# Patient Record
Sex: FEMALE | Race: BLACK OR AFRICAN AMERICAN | NOT HISPANIC OR LATINO | Employment: OTHER | RURAL
[De-identification: names, ages, dates, MRNs, and addresses within clinical notes are randomized per-mention and may not be internally consistent; named-entity substitution may affect disease eponyms.]

---

## 2021-02-19 ENCOUNTER — HISTORICAL (OUTPATIENT)
Dept: ADMINISTRATIVE | Facility: HOSPITAL | Age: 66
End: 2021-02-19

## 2021-02-20 ENCOUNTER — HISTORICAL (OUTPATIENT)
Dept: ADMINISTRATIVE | Facility: HOSPITAL | Age: 66
End: 2021-02-20

## 2021-02-20 LAB
ALBUMIN SERPL BCP-MCNC: 3.7 G/DL (ref 3.5–5)
ALBUMIN/GLOB SERPL: 0.8 {RATIO}
ALP SERPL-CCNC: 96 U/L (ref 50–130)
ALT SERPL W P-5'-P-CCNC: 24 U/L (ref 13–56)
ANION GAP SERPL CALCULATED.3IONS-SCNC: 14 MMOL/L
AST SERPL W P-5'-P-CCNC: 19 U/L (ref 15–37)
BACTERIA #/AREA URNS HPF: ABNORMAL /HPF
BASOPHILS # BLD AUTO: 0.04 X10E3/UL (ref 0–0.2)
BASOPHILS NFR BLD AUTO: 0.3 % (ref 0–1)
BILIRUB SERPL-MCNC: 0.8 MG/DL (ref 0–1.2)
BILIRUB UR QL STRIP: NEGATIVE MG/DL
BUN SERPL-MCNC: 26 MG/DL (ref 7–18)
BUN/CREAT SERPL: 12.2
CALCIUM SERPL-MCNC: 9.8 MG/DL (ref 8.5–10.1)
CHLORIDE SERPL-SCNC: 104 MMOL/L (ref 98–107)
CLARITY UR: ABNORMAL
CLARITY UR: ABNORMAL
CO2 SERPL-SCNC: 28 MMOL/L (ref 21–32)
COLOR UR: YELLOW
COLOR UR: YELLOW
CREAT SERPL-MCNC: 2.13 MG/DL (ref 0.55–1.02)
EOSINOPHIL # BLD AUTO: 0.03 X10E3/UL (ref 0–0.5)
EOSINOPHIL NFR BLD AUTO: 0.2 % (ref 1–4)
ERYTHROCYTE [DISTWIDTH] IN BLOOD BY AUTOMATED COUNT: 14.4 % (ref 11.5–14.5)
GLOBULIN SER-MCNC: 4.9 G/DL (ref 2–4)
GLUCOSE SERPL-MCNC: 133 MG/DL (ref 74–106)
GLUCOSE UR STRIP-MCNC: NEGATIVE MG/DL
HCT VFR BLD AUTO: 43.6 % (ref 38–47)
HGB BLD-MCNC: 14.1 G/DL (ref 12–16)
IMM GRANULOCYTES # BLD AUTO: 0.08 X10E3/UL (ref 0–0.04)
IMM GRANULOCYTES NFR BLD: 0.5 % (ref 0–0.4)
KETONES UR STRIP-SCNC: NEGATIVE MG/DL
LEUKOCYTE ESTERASE UR QL STRIP: ABNORMAL LEU/UL
LYMPHOCYTES # BLD AUTO: 1.91 X10E3/UL (ref 1–4.8)
LYMPHOCYTES NFR BLD AUTO: 12.1 % (ref 27–41)
MCH RBC QN AUTO: 29.6 PG (ref 27–31)
MCHC RBC AUTO-ENTMCNC: 32.3 G/DL (ref 32–36)
MCV RBC AUTO: 91.6 FL (ref 80–96)
MONOCYTES # BLD AUTO: 1 X10E3/UL (ref 0–0.8)
MONOCYTES NFR BLD AUTO: 6.3 % (ref 2–6)
MPC BLD CALC-MCNC: 11.5 FL (ref 9.4–12.4)
MUCOUS THREADS #/AREA URNS HPF: ABNORMAL /HPF
NEUTROPHILS # BLD AUTO: 12.72 X10E3/UL (ref 1.8–7.7)
NEUTROPHILS NFR BLD AUTO: 80.6 % (ref 53–65)
NITRITE UR QL STRIP: NEGATIVE
NRBC # BLD AUTO: 0 X10E3/UL (ref 0–0)
NRBC, AUTO (.00): 0 /100 (ref 0–0)
PH UR STRIP: 5.5 PH UNITS (ref 5–8)
PLATELET # BLD AUTO: 280 X10E3/UL (ref 150–400)
POTASSIUM SERPL-SCNC: 3.8 MMOL/L (ref 3.5–5.1)
PROT SERPL-MCNC: 8.6 G/DL (ref 6.4–8.2)
PROT UR QL STRIP: NEGATIVE MG/DL
RBC # BLD AUTO: 4.76 X10E6/UL (ref 4.2–5.4)
RBC # UR STRIP: ABNORMAL ERY/UL
RBC #/AREA URNS HPF: ABNORMAL /HPF (ref 0–3)
SODIUM SERPL-SCNC: 142 MMOL/L (ref 136–145)
SP GR UR STRIP: 1.02 (ref 1–1.03)
SQUAMOUS #/AREA URNS LPF: ABNORMAL /LPF
TRICHOMONAS #/AREA URNS HPF: ABNORMAL /HPF
UROBILINOGEN UR STRIP-ACNC: 0.2 EU/DL
WBC # BLD AUTO: 15.78 X10E3/UL (ref 4.5–11)
WBC #/AREA URNS HPF: ABNORMAL /HPF (ref 0–5)
YEAST #/AREA URNS HPF: ABNORMAL /HPF

## 2021-02-22 LAB
REPORT: NO GROWTH
REPORT: NORMAL

## 2021-02-23 ENCOUNTER — HISTORICAL (OUTPATIENT)
Dept: ADMINISTRATIVE | Facility: HOSPITAL | Age: 66
End: 2021-02-23

## 2021-06-29 VITALS
BODY MASS INDEX: 47.09 KG/M2 | OXYGEN SATURATION: 98 % | HEART RATE: 65 BPM | TEMPERATURE: 97 F | SYSTOLIC BLOOD PRESSURE: 130 MMHG | HEIGHT: 66 IN | WEIGHT: 293 LBS | RESPIRATION RATE: 18 BRPM | DIASTOLIC BLOOD PRESSURE: 68 MMHG

## 2021-06-29 RX ORDER — TRAZODONE HYDROCHLORIDE 50 MG/1
50 TABLET ORAL NIGHTLY
COMMUNITY
End: 2021-09-29 | Stop reason: SDUPTHER

## 2021-06-29 RX ORDER — LOSARTAN POTASSIUM AND HYDROCHLOROTHIAZIDE 12.5; 5 MG/1; MG/1
1 TABLET ORAL DAILY
COMMUNITY
End: 2021-06-30 | Stop reason: SDUPTHER

## 2021-06-29 RX ORDER — NAPROXEN SODIUM 220 MG/1
81 TABLET, FILM COATED ORAL DAILY
COMMUNITY
End: 2021-06-30 | Stop reason: SDUPTHER

## 2021-06-29 RX ORDER — ATORVASTATIN CALCIUM 10 MG/1
10 TABLET, FILM COATED ORAL DAILY
COMMUNITY
End: 2021-06-30 | Stop reason: SDUPTHER

## 2021-06-29 RX ORDER — FLUNISOLIDE 0.25 MG/ML
2 SOLUTION NASAL 2 TIMES DAILY
COMMUNITY
End: 2021-06-30

## 2021-06-29 RX ORDER — ALBUTEROL SULFATE 90 UG/1
2 AEROSOL, METERED RESPIRATORY (INHALATION) EVERY 6 HOURS PRN
COMMUNITY
End: 2021-09-29 | Stop reason: SDUPTHER

## 2021-06-29 RX ORDER — ACETAMINOPHEN 500 MG
1 TABLET ORAL DAILY
COMMUNITY
End: 2021-06-30 | Stop reason: SDUPTHER

## 2021-06-29 RX ORDER — KETOROLAC TROMETHAMINE 10 MG/1
10 TABLET, FILM COATED ORAL EVERY 8 HOURS PRN
COMMUNITY
End: 2021-06-30

## 2021-06-30 ENCOUNTER — OFFICE VISIT (OUTPATIENT)
Dept: FAMILY MEDICINE | Facility: CLINIC | Age: 66
End: 2021-06-30
Payer: COMMERCIAL

## 2021-06-30 VITALS
OXYGEN SATURATION: 98 % | SYSTOLIC BLOOD PRESSURE: 167 MMHG | HEART RATE: 73 BPM | RESPIRATION RATE: 18 BRPM | WEIGHT: 293 LBS | DIASTOLIC BLOOD PRESSURE: 85 MMHG | BODY MASS INDEX: 50.16 KG/M2

## 2021-06-30 DIAGNOSIS — I10 ESSENTIAL HYPERTENSION: ICD-10-CM

## 2021-06-30 DIAGNOSIS — F51.01 PRIMARY INSOMNIA: ICD-10-CM

## 2021-06-30 DIAGNOSIS — E55.9 VITAMIN D DEFICIENCY: ICD-10-CM

## 2021-06-30 DIAGNOSIS — E78.2 MIXED HYPERLIPIDEMIA: ICD-10-CM

## 2021-06-30 LAB
ALBUMIN SERPL BCP-MCNC: 3.5 G/DL (ref 3.5–5)
ALBUMIN/GLOB SERPL: 0.8 {RATIO}
ALP SERPL-CCNC: 115 U/L (ref 50–130)
ALT SERPL W P-5'-P-CCNC: 23 U/L (ref 13–56)
ANION GAP SERPL CALCULATED.3IONS-SCNC: 9 MMOL/L (ref 7–16)
AST SERPL W P-5'-P-CCNC: 17 U/L (ref 15–37)
BILIRUB SERPL-MCNC: 0.6 MG/DL (ref 0–1.2)
BUN SERPL-MCNC: 20 MG/DL (ref 7–18)
BUN/CREAT SERPL: 16 (ref 6–20)
CALCIUM SERPL-MCNC: 9.1 MG/DL (ref 8.5–10.1)
CHLORIDE SERPL-SCNC: 106 MMOL/L (ref 98–107)
CHOLEST SERPL-MCNC: 161 MG/DL (ref 0–200)
CHOLEST/HDLC SERPL: 2.1 {RATIO}
CO2 SERPL-SCNC: 30 MMOL/L (ref 21–32)
CREAT SERPL-MCNC: 1.22 MG/DL (ref 0.55–1.02)
GLOBULIN SER-MCNC: 4.6 G/DL (ref 2–4)
GLUCOSE SERPL-MCNC: 116 MG/DL (ref 74–106)
HDLC SERPL-MCNC: 76 MG/DL (ref 40–60)
LDLC SERPL CALC-MCNC: 70 MG/DL
LDLC/HDLC SERPL: 0.9 {RATIO}
NONHDLC SERPL-MCNC: 85 MG/DL
POTASSIUM SERPL-SCNC: 3.7 MMOL/L (ref 3.5–5.1)
PROT SERPL-MCNC: 8.1 G/DL (ref 6.4–8.2)
SODIUM SERPL-SCNC: 141 MMOL/L (ref 136–145)
TRIGL SERPL-MCNC: 76 MG/DL (ref 35–150)
VLDLC SERPL-MCNC: 15 MG/DL

## 2021-06-30 PROCEDURE — 99214 PR OFFICE/OUTPT VISIT, EST, LEVL IV, 30-39 MIN: ICD-10-PCS | Mod: ,,, | Performed by: FAMILY MEDICINE

## 2021-06-30 PROCEDURE — 99214 OFFICE O/P EST MOD 30 MIN: CPT | Mod: ,,, | Performed by: FAMILY MEDICINE

## 2021-06-30 PROCEDURE — 80053 COMPREHENSIVE METABOLIC PANEL: ICD-10-PCS | Mod: ,,, | Performed by: CLINICAL MEDICAL LABORATORY

## 2021-06-30 PROCEDURE — 80061 LIPID PANEL: ICD-10-PCS | Mod: ,,, | Performed by: CLINICAL MEDICAL LABORATORY

## 2021-06-30 PROCEDURE — 80061 LIPID PANEL: CPT | Mod: ,,, | Performed by: CLINICAL MEDICAL LABORATORY

## 2021-06-30 PROCEDURE — 80053 COMPREHEN METABOLIC PANEL: CPT | Mod: ,,, | Performed by: CLINICAL MEDICAL LABORATORY

## 2021-06-30 RX ORDER — POTASSIUM CHLORIDE 750 MG/1
10 TABLET, EXTENDED RELEASE ORAL DAILY
Qty: 90 TABLET | Refills: 1 | Status: SHIPPED | OUTPATIENT
Start: 2021-06-30 | End: 2021-09-29 | Stop reason: SDUPTHER

## 2021-06-30 RX ORDER — LOSARTAN POTASSIUM AND HYDROCHLOROTHIAZIDE 12.5; 5 MG/1; MG/1
1 TABLET ORAL DAILY
Qty: 90 TABLET | Refills: 1 | Status: SHIPPED | OUTPATIENT
Start: 2021-06-30 | End: 2021-09-29 | Stop reason: SDUPTHER

## 2021-06-30 RX ORDER — POTASSIUM CHLORIDE 1500 MG/1
1 TABLET, EXTENDED RELEASE ORAL DAILY
COMMUNITY
Start: 2021-03-03 | End: 2021-06-30

## 2021-06-30 RX ORDER — NAPROXEN SODIUM 220 MG/1
81 TABLET, FILM COATED ORAL DAILY
Qty: 90 TABLET | Refills: 1 | Status: SHIPPED | OUTPATIENT
Start: 2021-06-30 | End: 2021-09-29 | Stop reason: SDUPTHER

## 2021-06-30 RX ORDER — ACETAMINOPHEN 500 MG
1 TABLET ORAL DAILY
Qty: 90 CAPSULE | Refills: 1 | Status: SHIPPED | OUTPATIENT
Start: 2021-06-30 | End: 2021-09-29 | Stop reason: SDUPTHER

## 2021-06-30 RX ORDER — ATORVASTATIN CALCIUM 10 MG/1
10 TABLET, FILM COATED ORAL DAILY
Qty: 90 TABLET | Refills: 1 | Status: SHIPPED | OUTPATIENT
Start: 2021-06-30 | End: 2021-09-29 | Stop reason: SDUPTHER

## 2021-09-29 ENCOUNTER — OFFICE VISIT (OUTPATIENT)
Dept: FAMILY MEDICINE | Facility: CLINIC | Age: 66
End: 2021-09-29
Payer: MEDICARE

## 2021-09-29 VITALS
HEIGHT: 66 IN | BODY MASS INDEX: 47.09 KG/M2 | RESPIRATION RATE: 18 BRPM | DIASTOLIC BLOOD PRESSURE: 80 MMHG | SYSTOLIC BLOOD PRESSURE: 142 MMHG | HEART RATE: 72 BPM | TEMPERATURE: 98 F | WEIGHT: 293 LBS | OXYGEN SATURATION: 98 %

## 2021-09-29 DIAGNOSIS — R09.89 CHEST CONGESTION: ICD-10-CM

## 2021-09-29 DIAGNOSIS — I10 ESSENTIAL HYPERTENSION: Primary | ICD-10-CM

## 2021-09-29 DIAGNOSIS — Z76.0 MEDICATION REFILL: ICD-10-CM

## 2021-09-29 DIAGNOSIS — E55.9 VITAMIN D DEFICIENCY: ICD-10-CM

## 2021-09-29 DIAGNOSIS — J98.01 BRONCHOSPASM: ICD-10-CM

## 2021-09-29 DIAGNOSIS — F51.01 PRIMARY INSOMNIA: ICD-10-CM

## 2021-09-29 DIAGNOSIS — J30.2 SEASONAL ALLERGIES: ICD-10-CM

## 2021-09-29 DIAGNOSIS — F41.1 GENERALIZED ANXIETY DISORDER: ICD-10-CM

## 2021-09-29 DIAGNOSIS — E78.2 MIXED HYPERLIPIDEMIA: ICD-10-CM

## 2021-09-29 DIAGNOSIS — E87.6 HYPOKALEMIA: ICD-10-CM

## 2021-09-29 PROCEDURE — 99213 PR OFFICE/OUTPT VISIT, EST, LEVL III, 20-29 MIN: ICD-10-PCS | Mod: ,,, | Performed by: NURSE PRACTITIONER

## 2021-09-29 PROCEDURE — 99213 OFFICE O/P EST LOW 20 MIN: CPT | Mod: ,,, | Performed by: NURSE PRACTITIONER

## 2021-09-29 RX ORDER — SERTRALINE HYDROCHLORIDE 25 MG/1
25 TABLET, FILM COATED ORAL DAILY
Qty: 90 TABLET | Refills: 1 | Status: SHIPPED | OUTPATIENT
Start: 2021-09-29 | End: 2021-12-23 | Stop reason: SDUPTHER

## 2021-09-29 RX ORDER — POTASSIUM CHLORIDE 750 MG/1
10 TABLET, EXTENDED RELEASE ORAL DAILY
Qty: 90 TABLET | Refills: 1 | Status: SHIPPED | OUTPATIENT
Start: 2021-09-29 | End: 2021-12-23 | Stop reason: SDUPTHER

## 2021-09-29 RX ORDER — LOSARTAN POTASSIUM AND HYDROCHLOROTHIAZIDE 12.5; 5 MG/1; MG/1
1 TABLET ORAL DAILY
Qty: 90 TABLET | Refills: 1 | Status: SHIPPED | OUTPATIENT
Start: 2021-09-29 | End: 2021-12-23 | Stop reason: SDUPTHER

## 2021-09-29 RX ORDER — ALBUTEROL SULFATE 90 UG/1
2 AEROSOL, METERED RESPIRATORY (INHALATION) EVERY 6 HOURS PRN
Qty: 18 G | Refills: 5 | Status: SHIPPED | OUTPATIENT
Start: 2021-09-29 | End: 2021-12-23 | Stop reason: SDUPTHER

## 2021-09-29 RX ORDER — TRAZODONE HYDROCHLORIDE 50 MG/1
50 TABLET ORAL NIGHTLY
Qty: 90 TABLET | Refills: 1 | Status: SHIPPED | OUTPATIENT
Start: 2021-09-29 | End: 2021-12-23 | Stop reason: SDUPTHER

## 2021-09-29 RX ORDER — ACETAMINOPHEN 500 MG
1 TABLET ORAL DAILY
Qty: 90 CAPSULE | Refills: 1 | Status: SHIPPED | OUTPATIENT
Start: 2021-09-29 | End: 2021-12-23 | Stop reason: SDUPTHER

## 2021-09-29 RX ORDER — SERTRALINE HYDROCHLORIDE 25 MG/1
25 TABLET, FILM COATED ORAL DAILY
COMMUNITY
End: 2021-09-29 | Stop reason: SDUPTHER

## 2021-09-29 RX ORDER — AZITHROMYCIN 250 MG/1
TABLET, FILM COATED ORAL
Qty: 6 TABLET | Refills: 0 | Status: SHIPPED | OUTPATIENT
Start: 2021-09-29 | End: 2021-10-04

## 2021-09-29 RX ORDER — NAPROXEN SODIUM 220 MG/1
81 TABLET, FILM COATED ORAL DAILY
Qty: 90 TABLET | Refills: 1 | Status: SHIPPED | OUTPATIENT
Start: 2021-09-29 | End: 2023-05-15

## 2021-09-29 RX ORDER — FEXOFENADINE HCL 60 MG
60 TABLET ORAL DAILY
Qty: 90 TABLET | Refills: 1 | Status: SHIPPED | OUTPATIENT
Start: 2021-09-29 | End: 2021-12-23

## 2021-09-29 RX ORDER — ATORVASTATIN CALCIUM 10 MG/1
10 TABLET, FILM COATED ORAL DAILY
Qty: 90 TABLET | Refills: 1 | Status: SHIPPED | OUTPATIENT
Start: 2021-09-29 | End: 2021-12-23 | Stop reason: SDUPTHER

## 2021-12-23 ENCOUNTER — OFFICE VISIT (OUTPATIENT)
Dept: FAMILY MEDICINE | Facility: CLINIC | Age: 66
End: 2021-12-23
Payer: MEDICARE

## 2021-12-23 ENCOUNTER — APPOINTMENT (OUTPATIENT)
Dept: RADIOLOGY | Facility: CLINIC | Age: 66
End: 2021-12-23
Attending: FAMILY MEDICINE
Payer: MEDICARE

## 2021-12-23 VITALS
TEMPERATURE: 98 F | HEART RATE: 72 BPM | RESPIRATION RATE: 18 BRPM | SYSTOLIC BLOOD PRESSURE: 145 MMHG | WEIGHT: 293 LBS | BODY MASS INDEX: 47.09 KG/M2 | HEIGHT: 66 IN | DIASTOLIC BLOOD PRESSURE: 78 MMHG | OXYGEN SATURATION: 99 %

## 2021-12-23 DIAGNOSIS — E87.6 HYPOKALEMIA: ICD-10-CM

## 2021-12-23 DIAGNOSIS — J45.40 MODERATE PERSISTENT ASTHMA WITHOUT COMPLICATION: Primary | ICD-10-CM

## 2021-12-23 DIAGNOSIS — R05.9 COUGH: ICD-10-CM

## 2021-12-23 DIAGNOSIS — F51.01 PRIMARY INSOMNIA: ICD-10-CM

## 2021-12-23 DIAGNOSIS — E78.2 MIXED HYPERLIPIDEMIA: ICD-10-CM

## 2021-12-23 DIAGNOSIS — F41.1 GENERALIZED ANXIETY DISORDER: ICD-10-CM

## 2021-12-23 DIAGNOSIS — J98.01 BRONCHOSPASM: ICD-10-CM

## 2021-12-23 DIAGNOSIS — I10 ESSENTIAL HYPERTENSION: ICD-10-CM

## 2021-12-23 DIAGNOSIS — E55.9 VITAMIN D DEFICIENCY: ICD-10-CM

## 2021-12-23 LAB
ALBUMIN SERPL BCP-MCNC: 3.3 G/DL (ref 3.5–5)
ALBUMIN/GLOB SERPL: 0.7 {RATIO}
ALP SERPL-CCNC: 105 U/L (ref 55–142)
ALT SERPL W P-5'-P-CCNC: 24 U/L (ref 13–56)
ANION GAP SERPL CALCULATED.3IONS-SCNC: 10 MMOL/L (ref 7–16)
AST SERPL W P-5'-P-CCNC: 17 U/L (ref 15–37)
BILIRUB SERPL-MCNC: 0.7 MG/DL (ref 0–1.2)
BUN SERPL-MCNC: 17 MG/DL (ref 7–18)
BUN/CREAT SERPL: 15 (ref 6–20)
CALCIUM SERPL-MCNC: 9.3 MG/DL (ref 8.5–10.1)
CHLORIDE SERPL-SCNC: 105 MMOL/L (ref 98–107)
CHOLEST SERPL-MCNC: 152 MG/DL (ref 0–200)
CHOLEST/HDLC SERPL: 2.1 {RATIO}
CO2 SERPL-SCNC: 31 MMOL/L (ref 21–32)
CREAT SERPL-MCNC: 1.15 MG/DL (ref 0.55–1.02)
GLOBULIN SER-MCNC: 4.7 G/DL (ref 2–4)
GLUCOSE SERPL-MCNC: 135 MG/DL (ref 74–106)
HDLC SERPL-MCNC: 72 MG/DL (ref 40–60)
LDLC SERPL CALC-MCNC: 68 MG/DL
LDLC/HDLC SERPL: 0.9 {RATIO}
NONHDLC SERPL-MCNC: 80 MG/DL
POTASSIUM SERPL-SCNC: 3.6 MMOL/L (ref 3.5–5.1)
PROT SERPL-MCNC: 8 G/DL (ref 6.4–8.2)
SODIUM SERPL-SCNC: 142 MMOL/L (ref 136–145)
TRIGL SERPL-MCNC: 62 MG/DL (ref 35–150)
VLDLC SERPL-MCNC: 12 MG/DL

## 2021-12-23 PROCEDURE — 80061 LIPID PANEL: CPT | Mod: ,,, | Performed by: CLINICAL MEDICAL LABORATORY

## 2021-12-23 PROCEDURE — 80053 COMPREHEN METABOLIC PANEL: CPT | Mod: ,,, | Performed by: CLINICAL MEDICAL LABORATORY

## 2021-12-23 PROCEDURE — 80053 COMPREHENSIVE METABOLIC PANEL: ICD-10-PCS | Mod: ,,, | Performed by: CLINICAL MEDICAL LABORATORY

## 2021-12-23 PROCEDURE — 71046 X-RAY EXAM CHEST 2 VIEWS: CPT | Mod: TC,RHCUB,FY | Performed by: FAMILY MEDICINE

## 2021-12-23 PROCEDURE — 71046 XR CHEST PA AND LATERAL: ICD-10-PCS | Mod: 26,,, | Performed by: RADIOLOGY

## 2021-12-23 PROCEDURE — 71046 X-RAY EXAM CHEST 2 VIEWS: CPT | Mod: 26,,, | Performed by: RADIOLOGY

## 2021-12-23 PROCEDURE — 99214 PR OFFICE/OUTPT VISIT, EST, LEVL IV, 30-39 MIN: ICD-10-PCS | Mod: ,,, | Performed by: FAMILY MEDICINE

## 2021-12-23 PROCEDURE — 99214 OFFICE O/P EST MOD 30 MIN: CPT | Mod: ,,, | Performed by: FAMILY MEDICINE

## 2021-12-23 PROCEDURE — 80061 LIPID PANEL: ICD-10-PCS | Mod: ,,, | Performed by: CLINICAL MEDICAL LABORATORY

## 2021-12-23 RX ORDER — LOSARTAN POTASSIUM AND HYDROCHLOROTHIAZIDE 12.5; 5 MG/1; MG/1
1 TABLET ORAL DAILY
Qty: 90 TABLET | Refills: 1 | Status: SHIPPED | OUTPATIENT
Start: 2021-12-23 | End: 2021-12-23 | Stop reason: SDUPTHER

## 2021-12-23 RX ORDER — ALBUTEROL SULFATE 90 UG/1
2 AEROSOL, METERED RESPIRATORY (INHALATION) EVERY 6 HOURS PRN
Qty: 18 G | Refills: 5 | Status: SHIPPED | OUTPATIENT
Start: 2021-12-23 | End: 2022-12-15 | Stop reason: SDUPTHER

## 2021-12-23 RX ORDER — MONTELUKAST SODIUM 10 MG/1
10 TABLET ORAL NIGHTLY
Qty: 90 TABLET | Refills: 3 | Status: SHIPPED | OUTPATIENT
Start: 2021-12-23 | End: 2022-08-30

## 2021-12-23 RX ORDER — POTASSIUM CHLORIDE 750 MG/1
10 TABLET, EXTENDED RELEASE ORAL DAILY
Qty: 90 TABLET | Refills: 1 | Status: SHIPPED | OUTPATIENT
Start: 2021-12-23 | End: 2022-06-01

## 2021-12-23 RX ORDER — TRAZODONE HYDROCHLORIDE 100 MG/1
100 TABLET ORAL NIGHTLY
Qty: 90 TABLET | Refills: 1 | Status: SHIPPED | OUTPATIENT
Start: 2021-12-23 | End: 2022-05-31

## 2021-12-23 RX ORDER — ACETAMINOPHEN 500 MG
1 TABLET ORAL DAILY
Qty: 90 CAPSULE | Refills: 1 | Status: SHIPPED | OUTPATIENT
Start: 2021-12-23 | End: 2022-05-31

## 2021-12-23 RX ORDER — SERTRALINE HYDROCHLORIDE 25 MG/1
25 TABLET, FILM COATED ORAL DAILY
Qty: 90 TABLET | Refills: 1 | Status: SHIPPED | OUTPATIENT
Start: 2021-12-23 | End: 2022-08-30

## 2021-12-23 RX ORDER — ATORVASTATIN CALCIUM 10 MG/1
10 TABLET, FILM COATED ORAL DAILY
Qty: 90 TABLET | Refills: 1 | Status: SHIPPED | OUTPATIENT
Start: 2021-12-23 | End: 2022-12-15 | Stop reason: SDUPTHER

## 2021-12-23 RX ORDER — LOSARTAN POTASSIUM AND HYDROCHLOROTHIAZIDE 12.5; 5 MG/1; MG/1
1 TABLET ORAL DAILY
Qty: 90 TABLET | Refills: 1 | Status: SHIPPED | OUTPATIENT
Start: 2021-12-23 | End: 2022-03-17 | Stop reason: DRUGHIGH

## 2021-12-23 RX ORDER — CETIRIZINE HYDROCHLORIDE 10 MG/1
10 TABLET ORAL DAILY
Qty: 90 TABLET | Refills: 3 | Status: SHIPPED | OUTPATIENT
Start: 2021-12-23 | End: 2022-08-30

## 2022-03-11 DIAGNOSIS — Z71.89 COMPLEX CARE COORDINATION: ICD-10-CM

## 2022-03-17 ENCOUNTER — OFFICE VISIT (OUTPATIENT)
Dept: FAMILY MEDICINE | Facility: CLINIC | Age: 67
End: 2022-03-17
Payer: MEDICARE

## 2022-03-17 VITALS
RESPIRATION RATE: 20 BRPM | BODY MASS INDEX: 47.09 KG/M2 | HEART RATE: 72 BPM | OXYGEN SATURATION: 98 % | HEIGHT: 66 IN | TEMPERATURE: 98 F | SYSTOLIC BLOOD PRESSURE: 156 MMHG | DIASTOLIC BLOOD PRESSURE: 74 MMHG | WEIGHT: 293 LBS

## 2022-03-17 DIAGNOSIS — J32.9 SINUSITIS, UNSPECIFIED CHRONICITY, UNSPECIFIED LOCATION: ICD-10-CM

## 2022-03-17 DIAGNOSIS — I10 ESSENTIAL HYPERTENSION: ICD-10-CM

## 2022-03-17 DIAGNOSIS — Z11.59 ENCOUNTER FOR HEPATITIS C SCREENING TEST FOR LOW RISK PATIENT: ICD-10-CM

## 2022-03-17 DIAGNOSIS — Z00.00 ENCOUNTER FOR ANNUAL WELLNESS VISIT (AWV) IN MEDICARE PATIENT: Primary | ICD-10-CM

## 2022-03-17 DIAGNOSIS — Z78.0 ASYMPTOMATIC MENOPAUSAL STATE: ICD-10-CM

## 2022-03-17 DIAGNOSIS — J98.01 BRONCHOSPASM: ICD-10-CM

## 2022-03-17 DIAGNOSIS — Z12.31 ENCOUNTER FOR SCREENING MAMMOGRAM FOR MALIGNANT NEOPLASM OF BREAST: ICD-10-CM

## 2022-03-17 PROCEDURE — G0402 PR WELCOME MEDICARE PREVENTIVE VISIT NEW ENROLLEE: ICD-10-PCS | Mod: ,,, | Performed by: NURSE PRACTITIONER

## 2022-03-17 PROCEDURE — G0402 INITIAL PREVENTIVE EXAM: HCPCS | Mod: ,,, | Performed by: NURSE PRACTITIONER

## 2022-03-17 RX ORDER — LOSARTAN POTASSIUM AND HYDROCHLOROTHIAZIDE 25; 100 MG/1; MG/1
1 TABLET ORAL DAILY
Qty: 90 TABLET | Refills: 3 | Status: SHIPPED | OUTPATIENT
Start: 2022-03-17 | End: 2022-12-15 | Stop reason: SDUPTHER

## 2022-03-17 RX ORDER — BUDESONIDE AND FORMOTEROL FUMARATE DIHYDRATE 160; 4.5 UG/1; UG/1
2 AEROSOL RESPIRATORY (INHALATION) EVERY 12 HOURS
Qty: 10.2 G | Refills: 1 | Status: SHIPPED | OUTPATIENT
Start: 2022-03-17 | End: 2022-05-16

## 2022-03-17 RX ORDER — AZITHROMYCIN 250 MG/1
TABLET, FILM COATED ORAL
Qty: 6 TABLET | Refills: 0 | Status: SHIPPED | OUTPATIENT
Start: 2022-03-17 | End: 2022-03-23

## 2022-03-17 NOTE — PROGRESS NOTES
[facility]     PATIENT NAME: Magan Saleem   : 1955    AGE: 66 y.o. DATE: 2022   MRN: [id number]        Reason for Visit / Chief Complaint: No chief complaint on file.        Magan Saleem presents for a [facility] [employer] AWV today.     The following components were reviewed and updated:    Medical/Social/Family History:  Past Medical History:   Diagnosis Date    Acquired absence of both cervix and uterus 2021    Asthma     History of colonoscopy 2020    REFUSAL    Hyperlipidemia     Hypertension     Melanocytic nevi of lower limb, including hip, left     Vitamin D deficiency         Family History   Problem Relation Age of Onset    Diabetes Mother     Hypertension Mother     Lung cancer Father         Social History     Tobacco Use   Smoking Status Never Smoker   Smokeless Tobacco Never Used      Social History     Substance and Sexual Activity   Alcohol Use Never       Family History   Problem Relation Age of Onset    Diabetes Mother     Hypertension Mother     Lung cancer Father        Past Surgical History:   Procedure Laterality Date    cerebral shunt      CHOLECYSTECTOMY      HYSTERECTOMY      TONSILLECTOMY           · Allergies and Current Medications     Review of patient's allergies indicates:   Allergen Reactions    Pcn [penicillins]     Sulfa (sulfonamide antibiotics)        Current Outpatient Medications:     albuterol (VENTOLIN HFA) 90 mcg/actuation inhaler, Inhale 2 puffs into the lungs every 6 (six) hours as needed for Wheezing. Rescue, Disp: 18 g, Rfl: 5    aspirin 81 MG Chew, Take 1 tablet (81 mg total) by mouth once daily., Disp: 90 tablet, Rfl: 1    atorvastatin (LIPITOR) 10 MG tablet, Take 1 tablet (10 mg total) by mouth once daily., Disp: 90 tablet, Rfl: 1    cetirizine (ZYRTEC) 10 MG tablet, Take 1 tablet (10 mg total) by mouth once daily., Disp: 90 tablet, Rfl: 3    cholecalciferol, vitamin D3, (VITAMIN D3) 50 mcg  (2,000 unit) Cap, Take 1 capsule (2,000 Units total) by mouth once daily., Disp: 90 capsule, Rfl: 1    montelukast (SINGULAIR) 10 mg tablet, Take 1 tablet (10 mg total) by mouth every evening., Disp: 90 tablet, Rfl: 3    potassium chloride SA (KLOR-CON M10) 10 MEQ tablet, Take 1 tablet (10 mEq total) by mouth once daily., Disp: 90 tablet, Rfl: 1    sertraline (ZOLOFT) 25 MG tablet, Take 1 tablet (25 mg total) by mouth once daily., Disp: 90 tablet, Rfl: 1    traZODone (DESYREL) 100 MG tablet, Take 1 tablet (100 mg total) by mouth every evening. 0.5 - 1 tablet by mouth at bedtime for sleep, Disp: 90 tablet, Rfl: 1    azithromycin (Z-AMNA) 250 MG tablet, Take 2 tablets by mouth on day 1; Take 1 tablet by mouth on days 2-5, Disp: 6 tablet, Rfl: 0    budesonide-formoterol 160-4.5 mcg (SYMBICORT) 160-4.5 mcg/actuation HFAA, Inhale 2 puffs into the lungs every 12 (twelve) hours. Controller, Disp: 10.2 g, Rfl: 1    losartan-hydrochlorothiazide 100-25 mg (HYZAAR) 100-25 mg per tablet, Take 1 tablet by mouth once daily., Disp: 90 tablet, Rfl: 3    · Health Risk Assessment   Fall Risk: No   Obesity: BMI 50.69. Education given     Advance Directive:  Does not have an advanced directive. Verbal education and written education included in today's AVS.   Depression: PHQ9 2    HTN:   yes, DASH diet, exercise, weight management, med compliance, home BP monitoring, and follow-up discussed.   Tobacco use: Denies  STI: NA    Alcohol misuse: Denies   Statin Use: Yes      · Health Risk Assessment  What [age] your age?: 65-69  Are you male or female?: Female  During the past four weeks, how much have you been bothered by emotional problems such as feeling anxious, depressed, irritable, sad, or downhearted and blue?: Not at all  During the past five weeks, has your physical and/or emotional health limited your social activities with family, friends, neighbors, or groups?: Not at all  During the past four weeks, how much bodily pain have  you generally had?: Mild pain  During the past four weeks, was someone available to help if you needed and wanted help?: Yes, as much as I wanted  During the past four weeks, what was the hardest physical activity you could do for at least two minutes?: Light  Can you get to places out of walking distance without help?  (For example, can you travel alone on buses or taxis, or drive your own car?): Yes  Can you go shopping for groceries or clothes without someone's help?: Yes  Can you prepare your own meals?: Yes  Can you do your own housework without help?: Yes  Because of any health problems, do you need the help of another person with your personal care needs such as eating, bathing, dressing, or getting around the house?: No  Can you handle your own money without help?: Yes  During the past four weeks, how would you rate your health in general?: Good  How have things been going for you during the past four weeks?: Pretty well  Are you having difficulties driving your car?: No  Do you always fasten your seat belt when you are in a car?: Yes, usually  How often in the past four weeks have you been bothered by falling or dizzy when standing up?: Seldom  How often in the past four weeks have you been bothered by sexual problems?: Never  How often in the past four weeks have you been bothered by trouble eating well?: Never  How often in the past four weeks have you been bothered by teeth or denture problems?: Never  How often in the past four weeks have you been bothered with problems using the telephone?: Never  How often in the past four weeks have you been bothered by tiredness or fatigue?: Seldom  Have you fallen two or more times in the past year?: No  Are you afraid of falling?: Yes  Are you a smoker?: No  During the past four weeks, how many drinks of wine, beer, or other alcoholic beverages did you have?: No alcohol at all  Do you exercise for about 20 minutes three or more days a week?: No, I usually do not  exercise this much  Have you been given any information to help you with hazards in your house that might hurt you?: No  Have you been given any information to help you with keeping track of your medications?: No  How often do you have trouble taking medicines the way you've been told to take them?: I always take them as prescribed  How confident are you that you can control and manage most of your health problems?: Somewhat confident  What is your race? (Check all that apply.):     · Health Maintenance   Last eye exam: 2022   Last CV screen with lipids: 12/23/2021   Diabetes screening with fasting glucose or A1c: 12/23/2021   Colonoscopy: Cologuard 03/12/2021   Flu Vaccine: Declined   Pneumonia vaccines: Declined today   COVID vaccine: Pfizer 08/06/2021 08/27/2021   Hep B vaccine: NA   DEXA: Due   Last pap/pelvic: Pt had total hysterectomy   Last Mammogram: 02/23/2021   Last PSA screen: NA   AAA screening: NA (once in lifetime for males 65-75 who have smoked > 100 cigarettes in lifetime)  HIV Screeing: Declined  Hepatitis C Screen: Eligible  Low Dose CT Scan: NA    Health Maintenance Topics with due status: Not Due       Topic Last Completion Date    Colorectal Cancer Screening 03/12/2021    Lipid Panel 12/23/2021     Health Maintenance Due   Topic Date Due    Hepatitis C Screening  Never done    DEXA Scan  Never done    Pneumococcal Vaccines (Age 65+) (1 of 1 - PPSV23) Never done    COVID-19 Vaccine (3 - Booster for Pfizer series) 01/27/2022    Mammogram  02/23/2022        Incontinence  Bowel: No  Bladder: No    Lab results available in Epic or see dates from [facility] above:   Lab Results   Component Value Date    CHOL 152 12/23/2021    CHOL 161 06/30/2021     Lab Results   Component Value Date    HDL 72 (H) 12/23/2021    HDL 76 (H) 06/30/2021     Lab Results   Component Value Date    LDLCALC 68 12/23/2021    LDLCALC 70 06/30/2021     Lab Results   Component Value Date    TRIG 62 12/23/2021     TRIG 76 06/30/2021     Lab Results   Component Value Date    CHOLHDL 2.1 12/23/2021    CHOLHDL 2.1 06/30/2021       No results found for: LABA1C, HGBA1C    Sodium   Date Value Ref Range Status   12/23/2021 142 136 - 145 mmol/L Final     Potassium   Date Value Ref Range Status   12/23/2021 3.6 3.5 - 5.1 mmol/L Final     Chloride   Date Value Ref Range Status   12/23/2021 105 98 - 107 mmol/L Final     CO2   Date Value Ref Range Status   12/23/2021 31 21 - 32 mmol/L Final     Glucose   Date Value Ref Range Status   12/23/2021 135 (H) 74 - 106 mg/dL Final     BUN   Date Value Ref Range Status   12/23/2021 17 7 - 18 mg/dL Final     Creatinine   Date Value Ref Range Status   12/23/2021 1.15 (H) 0.55 - 1.02 mg/dL Final     Calcium   Date Value Ref Range Status   12/23/2021 9.3 8.5 - 10.1 mg/dL Final     Total Protein   Date Value Ref Range Status   12/23/2021 8.0 6.4 - 8.2 g/dL Final     Albumin   Date Value Ref Range Status   12/23/2021 3.3 (L) 3.5 - 5.0 g/dL Final     Bilirubin, Total   Date Value Ref Range Status   12/23/2021 0.7 0.0 - 1.2 mg/dL Final     Alk Phos   Date Value Ref Range Status   12/23/2021 105 55 - 142 U/L Final     AST   Date Value Ref Range Status   12/23/2021 17 15 - 37 U/L Final     ALT   Date Value Ref Range Status   12/23/2021 24 13 - 56 U/L Final     Anion Gap   Date Value Ref Range Status   12/23/2021 10 7 - 16 mmol/L Final     eGFR    Date Value Ref Range Status   06/30/2021 57 (L) >=60 mL/min/1.73m² Final     eGFR   Date Value Ref Range Status   12/23/2021 50 (L) >=60 mL/min/1.73m² Final                 · Care Team   PCP:  Dr. Sharp    Eye specialist: Dr. Domingo         **See Completed Assessments for Annual Wellness visit within the encounter summary    The following assessments were completed & reviewed:  · Depression Screening  · Cognitive function Screening  · Timed Get Up Test  · Whisper Test  · Vision Screen  · Health Risk Assessment  · Checklist of ADLs and  "IADLs      Objective  Vitals:    03/17/22 0833   BP: (!) 156/74   Pulse: 72   Resp: 20   Temp: 98 °F (36.7 °C)   SpO2: 98%   Weight: (!) 142.5 kg (314 lb 1 oz)   Height: 5' 6" (1.676 m)   PainSc:   5   PainLoc: Back      Body mass index is 50.69 kg/m².  Ideal body weight: 59.3 kg (130 lb 11.7 oz)       Physical Exam  Constitutional:       General: She is not in acute distress.  HENT:      Head: Normocephalic.      Nose: Nose normal. No congestion.      Mouth/Throat:      Mouth: Mucous membranes are moist.   Eyes:      Extraocular Movements: Extraocular movements intact.   Cardiovascular:      Rate and Rhythm: Normal rate.   Pulmonary:      Effort: Pulmonary effort is normal. No respiratory distress.      Breath sounds: Wheezing present.   Abdominal:      General: Bowel sounds are normal.      Palpations: Abdomen is soft.   Musculoskeletal:         General: Normal range of motion.      Cervical back: Neck supple.   Skin:     General: Skin is warm.   Neurological:      Mental Status: She is alert and oriented to person, place, and time.   Psychiatric:         Behavior: Behavior normal.           Assessment:     1. Encounter for annual wellness visit (AWV) in Medicare patient    2. Body mass index 50.0-59.9, adult    3. Asymptomatic menopausal state  - DXA Bone Density Spine And Hip; Future    4. Encounter for screening mammogram for malignant neoplasm of breast  - Mammo Digital Screening Bilat; Future    5. Encounter for hepatitis C screening test for low risk patient  - Hepatitis C Antibody; Future    6. Bronchospasm  - budesonide-formoterol 160-4.5 mcg (SYMBICORT) 160-4.5 mcg/actuation HFAA; Inhale 2 puffs into the lungs every 12 (twelve) hours. Controller  Dispense: 10.2 g; Refill: 1    7. Essential hypertension  - losartan-hydrochlorothiazide 100-25 mg (HYZAAR) 100-25 mg per tablet; Take 1 tablet by mouth once daily.  Dispense: 90 tablet; Refill: 3    8. Sinusitis, unspecified chronicity, unspecified location  - " azithromycin (Z-AMNA) 250 MG tablet; Take 2 tablets by mouth on day 1; Take 1 tablet by mouth on days 2-5  Dispense: 6 tablet; Refill: 0         Plan:    Referrals:   Mammogram and Bone Density Manuel  IBT For Obesity- appt made for 03/23/2021 at 10:00    Advised to call office if does not hear from anyone with referral appt within 2-3 weeks to check on status of referral. Voiced understanding.      Discussed and provided with a screening schedule and personal prevention plan in accordance with USPSTF age appropriate recommendations and Medicare screening guidelines.   Education, counseling, and referrals were provided as needed.  After Visit Summary printed and given to patient which includes written education and a list of any referrals if indicated.     F/u plan for yearly AWV.    Treated for sinusitis and bronchospasm with exam. Follow up on this about 6 months  Blood pressure is elevated; will increase current medication for hypertension    Signature:

## 2022-03-23 ENCOUNTER — OFFICE VISIT (OUTPATIENT)
Dept: FAMILY MEDICINE | Facility: CLINIC | Age: 67
End: 2022-03-23
Payer: MEDICARE

## 2022-03-23 VITALS
RESPIRATION RATE: 20 BRPM | WEIGHT: 293 LBS | HEART RATE: 77 BPM | DIASTOLIC BLOOD PRESSURE: 67 MMHG | BODY MASS INDEX: 47.09 KG/M2 | SYSTOLIC BLOOD PRESSURE: 159 MMHG | OXYGEN SATURATION: 97 % | HEIGHT: 66 IN | TEMPERATURE: 98 F

## 2022-03-23 DIAGNOSIS — J45.40 MODERATE PERSISTENT ASTHMA WITHOUT COMPLICATION: ICD-10-CM

## 2022-03-23 DIAGNOSIS — E63.9 INADEQUATE NUTRITION: Primary | ICD-10-CM

## 2022-03-23 DIAGNOSIS — E66.9 OBESITY, UNSPECIFIED CLASSIFICATION, UNSPECIFIED OBESITY TYPE, UNSPECIFIED WHETHER SERIOUS COMORBIDITY PRESENT: ICD-10-CM

## 2022-03-23 PROCEDURE — 99213 PR OFFICE/OUTPT VISIT, EST, LEVL III, 20-29 MIN: ICD-10-PCS | Mod: ,,, | Performed by: FAMILY MEDICINE

## 2022-03-23 PROCEDURE — 99213 OFFICE O/P EST LOW 20 MIN: CPT | Mod: ,,, | Performed by: FAMILY MEDICINE

## 2022-03-23 RX ORDER — IPRATROPIUM BROMIDE AND ALBUTEROL SULFATE 2.5; .5 MG/3ML; MG/3ML
3 SOLUTION RESPIRATORY (INHALATION) EVERY 6 HOURS PRN
Qty: 75 ML | Refills: 0 | Status: SHIPPED | OUTPATIENT
Start: 2022-03-23 | End: 2022-12-15

## 2022-03-23 NOTE — PROGRESS NOTES
Anabel Sharp MD        PATIENT NAME: Magan Saleem  : 1955  DATE: 3/23/22  MRN: [id number]      Billing Provider: Anabel Sharp MD  Level of Service:   Patient PCP Information     Provider PCP Type    Anabel Sharp MD General          Reason for Visit / Chief Complaint: Nutrition Counseling (Diet and weight loss)       History of Present Illness      Magan Saleem presents to the clinic with Nutrition Counseling (Diet and weight loss)     Goal weight 250    Currently 309 lbs    She is ready to loose weight    She is not able to exercise, due to lower back pain, she is willing and able to change diet    She also comes in for asthma, she continues to have some coughing although she is doing better that she was last week, she is taking singular, pulmicort, cetirizine       Review of Systems     Review of Systems   Constitutional: Negative for activity change and unexpected weight change.   HENT: Negative for hearing loss, rhinorrhea and trouble swallowing.    Eyes: Negative for discharge and visual disturbance.   Respiratory: Negative for chest tightness and wheezing.    Cardiovascular: Negative for chest pain and palpitations.   Gastrointestinal: Negative for blood in stool, constipation, diarrhea and vomiting.   Endocrine: Negative for polydipsia and polyuria.   Genitourinary: Negative for difficulty urinating, dysuria, hematuria and menstrual problem.   Musculoskeletal: Negative for arthralgias, joint swelling and neck pain.   Neurological: Negative for weakness and headaches.   Psychiatric/Behavioral: Negative for confusion and dysphoric mood.       Medical / Social / Family History     Past Medical History:   Diagnosis Date    Acquired absence of both cervix and uterus 2021    Asthma     History of colonoscopy 2020    REFUSAL    Hyperlipidemia     Hypertension     Melanocytic nevi of lower limb, including hip, left     Vitamin D deficiency        Past Surgical History:    Procedure Laterality Date    cerebral shunt      CHOLECYSTECTOMY      HYSTERECTOMY      TONSILLECTOMY         Social History  Ms.  reports that she has never smoked. She has never used smokeless tobacco. She reports that she does not drink alcohol and does not use drugs.    Family History  Ms.'s family history includes Diabetes in her mother; Hypertension in her mother; Lung cancer in her father.    Medications and Allergies     Medications  Outpatient Medications Marked as Taking for the 3/23/22 encounter (Office Visit) with Anabel Sharp MD   Medication Sig Dispense Refill    albuterol (VENTOLIN HFA) 90 mcg/actuation inhaler Inhale 2 puffs into the lungs every 6 (six) hours as needed for Wheezing. Rescue 18 g 5    aspirin 81 MG Chew Take 1 tablet (81 mg total) by mouth once daily. 90 tablet 1    atorvastatin (LIPITOR) 10 MG tablet Take 1 tablet (10 mg total) by mouth once daily. 90 tablet 1    budesonide-formoterol 160-4.5 mcg (SYMBICORT) 160-4.5 mcg/actuation HFAA Inhale 2 puffs into the lungs every 12 (twelve) hours. Controller 10.2 g 1    cetirizine (ZYRTEC) 10 MG tablet Take 1 tablet (10 mg total) by mouth once daily. 90 tablet 3    cholecalciferol, vitamin D3, (VITAMIN D3) 50 mcg (2,000 unit) Cap Take 1 capsule (2,000 Units total) by mouth once daily. 90 capsule 1    losartan-hydrochlorothiazide 100-25 mg (HYZAAR) 100-25 mg per tablet Take 1 tablet by mouth once daily. 90 tablet 3    montelukast (SINGULAIR) 10 mg tablet Take 1 tablet (10 mg total) by mouth every evening. 90 tablet 3    potassium chloride SA (KLOR-CON M10) 10 MEQ tablet Take 1 tablet (10 mEq total) by mouth once daily. 90 tablet 1    sertraline (ZOLOFT) 25 MG tablet Take 1 tablet (25 mg total) by mouth once daily. 90 tablet 1    traZODone (DESYREL) 100 MG tablet Take 1 tablet (100 mg total) by mouth every evening. 0.5 - 1 tablet by mouth at bedtime for sleep 90 tablet 1       Allergies  Review of patient's allergies  "indicates:   Allergen Reactions    Pcn [penicillins]     Sulfa (sulfonamide antibiotics)        Physical Examination   BP (!) 159/67 (BP Location: Left arm, Patient Position: Sitting, BP Method: Medium (Automatic))   Pulse 77   Temp 97.8 °F (36.6 °C) (Temporal)   Resp 20   Ht 5' 6" (1.676 m)   Wt (!) 140.5 kg (309 lb 12.8 oz)   SpO2 97%   BMI 50.00 kg/m²     Physical Exam  Constitutional:       Appearance: Normal appearance. She is obese.   Cardiovascular:      Rate and Rhythm: Normal rate and regular rhythm.      Pulses: Normal pulses.      Heart sounds: Normal heart sounds.   Musculoskeletal:         General: Normal range of motion.   Skin:     General: Skin is warm.   Neurological:      General: No focal deficit present.      Mental Status: She is alert.   Psychiatric:         Mood and Affect: Mood normal.         Behavior: Behavior normal.         Judgment: Judgment normal.           Assessment and Plan (including Health Maintenance)     Plan:         Problem List Items Addressed This Visit    None     Visit Diagnoses     Inadequate nutrition    -  Primary    Obesity, unspecified classification, unspecified obesity type, unspecified whether serious comorbidity present        Moderate persistent asthma without complication        Relevant Medications    albuterol-ipratropium (DUO-NEB) 2.5 mg-0.5 mg/3 mL nebulizer solution        I ordered a nebulizer though the medical store, and blayne, continue other regiment, discussed chest PT    The pt will bring in logs in 2 weeks with foods that have been eaten so we can review them together and make changes as tolerated, today we discussed lower carbohydrate intake and more water intake    Follow up in about 2 weeks (around 4/6/2022).        Signature:  Anabel Sharp MD      Date of encounter: 3/23/22    "

## 2022-10-09 DIAGNOSIS — Z71.89 COMPLEX CARE COORDINATION: ICD-10-CM

## 2022-12-15 ENCOUNTER — OFFICE VISIT (OUTPATIENT)
Dept: FAMILY MEDICINE | Facility: CLINIC | Age: 67
End: 2022-12-15
Payer: MEDICARE

## 2022-12-15 ENCOUNTER — APPOINTMENT (OUTPATIENT)
Dept: RADIOLOGY | Facility: CLINIC | Age: 67
End: 2022-12-15
Attending: NURSE PRACTITIONER
Payer: MEDICARE

## 2022-12-15 VITALS
HEART RATE: 78 BPM | RESPIRATION RATE: 20 BRPM | OXYGEN SATURATION: 96 % | WEIGHT: 293 LBS | HEIGHT: 66 IN | SYSTOLIC BLOOD PRESSURE: 126 MMHG | DIASTOLIC BLOOD PRESSURE: 78 MMHG | TEMPERATURE: 98 F | BODY MASS INDEX: 47.09 KG/M2

## 2022-12-15 DIAGNOSIS — R93.89 CHEST X-RAY ABNORMALITY: ICD-10-CM

## 2022-12-15 DIAGNOSIS — E87.6 HYPOKALEMIA: ICD-10-CM

## 2022-12-15 DIAGNOSIS — J98.4 LUNG DENSITY ON X-RAY: ICD-10-CM

## 2022-12-15 DIAGNOSIS — F41.1 GENERALIZED ANXIETY DISORDER: ICD-10-CM

## 2022-12-15 DIAGNOSIS — E78.2 MIXED HYPERLIPIDEMIA: ICD-10-CM

## 2022-12-15 DIAGNOSIS — E55.9 VITAMIN D DEFICIENCY: ICD-10-CM

## 2022-12-15 DIAGNOSIS — J40 BRONCHITIS: Primary | ICD-10-CM

## 2022-12-15 DIAGNOSIS — I10 ESSENTIAL HYPERTENSION: ICD-10-CM

## 2022-12-15 DIAGNOSIS — F51.01 PRIMARY INSOMNIA: ICD-10-CM

## 2022-12-15 DIAGNOSIS — R05.9 COUGH, UNSPECIFIED TYPE: ICD-10-CM

## 2022-12-15 DIAGNOSIS — J45.40 MODERATE PERSISTENT ASTHMA WITHOUT COMPLICATION: ICD-10-CM

## 2022-12-15 DIAGNOSIS — I73.9 PVD (PERIPHERAL VASCULAR DISEASE): ICD-10-CM

## 2022-12-15 DIAGNOSIS — J98.01 BRONCHOSPASM: ICD-10-CM

## 2022-12-15 DIAGNOSIS — R59.0 MEDIASTINAL ADENOPATHY: ICD-10-CM

## 2022-12-15 LAB
ALBUMIN SERPL BCP-MCNC: 3.4 G/DL (ref 3.5–5)
ALBUMIN/GLOB SERPL: 0.8 {RATIO}
ALP SERPL-CCNC: 113 U/L (ref 55–142)
ALT SERPL W P-5'-P-CCNC: 23 U/L (ref 13–56)
ANION GAP SERPL CALCULATED.3IONS-SCNC: 8 MMOL/L (ref 7–16)
AST SERPL W P-5'-P-CCNC: 20 U/L (ref 15–37)
BASOPHILS # BLD AUTO: 0.06 K/UL (ref 0–0.2)
BASOPHILS NFR BLD AUTO: 0.6 % (ref 0–1)
BILIRUB SERPL-MCNC: 0.6 MG/DL (ref ?–1.2)
BUN SERPL-MCNC: 14 MG/DL (ref 7–18)
BUN/CREAT SERPL: 12 (ref 6–20)
CALCIUM SERPL-MCNC: 9.7 MG/DL (ref 8.5–10.1)
CHLORIDE SERPL-SCNC: 106 MMOL/L (ref 98–107)
CHOLEST SERPL-MCNC: 165 MG/DL (ref 0–200)
CHOLEST/HDLC SERPL: 2.4 {RATIO}
CO2 SERPL-SCNC: 30 MMOL/L (ref 21–32)
CREAT SERPL-MCNC: 1.16 MG/DL (ref 0.55–1.02)
DIFFERENTIAL METHOD BLD: ABNORMAL
EGFR (NO RACE VARIABLE) (RUSH/TITUS): 52 ML/MIN/1.73M²
EOSINOPHIL # BLD AUTO: 0.59 K/UL (ref 0–0.5)
EOSINOPHIL NFR BLD AUTO: 5.9 % (ref 1–4)
ERYTHROCYTE [DISTWIDTH] IN BLOOD BY AUTOMATED COUNT: 15.2 % (ref 11.5–14.5)
GLOBULIN SER-MCNC: 4.3 G/DL (ref 2–4)
GLUCOSE SERPL-MCNC: 159 MG/DL (ref 74–106)
HCT VFR BLD AUTO: 44.2 % (ref 38–47)
HDLC SERPL-MCNC: 70 MG/DL (ref 40–60)
HGB BLD-MCNC: 13.7 G/DL (ref 12–16)
IMM GRANULOCYTES # BLD AUTO: 0.05 K/UL (ref 0–0.04)
IMM GRANULOCYTES NFR BLD: 0.5 % (ref 0–0.4)
LDLC SERPL CALC-MCNC: 80 MG/DL
LDLC/HDLC SERPL: 1.1 {RATIO}
LYMPHOCYTES # BLD AUTO: 1.98 K/UL (ref 1–4.8)
LYMPHOCYTES NFR BLD AUTO: 19.8 % (ref 27–41)
MCH RBC QN AUTO: 28.5 PG (ref 27–31)
MCHC RBC AUTO-ENTMCNC: 31 G/DL (ref 32–36)
MCV RBC AUTO: 92.1 FL (ref 80–96)
MONOCYTES # BLD AUTO: 0.91 K/UL (ref 0–0.8)
MONOCYTES NFR BLD AUTO: 9.1 % (ref 2–6)
MPC BLD CALC-MCNC: 10.9 FL (ref 9.4–12.4)
NEUTROPHILS # BLD AUTO: 6.43 K/UL (ref 1.8–7.7)
NEUTROPHILS NFR BLD AUTO: 64.1 % (ref 53–65)
NONHDLC SERPL-MCNC: 95 MG/DL
NRBC # BLD AUTO: 0 X10E3/UL
NRBC, AUTO (.00): 0 %
PLATELET # BLD AUTO: 322 K/UL (ref 150–400)
POTASSIUM SERPL-SCNC: 4.2 MMOL/L (ref 3.5–5.1)
PROT SERPL-MCNC: 7.7 G/DL (ref 6.4–8.2)
RBC # BLD AUTO: 4.8 M/UL (ref 4.2–5.4)
SODIUM SERPL-SCNC: 140 MMOL/L (ref 136–145)
TRIGL SERPL-MCNC: 73 MG/DL (ref 35–150)
VLDLC SERPL-MCNC: 15 MG/DL
WBC # BLD AUTO: 10.02 K/UL (ref 4.5–11)

## 2022-12-15 PROCEDURE — 96372 PR INJECTION,THERAP/PROPH/DIAG2ST, IM OR SUBCUT: ICD-10-PCS | Mod: ,,, | Performed by: NURSE PRACTITIONER

## 2022-12-15 PROCEDURE — 96372 THER/PROPH/DIAG INJ SC/IM: CPT | Mod: ,,, | Performed by: NURSE PRACTITIONER

## 2022-12-15 PROCEDURE — 99213 OFFICE O/P EST LOW 20 MIN: CPT | Mod: ,,, | Performed by: NURSE PRACTITIONER

## 2022-12-15 PROCEDURE — 80061 LIPID PANEL: ICD-10-PCS | Mod: ,,, | Performed by: CLINICAL MEDICAL LABORATORY

## 2022-12-15 PROCEDURE — 80053 COMPREHEN METABOLIC PANEL: CPT | Mod: ,,, | Performed by: CLINICAL MEDICAL LABORATORY

## 2022-12-15 PROCEDURE — 85025 CBC WITH DIFFERENTIAL: ICD-10-PCS | Mod: ,,, | Performed by: CLINICAL MEDICAL LABORATORY

## 2022-12-15 PROCEDURE — 99213 PR OFFICE/OUTPT VISIT, EST, LEVL III, 20-29 MIN: ICD-10-PCS | Mod: ,,, | Performed by: NURSE PRACTITIONER

## 2022-12-15 PROCEDURE — 71046 X-RAY EXAM CHEST 2 VIEWS: CPT | Mod: TC,RHCUB,FY | Performed by: NURSE PRACTITIONER

## 2022-12-15 PROCEDURE — 85025 COMPLETE CBC W/AUTO DIFF WBC: CPT | Mod: ,,, | Performed by: CLINICAL MEDICAL LABORATORY

## 2022-12-15 PROCEDURE — 71046 XR CHEST PA AND LATERAL: ICD-10-PCS | Mod: 26,,, | Performed by: RADIOLOGY

## 2022-12-15 PROCEDURE — 80061 LIPID PANEL: CPT | Mod: ,,, | Performed by: CLINICAL MEDICAL LABORATORY

## 2022-12-15 PROCEDURE — 80053 COMPREHENSIVE METABOLIC PANEL: ICD-10-PCS | Mod: ,,, | Performed by: CLINICAL MEDICAL LABORATORY

## 2022-12-15 PROCEDURE — 71046 X-RAY EXAM CHEST 2 VIEWS: CPT | Mod: 26,,, | Performed by: RADIOLOGY

## 2022-12-15 RX ORDER — SERTRALINE HYDROCHLORIDE 25 MG/1
25 TABLET, FILM COATED ORAL DAILY
Qty: 90 TABLET | Refills: 1 | Status: SHIPPED | OUTPATIENT
Start: 2022-12-15 | End: 2023-05-15

## 2022-12-15 RX ORDER — ALBUTEROL SULFATE 90 UG/1
2 AEROSOL, METERED RESPIRATORY (INHALATION) EVERY 6 HOURS PRN
Qty: 18 G | Refills: 5 | Status: SHIPPED | OUTPATIENT
Start: 2022-12-15

## 2022-12-15 RX ORDER — MONTELUKAST SODIUM 10 MG/1
10 TABLET ORAL NIGHTLY
Qty: 90 TABLET | Refills: 3 | Status: SHIPPED | OUTPATIENT
Start: 2022-12-15 | End: 2023-12-19

## 2022-12-15 RX ORDER — IPRATROPIUM BROMIDE AND ALBUTEROL SULFATE 2.5; .5 MG/3ML; MG/3ML
3 SOLUTION RESPIRATORY (INHALATION) EVERY 4 HOURS PRN
Qty: 75 ML | Refills: 2 | Status: SHIPPED | OUTPATIENT
Start: 2022-12-15 | End: 2023-12-15

## 2022-12-15 RX ORDER — LOSARTAN POTASSIUM AND HYDROCHLOROTHIAZIDE 25; 100 MG/1; MG/1
1 TABLET ORAL DAILY
Qty: 90 TABLET | Refills: 3 | Status: SHIPPED | OUTPATIENT
Start: 2022-12-15 | End: 2023-12-15

## 2022-12-15 RX ORDER — POTASSIUM CHLORIDE 750 MG/1
10 TABLET, EXTENDED RELEASE ORAL DAILY
Qty: 90 TABLET | Refills: 1 | Status: SHIPPED | OUTPATIENT
Start: 2022-12-15 | End: 2023-08-07

## 2022-12-15 RX ORDER — CETIRIZINE HYDROCHLORIDE 10 MG/1
10 TABLET ORAL DAILY
Qty: 90 TABLET | Refills: 3 | Status: SHIPPED | OUTPATIENT
Start: 2022-12-15 | End: 2023-10-23

## 2022-12-15 RX ORDER — DEXAMETHASONE SODIUM PHOSPHATE 4 MG/ML
4 INJECTION, SOLUTION INTRA-ARTICULAR; INTRALESIONAL; INTRAMUSCULAR; INTRAVENOUS; SOFT TISSUE
Status: COMPLETED | OUTPATIENT
Start: 2022-12-15 | End: 2022-12-15

## 2022-12-15 RX ORDER — LINCOMYCIN HYDROCHLORIDE 300 MG/ML
600 INJECTION, SOLUTION INTRAMUSCULAR; INTRAVENOUS; SUBCONJUNCTIVAL
Status: COMPLETED | OUTPATIENT
Start: 2022-12-15 | End: 2022-12-15

## 2022-12-15 RX ORDER — LEVOFLOXACIN 750 MG/1
750 TABLET ORAL DAILY
Qty: 10 TABLET | Refills: 0 | Status: SHIPPED | OUTPATIENT
Start: 2022-12-15 | End: 2023-02-06

## 2022-12-15 RX ORDER — CHOLECALCIFEROL (VITAMIN D3) 50 MCG
1 TABLET ORAL DAILY
Qty: 90 TABLET | Refills: 1 | Status: SHIPPED | OUTPATIENT
Start: 2022-12-15

## 2022-12-15 RX ORDER — ATORVASTATIN CALCIUM 10 MG/1
10 TABLET, FILM COATED ORAL DAILY
Qty: 90 TABLET | Refills: 1 | Status: SHIPPED | OUTPATIENT
Start: 2022-12-15 | End: 2023-03-14

## 2022-12-15 RX ADMIN — DEXAMETHASONE SODIUM PHOSPHATE 4 MG: 4 INJECTION, SOLUTION INTRA-ARTICULAR; INTRALESIONAL; INTRAMUSCULAR; INTRAVENOUS; SOFT TISSUE at 09:12

## 2022-12-15 RX ADMIN — LINCOMYCIN HYDROCHLORIDE 600 MG: 300 INJECTION, SOLUTION INTRAMUSCULAR; INTRAVENOUS; SUBCONJUNCTIVAL at 09:12

## 2022-12-16 ENCOUNTER — HOSPITAL ENCOUNTER (OUTPATIENT)
Dept: RADIOLOGY | Facility: HOSPITAL | Age: 67
Discharge: HOME | End: 2022-12-16
Attending: NURSE PRACTITIONER
Payer: MEDICARE

## 2022-12-16 DIAGNOSIS — R93.89 CHEST X-RAY ABNORMALITY: ICD-10-CM

## 2022-12-16 PROCEDURE — 71260 CT THORAX DX C+: CPT | Mod: TC

## 2022-12-16 PROCEDURE — 25500020 PHARM REV CODE 255: Performed by: NURSE PRACTITIONER

## 2022-12-16 RX ADMIN — IOPAMIDOL 100 ML: 755 INJECTION, SOLUTION INTRAVENOUS at 08:12

## 2022-12-19 DIAGNOSIS — R73.9 ELEVATED SERUM GLUCOSE: Primary | ICD-10-CM

## 2022-12-19 RX ORDER — METFORMIN HYDROCHLORIDE 500 MG/1
500 TABLET ORAL 2 TIMES DAILY WITH MEALS
Qty: 180 TABLET | Refills: 1 | Status: SHIPPED | OUTPATIENT
Start: 2022-12-19 | End: 2023-06-04

## 2022-12-19 NOTE — PROGRESS NOTES
"   GISELLA Haskins   [facility] [id number] [place] [address]  [phone]      PATIENT NAME: Magan Saleem  : 1955  DATE: 12/15/22  MRN: [id number]      Billing Provider: GISELLA Haskins  Level of Service:   Patient PCP Information       Provider PCP Type    GISELLA Haskins General            Reason for Visit / Chief Complaint: Medication Refill, Leg Pain (LLE tenderness./States she does have poor circulation but now has tenderness to the inside of LLE.), Cough (X2 wks), and Wheezing (Audible wheezing./X2 wks)       Update PCP  Update Chief Complaint         History of Present Illness / Problem Focused Workflow     67 year old female presents with complaints of cough, chest congestion, wheezing x 2 weeks  Needing medication refills today  Also complaints of left lower ext tenderness and swelling that "comes and goes"  Denies any known fever, n/v    Hx of hypertension, hyperlipidemia, asthma, vit D def  Blood pressure is normal today    Review of Systems     Review of Systems   Constitutional:  Positive for fatigue. Negative for fever.   HENT:  Positive for congestion. Negative for ear pain and sore throat.    Respiratory:  Positive for cough, shortness of breath and wheezing.    Cardiovascular:  Positive for leg swelling. Negative for chest pain.   Gastrointestinal:  Negative for abdominal pain, diarrhea and nausea.   Endocrine: Negative for cold intolerance and heat intolerance.   Musculoskeletal:  Negative for gait problem.   Allergic/Immunologic: Negative for environmental allergies.   Neurological:  Negative for dizziness, weakness and headaches.   Psychiatric/Behavioral:  Negative for agitation and dysphoric mood.      Medical / Social / Family History     Past Medical History:   Diagnosis Date    Acquired absence of both cervix and uterus 2021    Asthma     History of colonoscopy 2020    REFUSAL    Hyperlipidemia     " Hypertension     Melanocytic nevi of lower limb, including hip, left     Vitamin D deficiency        Past Surgical History:   Procedure Laterality Date    cerebral shunt      CHOLECYSTECTOMY      HYSTERECTOMY      TONSILLECTOMY         Social History  Ms.  reports that she has never smoked. She has never used smokeless tobacco. She reports that she does not drink alcohol and does not use drugs.    Family History  Ms.'s family history includes Diabetes in her mother; Hypertension in her mother; Lung cancer in her father.    Medications and Allergies     Medications  Outpatient Medications Marked as Taking for the 12/15/22 encounter (Office Visit) with GISELLA Haskins   Medication Sig Dispense Refill    SYMBICORT 160-4.5 mcg/actuation HFAA INHALE TWO PUFFS BY MOUTH into lungs EVERY TWELVE HOURS 10.2 g 1    [DISCONTINUED] albuterol (VENTOLIN HFA) 90 mcg/actuation inhaler Inhale 2 puffs into the lungs every 6 (six) hours as needed for Wheezing. Rescue 18 g 5    [DISCONTINUED] albuterol-ipratropium (DUO-NEB) 2.5 mg-0.5 mg/3 mL nebulizer solution Take 3 mLs by nebulization every 6 (six) hours as needed for Wheezing. Rescue 75 mL 0    [DISCONTINUED] cetirizine (ZYRTEC) 10 MG tablet TAKE ONE TABLET BY MOUTH EVERY DAY 90 tablet 3    [DISCONTINUED] losartan-hydrochlorothiazide 100-25 mg (HYZAAR) 100-25 mg per tablet Take 1 tablet by mouth once daily. 90 tablet 3    [DISCONTINUED] montelukast (SINGULAIR) 10 mg tablet TAKE ONE TABLET BY MOUTH EVERY EVENING 90 tablet 3    [DISCONTINUED] potassium chloride (KLOR-CON) 10 MEQ TbSR TAKE ONE TABLET BY MOUTH EVERY DAY 90 tablet 1    [DISCONTINUED] sertraline (ZOLOFT) 25 MG tablet TAKE ONE TABLET BY MOUTH EVERY DAY 90 tablet 1    [DISCONTINUED] VITAMIN D3 50 mcg (2,000 unit) tablet TAKE ONE TABLET BY MOUTH EVERY DAY 90 tablet 1       Allergies  Review of patient's allergies indicates:   Allergen Reactions    Pcn [penicillins]     Sulfa (sulfonamide antibiotics)        Physical  Examination     Vitals:    12/15/22 0832   BP: 126/78   Pulse: 78   Resp: 20   Temp: 97.6 °F (36.4 °C)     Physical Exam  Constitutional:       General: She is not in acute distress.  HENT:      Head: Normocephalic.      Nose: Congestion present.      Mouth/Throat:      Mouth: Mucous membranes are moist.      Pharynx: Posterior oropharyngeal erythema present.   Eyes:      Extraocular Movements: Extraocular movements intact.   Cardiovascular:      Rate and Rhythm: Normal rate.   Pulmonary:      Effort: Pulmonary effort is normal. No respiratory distress.      Breath sounds: Wheezing and rhonchi present.   Abdominal:      General: Bowel sounds are normal.      Palpations: Abdomen is soft.      Tenderness: There is no abdominal tenderness.   Musculoskeletal:         General: Normal range of motion.      Cervical back: Neck supple.   Skin:     General: Skin is warm.   Neurological:      Mental Status: She is alert and oriented to person, place, and time.   Psychiatric:         Behavior: Behavior normal.         Imaging / Labs     Office Visit on 12/15/2022   Component Date Value Ref Range Status    Triglycerides 12/15/2022 73  35 - 150 mg/dL Final    Cholesterol 12/15/2022 165  0 - 200 mg/dL Final    HDL Cholesterol 12/15/2022 70 (H)  40 - 60 mg/dL Final    Cholesterol/HDL Ratio (Risk Factor) 12/15/2022 2.4   Final    Non-HDL 12/15/2022 95  mg/dL Final    LDL Calculated 12/15/2022 80  mg/dL Final    LDL/HDL 12/15/2022 1.1   Final    VLDL 12/15/2022 15  mg/dL Final    Sodium 12/15/2022 140  136 - 145 mmol/L Final    Potassium 12/15/2022 4.2  3.5 - 5.1 mmol/L Final    Chloride 12/15/2022 106  98 - 107 mmol/L Final    CO2 12/15/2022 30  21 - 32 mmol/L Final    Anion Gap 12/15/2022 8  7 - 16 mmol/L Final    Glucose 12/15/2022 159 (H)  74 - 106 mg/dL Final    BUN 12/15/2022 14  7 - 18 mg/dL Final    Creatinine 12/15/2022 1.16 (H)  0.55 - 1.02 mg/dL Final    BUN/Creatinine Ratio 12/15/2022 12  6 - 20 Final    Calcium  12/15/2022 9.7  8.5 - 10.1 mg/dL Final    Total Protein 12/15/2022 7.7  6.4 - 8.2 g/dL Final    Albumin 12/15/2022 3.4 (L)  3.5 - 5.0 g/dL Final    Globulin 12/15/2022 4.3 (H)  2.0 - 4.0 g/dL Final    A/G Ratio 12/15/2022 0.8   Final    Bilirubin, Total 12/15/2022 0.6  >0.0 - 1.2 mg/dL Final    Alk Phos 12/15/2022 113  55 - 142 U/L Final    ALT 12/15/2022 23  13 - 56 U/L Final    AST 12/15/2022 20  15 - 37 U/L Final    eGFR 12/15/2022 52 (L)  >=60 mL/min/1.73m² Final    WBC 12/15/2022 10.02  4.50 - 11.00 K/uL Final    RBC 12/15/2022 4.80  4.20 - 5.40 M/uL Final    Hemoglobin 12/15/2022 13.7  12.0 - 16.0 g/dL Final    Hematocrit 12/15/2022 44.2  38.0 - 47.0 % Final    MCV 12/15/2022 92.1  80.0 - 96.0 fL Final    MCH 12/15/2022 28.5  27.0 - 31.0 pg Final    MCHC 12/15/2022 31.0 (L)  32.0 - 36.0 g/dL Final    RDW 12/15/2022 15.2 (H)  11.5 - 14.5 % Final    Platelet Count 12/15/2022 322  150 - 400 K/uL Final    MPV 12/15/2022 10.9  9.4 - 12.4 fL Final    Neutrophils % 12/15/2022 64.1  53.0 - 65.0 % Final    Lymphocytes % 12/15/2022 19.8 (L)  27.0 - 41.0 % Final    Monocytes % 12/15/2022 9.1 (H)  2.0 - 6.0 % Final    Eosinophils % 12/15/2022 5.9 (H)  1.0 - 4.0 % Final    Basophils % 12/15/2022 0.6  0.0 - 1.0 % Final    Immature Granulocytes % 12/15/2022 0.5 (H)  0.0 - 0.4 % Final    nRBC, Auto 12/15/2022 0.0  <=0.0 % Final    Neutrophils, Abs 12/15/2022 6.43  1.80 - 7.70 K/uL Final    Lymphocytes, Absolute 12/15/2022 1.98  1.00 - 4.80 K/uL Final    Monocytes, Absolute 12/15/2022 0.91 (H)  0.00 - 0.80 K/uL Final    Eosinophils, Absolute 12/15/2022 0.59 (H)  0.00 - 0.50 K/uL Final    Basophils, Absolute 12/15/2022 0.06  0.00 - 0.20 K/uL Final    Immature Granulocytes, Absolute 12/15/2022 0.05 (H)  0.00 - 0.04 K/uL Final    nRBC, Absolute 12/15/2022 0.00  <=0.00 x10e3/uL Final    Diff Type 12/15/2022 Auto   Final     CT Chest With Contrast  Narrative: EXAMINATION:  CT CHEST WITH CONTRAST    CLINICAL HISTORY:  Abnormal xray  - interstitial infiltrates;chest x-ray abnormality; right hilum prominence; Abnormal findings on diagnostic imaging of other specified body structures    TECHNIQUE:  Axial CT imaging of the chest was done at 3 mm intervals with intravenous contrast. Contrast dose was 100 cc Isovue 370.    CT dose reduction technique used - Dose Rite and tube current modulation.    COMPARISON:  None available    FINDINGS:  There is airspace density right lower lobe with more confluent area posterior to the right hilum.  Multiple enlarged lymph nodes present in the right hilum and mediastinum, the largest pretracheal lymph node present measures 1.9 cm.    Remaining lungs show no evidence of infiltrates or airspace disease. No nodule or mass is identified. No effusion or pneumothorax is seen. The heart, mediastinum and great vessels appear within normal limits. No other abnormality is identified.  Impression: Areas of right lung airspace density most can fluent posterior to the right hilum.  Distinct mass not present in this area although is suspected.  Multiple enlarged lymph nodes in the mediastinum could indicate reactive adenopathy or malignant process.  Sarcoid could have similar appearance.    Electronically signed by: Juice Montalvo  Date:    12/16/2022  Time:    08:43      Assessment and Plan (including Health Maintenance)      Problem List  Smart Sets  Document Outside HM   :    Health Maintenance Due   Topic Date Due    Hepatitis C Screening  Never done    Pneumococcal Vaccines (Age 65+) (1 - PCV) Never done    Hemoglobin A1c (Diabetic Prevention Screening)  Never done    DEXA Scan  Never done    COVID-19 Vaccine (3 - Booster for Pfizer series) 10/22/2021    Mammogram  02/23/2022    Influenza Vaccine (1) Never done       Problem List Items Addressed This Visit          Psychiatric    Generalized anxiety disorder    Relevant Medications    sertraline (ZOLOFT) 25 MG tablet       Pulmonary    Bronchospasm    Relevant  Medications    albuterol (VENTOLIN HFA) 90 mcg/actuation inhaler    albuterol-ipratropium (DUO-NEB) 2.5 mg-0.5 mg/3 mL nebulizer solution       Cardiac/Vascular    Essential hypertension    Relevant Medications    losartan-hydrochlorothiazide 100-25 mg (HYZAAR) 100-25 mg per tablet    Other Relevant Orders    Lipid Panel (Completed)    CBC Auto Differential (Completed)    Comprehensive Metabolic Panel (Completed)    Mixed hyperlipidemia    Relevant Medications    atorvastatin (LIPITOR) 10 MG tablet    Other Relevant Orders    Lipid Panel (Completed)    CBC Auto Differential (Completed)    Comprehensive Metabolic Panel (Completed)       Renal/    Hypokalemia    Relevant Medications    potassium chloride (KLOR-CON) 10 MEQ TbSR       Endocrine    Vitamin D deficiency    Relevant Medications    cholecalciferol, vitamin D3, (VITAMIN D3) 50 mcg (2,000 unit) Tab       Other    Primary insomnia     Other Visit Diagnoses       Bronchitis    -  Primary    Relevant Medications    lincomycin injection 600 mg (Completed)    dexAMETHasone injection 4 mg (Completed)    levoFLOXacin (LEVAQUIN) 750 MG tablet    albuterol-ipratropium (DUO-NEB) 2.5 mg-0.5 mg/3 mL nebulizer solution    Moderate persistent asthma without complication        Relevant Medications    cetirizine (ZYRTEC) 10 MG tablet    montelukast (SINGULAIR) 10 mg tablet    Cough, unspecified type        Relevant Orders    X-Ray Chest PA And Lateral (Completed)    PVD (peripheral vascular disease)        Relevant Orders    Ambulatory referral/consult to Vascular Surgery    Chest x-ray abnormality        Relevant Orders    CT Chest With Contrast (Completed)    Ambulatory referral/consult to Pulmonology    Lung density on x-ray        Relevant Orders    Ambulatory referral/consult to Pulmonology    Mediastinal adenopathy        Relevant Orders    Ambulatory referral/consult to Pulmonology        Treat for bronchitis today  Chest x-ray  Rest. Increase fluids as  tolearted  Vascular referral for PVD  Medication refills  Labs today; will treat as indicated  Follow up about 6 mo    Health Maintenance Topics with due status: Not Due       Topic Last Completion Date    Colorectal Cancer Screening 03/12/2021    Lipid Panel 12/15/2022       Future Appointments   Date Time Provider Department Center   3/27/2023  8:00 AM AW[facility] NURSE, [facility]          Signature:  GISELLA Haskins  [facility]  [address] [place] [id number]  [phone]    Date of encounter: 12/15/22

## 2022-12-29 DIAGNOSIS — I73.9 PVD (PERIPHERAL VASCULAR DISEASE): Primary | ICD-10-CM

## 2022-12-29 DIAGNOSIS — I83.893 VARICOSE VEINS OF BOTH LEGS WITH EDEMA: ICD-10-CM

## 2023-01-11 ENCOUNTER — HOSPITAL ENCOUNTER (OUTPATIENT)
Dept: RADIOLOGY | Facility: HOSPITAL | Age: 68
Discharge: HOME | End: 2023-01-11
Attending: FAMILY MEDICINE
Payer: MEDICARE

## 2023-01-11 ENCOUNTER — OFFICE VISIT (OUTPATIENT)
Dept: VASCULAR SURGERY | Facility: CLINIC | Age: 68
End: 2023-01-11
Payer: MEDICARE

## 2023-01-11 VITALS
DIASTOLIC BLOOD PRESSURE: 60 MMHG | HEIGHT: 65 IN | SYSTOLIC BLOOD PRESSURE: 112 MMHG | WEIGHT: 289 LBS | RESPIRATION RATE: 18 BRPM | HEART RATE: 68 BPM | BODY MASS INDEX: 48.15 KG/M2

## 2023-01-11 DIAGNOSIS — M79.605 BILATERAL LEG PAIN: Primary | ICD-10-CM

## 2023-01-11 DIAGNOSIS — M79.605 BILATERAL LEG PAIN: ICD-10-CM

## 2023-01-11 DIAGNOSIS — R60.0 EDEMA, LOWER EXTREMITY: ICD-10-CM

## 2023-01-11 DIAGNOSIS — L81.9 HYPERPIGMENTATION OF SKIN: ICD-10-CM

## 2023-01-11 DIAGNOSIS — I87.2 VENOUS INSUFFICIENCY: ICD-10-CM

## 2023-01-11 DIAGNOSIS — M79.604 BILATERAL LEG PAIN: ICD-10-CM

## 2023-01-11 DIAGNOSIS — M79.604 BILATERAL LEG PAIN: Primary | ICD-10-CM

## 2023-01-11 PROCEDURE — 93970 EXTREMITY STUDY: CPT | Mod: 26,,, | Performed by: FAMILY MEDICINE

## 2023-01-11 PROCEDURE — 93970 EXTREMITY STUDY: CPT | Mod: TC

## 2023-01-11 PROCEDURE — 99204 PR OFFICE/OUTPT VISIT, NEW, LEVL IV, 45-59 MIN: ICD-10-PCS | Mod: S$PBB,,, | Performed by: FAMILY MEDICINE

## 2023-01-11 PROCEDURE — 99204 OFFICE O/P NEW MOD 45 MIN: CPT | Mod: S$PBB,,, | Performed by: FAMILY MEDICINE

## 2023-01-11 PROCEDURE — 93970 US VENOUS REFLUX STUDY BILATERAL: ICD-10-PCS | Mod: 26,,, | Performed by: FAMILY MEDICINE

## 2023-01-11 PROCEDURE — 99215 OFFICE O/P EST HI 40 MIN: CPT | Mod: PBBFAC,25 | Performed by: FAMILY MEDICINE

## 2023-01-11 NOTE — PROGRESS NOTES
VEIN CENTER CLINIC NOTE    Patient ID: Magan Saleem is a 67 y.o. female.    I. HISTORY     Chief Complaint:   Chief Complaint   Patient presents with    Leg Swelling     Room 4. Varicose veins of both legs with edema        HPI: Magan Saleem is a 67 y.o. female who is referred here today by Maribell OBANDO for evaluation of bilateral lower extremity swelling, discomfort and hyperpigmentation.  Symptoms are progressive/worsening and began greater than 10 years ago.  Location is bilateral lower extremities below the knees. Symptoms are worse at the end of the day.  History of venous interventions includes none.  Positive, mother, family history of venous disease.      The patient underwent a bilateral complete venous reflux study today, 01/11/2023, and the results were discussed with the patient.  This study shows no evidence of DVT bilaterally.  The study also shows dilation and axial reflux of the left great and bilateral small saphenous veins.      Venous Disease Medical Necessity Documentation Initial Visit Date:1/11/2023 Return Check Date:    Have you ever had a rupture or bleed from a varicose vein in your leg(s)?              [] Yes  [x] No   [] Yes   [] No   Have you ever been diagnosed with phlebitis, cellulitis, or inflammation in the area of the varicose veins of  your leg(s)?  [] Yes  [x] No    [] Yes   [] No   Do you have darkened or inflamed skin on your legs?   [x] Yes   [] No   [] Yes   [] No   Do you have leg swelling?     [x] Yes   [] No   [] Yes   [] No   Do you have leg pain?   [x] Yes   [] No   [] Yes   [] No   If yes, describe the type of pain?    [x]   Stabbing []  Radiating [x]  Aching   [x]  Tightness []  Throbbing               []  Burning [x]  Cramping              Do you have leg discomfort?   [x] Yes   [] No   [] Yes   [] No   If yes, describe the type of discomfort?    [x]  Heaviness [x]  Fullness   []  Restlessness [] Tired/Fatigued [x] Itching              Have you ever worn  compression hose?   [x] Yes   [] No   [] Yes   [] No   If yes, how long? 2YR OFF/ON          Do you elevate your legs while sitting?   [x] Yes   [] No   [] Yes   [] No   Does venous disease (varicose veins, ulcers, skin changes, leg pain/swelling) interfere with your daily life?  If yes, check activities you are limited or unable to do.    []  Shower  [x]   Walk  []  Exercise  [] Play with children/grandchildren  []  Shop [] Work [x] Stand for any period of time [x] Sleep                               [x] Sitting for an extended period of time.           [x] Yes   [] No   [] Yes   [] No   Do you exercise/have you tried to exercise (i.e.  Walk our participate in a regular exercise routine)?  [] Yes  [x] No   [] Yes   [] No   BMI 48.2             Past Medical History:   Diagnosis Date    Acquired absence of both cervix and uterus 01/27/2021    Asthma     History of colonoscopy 09/29/2020    REFUSAL    Hyperlipidemia     Hypertension     Melanocytic nevi of lower limb, including hip, left     Vitamin D deficiency         Past Surgical History:   Procedure Laterality Date    cerebral shunt      CHOLECYSTECTOMY      HYSTERECTOMY      TONSILLECTOMY         Social History     Tobacco Use   Smoking Status Never   Smokeless Tobacco Never         Current Outpatient Medications:     albuterol (VENTOLIN HFA) 90 mcg/actuation inhaler, Inhale 2 puffs into the lungs every 6 (six) hours as needed for Wheezing. Rescue, Disp: 18 g, Rfl: 5    albuterol-ipratropium (DUO-NEB) 2.5 mg-0.5 mg/3 mL nebulizer solution, Take 3 mLs by nebulization every 4 (four) hours as needed for Wheezing or Shortness of Breath. Rescue, Disp: 75 mL, Rfl: 2    atorvastatin (LIPITOR) 10 MG tablet, Take 1 tablet (10 mg total) by mouth once daily., Disp: 90 tablet, Rfl: 1    cetirizine (ZYRTEC) 10 MG tablet, Take 1 tablet (10 mg total) by mouth once daily., Disp: 90 tablet, Rfl: 3    cholecalciferol, vitamin D3, (VITAMIN D3) 50 mcg (2,000 unit) Tab, Take 1  tablet (2,000 Units total) by mouth once daily., Disp: 90 tablet, Rfl: 1    losartan-hydrochlorothiazide 100-25 mg (HYZAAR) 100-25 mg per tablet, Take 1 tablet by mouth once daily., Disp: 90 tablet, Rfl: 3    metFORMIN (GLUCOPHAGE) 500 MG tablet, Take 1 tablet (500 mg total) by mouth 2 (two) times daily with meals., Disp: 180 tablet, Rfl: 1    montelukast (SINGULAIR) 10 mg tablet, Take 1 tablet (10 mg total) by mouth every evening., Disp: 90 tablet, Rfl: 3    potassium chloride (KLOR-CON) 10 MEQ TbSR, Take 1 tablet (10 mEq total) by mouth once daily., Disp: 90 tablet, Rfl: 1    sertraline (ZOLOFT) 25 MG tablet, Take 1 tablet (25 mg total) by mouth once daily., Disp: 90 tablet, Rfl: 1    SYMBICORT 160-4.5 mcg/actuation HFAA, INHALE TWO PUFFS BY MOUTH into lungs EVERY TWELVE HOURS, Disp: 10.2 g, Rfl: 1    aspirin 81 MG Chew, Take 1 tablet (81 mg total) by mouth once daily., Disp: 90 tablet, Rfl: 1    levoFLOXacin (LEVAQUIN) 750 MG tablet, Take 1 tablet (750 mg total) by mouth once daily., Disp: 10 tablet, Rfl: 0    Review of Systems   Constitutional:  Negative for activity change, chills, diaphoresis, fatigue and fever.   Respiratory:  Negative for cough and shortness of breath.    Cardiovascular:  Positive for leg swelling. Negative for chest pain and claudication.        Hyperpigmentation LE   Gastrointestinal:  Negative for nausea and vomiting.   Musculoskeletal:  Positive for leg pain. Negative for joint swelling.   Integumentary:  Negative for rash and wound.   Neurological:  Negative for weakness and numbness.        II. PHYSICAL EXAM     Physical Exam  Constitutional:       General: She is awake. She is not in acute distress.     Appearance: Normal appearance. She is obese. She is not ill-appearing or toxic-appearing.   HENT:      Head: Normocephalic and atraumatic.   Eyes:      Extraocular Movements: Extraocular movements intact.      Conjunctiva/sclera: Conjunctivae normal.      Pupils: Pupils are equal,  round, and reactive to light.   Neck:      Vascular: No carotid bruit or JVD.   Cardiovascular:      Rate and Rhythm: Normal rate and regular rhythm.      Pulses:           Dorsalis pedis pulses are detected w/ Doppler on the right side and detected w/ Doppler on the left side.        Posterior tibial pulses are detected w/ Doppler on the right side and detected w/ Doppler on the left side.      Heart sounds: No murmur heard.  Pulmonary:      Effort: Pulmonary effort is normal. No respiratory distress.      Breath sounds: No stridor. No wheezing, rhonchi or rales.   Musculoskeletal:         General: No swelling, tenderness or deformity.      Right lower le+ Edema present.      Left lower le+ Edema present.      Comments: Hyperpigmentation of the bilateral Gator regions.  No signs of cellulitis, weeping or open ulceration.   Feet:      Comments: Triphasic hand-held dopplerable pulses of the bilateral dorsalis pedis and posterior tibial arteries.  Skin:     General: Skin is warm.      Capillary Refill: Capillary refill takes less than 2 seconds.      Coloration: Skin is not ashen.      Findings: No bruising, erythema, lesion, rash or wound.   Neurological:      Mental Status: She is alert and oriented to person, place, and time.      Motor: No weakness.   Psychiatric:         Speech: Speech normal.         Behavior: Behavior normal. Behavior is cooperative.       Reticular/Spider veins noted:  RLE: none  LLE: none    Varicose veins noted:  RLE: none  LLE:  none    CEAP Classification  Clinical Signs: Class 4 - Skin changes ascribed to venous disease  Etiologic Classification: Primary  Anatomic distribution: Superficial  Pathophysiologic dysfunction: Reflux                         Venous Clinical Severity Score  Pain:2=Daily, moderate activity limitation, occasional analgesics  Varicose Veins: 0=None  Venous Edema: 2=Afternoon edema, above ankle  Pigmentation: 1=Diffuse, but limited in area and old  (brown)  Inflammation: 0=None  Induration: 0=None  Number of Active Ulcers: 0=0  Active Ulceration, Duration: 0=None  Active Ulcer Size: 0=None  Compressive Therapy: 0=Not used or not compliant  Total Score: 5     III. ASSESSMENT & PLAN (MEDICAL DECISION MAKING)     1. Bilateral leg pain    2. Hyperpigmentation of skin    3. Edema, lower extremity    4. Venous insufficiency        Assessment/Diagnosis and Plan:  Ultrasound of lower extremities reveals positive evidence of venous insufficiency in the bilateral small saphenous veins and left great saphenous vein.  Plan for conservative medical treatment at this time. The patient may benefit from endovenous ablation in the future.     - Start compression with 20-30mmHg Rx stockings  - Therapeutic leg elevation  - Calf pumping exercises  - RTC 3 months for further evaluation     Orders Placed This Encounter    US Venous Reflux Study Bilateral          Noel Edwards DO

## 2023-02-06 ENCOUNTER — OFFICE VISIT (OUTPATIENT)
Dept: PULMONOLOGY | Facility: CLINIC | Age: 68
End: 2023-02-06
Payer: MEDICARE

## 2023-02-06 VITALS
HEIGHT: 65 IN | WEIGHT: 280 LBS | DIASTOLIC BLOOD PRESSURE: 90 MMHG | HEART RATE: 80 BPM | RESPIRATION RATE: 16 BRPM | OXYGEN SATURATION: 95 % | BODY MASS INDEX: 46.65 KG/M2 | TEMPERATURE: 98 F | SYSTOLIC BLOOD PRESSURE: 156 MMHG

## 2023-02-06 DIAGNOSIS — E66.01 MORBID OBESITY: Primary | ICD-10-CM

## 2023-02-06 DIAGNOSIS — R91.8 RIGHT LOWER LOBE LUNG MASS: ICD-10-CM

## 2023-02-06 DIAGNOSIS — R59.0 MEDIASTINAL ADENOPATHY: ICD-10-CM

## 2023-02-06 DIAGNOSIS — J98.4 LUNG DENSITY ON X-RAY: ICD-10-CM

## 2023-02-06 DIAGNOSIS — R93.89 CHEST X-RAY ABNORMALITY: ICD-10-CM

## 2023-02-06 PROCEDURE — 99204 OFFICE O/P NEW MOD 45 MIN: CPT | Mod: S$PBB,,, | Performed by: INTERNAL MEDICINE

## 2023-02-06 PROCEDURE — 99204 PR OFFICE/OUTPT VISIT, NEW, LEVL IV, 45-59 MIN: ICD-10-PCS | Mod: S$PBB,,, | Performed by: INTERNAL MEDICINE

## 2023-02-06 PROCEDURE — 99215 OFFICE O/P EST HI 40 MIN: CPT | Mod: PBBFAC | Performed by: INTERNAL MEDICINE

## 2023-02-07 PROBLEM — E66.01 MORBID OBESITY: Status: ACTIVE | Noted: 2023-02-07

## 2023-02-07 PROBLEM — R91.8 RIGHT LOWER LOBE LUNG MASS: Status: ACTIVE | Noted: 2023-02-07

## 2023-02-07 NOTE — H&P (VIEW-ONLY)
Subjective:       Patient ID: Magan Saleem is a 67 y.o. female.    Chief Complaint: Abnormal Ct Scan (Consult from [facility]: Abnormal CT Chest.  Pt reports residual cough, fermin pm, clear secretions: AM wheeze. SOB with activity.)    Abnormal Ct Scan  This is a chronic problem. The current episode started more than 1 month ago. The problem has been unchanged. Pertinent negatives include no abdominal pain, arthralgias, chest pain, chills, congestion, headaches or rash.   Past Medical History:   Diagnosis Date    Acquired absence of both cervix and uterus 01/27/2021    Asthma     History of colonoscopy 09/29/2020    REFUSAL    Hyperlipidemia     Hypertension     Melanocytic nevi of lower limb, including hip, left     Vitamin D deficiency      Past Surgical History:   Procedure Laterality Date    cerebral shunt      CHOLECYSTECTOMY      HYSTERECTOMY      TONSILLECTOMY       Family History   Problem Relation Age of Onset    Diabetes Mother     Hypertension Mother     Lung cancer Father      Review of patient's allergies indicates:   Allergen Reactions    Pcn [penicillins]     Sulfa (sulfonamide antibiotics)       Social History     Tobacco Use    Smoking status: Never    Smokeless tobacco: Never   Substance Use Topics    Alcohol use: Never    Drug use: Never      Review of Systems   Constitutional:  Negative for chills, activity change and night sweats.   HENT:  Negative for congestion and ear pain.    Eyes:  Negative for redness and itching.   Cardiovascular:  Negative for chest pain and palpitations.   Musculoskeletal:  Negative for arthralgias and back pain.   Skin:  Negative for rash.   Gastrointestinal:  Negative for abdominal pain and abdominal distention.   Neurological:  Negative for dizziness and headaches.   Hematological:  Negative for adenopathy. Does not bruise/bleed easily.   Psychiatric/Behavioral:  Negative for confusion. The patient is not nervous/anxious.      Objective:      Physical Exam    Constitutional: She is oriented to person, place, and time. She appears well-developed and well-nourished.   HENT:   Head: Normocephalic.   Nose: Nose normal.   Mouth/Throat: Oropharynx is clear and moist.   Neck: No JVD present. No thyromegaly present.   Cardiovascular: Normal rate, regular rhythm, normal heart sounds and intact distal pulses.   Pulmonary/Chest: Normal expansion, hyperinflation, symmetric chest wall expansion, effort normal and breath sounds normal.   Abdominal: Soft. Bowel sounds are normal.   Musculoskeletal:         General: Normal range of motion.      Cervical back: Normal range of motion and neck supple.   Lymphadenopathy: No supraclavicular adenopathy is present.     She has no cervical adenopathy.   Neurological: She is alert and oriented to person, place, and time. She has normal reflexes.   Skin: Skin is warm and dry.   Psychiatric: She has a normal mood and affect. Her behavior is normal.   Personal Diagnostic Review  Abnormal CT evaluated    No flowsheet data found.      Assessment:       1. Morbid obesity    2. Chest x-ray abnormality    3. Lung density on x-ray    4. Mediastinal adenopathy    5. Right lower lobe lung mass          Outpatient Encounter Medications as of 2/6/2023   Medication Sig Dispense Refill    albuterol (VENTOLIN HFA) 90 mcg/actuation inhaler Inhale 2 puffs into the lungs every 6 (six) hours as needed for Wheezing. Rescue (Patient taking differently: Inhale 2 puffs into the lungs every 6 (six) hours as needed for Wheezing or Shortness of Breath. Occasional use :Rescue) 18 g 5    albuterol-ipratropium (DUO-NEB) 2.5 mg-0.5 mg/3 mL nebulizer solution Take 3 mLs by nebulization every 4 (four) hours as needed for Wheezing or Shortness of Breath. Rescue 75 mL 2    atorvastatin (LIPITOR) 10 MG tablet Take 1 tablet (10 mg total) by mouth once daily. 90 tablet 1    cetirizine (ZYRTEC) 10 MG tablet Take 1 tablet (10 mg total) by mouth once daily. 90 tablet 3     cholecalciferol, vitamin D3, (VITAMIN D3) 50 mcg (2,000 unit) Tab Take 1 tablet (2,000 Units total) by mouth once daily. 90 tablet 1    losartan-hydrochlorothiazide 100-25 mg (HYZAAR) 100-25 mg per tablet Take 1 tablet by mouth once daily. 90 tablet 3    montelukast (SINGULAIR) 10 mg tablet Take 1 tablet (10 mg total) by mouth every evening. 90 tablet 3    potassium chloride (KLOR-CON) 10 MEQ TbSR Take 1 tablet (10 mEq total) by mouth once daily. 90 tablet 1    sertraline (ZOLOFT) 25 MG tablet Take 1 tablet (25 mg total) by mouth once daily. 90 tablet 1    SYMBICORT 160-4.5 mcg/actuation HFAA INHALE TWO PUFFS BY MOUTH into lungs EVERY TWELVE HOURS 10.2 g 1    aspirin 81 MG Chew Take 1 tablet (81 mg total) by mouth once daily. 90 tablet 1    metFORMIN (GLUCOPHAGE) 500 MG tablet Take 1 tablet (500 mg total) by mouth 2 (two) times daily with meals. (Patient not taking: Reported on 2/6/2023) 180 tablet 1    [DISCONTINUED] levoFLOXacin (LEVAQUIN) 750 MG tablet Take 1 tablet (750 mg total) by mouth once daily. 10 tablet 0     No facility-administered encounter medications on file as of 2/6/2023.     No orders of the defined types were placed in this encounter.      Plan:       Problem List Items Addressed This Visit          Pulmonary    Right lower lobe lung mass     Patient has a right lower lung mass versus infiltrate having more cough and will proceed with bronchoscopy differential diagnosis and informed consent were obtained for patient            Endocrine    Morbid obesity - Primary     Other Visit Diagnoses       Chest x-ray abnormality        Lung density on x-ray        Relevant Orders    BRONCHOSCOPY    Mediastinal adenopathy

## 2023-02-07 NOTE — ASSESSMENT & PLAN NOTE
Patient has a right lower lung mass versus infiltrate having more cough and will proceed with bronchoscopy differential diagnosis and informed consent were obtained for patient

## 2023-02-09 ENCOUNTER — HOSPITAL ENCOUNTER (OUTPATIENT)
Dept: GASTROENTEROLOGY | Facility: HOSPITAL | Age: 68
Discharge: HOME | End: 2023-02-09
Attending: INTERNAL MEDICINE
Payer: MEDICARE

## 2023-02-09 VITALS
RESPIRATION RATE: 24 BRPM | HEART RATE: 108 BPM | OXYGEN SATURATION: 96 % | SYSTOLIC BLOOD PRESSURE: 111 MMHG | DIASTOLIC BLOOD PRESSURE: 57 MMHG

## 2023-02-09 DIAGNOSIS — J98.4 LUNG DENSITY ON X-RAY: ICD-10-CM

## 2023-02-09 LAB — DIRECT PREP: NORMAL

## 2023-02-09 PROCEDURE — 88108 CYTOPATH CONCENTRATE TECH: CPT | Mod: 26,,, | Performed by: PATHOLOGY

## 2023-02-09 PROCEDURE — G0500 MOD SEDAT ENDO SERVICE >5YRS: HCPCS

## 2023-02-09 PROCEDURE — 31623 DX BRONCHOSCOPE/BRUSH: CPT | Mod: ,,, | Performed by: INTERNAL MEDICINE

## 2023-02-09 PROCEDURE — 88305 TISSUE EXAM BY PATHOLOGIST: CPT | Mod: TC,MCY | Performed by: INTERNAL MEDICINE

## 2023-02-09 PROCEDURE — 88342 IMHCHEM/IMCYTCHM 1ST ANTB: CPT | Mod: 26,,, | Performed by: PATHOLOGY

## 2023-02-09 PROCEDURE — 88108 NON-GYN CYTOLOGY: ICD-10-PCS | Mod: 26,,, | Performed by: PATHOLOGY

## 2023-02-09 PROCEDURE — 31625 BRONCHOSCOPY W/BIOPSY(S): CPT | Mod: ,,, | Performed by: INTERNAL MEDICINE

## 2023-02-09 PROCEDURE — 88363 SURGICAL PATHOLOGY: ICD-10-PCS | Mod: ,,, | Performed by: PATHOLOGY

## 2023-02-09 PROCEDURE — 88305 NON-GYN CYTOLOGY: ICD-10-PCS | Mod: 26,XU,, | Performed by: PATHOLOGY

## 2023-02-09 PROCEDURE — 31625 PR BRONCHOSCOPY,BIOPSY: ICD-10-PCS | Mod: ,,, | Performed by: INTERNAL MEDICINE

## 2023-02-09 PROCEDURE — 87116 MYCOBACTERIA CULTURE: CPT | Performed by: INTERNAL MEDICINE

## 2023-02-09 PROCEDURE — 87070 CULTURE OTHR SPECIMN AEROBIC: CPT | Mod: 91 | Performed by: INTERNAL MEDICINE

## 2023-02-09 PROCEDURE — 88342 SURGICAL PATHOLOGY: ICD-10-PCS | Mod: 26,,, | Performed by: PATHOLOGY

## 2023-02-09 PROCEDURE — 87210 SMEAR WET MOUNT SALINE/INK: CPT | Performed by: INTERNAL MEDICINE

## 2023-02-09 PROCEDURE — 31625 BRONCHOSCOPY W/BIOPSY(S): CPT

## 2023-02-09 PROCEDURE — 87206 SMEAR FLUORESCENT/ACID STAI: CPT | Performed by: INTERNAL MEDICINE

## 2023-02-09 PROCEDURE — 31623 DX BRONCHOSCOPE/BRUSH: CPT

## 2023-02-09 PROCEDURE — 88341 SURGICAL PATHOLOGY: ICD-10-PCS | Mod: 26,,, | Performed by: PATHOLOGY

## 2023-02-09 PROCEDURE — 31623 PR BRONCHOSCOPY,DIAGNOSTIC W BRUSH: ICD-10-PCS | Mod: ,,, | Performed by: INTERNAL MEDICINE

## 2023-02-09 PROCEDURE — 88305 TISSUE EXAM BY PATHOLOGIST: CPT | Mod: 26,,, | Performed by: PATHOLOGY

## 2023-02-09 PROCEDURE — 88305 TISSUE EXAM BY PATHOLOGIST: CPT | Mod: 26,XU,, | Performed by: PATHOLOGY

## 2023-02-09 PROCEDURE — 87205 SMEAR GRAM STAIN: CPT | Performed by: INTERNAL MEDICINE

## 2023-02-09 PROCEDURE — 88305 TISSUE EXAM BY PATHOLOGIST: CPT | Mod: TC,SUR | Performed by: INTERNAL MEDICINE

## 2023-02-09 PROCEDURE — 88160 CYTOPATH SMEAR OTHER SOURCE: CPT | Mod: 26,XU,, | Performed by: PATHOLOGY

## 2023-02-09 PROCEDURE — 63600175 PHARM REV CODE 636 W HCPCS: Performed by: INTERNAL MEDICINE

## 2023-02-09 PROCEDURE — 88363 XM ARCHIVE TISSUE MOLEC ANAL: CPT | Mod: ,,, | Performed by: PATHOLOGY

## 2023-02-09 PROCEDURE — 88160 NON-GYN CYTOLOGY: ICD-10-PCS | Mod: 26,XU,, | Performed by: PATHOLOGY

## 2023-02-09 PROCEDURE — 87102 FUNGUS ISOLATION CULTURE: CPT | Performed by: INTERNAL MEDICINE

## 2023-02-09 PROCEDURE — 88341 IMHCHEM/IMCYTCHM EA ADD ANTB: CPT | Mod: 26,,, | Performed by: PATHOLOGY

## 2023-02-09 PROCEDURE — 31622 DX BRONCHOSCOPE/WASH: CPT | Mod: XB

## 2023-02-09 PROCEDURE — 88305 SURGICAL PATHOLOGY: ICD-10-PCS | Mod: 26,,, | Performed by: PATHOLOGY

## 2023-02-09 PROCEDURE — 27201423 OPTIME MED/SURG SUP & DEVICES STERILE SUPPLY

## 2023-02-09 PROCEDURE — 87070 CULTURE OTHR SPECIMN AEROBIC: CPT | Performed by: INTERNAL MEDICINE

## 2023-02-09 RX ORDER — MEPERIDINE HYDROCHLORIDE 100 MG/ML
INJECTION INTRAMUSCULAR; INTRAVENOUS; SUBCUTANEOUS
Status: COMPLETED | OUTPATIENT
Start: 2023-02-09 | End: 2023-02-09

## 2023-02-09 RX ORDER — MIDAZOLAM HYDROCHLORIDE 1 MG/ML
INJECTION INTRAMUSCULAR; INTRAVENOUS
Status: COMPLETED | OUTPATIENT
Start: 2023-02-09 | End: 2023-02-09

## 2023-02-09 RX ADMIN — MIDAZOLAM HYDROCHLORIDE 2 MG: 1 INJECTION, SOLUTION INTRAMUSCULAR; INTRAVENOUS at 07:02

## 2023-02-09 RX ADMIN — Medication 25 MG: at 07:02

## 2023-02-09 NOTE — DISCHARGE SUMMARY
[facility] - Endoscopy  Discharge Note  Short Stay    BRONCHOSCOPY      OUTCOME: Patient tolerated treatment/procedure well without complication and is now ready for discharge.    DISPOSITION: Home or Self Care    FINAL DIAGNOSIS:  <principal problem not specified>    FOLLOWUP: In clinic    DISCHARGE INSTRUCTIONS:  No discharge procedures on file.     TIME SPENT ON DISCHARGE: 5 minutes  Dx rll mass

## 2023-02-09 NOTE — DISCHARGE INSTRUCTIONS
Procedure Date  2/9/23     Impression  Overall Impression: right lower lobe lesion     Recommendation    Follow up bronchoscopy            Indication  Lung density on x-ray

## 2023-02-10 LAB
AFB SMEAR (FA): NORMAL
DHEA SERPL-MCNC: NORMAL
ESTROGEN SERPL-MCNC: NORMAL PG/ML
INSULIN SERPL-ACNC: NORMAL U[IU]/ML
INSULIN SERPL-ACNC: NORMAL U[IU]/ML
LAB AP GROSS DESCRIPTION: NORMAL
LAB AP GROSS DESCRIPTION: NORMAL
LAB AP LABORATORY NOTES: NORMAL
LAB AP LABORATORY NOTES: NORMAL
LAB AP SPECIMEN A NON-GYN GENERAL CATEGORIZATION: NORMAL
LAB AP SPECIMEN A NON-GYN INTERPETATION: NORMAL
LAB AP SPECIMEN B NON-GYN GENERAL CATEGORIZATION: NORMAL
LAB AP SPECIMEN B NON-GYN INTERPRETATION: NORMAL
T3RU NFR SERPL: NORMAL %

## 2023-02-11 LAB
CULTURE, LOWER RESPIRATORY: NORMAL
GRAM STN SPEC: NORMAL
GRAM STN SPEC: NORMAL

## 2023-02-13 ENCOUNTER — PATIENT MESSAGE (OUTPATIENT)
Dept: PULMONOLOGY | Facility: CLINIC | Age: 68
End: 2023-02-13
Payer: MEDICARE

## 2023-02-15 ENCOUNTER — OFFICE VISIT (OUTPATIENT)
Dept: PULMONOLOGY | Facility: CLINIC | Age: 68
End: 2023-02-15
Payer: MEDICARE

## 2023-02-15 VITALS
HEIGHT: 65 IN | WEIGHT: 280 LBS | RESPIRATION RATE: 18 BRPM | HEART RATE: 109 BPM | OXYGEN SATURATION: 96 % | BODY MASS INDEX: 46.65 KG/M2 | DIASTOLIC BLOOD PRESSURE: 75 MMHG | SYSTOLIC BLOOD PRESSURE: 150 MMHG

## 2023-02-15 DIAGNOSIS — C34.31 MALIGNANT NEOPLASM OF LOWER LOBE OF RIGHT LUNG: ICD-10-CM

## 2023-02-15 PROBLEM — C34.91 MALIGNANT NEOPLASM OF RIGHT LUNG: Status: ACTIVE | Noted: 2023-02-15

## 2023-02-15 PROCEDURE — 99214 PR OFFICE/OUTPT VISIT, EST, LEVL IV, 30-39 MIN: ICD-10-PCS | Mod: S$PBB,,, | Performed by: INTERNAL MEDICINE

## 2023-02-15 PROCEDURE — 99215 OFFICE O/P EST HI 40 MIN: CPT | Mod: PBBFAC | Performed by: INTERNAL MEDICINE

## 2023-02-15 PROCEDURE — 99214 OFFICE O/P EST MOD 30 MIN: CPT | Mod: S$PBB,,, | Performed by: INTERNAL MEDICINE

## 2023-02-15 NOTE — PROGRESS NOTES
Subjective:       Patient ID: Magan Saleem is a 67 y.o. female.    Chief Complaint: Abnormal Chest X-ray and Follow-up (Broncho results )    Abnormal Chest X-ray  This is a chronic problem. The current episode started more than 1 month ago. The problem has been unchanged. Pertinent negatives include no abdominal pain, arthralgias, chest pain, chills, congestion, headaches or rash.   Follow-up  Pertinent negatives include no abdominal pain, arthralgias, chest pain, chills, congestion, headaches or rash.   Past Medical History:   Diagnosis Date    Acquired absence of both cervix and uterus 01/27/2021    Asthma     History of colonoscopy 09/29/2020    REFUSAL    Hyperlipidemia     Hypertension     Melanocytic nevi of lower limb, including hip, left     Vitamin D deficiency      Past Surgical History:   Procedure Laterality Date    cerebral shunt      CHOLECYSTECTOMY      HYSTERECTOMY      TONSILLECTOMY       Family History   Problem Relation Age of Onset    Diabetes Mother     Hypertension Mother     Lung cancer Father      Review of patient's allergies indicates:   Allergen Reactions    Pcn [penicillins]     Sulfa (sulfonamide antibiotics)       Social History     Tobacco Use    Smoking status: Never    Smokeless tobacco: Never   Substance Use Topics    Alcohol use: Never    Drug use: Never      Review of Systems   Constitutional:  Negative for chills, activity change and night sweats.   HENT:  Negative for congestion and ear pain.    Eyes:  Negative for redness and itching.   Cardiovascular:  Negative for chest pain and palpitations.   Musculoskeletal:  Negative for arthralgias and back pain.   Skin:  Negative for rash.   Gastrointestinal:  Negative for abdominal pain and abdominal distention.   Neurological:  Negative for dizziness and headaches.   Hematological:  Negative for adenopathy. Does not bruise/bleed easily.   Psychiatric/Behavioral:  Negative for confusion. The patient is not nervous/anxious.       Objective:      Physical Exam   Constitutional: She is oriented to person, place, and time. She appears well-developed and well-nourished.   HENT:   Head: Normocephalic.   Nose: Nose normal.   Mouth/Throat: Oropharynx is clear and moist.   Neck: No JVD present. No thyromegaly present.   Cardiovascular: Normal rate, regular rhythm, normal heart sounds and intact distal pulses.   Pulmonary/Chest: Normal expansion, hyperinflation, symmetric chest wall expansion, effort normal and breath sounds normal.   Abdominal: Soft. Bowel sounds are normal.   Musculoskeletal:         General: Normal range of motion.      Cervical back: Normal range of motion and neck supple.   Lymphadenopathy: No supraclavicular adenopathy is present.     She has no cervical adenopathy.   Neurological: She is alert and oriented to person, place, and time. She has normal reflexes.   Skin: Skin is warm and dry.   Psychiatric: She has a normal mood and affect. Her behavior is normal.   Personal Diagnostic Review  none pertinent    No flowsheet data found.      Assessment:       1. Malignant neoplasm of lower lobe of right lung          Outpatient Encounter Medications as of 2/15/2023   Medication Sig Dispense Refill    albuterol (VENTOLIN HFA) 90 mcg/actuation inhaler Inhale 2 puffs into the lungs every 6 (six) hours as needed for Wheezing. Rescue (Patient taking differently: Inhale 2 puffs into the lungs every 6 (six) hours as needed for Wheezing or Shortness of Breath. Occasional use :Rescue) 18 g 5    albuterol-ipratropium (DUO-NEB) 2.5 mg-0.5 mg/3 mL nebulizer solution Take 3 mLs by nebulization every 4 (four) hours as needed for Wheezing or Shortness of Breath. Rescue 75 mL 2    atorvastatin (LIPITOR) 10 MG tablet Take 1 tablet (10 mg total) by mouth once daily. 90 tablet 1    cetirizine (ZYRTEC) 10 MG tablet Take 1 tablet (10 mg total) by mouth once daily. 90 tablet 3    cholecalciferol, vitamin D3, (VITAMIN D3) 50 mcg (2,000 unit) Tab Take 1  tablet (2,000 Units total) by mouth once daily. 90 tablet 1    losartan-hydrochlorothiazide 100-25 mg (HYZAAR) 100-25 mg per tablet Take 1 tablet by mouth once daily. 90 tablet 3    montelukast (SINGULAIR) 10 mg tablet Take 1 tablet (10 mg total) by mouth every evening. 90 tablet 3    potassium chloride (KLOR-CON) 10 MEQ TbSR Take 1 tablet (10 mEq total) by mouth once daily. 90 tablet 1    sertraline (ZOLOFT) 25 MG tablet Take 1 tablet (25 mg total) by mouth once daily. 90 tablet 1    SYMBICORT 160-4.5 mcg/actuation HFAA INHALE TWO PUFFS BY MOUTH into lungs EVERY TWELVE HOURS 10.2 g 1    aspirin 81 MG Chew Take 1 tablet (81 mg total) by mouth once daily. 90 tablet 1    metFORMIN (GLUCOPHAGE) 500 MG tablet Take 1 tablet (500 mg total) by mouth 2 (two) times daily with meals. (Patient not taking: Reported on 2/6/2023) 180 tablet 1     No facility-administered encounter medications on file as of 2/15/2023.     Orders Placed This Encounter   Procedures    Ambulatory referral/consult to Oncology     Standing Status:   Future     Standing Expiration Date:   3/15/2024     Referral Priority:   Routine     Referral Type:   Consultation     Referral Reason:   Specialty Services Required     Requested Specialty:   Oncology     Number of Visits Requested:   1       Plan:       Problem List Items Addressed This Visit          Oncology    Malignant neoplasm of right lung     Patient has a right lower lobe lung mass on bronchoscopy it was endobronchial has precarinal and mediastinal and hilar adenopathy noted she is a poorly differentiated adenocarcinoma of the lung does not look like a surgical candidate will send the doctor Randal for onc eval         Relevant Orders    Ambulatory referral/consult to Oncology

## 2023-02-15 NOTE — ASSESSMENT & PLAN NOTE
Patient has a right lower lobe lung mass on bronchoscopy it was endobronchial has precarinal and mediastinal and hilar adenopathy noted she is a poorly differentiated adenocarcinoma of the lung does not look like a surgical candidate will send the doctor Randal for onc frankieal

## 2023-02-17 ENCOUNTER — PATIENT MESSAGE (OUTPATIENT)
Dept: PULMONOLOGY | Facility: CLINIC | Age: 68
End: 2023-02-17
Payer: MEDICARE

## 2023-02-21 ENCOUNTER — TELEPHONE (OUTPATIENT)
Dept: PULMONOLOGY | Facility: CLINIC | Age: 68
End: 2023-02-21
Payer: MEDICARE

## 2023-02-21 NOTE — TELEPHONE ENCOUNTER
", [facility]   Phone [phone]   fax [phone]    Packet with referrral form and appointmwent request   for  with  fax'd to [facility].  Our fax confirmed deliveryBindu Jarrell, New Pt coordinator was called and was encouraged to phone if needing additional information.//gp    Also:  "power share" request forwarded to David with [facility]  for CXR and CT 12/15 and 12/16/22    Addendum 2/23/23  Confirmed with MsStiven that she has received   Appointment with  for 3/2/23.  She was encouraged to call here, prn.//gp  "

## 2023-03-23 LAB — CULTURE, FUNGUS (OTHER): NORMAL

## 2023-04-10 LAB — MYCOBACTERIUM SPEC CULT: NORMAL

## 2023-05-02 ENCOUNTER — PATIENT OUTREACH (OUTPATIENT)
Dept: ADMINISTRATIVE | Facility: CLINIC | Age: 68
End: 2023-05-02

## 2023-05-02 ENCOUNTER — EXTERNAL HOSPITAL ADMISSION (OUTPATIENT)
Dept: ADMINISTRATIVE | Facility: CLINIC | Age: 68
End: 2023-05-02

## 2023-05-02 NOTE — PROGRESS NOTES
C3 nurse attempted to contact Magan Stiven  for a [facility] post hospital discharge follow up call. No answer. Left voicemail with callback information. The patient has a scheduled HOSFU appointment with GISELLA Haskins  on 5/8/23 @ 300.

## 2023-05-05 ENCOUNTER — PATIENT OUTREACH (OUTPATIENT)
Dept: ADMINISTRATIVE | Facility: HOSPITAL | Age: 68
End: 2023-05-05

## 2023-05-05 NOTE — PROGRESS NOTES
Health Maintenance Due   Topic Date Due    Hepatitis C Screening  Never done    Pneumococcal Vaccines (Age 65+) (1 - PCV) Never done    TETANUS VACCINE  Never done    Shingles Vaccine (1 of 2) Never done    Hemoglobin A1c (Diabetic Prevention Screening)  Never done    DEXA Scan  Never done    COVID-19 Vaccine (3 - Booster for Pfizer series) 10/22/2021    Mammogram  02/23/2022     Reviewed measures for population health   HM TOPICS DUE, discuss at upcoming appt. If done, where?    Left message for patient to call back. Please message available RN if call back received.

## 2023-05-09 DIAGNOSIS — Z71.89 COMPLEX CARE COORDINATION: ICD-10-CM

## 2023-05-15 ENCOUNTER — OFFICE VISIT (OUTPATIENT)
Dept: FAMILY MEDICINE | Facility: CLINIC | Age: 68
End: 2023-05-15
Payer: MEDICARE

## 2023-05-15 VITALS
OXYGEN SATURATION: 98 % | DIASTOLIC BLOOD PRESSURE: 80 MMHG | TEMPERATURE: 97 F | HEART RATE: 88 BPM | WEIGHT: 266 LBS | BODY MASS INDEX: 44.32 KG/M2 | RESPIRATION RATE: 18 BRPM | HEIGHT: 65 IN | SYSTOLIC BLOOD PRESSURE: 128 MMHG

## 2023-05-15 DIAGNOSIS — Z09 HOSPITAL DISCHARGE FOLLOW-UP: Primary | ICD-10-CM

## 2023-05-15 DIAGNOSIS — F41.1 GENERALIZED ANXIETY DISORDER: ICD-10-CM

## 2023-05-15 PROCEDURE — 99496 TRANSITIONAL CARE MANAGE SERVICE 7 DAY DISCHARGE: ICD-10-PCS | Mod: ,,, | Performed by: NURSE PRACTITIONER

## 2023-05-15 PROCEDURE — 99496 TRANSJ CARE MGMT HIGH F2F 7D: CPT | Mod: ,,, | Performed by: NURSE PRACTITIONER

## 2023-05-15 RX ORDER — BLOOD-GLUCOSE METER
EACH MISCELLANEOUS
COMMUNITY
Start: 2023-05-01

## 2023-05-15 RX ORDER — SERTRALINE HYDROCHLORIDE 25 MG/1
25 TABLET, FILM COATED ORAL DAILY
Qty: 90 TABLET | Refills: 1 | Status: SHIPPED | OUTPATIENT
Start: 2023-05-15 | End: 2024-05-14

## 2023-05-15 RX ORDER — CALCIUM CITRATE/VITAMIN D3 200MG-6.25
TABLET ORAL
COMMUNITY
Start: 2023-05-01

## 2023-05-15 RX ORDER — ONDANSETRON 4 MG/1
4 TABLET, FILM COATED ORAL EVERY 8 HOURS PRN
COMMUNITY
Start: 2023-03-29

## 2023-05-15 RX ORDER — FUROSEMIDE 40 MG/1
TABLET ORAL
COMMUNITY
Start: 2023-05-10

## 2023-05-15 RX ORDER — SERTRALINE HYDROCHLORIDE 25 MG/1
25 TABLET, FILM COATED ORAL DAILY
Qty: 90 TABLET | Refills: 1 | Status: CANCELLED | OUTPATIENT
Start: 2023-05-15

## 2023-05-15 RX ORDER — ASCORBIC ACID 500 MG
TABLET ORAL
COMMUNITY
Start: 2023-05-01 | End: 2023-05-18 | Stop reason: SDUPTHER

## 2023-05-15 RX ORDER — METOPROLOL TARTRATE 25 MG/1
25 TABLET, FILM COATED ORAL 2 TIMES DAILY
COMMUNITY
Start: 2023-05-01 | End: 2023-05-18 | Stop reason: SDUPTHER

## 2023-05-15 RX ORDER — APIXABAN 5 MG/1
5 TABLET, FILM COATED ORAL 2 TIMES DAILY
COMMUNITY
Start: 2023-05-01 | End: 2023-05-18 | Stop reason: SDUPTHER

## 2023-05-22 RX ORDER — ASCORBIC ACID 500 MG
TABLET ORAL
Qty: 180 TABLET | Refills: 1 | Status: SHIPPED | OUTPATIENT
Start: 2023-05-22

## 2023-05-22 RX ORDER — APIXABAN 5 MG/1
5 TABLET, FILM COATED ORAL 2 TIMES DAILY
Qty: 180 TABLET | Refills: 1 | Status: SHIPPED | OUTPATIENT
Start: 2023-05-22 | End: 2023-08-07

## 2023-05-22 RX ORDER — METOPROLOL TARTRATE 25 MG/1
25 TABLET, FILM COATED ORAL 2 TIMES DAILY
Qty: 180 TABLET | Refills: 1 | Status: SHIPPED | OUTPATIENT
Start: 2023-05-22 | End: 2023-08-07

## 2023-06-04 PROBLEM — Z09 HOSPITAL DISCHARGE FOLLOW-UP: Status: ACTIVE | Noted: 2023-06-04

## 2023-06-04 NOTE — ASSESSMENT & PLAN NOTE
Recently discharged from [facility]  Was admitted for chest pain and syncopal episodes  States she is feeling better than prior to admission  Reviewed hospital stay and meds today  Refuses to take metformin   She is monitoring glucose at home  Will get A1C in 3 mo

## 2023-06-04 NOTE — PROGRESS NOTES
GISELLA Haskins   [facility]  [address] [address] [address]  [phone]      PATIENT NAME: Magan Saleem  : 1955  DATE: 5/15/23  MRN: [id number]      Billing Provider: GISELLA Haskins  Level of Service:   Patient PCP Information       Provider PCP Type    GISELLA Haskins General            Reason for Visit / Chief Complaint: Transitional Care (Pt was discharged from [facility] on 23. /Pt was admitted for syncope and chest tightness/Pt was started on Eliquis 5mg bid /Pt was diagnosed with new onset diabetes and started on metformin. Pt states she refuses to take the metformin due to previous experience with it. /Pt states since discharge she has had some anxiety that causes her heart to race. ) and Medication Refill       Update PCP  Update Chief Complaint         History of Present Illness / Problem Focused Workflow     67 year old female presents for hospital follow up   Recently discharged from [facility] after chest pain and syncopal episode  She has been diagnosed with lung cancer since last visit  Started on eliquis during hospital stay  Needing medication refills today  Refuses to take metformin     Hx of hypertension, hyperlipidemia, asthma, vit D def      Review of Systems     Review of Systems   Constitutional:  Positive for fatigue. Negative for fever.   HENT:  Positive for congestion. Negative for ear pain and sore throat.    Respiratory:  Positive for cough, shortness of breath and wheezing.    Cardiovascular:  Positive for leg swelling. Negative for chest pain.   Gastrointestinal:  Negative for abdominal pain, diarrhea and nausea.   Endocrine: Negative for cold intolerance and heat intolerance.   Musculoskeletal:  Negative for gait problem.   Allergic/Immunologic: Negative for environmental allergies.   Neurological:  Negative for dizziness, weakness and headaches.   Psychiatric/Behavioral:  Negative for agitation and  dysphoric mood.      Medical / Social / Family History     Past Medical History:   Diagnosis Date    Acquired absence of both cervix and uterus 01/27/2021    Asthma     History of colonoscopy 09/29/2020    REFUSAL    Hyperlipidemia     Hypertension     Melanocytic nevi of lower limb, including hip, left     Vitamin D deficiency        Past Surgical History:   Procedure Laterality Date    cerebral shunt      CHOLECYSTECTOMY      HYSTERECTOMY      TONSILLECTOMY         Social History  Ms.  reports that she has never smoked. She has never used smokeless tobacco. She reports that she does not drink alcohol and does not use drugs.    Family History  Ms.'s family history includes Diabetes in her mother; Hypertension in her mother; Lung cancer in her father.    Medications and Allergies     Medications  Outpatient Medications Marked as Taking for the 5/15/23 encounter (Office Visit) with GISELLA Haskins   Medication Sig Dispense Refill    albuterol (VENTOLIN HFA) 90 mcg/actuation inhaler Inhale 2 puffs into the lungs every 6 (six) hours as needed for Wheezing. Rescue (Patient taking differently: Inhale 2 puffs into the lungs every 6 (six) hours as needed for Wheezing or Shortness of Breath. Occasional use :Rescue) 18 g 5    albuterol-ipratropium (DUO-NEB) 2.5 mg-0.5 mg/3 mL nebulizer solution Take 3 mLs by nebulization every 4 (four) hours as needed for Wheezing or Shortness of Breath. Rescue 75 mL 2    aspirin 81 MG Chew Take 1 tablet (81 mg total) by mouth once daily. 90 tablet 1    atorvastatin (LIPITOR) 10 MG tablet TAKE ONE TABLET BY MOUTH ONCE DAILY 90 tablet 1    cetirizine (ZYRTEC) 10 MG tablet Take 1 tablet (10 mg total) by mouth once daily. 90 tablet 3    cholecalciferol, vitamin D3, (VITAMIN D3) 50 mcg (2,000 unit) Tab Take 1 tablet (2,000 Units total) by mouth once daily. 90 tablet 1    furosemide (LASIX) 40 MG tablet Take by mouth.      losartan-hydrochlorothiazide 100-25 mg (HYZAAR) 100-25 mg per tablet  Take 1 tablet by mouth once daily. 90 tablet 3    montelukast (SINGULAIR) 10 mg tablet Take 1 tablet (10 mg total) by mouth every evening. 90 tablet 3    ondansetron (ZOFRAN) 4 MG tablet Take 4 mg by mouth every 8 (eight) hours as needed. AS NEEDED FOR NAUSEA      osimertinib (TAGRISSO) 80 mg Tab Daily      potassium chloride (KLOR-CON) 10 MEQ TbSR Take 1 tablet (10 mEq total) by mouth once daily. 90 tablet 1    SYMBICORT 160-4.5 mcg/actuation HFAA INHALE TWO PUFFS BY MOUTH into lungs EVERY TWELVE HOURS 10.2 g 1    TRUE METRIX GLUCOSE METER Misc check blood sugar TWICE DAILY AND record      TRUE METRIX GLUCOSE TEST STRIP Strp check blood sugar TWICE DAILY AND record      [DISCONTINUED] ascorbic acid, vitamin C, (VITAMIN C) 500 MG tablet Twice Daily      [DISCONTINUED] ELIQUIS 5 mg Tab Take 5 mg by mouth 2 (two) times daily.      [DISCONTINUED] metoprolol tartrate (LOPRESSOR) 25 MG tablet Take 25 mg by mouth 2 (two) times daily.      [DISCONTINUED] sertraline (ZOLOFT) 25 MG tablet Take 1 tablet (25 mg total) by mouth once daily. (Patient taking differently: Take 25 mg by mouth once daily. Takes as needed) 90 tablet 1       Allergies  Review of patient's allergies indicates:   Allergen Reactions    Penicillins Hives    Sulfa (sulfonamide antibiotics) Hives       Physical Examination     Vitals:    05/15/23 1411   BP: 128/80   Pulse: 88   Resp: 18   Temp: 97.3 °F (36.3 °C)     Physical Exam  Constitutional:       General: She is not in acute distress.  HENT:      Head: Normocephalic.      Nose: Congestion present.      Mouth/Throat:      Mouth: Mucous membranes are moist.      Pharynx: Posterior oropharyngeal erythema present.   Eyes:      Extraocular Movements: Extraocular movements intact.   Cardiovascular:      Rate and Rhythm: Normal rate.   Pulmonary:      Effort: Pulmonary effort is normal. No respiratory distress.      Breath sounds: Wheezing and rhonchi present.   Abdominal:      General: Bowel sounds are  normal.      Palpations: Abdomen is soft.      Tenderness: There is no abdominal tenderness.   Musculoskeletal:         General: Normal range of motion.      Cervical back: Neck supple.   Skin:     General: Skin is warm.   Neurological:      Mental Status: She is alert and oriented to person, place, and time.   Psychiatric:         Behavior: Behavior normal.         Imaging / Labs     No visits with results within 1 Day(s) from this visit.   Latest known visit with results is:   Hospital Outpatient Visit on 02/09/2023   Component Date Value Ref Range Status    Culture, Fungus (Other) 02/09/2023 No Fungus Isolated at 6 weeks   Final    Direct Prep 02/09/2023 No fungal elements seen   Final    Culture, Lower Respiratory 02/09/2023 Typical respiratory safia   Final    Gram Stain Result 02/09/2023 No organisms seen   Final    Gram Stain Result 02/09/2023 No WBC seen   Final    Culture, AFB 02/09/2023 No AFB Isolated at 8 weeks   Final    AFB Smear 02/09/2023 No acid fast bacilli seen   Final    Case Report 02/09/2023    Final                    Value:Non-Gyn Cytology                                  Case: [id number]                                   Authorizing Provider:  oTdd Villasenor MD    Collected:           02/09/2023 07:50 AM          Ordering Location:     [facility]       Received:            02/09/2023 10:01 AM          Pathologist:           Danial Justice MD                                                       Specimens:   A) - Lung, RLL, a.  right lower lobe washing                                                        B) - Lung, RLL, Brushing                                                                   Specimen A General Categorization 02/09/2023 SUSPICIOUS   Final    Specimen A Intepretation 02/09/2023 Suspicious for malignant cells   Final    Specimen B General Categorization 02/09/2023 SUSPICIOUS   Final    Specimen B Interpretation 02/09/2023 Suspicious for malignant cells    Final    Gross Description 02/09/2023    Final                    Value:This result contains rich text formatting which cannot be displayed here.    Laboratory Notes 02/09/2023    Final                    Value:This result contains rich text formatting which cannot be displayed here.    Case Report 02/09/2023    Final                    Value:Surgical Pathology                                Case: [id number]                                   Authorizing Provider:  Todd Villsaenor MD    Collected:           02/09/2023 07:50 AM          Ordering Location:     [facility]       Received:            02/09/2023 11:15 AM          Pathologist:           Shahbaz Sol MD                                                            Specimen:    Lung, RLL, Right lower lung bx                                                             Final Diagnosis 02/09/2023    Final                    Value:This result contains rich text formatting which cannot be displayed here.    Comments 02/09/2023    Final                    Value:This result contains rich text formatting which cannot be displayed here.    Gross Description 02/09/2023    Final                    Value:This result contains rich text formatting which cannot be displayed here.    Microscopic Description 02/09/2023    Final                    Value:This result contains rich text formatting which cannot be displayed here.    Laboratory Notes 02/09/2023    Final                    Value:This result contains rich text formatting which cannot be displayed here.     BRONCHOSCOPY  Narrative: Table formatting from the original result was not included.  Procedure Date  2/9/23    Impression  Overall   Impression:  right lower lobe lesion    Recommendation   Follow up bronchoscopy     Indication  Lung density on x-ray    Providers  Carina Raza, RN Registered Nurse   Todd Villasenor MD Proceduralist   Richie Matias, [place] Sedation Nurse      Medications  Sedation   Medications (Filter: Administrations occurring from 0730 to 0753 on   02/09/23)    None        Preprocedure  A history and physical has been performed, and patient medication   allergies have been reviewed. The patient's tolerance of previous   anesthesia has been reviewed. The risks and benefits of the procedure and   the sedation options and risks were discussed with the patient. All   questions were answered and informed consent obtained.    ASA Score: ASA 2 - Patient with mild systemic disease with no functional   limitations  Mallampati Airway Score: I (soft palate, uvula, fauces, and tonsillar   pillars visible)    Details of the Procedure  The patient underwent moderate sedation, which was administered by a   sedation nurse. The patient's blood pressure, ECG, ETCO2, heart rate,   level of consciousness, oxygen and respirations were monitored throughout   the procedure. The patient experienced no blood loss. The scope was   introduced through the left naris. The procedure was not difficult. The   patient did not tolerate the procedure well due to gagging. There were   complications.     Scope: Bronchoscope  Scope Serial: [id number]    Events  Procedure Events   Event Event Time     Procedure Events   Event Event Time   SCOPE IN 2/9/2023  7:42 AM   SCOPE OUT 2/9/2023  7:49 AM     Findings  All observed locations appeared normal, including the pharynx, larynx,   left vocal cord, right vocal cord, upper trachea, middle trachea, lower   trachea, main juan, left main stem, FRANCIS, lingula, LLL, right main stem,   RUL, bronchus intermedius and RML. Erythematous, friable and nodular   mucosa in the RLL; performed 1 brushing with cytology ; performed 5   endobronchial biopsies with forceps; performed washing using 20 mL of   saline with a total return of 10 mL.       Assessment and Plan (including Health Maintenance)      Problem List  Smart Sets  Document Outside HM   :    Health Maintenance  Due   Topic Date Due    Hepatitis C Screening  Never done    Pneumococcal Vaccines (Age 65+) (1 - PCV) Never done    TETANUS VACCINE  Never done    Shingles Vaccine (1 of 2) Never done    Hemoglobin A1c (Diabetic Prevention Screening)  Never done    DEXA Scan  Never done    COVID-19 Vaccine (3 - Pfizer series) 10/22/2021    Mammogram  02/23/2022       Problem List Items Addressed This Visit          Psychiatric    Generalized anxiety disorder    Current Assessment & Plan     She has been dx with lung cancer since last visit  currently taking chemo treatments in [place]  States she is doing well but is struggling with her anxiety  Refill zoloft today         Relevant Medications    sertraline (ZOLOFT) 25 MG tablet       Other    Hospital discharge follow-up - Primary    Current Assessment & Plan     Recently discharged from [facility]  Was admitted for chest pain and syncopal episodes  States she is feeling better than prior to admission  Reviewed hospital stay and meds today  Refuses to take metformin   She is monitoring glucose at home  Will get A1C in 3 mo          Health Maintenance Topics with due status: Not Due       Topic Last Completion Date    Colorectal Cancer Screening 03/12/2021    Lipid Panel 12/15/2022    Influenza Vaccine Not Due       Future Appointments   Date Time Provider Department Center   11/15/2023  9:00 AM GISELLA Haskins [facility]          Signature:  G[facility]LLA Haskins [facility] [address] [place] [address]  [phone]    Date of encounter: 5/15/23

## 2023-06-04 NOTE — ASSESSMENT & PLAN NOTE
She has been dx with lung cancer since last visit  currently taking chemo treatments in [place]  States she is doing well but is struggling with her anxiety  Refill zoloft today

## 2023-08-07 DIAGNOSIS — E78.2 MIXED HYPERLIPIDEMIA: ICD-10-CM

## 2023-08-07 DIAGNOSIS — E87.6 HYPOKALEMIA: ICD-10-CM

## 2023-08-07 RX ORDER — POTASSIUM CHLORIDE 750 MG/1
10 TABLET, EXTENDED RELEASE ORAL
Qty: 90 TABLET | Refills: 1 | Status: SHIPPED | OUTPATIENT
Start: 2023-08-07

## 2023-08-07 RX ORDER — METOPROLOL TARTRATE 25 MG/1
25 TABLET, FILM COATED ORAL 2 TIMES DAILY
Qty: 180 TABLET | Refills: 1 | Status: SHIPPED | OUTPATIENT
Start: 2023-08-07

## 2023-08-07 RX ORDER — ATORVASTATIN CALCIUM 10 MG/1
TABLET, FILM COATED ORAL
Qty: 90 TABLET | Refills: 1 | Status: SHIPPED | OUTPATIENT
Start: 2023-08-07

## 2023-08-07 RX ORDER — APIXABAN 5 MG/1
5 TABLET, FILM COATED ORAL 2 TIMES DAILY
Qty: 180 TABLET | Refills: 1 | Status: SHIPPED | OUTPATIENT
Start: 2023-08-07

## 2023-09-04 PROBLEM — Z09 HOSPITAL DISCHARGE FOLLOW-UP: Status: RESOLVED | Noted: 2023-06-04 | Resolved: 2023-09-04

## 2023-10-23 DIAGNOSIS — J45.40 MODERATE PERSISTENT ASTHMA WITHOUT COMPLICATION: ICD-10-CM

## 2023-10-23 RX ORDER — CETIRIZINE HYDROCHLORIDE 10 MG/1
10 TABLET, FILM COATED ORAL
Qty: 90 TABLET | Refills: 3 | Status: SHIPPED | OUTPATIENT
Start: 2023-10-23

## 2023-12-09 DIAGNOSIS — Z71.89 COMPLEX CARE COORDINATION: ICD-10-CM

## 2023-12-19 DIAGNOSIS — J45.40 MODERATE PERSISTENT ASTHMA WITHOUT COMPLICATION: ICD-10-CM

## 2023-12-19 RX ORDER — MONTELUKAST SODIUM 10 MG/1
10 TABLET ORAL NIGHTLY
Qty: 90 TABLET | Refills: 3 | Status: SHIPPED | OUTPATIENT
Start: 2023-12-19

## 2024-08-14 ENCOUNTER — TELEPHONE (OUTPATIENT)
Dept: FAMILY MEDICINE | Facility: CLINIC | Age: 69
End: 2024-08-14
Payer: MEDICARE

## 2024-10-07 RX ORDER — GLIPIZIDE 5 MG/1
5 TABLET, FILM COATED, EXTENDED RELEASE ORAL EVERY MORNING
COMMUNITY
Start: 2024-06-04

## 2024-10-07 RX ORDER — LOSARTAN POTASSIUM AND HYDROCHLOROTHIAZIDE 12.5; 5 MG/1; MG/1
1 TABLET ORAL
COMMUNITY
Start: 2024-08-20 | End: 2024-11-18

## 2024-10-07 RX ORDER — DEXAMETHASONE 4 MG/1
4 TABLET ORAL
COMMUNITY
Start: 2024-05-08

## 2024-10-08 ENCOUNTER — HOSPITAL ENCOUNTER (OUTPATIENT)
Dept: RADIOLOGY | Facility: HOSPITAL | Age: 69
Discharge: HOME | End: 2024-10-08
Attending: STUDENT IN AN ORGANIZED HEALTH CARE EDUCATION/TRAINING PROGRAM
Payer: MEDICARE

## 2024-10-08 ENCOUNTER — OFFICE VISIT (OUTPATIENT)
Dept: PULMONOLOGY | Facility: CLINIC | Age: 69
End: 2024-10-08
Payer: MEDICARE

## 2024-10-08 VITALS
SYSTOLIC BLOOD PRESSURE: 134 MMHG | HEIGHT: 65 IN | WEIGHT: 293 LBS | OXYGEN SATURATION: 99 % | BODY MASS INDEX: 48.82 KG/M2 | DIASTOLIC BLOOD PRESSURE: 72 MMHG | RESPIRATION RATE: 18 BRPM | HEART RATE: 68 BPM

## 2024-10-08 DIAGNOSIS — C34.31 MALIGNANT NEOPLASM OF LOWER LOBE OF RIGHT LUNG: Primary | ICD-10-CM

## 2024-10-08 DIAGNOSIS — C34.31 MALIGNANT NEOPLASM OF LOWER LOBE OF RIGHT LUNG: ICD-10-CM

## 2024-10-08 PROCEDURE — 71250 CT THORAX DX C-: CPT | Mod: 26,,, | Performed by: RADIOLOGY

## 2024-10-08 PROCEDURE — 99214 OFFICE O/P EST MOD 30 MIN: CPT | Mod: PBBFAC,25 | Performed by: STUDENT IN AN ORGANIZED HEALTH CARE EDUCATION/TRAINING PROGRAM

## 2024-10-08 PROCEDURE — 99214 OFFICE O/P EST MOD 30 MIN: CPT | Mod: S$PBB,,, | Performed by: STUDENT IN AN ORGANIZED HEALTH CARE EDUCATION/TRAINING PROGRAM

## 2024-10-08 PROCEDURE — 71250 CT THORAX DX C-: CPT | Mod: TC

## 2024-10-08 PROCEDURE — 99999 PR PBB SHADOW E&M-EST. PATIENT-LVL IV: CPT | Mod: PBBFAC,,, | Performed by: STUDENT IN AN ORGANIZED HEALTH CARE EDUCATION/TRAINING PROGRAM

## 2024-10-08 RX ORDER — BLOOD-GLUCOSE SENSOR
EACH MISCELLANEOUS
COMMUNITY
Start: 2024-09-23

## 2024-10-08 RX ORDER — PREDNISONE 20 MG/1
20 TABLET ORAL
COMMUNITY
Start: 2024-09-23

## 2024-10-08 RX ORDER — BLOOD-GLUCOSE,RECEIVER,CONT
EACH MISCELLANEOUS
COMMUNITY
Start: 2024-09-23

## 2024-10-08 RX ORDER — BENZONATATE 100 MG/1
100 CAPSULE ORAL 3 TIMES DAILY PRN
COMMUNITY

## 2024-10-08 RX ORDER — SEMAGLUTIDE 0.68 MG/ML
0.25 INJECTION, SOLUTION SUBCUTANEOUS
COMMUNITY
Start: 2024-09-23

## 2024-10-08 RX ORDER — INSULIN ASPART 100 [IU]/ML
INJECTION, SOLUTION INTRAVENOUS; SUBCUTANEOUS
COMMUNITY
Start: 2024-09-23

## 2024-10-08 NOTE — PROGRESS NOTES
[facility]  Pulmonology  NEW VISIT     Patient Name:  Magan aSleem  Primary Care Provider: Sil Brown DO  Date of Service: 10/8/2024   Reason for Referral:  EBUS bronchoscopy/navigation bronchoscopy      Chief Complaint:  I need a bronchoscopy    SUBJECTIVE   HPI:  Magan Saleem is a 69 y.o. 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     Stiven reports feeling well at this time of assessment.  She details the course of her lung cancer therapies in the findings of new right lung lesion and lymph node enlargement.  She expresses concerns with bronchoscopy given previous experience with the procedure.  We discussed bronchoscopic evaluation at length and questions answered.  She has no shortness of breath at rest and has not required oxygen in the past year.  She denies any hospitalizations for any treatment of pneumonia.    Oncology records reviewed at length.  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  Her workup was notable for brain metastasis during staging.  She received SP RT for her brain metastasis and initiated on osimertinib systemic therapy.  Serial imaging most recently has demonstrated progressive growth of pulmonary nodules associated with mediastinal lymphadenopathy.  For evaluation, she underwent peripheral blood biopsy with carries that did not identify any new targets for therapy per.  To this end, she has been referred by her oncology team for goal biopsy for next generation sequencing to help tailor her chemotherapy regimen.      Past Medical History:   Diagnosis Date    Acquired absence of both cervix and uterus 01/27/2021    Asthma     History of  "colonoscopy 09/29/2020    REFUSAL    Hyperlipidemia     Hypertension     Melanocytic nevi of lower limb, including hip, left     Vitamin D deficiency        Past Surgical History:   Procedure Laterality Date    cerebral shunt      CHOLECYSTECTOMY      HYSTERECTOMY      TONSILLECTOMY         Family History   Problem Relation Name Age of Onset    Diabetes Mother      Hypertension Mother      Lung cancer Father          Social History     Socioeconomic History    Marital status:    Occupational History    Occupation: Retired   Tobacco Use    Smoking status: Never    Smokeless tobacco: Never   Substance and Sexual Activity    Alcohol use: Never    Drug use: Never    Sexual activity: Not Currently       Social History     Social History Narrative    Not on file       Review of patient's allergies indicates:   Allergen Reactions    Penicillins Hives    Sulfa (sulfonamide antibiotics) Hives        Medications: Medications reviewed to include over the counter medications.    Review of Systems: A focused ROS was completed and found to be negative except for that mentioned above.      OBJECTIVE   PHYSICAL EXAM:  Vitals:    10/08/24 0932   BP: 134/72   BP Location: Left forearm   Patient Position: Sitting   Pulse: 68   Resp: 18   SpO2: 99%   Weight: 134.4 kg (296 lb 3.2 oz)   Height: 5' 5" (1.651 m)        GENERAL: NAD  HEENT: normocephalic, non-icteric conjunctivae, moist oral mucosa  RESPIRATORY: clear to auscultation, no wheezing, rales or rhonchi  CARDIOVASCULAR: Regular rate and rhythm, no murmurs rubs or gallops  SKIN: no rash, jaundice, ecchymosis or ulcers  MUSCULOSKELETAL: No clubbing or cyanosis; no pedal edema  NEUROLOGIC: AO ×3, no gross deficits  PSYCH: Normal mood and affect    LABS:  Lab studies reviewed and notable for CO2 31, SCr 2.2, H/H 12.3/38.4 ([facility] labs 2024)    IMAGING:  Care Everywhere image reports reviewed and notable for :    PET-CT 10/04/2024:  Findings:   Head and neck: Interval improved " right-sided FDG avid cervical   lymphadenopathy including right level 3 and supraclavicular locations.   Subcentimeter focus of uptake remains within right level 3 with SUV   measuring up to 4.98.   Chest/Mediastinum: Significantly improved right hilar masslike uptake   in mediastinal multifocal uptake consistent with an element of   treatment response. Residual subcentimeter focus of uptake within the   left paratracheal region demonstrates SUV measuring up to 5.75.   Minimal uptake within previous location of right perihilar mass   demonstrates SUV measuring up to 4.18. There has been interval   progression of multiple left axillary lymph nodes. Representative   uptake measures up to 1.8 cm with SUV measuring up to 11.5. There are   a few nodular densities within the medial basal right lung involving   the right middle lobe and right lower lobe. Representative along the   medial basal right middle lobe measures approximately 1 cm with SUV   measuring up to 1.76.   Abdomen and pelvis:  Normal physiologic activity is noted in the   liver, spleen, stomach and bowel. No adrenal hypermetabolism is   appreciated. Urinary activity is noted in the kidneys bilaterally and   urinary bladder.     LUNG FUNCTION TESTING: None available to review or report      ASSESSMENT & PLAN     1. 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  We discussed that we would like to proceed with bronchoscopy. The bronchoscopic procedure was discussed in detail with the patient. Risks of the procedure were reviewed, including bleeding, infection, pneumothorax, damage to surrounding structures discussed.   - no reports to  date for Osimertinib causing granulomatous inflammation and ongoing concern for recurrence on therapy warrants bx for NGS  - PET-CT will be requested for personal review and is pending power share to tailor targets for Bx   -- PET CT report denotes L axillary LAD which could be an additional target for bx  - obtain CT Chest CHELSI bronch protocol  - renal function noted from OSH labs, will plan for Eliquis hold starting 10/09 for EBUS tentative date 10/16  - case to be discussed with Dr. Guerra, discussed with patient tentative plans for biopsy and they are amenable to meeting with Dr. Guerra following case discussion possibly on procedure day      Orders:  -     CT Chest Without Contrast; Future; Expected date: 10/08/2024  -     EBUS w 3 or more samples; Future; Expected date: 10/08/2024           Follow up in about 4 weeks (around 11/5/2024).      Case was discussed with patient; all questions were answered to patient's satisfaction and patient verbalized understanding.       Bea York MD  Pulmonary Medicine  [facility]  Phone: [phone]

## 2024-10-08 NOTE — H&P (VIEW-ONLY)
[facility]  Pulmonology  NEW VISIT     Patient Name:  Magan Saleem  Primary Care Provider: Sil Brown DO  Date of Service: 10/8/2024   Reason for Referral:  EBUS bronchoscopy/navigation bronchoscopy      Chief Complaint:  I need a bronchoscopy    SUBJECTIVE   HPI:  Magan Saleem is a 69 y.o. 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     Stiven reports feeling well at this time of assessment.  She details the course of her lung cancer therapies in the findings of new right lung lesion and lymph node enlargement.  She expresses concerns with bronchoscopy given previous experience with the procedure.  We discussed bronchoscopic evaluation at length and questions answered.  She has no shortness of breath at rest and has not required oxygen in the past year.  She denies any hospitalizations for any treatment of pneumonia.    Oncology records reviewed at length.  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  Her workup was notable for brain metastasis during staging.  She received SP RT for her brain metastasis and initiated on osimertinib systemic therapy.  Serial imaging most recently has demonstrated progressive growth of pulmonary nodules associated with mediastinal lymphadenopathy.  For evaluation, she underwent peripheral blood biopsy with carries that did not identify any new targets for therapy per.  To this end, she has been referred by her oncology team for goal biopsy for next generation sequencing to help tailor her chemotherapy regimen.      Past Medical History:   Diagnosis Date    Acquired absence of both cervix and uterus 01/27/2021    Asthma     History of  "colonoscopy 09/29/2020    REFUSAL    Hyperlipidemia     Hypertension     Melanocytic nevi of lower limb, including hip, left     Vitamin D deficiency        Past Surgical History:   Procedure Laterality Date    cerebral shunt      CHOLECYSTECTOMY      HYSTERECTOMY      TONSILLECTOMY         Family History   Problem Relation Name Age of Onset    Diabetes Mother      Hypertension Mother      Lung cancer Father          Social History     Socioeconomic History    Marital status:    Occupational History    Occupation: Retired   Tobacco Use    Smoking status: Never    Smokeless tobacco: Never   Substance and Sexual Activity    Alcohol use: Never    Drug use: Never    Sexual activity: Not Currently       Social History     Social History Narrative    Not on file       Review of patient's allergies indicates:   Allergen Reactions    Penicillins Hives    Sulfa (sulfonamide antibiotics) Hives        Medications: Medications reviewed to include over the counter medications.    Review of Systems: A focused ROS was completed and found to be negative except for that mentioned above.      OBJECTIVE   PHYSICAL EXAM:  Vitals:    10/08/24 0932   BP: 134/72   BP Location: Left forearm   Patient Position: Sitting   Pulse: 68   Resp: 18   SpO2: 99%   Weight: 134.4 kg (296 lb 3.2 oz)   Height: 5' 5" (1.651 m)        GENERAL: NAD  HEENT: normocephalic, non-icteric conjunctivae, moist oral mucosa  RESPIRATORY: clear to auscultation, no wheezing, rales or rhonchi  CARDIOVASCULAR: Regular rate and rhythm, no murmurs rubs or gallops  SKIN: no rash, jaundice, ecchymosis or ulcers  MUSCULOSKELETAL: No clubbing or cyanosis; no pedal edema  NEUROLOGIC: AO ×3, no gross deficits  PSYCH: Normal mood and affect    LABS:  Lab studies reviewed and notable for CO2 31, SCr 2.2, H/H 12.3/38.4 ([facility] labs 2024)    IMAGING:  Care Everywhere image reports reviewed and notable for :    PET-CT 10/04/2024:  Findings:   Head and neck: Interval improved " right-sided FDG avid cervical   lymphadenopathy including right level 3 and supraclavicular locations.   Subcentimeter focus of uptake remains within right level 3 with SUV   measuring up to 4.98.   Chest/Mediastinum: Significantly improved right hilar masslike uptake   in mediastinal multifocal uptake consistent with an element of   treatment response. Residual subcentimeter focus of uptake within the   left paratracheal region demonstrates SUV measuring up to 5.75.   Minimal uptake within previous location of right perihilar mass   demonstrates SUV measuring up to 4.18. There has been interval   progression of multiple left axillary lymph nodes. Representative   uptake measures up to 1.8 cm with SUV measuring up to 11.5. There are   a few nodular densities within the medial basal right lung involving   the right middle lobe and right lower lobe. Representative along the   medial basal right middle lobe measures approximately 1 cm with SUV   measuring up to 1.76.   Abdomen and pelvis:  Normal physiologic activity is noted in the   liver, spleen, stomach and bowel. No adrenal hypermetabolism is   appreciated. Urinary activity is noted in the kidneys bilaterally and   urinary bladder.     LUNG FUNCTION TESTING: None available to review or report      ASSESSMENT & PLAN     1. 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  We discussed that we would like to proceed with bronchoscopy. The bronchoscopic procedure was discussed in detail with the patient. Risks of the procedure were reviewed, including bleeding, infection, pneumothorax, damage to surrounding structures discussed.   - no reports to  date for Osimertinib causing granulomatous inflammation and ongoing concern for recurrence on therapy warrants bx for NGS  - PET-CT will be requested for personal review and is pending power share to tailor targets for Bx   -- PET CT report denotes L axillary LAD which could be an additional target for bx  - obtain CT Chest CHELSI bronch protocol  - renal function noted from OSH labs, will plan for Eliquis hold starting 10/09 for EBUS tentative date 10/16  - case to be discussed with Dr. Guerra, discussed with patient tentative plans for biopsy and they are amenable to meeting with Dr. Guerra following case discussion possibly on procedure day      Orders:  -     CT Chest Without Contrast; Future; Expected date: 10/08/2024  -     EBUS w 3 or more samples; Future; Expected date: 10/08/2024           Follow up in about 4 weeks (around 11/5/2024).      Case was discussed with patient; all questions were answered to patient's satisfaction and patient verbalized understanding.       Bea York MD  Pulmonary Medicine  [facility]  Phone: [phone]

## 2024-10-08 NOTE — PATIENT INSTRUCTIONS
STOP TAKING APIXABAN AFTER YOUR PM DOSE TODAY 10/08/2024    TENTATIVE EBUS SCHEDULED FOR 10/16/2024      Check in on GROUND FLOOR of Ambulatory building.      Dont eat or drink  Follow any directions you are given for not eating or drinking before surgery. If you don't follow instructions about when to stop eating and drinking, your procedure may be postponed or rescheduled for another day. This is a safety issue.  You can brush your teeth and rinse your mouth, but dont swallow any water.    Day of surgery  Leave jewelry (including rings), watches, and other valuables at home.  Be sure to bring health insurance cards or forms, and a photo ID.  Bring a list of your medicines (include the name, dose, how often you take them, and the time last dose was taken).  You will be sedated for the procedure so you must have a  present with you.  Arrive on time at the hospital or surgery facility.        Flexible Bronchoscopy  A flexible bronchoscopy is an exam of the airways of your lungs. A thin, flexible tube called a bronchoscope is used. It has a light and small camera that allow the healthcare provider to view your airways.    Before your test  Follow your healthcare provider's instructions carefully. If you dont, the exam may be canceled. Or you may need to take it again.  If you are taking blood-thinning medicine, ask your healthcare provider if you should stop taking the medicine before this test.  Have no food or drink starting at midnight on day of the test. Also, avoid smoking for 24 hours before the test.  You will need to remove any dentures or removable devices from your mouth.  Right before the test, you will be given sedating medicines to help you relax. The medicine may be given by an IV (intravenously) into one of your veins. In addition, your nose and throat may be numbed with a special spray to help prevent gagging and coughing.  If you are having this test as an outpatient, make sure you have an  adult friend or family member to drive you home.    During your test  Bronchoscopy takes 45 to 60 minutes (**EBUS takes longer**) and includes the following steps:  You may be given medicine (anesthesia) so that you are unconscious or asleep during the procedure.  The healthcare provider inserts the tube into your nose or mouth.  If you have not been given anesthesia, you might feel a gagging sensation. To help ease this feeling, you will be told to swallow or take deep breaths. Your airway will remain open even with the tube in place. But you wont be able to talk.  The provider checks your breathing passage. He or she may also remove tiny tissue samples for biopsy.  After your test  You may have a mild sore throat or cough. Your voice may also be hoarse.  Don't eat or drink until the anesthesia wears off.  If you had a biopsy, you might see traces of blood being coughed up.  When to call your healthcare provider  Call your healthcare provider right away if you have any of the following:  Shortness of breath  Chest pain  Bleeding from your nose or throat  Coughing up a large amount of blood  A fever above 100.4°F (38°C) for more than 24 hours  Call 911  Call 911 if you have:  Chest pain  Severe shortness of breath

## 2024-10-08 NOTE — H&P (VIEW-ONLY)
[facility]  Pulmonology  NEW VISIT     Patient Name:  Magan Saleem  Primary Care Provider: Sil Brown DO  Date of Service: 10/8/2024   Reason for Referral:  EBUS bronchoscopy/navigation bronchoscopy      Chief Complaint:  I need a bronchoscopy    SUBJECTIVE   HPI:  Magan Saleem is a 69 y.o. 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     Stiven reports feeling well at this time of assessment.  She details the course of her lung cancer therapies in the findings of new right lung lesion and lymph node enlargement.  She expresses concerns with bronchoscopy given previous experience with the procedure.  We discussed bronchoscopic evaluation at length and questions answered.  She has no shortness of breath at rest and has not required oxygen in the past year.  She denies any hospitalizations for any treatment of pneumonia.    Oncology records reviewed at length.  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  Her workup was notable for brain metastasis during staging.  She received SP RT for her brain metastasis and initiated on osimertinib systemic therapy.  Serial imaging most recently has demonstrated progressive growth of pulmonary nodules associated with mediastinal lymphadenopathy.  For evaluation, she underwent peripheral blood biopsy with carries that did not identify any new targets for therapy per.  To this end, she has been referred by her oncology team for goal biopsy for next generation sequencing to help tailor her chemotherapy regimen.      Past Medical History:   Diagnosis Date    Acquired absence of both cervix and uterus 01/27/2021    Asthma     History of  "colonoscopy 09/29/2020    REFUSAL    Hyperlipidemia     Hypertension     Melanocytic nevi of lower limb, including hip, left     Vitamin D deficiency        Past Surgical History:   Procedure Laterality Date    cerebral shunt      CHOLECYSTECTOMY      HYSTERECTOMY      TONSILLECTOMY         Family History   Problem Relation Name Age of Onset    Diabetes Mother      Hypertension Mother      Lung cancer Father          Social History     Socioeconomic History    Marital status:    Occupational History    Occupation: Retired   Tobacco Use    Smoking status: Never    Smokeless tobacco: Never   Substance and Sexual Activity    Alcohol use: Never    Drug use: Never    Sexual activity: Not Currently       Social History     Social History Narrative    Not on file       Review of patient's allergies indicates:   Allergen Reactions    Penicillins Hives    Sulfa (sulfonamide antibiotics) Hives        Medications: Medications reviewed to include over the counter medications.    Review of Systems: A focused ROS was completed and found to be negative except for that mentioned above.      OBJECTIVE   PHYSICAL EXAM:  Vitals:    10/08/24 0932   BP: 134/72   BP Location: Left forearm   Patient Position: Sitting   Pulse: 68   Resp: 18   SpO2: 99%   Weight: 134.4 kg (296 lb 3.2 oz)   Height: 5' 5" (1.651 m)        GENERAL: NAD  HEENT: normocephalic, non-icteric conjunctivae, moist oral mucosa  RESPIRATORY: clear to auscultation, no wheezing, rales or rhonchi  CARDIOVASCULAR: Regular rate and rhythm, no murmurs rubs or gallops  SKIN: no rash, jaundice, ecchymosis or ulcers  MUSCULOSKELETAL: No clubbing or cyanosis; no pedal edema  NEUROLOGIC: AO ×3, no gross deficits  PSYCH: Normal mood and affect    LABS:  Lab studies reviewed and notable for CO2 31, SCr 2.2, H/H 12.3/38.4 ([facility] labs 2024)    IMAGING:  Care Everywhere image reports reviewed and notable for :    PET-CT 10/04/2024:  Findings:   Head and neck: Interval improved " right-sided FDG avid cervical   lymphadenopathy including right level 3 and supraclavicular locations.   Subcentimeter focus of uptake remains within right level 3 with SUV   measuring up to 4.98.   Chest/Mediastinum: Significantly improved right hilar masslike uptake   in mediastinal multifocal uptake consistent with an element of   treatment response. Residual subcentimeter focus of uptake within the   left paratracheal region demonstrates SUV measuring up to 5.75.   Minimal uptake within previous location of right perihilar mass   demonstrates SUV measuring up to 4.18. There has been interval   progression of multiple left axillary lymph nodes. Representative   uptake measures up to 1.8 cm with SUV measuring up to 11.5. There are   a few nodular densities within the medial basal right lung involving   the right middle lobe and right lower lobe. Representative along the   medial basal right middle lobe measures approximately 1 cm with SUV   measuring up to 1.76.   Abdomen and pelvis:  Normal physiologic activity is noted in the   liver, spleen, stomach and bowel. No adrenal hypermetabolism is   appreciated. Urinary activity is noted in the kidneys bilaterally and   urinary bladder.     LUNG FUNCTION TESTING: None available to review or report      ASSESSMENT & PLAN     1. 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  We discussed that we would like to proceed with bronchoscopy. The bronchoscopic procedure was discussed in detail with the patient. Risks of the procedure were reviewed, including bleeding, infection, pneumothorax, damage to surrounding structures discussed.   - no reports to  date for Osimertinib causing granulomatous inflammation and ongoing concern for recurrence on therapy warrants bx for NGS  - PET-CT will be requested for personal review and is pending power share to tailor targets for Bx   -- PET CT report denotes L axillary LAD which could be an additional target for bx  - obtain CT Chest CHELSI bronch protocol  - renal function noted from OSH labs, will plan for Eliquis hold starting 10/09 for EBUS tentative date 10/16  - case to be discussed with Dr. Guerra, discussed with patient tentative plans for biopsy and they are amenable to meeting with Dr. Guerra following case discussion possibly on procedure day      Orders:  -     CT Chest Without Contrast; Future; Expected date: 10/08/2024  -     EBUS w 3 or more samples; Future; Expected date: 10/08/2024           Follow up in about 4 weeks (around 11/5/2024).      Case was discussed with patient; all questions were answered to patient's satisfaction and patient verbalized understanding.       Bea York MD  Pulmonary Medicine  [facility]  Phone: [phone]

## 2024-10-10 NOTE — ASSESSMENT & PLAN NOTE
70 yo F with stage IV adenocarcinoma of the lung with brain metastasis s/p radiation and currently on Osimertinib (Dx 2022) presents upon referral for consideration of EBUS bronchoscopy given PET-CT with evidence of evolution of pulmonary nodules and LAD with ongoing concern for recurrence while on therapy.  We discussed that we would like to proceed with bronchoscopy. The bronchoscopic procedure was discussed in detail with the patient. Risks of the procedure were reviewed, including bleeding, infection, pneumothorax, damage to surrounding structures discussed.   - no reports to date for Osimertinib causing granulomatous inflammation and ongoing concern for recurrence on therapy warrants bx for NGS  - PET-CT will be requested for personal review and is pending power share to tailor targets for Bx   -- PET CT report denotes L axillary LAD which could be an additional target for bx  - obtain CT Chest CHELSI bronch protocol  - renal function noted from OSH labs, will plan for Eliquis hold starting 10/09 for EBUS tentative date 10/16  - case to be discussed with Dr. Guerra, discussed with patient tentative plans for biopsy and they are amenable to meeting with Dr. Guerra following case discussion possibly on procedure day

## 2024-10-14 ENCOUNTER — TELEPHONE (OUTPATIENT)
Dept: GASTROENTEROLOGY | Facility: HOSPITAL | Age: 69
End: 2024-10-14
Payer: MEDICARE

## 2024-10-14 NOTE — TELEPHONE ENCOUNTER
Spoke with patient concerning upcoming Bronchoscopy procedure scheduled for 10.16.24. Patient confirmed appt time and arrival time. Patient confirmed address of facility. Patient expressed understanding of all pre-procedure instructions. Patient confirmed NPO status after midnight on 10.15.24. Pt denied having any cardiac implanted devices, pacemakers, or neuro-stimulators. Pt stated last dose of Eliquis was 10.10.24. Patient confirmed having a  present for procedure. Discussed a tentative schedule of times for the day of the procedure. Answered all questions and concerns at this time.

## 2024-10-16 ENCOUNTER — HOSPITAL ENCOUNTER (OUTPATIENT)
Dept: GASTROENTEROLOGY | Facility: HOSPITAL | Age: 69
Discharge: HOME | End: 2024-10-16
Attending: STUDENT IN AN ORGANIZED HEALTH CARE EDUCATION/TRAINING PROGRAM
Payer: MEDICARE

## 2024-10-16 ENCOUNTER — ANESTHESIA EVENT (OUTPATIENT)
Dept: GASTROENTEROLOGY | Facility: HOSPITAL | Age: 69
End: 2024-10-16
Payer: MEDICARE

## 2024-10-16 ENCOUNTER — ANESTHESIA (OUTPATIENT)
Dept: GASTROENTEROLOGY | Facility: HOSPITAL | Age: 69
End: 2024-10-16
Payer: MEDICARE

## 2024-10-16 VITALS
HEART RATE: 72 BPM | BODY MASS INDEX: 48.82 KG/M2 | OXYGEN SATURATION: 100 % | RESPIRATION RATE: 16 BRPM | HEIGHT: 65 IN | DIASTOLIC BLOOD PRESSURE: 54 MMHG | WEIGHT: 293 LBS | SYSTOLIC BLOOD PRESSURE: 141 MMHG | TEMPERATURE: 98 F

## 2024-10-16 DIAGNOSIS — C34.31 MALIGNANT NEOPLASM OF LOWER LOBE OF RIGHT LUNG: Primary | ICD-10-CM

## 2024-10-16 DIAGNOSIS — C34.31 MALIGNANT NEOPLASM OF LOWER LOBE OF RIGHT LUNG: ICD-10-CM

## 2024-10-16 PROCEDURE — 27000177 HC AIRWAY, LARYNGEAL MASK: Performed by: NURSE ANESTHETIST, CERTIFIED REGISTERED

## 2024-10-16 PROCEDURE — 27000510 HC BLANKET BAIR HUGGER ANY SIZE: Performed by: NURSE ANESTHETIST, CERTIFIED REGISTERED

## 2024-10-16 PROCEDURE — 27000655: Performed by: NURSE ANESTHETIST, CERTIFIED REGISTERED

## 2024-10-16 PROCEDURE — 27000716 HC OXISENSOR PROBE, ANY SIZE: Performed by: NURSE ANESTHETIST, CERTIFIED REGISTERED

## 2024-10-16 RX ORDER — MORPHINE SULFATE 10 MG/ML
4 INJECTION INTRAMUSCULAR; INTRAVENOUS; SUBCUTANEOUS EVERY 5 MIN PRN
OUTPATIENT
Start: 2024-10-16

## 2024-10-16 RX ORDER — IPRATROPIUM BROMIDE AND ALBUTEROL SULFATE 2.5; .5 MG/3ML; MG/3ML
3 SOLUTION RESPIRATORY (INHALATION) ONCE
OUTPATIENT
Start: 2024-10-16 | End: 2024-10-16

## 2024-10-16 RX ORDER — HYDROMORPHONE HYDROCHLORIDE 2 MG/ML
0.5 INJECTION, SOLUTION INTRAMUSCULAR; INTRAVENOUS; SUBCUTANEOUS EVERY 5 MIN PRN
OUTPATIENT
Start: 2024-10-16

## 2024-10-16 RX ORDER — ONDANSETRON HYDROCHLORIDE 2 MG/ML
4 INJECTION, SOLUTION INTRAVENOUS DAILY PRN
OUTPATIENT
Start: 2024-10-16

## 2024-10-16 RX ORDER — DIPHENHYDRAMINE HYDROCHLORIDE 50 MG/ML
25 INJECTION INTRAMUSCULAR; INTRAVENOUS EVERY 6 HOURS PRN
OUTPATIENT
Start: 2024-10-16

## 2024-10-16 RX ORDER — MEPERIDINE HYDROCHLORIDE 25 MG/ML
25 INJECTION INTRAMUSCULAR; INTRAVENOUS; SUBCUTANEOUS EVERY 10 MIN PRN
OUTPATIENT
Start: 2024-10-16 | End: 2024-10-16

## 2024-10-16 NOTE — PROGRESS NOTES
After history and physical update was performed, patient informed the healthcare team that she had taken her Ozempic a few days prior.  Therefore, due to increased risk of aspiration due to delayed gastric emptying, the patient was not cleared for the procedure under general anesthesia by our anesthesia care team.  This was discussed in detail with the patient and she is agreeable to having a procedure done in a few days.      She will resume her Eliquis and stopped taking it so that she will hold it on 10/19/24 and 10/20/24 for her scheduled EBUS bronchoscopy on 10/21/24.  She will continue to hold Ozempic.  All questions were answered and patient was agreeable with plan as above.      Colten Guerra MD  Interventional Pulmonary and Critical Care  [facility]

## 2024-10-16 NOTE — ANESTHESIA PREPROCEDURE EVALUATION
10/16/2024  Magan Saleem is a 69 y.o., female.      Pre-op Assessment    I have reviewed the Patient Summary Reports.     I have reviewed the Nursing Notes. I have reviewed the NPO Status.   I have reviewed the Medications.     Review of Systems  Anesthesia Hx:             Denies Family Hx of Anesthesia complications.    Denies Personal Hx of Anesthesia complications.                    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               Musculoskeletal:  Musculoskeletal Normal                Endocrine:        Morbid Obesity / BMI > 40  Psych:   anxiety                 Physical Exam  General: Well nourished, Cooperative, Alert and Oriented    Airway:  Mallampati: III / III  Mouth Opening: Normal  TM Distance: Normal  Neck ROM: Normal ROM    Dental:  Intact    Chest/Lungs:  Clear to auscultation    Heart:  Rate: Normal  Rhythm: Regular Rhythm  Sounds: Normal        Chemistry        Component Value Date/Time     12/15/2022 0937    K 4.2 12/15/2022 0937     12/15/2022 0937    CO2 30 12/15/2022 0937    BUN 14 12/15/2022 0937    CREATININE 1.16 (H) 12/15/2022 0937     (H) 12/15/2022 0937        Component Value Date/Time    CALCIUM 9.7 12/15/2022 0937    ALKPHOS 113 12/15/2022 0937    AST 20 12/15/2022 0937    ALT 23 12/15/2022 0937    BILITOT 0.6 12/15/2022 0937    ESTGFRAFRICA 57 (L) 06/30/2021 0955    EGFRNONAA 50 (L) 12/23/2021 0902        Lab Results   Component Value Date    WBC 10.02 12/15/2022    HGB 13.7 12/15/2022    HCT 44.2 12/15/2022     12/15/2022     No results found for this or any previous visit.      Anesthesia Plan  Type of  Anesthesia, risks & benefits discussed:    Anesthesia Type: Gen ETT  Intra-op Monitoring Plan: Standard ASA Monitors  Post Op Pain Control Plan: multimodal analgesia  Induction:  IV  Airway Plan: Direct  Informed Consent: Informed consent signed with the Patient and all parties understand the risks and agree with anesthesia plan.  All questions answered.   ASA Score: 3  Day of Surgery Review of History & Physical: H&P Update referred to the surgeon/provider.I have interviewed and examined the patient. I have reviewed the patient's H&P dated: There are no significant changes.     Ready For Surgery From Anesthesia Perspective.     .

## 2024-10-16 NOTE — INTERVAL H&P NOTE
I have reviewed the History and Physical on file for Magan Saleem and there is no significant change noted.  Procedure, including risks, discussed with the patient in detail and all questions were answered when I met the patient in the preprocedure area.  Consent was signed as per institutional protocol.  At this time, patient okay to proceed with planned bronchoscopy procedure.    The risks of bronchoscopy, and endobronchial ultrasound-guided biopsies +/- endobronchial and transbronchial biopsies were discussed with the patient in detail, including the alternatives to EBUS bronchoscopy.  Risks discussed included, but not limited to, bleeding, infection, pneumothorax, worsening shortness of breath and respiratory failure.  The patient/ their family verbalized understanding of this risk and agreed to proceed with the procedure as above.      Rapid on-site evaluation (SHANNA) will be used during the case above, which we will provide a preliminary diagnosis.  I have offered to share this preliminary diagnosis with the patient/their family with their understanding that these results are preliminary and the final results me change.  The patient's/their family have opted to be made aware of these preliminary results.       Final pathology results will be subjected to a delayed release in the electronic medical record to the patient as per their request, so that the results can be interpreted by their physicians and explained to them in a satisfactory manner.  The patient is aware and has agreed to this.        Colten Guerra MD  Interventional Pulmonary and Critical Care  [facility]

## 2024-10-17 ENCOUNTER — TELEPHONE (OUTPATIENT)
Dept: GASTROENTEROLOGY | Facility: HOSPITAL | Age: 69
End: 2024-10-17
Payer: MEDICARE

## 2024-10-17 NOTE — TELEPHONE ENCOUNTER
Spoke with patient concerning upcoming EBUS procedure scheduled for 10.21.24. Patient confirmed appt time and arrival time. Patient confirmed address of facility. Patient expressed understanding of all pre-procedure instructions. Patient confirmed NPO status after midnight on 10.20.24. Pt denied having any cardiac implanted devices, pacemakers, or neuro-stimulators. Pt confirmed last dose of Ozempic was 10.12.24 and last dose of Eliquis was 10.10.24 per Dr. Guerra verbal instructions. Patient confirmed having a  present for procedure. Discussed a tentative schedule of times for the day of the procedure. Answered all questions and concerns at this time.

## 2024-10-21 ENCOUNTER — ANESTHESIA (OUTPATIENT)
Dept: GASTROENTEROLOGY | Facility: HOSPITAL | Age: 69
End: 2024-10-21
Payer: MEDICARE

## 2024-10-21 ENCOUNTER — HOSPITAL ENCOUNTER (OUTPATIENT)
Dept: GASTROENTEROLOGY | Facility: HOSPITAL | Age: 69
Discharge: HOME | End: 2024-10-21
Attending: STUDENT IN AN ORGANIZED HEALTH CARE EDUCATION/TRAINING PROGRAM
Payer: MEDICARE

## 2024-10-21 ENCOUNTER — ANESTHESIA EVENT (OUTPATIENT)
Dept: GASTROENTEROLOGY | Facility: HOSPITAL | Age: 69
End: 2024-10-21
Payer: MEDICARE

## 2024-10-21 VITALS
TEMPERATURE: 98 F | SYSTOLIC BLOOD PRESSURE: 127 MMHG | HEIGHT: 65 IN | WEIGHT: 293 LBS | DIASTOLIC BLOOD PRESSURE: 61 MMHG | BODY MASS INDEX: 48.82 KG/M2 | RESPIRATION RATE: 16 BRPM | HEART RATE: 77 BPM | OXYGEN SATURATION: 99 %

## 2024-10-21 DIAGNOSIS — C34.31 MALIGNANT NEOPLASM OF LOWER LOBE OF RIGHT LUNG: ICD-10-CM

## 2024-10-21 LAB
CLARITY FLD: ABNORMAL
COLOR FLD: ABNORMAL
EOSINOPHIL NFR FLD MANUAL: 1 %
GLUCOSE SERPL-MCNC: 104 MG/DL (ref 70–105)
GLUCOSE SERPL-MCNC: 114 MG/DL (ref 70–105)
GRANULOCYTES NFR FLD MANUAL: 55 % (ref 0–25)
LYMPHOCYTES NFR FLD MANUAL: 3 %
MACROPHAGES NFR FLD MANUAL: 41 %
RBC # FLD MANUAL: 4875 /CUMM
WBC # FLD MANUAL: 305 /CUMM

## 2024-10-21 PROCEDURE — 87070 CULTURE OTHR SPECIMN AEROBIC: CPT | Performed by: STUDENT IN AN ORGANIZED HEALTH CARE EDUCATION/TRAINING PROGRAM

## 2024-10-21 PROCEDURE — 88112 CYTOPATH CELL ENHANCE TECH: CPT | Mod: 26,59,, | Performed by: PATHOLOGY

## 2024-10-21 PROCEDURE — 88342 IMHCHEM/IMCYTCHM 1ST ANTB: CPT | Mod: 26,,, | Performed by: PATHOLOGY

## 2024-10-21 PROCEDURE — 89050 BODY FLUID CELL COUNT: CPT | Performed by: STUDENT IN AN ORGANIZED HEALTH CARE EDUCATION/TRAINING PROGRAM

## 2024-10-21 PROCEDURE — 25000003 PHARM REV CODE 250

## 2024-10-21 PROCEDURE — 31653 BRONCH EBUS SAMPLNG 3/> NODE: CPT | Mod: ,,, | Performed by: STUDENT IN AN ORGANIZED HEALTH CARE EDUCATION/TRAINING PROGRAM

## 2024-10-21 PROCEDURE — 27000689 HC BLADE LARYNGOSCOPE ANY SIZE: Performed by: ANESTHESIOLOGY

## 2024-10-21 PROCEDURE — 82962 GLUCOSE BLOOD TEST: CPT

## 2024-10-21 PROCEDURE — 88112 CYTOPATH CELL ENHANCE TECH: CPT | Mod: TC,MCY | Performed by: STUDENT IN AN ORGANIZED HEALTH CARE EDUCATION/TRAINING PROGRAM

## 2024-10-21 PROCEDURE — 31653 BRONCH EBUS SAMPLNG 3/> NODE: CPT | Performed by: STUDENT IN AN ORGANIZED HEALTH CARE EDUCATION/TRAINING PROGRAM

## 2024-10-21 PROCEDURE — 88173 CYTOPATH EVAL FNA REPORT: CPT | Mod: 26,,, | Performed by: PATHOLOGY

## 2024-10-21 PROCEDURE — D9220A PRA ANESTHESIA: Mod: ANES,,, | Performed by: ANESTHESIOLOGY

## 2024-10-21 PROCEDURE — 27000177 HC AIRWAY, LARYNGEAL MASK: Performed by: ANESTHESIOLOGY

## 2024-10-21 PROCEDURE — 88305 TISSUE EXAM BY PATHOLOGIST: CPT | Mod: TC,91,MCY | Performed by: STUDENT IN AN ORGANIZED HEALTH CARE EDUCATION/TRAINING PROGRAM

## 2024-10-21 PROCEDURE — 31624 DX BRONCHOSCOPE/LAVAGE: CPT | Performed by: STUDENT IN AN ORGANIZED HEALTH CARE EDUCATION/TRAINING PROGRAM

## 2024-10-21 PROCEDURE — 27000655: Performed by: ANESTHESIOLOGY

## 2024-10-21 PROCEDURE — 89051 BODY FLUID CELL COUNT: CPT | Performed by: STUDENT IN AN ORGANIZED HEALTH CARE EDUCATION/TRAINING PROGRAM

## 2024-10-21 PROCEDURE — 27000716 HC OXISENSOR PROBE, ANY SIZE: Performed by: ANESTHESIOLOGY

## 2024-10-21 PROCEDURE — 87205 SMEAR GRAM STAIN: CPT | Performed by: STUDENT IN AN ORGANIZED HEALTH CARE EDUCATION/TRAINING PROGRAM

## 2024-10-21 PROCEDURE — 27000165 HC TUBE, ETT CUFFED: Performed by: ANESTHESIOLOGY

## 2024-10-21 PROCEDURE — 63600175 PHARM REV CODE 636 W HCPCS

## 2024-10-21 PROCEDURE — 37000009 HC ANESTHESIA EA ADD 15 MINS

## 2024-10-21 PROCEDURE — 88172 CYTP DX EVAL FNA 1ST EA SITE: CPT | Mod: 26,,, | Performed by: PATHOLOGY

## 2024-10-21 PROCEDURE — 63600175 PHARM REV CODE 636 W HCPCS: Performed by: STUDENT IN AN ORGANIZED HEALTH CARE EDUCATION/TRAINING PROGRAM

## 2024-10-21 PROCEDURE — 31624 DX BRONCHOSCOPE/LAVAGE: CPT | Mod: 51,,, | Performed by: STUDENT IN AN ORGANIZED HEALTH CARE EDUCATION/TRAINING PROGRAM

## 2024-10-21 PROCEDURE — 37000008 HC ANESTHESIA 1ST 15 MINUTES

## 2024-10-21 PROCEDURE — 88172 CYTP DX EVAL FNA 1ST EA SITE: CPT | Mod: TC,91,MCY | Performed by: STUDENT IN AN ORGANIZED HEALTH CARE EDUCATION/TRAINING PROGRAM

## 2024-10-21 PROCEDURE — 27202344 HC EYESHIELD: Performed by: ANESTHESIOLOGY

## 2024-10-21 PROCEDURE — D9220A PRA ANESTHESIA: Mod: CRNA,,,

## 2024-10-21 PROCEDURE — 88341 IMHCHEM/IMCYTCHM EA ADD ANTB: CPT | Mod: 26,,, | Performed by: PATHOLOGY

## 2024-10-21 PROCEDURE — 88305 TISSUE EXAM BY PATHOLOGIST: CPT | Mod: 26,,, | Performed by: PATHOLOGY

## 2024-10-21 RX ORDER — MEPERIDINE HYDROCHLORIDE 25 MG/ML
25 INJECTION INTRAMUSCULAR; INTRAVENOUS; SUBCUTANEOUS EVERY 10 MIN PRN
OUTPATIENT
Start: 2024-10-21 | End: 2024-10-21

## 2024-10-21 RX ORDER — DEXAMETHASONE SODIUM PHOSPHATE 4 MG/ML
INJECTION, SOLUTION INTRA-ARTICULAR; INTRALESIONAL; INTRAMUSCULAR; INTRAVENOUS; SOFT TISSUE
Status: DISCONTINUED | OUTPATIENT
Start: 2024-10-21 | End: 2024-10-21

## 2024-10-21 RX ORDER — ONDANSETRON HYDROCHLORIDE 2 MG/ML
4 INJECTION, SOLUTION INTRAVENOUS DAILY PRN
OUTPATIENT
Start: 2024-10-21

## 2024-10-21 RX ORDER — LIDOCAINE HYDROCHLORIDE 20 MG/ML
INJECTION INTRAVENOUS
Status: DISCONTINUED | OUTPATIENT
Start: 2024-10-21 | End: 2024-10-21

## 2024-10-21 RX ORDER — FENTANYL CITRATE 50 UG/ML
INJECTION, SOLUTION INTRAMUSCULAR; INTRAVENOUS
Status: DISCONTINUED | OUTPATIENT
Start: 2024-10-21 | End: 2024-10-21

## 2024-10-21 RX ORDER — MIDAZOLAM HYDROCHLORIDE 1 MG/ML
INJECTION INTRAMUSCULAR; INTRAVENOUS
Status: DISCONTINUED | OUTPATIENT
Start: 2024-10-21 | End: 2024-10-21

## 2024-10-21 RX ORDER — PHENYLEPHRINE HYDROCHLORIDE 10 MG/ML
INJECTION INTRAVENOUS
Status: DISCONTINUED | OUTPATIENT
Start: 2024-10-21 | End: 2024-10-21

## 2024-10-21 RX ORDER — DIPHENHYDRAMINE HYDROCHLORIDE 50 MG/ML
25 INJECTION INTRAMUSCULAR; INTRAVENOUS EVERY 6 HOURS PRN
OUTPATIENT
Start: 2024-10-21

## 2024-10-21 RX ORDER — IPRATROPIUM BROMIDE AND ALBUTEROL SULFATE 2.5; .5 MG/3ML; MG/3ML
3 SOLUTION RESPIRATORY (INHALATION) ONCE
OUTPATIENT
Start: 2024-10-21 | End: 2024-10-21

## 2024-10-21 RX ORDER — LIDOCAINE HYDROCHLORIDE 10 MG/ML
INJECTION, SOLUTION INFILTRATION; PERINEURAL
Status: COMPLETED | OUTPATIENT
Start: 2024-10-21 | End: 2024-10-21

## 2024-10-21 RX ORDER — ESMOLOL HYDROCHLORIDE 10 MG/ML
INJECTION INTRAVENOUS
Status: DISCONTINUED | OUTPATIENT
Start: 2024-10-21 | End: 2024-10-21

## 2024-10-21 RX ORDER — ONDANSETRON HYDROCHLORIDE 2 MG/ML
INJECTION, SOLUTION INTRAVENOUS
Status: DISCONTINUED | OUTPATIENT
Start: 2024-10-21 | End: 2024-10-21

## 2024-10-21 RX ORDER — ROCURONIUM BROMIDE 10 MG/ML
INJECTION, SOLUTION INTRAVENOUS
Status: DISCONTINUED | OUTPATIENT
Start: 2024-10-21 | End: 2024-10-21

## 2024-10-21 RX ORDER — MORPHINE SULFATE 10 MG/ML
4 INJECTION INTRAMUSCULAR; INTRAVENOUS; SUBCUTANEOUS EVERY 5 MIN PRN
OUTPATIENT
Start: 2024-10-21

## 2024-10-21 RX ORDER — HYDROMORPHONE HYDROCHLORIDE 2 MG/ML
0.5 INJECTION, SOLUTION INTRAMUSCULAR; INTRAVENOUS; SUBCUTANEOUS EVERY 5 MIN PRN
OUTPATIENT
Start: 2024-10-21

## 2024-10-21 RX ORDER — PROPOFOL 10 MG/ML
VIAL (ML) INTRAVENOUS
Status: DISCONTINUED | OUTPATIENT
Start: 2024-10-21 | End: 2024-10-21

## 2024-10-21 RX ADMIN — FENTANYL CITRATE 50 MCG: 50 INJECTION, SOLUTION INTRAMUSCULAR; INTRAVENOUS at 02:10

## 2024-10-21 RX ADMIN — SUGAMMADEX 200 MG: 100 INJECTION, SOLUTION INTRAVENOUS at 02:10

## 2024-10-21 RX ADMIN — MIDAZOLAM HYDROCHLORIDE 2 MG: 1 INJECTION, SOLUTION INTRAMUSCULAR; INTRAVENOUS at 01:10

## 2024-10-21 RX ADMIN — LIDOCAINE HYDROCHLORIDE 6 ML: 10 INJECTION, SOLUTION INFILTRATION; PERINEURAL at 02:10

## 2024-10-21 RX ADMIN — PROPOFOL 150 MG: 10 INJECTION, EMULSION INTRAVENOUS at 01:10

## 2024-10-21 RX ADMIN — FENTANYL CITRATE 50 MCG: 50 INJECTION, SOLUTION INTRAMUSCULAR; INTRAVENOUS at 01:10

## 2024-10-21 RX ADMIN — DEXAMETHASONE SODIUM PHOSPHATE 6 MG: 4 INJECTION, SOLUTION INTRA-ARTICULAR; INTRALESIONAL; INTRAMUSCULAR; INTRAVENOUS; SOFT TISSUE at 01:10

## 2024-10-21 RX ADMIN — LIDOCAINE HYDROCHLORIDE 100 MG: 20 INJECTION, SOLUTION INTRAVENOUS at 01:10

## 2024-10-21 RX ADMIN — PHENYLEPHRINE HYDROCHLORIDE 100 MCG: 10 INJECTION INTRAVENOUS at 01:10

## 2024-10-21 RX ADMIN — ESMOLOL HYDROCHLORIDE 10 MG: 100 INJECTION, SOLUTION INTRAVENOUS at 01:10

## 2024-10-21 RX ADMIN — ROCURONIUM BROMIDE 50 MG: 10 INJECTION, SOLUTION INTRAVENOUS at 01:10

## 2024-10-21 RX ADMIN — PROPOFOL 150 MCG/KG/MIN: 10 INJECTION, EMULSION INTRAVENOUS at 01:10

## 2024-10-21 RX ADMIN — ONDANSETRON 4 MG: 2 INJECTION INTRAMUSCULAR; INTRAVENOUS at 01:10

## 2024-10-21 NOTE — DISCHARGE INSTRUCTIONS
POST BRONCHOSCOPY DISCHARGE INSTRUCTIONS:  Today you have undergone a procedure called a bronchoscopy with biopsy (also referred to as Bronchoscopy with EBUS and biopsy). You underwent general anesthesia and the medication will be in your body for the following hours up to 24 hours. It is essential that you are accompanied home by a responsible adult and I recommend that they stay with you during this period. You should NOT drive a vehicle, operate any form of machinery, consume alcohol or sign legal documentation during this time. After 24 hours, the effect of sedation should have worn off and you will be able to start normal activities.      DIET: You may begin a normal diet and fluids 2 HOURS following leaving the hospital. This will make sure your swallowing muscles have recovered properly.      MEDICATIONS: You may resume your usual prescriptions.     BIOPSIES AND SPECIMENS: The biopsies that were taken following your procedure have been taken for processing and testing. It may take up to 1 week, and sometimes longer, to get the final result.  You have a follow up appointment with Dr. York who will go over the details of your results.  Your oncologist has also been updated regarding your preliminary diagnosis.        SYMPTOMS:  You may have a sore throat after the procedure; this typically resolves in a day.  Because of the biopsies taken, you may experience a low grade fever for <24 hrs and coughing with some blood coming up.  You may cough after surgery. This is normal to clear secretions in your lung that collected during the time  you were asleep in surgery and could not cough on your own. You may also see some blood in your sputum.  This is normal and as long as it is only a small amount there is no need for alarm.      Awareness of the following unusual symptoms should prompt you to call our office at [phone] to discuss a plan for management. If the symptoms come on suddenly go to the Emergency  Department for evaluation.  These unusual symptoms include:  Sudden breathing distress, such as being more breathless than you normally are  Chest pain not responding to medication  Persistently high temperature or fever of 100.4°F or higher  Coughing up large amount of blood or blood clots (more than a teaspoon)   Sudden swelling of your previous IV sites           Colten Guerra MD  Interventional Pulmonary and Critical Care  [facility]

## 2024-10-21 NOTE — BRIEF OP NOTE
Magan Saleem  presents 10/21/2024 in outpatient setting for planned EBUS TBNA.    PROCEDURES: Bronchoscopy, Bronchoscopy with Endobronchial ultrasound (EBUS), Transbronchial needle aspiration of 3 or more lymph nodes or structures, and Bronchoalveolar lavage  See procedure note for further details.    OUTCOME: Patient tolerated the procedure well without complication and is now ready for discharge    DISPOSITION: Home or self care    FINAL DIAGNOSIS:   Successful EBUS TBNA with preliminary analysis suggestive of non-small-cell cancer in station 4 L and station 4R with final pathology analysis pending.  Patient tolerated procedure well.      FOLLOW UP: Will coordinate follow up vs referral thereafter. Follow up in clinic to discuss results with Dr. York, will call to schedule f/u visit.    DISCHARGE INSTRUCTIONS: Post-bronchoscopy instructions discussed with patient. Entered into AVS.     TIME SPENT ON DISCHARGE: <30 minutes      Discussed with patient and accompanying family/friend management plans, all questions were answered and they verbalized understanding.       Colten Guerra MD  Interventional Pulmonary and Critical Care  [facility]

## 2024-10-21 NOTE — ANESTHESIA PROCEDURE NOTES
LMA    Date/Time: 10/21/2024 1:18 PM    Performed by: Miguel Olea II, CRNA  Authorized by: Ant Osuna MD    Intubation:     Induction:  Intravenous    Intubated:  Postinduction    Mask Ventilation:  Easy with oral airway    Attempts:  1    Attempted By:  CRNA    Difficult Airway Encountered?: No      Complications:  None    Airway Device:  Supraglottic airway/LMA    Airway Device Size:  4.0    Style/Cuff Inflation:  Uncuffed (I-GEL)    Secured at:  The lips    Placement Verified By:  Capnometry    Complicating Factors:  None    Findings Post-Intubation:  BS equal bilateral and atraumatic/condition of teeth unchanged

## 2024-10-21 NOTE — ANESTHESIA PROCEDURE NOTES
Intubation    Date/Time: 10/21/2024 1:22 PM    Performed by: Miguel Olea II, CRNA  Authorized by: Ant Osuna MD    Intubation:     Induction:  Intravenous    Intubated:  Postinduction    Mask Ventilation:  Easy with oral airway    Attempts:  1    Attempted By:  CRNA    Method of Intubation:  Direct    Blade:  Thai 4    Laryngeal View Grade: Grade I - full view of cords      Difficult Airway Encountered?: No      Complications:  None    Airway Device:  Oral endotracheal tube    Airway Device Size:  8.5    Style/Cuff Inflation:  Cuffed    Inflation Amount (mL):  7    Tube secured:  20    Secured at:  The lips    Placement Verified By:  Capnometry    Complicating Factors:  None    Findings Post-Intubation:  BS equal bilateral and atraumatic/condition of teeth unchanged

## 2024-10-21 NOTE — PLAN OF CARE
1425 RECEIVED FROM OR RESTING QUIETLY NO DISTRESS NOTED, AWAKENS EASILY TO NAME CALLED.WILL CONTINUE TO MONITOR.    1450 FRIEND CALLED AND UPDATED ON PATIENT STATUS    1510 TRANSFERRED TO [facility] NO DISTRESS NOTED. REPORT GIVEN TO LONDON MELARA 100% 83 131/65

## 2024-10-21 NOTE — ANESTHESIA PREPROCEDURE EVALUATION
10/21/2024  Magan Saleem is a 69 y.o., female.      Pre-op Assessment    I have reviewed the Patient Summary Reports.     I have reviewed the Nursing Notes. I have reviewed the NPO Status.   I have reviewed the Medications.     Review of Systems  Anesthesia Hx:             Denies Family Hx of Anesthesia complications.    Denies Personal Hx of Anesthesia complications.                    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               Musculoskeletal:  Musculoskeletal Normal                Endocrine:        Morbid Obesity / BMI > 40  Psych:   anxiety                 Physical Exam  General: Well nourished, Cooperative, Alert and Oriented    Airway:  Mallampati: III / III  Mouth Opening: Normal  TM Distance: Normal  Neck ROM: Normal ROM    Dental:  Intact    Chest/Lungs:  Clear to auscultation    Heart:  Rate: Normal  Rhythm: Regular Rhythm  Sounds: Normal        Chemistry        Component Value Date/Time     12/15/2022 0937    K 4.2 12/15/2022 0937     12/15/2022 0937    CO2 30 12/15/2022 0937    BUN 14 12/15/2022 0937    CREATININE 1.16 (H) 12/15/2022 0937     (H) 12/15/2022 0937        Component Value Date/Time    CALCIUM 9.7 12/15/2022 0937    ALKPHOS 113 12/15/2022 0937    AST 20 12/15/2022 0937    ALT 23 12/15/2022 0937    BILITOT 0.6 12/15/2022 0937    ESTGFRAFRICA 57 (L) 06/30/2021 0955    EGFRNONAA 50 (L) 12/23/2021 0902        Lab Results   Component Value Date    WBC 10.02 12/15/2022    HGB 13.7 12/15/2022    HCT 44.2 12/15/2022     12/15/2022     No results found for this or any previous visit.      Anesthesia Plan  Type of  Anesthesia, risks & benefits discussed:    Anesthesia Type: Gen ETT, Gen Supraglottic Airway  Intra-op Monitoring Plan: Standard ASA Monitors  Post Op Pain Control Plan: multimodal analgesia  Induction:  IV  Airway Plan: Direct  Informed Consent: Informed consent signed with the Patient and all parties understand the risks and agree with anesthesia plan.  All questions answered.   ASA Score: 3  Day of Surgery Review of History & Physical: H&P Update referred to the surgeon/provider.I have interviewed and examined the patient. I have reviewed the patient's H&P dated: There are no significant changes.     Ready For Surgery From Anesthesia Perspective.     .

## 2024-10-21 NOTE — TRANSFER OF CARE
"Anesthesia Transfer of Care Note    Patient: Magan Saleem    Procedure(s) Performed: * No procedures listed *    Patient location: PACU    Anesthesia Type: general    Transport from OR: Transported from OR on 6-10 L/min O2 by face mask with adequate spontaneous ventilation    Post pain: adequate analgesia    Post assessment: no apparent anesthetic complications    Post vital signs: stable    Level of consciousness: responds to stimulation    Nausea/Vomiting: no nausea/vomiting    Complications: none    Transfer of care protocol was followed      Last vitals: Visit Vitals  BP (!) 141/50 (BP Location: Left arm, Patient Position: Lying)   Pulse 96   Temp 36.4 °C (97.5 °F) (Oral)   Resp 17   Ht 5' 5" (1.651 m)   Wt 133.8 kg (295 lb)   SpO2 97%   Breastfeeding No   BMI 49.09 kg/m²     "

## 2024-10-22 LAB
ESTROGEN SERPL-MCNC: NORMAL PG/ML
INSULIN SERPL-ACNC: NORMAL U[IU]/ML
LAB AP CLINICAL INFORMATION: NORMAL
LAB AP COMMENTS: NORMAL
LAB AP GROSS DESCRIPTION: NORMAL
RUSH AP QUICK STAIN DIAGNOSIS: NORMAL
T3RU NFR SERPL: NORMAL %

## 2024-10-23 LAB
CULTURE, LOWER RESPIRATORY: NORMAL
GRAM STN SPEC: NORMAL

## 2024-10-23 NOTE — ANESTHESIA POSTPROCEDURE EVALUATION
Anesthesia Post Evaluation    Patient: Magan Saleem    Procedure(s) Performed: * No procedures listed *    Final Anesthesia Type: general      Patient location during evaluation: PACU  Patient participation: Yes- Able to Participate  Level of consciousness: awake and alert  Post-procedure vital signs: reviewed and stable  Pain management: adequate  Airway patency: patent  AMRITA mitigation strategies: Multimodal analgesia  PONV status at discharge: No PONV  Anesthetic complications: no      Cardiovascular status: blood pressure returned to baseline  Respiratory status: unassisted  Hydration status: euvolemic  Follow-up not needed.              Vitals Value Taken Time   /61 10/21/24 1545   Temp 36.4 °C (97.5 °F) 10/21/24 1430   Pulse 76 10/21/24 1541   Resp 16 10/21/24 1510   SpO2 100 % 10/21/24 1541   Vitals shown include unfiled device data.      Event Time   Out of Recovery 15:05:00         Pain/Lilli Score: No data recorded

## 2024-10-30 ENCOUNTER — OFFICE VISIT (OUTPATIENT)
Dept: PULMONOLOGY | Facility: CLINIC | Age: 69
End: 2024-10-30
Payer: MEDICARE

## 2024-10-30 ENCOUNTER — TELEPHONE (OUTPATIENT)
Dept: PHARMACY | Facility: CLINIC | Age: 69
End: 2024-10-30

## 2024-10-30 VITALS
RESPIRATION RATE: 18 BRPM | HEIGHT: 65 IN | BODY MASS INDEX: 48.82 KG/M2 | SYSTOLIC BLOOD PRESSURE: 134 MMHG | WEIGHT: 293 LBS | OXYGEN SATURATION: 98 % | DIASTOLIC BLOOD PRESSURE: 80 MMHG | HEART RATE: 60 BPM

## 2024-10-30 DIAGNOSIS — J45.40 MODERATE PERSISTENT ASTHMA WITHOUT COMPLICATION: Primary | ICD-10-CM

## 2024-10-30 PROCEDURE — 99215 OFFICE O/P EST HI 40 MIN: CPT | Mod: PBBFAC | Performed by: STUDENT IN AN ORGANIZED HEALTH CARE EDUCATION/TRAINING PROGRAM

## 2024-10-30 PROCEDURE — 99214 OFFICE O/P EST MOD 30 MIN: CPT | Mod: S$PBB,,, | Performed by: STUDENT IN AN ORGANIZED HEALTH CARE EDUCATION/TRAINING PROGRAM

## 2024-10-30 PROCEDURE — 99999 PR PBB SHADOW E&M-EST. PATIENT-LVL V: CPT | Mod: PBBFAC,,, | Performed by: STUDENT IN AN ORGANIZED HEALTH CARE EDUCATION/TRAINING PROGRAM

## 2024-10-30 RX ORDER — IPRATROPIUM BROMIDE AND ALBUTEROL SULFATE 2.5; .5 MG/3ML; MG/3ML
3 SOLUTION RESPIRATORY (INHALATION) EVERY 4 HOURS PRN
Qty: 75 ML | Refills: 6 | Status: SHIPPED | OUTPATIENT
Start: 2024-10-30 | End: 2025-10-30

## 2024-10-30 RX ORDER — ALBUTEROL SULFATE 90 UG/1
2 INHALANT RESPIRATORY (INHALATION) EVERY 6 HOURS PRN
Qty: 18 G | Refills: 5 | Status: SHIPPED | OUTPATIENT
Start: 2024-10-30

## 2024-10-30 RX ORDER — SEMAGLUTIDE 0.68 MG/ML
0.5 INJECTION, SOLUTION SUBCUTANEOUS
COMMUNITY
Start: 2024-10-28 | End: 2025-01-26

## 2024-10-30 RX ORDER — FLUTICASONE FUROATE, UMECLIDINIUM BROMIDE AND VILANTEROL TRIFENATATE 100; 62.5; 25 UG/1; UG/1; UG/1
1 POWDER RESPIRATORY (INHALATION) DAILY
Qty: 60 EACH | Refills: 11 | Status: SHIPPED | OUTPATIENT
Start: 2024-10-30

## 2024-11-15 ENCOUNTER — TELEPHONE (OUTPATIENT)
Dept: PULMONOLOGY | Facility: CLINIC | Age: 69
End: 2024-11-15
Payer: MEDICARE

## 2024-11-15 NOTE — TELEPHONE ENCOUNTER
Called [facility] spoke to representative [facility] # [phone]. Answered 2 questions and she informed me she would send it back over to appeal department and there was 76 hours left to complete the appeal.   ----- Message from Ada sent at 11/14/2024 12:15 PM CST -----  Who Called: Kat[employer]     Caller is requesting assistance/information from provider's office.    Katarzyna states she needing to speak w someone to answer clinical questions in regards to prescription albuterol (VENTOLIN HFA) 90 mcg/actuation inhaler    Preferred Method of Contact: Phone Call  Patient's Preferred Phone Number on File: [phone]  Additional Information:

## 2024-11-22 ENCOUNTER — PATIENT MESSAGE (OUTPATIENT)
Dept: PHARMACY | Facility: CLINIC | Age: 69
End: 2024-11-22
Payer: MEDICARE

## 2024-11-25 ENCOUNTER — PATIENT MESSAGE (OUTPATIENT)
Dept: PHARMACY | Facility: CLINIC | Age: 69
End: 2024-11-25
Payer: MEDICARE

## 2024-11-25 ENCOUNTER — CLINICAL SUPPORT (OUTPATIENT)
Dept: PULMONOLOGY | Facility: HOSPITAL | Age: 69
End: 2024-11-25
Attending: STUDENT IN AN ORGANIZED HEALTH CARE EDUCATION/TRAINING PROGRAM
Payer: MEDICARE

## 2024-11-25 DIAGNOSIS — J45.40 MODERATE PERSISTENT ASTHMA WITHOUT COMPLICATION: ICD-10-CM

## 2024-11-25 PROCEDURE — 94726 PLETHYSMOGRAPHY LUNG VOLUMES: CPT

## 2024-11-25 PROCEDURE — 94060 EVALUATION OF WHEEZING: CPT

## 2024-11-25 PROCEDURE — 27100098 HC SPACER

## 2024-11-25 PROCEDURE — 94729 DIFFUSING CAPACITY: CPT

## 2024-11-25 PROCEDURE — 94727 GAS DIL/WSHOT DETER LNG VOL: CPT

## 2025-01-01 ENCOUNTER — HOSPITAL ENCOUNTER (INPATIENT)
Facility: HOSPITAL | Age: 70
LOS: 20 days | DRG: 205 | End: 2025-05-16
Attending: HOSPITALIST | Admitting: HOSPITALIST
Payer: MEDICARE

## 2025-01-01 VITALS
BODY MASS INDEX: 46.28 KG/M2 | OXYGEN SATURATION: 84 % | TEMPERATURE: 98 F | HEIGHT: 65 IN | WEIGHT: 277.75 LBS | SYSTOLIC BLOOD PRESSURE: 77 MMHG | HEART RATE: 42 BPM | DIASTOLIC BLOOD PRESSURE: 51 MMHG

## 2025-01-01 DIAGNOSIS — A49.8 INFECTION DUE TO STENOTROPHOMONAS MALTOPHILIA: ICD-10-CM

## 2025-01-01 DIAGNOSIS — J18.9 SEPSIS DUE TO PNEUMONIA: ICD-10-CM

## 2025-01-01 DIAGNOSIS — Z87.19 HISTORY OF PANCREATITIS: ICD-10-CM

## 2025-01-01 DIAGNOSIS — K86.89 ACUTE PANCREATIC FLUID COLLECTION: ICD-10-CM

## 2025-01-01 DIAGNOSIS — R79.89 ELEVATED TROPONIN I LEVEL: ICD-10-CM

## 2025-01-01 DIAGNOSIS — K86.3 PANCREATIC PSEUDOCYST: ICD-10-CM

## 2025-01-01 DIAGNOSIS — R62.7 FAILURE TO THRIVE IN ADULT: ICD-10-CM

## 2025-01-01 DIAGNOSIS — R00.0 TACHYCARDIA: ICD-10-CM

## 2025-01-01 DIAGNOSIS — N18.4 ACUTE KIDNEY INJURY SUPERIMPOSED ON STAGE 4 CHRONIC KIDNEY DISEASE: ICD-10-CM

## 2025-01-01 DIAGNOSIS — A41.9 SEPSIS DUE TO PNEUMONIA: ICD-10-CM

## 2025-01-01 DIAGNOSIS — J98.4 PNEUMONITIS: Primary | ICD-10-CM

## 2025-01-01 DIAGNOSIS — N17.9 ACUTE KIDNEY INJURY SUPERIMPOSED ON STAGE 4 CHRONIC KIDNEY DISEASE: ICD-10-CM

## 2025-01-01 DIAGNOSIS — I47.10 SVT (SUPRAVENTRICULAR TACHYCARDIA): ICD-10-CM

## 2025-01-01 DIAGNOSIS — E79.0 HYPERURICEMIA: ICD-10-CM

## 2025-01-01 LAB
ABO + RH BLD: NORMAL
ALBUMIN SERPL BCP-MCNC: 1 G/DL (ref 3.4–4.8)
ALBUMIN SERPL BCP-MCNC: 1 G/DL (ref 3.4–4.8)
ALBUMIN SERPL BCP-MCNC: 1.1 G/DL (ref 3.4–4.8)
ALBUMIN SERPL BCP-MCNC: 1.2 G/DL (ref 3.4–4.8)
ALBUMIN SERPL BCP-MCNC: 1.2 G/DL (ref 3.4–4.8)
ALBUMIN SERPL BCP-MCNC: 1.3 G/DL (ref 3.4–4.8)
ALBUMIN SERPL BCP-MCNC: 1.4 G/DL (ref 3.4–4.8)
ALBUMIN SERPL BCP-MCNC: 1.5 G/DL (ref 3.4–4.8)
ALBUMIN SERPL BCP-MCNC: 1.6 G/DL (ref 3.4–4.8)
ALBUMIN/GLOB SERPL: 0.3 {RATIO}
ALBUMIN/GLOB SERPL: 0.4 {RATIO}
ALBUMIN/GLOB SERPL: 0.5 {RATIO}
ALBUMIN/GLOB SERPL: 0.6 {RATIO}
ALP SERPL-CCNC: 129 U/L (ref 40–150)
ALP SERPL-CCNC: 131 U/L (ref 40–150)
ALP SERPL-CCNC: 133 U/L (ref 40–150)
ALP SERPL-CCNC: 135 U/L (ref 40–150)
ALP SERPL-CCNC: 142 U/L (ref 40–150)
ALP SERPL-CCNC: 143 U/L (ref 40–150)
ALP SERPL-CCNC: 148 U/L (ref 40–150)
ALP SERPL-CCNC: 162 U/L (ref 40–150)
ALP SERPL-CCNC: 174 U/L (ref 40–150)
ALP SERPL-CCNC: 212 U/L (ref 40–150)
ALP SERPL-CCNC: 237 U/L (ref 40–150)
ALT SERPL W P-5'-P-CCNC: 26 U/L
ALT SERPL W P-5'-P-CCNC: 28 U/L
ALT SERPL W P-5'-P-CCNC: 29 U/L
ALT SERPL W P-5'-P-CCNC: 30 U/L
ALT SERPL W P-5'-P-CCNC: 31 U/L
ALT SERPL W P-5'-P-CCNC: 32 U/L
ALT SERPL W P-5'-P-CCNC: 34 U/L
ANION GAP SERPL CALCULATED.3IONS-SCNC: 18 MMOL/L (ref 7–16)
ANION GAP SERPL CALCULATED.3IONS-SCNC: 19 MMOL/L (ref 7–16)
ANION GAP SERPL CALCULATED.3IONS-SCNC: 20 MMOL/L (ref 7–16)
ANION GAP SERPL CALCULATED.3IONS-SCNC: 20 MMOL/L (ref 7–16)
ANION GAP SERPL CALCULATED.3IONS-SCNC: 21 MMOL/L (ref 7–16)
ANION GAP SERPL CALCULATED.3IONS-SCNC: 21 MMOL/L (ref 7–16)
ANION GAP SERPL CALCULATED.3IONS-SCNC: 22 MMOL/L (ref 7–16)
ANION GAP SERPL CALCULATED.3IONS-SCNC: 23 MMOL/L (ref 7–16)
ANION GAP SERPL CALCULATED.3IONS-SCNC: 24 MMOL/L (ref 7–16)
ANION GAP SERPL CALCULATED.3IONS-SCNC: 25 MMOL/L (ref 7–16)
ANISOCYTOSIS BLD QL SMEAR: ABNORMAL
AORTIC ROOT ANNULUS: 2.5 CM
AORTIC VALVE CUSP SEPERATION: 2.08 CM
APICAL FOUR CHAMBER EJECTION FRACTION: 59 %
AST SERPL W P-5'-P-CCNC: 25 U/L (ref 11–45)
AST SERPL W P-5'-P-CCNC: 34 U/L (ref 11–45)
AST SERPL W P-5'-P-CCNC: 34 U/L (ref 11–45)
AST SERPL W P-5'-P-CCNC: 35 U/L (ref 11–45)
AST SERPL W P-5'-P-CCNC: 35 U/L (ref 11–45)
AST SERPL W P-5'-P-CCNC: 36 U/L (ref 11–45)
AST SERPL W P-5'-P-CCNC: 38 U/L (ref 11–45)
AST SERPL W P-5'-P-CCNC: 38 U/L (ref 11–45)
AST SERPL W P-5'-P-CCNC: 39 U/L (ref 11–45)
AST SERPL W P-5'-P-CCNC: 40 U/L (ref 11–45)
AST SERPL W P-5'-P-CCNC: 53 U/L (ref 11–45)
AV INDEX (PROSTH): 0.72
AV MEAN GRADIENT: 4 MMHG
AV PEAK GRADIENT: 9 MMHG
AV VALVE AREA BY VELOCITY RATIO: 2.3 CM²
AV VALVE AREA: 2.3 CM²
AV VELOCITY RATIO: 0.73
BACTERIA BLD CULT: NORMAL
BACTERIA BLD CULT: NORMAL
BASOPHILS # BLD AUTO: 0.08 K/UL (ref 0–0.2)
BASOPHILS # BLD AUTO: 0.09 K/UL (ref 0–0.2)
BASOPHILS # BLD AUTO: 0.1 K/UL (ref 0–0.2)
BASOPHILS # BLD AUTO: 0.11 K/UL (ref 0–0.2)
BASOPHILS # BLD AUTO: 0.11 K/UL (ref 0–0.2)
BASOPHILS # BLD AUTO: 0.12 K/UL (ref 0–0.2)
BASOPHILS # BLD AUTO: 0.14 K/UL (ref 0–0.2)
BASOPHILS # BLD AUTO: 0.15 K/UL (ref 0–0.2)
BASOPHILS NFR BLD AUTO: 0.3 % (ref 0–1)
BASOPHILS NFR BLD AUTO: 0.4 % (ref 0–1)
BASOPHILS NFR BLD AUTO: 0.5 % (ref 0–1)
BASOPHILS NFR BLD AUTO: 0.5 % (ref 0–1)
BILIRUB SERPL-MCNC: 0.3 MG/DL
BILIRUB SERPL-MCNC: 0.3 MG/DL
BILIRUB SERPL-MCNC: 0.4 MG/DL
BILIRUB SERPL-MCNC: 0.5 MG/DL
BLD PROD TYP BPU: NORMAL
BLOOD UNIT EXPIRATION DATE: NORMAL
BLOOD UNIT TYPE CODE: 5100
BUN SERPL-MCNC: 118 MG/DL (ref 10–20)
BUN SERPL-MCNC: 123 MG/DL (ref 10–20)
BUN SERPL-MCNC: 123 MG/DL (ref 10–20)
BUN SERPL-MCNC: 124 MG/DL (ref 10–20)
BUN SERPL-MCNC: 127 MG/DL (ref 10–20)
BUN SERPL-MCNC: 129 MG/DL (ref 10–20)
BUN SERPL-MCNC: 132 MG/DL (ref 10–20)
BUN SERPL-MCNC: 86 MG/DL (ref 10–20)
BUN SERPL-MCNC: >125 MG/DL (ref 10–20)
BUN/CREAT SERPL: 20 (ref 6–20)
BUN/CREAT SERPL: 28 (ref 6–20)
BUN/CREAT SERPL: 29 (ref 6–20)
BUN/CREAT SERPL: 29 (ref 6–20)
BUN/CREAT SERPL: 30 (ref 6–20)
BUN/CREAT SERPL: 30 (ref 6–20)
BUN/CREAT SERPL: 31 (ref 6–20)
BUN/CREAT SERPL: 32 (ref 6–20)
CALCIUM SERPL-MCNC: 6.2 MG/DL (ref 8.4–10.2)
CALCIUM SERPL-MCNC: 6.4 MG/DL (ref 8.4–10.2)
CALCIUM SERPL-MCNC: 6.8 MG/DL (ref 8.4–10.2)
CALCIUM SERPL-MCNC: 6.9 MG/DL (ref 8.4–10.2)
CALCIUM SERPL-MCNC: 7.1 MG/DL (ref 8.4–10.2)
CALCIUM SERPL-MCNC: 7.2 MG/DL (ref 8.4–10.2)
CALCIUM SERPL-MCNC: 7.6 MG/DL (ref 8.4–10.2)
CALCIUM SERPL-MCNC: 7.7 MG/DL (ref 8.4–10.2)
CALCIUM SERPL-MCNC: 7.7 MG/DL (ref 8.4–10.2)
CALCIUM SERPL-MCNC: 7.8 MG/DL (ref 8.4–10.2)
CALCIUM SERPL-MCNC: 7.8 MG/DL (ref 8.4–10.2)
CALCIUM SERPL-MCNC: 7.9 MG/DL (ref 8.4–10.2)
CALCIUM SERPL-MCNC: 8 MG/DL (ref 8.4–10.2)
CALCIUM SERPL-MCNC: 8.1 MG/DL (ref 8.4–10.2)
CALCIUM SERPL-MCNC: 8.3 MG/DL (ref 8.4–10.2)
CALCIUM SERPL-MCNC: 8.3 MG/DL (ref 8.4–10.2)
CALCIUM SERPL-MCNC: 8.5 MG/DL (ref 8.4–10.2)
CHLORIDE SERPL-SCNC: 101 MMOL/L (ref 98–107)
CHLORIDE SERPL-SCNC: 105 MMOL/L (ref 98–107)
CHLORIDE SERPL-SCNC: 109 MMOL/L (ref 98–107)
CHLORIDE SERPL-SCNC: 111 MMOL/L (ref 98–107)
CHLORIDE SERPL-SCNC: 112 MMOL/L (ref 98–107)
CHLORIDE SERPL-SCNC: 113 MMOL/L (ref 98–107)
CHLORIDE SERPL-SCNC: 82 MMOL/L (ref 98–107)
CHLORIDE SERPL-SCNC: 83 MMOL/L (ref 98–107)
CHLORIDE SERPL-SCNC: 83 MMOL/L (ref 98–107)
CHLORIDE SERPL-SCNC: 84 MMOL/L (ref 98–107)
CHLORIDE SERPL-SCNC: 86 MMOL/L (ref 98–107)
CHLORIDE SERPL-SCNC: 86 MMOL/L (ref 98–107)
CHLORIDE SERPL-SCNC: 90 MMOL/L (ref 98–107)
CHLORIDE SERPL-SCNC: 92 MMOL/L (ref 98–107)
CHLORIDE SERPL-SCNC: 96 MMOL/L (ref 98–107)
CO2 SERPL-SCNC: 15 MMOL/L (ref 23–31)
CO2 SERPL-SCNC: 15 MMOL/L (ref 23–31)
CO2 SERPL-SCNC: 16 MMOL/L (ref 23–31)
CO2 SERPL-SCNC: 17 MMOL/L (ref 23–31)
CO2 SERPL-SCNC: 19 MMOL/L (ref 23–31)
CO2 SERPL-SCNC: 22 MMOL/L (ref 23–31)
CO2 SERPL-SCNC: 24 MMOL/L (ref 23–31)
CO2 SERPL-SCNC: 27 MMOL/L (ref 23–31)
CO2 SERPL-SCNC: 29 MMOL/L (ref 23–31)
CO2 SERPL-SCNC: 30 MMOL/L (ref 23–31)
CO2 SERPL-SCNC: 31 MMOL/L (ref 23–31)
CO2 SERPL-SCNC: 31 MMOL/L (ref 23–31)
CREAT SERPL-MCNC: 3.89 MG/DL (ref 0.55–1.02)
CREAT SERPL-MCNC: 3.95 MG/DL (ref 0.55–1.02)
CREAT SERPL-MCNC: 4 MG/DL (ref 0.55–1.02)
CREAT SERPL-MCNC: 4.03 MG/DL (ref 0.55–1.02)
CREAT SERPL-MCNC: 4.07 MG/DL (ref 0.55–1.02)
CREAT SERPL-MCNC: 4.12 MG/DL (ref 0.55–1.02)
CREAT SERPL-MCNC: 4.16 MG/DL (ref 0.55–1.02)
CREAT SERPL-MCNC: 4.28 MG/DL (ref 0.55–1.02)
CREAT SERPL-MCNC: 4.37 MG/DL (ref 0.55–1.02)
CREAT SERPL-MCNC: 4.42 MG/DL (ref 0.55–1.02)
CREAT SERPL-MCNC: 4.44 MG/DL (ref 0.55–1.02)
CREAT SERPL-MCNC: 4.46 MG/DL (ref 0.55–1.02)
CREAT SERPL-MCNC: 4.46 MG/DL (ref 0.55–1.02)
CREAT SERPL-MCNC: 4.48 MG/DL (ref 0.55–1.02)
CREAT SERPL-MCNC: 4.51 MG/DL (ref 0.55–1.02)
CREAT SERPL-MCNC: 4.53 MG/DL (ref 0.55–1.02)
CREAT SERPL-MCNC: 4.57 MG/DL (ref 0.55–1.02)
CRENATED CELLS: ABNORMAL
CRENATED CELLS: ABNORMAL
CROSSMATCH INTERPRETATION: NORMAL
CV ECHO LV RWT: 0.4 CM
DACRYOCYTES BLD QL SMEAR: ABNORMAL
DIFFERENTIAL METHOD BLD: ABNORMAL
DISPENSE STATUS: NORMAL
DOP CALC AO PEAK VEL: 1.5 M/S
DOP CALC AO VTI: 26.9 CM
DOP CALC LVOT AREA: 3.1 CM2
DOP CALC LVOT DIAMETER: 2 CM
DOP CALC LVOT PEAK VEL: 1.1 M/S
DOP CALC LVOT STROKE VOLUME: 60.6 CM3
DOP CALCLVOT PEAK VEL VTI: 19.3 CM
E WAVE DECELERATION TIME: 203 MSEC
E/A RATIO: 1.21
E/E' RATIO: 9 M/S
ECHO LV POSTERIOR WALL: 0.8 CM (ref 0.6–1.1)
EGFR (NO RACE VARIABLE) (RUSH/TITUS): 10 ML/MIN/1.73M2
EGFR (NO RACE VARIABLE) (RUSH/TITUS): 11 ML/MIN/1.73M2
EGFR (NO RACE VARIABLE) (RUSH/TITUS): 12 ML/MIN/1.73M2
EJECTION FRACTION: 60 %
EOSINOPHIL # BLD AUTO: 0 K/UL (ref 0–0.5)
EOSINOPHIL # BLD AUTO: 0.01 K/UL (ref 0–0.5)
EOSINOPHIL # BLD AUTO: 0.02 K/UL (ref 0–0.5)
EOSINOPHIL # BLD AUTO: 0.02 K/UL (ref 0–0.5)
EOSINOPHIL # BLD AUTO: 0.03 K/UL (ref 0–0.5)
EOSINOPHIL # BLD AUTO: 0.03 K/UL (ref 0–0.5)
EOSINOPHIL # BLD AUTO: 0.04 K/UL (ref 0–0.5)
EOSINOPHIL # BLD AUTO: 0.07 K/UL (ref 0–0.5)
EOSINOPHIL # BLD AUTO: 0.07 K/UL (ref 0–0.5)
EOSINOPHIL NFR BLD AUTO: 0 % (ref 1–4)
EOSINOPHIL NFR BLD AUTO: 0.1 % (ref 1–4)
EOSINOPHIL NFR BLD AUTO: 0.3 % (ref 1–4)
EOSINOPHIL NFR BLD AUTO: 0.3 % (ref 1–4)
EOSINOPHIL NFR BLD MANUAL: 1 % (ref 1–4)
EOSINOPHIL NFR BLD MANUAL: 1 % (ref 1–4)
ERYTHROCYTE [DISTWIDTH] IN BLOOD BY AUTOMATED COUNT: 22.2 % (ref 11.5–14.5)
ERYTHROCYTE [DISTWIDTH] IN BLOOD BY AUTOMATED COUNT: 22.2 % (ref 11.5–14.5)
ERYTHROCYTE [DISTWIDTH] IN BLOOD BY AUTOMATED COUNT: 22.3 % (ref 11.5–14.5)
ERYTHROCYTE [DISTWIDTH] IN BLOOD BY AUTOMATED COUNT: 22.3 % (ref 11.5–14.5)
ERYTHROCYTE [DISTWIDTH] IN BLOOD BY AUTOMATED COUNT: 22.5 % (ref 11.5–14.5)
ERYTHROCYTE [DISTWIDTH] IN BLOOD BY AUTOMATED COUNT: 22.9 % (ref 11.5–14.5)
ERYTHROCYTE [DISTWIDTH] IN BLOOD BY AUTOMATED COUNT: 23.2 % (ref 11.5–14.5)
ERYTHROCYTE [DISTWIDTH] IN BLOOD BY AUTOMATED COUNT: 23.7 % (ref 11.5–14.5)
ERYTHROCYTE [DISTWIDTH] IN BLOOD BY AUTOMATED COUNT: 23.8 % (ref 11.5–14.5)
ERYTHROCYTE [DISTWIDTH] IN BLOOD BY AUTOMATED COUNT: 23.9 % (ref 11.5–14.5)
FRACTIONAL SHORTENING: 42.5 % (ref 28–44)
GLOBULIN SER-MCNC: 2.4 G/DL (ref 2–4)
GLOBULIN SER-MCNC: 2.7 G/DL (ref 2–4)
GLOBULIN SER-MCNC: 2.8 G/DL (ref 2–4)
GLOBULIN SER-MCNC: 2.9 G/DL (ref 2–4)
GLOBULIN SER-MCNC: 3 G/DL (ref 2–4)
GLOBULIN SER-MCNC: 3.3 G/DL (ref 2–4)
GLUCOSE SERPL-MCNC: 101 MG/DL (ref 70–105)
GLUCOSE SERPL-MCNC: 108 MG/DL (ref 70–105)
GLUCOSE SERPL-MCNC: 109 MG/DL (ref 70–105)
GLUCOSE SERPL-MCNC: 110 MG/DL (ref 70–105)
GLUCOSE SERPL-MCNC: 112 MG/DL (ref 82–115)
GLUCOSE SERPL-MCNC: 113 MG/DL (ref 82–115)
GLUCOSE SERPL-MCNC: 114 MG/DL (ref 70–105)
GLUCOSE SERPL-MCNC: 115 MG/DL (ref 70–105)
GLUCOSE SERPL-MCNC: 115 MG/DL (ref 82–115)
GLUCOSE SERPL-MCNC: 121 MG/DL (ref 70–105)
GLUCOSE SERPL-MCNC: 124 MG/DL (ref 70–105)
GLUCOSE SERPL-MCNC: 125 MG/DL (ref 70–105)
GLUCOSE SERPL-MCNC: 129 MG/DL (ref 70–105)
GLUCOSE SERPL-MCNC: 130 MG/DL (ref 70–105)
GLUCOSE SERPL-MCNC: 133 MG/DL (ref 70–105)
GLUCOSE SERPL-MCNC: 133 MG/DL (ref 82–115)
GLUCOSE SERPL-MCNC: 137 MG/DL (ref 70–105)
GLUCOSE SERPL-MCNC: 137 MG/DL (ref 70–105)
GLUCOSE SERPL-MCNC: 138 MG/DL (ref 70–105)
GLUCOSE SERPL-MCNC: 140 MG/DL (ref 70–105)
GLUCOSE SERPL-MCNC: 148 MG/DL (ref 70–105)
GLUCOSE SERPL-MCNC: 157 MG/DL (ref 70–105)
GLUCOSE SERPL-MCNC: 159 MG/DL (ref 70–105)
GLUCOSE SERPL-MCNC: 159 MG/DL (ref 70–105)
GLUCOSE SERPL-MCNC: 161 MG/DL (ref 70–105)
GLUCOSE SERPL-MCNC: 170 MG/DL (ref 82–115)
GLUCOSE SERPL-MCNC: 179 MG/DL (ref 70–105)
GLUCOSE SERPL-MCNC: 182 MG/DL (ref 70–105)
GLUCOSE SERPL-MCNC: 182 MG/DL (ref 70–105)
GLUCOSE SERPL-MCNC: 183 MG/DL (ref 70–105)
GLUCOSE SERPL-MCNC: 184 MG/DL (ref 82–115)
GLUCOSE SERPL-MCNC: 194 MG/DL (ref 70–105)
GLUCOSE SERPL-MCNC: 196 MG/DL (ref 70–105)
GLUCOSE SERPL-MCNC: 197 MG/DL (ref 70–105)
GLUCOSE SERPL-MCNC: 208 MG/DL (ref 70–105)
GLUCOSE SERPL-MCNC: 210 MG/DL (ref 82–115)
GLUCOSE SERPL-MCNC: 212 MG/DL (ref 70–105)
GLUCOSE SERPL-MCNC: 215 MG/DL (ref 70–105)
GLUCOSE SERPL-MCNC: 216 MG/DL (ref 70–105)
GLUCOSE SERPL-MCNC: 218 MG/DL (ref 70–105)
GLUCOSE SERPL-MCNC: 220 MG/DL (ref 82–115)
GLUCOSE SERPL-MCNC: 225 MG/DL (ref 70–105)
GLUCOSE SERPL-MCNC: 228 MG/DL (ref 70–105)
GLUCOSE SERPL-MCNC: 240 MG/DL (ref 70–105)
GLUCOSE SERPL-MCNC: 240 MG/DL (ref 70–105)
GLUCOSE SERPL-MCNC: 241 MG/DL (ref 82–115)
GLUCOSE SERPL-MCNC: 245 MG/DL (ref 70–105)
GLUCOSE SERPL-MCNC: 263 MG/DL (ref 70–105)
GLUCOSE SERPL-MCNC: 275 MG/DL (ref 70–105)
GLUCOSE SERPL-MCNC: 288 MG/DL (ref 70–105)
GLUCOSE SERPL-MCNC: 295 MG/DL (ref 70–105)
GLUCOSE SERPL-MCNC: 314 MG/DL (ref 70–105)
GLUCOSE SERPL-MCNC: 314 MG/DL (ref 70–105)
GLUCOSE SERPL-MCNC: 317 MG/DL (ref 70–105)
GLUCOSE SERPL-MCNC: 320 MG/DL (ref 70–105)
GLUCOSE SERPL-MCNC: 321 MG/DL (ref 82–115)
GLUCOSE SERPL-MCNC: 327 MG/DL (ref 70–105)
GLUCOSE SERPL-MCNC: 327 MG/DL (ref 82–115)
GLUCOSE SERPL-MCNC: 333 MG/DL (ref 70–105)
GLUCOSE SERPL-MCNC: 337 MG/DL (ref 82–115)
GLUCOSE SERPL-MCNC: 342 MG/DL (ref 82–115)
GLUCOSE SERPL-MCNC: 351 MG/DL (ref 70–105)
GLUCOSE SERPL-MCNC: 355 MG/DL (ref 70–105)
GLUCOSE SERPL-MCNC: 356 MG/DL (ref 70–105)
GLUCOSE SERPL-MCNC: 356 MG/DL (ref 70–105)
GLUCOSE SERPL-MCNC: 359 MG/DL (ref 70–105)
GLUCOSE SERPL-MCNC: 367 MG/DL (ref 70–105)
GLUCOSE SERPL-MCNC: 368 MG/DL (ref 82–115)
GLUCOSE SERPL-MCNC: 370 MG/DL (ref 70–105)
GLUCOSE SERPL-MCNC: 377 MG/DL (ref 70–105)
GLUCOSE SERPL-MCNC: 377 MG/DL (ref 70–105)
GLUCOSE SERPL-MCNC: 379 MG/DL (ref 70–105)
GLUCOSE SERPL-MCNC: 397 MG/DL (ref 70–105)
GLUCOSE SERPL-MCNC: 411 MG/DL (ref 70–105)
GLUCOSE SERPL-MCNC: 415 MG/DL (ref 82–115)
GLUCOSE SERPL-MCNC: 427 MG/DL (ref 70–105)
GLUCOSE SERPL-MCNC: 48 MG/DL (ref 82–115)
GLUCOSE SERPL-MCNC: 52 MG/DL (ref 70–105)
GLUCOSE SERPL-MCNC: 55 MG/DL (ref 70–105)
GLUCOSE SERPL-MCNC: 57 MG/DL (ref 70–105)
GLUCOSE SERPL-MCNC: 62 MG/DL (ref 70–105)
GLUCOSE SERPL-MCNC: 67 MG/DL (ref 70–105)
GLUCOSE SERPL-MCNC: 68 MG/DL (ref 70–105)
GLUCOSE SERPL-MCNC: 76 MG/DL (ref 70–105)
GLUCOSE SERPL-MCNC: 79 MG/DL (ref 70–105)
GLUCOSE SERPL-MCNC: 79 MG/DL (ref 70–105)
GLUCOSE SERPL-MCNC: 89 MG/DL (ref 82–115)
GLUCOSE SERPL-MCNC: 91 MG/DL (ref 70–105)
GLUCOSE SERPL-MCNC: 92 MG/DL (ref 70–105)
GLUCOSE SERPL-MCNC: 93 MG/DL (ref 70–105)
HCT VFR BLD AUTO: 20.6 % (ref 38–47)
HCT VFR BLD AUTO: 21.9 % (ref 38–47)
HCT VFR BLD AUTO: 22.4 % (ref 38–47)
HCT VFR BLD AUTO: 23.3 % (ref 38–47)
HCT VFR BLD AUTO: 24.8 % (ref 38–47)
HCT VFR BLD AUTO: 25.2 % (ref 38–47)
HCT VFR BLD AUTO: 25.5 % (ref 38–47)
HCT VFR BLD AUTO: 25.8 % (ref 38–47)
HCT VFR BLD AUTO: 25.9 % (ref 38–47)
HCT VFR BLD AUTO: 26.3 % (ref 38–47)
HCT VFR BLD AUTO: 26.6 % (ref 38–47)
HCT VFR BLD AUTO: 27.2 % (ref 38–47)
HGB BLD-MCNC: 6.6 G/DL (ref 12–16)
HGB BLD-MCNC: 7.2 G/DL (ref 12–16)
HGB BLD-MCNC: 7.4 G/DL (ref 12–16)
HGB BLD-MCNC: 7.5 G/DL (ref 12–16)
HGB BLD-MCNC: 7.9 G/DL (ref 12–16)
HGB BLD-MCNC: 8.1 G/DL (ref 12–16)
HGB BLD-MCNC: 8.2 G/DL (ref 12–16)
HGB BLD-MCNC: 8.3 G/DL (ref 12–16)
HGB BLD-MCNC: 8.3 G/DL (ref 12–16)
HGB BLD-MCNC: 8.5 G/DL (ref 12–16)
HGB BLD-MCNC: 8.6 G/DL (ref 12–16)
HGB BLD-MCNC: 9.1 G/DL (ref 12–16)
HYPOCHROMIA BLD QL SMEAR: ABNORMAL
IMM GRANULOCYTES # BLD AUTO: 0.77 K/UL (ref 0–0.04)
IMM GRANULOCYTES # BLD AUTO: 0.88 K/UL (ref 0–0.04)
IMM GRANULOCYTES # BLD AUTO: 0.91 K/UL (ref 0–0.04)
IMM GRANULOCYTES # BLD AUTO: 1.05 K/UL (ref 0–0.04)
IMM GRANULOCYTES # BLD AUTO: 1.07 K/UL (ref 0–0.04)
IMM GRANULOCYTES # BLD AUTO: 1.08 K/UL (ref 0–0.04)
IMM GRANULOCYTES # BLD AUTO: 1.19 K/UL (ref 0–0.04)
IMM GRANULOCYTES # BLD AUTO: 1.21 K/UL (ref 0–0.04)
IMM GRANULOCYTES # BLD AUTO: 1.27 K/UL (ref 0–0.04)
IMM GRANULOCYTES # BLD AUTO: 1.49 K/UL (ref 0–0.04)
IMM GRANULOCYTES # BLD AUTO: 1.69 K/UL (ref 0–0.04)
IMM GRANULOCYTES # BLD AUTO: 1.88 K/UL (ref 0–0.04)
IMM GRANULOCYTES NFR BLD: 2.1 % (ref 0–0.4)
IMM GRANULOCYTES NFR BLD: 2.6 % (ref 0–0.4)
IMM GRANULOCYTES NFR BLD: 2.8 % (ref 0–0.4)
IMM GRANULOCYTES NFR BLD: 2.8 % (ref 0–0.4)
IMM GRANULOCYTES NFR BLD: 3.2 % (ref 0–0.4)
IMM GRANULOCYTES NFR BLD: 3.4 % (ref 0–0.4)
IMM GRANULOCYTES NFR BLD: 3.5 % (ref 0–0.4)
IMM GRANULOCYTES NFR BLD: 4.1 % (ref 0–0.4)
IMM GRANULOCYTES NFR BLD: 4.8 % (ref 0–0.4)
IMM GRANULOCYTES NFR BLD: 5.6 % (ref 0–0.4)
IMM GRANULOCYTES NFR BLD: 6 % (ref 0–0.4)
IMM GRANULOCYTES NFR BLD: 6.1 % (ref 0–0.4)
INDIRECT COOMBS: NORMAL
INTERVENTRICULAR SEPTUM: 0.7 CM (ref 0.6–1.1)
IVC DIAMETER: 1.45 CM
LACTATE SERPL-SCNC: 2 MMOL/L (ref 0.5–2.2)
LACTATE SERPL-SCNC: 2 MMOL/L (ref 0.5–2.2)
LACTATE SERPL-SCNC: 2.1 MMOL/L (ref 0.5–2.2)
LACTATE SERPL-SCNC: 2.2 MMOL/L (ref 0.5–2.2)
LACTATE SERPL-SCNC: 2.2 MMOL/L (ref 0.5–2.2)
LACTATE SERPL-SCNC: 2.3 MMOL/L (ref 0.5–2.2)
LACTATE SERPL-SCNC: 2.3 MMOL/L (ref 0.5–2.2)
LACTATE SERPL-SCNC: 2.4 MMOL/L (ref 0.5–2.2)
LACTATE SERPL-SCNC: 2.4 MMOL/L (ref 0.5–2.2)
LACTATE SERPL-SCNC: 2.5 MMOL/L (ref 0.5–2.2)
LACTATE SERPL-SCNC: 2.6 MMOL/L (ref 0.5–2.2)
LACTATE SERPL-SCNC: 2.7 MMOL/L (ref 0.5–2.2)
LEFT ATRIUM AREA SYSTOLIC (APICAL 4 CHAMBER): 14.75 CM2
LEFT ATRIUM SIZE: 3.1 CM
LEFT INTERNAL DIMENSION IN SYSTOLE: 2.3 CM (ref 2.1–4)
LEFT VENTRICLE DIASTOLIC VOLUME INDEX: 31.82 ML/M2
LEFT VENTRICLE DIASTOLIC VOLUME: 70 ML
LEFT VENTRICLE END DIASTOLIC VOLUME APICAL 4 CHAMBER: 37.67 ML
LEFT VENTRICLE END SYSTOLIC VOLUME APICAL 4 CHAMBER: 35.18 ML
LEFT VENTRICLE MASS INDEX: 39 G/M2
LEFT VENTRICLE SYSTOLIC VOLUME INDEX: 8.6 ML/M2
LEFT VENTRICLE SYSTOLIC VOLUME: 19 ML
LEFT VENTRICULAR INTERNAL DIMENSION IN DIASTOLE: 4 CM (ref 3.5–6)
LEFT VENTRICULAR MASS: 85.8 G
LIPASE SERPL-CCNC: 75 U/L
LV LATERAL E/E' RATIO: 8.5 M/S
LV SEPTAL E/E' RATIO: 8.5 M/S
LVED V (TEICH): 70.33 ML
LVES V (TEICH): 18.75 ML
LVOT MG: 2.32 MMHG
LVOT MV: 0.7 CM/S
LYMPHOCYTES # BLD AUTO: 0.27 K/UL (ref 1–4.8)
LYMPHOCYTES # BLD AUTO: 0.33 K/UL (ref 1–4.8)
LYMPHOCYTES # BLD AUTO: 0.35 K/UL (ref 1–4.8)
LYMPHOCYTES # BLD AUTO: 0.38 K/UL (ref 1–4.8)
LYMPHOCYTES # BLD AUTO: 0.39 K/UL (ref 1–4.8)
LYMPHOCYTES # BLD AUTO: 0.41 K/UL (ref 1–4.8)
LYMPHOCYTES # BLD AUTO: 0.42 K/UL (ref 1–4.8)
LYMPHOCYTES # BLD AUTO: 0.5 K/UL (ref 1–4.8)
LYMPHOCYTES # BLD AUTO: 0.54 K/UL (ref 1–4.8)
LYMPHOCYTES # BLD AUTO: 0.81 K/UL (ref 1–4.8)
LYMPHOCYTES # BLD AUTO: 0.9 K/UL (ref 1–4.8)
LYMPHOCYTES # BLD AUTO: 0.96 K/UL (ref 1–4.8)
LYMPHOCYTES NFR BLD AUTO: 0.7 % (ref 27–41)
LYMPHOCYTES NFR BLD AUTO: 0.9 % (ref 27–41)
LYMPHOCYTES NFR BLD AUTO: 1 % (ref 27–41)
LYMPHOCYTES NFR BLD AUTO: 1 % (ref 27–41)
LYMPHOCYTES NFR BLD AUTO: 1.1 % (ref 27–41)
LYMPHOCYTES NFR BLD AUTO: 1.2 % (ref 27–41)
LYMPHOCYTES NFR BLD AUTO: 1.3 % (ref 27–41)
LYMPHOCYTES NFR BLD AUTO: 1.9 % (ref 27–41)
LYMPHOCYTES NFR BLD AUTO: 2.4 % (ref 27–41)
LYMPHOCYTES NFR BLD AUTO: 2.7 % (ref 27–41)
LYMPHOCYTES NFR BLD AUTO: 2.9 % (ref 27–41)
LYMPHOCYTES NFR BLD AUTO: 3.8 % (ref 27–41)
LYMPHOCYTES NFR BLD MANUAL: 1 % (ref 27–41)
LYMPHOCYTES NFR BLD MANUAL: 2 % (ref 27–41)
LYMPHOCYTES NFR BLD MANUAL: 3 % (ref 27–41)
LYMPHOCYTES NFR BLD MANUAL: 5 % (ref 27–41)
MACROCYTES BLD QL SMEAR: ABNORMAL
MAGNESIUM SERPL-MCNC: 1.9 MG/DL (ref 1.6–2.6)
MAGNESIUM SERPL-MCNC: 2.3 MG/DL (ref 1.6–2.6)
MAGNESIUM SERPL-MCNC: 2.3 MG/DL (ref 1.6–2.6)
MAGNESIUM SERPL-MCNC: 2.4 MG/DL (ref 1.6–2.6)
MAGNESIUM SERPL-MCNC: 2.5 MG/DL (ref 1.6–2.6)
MCH RBC QN AUTO: 28.9 PG (ref 27–31)
MCH RBC QN AUTO: 29 PG (ref 27–31)
MCH RBC QN AUTO: 29 PG (ref 27–31)
MCH RBC QN AUTO: 29.3 PG (ref 27–31)
MCH RBC QN AUTO: 29.4 PG (ref 27–31)
MCH RBC QN AUTO: 29.5 PG (ref 27–31)
MCH RBC QN AUTO: 29.6 PG (ref 27–31)
MCH RBC QN AUTO: 29.6 PG (ref 27–31)
MCH RBC QN AUTO: 29.8 PG (ref 27–31)
MCH RBC QN AUTO: 29.9 PG (ref 27–31)
MCHC RBC AUTO-ENTMCNC: 31.4 G/DL (ref 32–36)
MCHC RBC AUTO-ENTMCNC: 31.6 G/DL (ref 32–36)
MCHC RBC AUTO-ENTMCNC: 31.9 G/DL (ref 32–36)
MCHC RBC AUTO-ENTMCNC: 32 G/DL (ref 32–36)
MCHC RBC AUTO-ENTMCNC: 32 G/DL (ref 32–36)
MCHC RBC AUTO-ENTMCNC: 32.2 G/DL (ref 32–36)
MCHC RBC AUTO-ENTMCNC: 32.3 G/DL (ref 32–36)
MCHC RBC AUTO-ENTMCNC: 32.5 G/DL (ref 32–36)
MCHC RBC AUTO-ENTMCNC: 32.9 G/DL (ref 32–36)
MCHC RBC AUTO-ENTMCNC: 33 G/DL (ref 32–36)
MCHC RBC AUTO-ENTMCNC: 33.3 G/DL (ref 32–36)
MCHC RBC AUTO-ENTMCNC: 33.5 G/DL (ref 32–36)
MCV RBC AUTO: 88.3 FL (ref 80–96)
MCV RBC AUTO: 89.8 FL (ref 80–96)
MCV RBC AUTO: 90.1 FL (ref 80–96)
MCV RBC AUTO: 90.3 FL (ref 80–96)
MCV RBC AUTO: 90.6 FL (ref 80–96)
MCV RBC AUTO: 90.6 FL (ref 80–96)
MCV RBC AUTO: 90.8 FL (ref 80–96)
MCV RBC AUTO: 91.1 FL (ref 80–96)
MCV RBC AUTO: 91.4 FL (ref 80–96)
MCV RBC AUTO: 92 FL (ref 80–96)
MCV RBC AUTO: 92.4 FL (ref 80–96)
MCV RBC AUTO: 93.5 FL (ref 80–96)
METAMYELOCYTES NFR BLD MANUAL: 1 %
MICROCYTES BLD QL SMEAR: ABNORMAL
MONOCYTES # BLD AUTO: 0.7 K/UL (ref 0–0.8)
MONOCYTES # BLD AUTO: 0.73 K/UL (ref 0–0.8)
MONOCYTES # BLD AUTO: 0.87 K/UL (ref 0–0.8)
MONOCYTES # BLD AUTO: 0.89 K/UL (ref 0–0.8)
MONOCYTES # BLD AUTO: 0.91 K/UL (ref 0–0.8)
MONOCYTES # BLD AUTO: 0.92 K/UL (ref 0–0.8)
MONOCYTES # BLD AUTO: 1.04 K/UL (ref 0–0.8)
MONOCYTES # BLD AUTO: 1.1 K/UL (ref 0–0.8)
MONOCYTES # BLD AUTO: 1.21 K/UL (ref 0–0.8)
MONOCYTES # BLD AUTO: 1.56 K/UL (ref 0–0.8)
MONOCYTES # BLD AUTO: 1.96 K/UL (ref 0–0.8)
MONOCYTES # BLD AUTO: 2.18 K/UL (ref 0–0.8)
MONOCYTES NFR BLD AUTO: 2.4 % (ref 2–6)
MONOCYTES NFR BLD AUTO: 2.5 % (ref 2–6)
MONOCYTES NFR BLD AUTO: 2.8 % (ref 2–6)
MONOCYTES NFR BLD AUTO: 2.9 % (ref 2–6)
MONOCYTES NFR BLD AUTO: 2.9 % (ref 2–6)
MONOCYTES NFR BLD AUTO: 3 % (ref 2–6)
MONOCYTES NFR BLD AUTO: 3.3 % (ref 2–6)
MONOCYTES NFR BLD AUTO: 3.4 % (ref 2–6)
MONOCYTES NFR BLD AUTO: 4.5 % (ref 2–6)
MONOCYTES NFR BLD AUTO: 4.6 % (ref 2–6)
MONOCYTES NFR BLD AUTO: 5.3 % (ref 2–6)
MONOCYTES NFR BLD AUTO: 5.6 % (ref 2–6)
MONOCYTES NFR BLD MANUAL: 2 % (ref 2–6)
MONOCYTES NFR BLD MANUAL: 3 % (ref 2–6)
MONOCYTES NFR BLD MANUAL: 4 % (ref 2–6)
MPC BLD CALC-MCNC: 11.6 FL (ref 9.4–12.4)
MPC BLD CALC-MCNC: 11.8 FL (ref 9.4–12.4)
MPC BLD CALC-MCNC: 12 FL (ref 9.4–12.4)
MPC BLD CALC-MCNC: 12 FL (ref 9.4–12.4)
MPC BLD CALC-MCNC: 12.2 FL (ref 9.4–12.4)
MPC BLD CALC-MCNC: 12.5 FL (ref 9.4–12.4)
MPC BLD CALC-MCNC: 12.7 FL (ref 9.4–12.4)
MPC BLD CALC-MCNC: 12.8 FL (ref 9.4–12.4)
MPC BLD CALC-MCNC: 12.9 FL (ref 9.4–12.4)
MPC BLD CALC-MCNC: 12.9 FL (ref 9.4–12.4)
MPC BLD CALC-MCNC: 13.1 FL (ref 9.4–12.4)
MPC BLD CALC-MCNC: 13.7 FL (ref 9.4–12.4)
MV PEAK A VEL: 0.7 M/S
MV PEAK E VEL: 0.85 M/S
NEUTROPHILS # BLD AUTO: 19.47 K/UL (ref 1.8–7.7)
NEUTROPHILS # BLD AUTO: 19.68 K/UL (ref 1.8–7.7)
NEUTROPHILS # BLD AUTO: 21.7 K/UL (ref 1.8–7.7)
NEUTROPHILS # BLD AUTO: 26.56 K/UL (ref 1.8–7.7)
NEUTROPHILS # BLD AUTO: 27.07 K/UL (ref 1.8–7.7)
NEUTROPHILS # BLD AUTO: 31.22 K/UL (ref 1.8–7.7)
NEUTROPHILS # BLD AUTO: 33.25 K/UL (ref 1.8–7.7)
NEUTROPHILS # BLD AUTO: 33.54 K/UL (ref 1.8–7.7)
NEUTROPHILS # BLD AUTO: 34.01 K/UL (ref 1.8–7.7)
NEUTROPHILS # BLD AUTO: 34.4 K/UL (ref 1.8–7.7)
NEUTROPHILS # BLD AUTO: 35.02 K/UL (ref 1.8–7.7)
NEUTROPHILS # BLD AUTO: 35.58 K/UL (ref 1.8–7.7)
NEUTROPHILS NFR BLD AUTO: 86.9 % (ref 53–65)
NEUTROPHILS NFR BLD AUTO: 87.5 % (ref 53–65)
NEUTROPHILS NFR BLD AUTO: 87.5 % (ref 53–65)
NEUTROPHILS NFR BLD AUTO: 88.2 % (ref 53–65)
NEUTROPHILS NFR BLD AUTO: 89.9 % (ref 53–65)
NEUTROPHILS NFR BLD AUTO: 90.1 % (ref 53–65)
NEUTROPHILS NFR BLD AUTO: 90.4 % (ref 53–65)
NEUTROPHILS NFR BLD AUTO: 90.6 % (ref 53–65)
NEUTROPHILS NFR BLD AUTO: 92.6 % (ref 53–65)
NEUTROPHILS NFR BLD AUTO: 92.9 % (ref 53–65)
NEUTROPHILS NFR BLD AUTO: 93 % (ref 53–65)
NEUTROPHILS NFR BLD AUTO: 93.5 % (ref 53–65)
NEUTS BAND NFR BLD MANUAL: 1 % (ref 1–5)
NEUTS BAND NFR BLD MANUAL: 13 % (ref 1–5)
NEUTS BAND NFR BLD MANUAL: 16 % (ref 1–5)
NEUTS BAND NFR BLD MANUAL: 27 % (ref 1–5)
NEUTS BAND NFR BLD MANUAL: 29 % (ref 1–5)
NEUTS BAND NFR BLD MANUAL: 3 % (ref 1–5)
NEUTS BAND NFR BLD MANUAL: 3 % (ref 1–5)
NEUTS BAND NFR BLD MANUAL: 4 % (ref 1–5)
NEUTS BAND NFR BLD MANUAL: 5 % (ref 1–5)
NEUTS SEG NFR BLD MANUAL: 64 % (ref 50–62)
NEUTS SEG NFR BLD MANUAL: 66 % (ref 50–62)
NEUTS SEG NFR BLD MANUAL: 80 % (ref 50–62)
NEUTS SEG NFR BLD MANUAL: 83 % (ref 50–62)
NEUTS SEG NFR BLD MANUAL: 89 % (ref 50–62)
NEUTS SEG NFR BLD MANUAL: 91 % (ref 50–62)
NEUTS SEG NFR BLD MANUAL: 93 % (ref 50–62)
NEUTS SEG NFR BLD MANUAL: 94 % (ref 50–62)
NEUTS SEG NFR BLD MANUAL: 94 % (ref 50–62)
NEUTS SEG NFR BLD MANUAL: 98 % (ref 50–62)
NRBC # BLD AUTO: 0 X10E3/UL
NRBC # BLD AUTO: 0.02 X10E3/UL
NRBC # BLD AUTO: 0.03 X10E3/UL
NRBC # BLD AUTO: 0.04 X10E3/UL
NRBC # BLD AUTO: 0.05 X10E3/UL
NRBC # BLD AUTO: 0.06 X10E3/UL
NRBC # BLD AUTO: 0.07 X10E3/UL
NRBC # BLD AUTO: 0.14 X10E3/UL
NRBC # BLD AUTO: 0.14 X10E3/UL
NRBC # BLD AUTO: 0.17 X10E3/UL
NRBC # BLD AUTO: 0.18 X10E3/UL
NRBC # BLD AUTO: 0.19 X10E3/UL
NRBC BLD MANUAL-RTO: 1 /100 WBC
NRBC BLD MANUAL-RTO: 2 /100 WBC
NRBC BLD MANUAL-RTO: 4 /100 WBC
NRBC, AUTO (.00): 0 %
NRBC, AUTO (.00): 0.1 %
NRBC, AUTO (.00): 0.2 %
NRBC, AUTO (.00): 0.2 %
NRBC, AUTO (.00): 0.5 %
NRBC, AUTO (.00): 0.6 %
NRBC, AUTO (.00): 0.6 %
NRBC, AUTO (.00): 0.8 %
NRBC, AUTO (.00): 0.8 %
OHS CV RV/LV RATIO: 0.93 CM
OHS QRS DURATION: 68 MS
OHS QRS DURATION: 72 MS
OHS QTC CALCULATION: 435 MS
OHS QTC CALCULATION: 501 MS
OVALOCYTES BLD QL SMEAR: ABNORMAL
PHOSPHATE SERPL-MCNC: 5.2 MG/DL (ref 2.3–4.7)
PHOSPHATE SERPL-MCNC: 5.3 MG/DL (ref 2.3–4.7)
PHOSPHATE SERPL-MCNC: 5.3 MG/DL (ref 2.3–4.7)
PHOSPHATE SERPL-MCNC: 5.4 MG/DL (ref 2.3–4.7)
PHOSPHATE SERPL-MCNC: 5.5 MG/DL (ref 2.3–4.7)
PHOSPHATE SERPL-MCNC: 5.9 MG/DL (ref 2.3–4.7)
PHOSPHATE SERPL-MCNC: 7.5 MG/DL (ref 2.3–4.7)
PISA TR MAX VEL: 3.5 M/S
PLATELET # BLD AUTO: 118 K/UL (ref 150–400)
PLATELET # BLD AUTO: 118 K/UL (ref 150–400)
PLATELET # BLD AUTO: 125 K/UL (ref 150–400)
PLATELET # BLD AUTO: 131 K/UL (ref 150–400)
PLATELET # BLD AUTO: 138 K/UL (ref 150–400)
PLATELET # BLD AUTO: 140 K/UL (ref 150–400)
PLATELET # BLD AUTO: 155 K/UL (ref 150–400)
PLATELET # BLD AUTO: 172 K/UL (ref 150–400)
PLATELET # BLD AUTO: 184 K/UL (ref 150–400)
PLATELET # BLD AUTO: 188 K/UL (ref 150–400)
PLATELET # BLD AUTO: 194 K/UL (ref 150–400)
PLATELET # BLD AUTO: 219 K/UL (ref 150–400)
PLATELET MORPHOLOGY: ABNORMAL
PLATELET MORPHOLOGY: NORMAL
PLATELET MORPHOLOGY: NORMAL
POLYCHROMASIA BLD QL SMEAR: ABNORMAL
POTASSIUM SERPL-SCNC: 3.2 MMOL/L (ref 3.5–5.1)
POTASSIUM SERPL-SCNC: 3.2 MMOL/L (ref 3.5–5.1)
POTASSIUM SERPL-SCNC: 3.3 MMOL/L (ref 3.5–5.1)
POTASSIUM SERPL-SCNC: 3.5 MMOL/L (ref 3.5–5.1)
POTASSIUM SERPL-SCNC: 3.9 MMOL/L (ref 3.5–5.1)
POTASSIUM SERPL-SCNC: 4.1 MMOL/L (ref 3.5–5.1)
POTASSIUM SERPL-SCNC: 4.2 MMOL/L (ref 3.5–5.1)
POTASSIUM SERPL-SCNC: 4.3 MMOL/L (ref 3.5–5.1)
POTASSIUM SERPL-SCNC: 4.4 MMOL/L (ref 3.5–5.1)
POTASSIUM SERPL-SCNC: 4.4 MMOL/L (ref 3.5–5.1)
POTASSIUM SERPL-SCNC: 4.5 MMOL/L (ref 3.5–5.1)
POTASSIUM SERPL-SCNC: 4.5 MMOL/L (ref 3.5–5.1)
POTASSIUM SERPL-SCNC: 4.6 MMOL/L (ref 3.5–5.1)
POTASSIUM SERPL-SCNC: 4.6 MMOL/L (ref 3.5–5.1)
POTASSIUM SERPL-SCNC: 4.7 MMOL/L (ref 3.5–5.1)
PROCALCITONIN SERPL-MCNC: 0.58 NG/ML
PROCALCITONIN SERPL-MCNC: 0.6 NG/ML
PROCALCITONIN SERPL-MCNC: 0.75 NG/ML
PROT SERPL-MCNC: 3.6 G/DL (ref 5.8–7.6)
PROT SERPL-MCNC: 3.7 G/DL (ref 5.8–7.6)
PROT SERPL-MCNC: 3.9 G/DL (ref 5.8–7.6)
PROT SERPL-MCNC: 4 G/DL (ref 5.8–7.6)
PROT SERPL-MCNC: 4 G/DL (ref 5.8–7.6)
PROT SERPL-MCNC: 4.1 G/DL (ref 5.8–7.6)
PROT SERPL-MCNC: 4.1 G/DL (ref 5.8–7.6)
PROT SERPL-MCNC: 4.2 G/DL (ref 5.8–7.6)
PROT SERPL-MCNC: 4.3 G/DL (ref 5.8–7.6)
PROT SERPL-MCNC: 4.4 G/DL (ref 5.8–7.6)
PROT SERPL-MCNC: 4.6 G/DL (ref 5.8–7.6)
PV PEAK GRADIENT: 4 MMHG
PV PEAK VELOCITY: 0.94 M/S
RA MAJOR: 4.22 CM
RA PRESSURE ESTIMATED: 3 MMHG
RBC # BLD AUTO: 2.23 M/UL (ref 4.2–5.4)
RBC # BLD AUTO: 2.43 M/UL (ref 4.2–5.4)
RBC # BLD AUTO: 2.48 M/UL (ref 4.2–5.4)
RBC # BLD AUTO: 2.55 M/UL (ref 4.2–5.4)
RBC # BLD AUTO: 2.73 M/UL (ref 4.2–5.4)
RBC # BLD AUTO: 2.76 M/UL (ref 4.2–5.4)
RBC # BLD AUTO: 2.78 M/UL (ref 4.2–5.4)
RBC # BLD AUTO: 2.84 M/UL (ref 4.2–5.4)
RBC # BLD AUTO: 2.86 M/UL (ref 4.2–5.4)
RBC # BLD AUTO: 2.86 M/UL (ref 4.2–5.4)
RBC # BLD AUTO: 2.92 M/UL (ref 4.2–5.4)
RBC # BLD AUTO: 3.08 M/UL (ref 4.2–5.4)
RH BLD: NORMAL
RIGHT VENTRICLE DIASTOLIC BASEL DIMENSION: 3.7 CM
RIGHT VENTRICLE DIASTOLIC LENGTH: 5.5 CM
RIGHT VENTRICLE DIASTOLIC MID DIMENSION: 2.8 CM
RIGHT VENTRICULAR LENGTH IN DIASTOLE (APICAL 4-CHAMBER VIEW): 5.48 CM
RV MID DIAMA: 2.84 CM
RV TB RVSP: 7 MMHG
SCHISTOCYTES BLD QL AUTO: ABNORMAL
SODIUM SERPL-SCNC: 126 MMOL/L (ref 136–145)
SODIUM SERPL-SCNC: 128 MMOL/L (ref 136–145)
SODIUM SERPL-SCNC: 130 MMOL/L (ref 136–145)
SODIUM SERPL-SCNC: 130 MMOL/L (ref 136–145)
SODIUM SERPL-SCNC: 131 MMOL/L (ref 136–145)
SODIUM SERPL-SCNC: 131 MMOL/L (ref 136–145)
SODIUM SERPL-SCNC: 132 MMOL/L (ref 136–145)
SODIUM SERPL-SCNC: 132 MMOL/L (ref 136–145)
SODIUM SERPL-SCNC: 135 MMOL/L (ref 136–145)
SODIUM SERPL-SCNC: 137 MMOL/L (ref 136–145)
SODIUM SERPL-SCNC: 137 MMOL/L (ref 136–145)
SODIUM SERPL-SCNC: 139 MMOL/L (ref 136–145)
SODIUM SERPL-SCNC: 139 MMOL/L (ref 136–145)
SODIUM SERPL-SCNC: 140 MMOL/L (ref 136–145)
SODIUM SERPL-SCNC: 142 MMOL/L (ref 136–145)
SODIUM SERPL-SCNC: 142 MMOL/L (ref 136–145)
SODIUM SERPL-SCNC: 143 MMOL/L (ref 136–145)
SPECIMEN OUTDATE: NORMAL
TARGETS BLD QL SMEAR: ABNORMAL
TDI LATERAL: 0.1 M/S
TDI SEPTAL: 0.1 M/S
TDI: 0.1 M/S
TR MAX PG: 49 MMHG
TRICUSPID ANNULAR PLANE SYSTOLIC EXCURSION: 1.9 CM
TROPONIN I SERPL HS-MCNC: 106.4 NG/L
TROPONIN I SERPL HS-MCNC: 85.8 NG/L
TROPONIN I SERPL HS-MCNC: 93.6 NG/L
TSH SERPL DL<=0.005 MIU/L-ACNC: 1.53 UIU/ML (ref 0.35–4.94)
TV REST PULMONARY ARTERY PRESSURE: 52 MMHG
UNIT NUMBER: NORMAL
URATE SERPL-MCNC: 11.9 MG/DL (ref 2.6–6)
URATE SERPL-MCNC: 12.2 MG/DL (ref 2.6–6)
URATE SERPL-MCNC: 12.5 MG/DL (ref 2.6–6)
URATE SERPL-MCNC: 12.6 MG/DL (ref 2.6–6)
URATE SERPL-MCNC: 13.6 MG/DL (ref 2.6–6)
URATE SERPL-MCNC: 14.2 MG/DL (ref 2.6–6)
URATE SERPL-MCNC: 15.7 MG/DL (ref 2.6–6)
URATE SERPL-MCNC: 16.8 MG/DL (ref 2.6–6)
URATE SERPL-MCNC: 17.5 MG/DL (ref 2.6–6)
URATE SERPL-MCNC: 17.7 MG/DL (ref 2.6–6)
URATE SERPL-MCNC: 17.9 MG/DL (ref 2.6–6)
WBC # BLD AUTO: 21.66 K/UL (ref 4.5–11)
WBC # BLD AUTO: 22.48 K/UL (ref 4.5–11)
WBC # BLD AUTO: 24.97 K/UL (ref 4.5–11)
WBC # BLD AUTO: 30.1 K/UL (ref 4.5–11)
WBC # BLD AUTO: 30.95 K/UL (ref 4.5–11)
WBC # BLD AUTO: 34.48 K/UL (ref 4.5–11)
WBC # BLD AUTO: 35.56 K/UL (ref 4.5–11)
WBC # BLD AUTO: 36.55 K/UL (ref 4.5–11)
WBC # BLD AUTO: 37.16 K/UL (ref 4.5–11)
WBC # BLD AUTO: 37.22 K/UL (ref 4.5–11)
WBC # BLD AUTO: 37.7 K/UL (ref 4.5–11)
WBC # BLD AUTO: 39.27 K/UL (ref 4.5–11)
Z-SCORE OF LEFT VENTRICULAR DIMENSION IN END DIASTOLE: -6.3
Z-SCORE OF LEFT VENTRICULAR DIMENSION IN END SYSTOLE: -5.42

## 2025-01-01 PROCEDURE — 97530 THERAPEUTIC ACTIVITIES: CPT | Mod: CO

## 2025-01-01 PROCEDURE — 63600175 PHARM REV CODE 636 W HCPCS: Performed by: INTERNAL MEDICINE

## 2025-01-01 PROCEDURE — 82962 GLUCOSE BLOOD TEST: CPT

## 2025-01-01 PROCEDURE — 80053 COMPREHEN METABOLIC PANEL: CPT | Performed by: INTERNAL MEDICINE

## 2025-01-01 PROCEDURE — 99232 SBSQ HOSP IP/OBS MODERATE 35: CPT | Mod: ,,,

## 2025-01-01 PROCEDURE — 25000003 PHARM REV CODE 250: Performed by: INTERNAL MEDICINE

## 2025-01-01 PROCEDURE — 94761 N-INVAS EAR/PLS OXIMETRY MLT: CPT

## 2025-01-01 PROCEDURE — 80048 BASIC METABOLIC PNL TOTAL CA: CPT | Performed by: INTERNAL MEDICINE

## 2025-01-01 PROCEDURE — 97110 THERAPEUTIC EXERCISES: CPT

## 2025-01-01 PROCEDURE — 97110 THERAPEUTIC EXERCISES: CPT | Mod: CQ

## 2025-01-01 PROCEDURE — 36415 COLL VENOUS BLD VENIPUNCTURE: CPT | Performed by: INTERNAL MEDICINE

## 2025-01-01 PROCEDURE — 27000221 HC OXYGEN, UP TO 24 HOURS

## 2025-01-01 PROCEDURE — 11000008

## 2025-01-01 PROCEDURE — 97162 PT EVAL MOD COMPLEX 30 MIN: CPT

## 2025-01-01 PROCEDURE — 84100 ASSAY OF PHOSPHORUS: CPT | Performed by: INTERNAL MEDICINE

## 2025-01-01 PROCEDURE — 99232 SBSQ HOSP IP/OBS MODERATE 35: CPT | Mod: ,,, | Performed by: STUDENT IN AN ORGANIZED HEALTH CARE EDUCATION/TRAINING PROGRAM

## 2025-01-01 PROCEDURE — 97530 THERAPEUTIC ACTIVITIES: CPT | Mod: CQ

## 2025-01-01 PROCEDURE — 84145 PROCALCITONIN (PCT): CPT | Performed by: INTERNAL MEDICINE

## 2025-01-01 PROCEDURE — 85025 COMPLETE CBC W/AUTO DIFF WBC: CPT | Performed by: INTERNAL MEDICINE

## 2025-01-01 PROCEDURE — 99223 1ST HOSP IP/OBS HIGH 75: CPT | Mod: ,,, | Performed by: INTERNAL MEDICINE

## 2025-01-01 PROCEDURE — 63600175 PHARM REV CODE 636 W HCPCS: Performed by: HOSPITALIST

## 2025-01-01 PROCEDURE — 25000003 PHARM REV CODE 250: Performed by: HOSPITALIST

## 2025-01-01 PROCEDURE — 84550 ASSAY OF BLOOD/URIC ACID: CPT | Performed by: INTERNAL MEDICINE

## 2025-01-01 PROCEDURE — 25000003 PHARM REV CODE 250: Performed by: NURSE PRACTITIONER

## 2025-01-01 PROCEDURE — 99900035 HC TECH TIME PER 15 MIN (STAT)

## 2025-01-01 PROCEDURE — 83605 ASSAY OF LACTIC ACID: CPT | Performed by: INTERNAL MEDICINE

## 2025-01-01 PROCEDURE — P9016 RBC LEUKOCYTES REDUCED: HCPCS

## 2025-01-01 PROCEDURE — 96372 THER/PROPH/DIAG INJ SC/IM: CPT

## 2025-01-01 PROCEDURE — 99238 HOSP IP/OBS DSCHRG MGMT 30/<: CPT | Mod: ,,, | Performed by: STUDENT IN AN ORGANIZED HEALTH CARE EDUCATION/TRAINING PROGRAM

## 2025-01-01 PROCEDURE — 30233N1 TRANSFUSION OF NONAUTOLOGOUS RED BLOOD CELLS INTO PERIPHERAL VEIN, PERCUTANEOUS APPROACH: ICD-10-PCS

## 2025-01-01 PROCEDURE — 94799 UNLISTED PULMONARY SVC/PX: CPT

## 2025-01-01 PROCEDURE — 93010 ELECTROCARDIOGRAM REPORT: CPT | Mod: ,,, | Performed by: INTERNAL MEDICINE

## 2025-01-01 PROCEDURE — 36415 COLL VENOUS BLD VENIPUNCTURE: CPT

## 2025-01-01 PROCEDURE — 93005 ELECTROCARDIOGRAM TRACING: CPT

## 2025-01-01 PROCEDURE — 92610 EVALUATE SWALLOWING FUNCTION: CPT

## 2025-01-01 PROCEDURE — 97530 THERAPEUTIC ACTIVITIES: CPT

## 2025-01-01 PROCEDURE — 97110 THERAPEUTIC EXERCISES: CPT | Mod: CO

## 2025-01-01 PROCEDURE — 99233 SBSQ HOSP IP/OBS HIGH 50: CPT | Mod: ,,, | Performed by: INTERNAL MEDICINE

## 2025-01-01 PROCEDURE — 63600175 PHARM REV CODE 636 W HCPCS

## 2025-01-01 PROCEDURE — 86901 BLOOD TYPING SEROLOGIC RH(D): CPT

## 2025-01-01 PROCEDURE — 97166 OT EVAL MOD COMPLEX 45 MIN: CPT

## 2025-01-01 PROCEDURE — 94640 AIRWAY INHALATION TREATMENT: CPT

## 2025-01-01 PROCEDURE — 36430 TRANSFUSION BLD/BLD COMPNT: CPT

## 2025-01-01 PROCEDURE — 99233 SBSQ HOSP IP/OBS HIGH 50: CPT | Mod: ,,,

## 2025-01-01 PROCEDURE — 25000003 PHARM REV CODE 250

## 2025-01-01 PROCEDURE — 83605 ASSAY OF LACTIC ACID: CPT

## 2025-01-01 PROCEDURE — 99900031 HC PATIENT EDUCATION (STAT)

## 2025-01-01 PROCEDURE — 87040 BLOOD CULTURE FOR BACTERIA: CPT | Performed by: INTERNAL MEDICINE

## 2025-01-01 PROCEDURE — 84484 ASSAY OF TROPONIN QUANT: CPT | Performed by: INTERNAL MEDICINE

## 2025-01-01 PROCEDURE — 83690 ASSAY OF LIPASE: CPT | Performed by: INTERNAL MEDICINE

## 2025-01-01 PROCEDURE — 83735 ASSAY OF MAGNESIUM: CPT | Performed by: INTERNAL MEDICINE

## 2025-01-01 PROCEDURE — 99223 1ST HOSP IP/OBS HIGH 75: CPT | Mod: 95,,, | Performed by: HOSPITALIST

## 2025-01-01 PROCEDURE — 86923 COMPATIBILITY TEST ELECTRIC: CPT

## 2025-01-01 PROCEDURE — P9047 ALBUMIN (HUMAN), 25%, 50ML: HCPCS

## 2025-01-01 PROCEDURE — 36600 WITHDRAWAL OF ARTERIAL BLOOD: CPT

## 2025-01-01 PROCEDURE — 25000242 PHARM REV CODE 250 ALT 637 W/ HCPCS: Performed by: INTERNAL MEDICINE

## 2025-01-01 PROCEDURE — 84443 ASSAY THYROID STIM HORMONE: CPT | Performed by: INTERNAL MEDICINE

## 2025-01-01 RX ORDER — PREDNISONE 5 MG/1
10 TABLET ORAL DAILY
Status: DISCONTINUED | OUTPATIENT
Start: 2025-05-18 | End: 2025-01-01

## 2025-01-01 RX ORDER — ROPINIROLE 0.25 MG/1
0.25 TABLET, FILM COATED ORAL 3 TIMES DAILY
Status: DISCONTINUED | OUTPATIENT
Start: 2025-01-01 | End: 2025-01-01

## 2025-01-01 RX ORDER — ALLOPURINOL 100 MG/1
300 TABLET ORAL DAILY
Status: DISCONTINUED | OUTPATIENT
Start: 2025-01-01 | End: 2025-01-01

## 2025-01-01 RX ORDER — INSULIN GLARGINE 100 [IU]/ML
50 INJECTION, SOLUTION SUBCUTANEOUS NIGHTLY
Status: DISCONTINUED | OUTPATIENT
Start: 2025-01-01 | End: 2025-01-01

## 2025-01-01 RX ORDER — LEVOFLOXACIN 250 MG/1
500 TABLET, FILM COATED ORAL
Status: COMPLETED | OUTPATIENT
Start: 2025-01-01 | End: 2025-01-01

## 2025-01-01 RX ORDER — IPRATROPIUM BROMIDE AND ALBUTEROL SULFATE 2.5; .5 MG/3ML; MG/3ML
3 SOLUTION RESPIRATORY (INHALATION) EVERY 4 HOURS PRN
Status: DISCONTINUED | OUTPATIENT
Start: 2025-01-01 | End: 2025-01-01

## 2025-01-01 RX ORDER — GUAIFENESIN AND DEXTROMETHORPHAN HYDROBROMIDE 10; 100 MG/5ML; MG/5ML
10 SYRUP ORAL EVERY 6 HOURS PRN
Status: DISCONTINUED | OUTPATIENT
Start: 2025-01-01 | End: 2025-01-01

## 2025-01-01 RX ORDER — INSULIN ASPART 100 [IU]/ML
0-5 INJECTION, SOLUTION INTRAVENOUS; SUBCUTANEOUS
Status: DISCONTINUED | OUTPATIENT
Start: 2025-01-01 | End: 2025-01-01

## 2025-01-01 RX ORDER — INSULIN ASPART 100 [IU]/ML
0-5 INJECTION, SOLUTION INTRAVENOUS; SUBCUTANEOUS EVERY 6 HOURS SCHEDULED
Status: DISCONTINUED | OUTPATIENT
Start: 2025-01-01 | End: 2025-01-01

## 2025-01-01 RX ORDER — FUROSEMIDE 20 MG/1
80 TABLET ORAL DAILY
Status: DISCONTINUED | OUTPATIENT
Start: 2025-01-01 | End: 2025-01-01

## 2025-01-01 RX ORDER — ACETAMINOPHEN 325 MG/1
650 TABLET ORAL EVERY 4 HOURS PRN
Status: DISCONTINUED | OUTPATIENT
Start: 2025-01-01 | End: 2025-01-01

## 2025-01-01 RX ORDER — INSULIN GLARGINE 100 [IU]/ML
25 INJECTION, SOLUTION SUBCUTANEOUS NIGHTLY
Status: DISPENSED | OUTPATIENT
Start: 2025-01-01 | End: 2025-01-01

## 2025-01-01 RX ORDER — SODIUM BICARBONATE 650 MG/1
1300 TABLET ORAL 3 TIMES DAILY
Status: DISCONTINUED | OUTPATIENT
Start: 2025-01-01 | End: 2025-01-01

## 2025-01-01 RX ORDER — GLUCAGON 1 MG
1 KIT INJECTION
Status: DISCONTINUED | OUTPATIENT
Start: 2025-01-01 | End: 2025-01-01

## 2025-01-01 RX ORDER — DRONABINOL 2.5 MG/1
2.5 CAPSULE ORAL 2 TIMES DAILY
Status: CANCELLED | OUTPATIENT
Start: 2025-01-01

## 2025-01-01 RX ORDER — PREDNISONE 20 MG/1
20 TABLET ORAL DAILY
Status: DISCONTINUED | OUTPATIENT
Start: 2025-01-01 | End: 2025-01-01

## 2025-01-01 RX ORDER — SERTRALINE HYDROCHLORIDE 25 MG/1
25 TABLET, FILM COATED ORAL DAILY
Status: DISCONTINUED | OUTPATIENT
Start: 2025-01-01 | End: 2025-01-01

## 2025-01-01 RX ORDER — INSULIN GLARGINE 100 [IU]/ML
40 INJECTION, SOLUTION SUBCUTANEOUS 2 TIMES DAILY
Status: DISCONTINUED | OUTPATIENT
Start: 2025-01-01 | End: 2025-01-01

## 2025-01-01 RX ORDER — ADENOSINE 3 MG/ML
INJECTION, SOLUTION INTRAVENOUS
Status: COMPLETED
Start: 2025-01-01 | End: 2025-01-01

## 2025-01-01 RX ORDER — ACETAMINOPHEN 500 MG
1000 TABLET ORAL EVERY 6 HOURS PRN
Status: DISCONTINUED | OUTPATIENT
Start: 2025-01-01 | End: 2025-01-01 | Stop reason: HOSPADM

## 2025-01-01 RX ORDER — HYDROCODONE BITARTRATE AND ACETAMINOPHEN 500; 5 MG/1; MG/1
TABLET ORAL
Status: DISCONTINUED | OUTPATIENT
Start: 2025-01-01 | End: 2025-01-01 | Stop reason: HOSPADM

## 2025-01-01 RX ORDER — BISACODYL 5 MG
10 TABLET, DELAYED RELEASE (ENTERIC COATED) ORAL DAILY PRN
Status: DISCONTINUED | OUTPATIENT
Start: 2025-01-01 | End: 2025-01-01 | Stop reason: HOSPADM

## 2025-01-01 RX ORDER — PREDNISONE 20 MG/1
40 TABLET ORAL DAILY
Status: COMPLETED | OUTPATIENT
Start: 2025-01-01 | End: 2025-01-01

## 2025-01-01 RX ORDER — SELENIUM 50 MCG
2 TABLET ORAL
Status: DISCONTINUED | OUTPATIENT
Start: 2025-01-01 | End: 2025-01-01

## 2025-01-01 RX ORDER — INSULIN GLARGINE 100 [IU]/ML
20 INJECTION, SOLUTION SUBCUTANEOUS 2 TIMES DAILY
Status: DISCONTINUED | OUTPATIENT
Start: 2025-01-01 | End: 2025-01-01

## 2025-01-01 RX ORDER — METOCLOPRAMIDE 5 MG/1
5 TABLET ORAL 4 TIMES DAILY
Status: DISCONTINUED | OUTPATIENT
Start: 2025-01-01 | End: 2025-01-01

## 2025-01-01 RX ORDER — MICONAZOLE NITRATE 2 G/100G
POWDER TOPICAL 2 TIMES DAILY
Status: DISCONTINUED | OUTPATIENT
Start: 2025-01-01 | End: 2025-01-01

## 2025-01-01 RX ORDER — ALBUTEROL SULFATE 0.83 MG/ML
2.5 SOLUTION RESPIRATORY (INHALATION)
Status: DISCONTINUED | OUTPATIENT
Start: 2025-01-01 | End: 2025-01-01

## 2025-01-01 RX ORDER — OXYCODONE HYDROCHLORIDE 5 MG/1
5 TABLET ORAL EVERY 6 HOURS PRN
Refills: 0 | Status: DISCONTINUED | OUTPATIENT
Start: 2025-01-01 | End: 2025-01-01 | Stop reason: HOSPADM

## 2025-01-01 RX ORDER — IBUPROFEN 200 MG
24 TABLET ORAL
Status: DISCONTINUED | OUTPATIENT
Start: 2025-01-01 | End: 2025-01-01

## 2025-01-01 RX ORDER — LIDOCAINE HYDROCHLORIDE 20 MG/ML
SOLUTION OROPHARYNGEAL EVERY 6 HOURS
Status: CANCELLED | OUTPATIENT
Start: 2025-01-01

## 2025-01-01 RX ORDER — DOCUSATE SODIUM 100 MG/1
100 CAPSULE, LIQUID FILLED ORAL 2 TIMES DAILY PRN
Status: DISCONTINUED | OUTPATIENT
Start: 2025-01-01 | End: 2025-01-01

## 2025-01-01 RX ORDER — IBUPROFEN 200 MG
16 TABLET ORAL
Status: DISCONTINUED | OUTPATIENT
Start: 2025-01-01 | End: 2025-01-01

## 2025-01-01 RX ORDER — MORPHINE SULFATE 4 MG/ML
4 INJECTION, SOLUTION INTRAMUSCULAR; INTRAVENOUS
Status: DISCONTINUED | OUTPATIENT
Start: 2025-01-01 | End: 2025-01-01 | Stop reason: HOSPADM

## 2025-01-01 RX ORDER — ALBUMIN HUMAN 250 G/1000ML
25 SOLUTION INTRAVENOUS ONCE
Status: COMPLETED | OUTPATIENT
Start: 2025-01-01 | End: 2025-01-01

## 2025-01-01 RX ORDER — TALC
6 POWDER (GRAM) TOPICAL NIGHTLY PRN
Status: DISCONTINUED | OUTPATIENT
Start: 2025-01-01 | End: 2025-01-01

## 2025-01-01 RX ORDER — ATORVASTATIN CALCIUM 10 MG/1
10 TABLET, FILM COATED ORAL DAILY
Status: DISCONTINUED | OUTPATIENT
Start: 2025-01-01 | End: 2025-01-01

## 2025-01-01 RX ORDER — INSULIN GLARGINE 100 [IU]/ML
25 INJECTION, SOLUTION SUBCUTANEOUS NIGHTLY
Status: DISCONTINUED | OUTPATIENT
Start: 2025-01-01 | End: 2025-01-01

## 2025-01-01 RX ORDER — INSULIN GLARGINE 100 [IU]/ML
50 INJECTION, SOLUTION SUBCUTANEOUS 2 TIMES DAILY
Status: DISCONTINUED | OUTPATIENT
Start: 2025-01-01 | End: 2025-01-01

## 2025-01-01 RX ORDER — INSULIN ASPART 100 [IU]/ML
0-10 INJECTION, SOLUTION INTRAVENOUS; SUBCUTANEOUS EVERY 6 HOURS PRN
Status: DISCONTINUED | OUTPATIENT
Start: 2025-01-01 | End: 2025-01-01

## 2025-01-01 RX ORDER — ACETAMINOPHEN 650 MG/1
650 SUPPOSITORY RECTAL EVERY 6 HOURS PRN
Status: DISCONTINUED | OUTPATIENT
Start: 2025-01-01 | End: 2025-01-01 | Stop reason: HOSPADM

## 2025-01-01 RX ORDER — FUROSEMIDE 20 MG/1
40 TABLET ORAL DAILY
Status: DISCONTINUED | OUTPATIENT
Start: 2025-01-01 | End: 2025-01-01

## 2025-01-01 RX ORDER — TRAZODONE HYDROCHLORIDE 50 MG/1
50 TABLET ORAL NIGHTLY PRN
Status: DISCONTINUED | OUTPATIENT
Start: 2025-01-01 | End: 2025-01-01 | Stop reason: HOSPADM

## 2025-01-01 RX ORDER — DIPHENHYDRAMINE HCL 25 MG
50 CAPSULE ORAL EVERY 6 HOURS PRN
Status: DISCONTINUED | OUTPATIENT
Start: 2025-01-01 | End: 2025-01-01

## 2025-01-01 RX ORDER — INSULIN GLARGINE 100 [IU]/ML
25 INJECTION, SOLUTION SUBCUTANEOUS 2 TIMES DAILY
Status: DISCONTINUED | OUTPATIENT
Start: 2025-01-01 | End: 2025-01-01

## 2025-01-01 RX ORDER — MORPHINE SULFATE 2 MG/ML
2 INJECTION, SOLUTION INTRAMUSCULAR; INTRAVENOUS ONCE
Status: COMPLETED | OUTPATIENT
Start: 2025-01-01 | End: 2025-01-01

## 2025-01-01 RX ORDER — ADENOSINE 3 MG/ML
INJECTION, SOLUTION INTRAVENOUS
Status: DISCONTINUED
Start: 2025-01-01 | End: 2025-01-01 | Stop reason: WASHOUT

## 2025-01-01 RX ORDER — MUPIROCIN 20 MG/G
OINTMENT TOPICAL 2 TIMES DAILY
Status: DISPENSED | OUTPATIENT
Start: 2025-01-01 | End: 2025-01-01

## 2025-01-01 RX ORDER — INSULIN GLARGINE 100 [IU]/ML
10 INJECTION, SOLUTION SUBCUTANEOUS DAILY
Status: DISCONTINUED | OUTPATIENT
Start: 2025-01-01 | End: 2025-01-01

## 2025-01-01 RX ORDER — ONDANSETRON HYDROCHLORIDE 2 MG/ML
8 INJECTION, SOLUTION INTRAVENOUS EVERY 6 HOURS PRN
Status: DISCONTINUED | OUTPATIENT
Start: 2025-01-01 | End: 2025-01-01

## 2025-01-01 RX ORDER — SODIUM BICARBONATE 650 MG/1
1300 TABLET ORAL 2 TIMES DAILY
Status: DISCONTINUED | OUTPATIENT
Start: 2025-01-01 | End: 2025-01-01

## 2025-01-01 RX ORDER — ALUMINUM HYDROXIDE, MAGNESIUM HYDROXIDE, AND SIMETHICONE 2400; 240; 2400 MG/30ML; MG/30ML; MG/30ML
30 SUSPENSION ORAL EVERY 6 HOURS PRN
Status: DISCONTINUED | OUTPATIENT
Start: 2025-01-01 | End: 2025-01-01

## 2025-01-01 RX ORDER — ALBUTEROL SULFATE 0.83 MG/ML
2.5 SOLUTION RESPIRATORY (INHALATION)
Status: CANCELLED | OUTPATIENT
Start: 2025-01-01

## 2025-01-01 RX ORDER — MONTELUKAST SODIUM 10 MG/1
10 TABLET ORAL NIGHTLY
Status: DISCONTINUED | OUTPATIENT
Start: 2025-01-01 | End: 2025-01-01

## 2025-01-01 RX ORDER — MIDODRINE HYDROCHLORIDE 2.5 MG/1
10 TABLET ORAL
Status: DISCONTINUED | OUTPATIENT
Start: 2025-01-01 | End: 2025-01-01

## 2025-01-01 RX ORDER — INSULIN GLARGINE 100 [IU]/ML
30 INJECTION, SOLUTION SUBCUTANEOUS 2 TIMES DAILY
Status: DISCONTINUED | OUTPATIENT
Start: 2025-01-01 | End: 2025-01-01

## 2025-01-01 RX ORDER — INSULIN GLARGINE 100 [IU]/ML
10 INJECTION, SOLUTION SUBCUTANEOUS 2 TIMES DAILY
Status: DISCONTINUED | OUTPATIENT
Start: 2025-01-01 | End: 2025-01-01

## 2025-01-01 RX ORDER — HEPARIN SODIUM 5000 [USP'U]/ML
5000 INJECTION, SOLUTION INTRAVENOUS; SUBCUTANEOUS EVERY 8 HOURS
Status: DISCONTINUED | OUTPATIENT
Start: 2025-01-01 | End: 2025-01-01

## 2025-01-01 RX ORDER — LORAZEPAM 2 MG/ML
2 INJECTION INTRAMUSCULAR
Status: DISCONTINUED | OUTPATIENT
Start: 2025-01-01 | End: 2025-01-01 | Stop reason: HOSPADM

## 2025-01-01 RX ORDER — PANTOPRAZOLE SODIUM 40 MG/1
40 TABLET, DELAYED RELEASE ORAL DAILY
Status: DISCONTINUED | OUTPATIENT
Start: 2025-01-01 | End: 2025-01-01

## 2025-01-01 RX ORDER — METOCLOPRAMIDE 5 MG/1
5 TABLET ORAL 4 TIMES DAILY
Status: DISCONTINUED | OUTPATIENT
Start: 2025-01-01 | End: 2025-01-01 | Stop reason: SDUPTHER

## 2025-01-01 RX ADMIN — HEPARIN SODIUM 5000 UNITS: 5000 INJECTION, SOLUTION INTRAVENOUS; SUBCUTANEOUS at 01:05

## 2025-01-01 RX ADMIN — Medication 2 CAPSULE: at 12:05

## 2025-01-01 RX ADMIN — METOCLOPRAMIDE 5 MG: 5 TABLET ORAL at 05:05

## 2025-01-01 RX ADMIN — METOCLOPRAMIDE 5 MG: 5 TABLET ORAL at 09:04

## 2025-01-01 RX ADMIN — METOCLOPRAMIDE 5 MG: 5 TABLET ORAL at 09:05

## 2025-01-01 RX ADMIN — ROPINIROLE HYDROCHLORIDE 0.25 MG: 0.25 TABLET, FILM COATED ORAL at 09:04

## 2025-01-01 RX ADMIN — Medication 2 CAPSULE: at 08:05

## 2025-01-01 RX ADMIN — HEPARIN SODIUM 5000 UNITS: 5000 INJECTION, SOLUTION INTRAVENOUS; SUBCUTANEOUS at 02:05

## 2025-01-01 RX ADMIN — INSULIN GLARGINE 25 UNITS: 100 INJECTION, SOLUTION SUBCUTANEOUS at 10:04

## 2025-01-01 RX ADMIN — SODIUM BICARBONATE 1300 MG: 650 TABLET ORAL at 09:04

## 2025-01-01 RX ADMIN — SODIUM BICARBONATE: 84 INJECTION INTRAVENOUS at 05:05

## 2025-01-01 RX ADMIN — METOCLOPRAMIDE 5 MG: 5 TABLET ORAL at 12:05

## 2025-01-01 RX ADMIN — ROPINIROLE HYDROCHLORIDE 0.25 MG: 0.25 TABLET, FILM COATED ORAL at 03:04

## 2025-01-01 RX ADMIN — HEPARIN SODIUM 5000 UNITS: 5000 INJECTION, SOLUTION INTRAVENOUS; SUBCUTANEOUS at 09:04

## 2025-01-01 RX ADMIN — Medication 2 CAPSULE: at 05:05

## 2025-01-01 RX ADMIN — HEPARIN SODIUM 5000 UNITS: 5000 INJECTION, SOLUTION INTRAVENOUS; SUBCUTANEOUS at 09:05

## 2025-01-01 RX ADMIN — MONTELUKAST 10 MG: 10 TABLET, FILM COATED ORAL at 09:05

## 2025-01-01 RX ADMIN — SODIUM BICARBONATE: 84 INJECTION INTRAVENOUS at 03:05

## 2025-01-01 RX ADMIN — ALLOPURINOL 300 MG: 100 TABLET ORAL at 08:05

## 2025-01-01 RX ADMIN — INSULIN ASPART 5 UNITS: 100 INJECTION, SOLUTION INTRAVENOUS; SUBCUTANEOUS at 06:04

## 2025-01-01 RX ADMIN — LEVOFLOXACIN 500 MG: 250 TABLET, FILM COATED ORAL at 08:05

## 2025-01-01 RX ADMIN — INSULIN ASPART 1 UNITS: 100 INJECTION, SOLUTION INTRAVENOUS; SUBCUTANEOUS at 11:04

## 2025-01-01 RX ADMIN — SERTRALINE HYDROCHLORIDE 25 MG: 25 TABLET ORAL at 08:05

## 2025-01-01 RX ADMIN — ALLOPURINOL 300 MG: 100 TABLET ORAL at 09:05

## 2025-01-01 RX ADMIN — SODIUM BICARBONATE 1300 MG: 650 TABLET ORAL at 09:05

## 2025-01-01 RX ADMIN — MICONAZOLE NITRATE ANTIFUNGAL POWDER: 2 POWDER TOPICAL at 08:05

## 2025-01-01 RX ADMIN — ATORVASTATIN CALCIUM 10 MG: 10 TABLET, FILM COATED ORAL at 08:04

## 2025-01-01 RX ADMIN — SODIUM BICARBONATE 1300 MG: 650 TABLET ORAL at 02:04

## 2025-01-01 RX ADMIN — INSULIN GLARGINE 50 UNITS: 100 INJECTION, SOLUTION SUBCUTANEOUS at 09:05

## 2025-01-01 RX ADMIN — PREDNISONE 20 MG: 20 TABLET ORAL at 08:05

## 2025-01-01 RX ADMIN — Medication 2 CAPSULE: at 01:05

## 2025-01-01 RX ADMIN — SODIUM BICARBONATE: 84 INJECTION INTRAVENOUS at 11:05

## 2025-01-01 RX ADMIN — ROPINIROLE HYDROCHLORIDE 0.25 MG: 0.25 TABLET, FILM COATED ORAL at 09:05

## 2025-01-01 RX ADMIN — MIDODRINE HYDROCHLORIDE 10 MG: 2.5 TABLET ORAL at 05:05

## 2025-01-01 RX ADMIN — Medication 2 CAPSULE: at 05:04

## 2025-01-01 RX ADMIN — INSULIN GLARGINE 20 UNITS: 100 INJECTION, SOLUTION SUBCUTANEOUS at 08:04

## 2025-01-01 RX ADMIN — HEPARIN SODIUM 5000 UNITS: 5000 INJECTION, SOLUTION INTRAVENOUS; SUBCUTANEOUS at 01:04

## 2025-01-01 RX ADMIN — DEXTROSE MONOHYDRATE 12.5 G: 25 INJECTION, SOLUTION INTRAVENOUS at 05:05

## 2025-01-01 RX ADMIN — MONTELUKAST 10 MG: 10 TABLET, FILM COATED ORAL at 09:04

## 2025-01-01 RX ADMIN — ATORVASTATIN CALCIUM 10 MG: 10 TABLET, FILM COATED ORAL at 08:05

## 2025-01-01 RX ADMIN — ROPINIROLE HYDROCHLORIDE 0.25 MG: 0.25 TABLET, FILM COATED ORAL at 02:05

## 2025-01-01 RX ADMIN — MIDODRINE HYDROCHLORIDE 10 MG: 2.5 TABLET ORAL at 04:05

## 2025-01-01 RX ADMIN — Medication 2 CAPSULE: at 09:05

## 2025-01-01 RX ADMIN — DEXTROSE MONOHYDRATE 12.5 G: 25 INJECTION, SOLUTION INTRAVENOUS at 11:05

## 2025-01-01 RX ADMIN — MUPIROCIN: 20 OINTMENT TOPICAL at 08:04

## 2025-01-01 RX ADMIN — Medication 2 CAPSULE: at 11:04

## 2025-01-01 RX ADMIN — INSULIN GLARGINE 40 UNITS: 100 INJECTION, SOLUTION SUBCUTANEOUS at 09:05

## 2025-01-01 RX ADMIN — Medication 2 CAPSULE: at 12:04

## 2025-01-01 RX ADMIN — SERTRALINE HYDROCHLORIDE 25 MG: 25 TABLET ORAL at 09:05

## 2025-01-01 RX ADMIN — METOCLOPRAMIDE 5 MG: 5 TABLET ORAL at 05:04

## 2025-01-01 RX ADMIN — PREDNISONE 40 MG: 20 TABLET ORAL at 08:05

## 2025-01-01 RX ADMIN — SODIUM CHLORIDE 500 ML: 900 INJECTION, SOLUTION INTRAVENOUS at 10:05

## 2025-01-01 RX ADMIN — LEVOFLOXACIN 500 MG: 250 TABLET, FILM COATED ORAL at 09:04

## 2025-01-01 RX ADMIN — METOCLOPRAMIDE 5 MG: 5 TABLET ORAL at 04:05

## 2025-01-01 RX ADMIN — AMIODARONE HYDROCHLORIDE 0.5 MG/MIN: 1.8 INJECTION, SOLUTION INTRAVENOUS at 08:04

## 2025-01-01 RX ADMIN — METOCLOPRAMIDE 5 MG: 5 TABLET ORAL at 12:04

## 2025-01-01 RX ADMIN — MICONAZOLE NITRATE ANTIFUNGAL POWDER: 2 POWDER TOPICAL at 09:05

## 2025-01-01 RX ADMIN — METOPROLOL SUCCINATE 25 MG: 25 TABLET, EXTENDED RELEASE ORAL at 08:04

## 2025-01-01 RX ADMIN — SERTRALINE HYDROCHLORIDE 25 MG: 25 TABLET ORAL at 08:04

## 2025-01-01 RX ADMIN — INSULIN ASPART 1 UNITS: 100 INJECTION, SOLUTION INTRAVENOUS; SUBCUTANEOUS at 01:05

## 2025-01-01 RX ADMIN — AMIODARONE HYDROCHLORIDE 0.5 MG/MIN: 1.8 INJECTION, SOLUTION INTRAVENOUS at 09:04

## 2025-01-01 RX ADMIN — Medication 2 CAPSULE: at 08:04

## 2025-01-01 RX ADMIN — METOCLOPRAMIDE 5 MG: 5 TABLET ORAL at 01:05

## 2025-01-01 RX ADMIN — SODIUM BICARBONATE: 84 INJECTION INTRAVENOUS at 10:05

## 2025-01-01 RX ADMIN — METOCLOPRAMIDE 5 MG: 5 TABLET ORAL at 08:05

## 2025-01-01 RX ADMIN — IPRATROPIUM BROMIDE AND ALBUTEROL SULFATE 3 ML: 2.5; .5 SOLUTION RESPIRATORY (INHALATION) at 08:05

## 2025-01-01 RX ADMIN — PANTOPRAZOLE SODIUM 40 MG: 40 TABLET, DELAYED RELEASE ORAL at 08:04

## 2025-01-01 RX ADMIN — MIDODRINE HYDROCHLORIDE 10 MG: 2.5 TABLET ORAL at 08:05

## 2025-01-01 RX ADMIN — SODIUM BICARBONATE 1300 MG: 650 TABLET ORAL at 10:04

## 2025-01-01 RX ADMIN — INSULIN ASPART 2 UNITS: 100 INJECTION, SOLUTION INTRAVENOUS; SUBCUTANEOUS at 12:05

## 2025-01-01 RX ADMIN — PANTOPRAZOLE SODIUM 40 MG: 40 TABLET, DELAYED RELEASE ORAL at 09:05

## 2025-01-01 RX ADMIN — MUPIROCIN: 20 OINTMENT TOPICAL at 08:05

## 2025-01-01 RX ADMIN — INSULIN ASPART 3 UNITS: 100 INJECTION, SOLUTION INTRAVENOUS; SUBCUTANEOUS at 12:04

## 2025-01-01 RX ADMIN — INSULIN ASPART 5 UNITS: 100 INJECTION, SOLUTION INTRAVENOUS; SUBCUTANEOUS at 06:05

## 2025-01-01 RX ADMIN — HEPARIN SODIUM 5000 UNITS: 5000 INJECTION, SOLUTION INTRAVENOUS; SUBCUTANEOUS at 03:05

## 2025-01-01 RX ADMIN — APIXABAN 5 MG: 5 TABLET, FILM COATED ORAL at 08:04

## 2025-01-01 RX ADMIN — INSULIN ASPART 3 UNITS: 100 INJECTION, SOLUTION INTRAVENOUS; SUBCUTANEOUS at 05:04

## 2025-01-01 RX ADMIN — ROPINIROLE HYDROCHLORIDE 0.25 MG: 0.25 TABLET, FILM COATED ORAL at 02:04

## 2025-01-01 RX ADMIN — MONTELUKAST 10 MG: 10 TABLET, FILM COATED ORAL at 11:04

## 2025-01-01 RX ADMIN — PREDNISONE 20 MG: 20 TABLET ORAL at 09:05

## 2025-01-01 RX ADMIN — HEPARIN SODIUM 5000 UNITS: 5000 INJECTION, SOLUTION INTRAVENOUS; SUBCUTANEOUS at 05:05

## 2025-01-01 RX ADMIN — SODIUM BICARBONATE: 84 INJECTION INTRAVENOUS at 02:05

## 2025-01-01 RX ADMIN — Medication 6 MG: at 01:05

## 2025-01-01 RX ADMIN — HEPARIN SODIUM 5000 UNITS: 5000 INJECTION, SOLUTION INTRAVENOUS; SUBCUTANEOUS at 02:04

## 2025-01-01 RX ADMIN — ROPINIROLE HYDROCHLORIDE 0.25 MG: 0.25 TABLET, FILM COATED ORAL at 08:05

## 2025-01-01 RX ADMIN — SODIUM CHLORIDE 1000 ML: 900 INJECTION, SOLUTION INTRAVENOUS at 01:05

## 2025-01-01 RX ADMIN — INSULIN ASPART 2 UNITS: 100 INJECTION, SOLUTION INTRAVENOUS; SUBCUTANEOUS at 05:05

## 2025-01-01 RX ADMIN — INSULIN ASPART 5 UNITS: 100 INJECTION, SOLUTION INTRAVENOUS; SUBCUTANEOUS at 11:04

## 2025-01-01 RX ADMIN — SODIUM BICARBONATE 1300 MG: 650 TABLET ORAL at 08:04

## 2025-01-01 RX ADMIN — ROPINIROLE HYDROCHLORIDE 0.25 MG: 0.25 TABLET, FILM COATED ORAL at 03:05

## 2025-01-01 RX ADMIN — PANTOPRAZOLE SODIUM 40 MG: 40 TABLET, DELAYED RELEASE ORAL at 08:05

## 2025-01-01 RX ADMIN — APIXABAN 5 MG: 5 TABLET, FILM COATED ORAL at 11:04

## 2025-01-01 RX ADMIN — SODIUM BICARBONATE 1300 MG: 650 TABLET ORAL at 02:05

## 2025-01-01 RX ADMIN — METOPROLOL SUCCINATE 25 MG: 25 TABLET, EXTENDED RELEASE ORAL at 10:04

## 2025-01-01 RX ADMIN — INSULIN ASPART 2 UNITS: 100 INJECTION, SOLUTION INTRAVENOUS; SUBCUTANEOUS at 06:05

## 2025-01-01 RX ADMIN — MUPIROCIN: 20 OINTMENT TOPICAL at 09:04

## 2025-01-01 RX ADMIN — OXYCODONE HYDROCHLORIDE 5 MG: 5 TABLET ORAL at 01:05

## 2025-01-01 RX ADMIN — INSULIN ASPART 1 UNITS: 100 INJECTION, SOLUTION INTRAVENOUS; SUBCUTANEOUS at 02:04

## 2025-01-01 RX ADMIN — INSULIN ASPART 4 UNITS: 100 INJECTION, SOLUTION INTRAVENOUS; SUBCUTANEOUS at 05:04

## 2025-01-01 RX ADMIN — INSULIN ASPART 8 UNITS: 100 INJECTION, SOLUTION INTRAVENOUS; SUBCUTANEOUS at 06:05

## 2025-01-01 RX ADMIN — ATORVASTATIN CALCIUM 10 MG: 10 TABLET, FILM COATED ORAL at 10:04

## 2025-01-01 RX ADMIN — HEPARIN SODIUM 5000 UNITS: 5000 INJECTION, SOLUTION INTRAVENOUS; SUBCUTANEOUS at 06:05

## 2025-01-01 RX ADMIN — METOPROLOL SUCCINATE 12.5 MG: 25 TABLET, EXTENDED RELEASE ORAL at 09:05

## 2025-01-01 RX ADMIN — PREDNISONE 40 MG: 20 TABLET ORAL at 09:05

## 2025-01-01 RX ADMIN — INSULIN ASPART 10 UNITS: 100 INJECTION, SOLUTION INTRAVENOUS; SUBCUTANEOUS at 05:05

## 2025-01-01 RX ADMIN — INSULIN ASPART 4 UNITS: 100 INJECTION, SOLUTION INTRAVENOUS; SUBCUTANEOUS at 06:05

## 2025-01-01 RX ADMIN — SERTRALINE HYDROCHLORIDE 25 MG: 25 TABLET ORAL at 10:04

## 2025-01-01 RX ADMIN — INSULIN GLARGINE 50 UNITS: 100 INJECTION, SOLUTION SUBCUTANEOUS at 08:05

## 2025-01-01 RX ADMIN — INSULIN ASPART 8 UNITS: 100 INJECTION, SOLUTION INTRAVENOUS; SUBCUTANEOUS at 11:05

## 2025-01-01 RX ADMIN — PANTOPRAZOLE SODIUM 40 MG: 40 TABLET, DELAYED RELEASE ORAL at 10:04

## 2025-01-01 RX ADMIN — Medication 2 CAPSULE: at 04:04

## 2025-01-01 RX ADMIN — HEPARIN SODIUM 5000 UNITS: 5000 INJECTION, SOLUTION INTRAVENOUS; SUBCUTANEOUS at 10:04

## 2025-01-01 RX ADMIN — PREDNISONE 40 MG: 20 TABLET ORAL at 08:04

## 2025-01-01 RX ADMIN — ADENOSINE: 3 INJECTION, SOLUTION INTRAVENOUS at 01:04

## 2025-01-01 RX ADMIN — FUROSEMIDE 40 MG: 20 TABLET ORAL at 08:05

## 2025-01-01 RX ADMIN — INSULIN ASPART 5 UNITS: 100 INJECTION, SOLUTION INTRAVENOUS; SUBCUTANEOUS at 05:04

## 2025-01-01 RX ADMIN — INSULIN GLARGINE 25 UNITS: 100 INJECTION, SOLUTION SUBCUTANEOUS at 09:04

## 2025-01-01 RX ADMIN — SODIUM BICARBONATE: 84 INJECTION INTRAVENOUS at 06:04

## 2025-01-01 RX ADMIN — METOPROLOL SUCCINATE 25 MG: 25 TABLET, EXTENDED RELEASE ORAL at 09:05

## 2025-01-01 RX ADMIN — HEPARIN SODIUM 5000 UNITS: 5000 INJECTION, SOLUTION INTRAVENOUS; SUBCUTANEOUS at 06:04

## 2025-01-01 RX ADMIN — POTASSIUM BICARBONATE 20 MEQ: 782 TABLET, EFFERVESCENT ORAL at 05:05

## 2025-01-01 RX ADMIN — MIDODRINE HYDROCHLORIDE 10 MG: 2.5 TABLET ORAL at 12:05

## 2025-01-01 RX ADMIN — ROPINIROLE HYDROCHLORIDE 0.25 MG: 0.25 TABLET, FILM COATED ORAL at 08:04

## 2025-01-01 RX ADMIN — INSULIN ASPART 1 UNITS: 100 INJECTION, SOLUTION INTRAVENOUS; SUBCUTANEOUS at 12:05

## 2025-01-01 RX ADMIN — INSULIN ASPART 2 UNITS: 100 INJECTION, SOLUTION INTRAVENOUS; SUBCUTANEOUS at 06:04

## 2025-01-01 RX ADMIN — INSULIN ASPART 2 UNITS: 100 INJECTION, SOLUTION INTRAVENOUS; SUBCUTANEOUS at 05:04

## 2025-01-01 RX ADMIN — Medication 2 CAPSULE: at 04:05

## 2025-01-01 RX ADMIN — POTASSIUM BICARBONATE 20 MEQ: 782 TABLET, EFFERVESCENT ORAL at 09:05

## 2025-01-01 RX ADMIN — HEPARIN SODIUM 5000 UNITS: 5000 INJECTION, SOLUTION INTRAVENOUS; SUBCUTANEOUS at 10:05

## 2025-01-01 RX ADMIN — HEPARIN SODIUM 5000 UNITS: 5000 INJECTION, SOLUTION INTRAVENOUS; SUBCUTANEOUS at 05:04

## 2025-01-01 RX ADMIN — INSULIN ASPART 2 UNITS: 100 INJECTION, SOLUTION INTRAVENOUS; SUBCUTANEOUS at 01:04

## 2025-01-01 RX ADMIN — METOCLOPRAMIDE 5 MG: 5 TABLET ORAL at 08:04

## 2025-01-01 RX ADMIN — ROPINIROLE HYDROCHLORIDE 0.25 MG: 0.25 TABLET, FILM COATED ORAL at 04:05

## 2025-01-01 RX ADMIN — ONDANSETRON 8 MG: 2 INJECTION INTRAMUSCULAR; INTRAVENOUS at 09:05

## 2025-01-01 RX ADMIN — SERTRALINE HYDROCHLORIDE 25 MG: 25 TABLET ORAL at 10:05

## 2025-01-01 RX ADMIN — LEVOFLOXACIN 500 MG: 250 TABLET, FILM COATED ORAL at 09:05

## 2025-01-01 RX ADMIN — Medication 2 CAPSULE: at 11:05

## 2025-01-01 RX ADMIN — ROPINIROLE HYDROCHLORIDE 0.25 MG: 0.25 TABLET, FILM COATED ORAL at 11:05

## 2025-01-01 RX ADMIN — FUROSEMIDE 40 MG: 20 TABLET ORAL at 03:05

## 2025-01-01 RX ADMIN — INSULIN ASPART 10 UNITS: 100 INJECTION, SOLUTION INTRAVENOUS; SUBCUTANEOUS at 12:05

## 2025-01-01 RX ADMIN — ALBUMIN (HUMAN) 25 G: 12.5 SOLUTION INTRAVENOUS at 02:05

## 2025-01-01 RX ADMIN — METOCLOPRAMIDE 5 MG: 5 TABLET ORAL at 04:04

## 2025-01-01 RX ADMIN — DEXTROSE MONOHYDRATE 12.5 G: 25 INJECTION, SOLUTION INTRAVENOUS at 06:05

## 2025-01-01 RX ADMIN — ATORVASTATIN CALCIUM 10 MG: 10 TABLET, FILM COATED ORAL at 09:05

## 2025-01-01 RX ADMIN — DEXTROSE MONOHYDRATE 25 G: 25 INJECTION, SOLUTION INTRAVENOUS at 11:05

## 2025-01-01 RX ADMIN — ADENOSINE 12 MG: 3 INJECTION INTRAVENOUS at 11:04

## 2025-01-01 RX ADMIN — INSULIN ASPART 4 UNITS: 100 INJECTION, SOLUTION INTRAVENOUS; SUBCUTANEOUS at 05:05

## 2025-01-01 RX ADMIN — INSULIN GLARGINE 30 UNITS: 100 INJECTION, SOLUTION SUBCUTANEOUS at 09:05

## 2025-01-01 RX ADMIN — SODIUM BICARBONATE 1300 MG: 650 TABLET ORAL at 04:05

## 2025-01-01 RX ADMIN — INSULIN ASPART 3 UNITS: 100 INJECTION, SOLUTION INTRAVENOUS; SUBCUTANEOUS at 12:05

## 2025-01-01 RX ADMIN — MONTELUKAST 10 MG: 10 TABLET, FILM COATED ORAL at 11:05

## 2025-01-01 RX ADMIN — ALLOPURINOL 300 MG: 100 TABLET ORAL at 05:04

## 2025-01-01 RX ADMIN — OXYCODONE HYDROCHLORIDE 5 MG: 5 TABLET ORAL at 12:05

## 2025-01-01 RX ADMIN — SODIUM BICARBONATE 1300 MG: 650 TABLET ORAL at 08:05

## 2025-01-01 RX ADMIN — INSULIN GLARGINE 20 UNITS: 100 INJECTION, SOLUTION SUBCUTANEOUS at 05:04

## 2025-01-01 RX ADMIN — SODIUM BICARBONATE 1300 MG: 650 TABLET ORAL at 03:04

## 2025-01-01 RX ADMIN — INSULIN GLARGINE 30 UNITS: 100 INJECTION, SOLUTION SUBCUTANEOUS at 08:05

## 2025-01-01 RX ADMIN — PREDNISONE 40 MG: 20 TABLET ORAL at 10:04

## 2025-01-01 RX ADMIN — ROPINIROLE HYDROCHLORIDE 0.25 MG: 0.25 TABLET, FILM COATED ORAL at 10:04

## 2025-01-01 RX ADMIN — MUPIROCIN: 20 OINTMENT TOPICAL at 09:05

## 2025-01-01 RX ADMIN — INSULIN ASPART 2 UNITS: 100 INJECTION, SOLUTION INTRAVENOUS; SUBCUTANEOUS at 11:04

## 2025-01-01 RX ADMIN — INSULIN ASPART 6 UNITS: 100 INJECTION, SOLUTION INTRAVENOUS; SUBCUTANEOUS at 12:05

## 2025-01-01 RX ADMIN — MORPHINE SULFATE 2 MG: 2 INJECTION, SOLUTION INTRAMUSCULAR; INTRAVENOUS at 11:04

## 2025-01-01 RX ADMIN — SODIUM BICARBONATE: 84 INJECTION INTRAVENOUS at 01:05

## 2025-01-01 RX ADMIN — SODIUM CHLORIDE: 900 INJECTION, SOLUTION INTRAVENOUS at 12:05

## 2025-01-01 RX ADMIN — INSULIN GLARGINE 20 UNITS: 100 INJECTION, SOLUTION SUBCUTANEOUS at 09:04

## 2025-01-01 RX ADMIN — METOCLOPRAMIDE 5 MG: 5 TABLET ORAL at 02:05

## 2025-01-01 RX ADMIN — INSULIN ASPART 5 UNITS: 100 INJECTION, SOLUTION INTRAVENOUS; SUBCUTANEOUS at 12:05

## 2025-01-01 RX ADMIN — INSULIN GLARGINE 10 UNITS: 100 INJECTION, SOLUTION SUBCUTANEOUS at 08:04

## 2025-01-01 RX ADMIN — METOCLOPRAMIDE 5 MG: 5 TABLET ORAL at 10:04

## 2025-01-01 RX ADMIN — AMIODARONE HYDROCHLORIDE 1 MG/MIN: 1.8 INJECTION, SOLUTION INTRAVENOUS at 03:04

## 2025-01-01 RX ADMIN — METOCLOPRAMIDE 5 MG: 5 TABLET ORAL at 11:05

## 2025-01-01 RX ADMIN — AMIODARONE HYDROCHLORIDE 150 MG: 1.5 INJECTION, SOLUTION INTRAVENOUS at 02:04

## 2025-01-01 RX ADMIN — SODIUM BICARBONATE: 84 INJECTION INTRAVENOUS at 06:05

## 2025-01-01 RX ADMIN — SODIUM BICARBONATE 1300 MG: 650 TABLET ORAL at 11:04

## 2025-04-02 ENCOUNTER — LAB REQUISITION (OUTPATIENT)
Dept: LAB | Facility: HOSPITAL | Age: 70
End: 2025-04-02
Attending: FAMILY MEDICINE
Payer: MEDICARE

## 2025-04-02 DIAGNOSIS — N18.4 CHRONIC KIDNEY DISEASE, STAGE 4 (SEVERE): ICD-10-CM

## 2025-04-02 DIAGNOSIS — J96.01 ACUTE RESPIRATORY FAILURE WITH HYPOXIA: ICD-10-CM

## 2025-04-02 DIAGNOSIS — E11.8 TYPE 2 DIABETES MELLITUS WITH UNSPECIFIED COMPLICATIONS: ICD-10-CM

## 2025-04-02 DIAGNOSIS — R78.5 FINDING OF OTHER PSYCHOTROPIC DRUG IN BLOOD: ICD-10-CM

## 2025-04-02 LAB
ALBUMIN SERPL BCP-MCNC: 2.3 G/DL (ref 3.4–4.8)
ALBUMIN/GLOB SERPL: 1.2 {RATIO}
ALP SERPL-CCNC: 107 U/L (ref 40–150)
ALT SERPL W P-5'-P-CCNC: 75 U/L
ANION GAP SERPL CALCULATED.3IONS-SCNC: 17 MMOL/L (ref 7–16)
AST SERPL W P-5'-P-CCNC: 38 U/L (ref 11–45)
BASOPHILS # BLD AUTO: 0.01 K/UL (ref 0–0.2)
BASOPHILS NFR BLD AUTO: 0 % (ref 0–1)
BILIRUB SERPL-MCNC: 0.7 MG/DL
BUN SERPL-MCNC: 60 MG/DL (ref 10–20)
BUN/CREAT SERPL: 30 (ref 6–20)
CALCIUM SERPL-MCNC: 8.7 MG/DL (ref 8.4–10.2)
CHLORIDE SERPL-SCNC: 118 MMOL/L (ref 98–107)
CHOLEST SERPL-MCNC: 123 MG/DL
CHOLEST/HDLC SERPL: 4.6 {RATIO}
CK SERPL-CCNC: 60 U/L (ref 29–168)
CO2 SERPL-SCNC: 24 MMOL/L (ref 23–31)
CREAT SERPL-MCNC: 2.02 MG/DL (ref 0.55–1.02)
DIFFERENTIAL METHOD BLD: ABNORMAL
EGFR (NO RACE VARIABLE) (RUSH/TITUS): 26 ML/MIN/1.73M2
EOSINOPHIL # BLD AUTO: 0.03 K/UL (ref 0–0.5)
EOSINOPHIL NFR BLD AUTO: 0.1 % (ref 1–4)
ERYTHROCYTE [DISTWIDTH] IN BLOOD BY AUTOMATED COUNT: 17.7 % (ref 11.5–14.5)
EST. AVERAGE GLUCOSE BLD GHB EST-MCNC: 146 MG/DL
GLOBULIN SER-MCNC: 2 G/DL (ref 2–4)
GLUCOSE SERPL-MCNC: 194 MG/DL (ref 82–115)
HBA1C MFR BLD HPLC: 6.7 %
HCT VFR BLD AUTO: 34 % (ref 38–47)
HDLC SERPL-MCNC: 27 MG/DL (ref 35–60)
HGB BLD-MCNC: 11.4 G/DL (ref 12–16)
HYPOCHROMIA BLD QL SMEAR: ABNORMAL
LDLC SERPL CALC-MCNC: 63 MG/DL
LDLC/HDLC SERPL: 2.3 {RATIO}
LYMPHOCYTES # BLD AUTO: 0.75 K/UL (ref 1–4.8)
LYMPHOCYTES NFR BLD AUTO: 2.8 % (ref 27–41)
LYMPHOCYTES NFR BLD MANUAL: 3 % (ref 27–41)
MCH RBC QN AUTO: 29.2 PG (ref 27–31)
MCHC RBC AUTO-ENTMCNC: 33.5 G/DL (ref 32–36)
MCV RBC AUTO: 87.2 FL (ref 80–96)
MONOCYTES # BLD AUTO: 0.97 K/UL (ref 0–0.8)
MONOCYTES NFR BLD AUTO: 3.6 % (ref 2–6)
MONOCYTES NFR BLD MANUAL: 2 % (ref 2–6)
MPC BLD CALC-MCNC: ABNORMAL G/DL
NEUTROPHILS # BLD AUTO: 25.32 K/UL (ref 1.8–7.7)
NEUTROPHILS NFR BLD AUTO: 93.5 % (ref 53–65)
NEUTS SEG NFR BLD MANUAL: 95 % (ref 50–62)
NONHDLC SERPL-MCNC: 96 MG/DL
NRBC BLD MANUAL-RTO: ABNORMAL %
PLATELET # BLD AUTO: 65 K/UL (ref 150–400)
PLATELET MORPHOLOGY: ABNORMAL
POTASSIUM SERPL-SCNC: 3.9 MMOL/L (ref 3.5–5.1)
PROT SERPL-MCNC: 4.3 G/DL (ref 5.8–7.6)
RBC # BLD AUTO: 3.9 M/UL (ref 4.2–5.4)
SODIUM SERPL-SCNC: 155 MMOL/L (ref 136–145)
TARGETS BLD QL SMEAR: ABNORMAL
TRIGL SERPL-MCNC: 166 MG/DL (ref 37–140)
VLDLC SERPL-MCNC: 33 MG/DL
WBC # BLD AUTO: 27.08 K/UL (ref 4.5–11)

## 2025-04-02 PROCEDURE — 85025 COMPLETE CBC W/AUTO DIFF WBC: CPT | Performed by: FAMILY MEDICINE

## 2025-04-02 PROCEDURE — 80061 LIPID PANEL: CPT | Performed by: FAMILY MEDICINE

## 2025-04-02 PROCEDURE — 82550 ASSAY OF CK (CPK): CPT | Performed by: FAMILY MEDICINE

## 2025-04-02 PROCEDURE — 83036 HEMOGLOBIN GLYCOSYLATED A1C: CPT | Performed by: FAMILY MEDICINE

## 2025-04-02 PROCEDURE — 80053 COMPREHEN METABOLIC PANEL: CPT | Performed by: FAMILY MEDICINE

## 2025-04-03 ENCOUNTER — HOSPITAL ENCOUNTER (EMERGENCY)
Facility: HOSPITAL | Age: 70
Discharge: ANOTHER HOSPITAL | End: 2025-04-03
Payer: MEDICARE

## 2025-04-03 ENCOUNTER — HOSPITAL ENCOUNTER (INPATIENT)
Facility: HOSPITAL | Age: 70
LOS: 23 days | Discharge: NURSING, REHAB OR LONG-TERM CARE | DRG: 871 | End: 2025-04-26
Attending: HOSPITALIST | Admitting: HOSPITALIST
Payer: MEDICARE

## 2025-04-03 VITALS
DIASTOLIC BLOOD PRESSURE: 83 MMHG | HEART RATE: 108 BPM | BODY MASS INDEX: 48.82 KG/M2 | RESPIRATION RATE: 28 BRPM | SYSTOLIC BLOOD PRESSURE: 116 MMHG | OXYGEN SATURATION: 95 % | WEIGHT: 293 LBS | HEIGHT: 65 IN | TEMPERATURE: 98 F

## 2025-04-03 DIAGNOSIS — K31.84 GASTROPARESIS: ICD-10-CM

## 2025-04-03 DIAGNOSIS — N18.32 CHRONIC KIDNEY DISEASE, STAGE 3B: ICD-10-CM

## 2025-04-03 DIAGNOSIS — I27.82 CHRONIC PULMONARY EMBOLISM WITHOUT ACUTE COR PULMONALE, UNSPECIFIED PULMONARY EMBOLISM TYPE: ICD-10-CM

## 2025-04-03 DIAGNOSIS — I10 ESSENTIAL HYPERTENSION: ICD-10-CM

## 2025-04-03 DIAGNOSIS — F41.1 GENERALIZED ANXIETY DISORDER: ICD-10-CM

## 2025-04-03 DIAGNOSIS — E66.01 MORBID OBESITY WITH BMI OF 45.0-49.9, ADULT: ICD-10-CM

## 2025-04-03 DIAGNOSIS — C79.31 LUNG CANCER METASTATIC TO BRAIN: Primary | ICD-10-CM

## 2025-04-03 DIAGNOSIS — J18.9 SEPSIS DUE TO PNEUMONIA: ICD-10-CM

## 2025-04-03 DIAGNOSIS — E87.70 HYPERVOLEMIA, UNSPECIFIED HYPERVOLEMIA TYPE: ICD-10-CM

## 2025-04-03 DIAGNOSIS — J45.40 MODERATE PERSISTENT ASTHMA WITHOUT COMPLICATION: ICD-10-CM

## 2025-04-03 DIAGNOSIS — A49.8 INFECTION DUE TO STENOTROPHOMONAS MALTOPHILIA: ICD-10-CM

## 2025-04-03 DIAGNOSIS — M79.605 LEFT LEG PAIN: ICD-10-CM

## 2025-04-03 DIAGNOSIS — E87.0 DEHYDRATION WITH HYPERNATREMIA: ICD-10-CM

## 2025-04-03 DIAGNOSIS — A41.9 SEPSIS: ICD-10-CM

## 2025-04-03 DIAGNOSIS — R00.0 TACHYCARDIA: ICD-10-CM

## 2025-04-03 DIAGNOSIS — N39.0 URINARY TRACT INFECTION WITHOUT HEMATURIA, SITE UNSPECIFIED: ICD-10-CM

## 2025-04-03 DIAGNOSIS — E66.01 MORBID OBESITY: ICD-10-CM

## 2025-04-03 DIAGNOSIS — C34.90 LUNG CANCER METASTATIC TO BRAIN: ICD-10-CM

## 2025-04-03 DIAGNOSIS — C34.90 LUNG CANCER METASTATIC TO BRAIN: Primary | ICD-10-CM

## 2025-04-03 DIAGNOSIS — E11.65 TYPE 2 DIABETES MELLITUS WITH HYPERGLYCEMIA, UNSPECIFIED WHETHER LONG TERM INSULIN USE: ICD-10-CM

## 2025-04-03 DIAGNOSIS — A41.9 SEPSIS, DUE TO UNSPECIFIED ORGANISM, UNSPECIFIED WHETHER ACUTE ORGAN DYSFUNCTION PRESENT: Primary | ICD-10-CM

## 2025-04-03 DIAGNOSIS — N17.0 ACUTE RENAL FAILURE WITH ACUTE TUBULAR NECROSIS SUPERIMPOSED ON STAGE 2 CHRONIC KIDNEY DISEASE: ICD-10-CM

## 2025-04-03 DIAGNOSIS — E87.0 HYPERNATREMIA: ICD-10-CM

## 2025-04-03 DIAGNOSIS — N18.2 ACUTE RENAL FAILURE WITH ACUTE TUBULAR NECROSIS SUPERIMPOSED ON STAGE 2 CHRONIC KIDNEY DISEASE: ICD-10-CM

## 2025-04-03 DIAGNOSIS — N17.9 ACUTE KIDNEY INJURY SUPERIMPOSED ON STAGE 4 CHRONIC KIDNEY DISEASE: ICD-10-CM

## 2025-04-03 DIAGNOSIS — A41.9 SEPSIS DUE TO PNEUMONIA: ICD-10-CM

## 2025-04-03 DIAGNOSIS — J98.4 PNEUMONITIS: ICD-10-CM

## 2025-04-03 DIAGNOSIS — R07.9 CHEST PAIN: ICD-10-CM

## 2025-04-03 DIAGNOSIS — N18.4 ACUTE KIDNEY INJURY SUPERIMPOSED ON STAGE 4 CHRONIC KIDNEY DISEASE: ICD-10-CM

## 2025-04-03 DIAGNOSIS — E83.39 HYPERPHOSPHATEMIA: ICD-10-CM

## 2025-04-03 DIAGNOSIS — C79.31 LUNG CANCER METASTATIC TO BRAIN: ICD-10-CM

## 2025-04-03 DIAGNOSIS — E79.0 HYPERURICEMIA: ICD-10-CM

## 2025-04-03 DIAGNOSIS — R79.89 ELEVATED BRAIN NATRIURETIC PEPTIDE (BNP) LEVEL: ICD-10-CM

## 2025-04-03 DIAGNOSIS — R06.02 SHORTNESS OF BREATH: ICD-10-CM

## 2025-04-03 DIAGNOSIS — E86.0 DEHYDRATION WITH HYPERNATREMIA: ICD-10-CM

## 2025-04-03 LAB
ALBUMIN SERPL BCP-MCNC: 2.2 G/DL (ref 3.4–4.8)
ALBUMIN/GLOB SERPL: 0.8 {RATIO}
ALP SERPL-CCNC: 123 U/L (ref 40–150)
ALT SERPL W P-5'-P-CCNC: 65 U/L
ANION GAP SERPL CALCULATED.3IONS-SCNC: 22 MMOL/L (ref 7–16)
APTT PPP: 32.5 SECONDS (ref 25.2–37.3)
AST SERPL W P-5'-P-CCNC: 34 U/L (ref 11–45)
BACTERIA #/AREA URNS HPF: ABNORMAL /HPF
BASOPHILS # BLD AUTO: 0.03 K/UL (ref 0–0.2)
BASOPHILS NFR BLD AUTO: 0.1 % (ref 0–1)
BILIRUB SERPL-MCNC: 0.5 MG/DL
BILIRUB UR QL STRIP: NEGATIVE
BUN SERPL-MCNC: 63 MG/DL (ref 10–20)
BUN/CREAT SERPL: 34 (ref 6–20)
CALCIUM SERPL-MCNC: 8.9 MG/DL (ref 8.4–10.2)
CHLORIDE SERPL-SCNC: 121 MMOL/L (ref 98–107)
CLARITY UR: ABNORMAL
CO2 SERPL-SCNC: 18 MMOL/L (ref 23–31)
COLOR UR: YELLOW
CREAT SERPL-MCNC: 1.83 MG/DL (ref 0.55–1.02)
DIFFERENTIAL METHOD BLD: ABNORMAL
EGFR (NO RACE VARIABLE) (RUSH/TITUS): 30 ML/MIN/1.73M2
EOSINOPHIL # BLD AUTO: 0 K/UL (ref 0–0.5)
EOSINOPHIL NFR BLD AUTO: 0 % (ref 1–4)
ERYTHROCYTE [DISTWIDTH] IN BLOOD BY AUTOMATED COUNT: 17.5 % (ref 11.5–14.5)
GLOBULIN SER-MCNC: 2.6 G/DL (ref 2–4)
GLUCOSE SERPL-MCNC: 230 MG/DL (ref 70–105)
GLUCOSE SERPL-MCNC: 245 MG/DL (ref 82–115)
GLUCOSE UR STRIP-MCNC: >=1000 MG/DL
HCO3 UR-SCNC: 22.4 MMOL/L (ref 21–28)
HCT VFR BLD AUTO: 34.6 % (ref 38–47)
HGB BLD-MCNC: 11.8 G/DL (ref 12–16)
INFLUENZA A MOLECULAR (OHS): NEGATIVE
INFLUENZA B MOLECULAR (OHS): NEGATIVE
INR BLD: 1.25
KETONES UR STRIP-SCNC: 15 MG/DL
LACTATE SERPL-SCNC: 2.5 MMOL/L (ref 0.5–2.2)
LACTATE SERPL-SCNC: 3.2 MMOL/L (ref 0.5–2.2)
LACTATE SERPL-SCNC: 3.3 MMOL/L (ref 0.5–2.2)
LEUKOCYTE ESTERASE UR QL STRIP: ABNORMAL
LYMPHOCYTES # BLD AUTO: 0.74 K/UL (ref 1–4.8)
LYMPHOCYTES NFR BLD AUTO: 2.5 % (ref 27–41)
LYMPHOCYTES NFR BLD MANUAL: 6 % (ref 27–41)
MAGNESIUM SERPL-MCNC: 2.3 MG/DL (ref 1.6–2.6)
MCH RBC QN AUTO: 29.6 PG (ref 27–31)
MCHC RBC AUTO-ENTMCNC: 34.1 G/DL (ref 32–36)
MCV RBC AUTO: 86.7 FL (ref 80–96)
MONOCYTES # BLD AUTO: 1.04 K/UL (ref 0–0.8)
MONOCYTES NFR BLD AUTO: 3.5 % (ref 2–6)
MONOCYTES NFR BLD MANUAL: 3 % (ref 2–6)
MPC BLD CALC-MCNC: ABNORMAL G/DL
NEUTROPHILS # BLD AUTO: 27.62 K/UL (ref 1.8–7.7)
NEUTROPHILS NFR BLD AUTO: 93.9 % (ref 53–65)
NEUTS SEG NFR BLD MANUAL: 91 % (ref 50–62)
NITRITE UR QL STRIP: NEGATIVE
NRBC BLD MANUAL-RTO: ABNORMAL %
NT-PROBNP SERPL-MCNC: 3227 PG/ML (ref 1–125)
OHS QRS DURATION: 70 MS
OHS QTC CALCULATION: 487 MS
PCO2 BLDA: 25 MMHG (ref 35–48)
PH SMN: 7.56 [PH] (ref 7.35–7.45)
PH UR STRIP: 6 PH UNITS
PLATELET # BLD AUTO: 88 K/UL (ref 150–400)
PLATELET MORPHOLOGY: ABNORMAL
PO2 BLDA: 56 MMHG (ref 83–108)
POC BASE EXCESS: 1.4 MMOL/L (ref -2–3)
POC SATURATED O2: 93 %
POTASSIUM SERPL-SCNC: 3.8 MMOL/L (ref 3.5–5.1)
PROT SERPL-MCNC: 4.8 G/DL (ref 5.8–7.6)
PROT UR QL STRIP: ABNORMAL
PROTHROMBIN TIME: 16.4 SECONDS (ref 11.7–14.7)
RBC # BLD AUTO: 3.99 M/UL (ref 4.2–5.4)
RBC # UR STRIP: ABNORMAL /UL
RBC #/AREA URNS HPF: ABNORMAL /HPF
RBC MORPH BLD: NORMAL
SARS-COV-2 RDRP RESP QL NAA+PROBE: NEGATIVE
SODIUM SERPL-SCNC: 157 MMOL/L (ref 136–145)
SP GR UR STRIP: 1.01
SQUAMOUS #/AREA URNS LPF: ABNORMAL /LPF
UROBILINOGEN UR STRIP-ACNC: 0.2 MG/DL
WBC # BLD AUTO: 29.43 K/UL (ref 4.5–11)
WBC #/AREA URNS HPF: ABNORMAL /HPF
YEAST #/AREA URNS HPF: ABNORMAL /HPF

## 2025-04-03 PROCEDURE — 87040 BLOOD CULTURE FOR BACTERIA: CPT | Performed by: NURSE PRACTITIONER

## 2025-04-03 PROCEDURE — 83735 ASSAY OF MAGNESIUM: CPT | Performed by: NURSE PRACTITIONER

## 2025-04-03 PROCEDURE — 99900035 HC TECH TIME PER 15 MIN (STAT)

## 2025-04-03 PROCEDURE — 82803 BLOOD GASES ANY COMBINATION: CPT

## 2025-04-03 PROCEDURE — 93005 ELECTROCARDIOGRAM TRACING: CPT

## 2025-04-03 PROCEDURE — 11000001 HC ACUTE MED/SURG PRIVATE ROOM

## 2025-04-03 PROCEDURE — 63600175 PHARM REV CODE 636 W HCPCS: Performed by: NURSE PRACTITIONER

## 2025-04-03 PROCEDURE — 83880 ASSAY OF NATRIURETIC PEPTIDE: CPT | Performed by: NURSE PRACTITIONER

## 2025-04-03 PROCEDURE — 36415 COLL VENOUS BLD VENIPUNCTURE: CPT | Performed by: NURSE PRACTITIONER

## 2025-04-03 PROCEDURE — 87186 SC STD MICRODIL/AGAR DIL: CPT | Performed by: NURSE PRACTITIONER

## 2025-04-03 PROCEDURE — 96365 THER/PROPH/DIAG IV INF INIT: CPT

## 2025-04-03 PROCEDURE — 81003 URINALYSIS AUTO W/O SCOPE: CPT | Performed by: NURSE PRACTITIONER

## 2025-04-03 PROCEDURE — 80053 COMPREHEN METABOLIC PANEL: CPT | Performed by: NURSE PRACTITIONER

## 2025-04-03 PROCEDURE — 99285 EMERGENCY DEPT VISIT HI MDM: CPT | Mod: GF,EDII,, | Performed by: NURSE PRACTITIONER

## 2025-04-03 PROCEDURE — 85025 COMPLETE CBC W/AUTO DIFF WBC: CPT | Performed by: NURSE PRACTITIONER

## 2025-04-03 PROCEDURE — 99223 1ST HOSP IP/OBS HIGH 75: CPT | Mod: ,,, | Performed by: HOSPITALIST

## 2025-04-03 PROCEDURE — 85730 THROMBOPLASTIN TIME PARTIAL: CPT | Performed by: NURSE PRACTITIONER

## 2025-04-03 PROCEDURE — 87502 INFLUENZA DNA AMP PROBE: CPT | Performed by: NURSE PRACTITIONER

## 2025-04-03 PROCEDURE — 87635 SARS-COV-2 COVID-19 AMP PRB: CPT | Performed by: NURSE PRACTITIONER

## 2025-04-03 PROCEDURE — 63600175 PHARM REV CODE 636 W HCPCS: Performed by: HOSPITALIST

## 2025-04-03 PROCEDURE — 99285 EMERGENCY DEPT VISIT HI MDM: CPT | Mod: 25

## 2025-04-03 PROCEDURE — 82962 GLUCOSE BLOOD TEST: CPT

## 2025-04-03 PROCEDURE — 83605 ASSAY OF LACTIC ACID: CPT | Performed by: NURSE PRACTITIONER

## 2025-04-03 PROCEDURE — 51702 INSERT TEMP BLADDER CATH: CPT

## 2025-04-03 PROCEDURE — 25000003 PHARM REV CODE 250: Performed by: NURSE PRACTITIONER

## 2025-04-03 PROCEDURE — 27000221 HC OXYGEN, UP TO 24 HOURS

## 2025-04-03 PROCEDURE — 85610 PROTHROMBIN TIME: CPT | Performed by: NURSE PRACTITIONER

## 2025-04-03 PROCEDURE — 96375 TX/PRO/DX INJ NEW DRUG ADDON: CPT | Mod: 59

## 2025-04-03 RX ORDER — MEROPENEM 500 MG/1
1 INJECTION, POWDER, FOR SOLUTION INTRAVENOUS
Status: COMPLETED | OUTPATIENT
Start: 2025-04-03 | End: 2025-04-03

## 2025-04-03 RX ORDER — FUROSEMIDE 10 MG/ML
80 INJECTION INTRAMUSCULAR; INTRAVENOUS
Status: COMPLETED | OUTPATIENT
Start: 2025-04-03 | End: 2025-04-03

## 2025-04-03 RX ORDER — GUAIFENESIN AND DEXTROMETHORPHAN HYDROBROMIDE 10; 100 MG/5ML; MG/5ML
10 SYRUP ORAL EVERY 6 HOURS PRN
Status: DISCONTINUED | OUTPATIENT
Start: 2025-04-03 | End: 2025-04-26 | Stop reason: HOSPADM

## 2025-04-03 RX ORDER — ACETAMINOPHEN 500 MG
1000 TABLET ORAL EVERY 6 HOURS PRN
Status: DISCONTINUED | OUTPATIENT
Start: 2025-04-03 | End: 2025-04-26 | Stop reason: HOSPADM

## 2025-04-03 RX ORDER — SODIUM CHLORIDE 450 MG/100ML
INJECTION, SOLUTION INTRAVENOUS CONTINUOUS
Status: DISCONTINUED | OUTPATIENT
Start: 2025-04-04 | End: 2025-04-05

## 2025-04-03 RX ORDER — DIPHENHYDRAMINE HCL 25 MG
50 CAPSULE ORAL EVERY 6 HOURS PRN
Status: DISCONTINUED | OUTPATIENT
Start: 2025-04-03 | End: 2025-04-26 | Stop reason: HOSPADM

## 2025-04-03 RX ORDER — BUDESONIDE 0.5 MG/2ML
0.5 INHALANT ORAL EVERY 12 HOURS
Status: DISCONTINUED | OUTPATIENT
Start: 2025-04-04 | End: 2025-04-26 | Stop reason: HOSPADM

## 2025-04-03 RX ORDER — DOCUSATE SODIUM 100 MG/1
100 CAPSULE, LIQUID FILLED ORAL 2 TIMES DAILY PRN
Status: DISCONTINUED | OUTPATIENT
Start: 2025-04-03 | End: 2025-04-26 | Stop reason: HOSPADM

## 2025-04-03 RX ORDER — TRAZODONE HYDROCHLORIDE 50 MG/1
50 TABLET ORAL NIGHTLY PRN
Status: DISCONTINUED | OUTPATIENT
Start: 2025-04-03 | End: 2025-04-26 | Stop reason: HOSPADM

## 2025-04-03 RX ORDER — ALUMINUM HYDROXIDE, MAGNESIUM HYDROXIDE, AND SIMETHICONE 2400; 240; 2400 MG/30ML; MG/30ML; MG/30ML
30 SUSPENSION ORAL EVERY 6 HOURS PRN
Status: DISCONTINUED | OUTPATIENT
Start: 2025-04-03 | End: 2025-04-26 | Stop reason: HOSPADM

## 2025-04-03 RX ORDER — PANTOPRAZOLE SODIUM 40 MG/1
40 TABLET, DELAYED RELEASE ORAL DAILY
Status: ON HOLD | COMMUNITY

## 2025-04-03 RX ORDER — IPRATROPIUM BROMIDE AND ALBUTEROL SULFATE 2.5; .5 MG/3ML; MG/3ML
3 SOLUTION RESPIRATORY (INHALATION) EVERY 6 HOURS
Status: DISCONTINUED | OUTPATIENT
Start: 2025-04-04 | End: 2025-04-08

## 2025-04-03 RX ORDER — ACETAMINOPHEN 650 MG/1
650 SUPPOSITORY RECTAL EVERY 6 HOURS PRN
Status: DISCONTINUED | OUTPATIENT
Start: 2025-04-03 | End: 2025-04-26 | Stop reason: HOSPADM

## 2025-04-03 RX ORDER — ONDANSETRON HYDROCHLORIDE 2 MG/ML
8 INJECTION, SOLUTION INTRAVENOUS EVERY 6 HOURS PRN
Status: DISCONTINUED | OUTPATIENT
Start: 2025-04-03 | End: 2025-04-03

## 2025-04-03 RX ORDER — BISACODYL 5 MG
10 TABLET, DELAYED RELEASE (ENTERIC COATED) ORAL DAILY PRN
Status: DISCONTINUED | OUTPATIENT
Start: 2025-04-03 | End: 2025-04-26 | Stop reason: HOSPADM

## 2025-04-03 RX ORDER — TALC
6 POWDER (GRAM) TOPICAL NIGHTLY PRN
Status: DISCONTINUED | OUTPATIENT
Start: 2025-04-03 | End: 2025-04-26 | Stop reason: HOSPADM

## 2025-04-03 RX ORDER — ROPINIROLE 0.25 MG/1
0.25 TABLET, FILM COATED ORAL 3 TIMES DAILY
Status: ON HOLD | COMMUNITY

## 2025-04-03 RX ORDER — METOPROLOL SUCCINATE 50 MG/1
50 TABLET, EXTENDED RELEASE ORAL DAILY
Status: ON HOLD | COMMUNITY

## 2025-04-03 RX ORDER — DOXYCYCLINE HYCLATE 100 MG/1
100 TABLET, DELAYED RELEASE ORAL 2 TIMES DAILY
Status: ON HOLD | COMMUNITY

## 2025-04-03 RX ORDER — METOCLOPRAMIDE HYDROCHLORIDE 5 MG/ML
10 INJECTION INTRAMUSCULAR; INTRAVENOUS EVERY 6 HOURS PRN
Status: DISCONTINUED | OUTPATIENT
Start: 2025-04-03 | End: 2025-04-04

## 2025-04-03 RX ORDER — METOCLOPRAMIDE 5 MG/1
5 TABLET ORAL 4 TIMES DAILY
Status: ON HOLD | COMMUNITY

## 2025-04-03 RX ADMIN — SODIUM CHLORIDE, POTASSIUM CHLORIDE, SODIUM LACTATE AND CALCIUM CHLORIDE 1000 ML: 600; 310; 30; 20 INJECTION, SOLUTION INTRAVENOUS at 11:04

## 2025-04-03 RX ADMIN — VANCOMYCIN HYDROCHLORIDE 1250 MG: 1 INJECTION, POWDER, LYOPHILIZED, FOR SOLUTION INTRAVENOUS at 02:04

## 2025-04-03 RX ADMIN — FUROSEMIDE 80 MG: 10 INJECTION, SOLUTION INTRAMUSCULAR; INTRAVENOUS at 01:04

## 2025-04-03 RX ADMIN — MEROPENEM 1 G: 500 INJECTION, POWDER, FOR SOLUTION INTRAVENOUS at 02:04

## 2025-04-03 NOTE — PROGRESS NOTES
Pharmacy Vancomycin consult.    Patient is 68y/o in the ED with possible sepsis. Pharmacy to dose Vancomycin.    Patient weighs: 57kg  CrCl=41.2  Scr=1.83 and BUN=63.    Will dose Vanc at 20mg/kg. Start Vancomycin 1250mg ivpb q24h.    Thanks,   Elsie Vaughn [facility]  Ext:[phone]

## 2025-04-03 NOTE — ED PROVIDER NOTES
Encounter Date: 4/3/2025       History     Chief Complaint   Patient presents with    elevated wbc     70 y/o female with PMHx of HTN, Asthma, HLD, DM, CKD, Lung Ca with brain mets (Dr. Swanson) and PE  who is a resident at [facility] arrived to the ED via [facility] for evaluation of leukocytosis. Patient reports increase in swelling, SOB and productive cough with thick brown sputum over the past few days. Denies fever. Patient was found to have elevated WBC 27.8 at nursing home this morning and sent to the ED for further evaluation.   Patient was admitted to [facility], [place], operated by [facility] on 03/19 for possible sepsis and respiratory failure and discharged on 004/01/2025. Currently on Doxycycline.    The history is provided by the patient, the nursing home and the EMS personnel.     Review of patient's allergies indicates:   Allergen Reactions    Penicillins Hives    Sulfa (sulfonamide antibiotics) Hives     Past Medical History:   Diagnosis Date    Acquired absence of both cervix and uterus 01/27/2021    Acute respiratory failure     Asthma     Chronic kidney disease, unspecified     Diabetes mellitus     Dysphagia     History of colonoscopy 09/29/2020    REFUSAL    Hyperlipidemia     Hypertension     Hypotension     Malignant neoplasm of brain, unspecified     Malignant neoplasm of unspecified part of unspecified bronchus or lung     Melanocytic nevi of lower limb, including hip, left     Mixed hyperlipidemia     Other pulmonary embolism without acute cor pulmonale     Vitamin D deficiency      Past Surgical History:   Procedure Laterality Date    cerebral shunt      CHOLECYSTECTOMY      HYSTERECTOMY      TONSILLECTOMY       Family History   Problem Relation Name Age of Onset    Diabetes Mother      Hypertension Mother      Lung cancer Father       Social History[1]  Review of Systems   Constitutional:  Positive for activity change and fatigue. Negative for appetite change and fever.   HENT:  Positive for  congestion, sore throat and trouble swallowing. Negative for ear pain, rhinorrhea, sinus pressure and sinus pain.    Eyes:  Negative for pain, redness and visual disturbance.   Respiratory:  Positive for cough and shortness of breath. Negative for wheezing.    Cardiovascular:  Positive for leg swelling. Negative for chest pain and palpitations.   Gastrointestinal:  Negative for abdominal pain, diarrhea, nausea and vomiting.   Genitourinary:  Negative for dysuria, flank pain and frequency.   Musculoskeletal:  Positive for myalgias (generalized body pain).   Skin:  Positive for color change (bruising to right lower extremity).   Allergic/Immunologic: Positive for immunocompromised state.   Neurological:  Positive for weakness (generalized). Negative for dizziness, syncope, light-headedness and headaches.   Psychiatric/Behavioral:  The patient is nervous/anxious.        Physical Exam     Initial Vitals   BP Pulse Resp Temp SpO2   04/03/25 1200 04/03/25 1200 04/03/25 1200 04/03/25 1216 04/03/25 1200   (!) 146/82 (!) 112 (!) 27 97.6 °F (36.4 °C) 95 %      MAP       --                Physical Exam    Nursing note and vitals reviewed.  Constitutional: She appears well-developed and well-nourished. She is not diaphoretic. She is Obese . She is cooperative. She appears toxic. She appears ill. No distress. Nasal cannula in place.   HENT:   Head: Normocephalic and atraumatic.   Right Ear: External ear normal.   Left Ear: External ear normal.   Nose: Nose normal. Mouth/Throat: Mucous membranes are dry.   Eyes: Conjunctivae and EOM are normal. Pupils are equal, round, and reactive to light.   Neck: Neck supple. No JVD present.   Normal range of motion.  Cardiovascular:  Regular rhythm, normal heart sounds and intact distal pulses.   Tachycardia present.         2+-3+  edema BLE and BUE   Pulmonary/Chest: No accessory muscle usage. Tachypnea noted. No respiratory distress. She has decreased breath sounds. She has no wheezes. She  has rhonchi. She has rales.   Abdominal: Abdomen is soft. Bowel sounds are normal. There is no abdominal tenderness.   Musculoskeletal:         General: Normal range of motion.      Cervical back: Normal range of motion and neck supple.     Neurological: She is alert and oriented to person, place, and time. She exhibits abnormal muscle tone (generalized weakness).   Skin: Skin is warm, dry and intact. Capillary refill takes less than 2 seconds. Bruising (to RLE) noted.   Psychiatric: She has a normal mood and affect. Her speech is normal and behavior is normal. Judgment and thought content normal.         Medical Screening Exam   See Full Note    ED Course   Procedures  Labs Reviewed   COMPREHENSIVE METABOLIC PANEL - Abnormal       Result Value    Sodium 157 (*)     Potassium 3.8      Chloride 121 (*)     CO2 18 (*)     Anion Gap 22 (*)     Glucose 245 (*)     BUN 63 (*)     Creatinine 1.83 (*)     BUN/Creatinine Ratio 34 (*)     Calcium 8.9      Total Protein 4.8 (*)     Albumin 2.2 (*)     Globulin 2.6      A/G Ratio 0.8      Bilirubin, Total 0.5      Alk Phos 123      ALT 65 (*)     AST 34      eGFR 30 (*)    LACTIC ACID, PLASMA - Abnormal    Lactic Acid 2.5 (*)    URINALYSIS, REFLEX TO URINE CULTURE - Abnormal    Color, UA Yellow      Clarity, UA Slightly Cloudy      pH, UA 6.0      Leukocytes, UA Moderate (*)     Nitrites, UA Negative      Protein, UA Trace (*)     Glucose, UA >=1000 (*)     Ketones, UA 15 (*)     Urobilinogen, UA 0.2      Bilirubin, UA Negative      Blood, UA Moderate (*)     Specific [place], UA 1.015     NT-PRO NATRIURETIC PEPTIDE - Abnormal    ProBNP 3,227 (*)    PROTIME-INR - Abnormal    PT 16.4 (*)     INR 1.25     CBC WITH DIFFERENTIAL - Abnormal    WBC 29.43 (*)     RBC 3.99 (*)     Hemoglobin 11.8 (*)     Hematocrit 34.6 (*)     MCV 86.7      MCH 29.6      MCHC 34.1      RDW 17.5 (*)     Platelet Count 88 (*)     MPV        Neutrophils % 93.9 (*)     Lymphocytes % 2.5 (*)      Neutrophils, Abs 27.62 (*)     Lymphocytes, Absolute 0.74 (*)     Diff Type Manual      Monocytes % 3.5      Eosinophils % 0.0 (*)     Basophils % 0.1      Monocytes, Absolute 1.04 (*)     Eosinophils, Absolute 0.00      Basophils, Absolute 0.03     MANUAL DIFFERENTIAL - Abnormal    Segmented Neutrophils, Man % 91 (*)     Lymphocytes, Man % 6 (*)     Monocytes, Man % 3      nRBC, Manual        Platelet Morphology Decreased (*)     RBC Morphology Normal     LACTIC ACID, PLASMA - Abnormal    Lactic Acid 3.2 (*)    URINALYSIS, MICROSCOPIC - Abnormal    WBC, UA 15-25 (*)     RBC, UA 0-3      Bacteria, UA Moderate (*)     Yeast, UA Moderate (*)     Squamous Epithelial Cells, UA Few (*)    LACTIC ACID, PLASMA - Abnormal    Lactic Acid 3.3 (*)    POCT GLUCOSE MONITORING CONTINUOUS - Abnormal    POC Glucose 230 (*)    INFLUENZA A & B BY MOLECULAR - Normal    INFLUENZA A MOLECULAR Negative      INFLUENZA B MOLECULAR  Negative     MAGNESIUM - Normal    Magnesium 2.3     APTT - Normal    PTT 32.5     SARS-COV-2 RNA AMPLIFICATION, QUAL - Normal    SARS COV-2 Molecular Negative      Narrative:     Negative SARS-CoV results should not be used as the sole basis for treatment or patient management decisions; negative results should be considered in the context of a patient's recent exposures, history and the presene of clinical signs and symptoms consistent with COVID-19.  Negative results should be treated as presumptive and confirmed by molecular assay, if necessary for patient management.   CULTURE, BLOOD   CULTURE, BLOOD   CULTURE, URINE   CBC W/ AUTO DIFFERENTIAL    Narrative:     The following orders were created for panel order CBC auto differential.  Procedure                               Abnormality         Status                     ---------                               -----------         ------                     CBC with Differential[[id number]]       Abnormal            Final result               Manual  Differential[[id number]]         Abnormal            Final result                 Please view results for these tests on the individual orders.        ECG Results              EKG 12-lead (In process)        Collection Time Result Time QRS Duration OHS QTC Calculation    04/03/25 12:13:08 04/03/25 12:24:15 70 487                     In process by Interface, Lab In [facility] (04/03/25 12:24:21)                   Narrative:    Test Reason : R00.0,    Vent. Rate : 114 BPM     Atrial Rate : 114 BPM     P-R Int : 130 ms          QRS Dur :  70 ms      QT Int : 354 ms       P-R-T Axes :  27 -28 122 degrees    QTcB Int : 487 ms    Sinus tachycardia  Inferior infarct ,age undetermined  Anterolateral infarct ,age undetermined  QTcB >= 480 msec  Abnormal ECG  No previous ECGs available    Referred By: AAAREFERRAL SELF           Confirmed By:                                   Imaging Results              X-Ray Chest AP Portable (Final result)  Result time 04/03/25 14:18:02      Final result by Kyle Charles MD (04/03/25 14:18:02)                   Impression:      Nonspecific patchy opacities both lungs may relate to atelectasis, edema, infection, and/or noninfectious inflammatory process.      Electronically signed by: Kyle Charles  Date:    04/03/2025  Time:    14:18               Narrative:    EXAMINATION:  XR CHEST AP PORTABLE    CLINICAL HISTORY:  Sepsis;    TECHNIQUE:  Single frontal view of the chest was performed.    COMPARISON:  Chest radiograph performed 12/15/2022.    FINDINGS:  Monitoring leads over the chest.    Grossly unchanged cardiac contours, again noting enlargement the cardiac silhouette, prominence of central pulmonary vasculature, and atherosclerosis of the aorta.    Patchy opacities are noted in both lungs.    No definite pneumothorax.  Trace pleural effusions are not excluded.    No acute findings in the visualized abdomen.    Osseous and soft tissue structures appear without definite acute  change.                                       X-Ray Tibia Fibula 2 View Left (Final result)  Result time 04/03/25 13:10:00      Final result by Galen Solis MD (04/03/25 13:10:00)                   Impression:      Intraosseous needle is in place within the tibia.      Electronically signed by: Galen Solis  Date:    04/03/2025  Time:    13:10               Narrative:    EXAMINATION:  XR TIBIA FIBULA 2 VIEW LEFT    CLINICAL HISTORY:  Pain in left leg    COMPARISON:  None available    FINDINGS:  Intra osseous needle is in place within the cortex of the anterior tibial.  It penetrates the depth of about 3 mm.  No acute fracture or focal lesion of the tibia or fibula is evident.  Left lower extremity subcutaneous edema.                                       Medications   vancomycin (VANCOCIN) 1,250 mg in 0.9% NaCl 250 mL IVPB (0 mg Intravenous Stopped 4/3/25 1625)   furosemide injection 80 mg (80 mg Intravenous Given 4/3/25 1303)   meropenem injection 1 g (1 g Intravenous Given 4/3/25 1401)     Medical Decision Making  68 y/o female who is a resident at [facility] arrived to the ED via [facility] for evaluation of leukocytosis. Patient reports increase in swelling, SOB and productive cough with thick brown over the past few days. Denies fever. Patient was found to have elevated WBC 27.8 at nursing home this morning and sent to the ED for further evaluation.   Patient was admitted to [facility] on 03/19 for possible sepsis and respiratory failure and discharged on 004/01/2025.      Unable to get IV after multiple attempts. Attempted IO in left leg, but was unsuccessful. Needle was too short due to swelling of patient's leg and caused too much pain.   Nurse was able to finally obtain IV on patient for medications.    Amount and/or Complexity of Data Reviewed  Labs: ordered. Decision-making details documented in ED Course.     Details: WBC 29.93, Lactic 2.9, Na 157, K+ 3.8, Creatinine 1.83  (baseline 1.4), glucose 245,  AST 65, BNP 3,227, UA: WBC 15-25, RBC 0-3, Nitrite negative. Bacteria moderate.   Radiology: ordered. Decision-making details documented in ED Course.     Details: CXR: Nonspecific patchy opacities both lungs may relate to atelectasis, edema, infection, and/or noninfectious inflammatory process. Trace bilateral pleural effusions.  Discussion of management or test interpretation with external provider(s): Admission MDM  I discussed the patient presentation labs, ekg, X-rays, CT findings with patient and her family for the need for admission.   Patient was managed in the ED with:  -Lasix 80 mg IV  -Merrem 1 gm IV  -Vancomycin pharmacy to dose IV  -Fluids not given due to volume overload.   The response to treatment was urine output 600 ml  Patient required consultation to Dimitris for admission who accepted patient for admission to [facility].       Risk  Prescription drug management.      Additional MDM:   Sepsis:   This patient does have evidence of infective focus  My overall impression is sepsis.  Source: Respiratory and Urinary Tract  Antibiotics given- Antibiotics     Patient Encounter Information Not Found      Latest lactate reviewed- 2.5  Organ dysfunction indicated by Acute kidney injury    Fluid challenge Fluid Not Needed - Patient is not hypotensive and/or lactate is less than 4.0.     Post- resuscitation assessment Yes - I attest a sepsis perfusion exam was performed within 6 hours of sepsis, severe sepsis, or septic shock presentation, following fluid resuscitation. Also contraindication due to volume overload.      Will Not start Pressors- Levophed for MAP of 65  Source control achieved by: Merrem and Vancomycin                           Medical Decision Making:   Clinical Tests:   Sepsis Perfusion Assessment: "I attest a sepsis perfusion exam was performed within 6 hours of sepsis, severe sepsis, or septic shock presentation, following fluid resuscitation."              Clinical Impression:   Final diagnoses:  [R00.0] Tachycardia  [M79.605] Left leg pain  [A41.9] Sepsis, due to unspecified organism, unspecified whether acute organ dysfunction present (Primary)  [N39.0] Urinary tract infection without hematuria, site unspecified  [R79.89] Elevated brain natriuretic peptide (BNP) level  [E87.70] Hypervolemia, unspecified hypervolemia type - BNP 3,227        ED Disposition Condition    Transfer to Another Facility Stable                       Sofia Patel FNP  04/03/25 1847         [1]   Social History  Tobacco Use    Smoking status: Never    Smokeless tobacco: Never   Substance Use Topics    Alcohol use: Never    Drug use: Never        Sofia Patel FNP  04/03/25 2997

## 2025-04-04 PROBLEM — E86.0 DEHYDRATION WITH HYPERNATREMIA: Status: ACTIVE | Noted: 2025-04-04

## 2025-04-04 PROBLEM — E55.9 VITAMIN D DEFICIENCY: Status: RESOLVED | Noted: 2021-06-30 | Resolved: 2025-04-04

## 2025-04-04 PROBLEM — D72.829 LEUKOCYTOSIS: Status: RESOLVED | Noted: 2025-04-04 | Resolved: 2025-04-04

## 2025-04-04 PROBLEM — E87.0 DEHYDRATION WITH HYPERNATREMIA: Status: RESOLVED | Noted: 2025-04-04 | Resolved: 2025-04-04

## 2025-04-04 PROBLEM — E11.65 TYPE 2 DIABETES MELLITUS WITH HYPERGLYCEMIA: Status: ACTIVE | Noted: 2025-04-04

## 2025-04-04 PROBLEM — E87.0 DEHYDRATION WITH HYPERNATREMIA: Status: ACTIVE | Noted: 2025-04-04

## 2025-04-04 PROBLEM — E87.0 HYPERNATREMIA: Status: ACTIVE | Noted: 2025-04-04

## 2025-04-04 PROBLEM — J18.9 SEPSIS DUE TO PNEUMONIA: Status: ACTIVE | Noted: 2025-04-04

## 2025-04-04 PROBLEM — D72.829 LEUKOCYTOSIS: Status: ACTIVE | Noted: 2025-04-04

## 2025-04-04 PROBLEM — E78.2 MIXED HYPERLIPIDEMIA: Status: RESOLVED | Noted: 2021-06-30 | Resolved: 2025-04-04

## 2025-04-04 PROBLEM — E86.0 DEHYDRATION WITH HYPERNATREMIA: Status: RESOLVED | Noted: 2025-04-04 | Resolved: 2025-04-04

## 2025-04-04 PROBLEM — A41.9 SEPSIS: Status: ACTIVE | Noted: 2025-04-04

## 2025-04-04 PROBLEM — D69.6 THROMBOCYTOPENIA: Status: ACTIVE | Noted: 2025-04-04

## 2025-04-04 PROBLEM — F41.1 GENERALIZED ANXIETY DISORDER: Status: RESOLVED | Noted: 2021-09-29 | Resolved: 2025-04-04

## 2025-04-04 LAB
25(OH)D3 SERPL-MCNC: 39.6 NG/ML (ref 30–80)
ACETONE SERPL QL SCN: NORMAL
ALBUMIN SERPL BCP-MCNC: 2 G/DL (ref 3.4–4.8)
ALBUMIN/GLOB SERPL: 0.8 {RATIO}
ALP SERPL-CCNC: 126 U/L (ref 40–150)
ALT SERPL W P-5'-P-CCNC: 53 U/L
ANION GAP SERPL CALCULATED.3IONS-SCNC: 19 MMOL/L (ref 7–16)
ANISOCYTOSIS BLD QL SMEAR: ABNORMAL
AST SERPL W P-5'-P-CCNC: 34 U/L (ref 11–45)
BASOPHILS # BLD AUTO: 0.09 K/UL (ref 0–0.2)
BASOPHILS NFR BLD AUTO: 0.3 % (ref 0–1)
BILIRUB SERPL-MCNC: 0.5 MG/DL
BUN SERPL-MCNC: 64 MG/DL (ref 10–20)
BUN/CREAT SERPL: 37 (ref 6–20)
CALCIUM SERPL-MCNC: 8.5 MG/DL (ref 8.4–10.2)
CHLORIDE SERPL-SCNC: 126 MMOL/L (ref 98–107)
CO2 SERPL-SCNC: 16 MMOL/L (ref 23–31)
CREAT SERPL-MCNC: 1.72 MG/DL (ref 0.55–1.02)
DIFFERENTIAL METHOD BLD: ABNORMAL
EGFR (NO RACE VARIABLE) (RUSH/TITUS): 32 ML/MIN/1.73M2
EOSINOPHIL # BLD AUTO: 0.03 K/UL (ref 0–0.5)
EOSINOPHIL NFR BLD AUTO: 0.1 % (ref 1–4)
ERYTHROCYTE [DISTWIDTH] IN BLOOD BY AUTOMATED COUNT: 18.3 % (ref 11.5–14.5)
GLOBULIN SER-MCNC: 2.5 G/DL (ref 2–4)
GLUCOSE SERPL-MCNC: 180 MG/DL (ref 70–105)
GLUCOSE SERPL-MCNC: 209 MG/DL (ref 70–105)
GLUCOSE SERPL-MCNC: 216 MG/DL (ref 70–105)
GLUCOSE SERPL-MCNC: 218 MG/DL (ref 82–115)
GLUCOSE SERPL-MCNC: 244 MG/DL (ref 70–105)
GLUCOSE SERPL-MCNC: 245 MG/DL (ref 70–105)
HCT VFR BLD AUTO: 33.9 % (ref 38–47)
HGB BLD-MCNC: 10.5 G/DL (ref 12–16)
IMM GRANULOCYTES # BLD AUTO: 0.99 K/UL (ref 0–0.04)
IMM GRANULOCYTES NFR BLD: 3.6 % (ref 0–0.4)
LACTATE SERPL-SCNC: 2 MMOL/L (ref 0.5–2.2)
LACTATE SERPL-SCNC: 2.1 MMOL/L (ref 0.5–2.2)
LACTATE SERPL-SCNC: 2.3 MMOL/L (ref 0.5–2.2)
LACTATE SERPL-SCNC: 2.3 MMOL/L (ref 0.5–2.2)
LACTATE SERPL-SCNC: 2.5 MMOL/L (ref 0.5–2.2)
LACTATE SERPL-SCNC: 3.2 MMOL/L (ref 0.5–2.2)
LYMPHOCYTES # BLD AUTO: 0.73 K/UL (ref 1–4.8)
LYMPHOCYTES NFR BLD AUTO: 2.7 % (ref 27–41)
LYMPHOCYTES NFR BLD MANUAL: 3 % (ref 27–41)
MAGNESIUM SERPL-MCNC: 2 MG/DL (ref 1.6–2.6)
MCH RBC QN AUTO: 29.4 PG (ref 27–31)
MCHC RBC AUTO-ENTMCNC: 31 G/DL (ref 32–36)
MCV RBC AUTO: 95 FL (ref 80–96)
MONOCYTES # BLD AUTO: 0.67 K/UL (ref 0–0.8)
MONOCYTES NFR BLD AUTO: 2.5 % (ref 2–6)
MONOCYTES NFR BLD MANUAL: 4 % (ref 2–6)
MPC BLD CALC-MCNC: ABNORMAL G/DL
NEUTROPHILS # BLD AUTO: 24.69 K/UL (ref 1.8–7.7)
NEUTROPHILS NFR BLD AUTO: 90.8 % (ref 53–65)
NEUTS BAND NFR BLD MANUAL: 12 % (ref 1–5)
NEUTS SEG NFR BLD MANUAL: 81 % (ref 50–62)
NRBC # BLD AUTO: 0.07 X10E3/UL
NRBC, AUTO (.00): 0.3 %
PLATELET # BLD AUTO: 45 K/UL (ref 150–400)
PLATELET MORPHOLOGY: ABNORMAL
POLYCHROMASIA BLD QL SMEAR: ABNORMAL
POTASSIUM SERPL-SCNC: 3.8 MMOL/L (ref 3.5–5.1)
PREALB SERPL NEPH-MCNC: <3 MG/DL (ref 14–37)
PROT SERPL-MCNC: 4.5 G/DL (ref 5.8–7.6)
RBC # BLD AUTO: 3.57 M/UL (ref 4.2–5.4)
SODIUM SERPL-SCNC: 157 MMOL/L (ref 136–145)
WBC # BLD AUTO: 27.2 K/UL (ref 4.5–11)

## 2025-04-04 PROCEDURE — 94761 N-INVAS EAR/PLS OXIMETRY MLT: CPT

## 2025-04-04 PROCEDURE — 80053 COMPREHEN METABOLIC PANEL: CPT | Performed by: HOSPITALIST

## 2025-04-04 PROCEDURE — 36415 COLL VENOUS BLD VENIPUNCTURE: CPT | Performed by: HOSPITALIST

## 2025-04-04 PROCEDURE — 63600175 PHARM REV CODE 636 W HCPCS: Performed by: HOSPITALIST

## 2025-04-04 PROCEDURE — 94640 AIRWAY INHALATION TREATMENT: CPT

## 2025-04-04 PROCEDURE — 97166 OT EVAL MOD COMPLEX 45 MIN: CPT

## 2025-04-04 PROCEDURE — 84134 ASSAY OF PREALBUMIN: CPT | Performed by: HOSPITALIST

## 2025-04-04 PROCEDURE — 85025 COMPLETE CBC W/AUTO DIFF WBC: CPT | Performed by: HOSPITALIST

## 2025-04-04 PROCEDURE — 99233 SBSQ HOSP IP/OBS HIGH 50: CPT | Mod: ,,, | Performed by: HOSPITALIST

## 2025-04-04 PROCEDURE — 11000001 HC ACUTE MED/SURG PRIVATE ROOM

## 2025-04-04 PROCEDURE — 82009 KETONE BODYS QUAL: CPT | Performed by: HOSPITALIST

## 2025-04-04 PROCEDURE — 82306 VITAMIN D 25 HYDROXY: CPT | Performed by: HOSPITALIST

## 2025-04-04 PROCEDURE — 25000242 PHARM REV CODE 250 ALT 637 W/ HCPCS: Performed by: HOSPITALIST

## 2025-04-04 PROCEDURE — 92610 EVALUATE SWALLOWING FUNCTION: CPT

## 2025-04-04 PROCEDURE — 82962 GLUCOSE BLOOD TEST: CPT

## 2025-04-04 PROCEDURE — 83735 ASSAY OF MAGNESIUM: CPT | Performed by: HOSPITALIST

## 2025-04-04 PROCEDURE — 97163 PT EVAL HIGH COMPLEX 45 MIN: CPT

## 2025-04-04 PROCEDURE — 99900035 HC TECH TIME PER 15 MIN (STAT)

## 2025-04-04 PROCEDURE — 27000221 HC OXYGEN, UP TO 24 HOURS

## 2025-04-04 PROCEDURE — 83605 ASSAY OF LACTIC ACID: CPT | Performed by: HOSPITALIST

## 2025-04-04 PROCEDURE — 25000003 PHARM REV CODE 250: Performed by: HOSPITALIST

## 2025-04-04 PROCEDURE — 94799 UNLISTED PULMONARY SVC/PX: CPT

## 2025-04-04 RX ORDER — INSULIN GLARGINE 100 [IU]/ML
20 INJECTION, SOLUTION SUBCUTANEOUS NIGHTLY
Status: DISCONTINUED | OUTPATIENT
Start: 2025-04-04 | End: 2025-04-04

## 2025-04-04 RX ORDER — METOCLOPRAMIDE 5 MG/1
5 TABLET ORAL
Status: DISCONTINUED | OUTPATIENT
Start: 2025-04-04 | End: 2025-04-08

## 2025-04-04 RX ORDER — IBUPROFEN 200 MG
24 TABLET ORAL
Status: DISCONTINUED | OUTPATIENT
Start: 2025-04-04 | End: 2025-04-26 | Stop reason: HOSPADM

## 2025-04-04 RX ORDER — SERTRALINE HYDROCHLORIDE 50 MG/1
100 TABLET, FILM COATED ORAL DAILY
Status: DISCONTINUED | OUTPATIENT
Start: 2025-04-04 | End: 2025-04-26 | Stop reason: HOSPADM

## 2025-04-04 RX ORDER — MONTELUKAST SODIUM 10 MG/1
10 TABLET ORAL NIGHTLY
Status: DISCONTINUED | OUTPATIENT
Start: 2025-04-04 | End: 2025-04-26 | Stop reason: HOSPADM

## 2025-04-04 RX ORDER — INSULIN GLARGINE 100 [IU]/ML
25 INJECTION, SOLUTION SUBCUTANEOUS NIGHTLY
Status: DISCONTINUED | OUTPATIENT
Start: 2025-04-04 | End: 2025-04-21

## 2025-04-04 RX ORDER — MUPIROCIN 20 MG/G
OINTMENT TOPICAL 2 TIMES DAILY
Status: COMPLETED | OUTPATIENT
Start: 2025-04-04 | End: 2025-04-08

## 2025-04-04 RX ORDER — INSULIN ASPART 100 [IU]/ML
0-10 INJECTION, SOLUTION INTRAVENOUS; SUBCUTANEOUS
Status: DISCONTINUED | OUTPATIENT
Start: 2025-04-04 | End: 2025-04-26 | Stop reason: HOSPADM

## 2025-04-04 RX ORDER — LOSARTAN POTASSIUM 25 MG/1
25 TABLET ORAL DAILY
Status: DISCONTINUED | OUTPATIENT
Start: 2025-04-04 | End: 2025-04-04

## 2025-04-04 RX ORDER — IBUPROFEN 200 MG
16 TABLET ORAL
Status: DISCONTINUED | OUTPATIENT
Start: 2025-04-04 | End: 2025-04-26 | Stop reason: HOSPADM

## 2025-04-04 RX ORDER — HYDROCODONE BITARTRATE AND ACETAMINOPHEN 7.5; 325 MG/1; MG/1
1 TABLET ORAL EVERY 6 HOURS PRN
Refills: 0 | Status: DISCONTINUED | OUTPATIENT
Start: 2025-04-04 | End: 2025-04-26 | Stop reason: HOSPADM

## 2025-04-04 RX ORDER — METOPROLOL SUCCINATE 25 MG/1
25 TABLET, EXTENDED RELEASE ORAL DAILY
Status: DISCONTINUED | OUTPATIENT
Start: 2025-04-04 | End: 2025-04-26 | Stop reason: HOSPADM

## 2025-04-04 RX ORDER — CEFTRIAXONE 1 G/1
1 INJECTION, POWDER, FOR SOLUTION INTRAMUSCULAR; INTRAVENOUS
Status: DISCONTINUED | OUTPATIENT
Start: 2025-04-04 | End: 2025-04-05

## 2025-04-04 RX ORDER — ATORVASTATIN CALCIUM 20 MG/1
20 TABLET, FILM COATED ORAL DAILY
Status: DISCONTINUED | OUTPATIENT
Start: 2025-04-04 | End: 2025-04-14

## 2025-04-04 RX ORDER — PANTOPRAZOLE SODIUM 40 MG/1
40 TABLET, DELAYED RELEASE ORAL DAILY
Status: DISCONTINUED | OUTPATIENT
Start: 2025-04-04 | End: 2025-04-26 | Stop reason: HOSPADM

## 2025-04-04 RX ORDER — GLUCAGON 1 MG
1 KIT INJECTION
Status: DISCONTINUED | OUTPATIENT
Start: 2025-04-04 | End: 2025-04-26 | Stop reason: HOSPADM

## 2025-04-04 RX ADMIN — INSULIN GLARGINE 25 UNITS: 100 INJECTION, SOLUTION SUBCUTANEOUS at 08:04

## 2025-04-04 RX ADMIN — BUDESONIDE 0.5 MG: 0.5 SUSPENSION RESPIRATORY (INHALATION) at 07:04

## 2025-04-04 RX ADMIN — PANTOPRAZOLE SODIUM 40 MG: 40 TABLET, DELAYED RELEASE ORAL at 08:04

## 2025-04-04 RX ADMIN — SERTRALINE HYDROCHLORIDE 100 MG: 50 TABLET ORAL at 08:04

## 2025-04-04 RX ADMIN — METOCLOPRAMIDE 5 MG: 5 TABLET ORAL at 05:04

## 2025-04-04 RX ADMIN — METOCLOPRAMIDE 5 MG: 5 TABLET ORAL at 11:04

## 2025-04-04 RX ADMIN — INSULIN ASPART 1 UNITS: 100 INJECTION, SOLUTION INTRAVENOUS; SUBCUTANEOUS at 08:04

## 2025-04-04 RX ADMIN — INSULIN ASPART 4 UNITS: 100 INJECTION, SOLUTION INTRAVENOUS; SUBCUTANEOUS at 05:04

## 2025-04-04 RX ADMIN — INSULIN ASPART 4 UNITS: 100 INJECTION, SOLUTION INTRAVENOUS; SUBCUTANEOUS at 08:04

## 2025-04-04 RX ADMIN — MUPIROCIN: 20 OINTMENT TOPICAL at 08:04

## 2025-04-04 RX ADMIN — IPRATROPIUM BROMIDE AND ALBUTEROL SULFATE 3 ML: .5; 3 SOLUTION RESPIRATORY (INHALATION) at 12:04

## 2025-04-04 RX ADMIN — SODIUM CHLORIDE: 4.5 INJECTION, SOLUTION INTRAVENOUS at 01:04

## 2025-04-04 RX ADMIN — METOCLOPRAMIDE 5 MG: 5 TABLET ORAL at 08:04

## 2025-04-04 RX ADMIN — MONTELUKAST 10 MG: 10 TABLET, FILM COATED ORAL at 08:04

## 2025-04-04 RX ADMIN — METOPROLOL SUCCINATE 25 MG: 25 TABLET, EXTENDED RELEASE ORAL at 08:04

## 2025-04-04 RX ADMIN — MUPIROCIN: 20 OINTMENT TOPICAL at 09:04

## 2025-04-04 RX ADMIN — CEFTRIAXONE SODIUM 1 G: 1 INJECTION, POWDER, FOR SOLUTION INTRAMUSCULAR; INTRAVENOUS at 05:04

## 2025-04-04 RX ADMIN — ACETAMINOPHEN 1000 MG: 500 TABLET ORAL at 05:04

## 2025-04-04 RX ADMIN — ATORVASTATIN CALCIUM 20 MG: 20 TABLET, FILM COATED ORAL at 08:04

## 2025-04-04 RX ADMIN — APIXABAN 5 MG: 5 TABLET, FILM COATED ORAL at 08:04

## 2025-04-04 RX ADMIN — SODIUM CHLORIDE: 4.5 INJECTION, SOLUTION INTRAVENOUS at 11:04

## 2025-04-04 RX ADMIN — IPRATROPIUM BROMIDE AND ALBUTEROL SULFATE 3 ML: .5; 3 SOLUTION RESPIRATORY (INHALATION) at 07:04

## 2025-04-04 RX ADMIN — HYDROCODONE BITARTRATE AND ACETAMINOPHEN 1 TABLET: 7.5; 325 TABLET ORAL at 08:04

## 2025-04-04 RX ADMIN — LOSARTAN POTASSIUM 25 MG: 25 TABLET, FILM COATED ORAL at 08:04

## 2025-04-04 RX ADMIN — INSULIN ASPART 4 UNITS: 100 INJECTION, SOLUTION INTRAVENOUS; SUBCUTANEOUS at 11:04

## 2025-04-04 NOTE — PT/OT/SLP EVAL
Physical Therapy Evaluation     Patient Name: Magan Saleem   MRN: [id number]  Recent Surgery: * No surgery found *      Recommendations:     Discharge Recommendations: Moderate Intensity Therapy   Discharge Equipment Recommendations: to be determined by next level of care   Barriers to discharge: Increased level of assist, Inaccessible home, and Ongoing medical treatment    Assessment:     Magan Saleem is a 69 y.o. female admitted with a medical diagnosis of Dehydration with hypernatremia. She presents with the following impairments/functional limitations: weakness, impaired endurance, impaired self care skills, impaired functional mobility, decreased lower extremity function, impaired cardiopulmonary response to activity. Pt was recently at [facility] due to weakness and falls. Per pt, she was ambulatory 3 weeks ago but has seen significant decline in function. She has history of metastatic lung cancer and is very short of breath with minimal exertion. She needed maximum assistance for all mobility and was unable to attempt standing. PT to follow to address mobility as able[facility]    Rehab Prognosis: Fair; patient would benefit from acute PT services to address these deficits and reach maximum level of function.    Plan:     During this hospitalization, patient to be seen 5 x/week to address the above listed problems via gait training, therapeutic activities, therapeutic exercises    Plan of Care Expires: 05/04/25    Subjective     Chief Complaint: shortness of breath   Patient Comments/Goals: Pt is agreeable to PT   Pain/Comfort:  Pain Rating 1: 0/10  Pain Rating Post-Intervention 1: 0/10    Social History:  Living Environment: Patient lives with their daughter in a single story home with number of outside stair(s): 0, but was at swing[facility] prior to this admission  Prior Level of Function: Prior to admission, patient was modified independent with ADLs using rollator for mobility  Equipment Used  at Home: rollator, shower chair  DME owned (not currently used): none  Assistance Upon Discharge: family    Objective:     Communicated with KEYONA Redmond RN prior to session. Patient found HOB elevated with peripheral IV, oxygen, SCD upon PT entry to room.    General Precautions: Standard, fall   Orthopedic Precautions: N/A   Braces: N/A    Respiratory Status: Nasal cannula, flow 3 L/min    Exams:  Cognition: Patient is oriented to Person, Place, Time, Situation  RLE ROM: WFL  RLE Strength:  2/5  LLE ROM: WFL  LLE Strength:  2/5  Gross Motor Coordination: WFL  Skin Integrity/Edema:     -       weeping from right side of belly  -       Skin integrity: Bruising of RLE    Functional Mobility:  Gait belt applied - N/A  Bed Mobility  Rolling Left: maximal assistance and of 2 persons  Rolling Right: maximal assistance and of 2 persons  Scooting: maximal assistance and of 2 persons  Supine to Sit: maximal assistance and of 2 persons for LE management and trunk management  Sit to Supine: maximal assistance and of 2 persons for LE management and trunk management  Transfers  unable  Gait  Unable  Balance  Sitting: moderate assistance  Standing:  n/a      Therapeutic Activities and Exercises:   Patient educated on role of acute care PT and PT POC, safety while in hospital including calling nurse for mobility, and call light usage      AM-PAC 6 CLICK MOBILITY  Total Score:8    Patient left HOB elevated with all lines intact and call button in reach.    GOALS:   Multidisciplinary Problems       Physical Therapy Goals          Problem: Physical Therapy    Goal Priority Disciplines Outcome Interventions   Physical Therapy Goal     PT, PT/OT Progressing    Description: Short term goals:  1. Supine to sit with MInimal Assistance  2. Sit to stand transfer with Moderate Assistance  3. Bed to chair transfer with Moderate Assistance using Rolling Walker  4. Sitting at edge of bed x15 minutes with Contact Guard Assistance    Long term  goals:  1. Supine to sit with Contact Guard Assistance  2. Sit to stand transfer with Contact Guard Assistance  3. Bed to chair transfer with Contact Guard Assistance using Rolling Walker  4. Gait  x 100 feet with Contact Guard Assistance using Rolling Walker.                          DME Justifications:  No DME recommended requiring DME justifications    History:     Past Medical History:   Diagnosis Date    Chronic kidney disease, stage 3b     Chronic pulmonary embolism without acute cor pulmonale     Diabetes mellitus type 2 in obese     DNR (do not resuscitate)     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 Swanson    Melanocytic nevi of lower limb, including hip, left     Mixed hyperlipidemia     Moderate persistent asthma without complication 10/30/2024    Other pulmonary embolism without acute cor pulmonale     Vitamin D deficiency        Past Surgical History:   Procedure Laterality Date    CHOLECYSTECTOMY      CSF SHUNT      HYSTERECTOMY      TONSILLECTOMY         Time Tracking:     PT Received On: 04/04/25  PT Start Time: 0930  PT Stop Time: 0956  PT Total Time (min): 26 min     Billable Minutes: Evaluation high complexity    4/4/2025

## 2025-04-04 NOTE — ASSESSMENT & PLAN NOTE
Last A1c reviewed-   Lab Results   Component Value Date    HGBA1C 6.7 04/02/2025     Basal/bolus insulin regimen while in hospital.  Will need prandial insulin added to correctional insulin once no longer NPO or tolerating diet for tight glycemic control while in hospital.    Hold Oral hypoglycemics while patient is in the hospital.    Should improve with volume resuscitation and off decadron.

## 2025-04-04 NOTE — HOSPITAL COURSE
69 year old female presents with hypernatremia; she is not too talkative during rounds    4/5- patient seen examined today resting comfortably in bed and in no acute distress, with no acute events overnight.  Patient has a multitude of issues complicating her medical picture.  White blood cell count 51287 today, sodium 159, potassium 3.2 and BUN creatinine 59 and 1.8.  The patient is still not eating even when assistance is provided.  We have already changed her fluids to half-normal saline with 20 of KCl at 1:25 a.m..  Have also put in a GI consult for possible feeding tube.  E coli in the urine has turned out to be ESBL and Rocephin has been changed Merrem.    4/6- the patient seen examined today resting comfortably in bed, in no acute distress, in no acute events overnight.  The patient is not very talkative today, but states she is doing okay.  She has no acute complaints.  Blood cultures remain negative.  The patient has not eaten since yesterday and is on light IV fluids.  GI has been consulted for feeding tube.  Continues on Merrem for positive urine culture.    04/07 Records reviewed. Not eating which not new, Similar during recent admit at [facility]. Dr Swanson there had question pancreatitis. No abd pain or tenderness reported now. Question of dysphagia to solids. Chart hx gastroparesis. Will discuss with GI. Ronnie says has responded so far well to treatment for lung CA.   04/08 had EGD at [facility] 03/21/25 with no obstruction and evidence gastroparesis. Still not ending. Try additional meds. Talked with GI. Increase activity  04/09 Some better with med  changes  04/10 Continues to do better. Ate 1/2 fish sandwich for lunch. Called by Dr Swanson and she wanting to keep feeding tube in consideration. Talked with Dr Reich and Dr Lemus. If something needed with gastroparesis would have to have post gastric position of tube.   04/11 eating some. Later staff requested some nystatin for sore mouth.   04/12 still  not eating well.  Increased peripheral edema.  Albumin low at 1.5.  Will give some albumin and follow with some Lasix.  Placed Dobbhoff tube and start enteral feedings.  This will help with nutrition and if issue of placing surgical tube later make sure will tolerate enteral feedings.  Chest x-ray continues worse on left side.  Discuss with Pulmonary and ask Dr. Villasenor to see.   04/13 no new issues.  Enteral feedings to be started.  Pulmonary evaluation appreciated and plans noted.  04/14 Tolerating feedings Therapy working with her. Bronchoscopy planned.   4/15 BAL today  4/16 bronch yesterday looks like pneumonitis  4/17 not candidate for PEG    04/18 Eating some now. Pt says feels some better than last seen. Look at placement. High dose steroids by pulmonary. BS not as bad as would expect.  04/19 states continued eat some.  Less nausea.  Blood sugar not sure bad considering the steroids.  Pulmonary adjusted antibiotics based on cultures.  Look at it placement next week.   04/20 Looks the small. C/O SOB to nursing staff earlier but ABG OK. Poor oral intake ; encourage for pt to do more. Looking into placement.  04/21 Firm tender area in abd wall above umbilicus; ?hernia. Abnormal CT de santiago noted and will discuss with surgery.   04/22 CT shows evolving pancreatitis which pt treated for acouple weeks earlier at [facility]. Reviewed with Dr Tapia. No plan to operate on ventral hernia. Discussed later with Dr York. See how does off IVF and encourage oral intake. WBC and Cr both slight better.   04/23 Looks this same. Pt eating some. Pt getting discouraged and asking about home hospice. Talked by phone with her friend Shauna. In conversion doesn't sound like family would be able to care for pt at home. Encouraged pt to give some time and attempt SWB. She says she with consider tonight.   04/24 Lab worse but pt looks some better. Still not taking in well. Maybe eat better as pancreatitis further resolves. Considering replacing  dobbhoff feeding tube and look into move to [facility]. Ask Dr Frias to review renal situation. Maybe pancreatitis,pneumonia,renal failure, all these might make a big difference if resolved. Talked with pt and she was OK with NGT  04/25 patient's spirits seems to be doing some better.  Dobbhoff tube placed in feedings to be started.  To be moved to LTAC.  No other new issue  04/26 Awaiting bed assignment at [facility]. No new complains.

## 2025-04-04 NOTE — PLAN OF CARE
Problem: Occupational Therapy  Goal: Occupational Therapy Goal  Description: STG:  Pt will perform grooming with setup  Pt will bathe with Min A  Pt will perform UE dressing with Min A  Pt will perform LE dressing with Mod A  Pt will sit EOB x 10 min with CG assistance  Pt will transfer bed/chair/bsc with Mod A  Pt will perform standing task x 3 min with CG assistance  Pt will tolerate 30 minutes of tx without fatigue      LT.Restore to max I with self care and mobility.    Outcome: Progressing

## 2025-04-04 NOTE — ASSESSMENT & PLAN NOTE
Volume resuscitate with LR and then with 125 cc hr 1/2 ns  Should improve off HCT and lasix.    Follow BMP and renal function daily.  Prerenal from dehydration  Will have to figure out a long term solution if patient unable to eat and drink due to gastroparesis but stopping the ozempic will help.    Recently treated for UTI.  Will stop the jardiance as well and this should help with polyuria as well.    Patient came from [place] with dominguez and will remove and monitor for any urinary retention.    Avoid adult diapers as much as possible, using pink pads at night.      Patient with condition that if not treated could result in loss of life or bodily function.  Due to co morbidities this patient has complex medical management.  Spent over 45 minutes with patient and caregiver and in forming plan of care.  DNR discussion noted above.

## 2025-04-04 NOTE — ASSESSMENT & PLAN NOTE
Appears to have UTI and pneumonia; has leukocytosis; has opacities on CXR; has G- bacilli on urine culture; blood cultures pending; will place on Vancomycin and Rocephin; BP on the low side; on IVF

## 2025-04-04 NOTE — ASSESSMENT & PLAN NOTE
Patient well controlled.  No S/H plans or ideation.  No hallucinations at present.    Will not consult psychiatry.  Will continue his OP psychotropic medication regimen.  She is on zoloft and will continue with slightly increased dose.

## 2025-04-04 NOTE — SUBJECTIVE & OBJECTIVE
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 Swanson    Melanocytic nevi of lower limb, including hip, left     Mixed hyperlipidemia     Moderate persistent asthma without complication 10/30/2024    Other pulmonary embolism without acute cor pulmonale     Vitamin D deficiency        Past Surgical History:   Procedure Laterality Date    CHOLECYSTECTOMY      CSF SHUNT      HYSTERECTOMY      TONSILLECTOMY         Review of patient's allergies indicates:   Allergen Reactions    Penicillins Hives    Sulfa (sulfonamide antibiotics) Hives       Current Facility-Administered Medications on File Prior to Encounter   Medication    [COMPLETED] furosemide injection 80 mg    [COMPLETED] meropenem injection 1 g    [DISCONTINUED] acetaminophen tablet    [DISCONTINUED] albuterol-ipratropium 2.5 mg-0.5 mg/3 mL nebulizer solution    [DISCONTINUED] ascorbic acid (vitamin C) tablet    [DISCONTINUED] busPIRone tablet    [DISCONTINUED] D5W infusion    [DISCONTINUED] dextrose 50% (D50W) solution    [DISCONTINUED] furosemide injection    [DISCONTINUED] GENERIC EXTERNAL MEDICATION    [DISCONTINUED] GENERIC EXTERNAL MEDICATION    [DISCONTINUED] GENERIC EXTERNAL MEDICATION    [DISCONTINUED] HYDROcodone-acetaminophen 5-325 mg per tablet    [DISCONTINUED] hydrocortisone sod succ (PF) injection    [DISCONTINUED] HYDROmorphone (PF) injection Syrg    [DISCONTINUED] lactated ringers bolus 30 mL/kg    [DISCONTINUED] metoclopramide HCl tablet     [DISCONTINUED] metoprolol succinate (TOPROL-XL) 24 hr tablet    [DISCONTINUED] montelukast tablet    [DISCONTINUED] ondansetron injection    [DISCONTINUED] pantoprazole EC tablet    [DISCONTINUED] polyethylene glycol packet    [DISCONTINUED] potassium chloride SA CR tablet    [DISCONTINUED] vancomycin (VANCOCIN) 1,250 mg in 0.9% NaCl 250 mL IVPB    [DISCONTINUED] vitamin D 1000 units tablet     Current Outpatient Medications on File Prior to Encounter   Medication Sig    albuterol (VENTOLIN HFA) 90 mcg/actuation inhaler Inhale 2 puffs into the lungs every 6 (six) hours as needed for Wheezing. Rescue    albuterol-ipratropium (DUO-NEB) 2.5 mg-0.5 mg/3 mL nebulizer solution Take 3 mLs by nebulization every 4 (four) hours as needed for Wheezing or Shortness of Breath. Rescue    ALLERGY RELIEF, CETIRIZINE, 10 mg tablet TAKE ONE TABLET BY MOUTH ONCE DAILY    ascorbic acid, vitamin C, (VITAMIN C) 500 MG tablet Twice Daily    aspirin 81 MG Chew Take 1 tablet (81 mg total) by mouth once daily. (Patient not taking: Reported on 10/30/2024)    atorvastatin (LIPITOR) 10 MG tablet TAKE ONE TABLET BY MOUTH ONCE DAILY    benzonatate (TESSALON) 100 MG capsule Take 100 mg by mouth 3 (three) times daily as needed for Cough. (Patient not taking: Reported on 10/30/2024)    blood-glucose meter,continuous (FREESTYLE OLIVA 3 READER) [facility] [place] as directed    blood-glucose sensor (FREESTYLE OLIVA 3 SENSOR) [place] use as directed    cholecalciferol, vitamin D3, (VITAMIN D3) 50 mcg (2,000 unit) Tab Take 1 tablet (2,000 Units total) by mouth once daily.    dexAMETHasone (DECADRON) 4 MG Tab Take 4 mg by mouth.    doxycycline (DORYX) 100 MG EC tablet Take 100 mg by mouth 2 (two) times daily.    ELIQUIS 5 mg Tab TAKE ONE TABLET BY MOUTH TWICE DAILY    empagliflozin (JARDIANCE) 10 mg tablet Take 10 mg by mouth.    fluticasone-umeclidin-vilanter (TRELEGY ELLIPTA) 100-62.5-25 mcg DsDv Inhale 1 puff into the lungs once daily.    furosemide (LASIX) 40 MG  tablet Take by mouth.    glipiZIDE 5 MG TR24 Take 5 mg by mouth every morning. (Patient not taking: Reported on 10/30/2024)    insulin aspart U-100 (NOVOLOG FLEXPEN U-100 INSULIN) 100 unit/mL (3 mL) InPn pen Per sliding scale max daily dose 30 units; subcutaneously; three times a day with meals    losartan-hydrochlorothiazide 50-12.5 mg (HYZAAR) 50-12.5 mg per tablet Take 1 tablet by mouth.    metoclopramide HCl (REGLAN) 5 MG tablet Take 5 mg by mouth 4 (four) times daily.    metoprolol succinate (TOPROL-XL) 50 MG 24 hr tablet Take 50 mg by mouth once daily.    metoprolol tartrate (LOPRESSOR) 25 MG tablet TAKE ONE TABLET BY MOUTH TWICE DAILY    montelukast (SINGULAIR) 10 mg tablet TAKE ONE TABLET BY MOUTH EVERY EVENING    ondansetron (ZOFRAN) 4 MG tablet Take 4 mg by mouth every 8 (eight) hours as needed. AS NEEDED FOR NAUSEA    osimertinib (TAGRISSO) 80 mg Tab Take 40 mg by mouth once daily.    OZEMPIC 0.25 mg or 0.5 mg (2 mg/3 mL) pen injector Inject 0.25 mg into the skin.    pantoprazole (PROTONIX) 40 MG tablet Take 40 mg by mouth once daily.    potassium chloride (KLOR-CON) 10 MEQ TbSR TAKE ONE TABLET BY MOUTH ONCE DAILY    predniSONE (DELTASONE) 20 MG tablet Take 20 mg by mouth. for five days    rOPINIRole (REQUIP) 0.25 MG tablet Take 0.25 mg by mouth 3 (three) times daily.    sertraline (ZOLOFT) 25 MG tablet Take 1 tablet (25 mg total) by mouth once daily.    TRUE METRIX GLUCOSE METER Misc check blood sugar TWICE DAILY AND record    TRUE METRIX GLUCOSE TEST STRIP Strp check blood sugar TWICE DAILY AND record     Family History       Problem Relation (Age of Onset)    Diabetes Mother    Hypertension Mother    Lung cancer Father          Tobacco Use    Smoking status: Never    Smokeless tobacco: Never   Substance and Sexual Activity    Alcohol use: Never    Drug use: Never    Sexual activity: Not Currently     Review of Systems   Constitutional:  Positive for appetite change and fatigue. Negative for chills,  fever and unexpected weight change.   HENT:  Negative for congestion, mouth sores, nosebleeds, sinus pain, sore throat and trouble swallowing.    Eyes:  Negative for visual disturbance.   Respiratory:  Negative for apnea, cough, chest tightness and shortness of breath.    Cardiovascular:  Negative for chest pain, palpitations and leg swelling.   Gastrointestinal:  Positive for constipation. Negative for abdominal pain, blood in stool, diarrhea, nausea and vomiting.   Endocrine: Positive for polyuria. Negative for polydipsia.   Genitourinary:  Negative for decreased urine volume, difficulty urinating, dysuria and frequency.   Musculoskeletal:  Negative for arthralgias, back pain and neck pain.   Skin:  Negative for rash.   Neurological:  Negative for syncope, light-headedness and headaches.   Hematological:  Does not bruise/bleed easily.   Psychiatric/Behavioral:  Negative for confusion and suicidal ideas.      Objective:     Vital Signs (Most Recent):  Temp: 97.4 °F (36.3 °C) (04/04/25 0020)  Pulse: 105 (04/04/25 0029)  Resp: 19 (04/04/25 0029)  BP: (!) 104/53 (04/04/25 0022)  SpO2: (!) 94 % (04/04/25 0029) Vital Signs (24h Range):  Temp:  [97.4 °F (36.3 °C)-98.1 °F (36.7 °C)] 97.4 °F (36.3 °C)  Pulse:  [] 105  Resp:  [14-31] 19  SpO2:  [89 %-98 %] 94 %  BP: ()/() 104/53     Weight: (!) 136.1 kg (300 lb)  Body mass index is 49.92 kg/m².     Physical Exam  Vitals and nursing note reviewed. Exam conducted with a chaperone present.   Constitutional:       Appearance: She is obese. She is ill-appearing.   HENT:      Head: Atraumatic.      Mouth/Throat:      Mouth: Mucous membranes are dry.      Pharynx: Oropharynx is clear.   Eyes:      Conjunctiva/sclera: Conjunctivae normal.      Pupils: Pupils are equal, round, and reactive to light.   Neck:      Vascular: No carotid bruit.   Cardiovascular:      Rate and Rhythm: Regular rhythm. Tachycardia present.      Pulses: Normal pulses.      Heart sounds:  Normal heart sounds.   Pulmonary:      Effort: Pulmonary effort is normal.      Breath sounds: Normal breath sounds.   Abdominal:      General: Bowel sounds are normal. There is no distension.      Palpations: Abdomen is soft.      Tenderness: There is no abdominal tenderness. There is no guarding.   Musculoskeletal:         General: No deformity or signs of injury. Normal range of motion.      Cervical back: Neck supple.      Right lower leg: No edema.      Left lower leg: No edema.   Skin:     General: Skin is warm and dry.      Capillary Refill: Capillary refill takes less than 2 seconds.      Coloration: Skin is not jaundiced or pale.      Findings: No bruising, lesion or rash.   Neurological:      General: No focal deficit present.      Mental Status: She is alert and oriented to person, place, and time.   Psychiatric:         Mood and Affect: Mood is depressed.         Behavior: Behavior is withdrawn.              CRANIAL NERVES     CN III, IV, VI   Pupils are equal, round, and reactive to light.       Significant Labs: All pertinent labs within the past 24 hours have been reviewed.    Significant Imaging: I have reviewed all pertinent imaging results/findings within the past 24 hours.

## 2025-04-04 NOTE — PROGRESS NOTES
[facility] - Orthopedic  Wound [facility]    Patient Name:  Magan Saleem   MRN:  [id number]  Date: 4/4/2025  Diagnosis: Sepsis due to pneumonia    History:     Past Medical History:   Diagnosis Date    Chronic kidney disease, stage 3b     Chronic pulmonary embolism without acute cor pulmonale     Diabetes mellitus type 2 in obese     DNR (do not resuscitate)     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 Swanson    Melanocytic nevi of lower limb, including hip, left     Mixed hyperlipidemia     Moderate persistent asthma without complication 10/30/2024    Other pulmonary embolism without acute cor pulmonale     Vitamin D deficiency        Social History[1]    Precautions:     Allergies as of 04/03/2025 - Reviewed 04/03/2025   Allergen Reaction Noted    Penicillins Hives 06/29/2021    Sulfa (sulfonamide antibiotics) Hives 06/29/2021       WOC Assessment Details/Treatment     Narrative: Seen patient for initial preventative skin care measures.  Patient ambulates with cane.  Foam borders noted to bilateral heels and sacral.  No redness or open wounds noted.  Consult wounds care of any findings.    04/04/2025        [1]   Social History  Socioeconomic History    Marital status:    Occupational History    Occupation: Retired   Tobacco Use    Smoking status: Never    Smokeless tobacco: Never   Substance and Sexual Activity    Alcohol use: Never    Drug use: Never    Sexual activity: Not Currently     Social Drivers of Health     Financial Resource Strain: Low Risk  (4/4/2025)    Overall Financial Resource Strain (CARDIA)     Difficulty of Paying Living Expenses: Not very hard   Food Insecurity: No Food  Insecurity (4/4/2025)    Hunger Vital Sign     Worried About Running Out of Food in the Last Year: Never true     Ran Out of Food in the Last Year: Never true   Transportation Needs: No Transportation Needs (4/4/2025)    PRAPARE - Transportation     Lack of Transportation (Medical): No     Lack of Transportation (Non-Medical): No   Physical Activity: Inactive (4/4/2025)    Exercise Vital Sign     Days of Exercise per Week: 0 days     Minutes of Exercise per Session: 0 min   Stress: No Stress Concern Present (4/4/2025)    [employer] - Occupational Stress Questionnaire     Feeling of Stress : Not at all   Housing Stability: Low Risk  (4/4/2025)    Housing Stability Vital Sign     Unable to Pay for Housing in the Last Year: No     Homeless in the Last Year: No

## 2025-04-04 NOTE — ASSESSMENT & PLAN NOTE
Body mass index is 49.92 kg/m². Morbid obesity complicates all aspects of disease management from diagnostic modalities to treatment. Weight loss encouraged and health benefits explained to patient.     Would benefit from sleep study and possible CPAP.  Does not wear oxygen at home.

## 2025-04-04 NOTE — PROGRESS NOTES
Pharmacokinetic Initial Assessment: IV Vancomycin    Assessment/Plan:    Initiate intravenous vancomycin 2500 mg every 24 hours   Desired empiric serum trough concentration is 15 to 20 mcg/mL  Draw vancomycin trough level 30 min prior to fourth dose on 4/7 at approximately 1430  Pharmacy will continue to follow and monitor vancomycin.      Please contact pharmacy at extension [phone] with any questions regarding this assessment.     Thank you for the consult,   Vicky Benoit       Patient brief summary:  Magan Saleem is a 69 y.o. female initiated on antimicrobial therapy with IV Vancomycin for treatment of suspected sepsis    Drug Allergies:   Review of patient's allergies indicates:   Allergen Reactions    Penicillins Hives    Sulfa (sulfonamide antibiotics) Hives       Actual Body Weight:   136 kg     Renal Function:   Estimated Creatinine Clearance: 43.8 mL/min (A) (based on SCr of 1.72 mg/dL (H)).,     Dialysis Method (if applicable):  N/A    CBC (last 72 hours):  Recent Labs   Lab Result Units 04/02/25  0200 04/03/25  1236 04/04/25  0426   WBC K/uL 27.08* 29.43* 27.20*   Hemoglobin g/dL 11.4* 11.8* 10.5*   Hemoglobin A1C % 6.7  --   --    Hematocrit % 34.0* 34.6* 33.9*   Platelet Count K/uL 65* 88* 45*   Lymphocytes % % 2.8* 2.5* 2.7*   Lymphocytes, Man % % 3* 6* 3*   Monocytes % % 3.6 3.5 2.5   Monocytes, Man % % 2 3 4   Eosinophils % % 0.1* 0.0* 0.1*   Basophils % % 0.0 0.1 0.3   Diff Type  Manual Manual Scan Smear       Metabolic Panel (last 72 hours):  Recent Labs   Lab Result Units 04/02/25  0200 04/03/25  1236 04/03/25  1317 04/04/25  0426   Sodium mmol/L 155* 157*  --  157*   Potassium mmol/L 3.9 3.8  --  3.8   Chloride mmol/L 118* 121*  --  126*   CO2 mmol/L 24 18*  --  16*   Glucose mg/dL 194* 245*  --  218*   Glucose, UA mg/dL  --   --  >=1000*  --    BUN mg/dL 60* 63*  --  64*   Creatinine mg/dL 2.02* 1.83*  --  1.72*   Albumin g/dL 2.3* 2.2*  --  2.0*   Bilirubin, Total mg/dL 0.7 0.5  --  0.5   Alk  "Phos U/L 107 123  --  126   AST U/L 38 34  --  34   ALT U/L 75* 65*  --  53   Magnesium mg/dL  --  2.3  --  2.0       Drug levels (last 3 results):  No results for input(s): "VANCOMYCINRA", "VANCORANDOM", "VANCOMYCINPE", "VANCOPEAK", "VANCOMYCINTR", "VANCOTROUGH" in the last 72 hours.    Microbiologic Results:  Microbiology Results (last 7 days)       ** No results found for the last 168 hours. **            "

## 2025-04-04 NOTE — H&P
[facility]  [facility] Medicine  History & Physical    Patient Name: Magan Saleem  MRN: [id number]  Patient Class: IP- Inpatient  Admission Date: 4/3/2025  Attending Physician: Lesley Shanks DO   Primary Care Provider: Sil Brown DO         Patient information was obtained from patient, caregiver / friend, past medical records, and ER records.     Subjective:     Principal Problem:Dehydration with hypernatremia    Chief Complaint: No chief complaint on file.       HPI: 70 yo F presents to [facility] ED from [facility] for abnormal labs.  Patient was discharged from [facility] two days ago to skilled nursing facility for rehab.  She was diagnosed with dehydration due to gastroparesis with UTI.  Patient has metastatic lung cancer (to the brain) and follows with Dr. Swanson for IV chemotherapy every other week and takes lazertinib daily.  She has completed her course of radiation for the brain mets and follow had showed some improvement.  I thought she had either some decreased LOC due to encephalopathy or some aphasia from the brain mets but after a great effort to get her to talk, I realized she was just mad.  She wanted to know why she had been discharged two days ago when she could not eat.  She has no appetite and early satiety.  She is not nauseated and no vomiting.  She has decided on a DNR status previously (her caretaker and friend of 20 years) states that she had always said she did not want resuscitation if she experienced cardiopulmonary arrest and had told her children her wishes but she does want treatment and is not opposed to J tube if needed.  She has not had anything to eat or drink in two days and is dehydrated again.      Patient was transferred because of concern of sepsis.  She did have enterococcus faecalis UTI at [facility] and was treated.  I do not have the culture results but cultures were sent and patient does have some yeast noted.  (Will not treat a  yeast colonization).  She is afebrile and hemodynamically stable and does not look septic clinically.  Her Lactic acid is stable at 2.5 dara 3.2.  Will check another in am after volume resuscitation.  Her creat is at baseline but she has prerenal azotemia further confirming the dehydration.  Patient has an IO in place from [facility] due to dehydration and difficult stick but with some volume resuscitation, we have gotten an IV.  She is covid and flu negative.      Her WBC is 29 and I am not sure if she has received neupogen but could be a stress reaction.  Her BS is 245 and she does have some urine ketones but most likely due to starvation ketosis.  Will check a serum ketone.  Her platelets are 88K but this is most likely from her chemo.  She is not anemic.  CXR shows some patchy infiltrate but no obvious obstructive pneumonia from her lung cancer.  Her BNP about 3K but no clinical signs of CHF.  No recent echo but due to her BMI 50 most likely has some PHTN.  EKG had no ischemic changes but tachy at 114 which could also be from dehydration.  She was on ozempic for her DM and this could be the cause of her decreased appetite.  She is also on decadron for prevention of cerebral edema.      Remainder of ROS as below.  She mostly just nods and shakes her head and rolls her eyes.  You can get her to laugh if you try but she has been depressed since she has not walked since her hospitalization at [facility] on 3/19/25 and was discharged two days ago.  She says that at her last chemo she noticed she was getting weaker and her daughter had to help her in and out of the car and that was new for her.      See assessment and plan below for problem based evaluation       Past Medical History:   Diagnosis Date    Chronic kidney disease, stage 3b     Chronic pulmonary embolism without acute cor pulmonale     Diabetes mellitus type 2 in obese     DNR (do not resuscitate)     caretaker of 20 years present and says this was always her wish and  had stated she did not wish to be resuscitated if she had cardiac arrest    Erosive gastritis     Essential hypertension 06/30/2021    Gastroparesis     Generalized anxiety disorder 09/29/2021    Lung cancer metastatic to brain     Malignant neoplasm of right lung 02/15/2023    Dr. Swanson    Melanocytic nevi of lower limb, including hip, left     Mixed hyperlipidemia     Moderate persistent asthma without complication 10/30/2024    Other pulmonary embolism without acute cor pulmonale     Vitamin D deficiency        Past Surgical History:   Procedure Laterality Date    CHOLECYSTECTOMY      CSF SHUNT      HYSTERECTOMY      TONSILLECTOMY         Review of patient's allergies indicates:   Allergen Reactions    Penicillins Hives    Sulfa (sulfonamide antibiotics) Hives       Current Facility-Administered Medications on File Prior to Encounter   Medication    [COMPLETED] furosemide injection 80 mg    [COMPLETED] meropenem injection 1 g    [DISCONTINUED] acetaminophen tablet    [DISCONTINUED] albuterol-ipratropium 2.5 mg-0.5 mg/3 mL nebulizer solution    [DISCONTINUED] ascorbic acid (vitamin C) tablet    [DISCONTINUED] busPIRone tablet    [DISCONTINUED] D5W infusion    [DISCONTINUED] dextrose 50% (D50W) solution    [DISCONTINUED] furosemide injection    [DISCONTINUED] GENERIC EXTERNAL MEDICATION    [DISCONTINUED] GENERIC EXTERNAL MEDICATION    [DISCONTINUED] GENERIC EXTERNAL MEDICATION    [DISCONTINUED] HYDROcodone-acetaminophen 5-325 mg per tablet    [DISCONTINUED] hydrocortisone sod succ (PF) injection    [DISCONTINUED] HYDROmorphone (PF) injection Syrg    [DISCONTINUED] lactated ringers bolus 30 mL/kg    [DISCONTINUED] metoclopramide HCl tablet    [DISCONTINUED] metoprolol succinate (TOPROL-XL) 24 hr tablet    [DISCONTINUED] montelukast tablet    [DISCONTINUED] ondansetron injection    [DISCONTINUED] pantoprazole EC tablet    [DISCONTINUED] polyethylene glycol packet    [DISCONTINUED] potassium chloride SA CR tablet     [DISCONTINUED] vancomycin (VANCOCIN) 1,250 mg in 0.9% NaCl 250 mL IVPB    [DISCONTINUED] vitamin D 1000 units tablet     Current Outpatient Medications on File Prior to Encounter   Medication Sig    albuterol (VENTOLIN HFA) 90 mcg/actuation inhaler Inhale 2 puffs into the lungs every 6 (six) hours as needed for Wheezing. Rescue    albuterol-ipratropium (DUO-NEB) 2.5 mg-0.5 mg/3 mL nebulizer solution Take 3 mLs by nebulization every 4 (four) hours as needed for Wheezing or Shortness of Breath. Rescue    ALLERGY RELIEF, CETIRIZINE, 10 mg tablet TAKE ONE TABLET BY MOUTH ONCE DAILY    ascorbic acid, vitamin C, (VITAMIN C) 500 MG tablet Twice Daily    aspirin 81 MG Chew Take 1 tablet (81 mg total) by mouth once daily. (Patient not taking: Reported on 10/30/2024)    atorvastatin (LIPITOR) 10 MG tablet TAKE ONE TABLET BY MOUTH ONCE DAILY    benzonatate (TESSALON) 100 MG capsule Take 100 mg by mouth 3 (three) times daily as needed for Cough. (Patient not taking: Reported on 10/30/2024)    blood-glucose meter,continuous (FREESTYLE OLIVA 3 READER) [facility] use as directed    blood-glucose sensor (FREESTYLE OLIVA 3 SENSOR) [place] use as directed    cholecalciferol, vitamin D3, (VITAMIN D3) 50 mcg (2,000 unit) Tab Take 1 tablet (2,000 Units total) by mouth once daily.    dexAMETHasone (DECADRON) 4 MG Tab Take 4 mg by mouth.    doxycycline (DORYX) 100 MG EC tablet Take 100 mg by mouth 2 (two) times daily.    ELIQUIS 5 mg Tab TAKE ONE TABLET BY MOUTH TWICE DAILY    empagliflozin (JARDIANCE) 10 mg tablet Take 10 mg by mouth.    fluticasone-umeclidin-vilanter (TRELEGY ELLIPTA) 100-62.5-25 mcg DsDv Inhale 1 puff into the lungs once daily.    furosemide (LASIX) 40 MG tablet Take by mouth.    glipiZIDE 5 MG TR24 Take 5 mg by mouth every morning. (Patient not taking: Reported on 10/30/2024)    insulin aspart U-100 (NOVOLOG FLEXPEN U-100 INSULIN) 100 unit/mL (3 mL) InPn pen Per sliding scale max daily dose 30 units; subcutaneously; three  times a day with meals    losartan-hydrochlorothiazide 50-12.5 mg (HYZAAR) 50-12.5 mg per tablet Take 1 tablet by mouth.    metoclopramide HCl (REGLAN) 5 MG tablet Take 5 mg by mouth 4 (four) times daily.    metoprolol succinate (TOPROL-XL) 50 MG 24 hr tablet Take 50 mg by mouth once daily.    metoprolol tartrate (LOPRESSOR) 25 MG tablet TAKE ONE TABLET BY MOUTH TWICE DAILY    montelukast (SINGULAIR) 10 mg tablet TAKE ONE TABLET BY MOUTH EVERY EVENING    ondansetron (ZOFRAN) 4 MG tablet Take 4 mg by mouth every 8 (eight) hours as needed. AS NEEDED FOR NAUSEA    osimertinib (TAGRISSO) 80 mg Tab Take 40 mg by mouth once daily.    OZEMPIC 0.25 mg or 0.5 mg (2 mg/3 mL) pen injector Inject 0.25 mg into the skin.    pantoprazole (PROTONIX) 40 MG tablet Take 40 mg by mouth once daily.    potassium chloride (KLOR-CON) 10 MEQ TbSR TAKE ONE TABLET BY MOUTH ONCE DAILY    predniSONE (DELTASONE) 20 MG tablet Take 20 mg by mouth. for five days    rOPINIRole (REQUIP) 0.25 MG tablet Take 0.25 mg by mouth 3 (three) times daily.    sertraline (ZOLOFT) 25 MG tablet Take 1 tablet (25 mg total) by mouth once daily.    TRUE METRIX GLUCOSE METER Misc check blood sugar TWICE DAILY AND record    TRUE METRIX GLUCOSE TEST STRIP Strp check blood sugar TWICE DAILY AND record     Family History       Problem Relation (Age of Onset)    Diabetes Mother    Hypertension Mother    Lung cancer Father          Tobacco Use    Smoking status: Never    Smokeless tobacco: Never   Substance and Sexual Activity    Alcohol use: Never    Drug use: Never    Sexual activity: Not Currently     Review of Systems   Constitutional:  Positive for appetite change and fatigue. Negative for chills, fever and unexpected weight change.   HENT:  Negative for congestion, mouth sores, nosebleeds, sinus pain, sore throat and trouble swallowing.    Eyes:  Negative for visual disturbance.   Respiratory:  Negative for apnea, cough, chest tightness and shortness of breath.     Cardiovascular:  Negative for chest pain, palpitations and leg swelling.   Gastrointestinal:  Positive for constipation. Negative for abdominal pain, blood in stool, diarrhea, nausea and vomiting.   Endocrine: Positive for polyuria. Negative for polydipsia.   Genitourinary:  Negative for decreased urine volume, difficulty urinating, dysuria and frequency.   Musculoskeletal:  Negative for arthralgias, back pain and neck pain.   Skin:  Negative for rash.   Neurological:  Negative for syncope, light-headedness and headaches.   Hematological:  Does not bruise/bleed easily.   Psychiatric/Behavioral:  Negative for confusion and suicidal ideas.      Objective:     Vital Signs (Most Recent):  Temp: 97.4 °F (36.3 °C) (04/04/25 0020)  Pulse: 105 (04/04/25 0029)  Resp: 19 (04/04/25 0029)  BP: (!) 104/53 (04/04/25 0022)  SpO2: (!) 94 % (04/04/25 0029) Vital Signs (24h Range):  Temp:  [97.4 °F (36.3 °C)-98.1 °F (36.7 °C)] 97.4 °F (36.3 °C)  Pulse:  [] 105  Resp:  [14-31] 19  SpO2:  [89 %-98 %] 94 %  BP: ()/() 104/53     Weight: (!) 136.1 kg (300 lb)  Body mass index is 49.92 kg/m².     Physical Exam  Vitals and nursing note reviewed. Exam conducted with a chaperone present.   Constitutional:       Appearance: She is obese. She is ill-appearing.   HENT:      Head: Atraumatic.      Mouth/Throat:      Mouth: Mucous membranes are dry.      Pharynx: Oropharynx is clear.   Eyes:      Conjunctiva/sclera: Conjunctivae normal.      Pupils: Pupils are equal, round, and reactive to light.   Neck:      Vascular: No carotid bruit.   Cardiovascular:      Rate and Rhythm: Regular rhythm. Tachycardia present.      Pulses: Normal pulses.      Heart sounds: Normal heart sounds.   Pulmonary:      Effort: Pulmonary effort is normal.      Breath sounds: Normal breath sounds.   Abdominal:      General: Bowel sounds are normal. There is no distension.      Palpations: Abdomen is soft.      Tenderness: There is no abdominal  tenderness. There is no guarding.   Musculoskeletal:         General: No deformity or signs of injury. Normal range of motion.      Cervical back: Neck supple.      Right lower leg: No edema.      Left lower leg: No edema.   Skin:     General: Skin is warm and dry.      Capillary Refill: Capillary refill takes less than 2 seconds.      Coloration: Skin is not jaundiced or pale.      Findings: No bruising, lesion or rash.   Neurological:      General: No focal deficit present.      Mental Status: She is alert and oriented to person, place, and time.   Psychiatric:         Mood and Affect: Mood is depressed.         Behavior: Behavior is withdrawn.              CRANIAL NERVES     CN III, IV, VI   Pupils are equal, round, and reactive to light.       Significant Labs: All pertinent labs within the past 24 hours have been reviewed.    Significant Imaging: I have reviewed all pertinent imaging results/findings within the past 24 hours.  Assessment/Plan:     Assessment & Plan  Dehydration with hypernatremia  Volume resuscitate with LR and then with 125 cc hr 1/2 ns  Should improve off HCT and lasix.    Follow BMP and renal function daily.  Prerenal from dehydration  Will have to figure out a long term solution if patient unable to eat and drink due to gastroparesis but stopping the ozempic will help.    Recently treated for UTI.  Will stop the jardiance as well and this should help with polyuria as well.    Patient came from [place] with dominguez and will remove and monitor for any urinary retention.    Avoid adult diapers as much as possible, using pink pads at night.      Patient with condition that if not treated could result in loss of life or bodily function.  Due to co morbidities this patient has complex medical management.  Spent over 45 minutes with patient and caregiver and in forming plan of care.  DNR discussion noted above.         Gastroparesis  Should improve off ozempic.  Will give a trial of low dose reglan ac  and hs  Patient with erosive gastritis per discharge summary from two days ago.  Will start PPI.    Will check PAB and get nutrition consult.    Will consult ST for swallow eval for possible dysphagia to solids.      Morbid obesity with BMI of 45.0-49.9, adult  Body mass index is 49.92 kg/m². Morbid obesity complicates all aspects of disease management from diagnostic modalities to treatment. Weight loss encouraged and health benefits explained to patient.     Would benefit from sleep study and possible CPAP.  Does not wear oxygen at home.      Chronic pulmonary embolism without acute cor pulmonale  Eliquis was discontinued due to risk of ICH with brain mets but they have improved so will restart and weigh the risk vs the benefit.    She has had PTE previously and is now nonambulatory.  Will get PT/OT on board to help.  Type 2 diabetes mellitus with hyperglycemia    Last A1c reviewed-   Lab Results   Component Value Date    HGBA1C 6.7 04/02/2025     Basal/bolus insulin regimen while in hospital.  Will need prandial insulin added to correctional insulin once no longer NPO or tolerating diet for tight glycemic control while in hospital.    Hold Oral hypoglycemics while patient is in the hospital.    Should improve with volume resuscitation and off decadron.   Essential hypertension  Patient's blood pressure range in the last 24 hours was: BP  Min: 91/52  Max: 158/80.The patient's inpatient anti-hypertensive regimen is listed below:  Current Antihypertensives  losartan tablet 25 mg, Daily, Oral  metoprolol succinate (TOPROL-XL) 24 hr tablet 25 mg, Daily, Oral    Plan  - BP is controlled, no changes needed to their regimen  - stop HCT but continue ARB and BB.  If PHTN consider CCB  Vitamin D deficiency  Will check and supplement if needed.      Mixed hyperlipidemia  Continue statin    Generalized anxiety disorder  Patient well controlled.  No S/H plans or ideation.  No hallucinations at present.    Will not consult  psychiatry.  Will continue his OP psychotropic medication regimen.  She is on zoloft and will continue with slightly increased dose.    Moderate persistent asthma without complication  Continue home inhaled steroid/bronchodilator and singulair    Chronic kidney disease, stage 3b  Creatine stable for now. BMP reviewed- noted Estimated Creatinine Clearance: 41.2 mL/min (A) (based on SCr of 1.83 mg/dL (H)). according to latest data. Based on current GFR, CKD stage is stage 3 - GFR 30-59.  Monitor UOP and serial BMP and adjust therapy as needed. Renally dose meds. Avoid nephrotoxic medications and procedures.    Baseline creat 2 and is stable but prerenal azotemia should improve with volume resuscitation.    Lung cancer metastatic to brain  DNR but not hospice.  Receives chemo and has finished radiation.    Wants rehab and to go home and be independent.    She is not objective to TPN or J tube if needed.    Will need PT/OT and possible swing bed.  Lives close to [facility].    She has been on decadron previously and will need to verify if she needs to continue now that radiation complete.    She has T2DM and BS is over 250 at present.        VTE Risk Mitigation (From admission, onward)           Ordered     apixaban tablet 5 mg  2 times daily         04/04/25 5956                                    Maria Elena Colorado MD  Department of Hospital Medicine  [facility]

## 2025-04-04 NOTE — CONSULTS
[facility]  Adult Nutrition  Consult Note         Reason for Assessment  Reason For Assessment: consult        Assessment and Plan  Consult received and appreciated. Consult for gastroparesis with risk of malnutrition. Patient is a 70 yo F who presented to [facility] ED from [facility] for abnormal labs. Patient was discharged from [facility] two days ago to SNF for rehab. She was diagnosed with dehydration due to gastroparesis with UTI. Patient has complex medical history including anxiety, CKD stage 3b, chronic PE without acute cor pulmonale, T2DM, gastroparesis. Pt has metastatic lung cancer (to the brain) and receives IV chemotherapy every other week. She has completed her course of radiation for brain mets and has showed some improvement.    Patient is 136.1 kg (300 lb) with a BMI of 49.92 and is morbidly obese. Chart review reveals that patient has not lost a significant amount of weight as of recent. Rather, in October 2024 patient documented to weigh 133.8 kg (295 lb) (+15 lbs x 6 mo). Patient has no evident fat or muscle depletion. However, she has no appetite, early satiety, and is depressed. She isn't nauseated nor vomiting. PTA, she had not had anything to eat or drink in two days. Despite poor intake, pt does not meet criteria for malnutrition at this time per ASPEN guidelines, though she is at high risk.     Nutrition Recommendations  Patient is currently on a Regular Diet. Recommend to change diet to Low Residue 2/2 gastroparesis (low fat, low fiber) with addition of Boost Glucose Control TID -- though solid foods high in fat are not recommended, liquids that contain fat may be tolerated and can provide needed calories. If difficulty tolerating solid foods, ground foods may be better as solid foods in stomach don't empty well. ST consulted for swallow eval for possible dysphagia to solid foods. Recommend to consider appetite stimulant or alternate route of nutrition if intake  does not improve. Per MD, she is not objective to TPN or J tube if needed.     Note: Patient was on ozempic -- gastroparesis should improve off ozempic, plan to trial low dose reglan per MD    Last Bowel Movement: 04/04/25    Medications/labs reviewed. RD following.    Learning Needs/Social Determinants of Health    Learning Assessment       04/04/2025 0021 [facility] (4/3/2025 - Present)   Created by Deanna Rivas, RN - RN (Nurse) Status: Complete                 PRIMARY LEARNER     Primary Learner Name:  Magan Saleem  - 04/04/2025 0021    Relationship:  Patient SG - 04/04/2025 0021    Does the primary learner have any barriers to learning?:  No Barriers  - 04/04/2025 0021    What is the preferred language of the primary learner?:  English SG - 04/04/2025 0021    Is an  required?:  No  - 04/04/2025 0021    How does the primary learner prefer to learn new concepts?:  Listening SG - 04/04/2025 0021    How often do you need to have someone help you read instructions, pamphlets, or written material from your doctor or pharmacy?:  Never SG - 04/04/2025 0021        CO-LEARNER #1     No question answered        CO-LEARNER #2     No question answered        SPECIAL TOPICS     No question answered        ANSWERED BY:     -:  Family SG - 04/04/2025 0021        Comments         Edit History       Deanna Rivas, RN - RN (Nurse)   04/04/2025 0021                          Social Drivers of Health     Tobacco Use: Low Risk  (4/4/2025)    Patient History     Smoking Tobacco Use: Never     Smokeless Tobacco Use: Never     Passive Exposure: Not on file   Alcohol Use: Not At Risk (4/4/2025)    AUDIT-C     Frequency of Alcohol Consumption: Never     Average Number of Drinks: Patient does not drink     Frequency of Binge Drinking: Never   Financial Resource Strain: Low Risk  (4/4/2025)    Overall Financial Resource Strain (CARDIA)     Difficulty of Paying Living Expenses: Not very  hard   Food Insecurity: No Food Insecurity (4/4/2025)    Hunger Vital Sign     Worried About Running Out of Food in the Last Year: Never true     Ran Out of Food in the Last Year: Never true   Transportation Needs: No Transportation Needs (4/4/2025)    PRAPARE - Transportation     Lack of Transportation (Medical): No     Lack of Transportation (Non-Medical): No   Physical Activity: Inactive (4/4/2025)    Exercise Vital Sign     Days of Exercise per Week: 0 days     Minutes of Exercise per Session: 0 min   Stress: No Stress Concern Present (4/4/2025)    [employer] [place] [employer] - Occupational Stress Questionnaire     Feeling of Stress : Not at all   Housing Stability: Low Risk  (4/4/2025)    Housing Stability Vital Sign     Unable to Pay for Housing in the Last Year: No     Number of Times Moved in the Last Year: Not on file     Homeless in the Last Year: No   Depression: Low Risk  (10/30/2024)    Depression     Last PHQ-4: Flowsheet Data: 0   Utilities: Not At Risk (4/4/2025)    [facility] Utilities     Threatened with loss of utilities: No   Health Literacy: Inadequate Health Literacy (4/4/2025)     Health Literacy     Frequency of need for help with medical instructions: Sometimes   Social Isolation: Not on file     Malnutrition  Is Patient Malnourished: No    Nutrition Diagnosis  Decreased nutrient needs of fat, fiber  related to Compromised organ/organs related to G.I. function as evidenced by gastroparesis    Recent Labs   Lab 04/04/25  0426 04/04/25  0756 04/04/25  1120   *  --   --    POCGLU  --    < > 245*    < > = values in this interval not displayed.     Comments on Glucose: Glucose elevated (T2DM, metabolic stress)    Nutrition Prescription / Recommendations  Recommendation/Intervention: Recommend to change diet order to low residue 2/2 gastroparesis with addition of Boost Glucose Control TID to better meet nutritional needs. If intake does not improve, may recommend to consider  alternate route of nutrition.  Goals: encourage good PO intakes, weight maintenance during admission  Nutrition Goal Status: new  Current Diet Order: Regular Diet  Chewing or Swallowing Difficulty: ST consulted for swallow eval for possible dysphagia to solid foods  Recommended Diet: Fiber/Fat Restricted  Recommended Oral Supplement: Boost Glucose Control [190kcals, 16g protein, 16g Carbs] with meals  Is Nutrition Support Recommended: Not at the moment  Is Nutrition Education Recommended: No-- will likely warrant education pending clinical status upon follow up with caretaker    Monitor and Evaluation  % current Intake: Unknown/ No P.O. intake documented - likely poor  % intake to meet estimated needs: 50 - 75 %  Monitor and Evaluation: Food and beverage intake, Protein intake, Carbohydrate intake, Diet order, Weight, Beliefs and attitudes, Electrolyte and renal panel, Gastrointestinal profile, Glucose/endocrine profile, Inflammatory profile, Lipid profile, Nutrition focused physical findings    Current Medical Diagnosis and Past Medical History     Past Medical History:   Diagnosis Date    Chronic kidney disease, stage 3b     Chronic pulmonary embolism without acute cor pulmonale     Diabetes mellitus type 2 in obese     DNR (do not resuscitate)     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 Swanson    Melanocytic nevi of lower limb, including hip, left     Mixed hyperlipidemia     Moderate persistent asthma without complication 10/30/2024    Other pulmonary embolism without acute cor pulmonale     Vitamin D deficiency      Nutrition/Diet History  Spiritual, Cultural Beliefs, Restoration Practices, Values that  Affect Care: no  Factors Affecting Nutritional Intake: depression, altered gastrointestinal function, decreased appetite, early satiety    Lab/Procedures/Meds  Recent Labs   Lab 04/04/25  0426   *   K 3.8   BUN 64*   CREATININE 1.72*   CALCIUM 8.5   ALBUMIN 2.0*   *   ALT 53   AST 34   Note: Na+ high (dehydration). BUN, Cr high (dehydration, CKD stage 3). Alb low (inflammation). Cl- high (dehydration, IV fluids).     Last A1c:   Lab Results   Component Value Date    HGBA1C 6.7 04/02/2025    HGBA1C 6.4 (H) 02/05/2025   Note: < 7 % goal for individuals with diabetes    Lab Results   Component Value Date    RBC 3.57 (L) 04/04/2025    HGB 10.5 (L) 04/04/2025    HCT 33.9 (L) 04/04/2025    MCV 95.0 04/04/2025    MCH 29.4 04/04/2025    MCHC 31.0 (L) 04/04/2025   Note: H/H low    Pertinent Labs Reviewed: reviewed  Pertinent Medications Reviewed: reviewed    Scheduled Meds:   albuterol-ipratropium  3 mL Nebulization Q6H    apixaban  5 mg Oral BID    atorvastatin  20 mg Oral Daily    budesonide  0.5 mg Nebulization Q12H    cefTRIAXone (Rocephin) IV (PEDS and ADULTS)  1 g Intravenous Q24H    insulin glargine U-100  25 Units Subcutaneous QHS    metoclopramide HCl  5 mg Oral QID (AC & HS)    metoprolol succinate  25 mg Oral Daily    montelukast  10 mg Oral QHS    mupirocin   Nasal BID    pantoprazole  40 mg Oral Daily    sertraline  100 mg Oral Daily   Note: atorvastatin, insulin glargine, metoclopramide, pantoprazole, sertraline    Continuous Infusions:   0.45% NaCl   Intravenous Continuous 125 mL/hr at 04/04/25 1159 New Bag at 04/04/25 1159     PRN Meds:.  Current Facility-Administered Medications:     acetaminophen, 650 mg, Rectal, Q6H PRN    acetaminophen, 1,000 mg, Oral, Q6H PRN    aluminum & magnesium hydroxide-simethicone, 30 mL, Oral, Q6H PRN    bisacodyL, 10 mg, Oral, Daily PRN    dextromethorphan-guaiFENesin  mg/5 ml, 10 mL, Oral, Q6H PRN    dextrose 50%, 12.5 g, Intravenous, PRN    dextrose 50%, 25  "g, Intravenous, PRN    diphenhydrAMINE, 50 mg, Oral, Q6H PRN    docusate sodium, 100 mg, Oral, BID PRN    glucagon (human recombinant), 1 mg, Intramuscular, PRN    glucose, 16 g, Oral, PRN    glucose, 24 g, Oral, PRN    insulin aspart U-100, 0-10 Units, Subcutaneous, QID (AC + HS) PRN    melatonin, 6 mg, Oral, Nightly PRN    traZODone, 50 mg, Oral, Nightly PRN    Anthropometrics  Height: 5' 5" (165.1 cm)  Height (inches): 65 in  Height Method: Stated  Weight: (!) 136.1 kg (300 lb)  Weight (lb): 300 lb  Weight Method: Bed Scale  Ideal Body Weight (IBW), Female: 125 lb  % Ideal Body Weight, Female (lb): 240 %  BMI (Calculated): 49.9       Estimated/Assessed Needs  RMR ([place]-St. Jeor Equation): 1891.68     Temp: 97.5 °F (36.4 °C)Oral  Weight Used For Calorie Calculations: (!) 136.1 kg (300 lb 0.7 oz)     Energy Calorie Requirements (kcal): 1,905 - 2,722 kcal (14 - 20 kcal/kg ABW) (morbidly obese-- though hypermetabolic secondary to cancer, this should be adequate given BMI)  Weight Used For Protein Calculations: 56.7 kg (125 lb)  Protein Requirements: 60 - 85 g (1 - 1.5 g/kg IBW) (increased protein needs secondary to lung cancer with brain mets; undergoing chemo-- increased protein to heal tissues, fight infection; older age)    Fluid Requirements (mL): 1 mL/kcal  CHO Requirement: 45-55% (T2DM)    Nutrition by Nursing  Diet/Nutrition Received: regular                         Nutrition Follow-Up  RD Follow-up?: Yes    Nutrition Discharge Planning: Too early to determine, pending clinical course       Maria C Lorenzo, MS, RD, LD  Available via Secure Chat  "

## 2025-04-04 NOTE — PLAN OF CARE
Problem: Adult Inpatient Plan of Care  Goal: Plan of Care Review  Outcome: Not Progressing  Goal: Patient-Specific Goal (Individualized)  Outcome: Not Progressing  Goal: Absence of Hospital-Acquired Illness or Injury  Outcome: Not Progressing  Goal: Optimal Comfort and Wellbeing  Outcome: Not Progressing  Goal: Readiness for Transition of Care  Outcome: Not Progressing     Problem: Bariatric Environmental Safety  Goal: Safety Maintained with Care  Outcome: Not Progressing     Problem: Skin Injury Risk Increased  Goal: Skin Health and Integrity  Outcome: Not Progressing     Problem: Infection  Goal: Absence of Infection Signs and Symptoms  Outcome: Not Progressing     Problem: Fall Injury Risk  Goal: Absence of Fall and Fall-Related Injury  Outcome: Not Progressing     Problem: UTI (Urinary Tract Infection)  Goal: Improved Infection Symptoms  Outcome: Not Progressing     Problem: Anxiety  Goal: Anxiety Reduction or Resolution  Outcome: Not Progressing     Problem: Chronic Kidney Disease  Goal: Optimal Coping with Chronic Illness  Outcome: Not Progressing  Goal: Electrolyte Balance  Outcome: Not Progressing  Goal: Fluid Balance  Outcome: Not Progressing  Goal: Optimal Functional Ability  Outcome: Not Progressing  Goal: Absence of Anemia Signs and Symptoms  Outcome: Not Progressing  Goal: Optimal Oral Intake  Outcome: Not Progressing  Goal: Acceptable Pain Control  Outcome: Not Progressing  Goal: Minimize Renal Failure Effects  Outcome: Not Progressing     Problem: Diabetes Comorbidity  Goal: Blood Glucose Level Within Targeted Range  Outcome: Not Progressing

## 2025-04-04 NOTE — HPI
68 yo F presents to [place] ED from [facility] for abnormal labs.  Patient was discharged from [facility] two days ago to skilled nursing facility for rehab.  She was diagnosed with dehydration due to gastroparesis with UTI.  Patient has metastatic lung cancer (to the brain) and follows with Dr. Swanson for IV chemotherapy every other week and takes lazertinib daily.  She has completed her course of radiation for the brain mets and follow had showed some improvement.  I thought she had either some decreased LOC due to encephalopathy or some aphasia from the brain mets but after a great effort to get her to talk, I realized she was just mad.  She wanted to know why she had been discharged two days ago when she could not eat.  She has no appetite and early satiety.  She is not nauseated and no vomiting.  She has decided on a DNR status previously (her caretaker and friend of 20 years) states that she had always said she did not want resuscitation if she experienced cardiopulmonary arrest and had told her children her wishes but she does want treatment and is not opposed to J tube if needed.  She has not had anything to eat or drink in two days and is dehydrated again.      Patient was transferred because of concern of sepsis.  She did have enterococcus faecalis UTI at [facility] and was treated.  I do not have the culture results but cultures were sent and patient does have some yeast noted.  (Will not treat a yeast colonization).  She is afebrile and hemodynamically stable and does not look septic clinically.  Her Lactic acid is stable at 2.5 adra 3.2.  Will check another in am after volume resuscitation.  Her creat is at baseline but she has prerenal azotemia further confirming the dehydration.  Patient has an IO in place from [facility] due to dehydration and difficult stick but with some volume resuscitation, we have gotten an IV.  She is covid and flu negative.      Her WBC is 29 and I am not sure if she has received  neupogen but could be a stress reaction.  Her BS is 245 and she does have some urine ketones but most likely due to starvation ketosis.  Will check a serum ketone.  Her platelets are 88K but this is most likely from her chemo.  She is not anemic.  CXR shows some patchy infiltrate but no obvious obstructive pneumonia from her lung cancer.  Her BNP about 3K but no clinical signs of CHF.  No recent echo but due to her BMI 50 most likely has some PHTN.  EKG had no ischemic changes but tachy at 114 which could also be from dehydration.  She was on ozempic for her DM and this could be the cause of her decreased appetite.  She is also on decadron for prevention of cerebral edema.      Remainder of ROS as below.  She mostly just nods and shakes her head and rolls her eyes.  You can get her to laugh if you try but she has been depressed since she has not walked since her hospitalization at [facility] on 3/19/25 and was discharged two days ago.  She says that at her last chemo she noticed she was getting weaker and her daughter had to help her in and out of the car and that was new for her.      See assessment and plan below for problem based evaluation

## 2025-04-04 NOTE — ASSESSMENT & PLAN NOTE
Creatine stable for now. BMP reviewed- noted Estimated Creatinine Clearance: 41.2 mL/min (A) (based on SCr of 1.83 mg/dL (H)). according to latest data. Based on current GFR, CKD stage is stage 3 - GFR 30-59.  Monitor UOP and serial BMP and adjust therapy as needed. Renally dose meds. Avoid nephrotoxic medications and procedures.    Baseline creat 2 and is stable but prerenal azotemia should improve with volume resuscitation.

## 2025-04-04 NOTE — ASSESSMENT & PLAN NOTE
Should improve off ozempic.  Will give a trial of low dose reglan ac and hs  Patient with erosive gastritis per discharge summary from two days ago.  Will start PPI.    Will check PAB and get nutrition consult.    Will consult ST for swallow eval for possible dysphagia to solids.

## 2025-04-04 NOTE — PLAN OF CARE
Problem: Physical Therapy  Goal: Physical Therapy Goal  Description: Short term goals:  1. Supine to sit with MInimal Assistance  2. Sit to stand transfer with Moderate Assistance  3. Bed to chair transfer with Moderate Assistance using Rolling Walker  4. Sitting at edge of bed x15 minutes with Contact Guard Assistance    Long term goals:  1. Supine to sit with Contact Guard Assistance  2. Sit to stand transfer with Contact Guard Assistance  3. Bed to chair transfer with Contact Guard Assistance using Rolling Walker  4. Gait  x 100 feet with Contact Guard Assistance using Rolling Walker.     Outcome: Progressing

## 2025-04-04 NOTE — PLAN OF CARE
[facility]  Initial Discharge Assessment       Primary Care Provider: Sil Brown DO    Admission Diagnosis: Sepsis [A41.9]    Admission Date: 4/3/2025  Expected Discharge Date:     Transition of Care Barriers: None    Payor: MEDICARE / Plan: MEDICARE PART A & B / Product Type: Government /     Extended Emergency Contact Information  Primary Emergency Contact: Shauna Yeung  Address: [address] [place] [address], [place] [address] [place]  Work Phone: [phone]  Mobile Phone: [phone]  Relation: Friend  Secondary Emergency Contact: ZHANE FRAZIER  Address: [address]           [place] [address] [place]  Mobile Phone: [phone]  Relation: Daughter  Preferred language: English   needed? No    Discharge Plan A: Home with family  Discharge Plan B: Long-term acute care facility (LTAC), Rehab, Skilled Nursing Facility      [facility] - [place], [place] - [address] [place] [address]  Phone: [phone] Fax: [phone]    [facility]  [address] [place] [address]  Phone: [phone] Fax: [phone]    [facility] - [place], [place] - [address]  [address] [address]  Phone: [phone] Fax: [phone]      Initial Assessment (most recent)       Adult Discharge Assessment - 04/04/25 1043          Discharge Assessment    Assessment Type Discharge Planning Assessment     Source of Information patient     Communicated MITUL with patient/caregiver Date not available/Unable to determine     People in Home child(jeana), adult     Facility Arrived From: home     Do you expect to return to your current living situation? Yes     Do you have help at home or someone to help you manage your care at home? Yes     Who are your caregiver(s) and their phone number(s)? Zhane Frazier, daughter, [phone]     Prior to hospitilization cognitive status:  Unable to Assess     Current cognitive status: Alert/Oriented     Walking or Climbing Stairs Difficulty yes     Walking or Climbing Stairs ambulation difficulty, requires equipment;stair climbing difficulty, requires equipment     Mobility Management uses a can and rollator     Dressing/Bathing Difficulty no     Home Layout Able to live on 1st floor     Equipment Currently Used at Home cane, straight;walker, rolling;oxygen;CPAP     Patient currently being followed by outpatient case management? No     Do you currently have service(s) that help you manage your care at home? No     Do you take prescription medications? Yes     Do you have prescription coverage? Yes     Coverage Medicare     Do you have any problems affording any of your prescribed medications? No     Is the patient taking medications as prescribed? yes     Who is going to help you get home at discharge? family     How do you get to doctors appointments? family or friend will provide     Are you on dialysis? No     Discharge Plan A Home with family     Discharge Plan B Long-term acute care facility (LTAC);Rehab;Skilled Nursing Facility     DME Needed Upon Discharge  none     Discharge Plan discussed with: Patient     Transition of Care Barriers None        Physical Activity    On average, how many days per week do you engage in moderate to strenuous exercise (like a brisk walk)? 0 days     On average, how many minutes do you engage in exercise at this level? 0 min        Financial Resource Strain    How hard is it for you to pay for the very basics like food, housing, medical care, and heating? Not very hard        Housing Stability    In the last 12 months, was there a time when you were not able to pay the mortgage or rent on time? No     At any time in the past 12 months, were you homeless or living in a shelter (including now)? No        Transportation Needs    In the past 12 months, has lack of transportation kept you from medical appointments or  from getting medications? No     In the past 12 months, has lack of transportation kept you from meetings, work, or from getting things needed for daily living? No        Food Insecurity    Within the past 12 months, you worried that your food would run out before you got the money to buy more. Never true     Within the past 12 months, the food you bought just didn't last and you didn't have money to get more. Never true        Alcohol Use    Q1: How often do you have a drink containing alcohol? Never     Q2: How many drinks containing alcohol do you have on a typical day when you are drinking? Patient does not drink     Q3: How often do you have six or more drinks on one occasion? Never        Utilities    In the past 12 months has the electric, gas, oil, or water company threatened to shut off services in your home? No        Health Literacy    How often do you need to have someone help you when you read instructions, pamphlets, or other written material from your doctor or pharmacy? Sometimes        OTHER    Name(s) of People in Home Zhane Saleem, daughter, [phone]                 Met with patient in her room this am.  She stated that she lives at home with her daughter.  She does not have hh.  DME at home include a cane, rollator walker, O2, and a cpap machine.  Plans are to return home upon discharge.  Will continue to follow for anticipated discharge needs.

## 2025-04-04 NOTE — PT/OT/SLP EVAL
Occupational Therapy   Evaluation    Name: Magan Saleem  MRN: [id number]  Admitting Diagnosis: Dehydration with hypernatremia  Recent Surgery: * No surgery found *      Recommendations:     Discharge Recommendations: Low Intensity Therapy at SWB  Discharge Equipment Recommendations:  to be determined by next level of care  Barriers to discharge:  None    Assessment:     Magan Saleem is a 69 y.o. female with a medical diagnosis of Dehydration with hypernatremia. Ms. Saleem was discharged form [facility] 2 days ago to [facility] for [facility]. Pt was sent to [facility] for dehydration and not eating or drinking then transferred to [facility]. Pt has a past medical history of lung cancer that metastasized  to her brain. She recently finished radiation treatments.  She presents with weakness. Performance deficits affecting function: weakness, impaired endurance, impaired self care skills, impaired functional mobility, gait instability, impaired balance.      Rehab Prognosis: Good; patient would benefit from acute skilled OT services to address these deficits and reach maximum level of function.       Plan:     Patient to be seen 5 x/week to address the above listed problems via self-care/home management, therapeutic activities, therapeutic exercises  Plan of Care Expires:    Plan of Care Reviewed with: patient    Subjective     Chief Complaint: Pt had no complaints  Patient/Family Comments/goals: return to [facility]    Occupational Profile:  Living Environment: Pt lives with her daughter in single story home  Previous level of function: Pt stated that prior to recent hospitalization, she was I with ADLs  Roles and Routines: self care  Equipment Used at Home: walker, rolling  Assistance upon Discharge: Pt will need SWB placement    Pain/Comfort:  Pain Rating 1: 0/10    Patients cultural, spiritual, Anabaptism conflicts given the current situation:      Objective:     Communicated with: LONDON Jarrell prior to session.  Patient found HOB  elevated with peripheral IV, oxygen upon OT entry to room.    General Precautions: Standard, fall  Orthopedic Precautions:    Braces: N/A  Respiratory Status: Nasal cannula, flow 3 L/min    Occupational Performance:    Bed Mobility:    Patient completed Rolling/Turning to Left with  maximal assistance and 3 persons  Patient completed Rolling/Turning to Right with maximal assistance and 3 persons  Patient completed Supine to Sit with maximal assistance and 2 persons  Patient completed Sit to Supine with maximal assistance and 2 persons    Functional Mobility/Transfers:  Pt was unable to complete sit to stand  Functional Mobility: unable to take steps    Activities of Daily Living:  Upper Body Dressing: moderate assistance Pt required Mod A to [facility]    Cognitive/Visual Perceptual:  Cognitive/Psychosocial Skills:     -       Follows Commands/attention:Follows multistep  commands    Physical Exam:  Upper Extremity Range of Motion:     -       Right Upper Extremity: WFL  -       Left Upper Extremity: WFL  Upper Extremity Strength:    -       Right Upper Extremity: 2/5  -       Left Upper Extremity: 2/5    AMPAC 6 Click ADL:  AMPAC Total Score: 15    Treatment & Education:  Pt completed bed mobs to assist with cleaning and arranging pads under her.  Pt educated on role and scope of OT practice  Importance of OOB activities with staff assist    Patient left HOB elevated with all lines intact and call button in reach    GOALS:   Multidisciplinary Problems       Occupational Therapy Goals          Problem: Occupational Therapy    Goal Priority Disciplines Outcome Interventions   Occupational Therapy Goal     OT, PT/OT Progressing    Description: STG:  Pt will perform grooming with setup  Pt will bathe with Min A  Pt will perform UE dressing with Min A  Pt will perform LE dressing with Mod A  Pt will sit EOB x 10 min with CG assistance  Pt will transfer bed/chair/bsc with Mod A  Pt will perform standing  task x 3 min with CG assistance  Pt will tolerate 30 minutes of tx without fatigue      LT.Restore to max I with self care and mobility.                         DME Justifications:  No DME recommended requiring DME justifications    History:     Past Medical History:   Diagnosis Date    Chronic kidney disease, stage 3b     Chronic pulmonary embolism without acute cor pulmonale     Diabetes mellitus type 2 in obese     DNR (do not resuscitate)     caretaker of 20 years present and says this was always her wish and had stated she did not wish to be resuscitated if she had cardiac arrest    Erosive gastritis     Essential hypertension 2021    Gastroparesis     Generalized anxiety disorder 2021    Lung cancer metastatic to brain     Malignant neoplasm of right lung 02/15/2023    Dr. Swanson    Melanocytic nevi of lower limb, including hip, left     Mixed hyperlipidemia     Moderate persistent asthma without complication 10/30/2024    Other pulmonary embolism without acute cor pulmonale     Vitamin D deficiency          Past Surgical History:   Procedure Laterality Date    CHOLECYSTECTOMY      CSF SHUNT      HYSTERECTOMY      TONSILLECTOMY         Time Tracking:     OT Date of Treatment:    OT Start Time: 930  OT Stop Time: 1000  OT Total Time (min): 30 min    Billable Minutes:Evaluation 2025

## 2025-04-04 NOTE — ASSESSMENT & PLAN NOTE
Eliquis was discontinued due to risk of ICH with brain mets but they have improved so will restart and weigh the risk vs the benefit.    She has had PTE previously and is now nonambulatory.  Will get PT/OT on board to help.

## 2025-04-04 NOTE — ASSESSMENT & PLAN NOTE
Patient's blood pressure range in the last 24 hours was: BP  Min: 91/52  Max: 158/80.The patient's inpatient anti-hypertensive regimen is listed below:  Current Antihypertensives  losartan tablet 25 mg, Daily, Oral  metoprolol succinate (TOPROL-XL) 24 hr tablet 25 mg, Daily, Oral    Plan  - BP is controlled, no changes needed to their regimen  - stop HCT but continue ARB and BB.  If PHTN consider CCB

## 2025-04-04 NOTE — PROGRESS NOTES
[facility]  [facility] Medicine  Progress Note    Patient Name: Magan Saleem  MRN: [id number]  Patient Class: IP- Inpatient   Admission Date: 4/3/2025  Length of Stay: 1 days  Attending Physician: Lesley Shanks DO  Primary Care Provider: Sil Brown DO        Subjective     Principal Problem:Sepsis      HPI:  70 yo F presents to [facility] from [facility] for abnormal labs.  Patient was discharged from [facility] two days ago to skilled nursing facility for rehab.  She was diagnosed with dehydration due to gastroparesis with UTI.  Patient has metastatic lung cancer (to the brain) and follows with Dr. Swanson for IV chemotherapy every other week and takes lazertinib daily.  She has completed her course of radiation for the brain mets and follow had showed some improvement.  I thought she had either some decreased LOC due to encephalopathy or some aphasia from the brain mets but after a great effort to get her to talk, I realized she was just mad.  She wanted to know why she had been discharged two days ago when she could not eat.  She has no appetite and early satiety.  She is not nauseated and no vomiting.  She has decided on a DNR status previously (her caretaker and friend of 20 years) states that she had always said she did not want resuscitation if she experienced cardiopulmonary arrest and had told her children her wishes but she does want treatment and is not opposed to J tube if needed.  She has not had anything to eat or drink in two days and is dehydrated again.      Patient was transferred because of concern of sepsis.  She did have enterococcus faecalis UTI at [facility] and was treated.  I do not have the culture results but cultures were sent and patient does have some yeast noted.  (Will not treat a yeast colonization).  She is afebrile and hemodynamically stable and does not look septic clinically.  Her Lactic acid is stable at 2.5 dara 3.2.  Will check another in am  after volume resuscitation.  Her creat is at baseline but she has prerenal azotemia further confirming the dehydration.  Patient has an IO in place from [facility] due to dehydration and difficult stick but with some volume resuscitation, we have gotten an IV.  She is covid and flu negative.      Her WBC is 29 and I am not sure if she has received neupogen but could be a stress reaction.  Her BS is 245 and she does have some urine ketones but most likely due to starvation ketosis.  Will check a serum ketone.  Her platelets are 88K but this is most likely from her chemo.  She is not anemic.  CXR shows some patchy infiltrate but no obvious obstructive pneumonia from her lung cancer.  Her BNP about 3K but no clinical signs of CHF.  No recent echo but due to her BMI 50 most likely has some PHTN.  EKG had no ischemic changes but tachy at 114 which could also be from dehydration.  She was on ozempic for her DM and this could be the cause of her decreased appetite.  She is also on decadron for prevention of cerebral edema.      Remainder of ROS as below.  She mostly just nods and shakes her head and rolls her eyes.  You can get her to laugh if you try but she has been depressed since she has not walked since her hospitalization at [facility] on 3/19/25 and was discharged two days ago.  She says that at her last chemo she noticed she was getting weaker and her daughter had to help her in and out of the car and that was new for her.      See assessment and plan below for problem based evaluation       Overview/Hospital Course:  69 year old female presents with hypernatremia; she is not too talkative during rounds    Vitals:    04/04/25 0710 04/04/25 0758 04/04/25 1122 04/04/25 1200   BP:  (!) 105/56 109/71    Pulse: 74 (!) 121 94 93   Resp: 15  18 17   Temp:  97.6 °F (36.4 °C) 97.5 °F (36.4 °C)    TempSrc:  Oral Oral    SpO2: 95% 98% 97% 95%   Weight:       Height:             PHYSICAL EXAM:    GEN: NAD; alert   CVS: regular rate and  rhythm; no murmurs  RESP: clear to auscultation bilaterally; no rhonchi, rales, or wheezes noted  GI: soft, non-tender, non-distended; + bowel sounds  EXTR: no clubbing, cyanosis, or edema        Assessment & Plan  Sepsis  Appears to have UTI and pneumonia; has leukocytosis; has opacities on CXR; has G- bacilli on urine culture; blood cultures pending; will place on Vancomycin and Rocephin; BP on the low side; on IVF  Hypernatremia  Due to dehydration; off diuretics; on IVF; will monitor Na levels  Lung cancer metastatic to brain  DNR but not hospice.  Receives chemo and has finished radiation.      Thrombocytopenia  On chemo; will monitor    Essential hypertension  Monitor BP while on Metoprolol; on IVF; off Losartan    Moderate persistent asthma without complication  Continue home inhaled steroid/bronchodilator and singulair    Chronic kidney disease, stage 3b  Creatine stable   Chronic pulmonary embolism without acute cor pulmonale  Eliquis was discontinued due to risk of ICH with brain mets but they have improved so the anti-coagulation has been restarted; will monitor    Type 2 diabetes mellitus with hyperglycemia  BS elevated; will increase Lantus to 25 units; continue SSI;monitor BS     Gastroparesis  On Reglan and PPI for erosive gastritis; will monitor  Morbid obesity with BMI of 45.0-49.9, adult  Body mass index is 49.92 kg/m². Morbid obesity complicates all aspects of disease management from diagnostic modalities to treatment. Weight loss encouraged and health benefits explained to patient.     Would benefit from sleep study and possible CPAP.  Does not wear oxygen at home.            Lesley Shanks DO  Department of Hospital Medicine   [facility]

## 2025-04-04 NOTE — PT/OT/SLP EVAL
Speech Language Pathology Evaluation  Bedside Swallow    Patient Name:  aMgan Saleem   MRN:  [id number]  Admitting Diagnosis: Sepsis due to pneumonia    Recommendations:                 General Recommendations:  Follow-up not indicated  Diet recommendations:  Other (Comment) (Unable to recommend a solid diet level secondary to pt did not participate.), Thin   Aspiration Precautions: 1 bite/sip at a time, Small bites/sips, and Standard aspiration precautions   General Precautions: Standard    Communication strategies:  none    Assessment:     Magan Saleem is a 69 y.o. female with an SLP diagnosis of  possible dysphagia .  She presents with refusing with all solids during eval. Nurse reported patient refused breakfast and lunch meal as well.    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 Swanson    Melanocytic nevi of lower limb, including hip, left     Mixed hyperlipidemia     Moderate persistent asthma without complication 10/30/2024    Other pulmonary embolism without acute cor pulmonale     Vitamin D deficiency        Past Surgical History:   Procedure Laterality Date    CHOLECYSTECTOMY      CSF SHUNT      HYSTERECTOMY      TONSILLECTOMY         Social History: unknown living arrangements. Patient has a history of lung cancer which has spread to her brain.    Prior Intubation HX:  see chart    Modified Barium Swallow: n/a    Chest X-Rays: see chart    Prior diet: regular  consistency.    Occupation/hobbies/homemaking: not stated.    Subjective     Patient lying in bed awake and alert. Patient very reluctant to participate in BEDSIDE SWALLOW EVALUATION.   Patient goals: not stated     Pain/Comfort:  Pain Rating 1: 0/10 (No pain reported by pt during BSE.)    Respiratory Status:  see chart    Objective:     Oral Musculature Evaluation  Oral Musculature: WFL  Dentition: other (see comments) (unable to assess.)  Secretion Management: adequate  Mucosal Quality: adequate  Oral Labial Strength and Mobility: WFL  Lingual Strength and Mobility: WF    Bedside Swallow Eval:   Consistencies Assessed:  Thin liquids No difficulties noted with sips of water via cup edge.    Patient refused a;; other consistencies claiming she has no appetite.   Oral Phase:   WFL    Pharyngeal Phase:   no overt clinical signs/symptoms of aspiration  no overt clinical signs/symptoms of pharyngeal dysphagia    Compensatory Strategies  None    Treatment: No difficulties with thin liquids noted. SLP unable to make a determination regarding solids secondary to patient refused.    Goals:   Multidisciplinary Problems       SLP Goals       Not on file                    Plan:     Patient to be seen:      Plan of Care expires:     Plan of Care reviewed with:      SLP Follow-Up:  No       Discharge recommendations:      Barriers to Discharge:  Level of Skilled Assistance Needed patient may need nursing home care    Time Tracking:     SLP Treatment Date:      Speech Start Time:  1304  Speech Stop Time:  1316     Speech Total Time (min):  12 min    Billable Minutes: Eval Swallow and Oral Function 12 04/04/2025

## 2025-04-04 NOTE — PLAN OF CARE
Received call from Billie at [facility].  She stated that patient had been at their facility but she would have to re-apply to be re-admitted and noted she's unsure if they would be able to meet patient needs currently.  Will continue to follow.    [id number]  Attempted to reach patient's contacts:  Daughter, Zhane Saleem, and friend, Shauna Yeung, to obtain additional hx and discuss dc planning.  No answer.  Will continue to try.

## 2025-04-04 NOTE — PLAN OF CARE
Problem: Adult Inpatient Plan of Care  Goal: Plan of Care Review  Outcome: Progressing  Goal: Patient-Specific Goal (Individualized)  Outcome: Progressing  Goal: Absence of Hospital-Acquired Illness or Injury  Outcome: Progressing  Goal: Optimal Comfort and Wellbeing  Outcome: Progressing  Goal: Readiness for Transition of Care  Outcome: Progressing     Problem: Bariatric Environmental Safety  Goal: Safety Maintained with Care  Outcome: Progressing     Problem: Skin Injury Risk Increased  Goal: Skin Health and Integrity  Outcome: Progressing     Problem: Infection  Goal: Absence of Infection Signs and Symptoms  Outcome: Progressing     Problem: Fall Injury Risk  Goal: Absence of Fall and Fall-Related Injury  Outcome: Progressing     Problem: UTI (Urinary Tract Infection)  Goal: Improved Infection Symptoms  Outcome: Progressing     Problem: Anxiety  Goal: Anxiety Reduction or Resolution  Outcome: Progressing     Problem: Chronic Kidney Disease  Goal: Optimal Coping with Chronic Illness  Outcome: Progressing  Goal: Electrolyte Balance  Outcome: Progressing  Goal: Fluid Balance  Outcome: Progressing  Goal: Optimal Functional Ability  Outcome: Progressing  Goal: Absence of Anemia Signs and Symptoms  Outcome: Progressing  Goal: Optimal Oral Intake  Outcome: Progressing  Goal: Acceptable Pain Control  Outcome: Progressing  Goal: Minimize Renal Failure Effects  Outcome: Progressing

## 2025-04-04 NOTE — ASSESSMENT & PLAN NOTE
Eliquis was discontinued due to risk of ICH with brain mets but they have improved so the anti-coagulation has been restarted; will monitor

## 2025-04-04 NOTE — ASSESSMENT & PLAN NOTE
DNR but not hospice.  Receives chemo and has finished radiation.    Wants rehab and to go home and be independent.    She is not objective to TPN or J tube if needed.    Will need PT/OT and possible swing bed.  Lives close to [place].    She has been on decadron previously and will need to verify if she needs to continue now that radiation complete.    She has T2DM and BS is over 250 at present.

## 2025-04-05 LAB
ANION GAP SERPL CALCULATED.3IONS-SCNC: 14 MMOL/L (ref 7–16)
BASOPHILS # BLD AUTO: 0.09 K/UL (ref 0–0.2)
BASOPHILS NFR BLD AUTO: 0.3 % (ref 0–1)
BUN SERPL-MCNC: 59 MG/DL (ref 10–20)
BUN/CREAT SERPL: 34 (ref 6–20)
CALCIUM SERPL-MCNC: 8.6 MG/DL (ref 8.4–10.2)
CHLORIDE SERPL-SCNC: 124 MMOL/L (ref 98–107)
CO2 SERPL-SCNC: 24 MMOL/L (ref 23–31)
CREAT SERPL-MCNC: 1.75 MG/DL (ref 0.55–1.02)
DIFFERENTIAL METHOD BLD: ABNORMAL
EGFR (NO RACE VARIABLE) (RUSH/TITUS): 31 ML/MIN/1.73M2
EOSINOPHIL # BLD AUTO: 0.12 K/UL (ref 0–0.5)
EOSINOPHIL NFR BLD AUTO: 0.4 % (ref 1–4)
ERYTHROCYTE [DISTWIDTH] IN BLOOD BY AUTOMATED COUNT: 17.3 % (ref 11.5–14.5)
GLUCOSE SERPL-MCNC: 164 MG/DL (ref 82–115)
GLUCOSE SERPL-MCNC: 179 MG/DL (ref 70–105)
GLUCOSE SERPL-MCNC: 181 MG/DL (ref 70–105)
GLUCOSE SERPL-MCNC: 183 MG/DL (ref 70–105)
GLUCOSE SERPL-MCNC: 188 MG/DL (ref 70–105)
GLUCOSE SERPL-MCNC: 203 MG/DL (ref 70–105)
HCT VFR BLD AUTO: 32.1 % (ref 38–47)
HGB BLD-MCNC: 10.5 G/DL (ref 12–16)
IMM GRANULOCYTES # BLD AUTO: 1.26 K/UL (ref 0–0.04)
IMM GRANULOCYTES NFR BLD: 4.2 % (ref 0–0.4)
LYMPHOCYTES # BLD AUTO: 0.93 K/UL (ref 1–4.8)
LYMPHOCYTES NFR BLD AUTO: 3.1 % (ref 27–41)
LYMPHOCYTES NFR BLD MANUAL: 3 % (ref 27–41)
MCH RBC QN AUTO: 29 PG (ref 27–31)
MCHC RBC AUTO-ENTMCNC: 32.7 G/DL (ref 32–36)
MCV RBC AUTO: 88.7 FL (ref 80–96)
MONOCYTES # BLD AUTO: 0.83 K/UL (ref 0–0.8)
MONOCYTES NFR BLD AUTO: 2.8 % (ref 2–6)
MONOCYTES NFR BLD MANUAL: 2 % (ref 2–6)
MPC BLD CALC-MCNC: ABNORMAL G/DL
NEUTROPHILS # BLD AUTO: 26.84 K/UL (ref 1.8–7.7)
NEUTROPHILS NFR BLD AUTO: 89.2 % (ref 53–65)
NEUTS BAND NFR BLD MANUAL: 9 % (ref 1–5)
NEUTS SEG NFR BLD MANUAL: 86 % (ref 50–62)
NRBC # BLD AUTO: 0.09 X10E3/UL
NRBC, AUTO (.00): 0.3 %
PLATELET # BLD AUTO: 106 K/UL (ref 150–400)
PLATELET MORPHOLOGY: ABNORMAL
POTASSIUM SERPL-SCNC: 3.2 MMOL/L (ref 3.5–5.1)
RBC # BLD AUTO: 3.62 M/UL (ref 4.2–5.4)
RBC MORPH BLD: NORMAL
SODIUM SERPL-SCNC: 159 MMOL/L (ref 136–145)
UA COMPLETE W REFLEX CULTURE PNL UR: ABNORMAL
WBC # BLD AUTO: 30.07 K/UL (ref 4.5–11)

## 2025-04-05 PROCEDURE — 82962 GLUCOSE BLOOD TEST: CPT

## 2025-04-05 PROCEDURE — 63600175 PHARM REV CODE 636 W HCPCS: Performed by: HOSPITALIST

## 2025-04-05 PROCEDURE — 63600175 PHARM REV CODE 636 W HCPCS: Performed by: FAMILY MEDICINE

## 2025-04-05 PROCEDURE — 27000221 HC OXYGEN, UP TO 24 HOURS

## 2025-04-05 PROCEDURE — 99900035 HC TECH TIME PER 15 MIN (STAT)

## 2025-04-05 PROCEDURE — 27000207 HC ISOLATION

## 2025-04-05 PROCEDURE — 99232 SBSQ HOSP IP/OBS MODERATE 35: CPT | Mod: ,,, | Performed by: FAMILY MEDICINE

## 2025-04-05 PROCEDURE — 94640 AIRWAY INHALATION TREATMENT: CPT

## 2025-04-05 PROCEDURE — 94761 N-INVAS EAR/PLS OXIMETRY MLT: CPT

## 2025-04-05 PROCEDURE — 11000001 HC ACUTE MED/SURG PRIVATE ROOM

## 2025-04-05 PROCEDURE — 80048 BASIC METABOLIC PNL TOTAL CA: CPT | Performed by: HOSPITALIST

## 2025-04-05 PROCEDURE — 25000003 PHARM REV CODE 250: Performed by: HOSPITALIST

## 2025-04-05 PROCEDURE — 25000242 PHARM REV CODE 250 ALT 637 W/ HCPCS: Performed by: HOSPITALIST

## 2025-04-05 PROCEDURE — 85025 COMPLETE CBC W/AUTO DIFF WBC: CPT | Performed by: HOSPITALIST

## 2025-04-05 PROCEDURE — 36415 COLL VENOUS BLD VENIPUNCTURE: CPT | Performed by: HOSPITALIST

## 2025-04-05 RX ORDER — MEROPENEM 500 MG/1
500 INJECTION, POWDER, FOR SOLUTION INTRAVENOUS
Status: DISCONTINUED | OUTPATIENT
Start: 2025-04-05 | End: 2025-04-05

## 2025-04-05 RX ORDER — SODIUM CHLORIDE AND POTASSIUM CHLORIDE 150; 450 MG/100ML; MG/100ML
INJECTION, SOLUTION INTRAVENOUS CONTINUOUS
Status: DISCONTINUED | OUTPATIENT
Start: 2025-04-05 | End: 2025-04-07

## 2025-04-05 RX ORDER — MEROPENEM 1 G/1
1 INJECTION, POWDER, FOR SOLUTION INTRAVENOUS
Status: DISCONTINUED | OUTPATIENT
Start: 2025-04-05 | End: 2025-04-16

## 2025-04-05 RX ADMIN — IPRATROPIUM BROMIDE AND ALBUTEROL SULFATE 3 ML: .5; 3 SOLUTION RESPIRATORY (INHALATION) at 01:04

## 2025-04-05 RX ADMIN — BUDESONIDE 0.5 MG: 0.5 SUSPENSION RESPIRATORY (INHALATION) at 08:04

## 2025-04-05 RX ADMIN — MONTELUKAST 10 MG: 10 TABLET, FILM COATED ORAL at 09:04

## 2025-04-05 RX ADMIN — METOCLOPRAMIDE 5 MG: 5 TABLET ORAL at 09:04

## 2025-04-05 RX ADMIN — APIXABAN 5 MG: 5 TABLET, FILM COATED ORAL at 09:04

## 2025-04-05 RX ADMIN — MEROPENEM 1 G: 1 INJECTION, POWDER, FOR SOLUTION INTRAVENOUS at 05:04

## 2025-04-05 RX ADMIN — MUPIROCIN: 20 OINTMENT TOPICAL at 08:04

## 2025-04-05 RX ADMIN — HYDROCODONE BITARTRATE AND ACETAMINOPHEN 1 TABLET: 7.5; 325 TABLET ORAL at 12:04

## 2025-04-05 RX ADMIN — SODIUM CHLORIDE AND POTASSIUM CHLORIDE: 150; 450 INJECTION, SOLUTION INTRAVENOUS at 09:04

## 2025-04-05 RX ADMIN — METOCLOPRAMIDE 5 MG: 5 TABLET ORAL at 12:04

## 2025-04-05 RX ADMIN — MUPIROCIN: 20 OINTMENT TOPICAL at 09:04

## 2025-04-05 RX ADMIN — SODIUM CHLORIDE AND POTASSIUM CHLORIDE: 150; 450 INJECTION, SOLUTION INTRAVENOUS at 01:04

## 2025-04-05 RX ADMIN — METOCLOPRAMIDE 5 MG: 5 TABLET ORAL at 06:04

## 2025-04-05 RX ADMIN — IPRATROPIUM BROMIDE AND ALBUTEROL SULFATE 3 ML: .5; 3 SOLUTION RESPIRATORY (INHALATION) at 06:04

## 2025-04-05 RX ADMIN — ATORVASTATIN CALCIUM 20 MG: 20 TABLET, FILM COATED ORAL at 08:04

## 2025-04-05 RX ADMIN — INSULIN GLARGINE 25 UNITS: 100 INJECTION, SOLUTION SUBCUTANEOUS at 09:04

## 2025-04-05 RX ADMIN — INSULIN ASPART 1 UNITS: 100 INJECTION, SOLUTION INTRAVENOUS; SUBCUTANEOUS at 09:04

## 2025-04-05 RX ADMIN — SERTRALINE HYDROCHLORIDE 100 MG: 50 TABLET ORAL at 08:04

## 2025-04-05 RX ADMIN — VANCOMYCIN HYDROCHLORIDE 2500 MG: 500 INJECTION, POWDER, LYOPHILIZED, FOR SOLUTION INTRAVENOUS at 02:04

## 2025-04-05 RX ADMIN — PANTOPRAZOLE SODIUM 40 MG: 40 TABLET, DELAYED RELEASE ORAL at 08:04

## 2025-04-05 RX ADMIN — METOCLOPRAMIDE 5 MG: 5 TABLET ORAL at 05:04

## 2025-04-05 RX ADMIN — APIXABAN 5 MG: 5 TABLET, FILM COATED ORAL at 08:04

## 2025-04-05 RX ADMIN — IPRATROPIUM BROMIDE AND ALBUTEROL SULFATE 3 ML: .5; 3 SOLUTION RESPIRATORY (INHALATION) at 07:04

## 2025-04-05 RX ADMIN — IPRATROPIUM BROMIDE AND ALBUTEROL SULFATE 3 ML: .5; 3 SOLUTION RESPIRATORY (INHALATION) at 12:04

## 2025-04-05 NOTE — PLAN OF CARE
Problem: Adult Inpatient Plan of Care  Goal: Plan of Care Review  Outcome: Progressing  Goal: Patient-Specific Goal (Individualized)  Outcome: Progressing  Goal: Absence of Hospital-Acquired Illness or Injury  Outcome: Progressing  Goal: Optimal Comfort and Wellbeing  Outcome: Progressing     Problem: Bariatric Environmental Safety  Goal: Safety Maintained with Care  Outcome: Progressing     Problem: Skin Injury Risk Increased  Goal: Skin Health and Integrity  Outcome: Progressing     Problem: Infection  Goal: Absence of Infection Signs and Symptoms  Outcome: Progressing

## 2025-04-05 NOTE — PROGRESS NOTES
[facility]  [facility] Medicine  Progress Note    Patient Name: Magan Saleme  MRN: [id number]  Patient Class: IP- Inpatient   Admission Date: 4/3/2025  Length of Stay: 2 days  Attending Physician: Noel Edwards DO  Primary Care Provider: Sil Brown DO        Subjective     Principal Problem:Sepsis due to pneumonia        HPI:  70 yo F presents to [place] ED from [facility] for abnormal labs.  Patient was discharged from [facility] two days ago to skilled nursing facility for rehab.  She was diagnosed with dehydration due to gastroparesis with UTI.  Patient has metastatic lung cancer (to the brain) and follows with Dr. Swanson for IV chemotherapy every other week and takes lazertinib daily.  She has completed her course of radiation for the brain mets and follow had showed some improvement.  I thought she had either some decreased LOC due to encephalopathy or some aphasia from the brain mets but after a great effort to get her to talk, I realized she was just mad.  She wanted to know why she had been discharged two days ago when she could not eat.  She has no appetite and early satiety.  She is not nauseated and no vomiting.  She has decided on a DNR status previously (her caretaker and friend of 20 years) states that she had always said she did not want resuscitation if she experienced cardiopulmonary arrest and had told her children her wishes but she does want treatment and is not opposed to J tube if needed.  She has not had anything to eat or drink in two days and is dehydrated again.      Patient was transferred because of concern of sepsis.  She did have enterococcus faecalis UTI at [facility] and was treated.  I do not have the culture results but cultures were sent and patient does have some yeast noted.  (Will not treat a yeast colonization).  She is afebrile and hemodynamically stable and does not look septic clinically.  Her Lactic acid is stable at 2.5 dara 3.2.  Will check  another in am after volume resuscitation.  Her creat is at baseline but she has prerenal azotemia further confirming the dehydration.  Patient has an IO in place from [facility] due to dehydration and difficult stick but with some volume resuscitation, we have gotten an IV.  She is covid and flu negative.      Her WBC is 29 and I am not sure if she has received neupogen but could be a stress reaction.  Her BS is 245 and she does have some urine ketones but most likely due to starvation ketosis.  Will check a serum ketone.  Her platelets are 88K but this is most likely from her chemo.  She is not anemic.  CXR shows some patchy infiltrate but no obvious obstructive pneumonia from her lung cancer.  Her BNP about 3K but no clinical signs of CHF.  No recent echo but due to her BMI 50 most likely has some PHTN.  EKG had no ischemic changes but tachy at 114 which could also be from dehydration.  She was on ozempic for her DM and this could be the cause of her decreased appetite.  She is also on decadron for prevention of cerebral edema.      Remainder of ROS as below.  She mostly just nods and shakes her head and rolls her eyes.  You can get her to laugh if you try but she has been depressed since she has not walked since her hospitalization at [facility] on 3/19/25 and was discharged two days ago.  She says that at her last chemo she noticed she was getting weaker and her daughter had to help her in and out of the car and that was new for her.      See assessment and plan below for problem based evaluation       Overview/Hospital Course:  69 year old female presents with hypernatremia; she is not too talkative during rounds    4/5- patient seen examined today resting comfortably in bed and in no acute distress, with no acute events overnight.  Patient has a multitude of issues complicating her medical picture.  White blood cell count 51972 today, sodium 159, potassium 3.2 and BUN creatinine 59 and 1.8.  The patient is still not eating  even when assistance is provided.  We have already changed her fluids to half-normal saline with 20 of KCl at 1:25 a.m..  Have also put in a GI consult for possible feeding tube.  E coli in the urine has turned out to be ESBL and Rocephin has been changed Merrem.    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 Swanson    Melanocytic nevi of lower limb, including hip, left     Mixed hyperlipidemia     Moderate persistent asthma without complication 10/30/2024    Other pulmonary embolism without acute cor pulmonale     Vitamin D deficiency        Past Surgical History:   Procedure Laterality Date    CHOLECYSTECTOMY      CSF SHUNT      HYSTERECTOMY      TONSILLECTOMY         Review of patient's allergies indicates:   Allergen Reactions    Penicillins Hives    Sulfa (sulfonamide antibiotics) Hives       No current facility-administered medications on file prior to encounter.     Current Outpatient Medications on File Prior to Encounter   Medication Sig    albuterol (VENTOLIN HFA) 90 mcg/actuation inhaler Inhale 2 puffs into the lungs every 6 (six) hours as needed for Wheezing. Rescue    albuterol-ipratropium (DUO-NEB) 2.5 mg-0.5 mg/3 mL nebulizer solution Take 3 mLs by nebulization every 4 (four) hours as needed for Wheezing or Shortness of Breath. Rescue    ALLERGY RELIEF, CETIRIZINE, 10 mg tablet TAKE ONE TABLET BY MOUTH ONCE DAILY    ascorbic acid, vitamin C, (VITAMIN C) 500 MG tablet Twice Daily    aspirin 81 MG  Chew Take 1 tablet (81 mg total) by mouth once daily. (Patient not taking: Reported on 10/30/2024)    atorvastatin (LIPITOR) 10 MG tablet TAKE ONE TABLET BY MOUTH ONCE DAILY    benzonatate (TESSALON) 100 MG capsule Take 100 mg by mouth 3 (three) times daily as needed for Cough. (Patient not taking: Reported on 10/30/2024)    blood-glucose meter,continuous (FREESTYLE OLIVA 3 READER) [facility]    blood-glucose sensor (FREESTYLE OLIVA 3 SENSOR) [facility] use as directed    cholecalciferol, vitamin D3, (VITAMIN D3) 50 mcg (2,000 unit) Tab Take 1 tablet (2,000 Units total) by mouth once daily.    dexAMETHasone (DECADRON) 4 MG Tab Take 4 mg by mouth.    doxycycline (DORYX) 100 MG EC tablet Take 100 mg by mouth 2 (two) times daily.    ELIQUIS 5 mg Tab TAKE ONE TABLET BY MOUTH TWICE DAILY    empagliflozin (JARDIANCE) 10 mg tablet Take 10 mg by mouth.    fluticasone-umeclidin-vilanter (TRELEGY ELLIPTA) 100-62.5-25 mcg DsDv Inhale 1 puff into the lungs once daily.    furosemide (LASIX) 40 MG tablet Take by mouth.    glipiZIDE 5 MG TR24 Take 5 mg by mouth every morning. (Patient not taking: Reported on 10/30/2024)    insulin aspart U-100 (NOVOLOG FLEXPEN U-100 INSULIN) 100 unit/mL (3 mL) InPn pen Per sliding scale max daily dose 30 units; subcutaneously; three times a day with meals    losartan-hydrochlorothiazide 50-12.5 mg (HYZAAR) 50-12.5 mg per tablet Take 1 tablet by mouth.    metoclopramide HCl (REGLAN) 5 MG tablet Take 5 mg by mouth 4 (four) times daily.    metoprolol succinate (TOPROL-XL) 50 MG 24 hr tablet Take 50 mg by mouth once daily.    metoprolol tartrate (LOPRESSOR) 25 MG tablet TAKE ONE TABLET BY MOUTH TWICE DAILY    montelukast (SINGULAIR) 10 mg tablet TAKE ONE TABLET BY MOUTH EVERY EVENING    ondansetron (ZOFRAN) 4 MG tablet Take 4 mg by mouth every 8 (eight) hours as needed. AS NEEDED FOR NAUSEA    osimertinib (TAGRISSO) 80 mg Tab Take 40 mg by mouth once daily.    OZEMPIC 0.25 mg or 0.5 mg (2 mg/3 mL)  pen injector Inject 0.25 mg into the skin.    pantoprazole (PROTONIX) 40 MG tablet Take 40 mg by mouth once daily.    potassium chloride (KLOR-CON) 10 MEQ TbSR TAKE ONE TABLET BY MOUTH ONCE DAILY    predniSONE (DELTASONE) 20 MG tablet Take 20 mg by mouth. for five days    rOPINIRole (REQUIP) 0.25 MG tablet Take 0.25 mg by mouth 3 (three) times daily.    sertraline (ZOLOFT) 25 MG tablet Take 1 tablet (25 mg total) by mouth once daily.    TRUE METRIX GLUCOSE METER Misc check blood sugar TWICE DAILY AND record    TRUE METRIX GLUCOSE TEST STRIP Strp check blood sugar TWICE DAILY AND record     Family History       Problem Relation (Age of Onset)    Diabetes Mother    Hypertension Mother    Lung cancer Father          Tobacco Use    Smoking status: Never    Smokeless tobacco: Never   Substance and Sexual Activity    Alcohol use: Never    Drug use: Never    Sexual activity: Not Currently     Review of Systems   Constitutional:  Positive for appetite change and fatigue. Negative for chills, fever and unexpected weight change.   HENT:  Negative for congestion, mouth sores, nosebleeds, sinus pain, sore throat and trouble swallowing.    Eyes:  Negative for visual disturbance.   Respiratory:  Negative for apnea, cough, chest tightness and shortness of breath.    Cardiovascular:  Negative for chest pain, palpitations and leg swelling.   Gastrointestinal:  Positive for constipation. Negative for abdominal pain, blood in stool, diarrhea, nausea and vomiting.   Endocrine: Positive for polyuria. Negative for polydipsia.   Genitourinary:  Negative for decreased urine volume, difficulty urinating, dysuria and frequency.   Musculoskeletal:  Negative for arthralgias, back pain and neck pain.   Skin:  Negative for rash.   Neurological:  Negative for syncope, light-headedness and headaches.   Hematological:  Does not bruise/bleed easily.   Psychiatric/Behavioral:  Negative for confusion and suicidal ideas.      Objective:     Vital Signs  (Most Recent):  Temp: 98.3 °F (36.8 °C) (04/05/25 1146)  Pulse: 101 (04/05/25 1330)  Resp: 19 (04/05/25 1330)  BP: (!) 96/56 (04/05/25 1146)  SpO2: 99 % (04/05/25 1330) Vital Signs (24h Range):  Temp:  [97.5 °F (36.4 °C)-98.3 °F (36.8 °C)] 98.3 °F (36.8 °C)  Pulse:  [] 101  Resp:  [16-20] 19  SpO2:  [94 %-99 %] 99 %  BP: ()/(53-89) 96/56     Weight: (!) 136.1 kg (300 lb)  Body mass index is 49.92 kg/m².     Physical Exam  Vitals and nursing note reviewed. Exam conducted with a chaperone present.   Constitutional:       Appearance: She is obese. She is ill-appearing.   HENT:      Head: Atraumatic.      Mouth/Throat:      Mouth: Mucous membranes are dry.      Pharynx: Oropharynx is clear.   Eyes:      Conjunctiva/sclera: Conjunctivae normal.      Pupils: Pupils are equal, round, and reactive to light.   Neck:      Vascular: No carotid bruit.   Cardiovascular:      Rate and Rhythm: Regular rhythm. Tachycardia present.      Pulses: Normal pulses.      Heart sounds: Normal heart sounds.   Pulmonary:      Effort: Pulmonary effort is normal.      Breath sounds: Normal breath sounds.   Abdominal:      General: Bowel sounds are normal. There is no distension.      Palpations: Abdomen is soft.      Tenderness: There is no abdominal tenderness. There is no guarding.   Musculoskeletal:         General: No deformity or signs of injury. Normal range of motion.      Cervical back: Neck supple.      Right lower leg: No edema.      Left lower leg: No edema.   Skin:     General: Skin is warm and dry.      Capillary Refill: Capillary refill takes less than 2 seconds.      Coloration: Skin is not jaundiced or pale.      Findings: No bruising, lesion or rash.   Neurological:      General: No focal deficit present.      Mental Status: She is alert and oriented to person, place, and time.   Psychiatric:         Mood and Affect: Mood is depressed.         Behavior: Behavior is withdrawn.              CRANIAL NERVES     CN III,  IV, VI   Pupils are equal, round, and reactive to light.       Significant Labs: All pertinent labs within the past 24 hours have been reviewed.    Significant Imaging: I have reviewed all pertinent imaging results/findings within the past 24 hours.      Assessment & Plan  Sepsis due to pneumonia  Appears to have UTI and pneumonia; has leukocytosis; has opacities on CXR; has G- bacilli on urine culture; blood cultures pending; will place on Vancomycin and Rocephin; BP on the low side; on IVF  Hypernatremia  Due to dehydration; off diuretics; on IVF; will monitor Na levels  Lung cancer metastatic to brain  DNR but not hospice.  Receives chemo and has finished radiation.      Thrombocytopenia  On chemo; will monitor    Essential hypertension  Monitor BP while on Metoprolol; on IVF; off Losartan    Moderate persistent asthma without complication  Continue home inhaled steroid/bronchodilator and singulair    Chronic kidney disease, stage 3b  Creatine stable   Chronic pulmonary embolism without acute cor pulmonale  Eliquis was discontinued due to risk of ICH with brain mets but they have improved so the anti-coagulation has been restarted; will monitor    Type 2 diabetes mellitus with hyperglycemia  BS elevated; will increase Lantus to 25 units; continue SSI;monitor BS     Gastroparesis  On Reglan and PPI for erosive gastritis; will monitor  Morbid obesity with BMI of 45.0-49.9, adult  Body mass index is 49.92 kg/m². Morbid obesity complicates all aspects of disease management from diagnostic modalities to treatment. Weight loss encouraged and health benefits explained to patient.     Would benefit from sleep study and possible CPAP.  Does not wear oxygen at home.      Leukocytosis (Resolved: 4/4/2025)        VTE Risk Mitigation (From admission, onward)           Ordered     apixaban tablet 5 mg  2 times daily         04/04/25 0322                    Discharge Planning   MITUL:      Code Status: DNR   Medical Readiness for  Discharge Date:   Discharge Plan A: Home with family                Please place Justification for DME        Noel Edwards DO  Department of Hospital Medicine   [facility]

## 2025-04-05 NOTE — SUBJECTIVE & OBJECTIVE
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 Swanson    Melanocytic nevi of lower limb, including hip, left     Mixed hyperlipidemia     Moderate persistent asthma without complication 10/30/2024    Other pulmonary embolism without acute cor pulmonale     Vitamin D deficiency        Past Surgical History:   Procedure Laterality Date    CHOLECYSTECTOMY      CSF SHUNT      HYSTERECTOMY      TONSILLECTOMY         Review of patient's allergies indicates:   Allergen Reactions    Penicillins Hives    Sulfa (sulfonamide antibiotics) Hives       No current facility-administered medications on file prior to encounter.     Current Outpatient Medications on File Prior to Encounter   Medication Sig    albuterol (VENTOLIN HFA) 90 mcg/actuation inhaler Inhale 2 puffs into the lungs every 6 (six) hours as needed for Wheezing. Rescue    albuterol-ipratropium (DUO-NEB) 2.5 mg-0.5 mg/3 mL nebulizer solution Take 3 mLs by nebulization every 4 (four) hours as needed for Wheezing or Shortness of Breath. Rescue    ALLERGY RELIEF, CETIRIZINE, 10 mg tablet TAKE ONE TABLET BY MOUTH ONCE DAILY    ascorbic acid, vitamin C, (VITAMIN C) 500 MG tablet Twice Daily    aspirin 81 MG Chew Take 1 tablet (81 mg total) by mouth once daily. (Patient not taking: Reported on 10/30/2024)    atorvastatin (LIPITOR) 10 MG tablet TAKE ONE TABLET BY MOUTH ONCE DAILY    benzonatate (TESSALON) 100 MG capsule Take 100 mg by mouth 3 (three) times daily as needed  for Cough. (Patient not taking: Reported on 10/30/2024)    blood-glucose meter,continuous (FREESTYLE OLIVA 3 READER) [facility] use as directed    blood-glucose sensor (FREESTYLE OLIVA 3 SENSOR) [facility] use as directed    cholecalciferol, vitamin D3, (VITAMIN D3) 50 mcg (2,000 unit) Tab Take 1 tablet (2,000 Units total) by mouth once daily.    dexAMETHasone (DECADRON) 4 MG Tab Take 4 mg by mouth.    doxycycline (DORYX) 100 MG EC tablet Take 100 mg by mouth 2 (two) times daily.    ELIQUIS 5 mg Tab TAKE ONE TABLET BY MOUTH TWICE DAILY    empagliflozin (JARDIANCE) 10 mg tablet Take 10 mg by mouth.    fluticasone-umeclidin-vilanter (TRELEGY ELLIPTA) 100-62.5-25 mcg DsDv Inhale 1 puff into the lungs once daily.    furosemide (LASIX) 40 MG tablet Take by mouth.    glipiZIDE 5 MG TR24 Take 5 mg by mouth every morning. (Patient not taking: Reported on 10/30/2024)    insulin aspart U-100 (NOVOLOG FLEXPEN U-100 INSULIN) 100 unit/mL (3 mL) InPn pen Per sliding scale max daily dose 30 units; subcutaneously; three times a day with meals    losartan-hydrochlorothiazide 50-12.5 mg (HYZAAR) 50-12.5 mg per tablet Take 1 tablet by mouth.    metoclopramide HCl (REGLAN) 5 MG tablet Take 5 mg by mouth 4 (four) times daily.    metoprolol succinate (TOPROL-XL) 50 MG 24 hr tablet Take 50 mg by mouth once daily.    metoprolol tartrate (LOPRESSOR) 25 MG tablet TAKE ONE TABLET BY MOUTH TWICE DAILY    montelukast (SINGULAIR) 10 mg tablet TAKE ONE TABLET BY MOUTH EVERY EVENING    ondansetron (ZOFRAN) 4 MG tablet Take 4 mg by mouth every 8 (eight) hours as needed. AS NEEDED FOR NAUSEA    osimertinib (TAGRISSO) 80 mg Tab Take 40 mg by mouth once daily.    OZEMPIC 0.25 mg or 0.5 mg (2 mg/3 mL) pen injector Inject 0.25 mg into the skin.    pantoprazole (PROTONIX) 40 MG tablet Take 40 mg by mouth once daily.    potassium chloride (KLOR-CON) 10 MEQ TbSR TAKE ONE TABLET BY MOUTH ONCE DAILY    predniSONE (DELTASONE) 20 MG tablet Take 20 mg by mouth. for five  days    rOPINIRole (REQUIP) 0.25 MG tablet Take 0.25 mg by mouth 3 (three) times daily.    sertraline (ZOLOFT) 25 MG tablet Take 1 tablet (25 mg total) by mouth once daily.    TRUE METRIX GLUCOSE METER Misc check blood sugar TWICE DAILY AND record    TRUE METRIX GLUCOSE TEST STRIP Strp check blood sugar TWICE DAILY AND record     Family History       Problem Relation (Age of Onset)    Diabetes Mother    Hypertension Mother    Lung cancer Father          Tobacco Use    Smoking status: Never    Smokeless tobacco: Never   Substance and Sexual Activity    Alcohol use: Never    Drug use: Never    Sexual activity: Not Currently     Review of Systems   Constitutional:  Positive for appetite change and fatigue. Negative for chills, fever and unexpected weight change.   HENT:  Negative for congestion, mouth sores, nosebleeds, sinus pain, sore throat and trouble swallowing.    Eyes:  Negative for visual disturbance.   Respiratory:  Negative for apnea, cough, chest tightness and shortness of breath.    Cardiovascular:  Negative for chest pain, palpitations and leg swelling.   Gastrointestinal:  Positive for constipation. Negative for abdominal pain, blood in stool, diarrhea, nausea and vomiting.   Endocrine: Positive for polyuria. Negative for polydipsia.   Genitourinary:  Negative for decreased urine volume, difficulty urinating, dysuria and frequency.   Musculoskeletal:  Negative for arthralgias, back pain and neck pain.   Skin:  Negative for rash.   Neurological:  Negative for syncope, light-headedness and headaches.   Hematological:  Does not bruise/bleed easily.   Psychiatric/Behavioral:  Negative for confusion and suicidal ideas.      Objective:     Vital Signs (Most Recent):  Temp: 98.3 °F (36.8 °C) (04/05/25 1146)  Pulse: 101 (04/05/25 1330)  Resp: 19 (04/05/25 1330)  BP: (!) 96/56 (04/05/25 1146)  SpO2: 99 % (04/05/25 1330) Vital Signs (24h Range):  Temp:  [97.5 °F (36.4 °C)-98.3 °F (36.8 °C)] 98.3 °F (36.8  °C)  Pulse:  [] 101  Resp:  [16-20] 19  SpO2:  [94 %-99 %] 99 %  BP: ()/(53-89) 96/56     Weight: (!) 136.1 kg (300 lb)  Body mass index is 49.92 kg/m².     Physical Exam  Vitals and nursing note reviewed. Exam conducted with a chaperone present.   Constitutional:       Appearance: She is obese. She is ill-appearing.   HENT:      Head: Atraumatic.      Mouth/Throat:      Mouth: Mucous membranes are dry.      Pharynx: Oropharynx is clear.   Eyes:      Conjunctiva/sclera: Conjunctivae normal.      Pupils: Pupils are equal, round, and reactive to light.   Neck:      Vascular: No carotid bruit.   Cardiovascular:      Rate and Rhythm: Regular rhythm. Tachycardia present.      Pulses: Normal pulses.      Heart sounds: Normal heart sounds.   Pulmonary:      Effort: Pulmonary effort is normal.      Breath sounds: Normal breath sounds.   Abdominal:      General: Bowel sounds are normal. There is no distension.      Palpations: Abdomen is soft.      Tenderness: There is no abdominal tenderness. There is no guarding.   Musculoskeletal:         General: No deformity or signs of injury. Normal range of motion.      Cervical back: Neck supple.      Right lower leg: No edema.      Left lower leg: No edema.   Skin:     General: Skin is warm and dry.      Capillary Refill: Capillary refill takes less than 2 seconds.      Coloration: Skin is not jaundiced or pale.      Findings: No bruising, lesion or rash.   Neurological:      General: No focal deficit present.      Mental Status: She is alert and oriented to person, place, and time.   Psychiatric:         Mood and Affect: Mood is depressed.         Behavior: Behavior is withdrawn.              CRANIAL NERVES     CN III, IV, VI   Pupils are equal, round, and reactive to light.       Significant Labs: All pertinent labs within the past 24 hours have been reviewed.    Significant Imaging: I have reviewed all pertinent imaging results/findings within the past 24 hours.

## 2025-04-06 LAB
ALBUMIN SERPL BCP-MCNC: 1.7 G/DL (ref 3.4–4.8)
ALBUMIN/GLOB SERPL: 0.6 {RATIO}
ALP SERPL-CCNC: 157 U/L (ref 40–150)
ALT SERPL W P-5'-P-CCNC: 53 U/L
ANION GAP SERPL CALCULATED.3IONS-SCNC: 15 MMOL/L (ref 7–16)
AST SERPL W P-5'-P-CCNC: 49 U/L (ref 11–45)
BILIRUB SERPL-MCNC: 0.6 MG/DL
BUN SERPL-MCNC: 47 MG/DL (ref 10–20)
BUN/CREAT SERPL: 32 (ref 6–20)
CALCIUM SERPL-MCNC: 8.3 MG/DL (ref 8.4–10.2)
CHLORIDE SERPL-SCNC: 125 MMOL/L (ref 98–107)
CO2 SERPL-SCNC: 20 MMOL/L (ref 23–31)
CREAT SERPL-MCNC: 1.46 MG/DL (ref 0.55–1.02)
EGFR (NO RACE VARIABLE) (RUSH/TITUS): 39 ML/MIN/1.73M2
GLOBULIN SER-MCNC: 2.8 G/DL (ref 2–4)
GLUCOSE SERPL-MCNC: 110 MG/DL (ref 82–115)
GLUCOSE SERPL-MCNC: 121 MG/DL (ref 70–105)
GLUCOSE SERPL-MCNC: 123 MG/DL (ref 70–105)
GLUCOSE SERPL-MCNC: 126 MG/DL (ref 70–105)
GLUCOSE SERPL-MCNC: 131 MG/DL (ref 70–105)
POTASSIUM SERPL-SCNC: 3.9 MMOL/L (ref 3.5–5.1)
PROT SERPL-MCNC: 4.5 G/DL (ref 5.8–7.6)
SODIUM SERPL-SCNC: 156 MMOL/L (ref 136–145)

## 2025-04-06 PROCEDURE — 36415 COLL VENOUS BLD VENIPUNCTURE: CPT | Performed by: INTERNAL MEDICINE

## 2025-04-06 PROCEDURE — 11000001 HC ACUTE MED/SURG PRIVATE ROOM

## 2025-04-06 PROCEDURE — 80053 COMPREHEN METABOLIC PANEL: CPT | Performed by: INTERNAL MEDICINE

## 2025-04-06 PROCEDURE — 27000207 HC ISOLATION

## 2025-04-06 PROCEDURE — 82962 GLUCOSE BLOOD TEST: CPT

## 2025-04-06 PROCEDURE — 25000003 PHARM REV CODE 250: Performed by: HOSPITALIST

## 2025-04-06 PROCEDURE — 94761 N-INVAS EAR/PLS OXIMETRY MLT: CPT

## 2025-04-06 PROCEDURE — 99232 SBSQ HOSP IP/OBS MODERATE 35: CPT | Mod: ,,, | Performed by: FAMILY MEDICINE

## 2025-04-06 PROCEDURE — 94640 AIRWAY INHALATION TREATMENT: CPT

## 2025-04-06 PROCEDURE — 63600175 PHARM REV CODE 636 W HCPCS: Performed by: HOSPITALIST

## 2025-04-06 PROCEDURE — 99223 1ST HOSP IP/OBS HIGH 75: CPT | Mod: ,,, | Performed by: INTERNAL MEDICINE

## 2025-04-06 PROCEDURE — 63600175 PHARM REV CODE 636 W HCPCS: Performed by: FAMILY MEDICINE

## 2025-04-06 PROCEDURE — 99900035 HC TECH TIME PER 15 MIN (STAT)

## 2025-04-06 PROCEDURE — 25000003 PHARM REV CODE 250: Performed by: INTERNAL MEDICINE

## 2025-04-06 PROCEDURE — 27000221 HC OXYGEN, UP TO 24 HOURS

## 2025-04-06 PROCEDURE — 25000242 PHARM REV CODE 250 ALT 637 W/ HCPCS: Performed by: HOSPITALIST

## 2025-04-06 RX ORDER — DEXTROSE MONOHYDRATE 50 MG/ML
INJECTION, SOLUTION INTRAVENOUS CONTINUOUS
Status: DISCONTINUED | OUTPATIENT
Start: 2025-04-06 | End: 2025-04-14

## 2025-04-06 RX ADMIN — INSULIN GLARGINE 25 UNITS: 100 INJECTION, SOLUTION SUBCUTANEOUS at 08:04

## 2025-04-06 RX ADMIN — IPRATROPIUM BROMIDE AND ALBUTEROL SULFATE 3 ML: .5; 3 SOLUTION RESPIRATORY (INHALATION) at 07:04

## 2025-04-06 RX ADMIN — METOCLOPRAMIDE 5 MG: 5 TABLET ORAL at 04:04

## 2025-04-06 RX ADMIN — DEXTROSE MONOHYDRATE: 50 INJECTION, SOLUTION INTRAVENOUS at 09:04

## 2025-04-06 RX ADMIN — MEROPENEM 1 G: 1 INJECTION, POWDER, FOR SOLUTION INTRAVENOUS at 04:04

## 2025-04-06 RX ADMIN — APIXABAN 5 MG: 5 TABLET, FILM COATED ORAL at 08:04

## 2025-04-06 RX ADMIN — BUDESONIDE 0.5 MG: 0.5 SUSPENSION RESPIRATORY (INHALATION) at 07:04

## 2025-04-06 RX ADMIN — METOPROLOL SUCCINATE 25 MG: 25 TABLET, EXTENDED RELEASE ORAL at 08:04

## 2025-04-06 RX ADMIN — PANTOPRAZOLE SODIUM 40 MG: 40 TABLET, DELAYED RELEASE ORAL at 08:04

## 2025-04-06 RX ADMIN — IPRATROPIUM BROMIDE AND ALBUTEROL SULFATE 3 ML: .5; 3 SOLUTION RESPIRATORY (INHALATION) at 12:04

## 2025-04-06 RX ADMIN — VANCOMYCIN HYDROCHLORIDE 2500 MG: 500 INJECTION, POWDER, LYOPHILIZED, FOR SOLUTION INTRAVENOUS at 01:04

## 2025-04-06 RX ADMIN — MONTELUKAST 10 MG: 10 TABLET, FILM COATED ORAL at 08:04

## 2025-04-06 RX ADMIN — ATORVASTATIN CALCIUM 20 MG: 20 TABLET, FILM COATED ORAL at 08:04

## 2025-04-06 RX ADMIN — SODIUM CHLORIDE AND POTASSIUM CHLORIDE: 150; 450 INJECTION, SOLUTION INTRAVENOUS at 06:04

## 2025-04-06 RX ADMIN — MUPIROCIN: 20 OINTMENT TOPICAL at 08:04

## 2025-04-06 RX ADMIN — METOCLOPRAMIDE 5 MG: 5 TABLET ORAL at 08:04

## 2025-04-06 RX ADMIN — SODIUM CHLORIDE AND POTASSIUM CHLORIDE: 150; 450 INJECTION, SOLUTION INTRAVENOUS at 04:04

## 2025-04-06 RX ADMIN — IPRATROPIUM BROMIDE AND ALBUTEROL SULFATE 3 ML: .5; 3 SOLUTION RESPIRATORY (INHALATION) at 01:04

## 2025-04-06 RX ADMIN — SERTRALINE HYDROCHLORIDE 100 MG: 50 TABLET ORAL at 08:04

## 2025-04-06 RX ADMIN — HYDROCODONE BITARTRATE AND ACETAMINOPHEN 1 TABLET: 7.5; 325 TABLET ORAL at 08:04

## 2025-04-06 RX ADMIN — HYDROCODONE BITARTRATE AND ACETAMINOPHEN 1 TABLET: 7.5; 325 TABLET ORAL at 06:04

## 2025-04-06 RX ADMIN — METOCLOPRAMIDE 5 MG: 5 TABLET ORAL at 10:04

## 2025-04-06 RX ADMIN — METOCLOPRAMIDE 5 MG: 5 TABLET ORAL at 06:04

## 2025-04-06 NOTE — PROGRESS NOTES
[facility]  [facility] Medicine  Progress Note    Patient Name: Magan Saleem  MRN: [id number]  Patient Class: IP- Inpatient   Admission Date: 4/3/2025  Length of Stay: 3 days  Attending Physician: Noel Edwards DO  Primary Care Provider: Sil Brown DO        Subjective     Principal Problem:Sepsis due to pneumonia        HPI:  68 yo F presents to [place] ED from [facility] for abnormal labs.  Patient was discharged from [facility] two days ago to skilled nursing facility for rehab.  She was diagnosed with dehydration due to gastroparesis with UTI.  Patient has metastatic lung cancer (to the brain) and follows with Dr. Swanson for IV chemotherapy every other week and takes lazertinib daily.  She has completed her course of radiation for the brain mets and follow had showed some improvement.  I thought she had either some decreased LOC due to encephalopathy or some aphasia from the brain mets but after a great effort to get her to talk, I realized she was just mad.  She wanted to know why she had been discharged two days ago when she could not eat.  She has no appetite and early satiety.  She is not nauseated and no vomiting.  She has decided on a DNR status previously (her caretaker and friend of 20 years) states that she had always said she did not want resuscitation if she experienced cardiopulmonary arrest and had told her children her wishes but she does want treatment and is not opposed to J tube if needed.  She has not had anything to eat or drink in two days and is dehydrated again.      Patient was transferred because of concern of sepsis.  She did have enterococcus faecalis UTI at [facility] and was treated.  I do not have the culture results but cultures were sent and patient does have some yeast noted.  (Will not treat a yeast colonization).  She is afebrile and hemodynamically stable and does not look septic clinically.  Her Lactic acid is stable at 2.5 dara 3.2.  Will check  another in am after volume resuscitation.  Her creat is at baseline but she has prerenal azotemia further confirming the dehydration.  Patient has an IO in place from [facility] due to dehydration and difficult stick but with some volume resuscitation, we have gotten an IV.  She is covid and flu negative.      Her WBC is 29 and I am not sure if she has received neupogen but could be a stress reaction.  Her BS is 245 and she does have some urine ketones but most likely due to starvation ketosis.  Will check a serum ketone.  Her platelets are 88K but this is most likely from her chemo.  She is not anemic.  CXR shows some patchy infiltrate but no obvious obstructive pneumonia from her lung cancer.  Her BNP about 3K but no clinical signs of CHF.  No recent echo but due to her BMI 50 most likely has some PHTN.  EKG had no ischemic changes but tachy at 114 which could also be from dehydration.  She was on ozempic for her DM and this could be the cause of her decreased appetite.  She is also on decadron for prevention of cerebral edema.      Remainder of ROS as below.  She mostly just nods and shakes her head and rolls her eyes.  You can get her to laugh if you try but she has been depressed since she has not walked since her hospitalization at [facility] on 3/19/25 and was discharged two days ago.  She says that at her last chemo she noticed she was getting weaker and her daughter had to help her in and out of the car and that was new for her.      See assessment and plan below for problem based evaluation       Overview/Hospital Course:  69 year old female presents with hypernatremia; she is not too talkative during rounds    4/5- patient seen examined today resting comfortably in bed and in no acute distress, with no acute events overnight.  Patient has a multitude of issues complicating her medical picture.  White blood cell count 92104 today, sodium 159, potassium 3.2 and BUN creatinine 59 and 1.8.  The patient is still not eating  even when assistance is provided.  We have already changed her fluids to half-normal saline with 20 of KCl at 1:25 a.m..  Have also put in a GI consult for possible feeding tube.  E coli in the urine has turned out to be ESBL and Rocephin has been changed Merrem.    4/6- the patient seen examined today resting comfortably in bed, in no acute distress, in no acute events overnight.  The patient is not very talkative today, but states she is doing okay.  She has no acute complaints.  Blood cultures remain negative.  The patient has not eaten since yesterday and is on light IV fluids.  GI has been consulted for feeding tube.  Continues on Merrem for positive urine culture.    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 Swanson    Melanocytic nevi of lower limb, including hip, left     Mixed hyperlipidemia     Moderate persistent asthma without complication 10/30/2024    Other pulmonary embolism without acute cor pulmonale     Vitamin D deficiency        Past Surgical History:   Procedure Laterality Date    CHOLECYSTECTOMY      CSF SHUNT      HYSTERECTOMY      TONSILLECTOMY         Review of patient's allergies indicates:   Allergen Reactions    Penicillins Hives    Sulfa (sulfonamide antibiotics) Hives       No current facility-administered medications on file prior to encounter.     Current Outpatient Medications on File Prior to Encounter   Medication Sig    albuterol (VENTOLIN HFA) 90 mcg/actuation inhaler Inhale  2 puffs into the lungs every 6 (six) hours as needed for Wheezing. Rescue    albuterol-ipratropium (DUO-NEB) 2.5 mg-0.5 mg/3 mL nebulizer solution Take 3 mLs by nebulization every 4 (four) hours as needed for Wheezing or Shortness of Breath. Rescue    ALLERGY RELIEF, CETIRIZINE, 10 mg tablet TAKE ONE TABLET BY MOUTH ONCE DAILY    ascorbic acid, vitamin C, (VITAMIN C) 500 MG tablet Twice Daily    aspirin 81 MG Chew Take 1 tablet (81 mg total) by mouth once daily. (Patient not taking: Reported on 10/30/2024)    atorvastatin (LIPITOR) 10 MG tablet TAKE ONE TABLET BY MOUTH ONCE DAILY    benzonatate (TESSALON) 100 MG capsule Take 100 mg by mouth 3 (three) times daily as needed for Cough. (Patient not taking: Reported on 10/30/2024)    blood-glucose meter,continuous (FREESTYLE OLIVA 3 READER) Misc use as directed    blood-glucose sensor (FREESTYLE OLIVA 3 SENSOR) [place] use as directed    cholecalciferol, vitamin D3, (VITAMIN D3) 50 mcg (2,000 unit) Tab Take 1 tablet (2,000 Units total) by mouth once daily.    dexAMETHasone (DECADRON) 4 MG Tab Take 4 mg by mouth.    doxycycline (DORYX) 100 MG EC tablet Take 100 mg by mouth 2 (two) times daily.    ELIQUIS 5 mg Tab TAKE ONE TABLET BY MOUTH TWICE DAILY    empagliflozin (JARDIANCE) 10 mg tablet Take 10 mg by mouth.    fluticasone-umeclidin-vilanter (TRELEGY ELLIPTA) 100-62.5-25 mcg DsDv Inhale 1 puff into the lungs once daily.    furosemide (LASIX) 40 MG tablet Take by mouth.    glipiZIDE 5 MG TR24 Take 5 mg by mouth every morning. (Patient not taking: Reported on 10/30/2024)    insulin aspart U-100 (NOVOLOG FLEXPEN U-100 INSULIN) 100 unit/mL (3 mL) InPn pen Per sliding scale max daily dose 30 units; subcutaneously; three times a day with meals    losartan-hydrochlorothiazide 50-12.5 mg (HYZAAR) 50-12.5 mg per tablet Take 1 tablet by mouth.    metoclopramide HCl (REGLAN) 5 MG tablet Take 5 mg by mouth 4 (four) times daily.    metoprolol succinate (TOPROL-XL) 50 MG 24 hr tablet  Take 50 mg by mouth once daily.    metoprolol tartrate (LOPRESSOR) 25 MG tablet TAKE ONE TABLET BY MOUTH TWICE DAILY    montelukast (SINGULAIR) 10 mg tablet TAKE ONE TABLET BY MOUTH EVERY EVENING    ondansetron (ZOFRAN) 4 MG tablet Take 4 mg by mouth every 8 (eight) hours as needed. AS NEEDED FOR NAUSEA    osimertinib (TAGRISSO) 80 mg Tab Take 40 mg by mouth once daily.    OZEMPIC 0.25 mg or 0.5 mg (2 mg/3 mL) pen injector Inject 0.25 mg into the skin.    pantoprazole (PROTONIX) 40 MG tablet Take 40 mg by mouth once daily.    potassium chloride (KLOR-CON) 10 MEQ TbSR TAKE ONE TABLET BY MOUTH ONCE DAILY    predniSONE (DELTASONE) 20 MG tablet Take 20 mg by mouth. for five days    rOPINIRole (REQUIP) 0.25 MG tablet Take 0.25 mg by mouth 3 (three) times daily.    sertraline (ZOLOFT) 25 MG tablet Take 1 tablet (25 mg total) by mouth once daily.    TRUE METRIX GLUCOSE METER Misc check blood sugar TWICE DAILY AND record    TRUE METRIX GLUCOSE TEST STRIP Strp check blood sugar TWICE DAILY AND record     Family History       Problem Relation (Age of Onset)    Diabetes Mother    Hypertension Mother    Lung cancer Father          Tobacco Use    Smoking status: Never    Smokeless tobacco: Never   Substance and Sexual Activity    Alcohol use: Never    Drug use: Never    Sexual activity: Not Currently     Review of Systems   Constitutional:  Positive for appetite change and fatigue. Negative for chills, fever and unexpected weight change.   HENT:  Negative for congestion, mouth sores, nosebleeds, sinus pain, sore throat and trouble swallowing.    Eyes:  Negative for visual disturbance.   Respiratory:  Negative for apnea, cough, chest tightness and shortness of breath.    Cardiovascular:  Negative for chest pain, palpitations and leg swelling.   Gastrointestinal:  Positive for constipation. Negative for abdominal pain, blood in stool, diarrhea, nausea and vomiting.   Endocrine: Positive for polyuria. Negative for polydipsia.    Genitourinary:  Negative for decreased urine volume, difficulty urinating, dysuria and frequency.   Musculoskeletal:  Negative for arthralgias, back pain and neck pain.   Skin:  Negative for rash.   Neurological:  Negative for syncope, light-headedness and headaches.   Hematological:  Does not bruise/bleed easily.   Psychiatric/Behavioral:  Negative for confusion and suicidal ideas.      Objective:     Vital Signs (Most Recent):  Temp: 98.3 °F (36.8 °C) (04/06/25 1158)  Pulse: 94 (04/06/25 1312)  Resp: 20 (04/06/25 1312)  BP: 113/62 (04/06/25 1158)  SpO2: 95 % (04/06/25 1312) Vital Signs (24h Range):  Temp:  [96.9 °F (36.1 °C)-98.3 °F (36.8 °C)] 98.3 °F (36.8 °C)  Pulse:  [] 94  Resp:  [18-20] 20  SpO2:  [92 %-97 %] 95 %  BP: ()/(59-71) 113/62     Weight: (!) 136.1 kg (300 lb)  Body mass index is 49.92 kg/m².     Physical Exam  Vitals and nursing note reviewed. Exam conducted with a chaperone present.   Constitutional:       Appearance: She is obese. She is ill-appearing.   HENT:      Head: Atraumatic.      Mouth/Throat:      Mouth: Mucous membranes are dry.      Pharynx: Oropharynx is clear.   Eyes:      Conjunctiva/sclera: Conjunctivae normal.      Pupils: Pupils are equal, round, and reactive to light.   Neck:      Vascular: No carotid bruit.   Cardiovascular:      Rate and Rhythm: Regular rhythm. Tachycardia present.      Pulses: Normal pulses.      Heart sounds: Normal heart sounds.   Pulmonary:      Effort: Pulmonary effort is normal.      Breath sounds: Normal breath sounds.   Abdominal:      General: Bowel sounds are normal. There is no distension.      Palpations: Abdomen is soft.      Tenderness: There is no abdominal tenderness. There is no guarding.   Musculoskeletal:         General: No deformity or signs of injury. Normal range of motion.      Cervical back: Neck supple.      Right lower leg: No edema.      Left lower leg: No edema.   Skin:     General: Skin is warm and dry.       Capillary Refill: Capillary refill takes less than 2 seconds.      Coloration: Skin is not jaundiced or pale.      Findings: No bruising, lesion or rash.   Neurological:      General: No focal deficit present.      Mental Status: She is alert and oriented to person, place, and time.   Psychiatric:         Mood and Affect: Mood is depressed.         Behavior: Behavior is withdrawn.              CRANIAL NERVES     CN III, IV, VI   Pupils are equal, round, and reactive to light.       Significant Labs: All pertinent labs within the past 24 hours have been reviewed.    Significant Imaging: I have reviewed all pertinent imaging results/findings within the past 24 hours.      Assessment & Plan  Sepsis due to pneumonia  Appears to have UTI and pneumonia; has leukocytosis; has opacities on CXR; has G- bacilli on urine culture; blood cultures pending; will place on Vancomycin and Rocephin; BP on the low side; on IVF  Hypernatremia  Due to dehydration; off diuretics; on IVF; will monitor Na levels  Lung cancer metastatic to brain  DNR but not hospice.  Receives chemo and has finished radiation.      Thrombocytopenia  On chemo; will monitor    Essential hypertension  Monitor BP while on Metoprolol; on IVF; off Losartan    Moderate persistent asthma without complication  Continue home inhaled steroid/bronchodilator and singulair    Chronic kidney disease, stage 3b  Creatine stable   Chronic pulmonary embolism without acute cor pulmonale  Eliquis was discontinued due to risk of ICH with brain mets but they have improved so the anti-coagulation has been restarted; will monitor    Type 2 diabetes mellitus with hyperglycemia  BS elevated; will increase Lantus to 25 units; continue SSI;monitor BS     Gastroparesis  On Reglan and PPI for erosive gastritis; will monitor  Morbid obesity with BMI of 45.0-49.9, adult  Body mass index is 49.92 kg/m². Morbid obesity complicates all aspects of disease management from diagnostic modalities  to treatment. Weight loss encouraged and health benefits explained to patient.     Would benefit from sleep study and possible CPAP.  Does not wear oxygen at home.        VTE Risk Mitigation (From admission, onward)           Ordered     apixaban tablet 5 mg  2 times daily         04/04/25 0322                    Discharge Planning   MITUL:      Code Status: DNR   Medical Readiness for Discharge Date:   Discharge Plan A: Home with family                Please place Justification for DME        Noel Edwards DO  Department of Hospital Medicine   [facility]

## 2025-04-06 NOTE — CONSULTS
"  CC: PEG tube placement      HPI 69 y.o. female with history of morbid obesity (BMI 49), metastatic lung cancer (to the brain) who follows with Dr. Swanson for IV chemotherapy every other week and takes lazertinib daily. She has completed her course of radiation for the brain mets. Reports decreased appetite but tolerated some diet. GI has been consulted due to poor PO intake and request for PEG placement. She is minimally conversive with me. When asked about PEG she states she is unsure whether she would want one.     Medical records reviewed. Additional history supplemented by nursing.     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 Swanson    Melanocytic nevi of lower limb, including hip, left     Mixed hyperlipidemia     Moderate persistent asthma without complication 10/30/2024    Other pulmonary embolism without acute cor pulmonale     Vitamin D deficiency      Past Surgical History:   Procedure Laterality Date    CHOLECYSTECTOMY      CSF SHUNT      HYSTERECTOMY      TONSILLECTOMY       Social History  Social History     Tobacco Use    Smoking status: Never    Smokeless tobacco: Never   Substance Use Topics    Alcohol use: Never    Drug use: Never     Family History   Problem Relation Name Age of Onset    Diabetes Mother      Hypertension Mother      Lung cancer Father       Physical Examination  BP (!) 151/96   Pulse 88   Temp 98.3 °F (36.8 °C) (Oral)   Resp 18   Ht 5' 5" (1.651 m)   Wt (!) 136.1 kg " (300 lb)   SpO2 95%   BMI 49.92 kg/m²   General appearance: morbid obesity, alert, cooperative, no distress  HENT: Normocephalic, atraumatic, neck symmetrical, no nasal discharge   Eyes: conjunctivae/corneas clear, PERRL, EOM's intact  Lungs: +labored breathing, symmetric chest wall expansion bilaterally  Heart: regular rate and rhythm without rub; no displacement of the PMI   Abdomen: soft, protuberant, obese, non-tender; bowel sounds normoactive; no organomegaly  Extremities: extremities symmetric; no clubbing, cyanosis, or edema  Integument: Skin color, texture, turgor normal; no rashes; hair distrubution normal  Neurologic: Alert, did not test strength  Psychiatric: no pressured speech; flat affect; + evidence of impaired cognition     Labs:  Lab Results   Component Value Date    WBC 30.07 (HH) 04/05/2025    HGB 10.5 (L) 04/05/2025    HCT 32.1 (L) 04/05/2025    MCV 88.7 04/05/2025     (L) 04/05/2025     CMP  Sodium   Date Value Ref Range Status   04/05/2025 159 (H) 136 - 145 mmol/L Final     Potassium   Date Value Ref Range Status   04/05/2025 3.2 (L) 3.5 - 5.1 mmol/L Final     Chloride   Date Value Ref Range Status   04/05/2025 124 (H) 98 - 107 mmol/L Final     CO2   Date Value Ref Range Status   04/05/2025 24 23 - 31 mmol/L Final     Glucose   Date Value Ref Range Status   04/05/2025 164 (H) 82 - 115 mg/dL Final     BUN   Date Value Ref Range Status   04/05/2025 59 (H) 10 - 20 mg/dL Final     Creatinine   Date Value Ref Range Status   04/05/2025 1.75 (H) 0.55 - 1.02 mg/dL Final     Calcium   Date Value Ref Range Status   04/05/2025 8.6 8.4 - 10.2 mg/dL Final     Total Protein   Date Value Ref Range Status   04/04/2025 4.5 (L) 5.8 - 7.6 g/dL Final     Albumin   Date Value Ref Range Status   04/04/2025 2.0 (L) 3.4 - 4.8 g/dL Final     Bilirubin, Total   Date Value Ref Range Status   04/04/2025 0.5 <=1.5 mg/dL Final     Alk Phos   Date Value Ref Range Status   04/04/2025 126 40 - 150 U/L Final     AST    Date Value Ref Range Status   04/04/2025 34 11 - 45 U/L Final     ALT   Date Value Ref Range Status   04/04/2025 53 <=55 U/L Final     Anion Gap   Date Value Ref Range Status   04/05/2025 14 7 - 16 mmol/L Final     eGFR    Date Value Ref Range Status   06/30/2021 57 (L) >=60 mL/min/1.73m² Final     eGFR   Date Value Ref Range Status   12/23/2021 50 (L) >=60 mL/min/1.73m² Final       Imaging:  CXR:  Nonspecific patchy opacities both lungs may relate to atelectasis, edema, infection, and/or noninfectious inflammatory process.    Assessment:   Poor PO intake  Metastatic lung cancer to brain  Hypernatremia    Plan:   -Endoscopic PEG placement would likely be difficult due to morbid obesity and body habitus  -Would likely need surgical PEG or J tube per surgery due to history of gastroparesis  -For hypernatremia, will switch to D5W 125 cc/hr as free water deficit was calculated at around 9L  -Repeat CMP    Aurelio Reich MD  [facility]

## 2025-04-06 NOTE — SUBJECTIVE & OBJECTIVE
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 Swanson    Melanocytic nevi of lower limb, including hip, left     Mixed hyperlipidemia     Moderate persistent asthma without complication 10/30/2024    Other pulmonary embolism without acute cor pulmonale     Vitamin D deficiency        Past Surgical History:   Procedure Laterality Date    CHOLECYSTECTOMY      CSF SHUNT      HYSTERECTOMY      TONSILLECTOMY         Review of patient's allergies indicates:   Allergen Reactions    Penicillins Hives    Sulfa (sulfonamide antibiotics) Hives       No current facility-administered medications on file prior to encounter.     Current Outpatient Medications on File Prior to Encounter   Medication Sig    albuterol (VENTOLIN HFA) 90 mcg/actuation inhaler Inhale 2 puffs into the lungs every 6 (six) hours as needed for Wheezing. Rescue    albuterol-ipratropium (DUO-NEB) 2.5 mg-0.5 mg/3 mL nebulizer solution Take 3 mLs by nebulization every 4 (four) hours as needed for Wheezing or Shortness of Breath. Rescue    ALLERGY RELIEF, CETIRIZINE, 10 mg tablet TAKE ONE TABLET BY MOUTH ONCE DAILY    ascorbic acid, vitamin C, (VITAMIN C) 500 MG tablet Twice Daily    aspirin 81 MG Chew Take 1 tablet (81 mg total) by mouth once daily. (Patient not taking: Reported on 10/30/2024)    atorvastatin (LIPITOR) 10 MG tablet TAKE ONE TABLET BY MOUTH ONCE DAILY    benzonatate (TESSALON) 100 MG capsule Take 100 mg by mouth 3 (three) times daily as needed  for Cough. (Patient not taking: Reported on 10/30/2024)    blood-glucose meter,continuous (FREESTYLE OLIVA 3 READER) [facility] use as directed    blood-glucose sensor (FREESTYLE OLIVA 3 SENSOR) [facility] use as directed    cholecalciferol, vitamin D3, (VITAMIN D3) 50 mcg (2,000 unit) Tab Take 1 tablet (2,000 Units total) by mouth once daily.    dexAMETHasone (DECADRON) 4 MG Tab Take 4 mg by mouth.    doxycycline (DORYX) 100 MG EC tablet Take 100 mg by mouth 2 (two) times daily.    ELIQUIS 5 mg Tab TAKE ONE TABLET BY MOUTH TWICE DAILY    empagliflozin (JARDIANCE) 10 mg tablet Take 10 mg by mouth.    fluticasone-umeclidin-vilanter (TRELEGY ELLIPTA) 100-62.5-25 mcg DsDv Inhale 1 puff into the lungs once daily.    furosemide (LASIX) 40 MG tablet Take by mouth.    glipiZIDE 5 MG TR24 Take 5 mg by mouth every morning. (Patient not taking: Reported on 10/30/2024)    insulin aspart U-100 (NOVOLOG FLEXPEN U-100 INSULIN) 100 unit/mL (3 mL) InPn pen Per sliding scale max daily dose 30 units; subcutaneously; three times a day with meals    losartan-hydrochlorothiazide 50-12.5 mg (HYZAAR) 50-12.5 mg per tablet Take 1 tablet by mouth.    metoclopramide HCl (REGLAN) 5 MG tablet Take 5 mg by mouth 4 (four) times daily.    metoprolol succinate (TOPROL-XL) 50 MG 24 hr tablet Take 50 mg by mouth once daily.    metoprolol tartrate (LOPRESSOR) 25 MG tablet TAKE ONE TABLET BY MOUTH TWICE DAILY    montelukast (SINGULAIR) 10 mg tablet TAKE ONE TABLET BY MOUTH EVERY EVENING    ondansetron (ZOFRAN) 4 MG tablet Take 4 mg by mouth every 8 (eight) hours as needed. AS NEEDED FOR NAUSEA    osimertinib (TAGRISSO) 80 mg Tab Take 40 mg by mouth once daily.    OZEMPIC 0.25 mg or 0.5 mg (2 mg/3 mL) pen injector Inject 0.25 mg into the skin.    pantoprazole (PROTONIX) 40 MG tablet Take 40 mg by mouth once daily.    potassium chloride (KLOR-CON) 10 MEQ TbSR TAKE ONE TABLET BY MOUTH ONCE DAILY    predniSONE (DELTASONE) 20 MG tablet Take 20 mg by mouth. for five  days    rOPINIRole (REQUIP) 0.25 MG tablet Take 0.25 mg by mouth 3 (three) times daily.    sertraline (ZOLOFT) 25 MG tablet Take 1 tablet (25 mg total) by mouth once daily.    TRUE METRIX GLUCOSE METER Misc check blood sugar TWICE DAILY AND record    TRUE METRIX GLUCOSE TEST STRIP Strp check blood sugar TWICE DAILY AND record     Family History       Problem Relation (Age of Onset)    Diabetes Mother    Hypertension Mother    Lung cancer Father          Tobacco Use    Smoking status: Never    Smokeless tobacco: Never   Substance and Sexual Activity    Alcohol use: Never    Drug use: Never    Sexual activity: Not Currently     Review of Systems   Constitutional:  Positive for appetite change and fatigue. Negative for chills, fever and unexpected weight change.   HENT:  Negative for congestion, mouth sores, nosebleeds, sinus pain, sore throat and trouble swallowing.    Eyes:  Negative for visual disturbance.   Respiratory:  Negative for apnea, cough, chest tightness and shortness of breath.    Cardiovascular:  Negative for chest pain, palpitations and leg swelling.   Gastrointestinal:  Positive for constipation. Negative for abdominal pain, blood in stool, diarrhea, nausea and vomiting.   Endocrine: Positive for polyuria. Negative for polydipsia.   Genitourinary:  Negative for decreased urine volume, difficulty urinating, dysuria and frequency.   Musculoskeletal:  Negative for arthralgias, back pain and neck pain.   Skin:  Negative for rash.   Neurological:  Negative for syncope, light-headedness and headaches.   Hematological:  Does not bruise/bleed easily.   Psychiatric/Behavioral:  Negative for confusion and suicidal ideas.      Objective:     Vital Signs (Most Recent):  Temp: 98.3 °F (36.8 °C) (04/06/25 1158)  Pulse: 94 (04/06/25 1312)  Resp: 20 (04/06/25 1312)  BP: 113/62 (04/06/25 1158)  SpO2: 95 % (04/06/25 1312) Vital Signs (24h Range):  Temp:  [96.9 °F (36.1 °C)-98.3 °F (36.8 °C)] 98.3 °F (36.8 °C)  Pulse:   [] 94  Resp:  [18-20] 20  SpO2:  [92 %-97 %] 95 %  BP: ()/(59-71) 113/62     Weight: (!) 136.1 kg (300 lb)  Body mass index is 49.92 kg/m².     Physical Exam  Vitals and nursing note reviewed. Exam conducted with a chaperone present.   Constitutional:       Appearance: She is obese. She is ill-appearing.   HENT:      Head: Atraumatic.      Mouth/Throat:      Mouth: Mucous membranes are dry.      Pharynx: Oropharynx is clear.   Eyes:      Conjunctiva/sclera: Conjunctivae normal.      Pupils: Pupils are equal, round, and reactive to light.   Neck:      Vascular: No carotid bruit.   Cardiovascular:      Rate and Rhythm: Regular rhythm. Tachycardia present.      Pulses: Normal pulses.      Heart sounds: Normal heart sounds.   Pulmonary:      Effort: Pulmonary effort is normal.      Breath sounds: Normal breath sounds.   Abdominal:      General: Bowel sounds are normal. There is no distension.      Palpations: Abdomen is soft.      Tenderness: There is no abdominal tenderness. There is no guarding.   Musculoskeletal:         General: No deformity or signs of injury. Normal range of motion.      Cervical back: Neck supple.      Right lower leg: No edema.      Left lower leg: No edema.   Skin:     General: Skin is warm and dry.      Capillary Refill: Capillary refill takes less than 2 seconds.      Coloration: Skin is not jaundiced or pale.      Findings: No bruising, lesion or rash.   Neurological:      General: No focal deficit present.      Mental Status: She is alert and oriented to person, place, and time.   Psychiatric:         Mood and Affect: Mood is depressed.         Behavior: Behavior is withdrawn.              CRANIAL NERVES     CN III, IV, VI   Pupils are equal, round, and reactive to light.       Significant Labs: All pertinent labs within the past 24 hours have been reviewed.    Significant Imaging: I have reviewed all pertinent imaging results/findings within the past 24 hours.

## 2025-04-06 NOTE — PLAN OF CARE
Problem: Adult Inpatient Plan of Care  Goal: Plan of Care Review  Outcome: Progressing  Goal: Patient-Specific Goal (Individualized)  Outcome: Progressing  Goal: Absence of Hospital-Acquired Illness or Injury  Outcome: Progressing  Goal: Optimal Comfort and Wellbeing  Outcome: Progressing  Goal: Readiness for Transition of Care  Outcome: Progressing     Problem: Bariatric Environmental Safety  Goal: Safety Maintained with Care  Outcome: Progressing     Problem: Skin Injury Risk Increased  Goal: Skin Health and Integrity  Outcome: Progressing     Problem: Infection  Goal: Absence of Infection Signs and Symptoms  Outcome: Progressing     Problem: Fall Injury Risk  Goal: Absence of Fall and Fall-Related Injury  Outcome: Progressing     Problem: UTI (Urinary Tract Infection)  Goal: Improved Infection Symptoms  Outcome: Progressing     Problem: Anxiety  Goal: Anxiety Reduction or Resolution  Outcome: Progressing     Problem: Chronic Kidney Disease  Goal: Optimal Coping with Chronic Illness  Outcome: Progressing  Goal: Electrolyte Balance  Outcome: Progressing  Goal: Fluid Balance  Outcome: Progressing  Goal: Optimal Functional Ability  Outcome: Progressing  Goal: Absence of Anemia Signs and Symptoms  Outcome: Progressing  Goal: Optimal Oral Intake  Outcome: Progressing  Goal: Acceptable Pain Control  Outcome: Progressing  Goal: Minimize Renal Failure Effects  Outcome: Progressing     Problem: Diabetes Comorbidity  Goal: Blood Glucose Level Within Targeted Range  Outcome: Progressing     Problem: Sepsis/Septic Shock  Goal: Optimal Coping  Outcome: Progressing  Goal: Absence of Bleeding  Outcome: Progressing  Goal: Blood Glucose Level Within Targeted Range  Outcome: Progressing  Goal: Absence of Infection Signs and Symptoms  Outcome: Progressing  Goal: Optimal Nutrition Intake  Outcome: Progressing     Problem: Pneumonia  Goal: Fluid Balance  Outcome: Progressing  Goal: Resolution of Infection Signs and  Symptoms  Outcome: Progressing  Goal: Effective Oxygenation and Ventilation  Outcome: Progressing     Problem: Gas Exchange Impaired  Goal: Optimal Gas Exchange  Outcome: Progressing     Problem: Wound  Goal: Optimal Coping  Outcome: Progressing  Goal: Optimal Functional Ability  Outcome: Progressing  Goal: Absence of Infection Signs and Symptoms  Outcome: Progressing  Goal: Improved Oral Intake  Outcome: Progressing  Goal: Optimal Pain Control and Function  Outcome: Progressing  Goal: Skin Health and Integrity  Outcome: Progressing  Goal: Optimal Wound Healing  Outcome: Progressing

## 2025-04-07 LAB
ANION GAP SERPL CALCULATED.3IONS-SCNC: 14 MMOL/L (ref 7–16)
ANISOCYTOSIS BLD QL SMEAR: ABNORMAL
BASOPHILS # BLD AUTO: 0.1 K/UL (ref 0–0.2)
BASOPHILS NFR BLD AUTO: 0.3 % (ref 0–1)
BUN SERPL-MCNC: 44 MG/DL (ref 10–20)
BUN/CREAT SERPL: 30 (ref 6–20)
CALCIUM SERPL-MCNC: 8.3 MG/DL (ref 8.4–10.2)
CHLORIDE SERPL-SCNC: 125 MMOL/L (ref 98–107)
CO2 SERPL-SCNC: 20 MMOL/L (ref 23–31)
CREAT SERPL-MCNC: 1.45 MG/DL (ref 0.55–1.02)
DIFFERENTIAL METHOD BLD: ABNORMAL
EGFR (NO RACE VARIABLE) (RUSH/TITUS): 39 ML/MIN/1.73M2
EOSINOPHIL # BLD AUTO: 0.16 K/UL (ref 0–0.5)
EOSINOPHIL NFR BLD AUTO: 0.5 % (ref 1–4)
EOSINOPHIL NFR BLD MANUAL: 1 % (ref 1–4)
ERYTHROCYTE [DISTWIDTH] IN BLOOD BY AUTOMATED COUNT: 19.1 % (ref 11.5–14.5)
GLUCOSE SERPL-MCNC: 161 MG/DL (ref 70–105)
GLUCOSE SERPL-MCNC: 162 MG/DL (ref 82–115)
GLUCOSE SERPL-MCNC: 173 MG/DL (ref 70–105)
GLUCOSE SERPL-MCNC: 207 MG/DL (ref 70–105)
GLUCOSE SERPL-MCNC: 229 MG/DL (ref 70–105)
HCT VFR BLD AUTO: 32.2 % (ref 38–47)
HGB BLD-MCNC: 10.2 G/DL (ref 12–16)
IMM GRANULOCYTES # BLD AUTO: 1.31 K/UL (ref 0–0.04)
IMM GRANULOCYTES NFR BLD: 4.2 % (ref 0–0.4)
LYMPHOCYTES # BLD AUTO: 0.8 K/UL (ref 1–4.8)
LYMPHOCYTES NFR BLD AUTO: 2.6 % (ref 27–41)
LYMPHOCYTES NFR BLD MANUAL: 2 % (ref 27–41)
MCH RBC QN AUTO: 29.6 PG (ref 27–31)
MCHC RBC AUTO-ENTMCNC: 31.7 G/DL (ref 32–36)
MCV RBC AUTO: 93.3 FL (ref 80–96)
MONOCYTES # BLD AUTO: 0.82 K/UL (ref 0–0.8)
MONOCYTES NFR BLD AUTO: 2.6 % (ref 2–6)
MONOCYTES NFR BLD MANUAL: 2 % (ref 2–6)
MPC BLD CALC-MCNC: ABNORMAL G/DL
NEUTROPHILS # BLD AUTO: 27.78 K/UL (ref 1.8–7.7)
NEUTROPHILS NFR BLD AUTO: 89.8 % (ref 53–65)
NEUTS BAND NFR BLD MANUAL: 15 % (ref 1–5)
NEUTS SEG NFR BLD MANUAL: 80 % (ref 50–62)
NRBC # BLD AUTO: 0.24 X10E3/UL
NRBC BLD MANUAL-RTO: 1 /100 WBC
NRBC, AUTO (.00): 0.8 %
PLATELET # BLD AUTO: 124 K/UL (ref 150–400)
PLATELET MORPHOLOGY: ABNORMAL
POLYCHROMASIA BLD QL SMEAR: ABNORMAL
POTASSIUM SERPL-SCNC: 4.3 MMOL/L (ref 3.5–5.1)
RBC # BLD AUTO: 3.45 M/UL (ref 4.2–5.4)
SODIUM SERPL-SCNC: 155 MMOL/L (ref 136–145)
VANCOMYCIN TROUGH SERPL-MCNC: 36.5 ΜG/ML (ref 15–20)
WBC # BLD AUTO: 30.97 K/UL (ref 4.5–11)

## 2025-04-07 PROCEDURE — 99900035 HC TECH TIME PER 15 MIN (STAT)

## 2025-04-07 PROCEDURE — 97535 SELF CARE MNGMENT TRAINING: CPT

## 2025-04-07 PROCEDURE — 94640 AIRWAY INHALATION TREATMENT: CPT

## 2025-04-07 PROCEDURE — 25000003 PHARM REV CODE 250: Performed by: HOSPITALIST

## 2025-04-07 PROCEDURE — 63600175 PHARM REV CODE 636 W HCPCS: Performed by: HOSPITALIST

## 2025-04-07 PROCEDURE — 63600175 PHARM REV CODE 636 W HCPCS: Performed by: FAMILY MEDICINE

## 2025-04-07 PROCEDURE — 25000242 PHARM REV CODE 250 ALT 637 W/ HCPCS: Performed by: HOSPITALIST

## 2025-04-07 PROCEDURE — 94761 N-INVAS EAR/PLS OXIMETRY MLT: CPT

## 2025-04-07 PROCEDURE — 97110 THERAPEUTIC EXERCISES: CPT

## 2025-04-07 PROCEDURE — 36415 COLL VENOUS BLD VENIPUNCTURE: CPT | Performed by: FAMILY MEDICINE

## 2025-04-07 PROCEDURE — 87641 MR-STAPH DNA AMP PROBE: CPT | Performed by: HOSPITALIST

## 2025-04-07 PROCEDURE — 80048 BASIC METABOLIC PNL TOTAL CA: CPT | Performed by: FAMILY MEDICINE

## 2025-04-07 PROCEDURE — 99233 SBSQ HOSP IP/OBS HIGH 50: CPT | Mod: ,,, | Performed by: HOSPITALIST

## 2025-04-07 PROCEDURE — 80202 ASSAY OF VANCOMYCIN: CPT | Performed by: HOSPITALIST

## 2025-04-07 PROCEDURE — 97530 THERAPEUTIC ACTIVITIES: CPT

## 2025-04-07 PROCEDURE — 27000207 HC ISOLATION

## 2025-04-07 PROCEDURE — 25000003 PHARM REV CODE 250: Performed by: INTERNAL MEDICINE

## 2025-04-07 PROCEDURE — 27000221 HC OXYGEN, UP TO 24 HOURS

## 2025-04-07 PROCEDURE — 11000001 HC ACUTE MED/SURG PRIVATE ROOM

## 2025-04-07 PROCEDURE — 82962 GLUCOSE BLOOD TEST: CPT

## 2025-04-07 PROCEDURE — 85025 COMPLETE CBC W/AUTO DIFF WBC: CPT | Performed by: FAMILY MEDICINE

## 2025-04-07 RX ADMIN — APIXABAN 5 MG: 5 TABLET, FILM COATED ORAL at 08:04

## 2025-04-07 RX ADMIN — ATORVASTATIN CALCIUM 20 MG: 20 TABLET, FILM COATED ORAL at 08:04

## 2025-04-07 RX ADMIN — PANTOPRAZOLE SODIUM 40 MG: 40 TABLET, DELAYED RELEASE ORAL at 08:04

## 2025-04-07 RX ADMIN — MUPIROCIN: 20 OINTMENT TOPICAL at 09:04

## 2025-04-07 RX ADMIN — IPRATROPIUM BROMIDE AND ALBUTEROL SULFATE 3 ML: .5; 3 SOLUTION RESPIRATORY (INHALATION) at 12:04

## 2025-04-07 RX ADMIN — METOCLOPRAMIDE 5 MG: 5 TABLET ORAL at 11:04

## 2025-04-07 RX ADMIN — MONTELUKAST 10 MG: 10 TABLET, FILM COATED ORAL at 09:04

## 2025-04-07 RX ADMIN — IPRATROPIUM BROMIDE AND ALBUTEROL SULFATE 3 ML: .5; 3 SOLUTION RESPIRATORY (INHALATION) at 07:04

## 2025-04-07 RX ADMIN — MUPIROCIN: 20 OINTMENT TOPICAL at 08:04

## 2025-04-07 RX ADMIN — METOCLOPRAMIDE 5 MG: 5 TABLET ORAL at 09:04

## 2025-04-07 RX ADMIN — BUDESONIDE 0.5 MG: 0.5 SUSPENSION RESPIRATORY (INHALATION) at 07:04

## 2025-04-07 RX ADMIN — INSULIN GLARGINE 25 UNITS: 100 INJECTION, SOLUTION SUBCUTANEOUS at 09:04

## 2025-04-07 RX ADMIN — MEROPENEM 1 G: 1 INJECTION, POWDER, FOR SOLUTION INTRAVENOUS at 04:04

## 2025-04-07 RX ADMIN — DEXTROSE MONOHYDRATE: 50 INJECTION, SOLUTION INTRAVENOUS at 04:04

## 2025-04-07 RX ADMIN — INSULIN ASPART 2 UNITS: 100 INJECTION, SOLUTION INTRAVENOUS; SUBCUTANEOUS at 09:04

## 2025-04-07 RX ADMIN — MEROPENEM 1 G: 1 INJECTION, POWDER, FOR SOLUTION INTRAVENOUS at 03:04

## 2025-04-07 RX ADMIN — DEXTROSE MONOHYDRATE: 50 INJECTION, SOLUTION INTRAVENOUS at 01:04

## 2025-04-07 RX ADMIN — METOCLOPRAMIDE 5 MG: 5 TABLET ORAL at 06:04

## 2025-04-07 RX ADMIN — DEXTROSE MONOHYDRATE: 50 INJECTION, SOLUTION INTRAVENOUS at 10:04

## 2025-04-07 RX ADMIN — VANCOMYCIN HYDROCHLORIDE 2500 MG: 500 INJECTION, POWDER, LYOPHILIZED, FOR SOLUTION INTRAVENOUS at 03:04

## 2025-04-07 RX ADMIN — SERTRALINE HYDROCHLORIDE 100 MG: 50 TABLET ORAL at 08:04

## 2025-04-07 NOTE — PROGRESS NOTES
"[facility]  Adult Nutrition  Follow-Up Note         Reason for Assessment  Reason For Assessment: RD follow-up        Assessment and Plan    4/7/2025:    RD follow up. Patient is currently on a Consistent Carbohydrate Diet (2000 Calories). Per flowsheet, patient does not have an appetite and is only taking a few bites of her food and/or refusing meals altogether. SLP evaluated patient last week, and pt refused all solids during evaluation.     GI was consulted for feeding tube. Per GI yesterday: "Endoscopic PEG placement would likely be difficult due to morbid obesity and body habits. Would likely need surgical PEG or J tube per surgery due to history of gastroparesis." Per GI, when asked about PEG, she states she is unsure whether she would want one.     Recommend to consider changing diet to Low Residue 2/2 gastroparesis with addition of Boost Glucose Control (all meals) to better meet nutritional needs with daily multivitamin/mineral pending decision regarding nutrition support. If decision made to initiate nutrition support, given gastroparesis, J-tube warranted. RD following.    4/4/2025:    Consult received and appreciated. Consult for gastroparesis with risk of malnutrition. Patient is a 70 yo F who presented to [place] ED from [facility] for abnormal labs. Patient was discharged from [facility] two days ago to SNF for rehab. She was diagnosed with dehydration due to gastroparesis with UTI. Patient has complex medical history including anxiety, CKD stage 3b, chronic PE without acute cor pulmonale, T2DM, gastroparesis. Pt has metastatic lung cancer (to the brain) and receives IV chemotherapy every other week. She has completed her course of radiation for brain mets and has showed some improvement.    Patient is 136.1 kg (300 lb) with a BMI of 49.92 and is morbidly obese. Chart review reveals that patient has not lost a significant amount of weight as of recent. Rather, in October " 2024 patient documented to weigh 133.8 kg (295 lb) (+15 lbs x 6 mo). Patient has no evident fat or muscle depletion. However, she has no appetite, early satiety, and is depressed. She isn't nauseated nor vomiting. PTA, she had not had anything to eat or drink in two days. Despite poor intake, pt does not meet criteria for malnutrition at this time per ASPEN guidelines, though she is at high risk.     Patient is currently on a Regular Diet. Recommend to change diet to Low Residue 2/2 gastroparesis (low fat, low fiber) with addition of Boost Glucose Control TID -- though solid foods high in fat are not recommended, liquids that contain fat may be tolerated and can provide needed calories. If difficulty tolerating solid foods, ground foods may be better as solid foods in stomach don't empty well. ST consulted for swallow eval for possible dysphagia to solid foods. Recommend to consider appetite stimulant or alternate route of nutrition if intake does not improve. Per MD, she is not objective to TPN or J tube if needed.     Note: Patient was on ozempic -- gastroparesis should improve off ozempic, plan to trial low dose reglan per MD    Last Bowel Movement: 04/04/25    Medications/labs reviewed. RD following.    Learning Needs/Social Determinants of Health    Learning Assessment       04/04/2025 0021 [facility] (4/3/2025 - Present)   Created by Deanna Rivas, RN - RN (Nurse) Status: Complete                 PRIMARY LEARNER     Primary Learner Name:  Magan Saleem SG - 04/04/2025 0021    Relationship:  Patient SG - 04/04/2025 0021    Does the primary learner have any barriers to learning?:  No Barriers SG - 04/04/2025 0021    What is the preferred language of the primary learner?:  English SG - 04/04/2025 0021    Is an  required?:  No SG - 04/04/2025 0021    How does the primary learner prefer to learn new concepts?:  Listening SG - 04/04/2025 0021    How often do you need to have  someone help you read instructions, pamphlets, or written material from your doctor or pharmacy?:  Never SG - 04/04/2025 0021        CO-LEARNER #1     No question answered        CO-LEARNER #2     No question answered        SPECIAL TOPICS     No question answered        ANSWERED BY:     -:  Family SG - 04/04/2025 0021        Comments         Edit History       Deanna Rivas RN - RN (Nurse)   04/04/2025 0021                          Social Drivers of Health     Tobacco Use: Low Risk  (4/4/2025)    Patient History     Smoking Tobacco Use: Never     Smokeless Tobacco Use: Never     Passive Exposure: Not on file   Alcohol Use: Not At Risk (4/4/2025)    AUDIT-C     Frequency of Alcohol Consumption: Never     Average Number of Drinks: Patient does not drink     Frequency of Binge Drinking: Never   Financial Resource Strain: Patient Declined (4/5/2025)    Overall Financial Resource Strain (CARDIA)     Difficulty of Paying Living Expenses: Patient declined   Food Insecurity: Patient Declined (4/5/2025)    Hunger Vital Sign     Worried About Running Out of Food in the Last Year: Patient declined     Ran Out of Food in the Last Year: Patient declined   Transportation Needs: No Transportation Needs (4/4/2025)    PRAPARE - Transportation     Lack of Transportation (Medical): No     Lack of Transportation (Non-Medical): No   Physical Activity: Inactive (4/4/2025)    Exercise Vital Sign     Days of Exercise per Week: 0 days     Minutes of Exercise per Session: 0 min   Stress: Patient Declined (4/5/2025)    [employer] - Occupational Stress Questionnaire     Feeling of Stress : Patient declined   Housing Stability: Patient Declined (4/5/2025)    Housing Stability Vital Sign     Unable to Pay for Housing in the Last Year: Patient declined     Number of Times Moved in the Last Year: Not on file     Homeless in the Last Year: Patient declined   Depression: Low Risk  (10/30/2024)    Depression      Last PHQ-4: Flowsheet Data: 0   Utilities: Patient Declined (4/5/2025)    [facility] Utilities     Threatened with loss of utilities: Patient declined   Health Literacy: Patient Declined (4/5/2025)     Health Literacy     Frequency of need for help with medical instructions: Patient declines to respond   Recent Concern: Health Literacy - Inadequate Health Literacy (4/4/2025)     Health Literacy     Frequency of need for help with medical instructions: Sometimes   Social Isolation: Not on file     Malnutrition  Is Patient Malnourished: No    Nutrition Diagnosis  Decreased nutrient needs of fat, fiber  related to Compromised organ/organs related to G.I. function as evidenced by gastroparesis    Recent Labs   Lab 04/07/25  0503 04/07/25  0759   *  --    POCGLU  --  161*     Comments on Glucose: Glucose elevated (T2DM, metabolic stress, D5W)    Nutrition Prescription / Recommendations  Recommendation/Intervention: Recommend to change diet order to low residue 2/2 gastroparesis with addition of Boost Glucose Control TID to better meet nutritional needs. If intake does not improve, may recommend to consider alternate route of nutrition.  Goals: encourage good PO intakes, weight maintenance during admission  Nutrition Goal Status: new  Current Diet Order: Consistent Carbohydrate Diet  Chewing or Swallowing Difficulty: refused solids during speech eval  Recommended Diet: Fiber/Fat Restricted  Recommended Oral Supplement: Boost Glucose Control [190kcals, 16g protein, 16g Carbs] with meals  Is Nutrition Education Recommended: No-- will likely warrant education pending clinical status upon follow up with caretaker    Monitor and Evaluation  % current intake: 0 - 10 %  % intake to meet estimated needs: 50 - 75 %  Monitor and Evaluation: Food and beverage intake, Protein intake, Carbohydrate intake, Diet order, Weight, Beliefs and attitudes, Electrolyte and renal panel, Gastrointestinal profile, Glucose/endocrine  profile, Inflammatory profile, Lipid profile, Nutrition focused physical findings    Current Medical Diagnosis and Past Medical History     Past Medical History:   Diagnosis Date    Chronic kidney disease, stage 3b     Chronic pulmonary embolism without acute cor pulmonale     Diabetes mellitus type 2 in obese     DNR (do not resuscitate)     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 Swanson    Melanocytic nevi of lower limb, including hip, left     Mixed hyperlipidemia     Moderate persistent asthma without complication 10/30/2024    Other pulmonary embolism without acute cor pulmonale     Vitamin D deficiency      Nutrition/Diet History  Spiritual, Cultural Beliefs, Oriental orthodox Practices, Values that Affect Care: no  Factors Affecting Nutritional Intake: depression, altered gastrointestinal function, decreased appetite, early satiety    Lab/Procedures/Meds  Recent Labs   Lab 04/06/25 2122 04/07/25  0503   * 155*   K 3.9 4.3   BUN 47* 44*   CREATININE 1.46* 1.45*   CALCIUM 8.3* 8.3*   ALBUMIN 1.7*  --    * 125*   ALT 53  --    AST 49*  --    Note: Na+ high (dehydration). BUN, Cr high (dehydration, CKD stage 3). Ca++ low (likely secondary to low alb). Alb low (inflammation, liver dysfunction). Cl- high (dehydration, IV fluids). AST high (liver dysfunction)    Last A1c:   Lab Results   Component Value Date    HGBA1C 6.7 04/02/2025    HGBA1C 6.4 (H) 02/05/2025   Note: < 7 % goal for individuals with diabetes    Lab Results   Component Value Date    RBC 3.45 (L) 04/07/2025    HGB 10.2 (L) 04/07/2025    HCT 32.2 (L) 04/07/2025    MCV 93.3 04/07/2025    MCH 29.6 04/07/2025    MCHC 31.7 (L) 04/07/2025  "  Note: H/H low    Pertinent Labs Reviewed: reviewed  Pertinent Medications Reviewed: reviewed    Scheduled Meds:   albuterol-ipratropium  3 mL Nebulization Q6H    apixaban  5 mg Oral BID    atorvastatin  20 mg Oral Daily    budesonide  0.5 mg Nebulization Q12H    insulin glargine U-100  25 Units Subcutaneous QHS    meropenem IV (PEDS and ADULTS)  1 g Intravenous Q12H    metoclopramide HCl  5 mg Oral QID (AC & HS)    metoprolol succinate  25 mg Oral Daily    montelukast  10 mg Oral QHS    mupirocin   Nasal BID    pantoprazole  40 mg Oral Daily    sertraline  100 mg Oral Daily    vancomycin (VANCOCIN) IV (PEDS and ADULTS)  2,500 mg Intravenous Q24H   Note: atorvastatin, insulin glargine, metoclopramide, metoprolol, pantoprazole, sertraline    Continuous Infusions:   D5W   Intravenous Continuous 125 mL/hr at 04/07/25 0447 New Bag at 04/07/25 0447     PRN Meds:.  Current Facility-Administered Medications:     acetaminophen, 650 mg, Rectal, Q6H PRN    acetaminophen, 1,000 mg, Oral, Q6H PRN    aluminum & magnesium hydroxide-simethicone, 30 mL, Oral, Q6H PRN    bisacodyL, 10 mg, Oral, Daily PRN    dextromethorphan-guaiFENesin  mg/5 ml, 10 mL, Oral, Q6H PRN    dextrose 50%, 12.5 g, Intravenous, PRN    dextrose 50%, 25 g, Intravenous, PRN    diphenhydrAMINE, 50 mg, Oral, Q6H PRN    docusate sodium, 100 mg, Oral, BID PRN    glucagon (human recombinant), 1 mg, Intramuscular, PRN    glucose, 16 g, Oral, PRN    glucose, 24 g, Oral, PRN    HYDROcodone-acetaminophen, 1 tablet, Oral, Q6H PRN    insulin aspart U-100, 0-10 Units, Subcutaneous, QID (AC + HS) PRN    melatonin, 6 mg, Oral, Nightly PRN    traZODone, 50 mg, Oral, Nightly PRN    vancomycin - pharmacy to dose, , Intravenous, pharmacy to manage frequency    Anthropometrics  Height: 5' 5" (165.1 cm)  Height (inches): 65 in  Height Method: Stated  Weight: (!) 136.1 kg (300 lb)  Weight (lb): 300 lb  Weight Method: Bed Scale  Ideal Body Weight (IBW), Female: 125 lb  % " Ideal Body Weight, Female (lb): 240 %  BMI (Calculated): 49.9       Estimated/Assessed Needs  RMR (Faulk-St. Jeor Equation): 1891.68     Temp: 98.3 °F (36.8 °C)Axillary  Weight Used For Calorie Calculations: (!) 136.1 kg (300 lb 0.7 oz)     Energy Calorie Requirements (kcal): 1,905 - 2,722 kcal (14 - 20 kcal/kg ABW) (morbidly obese-- though hypermetabolic secondary to cancer, this should be adequate given BMI)  Weight Used For Protein Calculations: 56.7 kg (125 lb)  Protein Requirements: 60 - 85 g (1 - 1.5 g/kg IBW) (increased protein needs secondary to lung cancer with brain mets; undergoing chemo-- increased protein to heal tissues, fight infection; older age)    Fluid Requirements (mL): 1 mL/kcal  CHO Requirement: 45-55% (T2DM)    Nutrition by Nursing  Diet/Nutrition Received: regular  Intake (%): other (see comments) (a few bites)  Diet/Feeding Assistance: total feed  Diet/Feeding Tolerance: no appetite                Nutrition Follow-Up  RD Follow-up?: Yes    Nutrition Discharge Planning: Too early to determine, pending clinical course       Maria C Lorenzo, MS, RD, LD  Available via Secure Chat

## 2025-04-07 NOTE — PT/OT/SLP PROGRESS
Physical Therapy Treatment    Patient Name:  Magan Saleem   MRN:  [id number]    Recommendations:     Discharge Recommendations: Moderate Intensity Therapy  Discharge Equipment Recommendations: to be determined by next level of care  Barriers to discharge: Inaccessible home and Decreased caregiver support    Assessment:     Magan Saleem is a 69 y.o. female admitted with a medical diagnosis of Sepsis due to pneumonia.  She presents with the following impairments/functional limitations: weakness, impaired endurance, impaired self care skills, impaired functional mobility, decreased lower extremity function, impaired cardiopulmonary response to activity Pt is very weak and required maximum assistance with all mobility. Unable to tolerate sitting at edge of bed due to shortness of breath.    Rehab Prognosis: Poor; patient would benefit from acute skilled PT services to address these deficits and reach maximum level of function.    Recent Surgery: * No surgery found *      Plan:     During this hospitalization, patient to be seen 5 x/week to address the identified rehab impairments via gait training, therapeutic activities, therapeutic exercises and progress toward the following goals:    Plan of Care Expires:  05/04/25    Subjective     Chief Complaint: weakness  Patient/Family Comments/goals: Pt is agreeable to PT   Pain/Comfort:  Pain Rating 1: 3/10  Location - Orientation 1: generalized  Pain Addressed 1: Reposition  Pain Rating Post-Intervention 1: 3/10      Objective:     Communicated with JOHANNA Blake RN prior to session.  Patient found HOB elevated with peripheral IV, oxygen, PureWick upon PT entry to room.     General Precautions: Standard, fall  Orthopedic Precautions: N/A  Braces: N/A  Respiratory Status: Nasal cannula, flow 2 L/min     Functional Mobility:  Bed Mobility:     Scooting: maximal assistance and of 2 persons  Supine to Sit: maximal assistance and of 2 persons  Sit to Supine: maximal assistance and  of 2 persons  Balance: fair      AM-PAC 6 CLICK MOBILITY  Turning over in bed (including adjusting bedclothes, sheets and blankets)?: 2  Sitting down on and standing up from a chair with arms (e.g., wheelchair, bedside commode, etc.): 1  Moving from lying on back to sitting on the side of the bed?: 2  Moving to and from a bed to a chair (including a wheelchair)?: 1  Need to walk in hospital room?: 1  Climbing 3-5 steps with a railing?: 1  Basic Mobility Total Score: 8       Treatment & Education:  Assisted to edge of bed with maximum assistance, unable to tolerate sitting, assisted back into bed  Pt performed bilateral LE: bed level exercises: ankle pumps, quad sets, heel slides, and hip abduction x 30 each      Patient left HOB elevated with all lines intact and call button in reach..    GOALS:   Multidisciplinary Problems       Physical Therapy Goals          Problem: Physical Therapy    Goal Priority Disciplines Outcome Interventions   Physical Therapy Goal     PT, PT/OT Progressing    Description: Short term goals:  1. Supine to sit with MInimal Assistance  2. Sit to stand transfer with Moderate Assistance  3. Bed to chair transfer with Moderate Assistance using Rolling Walker  4. Sitting at edge of bed x15 minutes with Contact Guard Assistance    Long term goals:  1. Supine to sit with Contact Guard Assistance  2. Sit to stand transfer with Contact Guard Assistance  3. Bed to chair transfer with Contact Guard Assistance using Rolling Walker  4. Gait  x 100 feet with Contact Guard Assistance using Rolling Walker.                          DME Justifications:  No DME recommended requiring DME justifications    Time Tracking:     PT Received On: 04/07/25  PT Start Time: 0923     PT Stop Time: 0952  PT Total Time (min): 29 min     Billable Minutes: Therapeutic Activity 12 and Therapeutic Exercise 17    Treatment Type: Treatment  PT/PTA: PT     Number of PTA visits since last PT visit: 0     04/07/2025

## 2025-04-07 NOTE — PROGRESS NOTES
Pharmacokinetic Assessment Follow Up: IV Vancomycin    Vancomycin serum concentration assessment(s):    The trough level was drawn correctly and can be used to guide therapy at this time. The measurement is above the desired definitive target range of 15 to 20 mcg/mL.    Vancomycin Regimen Plan:    Discontinue the scheduled vancomycin regimen and re-dose when the random level is less than 20 mcg/mL, next level to be drawn at 4/8 on 0430.    Drug levels (last 3 results):  Recent Labs   Lab Result Units 04/07/25  1436   Vancomycin, Trough µg/mL 36.5*       Pharmacy will continue to follow and monitor vancomycin.    Please contact pharmacy at extension [phone] for questions regarding this assessment.    Thank you for the consult,   Solis Hawthorne, PharmD       Patient brief summary:  Magan Saleem is a 69 y.o. female initiated on antimicrobial therapy with IV Vancomycin for treatment of sepsis    The patient's current regimen is currently on hold.    Drug Allergies:   Review of patient's allergies indicates:   Allergen Reactions    Penicillins Hives    Sulfa (sulfonamide antibiotics) Hives       Actual Body Weight:   136.1 kg    Renal Function:   Estimated Creatinine Clearance: 51.9 mL/min (A) (based on SCr of 1.45 mg/dL (H)).,     Dialysis Method (if applicable):  N/A    CBC (last 72 hours):  Recent Labs   Lab Result Units 04/05/25  0542 04/07/25  0503   WBC K/uL 30.07* 30.97*   Hemoglobin g/dL 10.5* 10.2*   Hematocrit % 32.1* 32.2*   Platelet Count K/uL 106* 124*   Lymphocytes % % 3.1* 2.6*   Lymphocytes, Man % % 3* 2*   Monocytes % % 2.8 2.6   Monocytes, Man % % 2 2   Eosinophils % % 0.4* 0.5*   Eosinophils, Man % %  --  1   Basophils % % 0.3 0.3   Diff Type  Scan Smear Scan Smear       Metabolic Panel (last 72 hours):  Recent Labs   Lab Result Units 04/05/25  0542 04/06/25 2122 04/07/25  0503   Sodium mmol/L 159* 156* 155*   Potassium mmol/L 3.2* 3.9 4.3   Chloride mmol/L 124* 125* 125*   CO2 mmol/L 24 20* 20*    Glucose mg/dL 164* 110 162*   BUN mg/dL 59* 47* 44*   Creatinine mg/dL 1.75* 1.46* 1.45*   Albumin g/dL  --  1.7*  --    Bilirubin, Total mg/dL  --  0.6  --    Alk Phos U/L  --  157*  --    AST U/L  --  49*  --    ALT U/L  --  53  --        Vancomycin Administrations:  vancomycin given in the last 96 hours                     vancomycin (VANCOCIN) 2,500 mg in 0.9% NaCl 500 mL IVPB (mg) 2,500 mg New Bag 04/07/25 1529     2,500 mg New Bag 04/06/25 1352     2,500 mg New Bag 04/05/25 1459                    Microbiologic Results:  Microbiology Results (last 7 days)       Procedure Component Value Units Date/Time    Clostridioides difficile toxin/antigen with reflex to PCR [[id number]]     Order Status: Canceled Specimen: Stool

## 2025-04-07 NOTE — PLAN OF CARE
Talked to patient's daughter, Zhane Saleem at [phone] regarding plans for discharge.  She stated that she is agreeable for Rehab at  for patient if her mother is willing to go.  Otherwise, she stated she may discharge to live with her.  Patient was at  at [facility] in [place] prior to admission but was not doing well.  Will continue to follow for anticipated discharge needs.

## 2025-04-07 NOTE — PLAN OF CARE
Problem: Adult Inpatient Plan of Care  Goal: Plan of Care Review  Outcome: Progressing  Goal: Patient-Specific Goal (Individualized)  Outcome: Progressing  Goal: Absence of Hospital-Acquired Illness or Injury  Outcome: Progressing  Goal: Optimal Comfort and Wellbeing  Outcome: Progressing  Goal: Readiness for Transition of Care  Outcome: Progressing     Problem: Bariatric Environmental Safety  Goal: Safety Maintained with Care  Outcome: Progressing     Problem: Skin Injury Risk Increased  Goal: Skin Health and Integrity  Outcome: Progressing     Problem: Infection  Goal: Absence of Infection Signs and Symptoms  Outcome: Progressing     Problem: Fall Injury Risk  Goal: Absence of Fall and Fall-Related Injury  Outcome: Progressing     Problem: UTI (Urinary Tract Infection)  Goal: Improved Infection Symptoms  Outcome: Progressing     Problem: Anxiety  Goal: Anxiety Reduction or Resolution  Outcome: Progressing     Problem: Chronic Kidney Disease  Goal: Optimal Coping with Chronic Illness  Outcome: Progressing  Goal: Electrolyte Balance  Outcome: Progressing  Goal: Fluid Balance  Outcome: Progressing  Goal: Optimal Functional Ability  Outcome: Progressing  Goal: Absence of Anemia Signs and Symptoms  Outcome: Progressing  Goal: Optimal Oral Intake  Outcome: Progressing  Goal: Acceptable Pain Control  Outcome: Progressing  Goal: Minimize Renal Failure Effects  Outcome: Progressing     Problem: Diabetes Comorbidity  Goal: Blood Glucose Level Within Targeted Range  Outcome: Progressing     Problem: Sepsis/Septic Shock  Goal: Optimal Coping  Outcome: Progressing  Goal: Absence of Bleeding  Outcome: Progressing  Goal: Blood Glucose Level Within Targeted Range  Outcome: Progressing  Goal: Absence of Infection Signs and Symptoms  Outcome: Progressing  Goal: Optimal Nutrition Intake  Outcome: Progressing     Problem: Pneumonia  Goal: Fluid Balance  Outcome: Progressing  Goal: Resolution of Infection Signs and  What Type Of Note Output Would You Prefer (Optional)?: Standard Output Hpi Title: Evaluation of Skin Lesions Symptoms  Outcome: Progressing  Goal: Effective Oxygenation and Ventilation  Outcome: Progressing     Problem: Gas Exchange Impaired  Goal: Optimal Gas Exchange  Outcome: Progressing     Problem: Wound  Goal: Optimal Coping  Outcome: Progressing  Goal: Optimal Functional Ability  Outcome: Progressing  Goal: Absence of Infection Signs and Symptoms  Outcome: Progressing  Goal: Improved Oral Intake  Outcome: Progressing  Goal: Optimal Pain Control and Function  Outcome: Progressing  Goal: Skin Health and Integrity  Outcome: Progressing  Goal: Optimal Wound Healing  Outcome: Progressing     Problem: Airway Clearance Ineffective  Goal: Effective Airway Clearance  Outcome: Progressing      How Severe Are Your Spot(S)?: moderate Have Your Spot(S) Been Treated In The Past?: has not been treated

## 2025-04-07 NOTE — PT/OT/SLP PROGRESS
Occupational Therapy   Treatment    Name: Magan Saleem  MRN: [id number]  Admitting Diagnosis:  Sepsis due to pneumonia       Recommendations:     Discharge Recommendations: Low Intensity Therapy  Discharge Equipment Recommendations:  to be determined by next level of care  Barriers to discharge:  None    Assessment:     Magan Saleem is a 69 y.o. female with a medical diagnosis of Sepsis due to pneumonia.  She presents with weakness. Performance deficits affecting function are weakness, impaired endurance, impaired self care skills, impaired functional mobility, gait instability, impaired balance.     Rehab Prognosis:  Good; patient would benefit from acute skilled OT services to address these deficits and reach maximum level of function.       Plan:     Patient to be seen 5 x/week to address the above listed problems via self-care/home management, therapeutic activities, therapeutic exercises  Plan of Care Expires:    Plan of Care Reviewed with: patient    Subjective     Chief Complaint: Pt c/o difficulty breathing when sitting up and her throat burning  Patient/Family Comments/goals: return to PLOF  Pain/Comfort:       Objective:     Communicated with: LONDON Cisneros prior to session.  Patient found HOB elevated with peripheral IV, oxygen upon OT entry to room.    General Precautions: Standard, fall    Orthopedic Precautions:   Braces: N/A  Respiratory Status: Nasal cannula, flow 2 L/min     Occupational Performance:     Bed Mobility:    Patient completed Supine to Sit with maximal assistance and 2 persons  Patient completed Sit to Supine with maximal assistance and 2 persons     Functional Mobility/Transfers:  Pt was unable to complete, Pt's sitting balance was fair. She lay down across the bed twice stating she couldn't breathe  Functional Mobility:     Activities of Daily Living:        [facility] 6 Click ADL:      Treatment & Education:  Pt completed BUE AROM in elbow flex finger flex/ext, and sh flex 30 x ea ex to  increase ROM and strength for improved performanc ein self care skills    Patient left HOB elevated with all lines intact and call button in reach    GOALS:   Multidisciplinary Problems       Occupational Therapy Goals          Problem: Occupational Therapy    Goal Priority Disciplines Outcome Interventions   Occupational Therapy Goal     OT, PT/OT Progressing    Description: STG:  Pt will perform grooming with setup  Pt will bathe with Min A  Pt will perform UE dressing with Min A  Pt will perform LE dressing with Mod A  Pt will sit EOB x 10 min with CG assistance  Pt will transfer bed/chair/bsc with Mod A  Pt will perform standing task x 3 min with CG assistance  Pt will tolerate 30 minutes of tx without fatigue      LT.Restore to max I with self care and mobility.                         DME Justifications:  No DME recommended requiring DME justifications    Time Tracking:     OT Date of Treatment: 25  OT Start Time: 900  OT Stop Time: 930  OT Total Time (min): 30 min    Billable Minutes:Self Care/Home Management 15  Therapeutic Exercise 15               2025

## 2025-04-08 LAB
AMYLASE SERPL-CCNC: 92 U/L (ref 25–125)
ANION GAP SERPL CALCULATED.3IONS-SCNC: 13 MMOL/L (ref 7–16)
ANISOCYTOSIS BLD QL SMEAR: ABNORMAL
BASOPHILS # BLD AUTO: 0.08 K/UL (ref 0–0.2)
BASOPHILS NFR BLD AUTO: 0.3 % (ref 0–1)
BUN SERPL-MCNC: 39 MG/DL (ref 10–20)
BUN/CREAT SERPL: 28 (ref 6–20)
CALCIUM SERPL-MCNC: 8.2 MG/DL (ref 8.4–10.2)
CHLORIDE SERPL-SCNC: 118 MMOL/L (ref 98–107)
CHOLEST SERPL-MCNC: 88 MG/DL
CHOLEST/HDLC SERPL: 3.5 {RATIO}
CO2 SERPL-SCNC: 22 MMOL/L (ref 23–31)
CREAT SERPL-MCNC: 1.39 MG/DL (ref 0.55–1.02)
DIFFERENTIAL METHOD BLD: ABNORMAL
EGFR (NO RACE VARIABLE) (RUSH/TITUS): 41 ML/MIN/1.73M2
EOSINOPHIL # BLD AUTO: 0.13 K/UL (ref 0–0.5)
EOSINOPHIL NFR BLD AUTO: 0.4 % (ref 1–4)
ERYTHROCYTE [DISTWIDTH] IN BLOOD BY AUTOMATED COUNT: 18.5 % (ref 11.5–14.5)
FOLATE SERPL-MCNC: 3.5 NG/ML (ref 7–31.4)
GLUCOSE SERPL-MCNC: 216 MG/DL (ref 70–105)
GLUCOSE SERPL-MCNC: 218 MG/DL (ref 82–115)
GLUCOSE SERPL-MCNC: 237 MG/DL (ref 70–105)
GLUCOSE SERPL-MCNC: 245 MG/DL (ref 70–105)
GLUCOSE SERPL-MCNC: 247 MG/DL (ref 70–105)
HCO3 UR-SCNC: 23.1 MMOL/L (ref 21–28)
HCT VFR BLD AUTO: 31.5 % (ref 38–47)
HDLC SERPL-MCNC: 25 MG/DL (ref 35–60)
HGB BLD-MCNC: 10.2 G/DL (ref 12–16)
IMM GRANULOCYTES # BLD AUTO: 0.85 K/UL (ref 0–0.04)
IMM GRANULOCYTES NFR BLD: 2.8 % (ref 0–0.4)
LDLC SERPL CALC-MCNC: 42 MG/DL
LDLC/HDLC SERPL: 1.7 {RATIO}
LYMPHOCYTES # BLD AUTO: 0.78 K/UL (ref 1–4.8)
LYMPHOCYTES NFR BLD AUTO: 2.6 % (ref 27–41)
LYMPHOCYTES NFR BLD MANUAL: 2 % (ref 27–41)
MCH RBC QN AUTO: 29.1 PG (ref 27–31)
MCHC RBC AUTO-ENTMCNC: 32.4 G/DL (ref 32–36)
MCV RBC AUTO: 90 FL (ref 80–96)
METHICILLIN RESISTANT STAPHYLOCOCCUS AUREUS: NEGATIVE
MONOCYTES # BLD AUTO: 0.8 K/UL (ref 0–0.8)
MONOCYTES NFR BLD AUTO: 2.7 % (ref 2–6)
MONOCYTES NFR BLD MANUAL: 3 % (ref 2–6)
MPC BLD CALC-MCNC: ABNORMAL G/DL
NEUTROPHILS # BLD AUTO: 27.28 K/UL (ref 1.8–7.7)
NEUTROPHILS NFR BLD AUTO: 91.2 % (ref 53–65)
NEUTS BAND NFR BLD MANUAL: 10 % (ref 1–5)
NEUTS SEG NFR BLD MANUAL: 85 % (ref 50–62)
NONHDLC SERPL-MCNC: 63 MG/DL
NRBC # BLD AUTO: 0.28 X10E3/UL
NRBC BLD MANUAL-RTO: 3 /100 WBC
NRBC, AUTO (.00): 0.9 %
PCO2 BLDA: 29 MMHG (ref 35–48)
PH SMN: 7.51 [PH] (ref 7.35–7.45)
PLATELET # BLD AUTO: 128 K/UL (ref 150–400)
PLATELET MORPHOLOGY: ABNORMAL
PO2 BLDA: 66 MMHG (ref 83–108)
POC BASE EXCESS: 0.9 MMOL/L (ref -2–3)
POC SATURATED O2: 95 % (ref 95–98)
POLYCHROMASIA BLD QL SMEAR: ABNORMAL
POTASSIUM SERPL-SCNC: 3.6 MMOL/L (ref 3.5–5.1)
RBC # BLD AUTO: 3.5 M/UL (ref 4.2–5.4)
SODIUM SERPL-SCNC: 149 MMOL/L (ref 136–145)
T4 SERPL-MCNC: 3.7 ΜG/DL (ref 4.9–11.7)
TRIGL SERPL-MCNC: 107 MG/DL (ref 37–140)
TSH SERPL DL<=0.005 MIU/L-ACNC: 2.18 UIU/ML (ref 0.35–4.94)
URATE SERPL-MCNC: 10 MG/DL (ref 2.6–6)
VIT B12 SERPL-MCNC: >2000 PG/ML (ref 213–816)
VLDLC SERPL-MCNC: 21 MG/DL
WBC # BLD AUTO: 29.92 K/UL (ref 4.5–11)

## 2025-04-08 PROCEDURE — 25000003 PHARM REV CODE 250: Performed by: HOSPITALIST

## 2025-04-08 PROCEDURE — 82607 VITAMIN B-12: CPT | Performed by: HOSPITALIST

## 2025-04-08 PROCEDURE — 84550 ASSAY OF BLOOD/URIC ACID: CPT | Performed by: HOSPITALIST

## 2025-04-08 PROCEDURE — 11000001 HC ACUTE MED/SURG PRIVATE ROOM

## 2025-04-08 PROCEDURE — 99233 SBSQ HOSP IP/OBS HIGH 50: CPT | Mod: ,,, | Performed by: HOSPITALIST

## 2025-04-08 PROCEDURE — 25000242 PHARM REV CODE 250 ALT 637 W/ HCPCS: Performed by: HOSPITALIST

## 2025-04-08 PROCEDURE — 63600175 PHARM REV CODE 636 W HCPCS: Performed by: FAMILY MEDICINE

## 2025-04-08 PROCEDURE — 80061 LIPID PANEL: CPT | Performed by: HOSPITALIST

## 2025-04-08 PROCEDURE — 97110 THERAPEUTIC EXERCISES: CPT | Mod: CO

## 2025-04-08 PROCEDURE — 85025 COMPLETE CBC W/AUTO DIFF WBC: CPT | Performed by: FAMILY MEDICINE

## 2025-04-08 PROCEDURE — 84436 ASSAY OF TOTAL THYROXINE: CPT | Performed by: HOSPITALIST

## 2025-04-08 PROCEDURE — 63600175 PHARM REV CODE 636 W HCPCS: Performed by: HOSPITALIST

## 2025-04-08 PROCEDURE — 94761 N-INVAS EAR/PLS OXIMETRY MLT: CPT

## 2025-04-08 PROCEDURE — 99232 SBSQ HOSP IP/OBS MODERATE 35: CPT | Mod: ,,, | Performed by: INTERNAL MEDICINE

## 2025-04-08 PROCEDURE — 97530 THERAPEUTIC ACTIVITIES: CPT

## 2025-04-08 PROCEDURE — 36600 WITHDRAWAL OF ARTERIAL BLOOD: CPT

## 2025-04-08 PROCEDURE — 82803 BLOOD GASES ANY COMBINATION: CPT

## 2025-04-08 PROCEDURE — 80048 BASIC METABOLIC PNL TOTAL CA: CPT | Performed by: FAMILY MEDICINE

## 2025-04-08 PROCEDURE — 97110 THERAPEUTIC EXERCISES: CPT

## 2025-04-08 PROCEDURE — 27000221 HC OXYGEN, UP TO 24 HOURS

## 2025-04-08 PROCEDURE — 84443 ASSAY THYROID STIM HORMONE: CPT | Performed by: HOSPITALIST

## 2025-04-08 PROCEDURE — 99900035 HC TECH TIME PER 15 MIN (STAT)

## 2025-04-08 PROCEDURE — 25000003 PHARM REV CODE 250: Performed by: INTERNAL MEDICINE

## 2025-04-08 PROCEDURE — 94640 AIRWAY INHALATION TREATMENT: CPT

## 2025-04-08 PROCEDURE — 36415 COLL VENOUS BLD VENIPUNCTURE: CPT | Performed by: FAMILY MEDICINE

## 2025-04-08 PROCEDURE — 82962 GLUCOSE BLOOD TEST: CPT

## 2025-04-08 PROCEDURE — 27000207 HC ISOLATION

## 2025-04-08 PROCEDURE — 82150 ASSAY OF AMYLASE: CPT | Performed by: HOSPITALIST

## 2025-04-08 RX ORDER — ERYTHROMYCIN 250 MG/1
500 TABLET, COATED ORAL
Status: COMPLETED | OUTPATIENT
Start: 2025-04-08 | End: 2025-04-13

## 2025-04-08 RX ORDER — METOCLOPRAMIDE 5 MG/1
10 TABLET ORAL
Status: DISCONTINUED | OUTPATIENT
Start: 2025-04-09 | End: 2025-04-09

## 2025-04-08 RX ORDER — IPRATROPIUM BROMIDE AND ALBUTEROL SULFATE 2.5; .5 MG/3ML; MG/3ML
3 SOLUTION RESPIRATORY (INHALATION) 4 TIMES DAILY
Status: DISCONTINUED | OUTPATIENT
Start: 2025-04-09 | End: 2025-04-26 | Stop reason: HOSPADM

## 2025-04-08 RX ADMIN — APIXABAN 5 MG: 5 TABLET, FILM COATED ORAL at 09:04

## 2025-04-08 RX ADMIN — DEXTROSE MONOHYDRATE: 50 INJECTION, SOLUTION INTRAVENOUS at 02:04

## 2025-04-08 RX ADMIN — METOCLOPRAMIDE 5 MG: 5 TABLET ORAL at 09:04

## 2025-04-08 RX ADMIN — MONTELUKAST 10 MG: 10 TABLET, FILM COATED ORAL at 09:04

## 2025-04-08 RX ADMIN — INSULIN ASPART 4 UNITS: 100 INJECTION, SOLUTION INTRAVENOUS; SUBCUTANEOUS at 04:04

## 2025-04-08 RX ADMIN — BUDESONIDE 0.5 MG: 0.5 SUSPENSION RESPIRATORY (INHALATION) at 07:04

## 2025-04-08 RX ADMIN — PANTOPRAZOLE SODIUM 40 MG: 40 TABLET, DELAYED RELEASE ORAL at 09:04

## 2025-04-08 RX ADMIN — DEXTROSE MONOHYDRATE: 50 INJECTION, SOLUTION INTRAVENOUS at 05:04

## 2025-04-08 RX ADMIN — DEXTROSE MONOHYDRATE: 50 INJECTION, SOLUTION INTRAVENOUS at 10:04

## 2025-04-08 RX ADMIN — MUPIROCIN: 20 OINTMENT TOPICAL at 09:04

## 2025-04-08 RX ADMIN — MEROPENEM 1 G: 1 INJECTION, POWDER, FOR SOLUTION INTRAVENOUS at 03:04

## 2025-04-08 RX ADMIN — METOCLOPRAMIDE 5 MG: 5 TABLET ORAL at 04:04

## 2025-04-08 RX ADMIN — INSULIN GLARGINE 25 UNITS: 100 INJECTION, SOLUTION SUBCUTANEOUS at 09:04

## 2025-04-08 RX ADMIN — MEROPENEM 1 G: 1 INJECTION, POWDER, FOR SOLUTION INTRAVENOUS at 04:04

## 2025-04-08 RX ADMIN — METOCLOPRAMIDE 5 MG: 5 TABLET ORAL at 10:04

## 2025-04-08 RX ADMIN — IPRATROPIUM BROMIDE AND ALBUTEROL SULFATE 3 ML: .5; 3 SOLUTION RESPIRATORY (INHALATION) at 01:04

## 2025-04-08 RX ADMIN — METOCLOPRAMIDE 5 MG: 5 TABLET ORAL at 05:04

## 2025-04-08 RX ADMIN — IPRATROPIUM BROMIDE AND ALBUTEROL SULFATE 3 ML: .5; 3 SOLUTION RESPIRATORY (INHALATION) at 07:04

## 2025-04-08 RX ADMIN — IPRATROPIUM BROMIDE AND ALBUTEROL SULFATE 3 ML: .5; 3 SOLUTION RESPIRATORY (INHALATION) at 12:04

## 2025-04-08 RX ADMIN — INSULIN ASPART 2 UNITS: 100 INJECTION, SOLUTION INTRAVENOUS; SUBCUTANEOUS at 09:04

## 2025-04-08 RX ADMIN — SERTRALINE HYDROCHLORIDE 100 MG: 50 TABLET ORAL at 09:04

## 2025-04-08 RX ADMIN — ATORVASTATIN CALCIUM 20 MG: 20 TABLET, FILM COATED ORAL at 09:04

## 2025-04-08 NOTE — PROGRESS NOTES
[facility]  [facility] Medicine  Progress Note    Patient Name: Magan Saleem  MRN: [id number]  Patient Class: IP- Inpatient   Admission Date: 4/3/2025  Length of Stay: 5 days  Attending Physician: Andriy Rizvi MD  Primary Care Provider: Sil Brown DO        Subjective     Principal Problem:Sepsis due to pneumonia        HPI:  68 yo F presents to [place] ED from [facility] for abnormal labs.  Patient was discharged from [facility] two days ago to skilled nursing facility for rehab.  She was diagnosed with dehydration due to gastroparesis with UTI.  Patient has metastatic lung cancer (to the brain) and follows with Dr. Swanson for IV chemotherapy every other week and takes lazertinib daily.  She has completed her course of radiation for the brain mets and follow had showed some improvement.  I thought she had either some decreased LOC due to encephalopathy or some aphasia from the brain mets but after a great effort to get her to talk, I realized she was just mad.  She wanted to know why she had been discharged two days ago when she could not eat.  She has no appetite and early satiety.  She is not nauseated and no vomiting.  She has decided on a DNR status previously (her caretaker and friend of 20 years) states that she had always said she did not want resuscitation if she experienced cardiopulmonary arrest and had told her children her wishes but she does want treatment and is not opposed to J tube if needed.  She has not had anything to eat or drink in two days and is dehydrated again.      Patient was transferred because of concern of sepsis.  She did have enterococcus faecalis UTI at [facility] and was treated.  I do not have the culture results but cultures were sent and patient does have some yeast noted.  (Will not treat a yeast colonization).  She is afebrile and hemodynamically stable and does not look septic clinically.  Her Lactic acid is stable at 2.5 dara 3.2.  Will check  another in am after volume resuscitation.  Her creat is at baseline but she has prerenal azotemia further confirming the dehydration.  Patient has an IO in place from [facility] due to dehydration and difficult stick but with some volume resuscitation, we have gotten an IV.  She is covid and flu negative.      Her WBC is 29 and I am not sure if she has received neupogen but could be a stress reaction.  Her BS is 245 and she does have some urine ketones but most likely due to starvation ketosis.  Will check a serum ketone.  Her platelets are 88K but this is most likely from her chemo.  She is not anemic.  CXR shows some patchy infiltrate but no obvious obstructive pneumonia from her lung cancer.  Her BNP about 3K but no clinical signs of CHF.  No recent echo but due to her BMI 50 most likely has some PHTN.  EKG had no ischemic changes but tachy at 114 which could also be from dehydration.  She was on ozempic for her DM and this could be the cause of her decreased appetite.  She is also on decadron for prevention of cerebral edema.      Remainder of ROS as below.  She mostly just nods and shakes her head and rolls her eyes.  You can get her to laugh if you try but she has been depressed since she has not walked since her hospitalization at [facility] on 3/19/25 and was discharged two days ago.  She says that at her last chemo she noticed she was getting weaker and her daughter had to help her in and out of the car and that was new for her.      See assessment and plan below for problem based evaluation       Overview/Hospital Course:  69 year old female presents with hypernatremia; she is not too talkative during rounds    4/5- patient seen examined today resting comfortably in bed and in no acute distress, with no acute events overnight.  Patient has a multitude of issues complicating her medical picture.  White blood cell count 25668 today, sodium 159, potassium 3.2 and BUN creatinine 59 and 1.8.  The patient is still not eating  even when assistance is provided.  We have already changed her fluids to half-normal saline with 20 of KCl at 1:25 a.m..  Have also put in a GI consult for possible feeding tube.  E coli in the urine has turned out to be ESBL and Rocephin has been changed Merrem.    4/6- the patient seen examined today resting comfortably in bed, in no acute distress, in no acute events overnight.  The patient is not very talkative today, but states she is doing okay.  She has no acute complaints.  Blood cultures remain negative.  The patient has not eaten since yesterday and is on light IV fluids.  GI has been consulted for feeding tube.  Continues on Merrem for positive urine culture.    04/07 Records reviewed. Not eating which not new, Similar during recent admit at [facility]. Dr Swanson there had question pancreatitis. No abd pain or tenderness reported now. Question of dysphagia to solids. Chart hx gastroparesis. Will discuss with GI. Ronnie says has responded so far well to treatment for lung CA.     Interval History:     Review of Systems   Constitutional:  Negative for appetite change, fatigue and fever.   HENT:  Positive for trouble swallowing. Negative for congestion and hearing loss.    Respiratory:  Positive for shortness of breath. Negative for chest tightness and wheezing.    Cardiovascular:  Negative for chest pain and palpitations.   Gastrointestinal:  Negative for abdominal pain, constipation and nausea.   Genitourinary:  Negative for difficulty urinating and dysuria.   Musculoskeletal:  Positive for gait problem. Negative for back pain and neck stiffness.   Skin:  Negative for pallor and rash.   Neurological:  Negative for dizziness, speech difficulty and headaches.   Psychiatric/Behavioral:  Positive for sleep disturbance. Negative for confusion and suicidal ideas.      Objective:     Vital Signs (Most Recent):  Temp: 97.2 °F (36.2 °C) (04/07/25 2003)  Pulse: 96 (04/08/25 0011)  Resp: (!) 23 (04/08/25 0011)  BP:  128/81 (04/07/25 2003)  SpO2: (!) 94 % (04/08/25 0011) Vital Signs (24h Range):  Temp:  [96.1 °F (35.6 °C)-98.3 °F (36.8 °C)] 97.2 °F (36.2 °C)  Pulse:  [] 96  Resp:  [18-23] 23  SpO2:  [92 %-96 %] 94 %  BP: ()/(42-81) 128/81     Weight: (!) 136.1 kg (300 lb)  Body mass index is 49.92 kg/m².    Intake/Output Summary (Last 24 hours) at 4/8/2025 0049  Last data filed at 4/7/2025 2201  Gross per 24 hour   Intake 0 ml   Output 500 ml   Net -500 ml         Physical Exam  Vitals reviewed.   Constitutional:       General: She is awake. She is not in acute distress.     Appearance: She is well-developed. She is morbidly obese. She is not toxic-appearing.   HENT:      Head: Normocephalic.      Nose: Nose normal.      Mouth/Throat:      Pharynx: Oropharynx is clear.   Eyes:      Extraocular Movements: Extraocular movements intact.      Pupils: Pupils are equal, round, and reactive to light.   Neck:      Thyroid: No thyroid mass.      Vascular: No carotid bruit.   Cardiovascular:      Rate and Rhythm: Normal rate and regular rhythm.      Pulses: Normal pulses.      Heart sounds: Normal heart sounds. No murmur heard.  Pulmonary:      Effort: Pulmonary effort is normal.      Breath sounds: Normal breath sounds and air entry. No wheezing.   Abdominal:      General: Bowel sounds are normal. There is no distension.      Palpations: Abdomen is soft.      Tenderness: There is no abdominal tenderness.   Musculoskeletal:         General: Normal range of motion.      Cervical back: Neck supple. No rigidity.   Skin:     General: Skin is warm.      Coloration: Skin is not jaundiced.      Findings: No lesion.   Neurological:      General: No focal deficit present.      Mental Status: She is alert and oriented to person, place, and time.      Cranial Nerves: No cranial nerve deficit.   Psychiatric:         Attention and Perception: Attention normal.         Mood and Affect: Mood normal.         Behavior: Behavior normal.  Behavior is cooperative.         Thought Content: Thought content normal.         Cognition and Memory: Cognition normal.               Significant Labs: All pertinent labs within the past 24 hours have been reviewed.  BMP:   Recent Labs   Lab 04/07/25  0503   *   *   K 4.3   *   CO2 20*   BUN 44*   CREATININE 1.45*   CALCIUM 8.3*     CBC:   Recent Labs   Lab 04/07/25  0503   WBC 30.97*   HGB 10.2*   HCT 32.2*   *     CMP:   Recent Labs   Lab 04/06/25 2122 04/07/25  0503   * 155*   K 3.9 4.3   * 125*   CO2 20* 20*    162*   BUN 47* 44*   CREATININE 1.46* 1.45*   CALCIUM 8.3* 8.3*   PROT 4.5*  --    ALBUMIN 1.7*  --    BILITOT 0.6  --    ALKPHOS 157*  --    AST 49*  --    ALT 53  --    ANIONGAP 15 14       Significant Imaging: I have reviewed all pertinent imaging results/findings within the past 24 hours.      Intake/Output - Last 3 Shifts         04/06 0700 04/07 0659 04/07 0700 04/08 0659    P.O.  0    Total Intake(mL/kg)  0 (0)    Urine (mL/kg/hr)  500 (0.2)    Stool  0    Total Output  500    Net  -500          Urine Occurrence  1 x    Stool Occurrence  2 x          Microbiology Results (last 7 days)       Procedure Component Value Units Date/Time    Clostridioides difficile toxin/antigen with reflex to PCR [[id number]]     Order Status: Canceled Specimen: Stool                 Assessment & Plan  Sepsis due to pneumonia  Appears to have UTI and pneumonia; has leukocytosis; has opacities on CXR; has G- bacilli on urine culture; blood cultures pending; will place on Vancomycin and Rocephin; BP on the low side; on IVF  Hypernatremia  Due to dehydration; off diuretics; on IVF; will monitor Na levels  Lung cancer metastatic to brain  DNR but not hospice.  Receives chemo and has finished radiation.    04/07 reported stable / improved after treatment    Thrombocytopenia  On chemo; will monitor    Essential hypertension  Vitals:    04/06/25 1609 04/06/25 2043 04/07/25 0035  04/07/25 0040   BP: (!) 151/96 102/66 (!) 81/48 107/70    04/07/25 0506 04/07/25 0508 04/07/25 0803 04/07/25 1116   BP: (!) 78/46 114/71 (!) 92/42 106/60    04/07/25 1655 04/07/25 2003   BP: 102/62 128/81         Monitor BP while on Metoprolol; on IVF; off Losartan    Moderate persistent asthma without complication  Continue home inhaled steroid/bronchodilator and singulair    Chronic kidney disease, stage 3b  Creatine stable   Chronic pulmonary embolism without acute cor pulmonale  Eliquis was discontinued due to risk of ICH with brain mets but they have improved so the anti-coagulation has been restarted; will monitor  04/07 apparently this of several years ago    Type 2 diabetes mellitus with hyperglycemia  BS elevated; will increase Lantus to 25 units; continue SSI;monitor BS     Gastroparesis  On Reglan and PPI for erosive gastritis; will monitor  Morbid obesity  Body mass index is 49.92 kg/m². Morbid obesity complicates all aspects of disease management from diagnostic modalities to treatment. Weight loss encouraged and health benefits explained to patient.     Would benefit from sleep study and possible CPAP.  Does not wear oxygen at home.      VTE Risk Mitigation (From admission, onward)      None            Discharge Planning   MITUL:      Code Status: DNR   Medical Readiness for Discharge Date:   Discharge Plan A: Home with family                Please place Justification for DME        Andriy Rizvi MD  Department of Hospital Medicine   [facility]

## 2025-04-08 NOTE — PROGRESS NOTES
"  CC: PEG tube placement      HPI 69 y.o. female with history of morbid obesity (BMI 49), metastatic lung cancer (to the brain) who follows with Dr. Swanson for IV chemotherapy every other week and takes lazertinib daily. She has completed her course of radiation for the brain mets. Reports decreased appetite but tolerated some diet. GI has been consulted due to poor PO intake and request for PEG placement. She is minimally conversive with me. When asked about PEG she states she is unsure whether she would want one.     Interval hx:  Tolerating some diet  No nausea or vomiting  Occasional dysphagia to solids  Recent EGD 03/21/2025 shows LA class B erosive esophagitis, nonerosive gastritis and retention of food and fluid suspicious for gastroparesis     Past Medical History:   Diagnosis Date    Chronic kidney disease, stage 3b     Chronic pulmonary embolism without acute cor pulmonale     Diabetes mellitus type 2 in obese     DNR (do not resuscitate)     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 Swanson    Melanocytic nevi of lower limb, including hip, left     Mixed hyperlipidemia     Moderate persistent asthma without complication 10/30/2024    Other pulmonary embolism without acute cor pulmonale     Vitamin D deficiency      Physical Examination  /62 (BP Location: Left arm)   Pulse 81   Temp 97.3 °F (36.3 °C) (Oral)   Resp 20   Ht 5' 5" (1.651 m)   Wt (!) 136.1 kg (300 lb)   SpO2 (!) 94%   BMI 49.92 kg/m²   General appearance: morbid obesity, alert, cooperative, no distress  HENT: Normocephalic, atraumatic, neck symmetrical, no nasal discharge   Eyes: conjunctivae/corneas clear, PERRL, EOM's " intact  Lungs: symmetric chest wall expansion bilaterally  Heart: regular rate and rhythm without rub; no displacement of the PMI   Abdomen: soft, protuberant, obese, non-tender; bowel sounds normoactive; no organomegaly  Extremities: extremities symmetric; no clubbing, cyanosis, or edema  Integument: Skin color, texture, turgor normal; no rashes; hair distrubution normal  Neurologic: Alert, did not test strength  Psychiatric: no pressured speech; flat affect; no evidence of impaired cognition     Labs:  Lab Results   Component Value Date    WBC 29.92 (HH) 04/08/2025    HGB 10.2 (L) 04/08/2025    HCT 31.5 (L) 04/08/2025    MCV 90.0 04/08/2025     (L) 04/08/2025     CMP  Sodium   Date Value Ref Range Status   04/08/2025 149 (H) 136 - 145 mmol/L Final     Potassium   Date Value Ref Range Status   04/08/2025 3.6 3.5 - 5.1 mmol/L Final     Chloride   Date Value Ref Range Status   04/08/2025 118 (H) 98 - 107 mmol/L Final     CO2   Date Value Ref Range Status   04/08/2025 22 (L) 23 - 31 mmol/L Final     Glucose   Date Value Ref Range Status   04/08/2025 218 (H) 82 - 115 mg/dL Final     BUN   Date Value Ref Range Status   04/08/2025 39 (H) 10 - 20 mg/dL Final     Creatinine   Date Value Ref Range Status   04/08/2025 1.39 (H) 0.55 - 1.02 mg/dL Final     Calcium   Date Value Ref Range Status   04/08/2025 8.2 (L) 8.4 - 10.2 mg/dL Final     Total Protein   Date Value Ref Range Status   04/06/2025 4.5 (L) 5.8 - 7.6 g/dL Final     Albumin   Date Value Ref Range Status   04/06/2025 1.7 (L) 3.4 - 4.8 g/dL Final     Bilirubin, Total   Date Value Ref Range Status   04/06/2025 0.6 <=1.5 mg/dL Final     Alk Phos   Date Value Ref Range Status   04/06/2025 157 (H) 40 - 150 U/L Final     AST   Date Value Ref Range Status   04/06/2025 49 (H) 11 - 45 U/L Final     ALT   Date Value Ref Range Status   04/06/2025 53 <=55 U/L Final     Anion Gap   Date Value Ref Range Status   04/08/2025 13 7 - 16 mmol/L Final     eGFR African American    Date Value Ref Range Status   06/30/2021 57 (L) >=60 mL/min/1.73m² Final     eGFR   Date Value Ref Range Status   12/23/2021 50 (L) >=60 mL/min/1.73m² Final     Imaging:  CXR:  Nonspecific patchy opacities both lungs may relate to atelectasis, edema, infection, and/or noninfectious inflammatory process.    Assessment:   Poor PO intake  Metastatic lung cancer to brain  Hypernatremia-resolved    Plan:   -Endoscopic PEG placement would likely be difficult due to morbid obesity and body habitus  -Would likely need surgical PEG or J tube per surgery due to history of gastroparesis  -Hypernatremia improved on D5W  -Continue PPI and anti-emetics as needed    Aurelio Reich MD  [facility]

## 2025-04-08 NOTE — ASSESSMENT & PLAN NOTE
DNR but not hospice.  Receives chemo and has finished radiation.    04/07 reported stable / improved after treatment

## 2025-04-08 NOTE — ASSESSMENT & PLAN NOTE
Vitals:    04/06/25 1609 04/06/25 2043 04/07/25 0035 04/07/25 0040   BP: (!) 151/96 102/66 (!) 81/48 107/70    04/07/25 0506 04/07/25 0508 04/07/25 0803 04/07/25 1116   BP: (!) 78/46 114/71 (!) 92/42 106/60    04/07/25 1655 04/07/25 2003   BP: 102/62 128/81         Monitor BP while on Metoprolol; on IVF; off Losartan

## 2025-04-08 NOTE — ASSESSMENT & PLAN NOTE
Eliquis was discontinued due to risk of ICH with brain mets but they have improved so the anti-coagulation has been restarted; will monitor  04/07 apparently this of several years ago

## 2025-04-08 NOTE — ASSESSMENT & PLAN NOTE
"On Reglan and PPI for erosive gastritis; will monitor    EGD by Dr. Lo during recent past admission at [facility]:  "EGD on 03/21/2025 shows LA class B erosive esophagitis but no pill esophagitis, nonerosive gastritis and retention of food and fluid suspicious for gastroparesis, due to narcotics and diabetes. "    04/ 08 try additional meds to help gastric emptying.  "

## 2025-04-08 NOTE — PT/OT/SLP PROGRESS
Occupational Therapy   Treatment    Name: Magan Saleem  MRN: [id number]  Admitting Diagnosis:  Sepsis due to pneumonia       Recommendations:     Discharge Recommendations: Low Intensity Therapy  Discharge Equipment Recommendations:  to be determined by next level of care  Barriers to discharge:       Assessment:     Magan Saleem is a 69 y.o. female with a medical diagnosis of Sepsis due to pneumonia.  She presents with the following Performance deficits affecting function are weakness, decreased upper extremity function, impaired endurance, impaired balance, decreased safety awareness, impaired self care skills, impaired functional mobility, decreased coordination, edema.     Rehab Prognosis:  Fair and Poor; patient would benefit from acute skilled OT services to address these deficits and reach maximum level of function.       Plan:     Patient to be seen 5 x/week to address the above listed problems via self-care/home management, therapeutic activities, therapeutic exercises  Plan of Care Expires:    Plan of Care Reviewed with: patient    Subjective   Patient was laying supine in bed upon ROSA arrival. Patient was easily arousable but did not communicate just moaned entire session. Patient arms were weeping due to increased swelling in both extremities   Chief Complaint: no complaints voiced just moaning like she was in pain  Patient/Family Comments/goals:   Pain/Comfort:  Pain Rating 1: 3/10  Location - Orientation 1: generalized    Objective:     Communicated with: RN prior to session.  Patient found HOB elevated with PureWick, oxygen, peripheral IV upon OT entry to room.    General Precautions: Standard, fall    Orthopedic Precautions:N/A  Braces: N/A  Respiratory Status: Nasal cannula, flow 2 L/min    Therapeutic exercises:  Patient completed the following AAROM exercises on right and left upper extremities  to work on ROM/strengthening in preparation to complete of bed mobility, ADLs and  transfers:     -Elbow flexion/extension: 2 sets of 10   -Shoulder flexion: 2 sets of 10   -Chest press: 2 sets of 10   -Internal Rotation/External Rotation: 2 sets of 10   -Hand squeezes: 1 sets of 10   -Pronation/Supination: 2 sets of 10    Increased time/effort needed to complete each exercise.       Patient left HOB elevated with call button in reach and RN notified    GOALS:   Multidisciplinary Problems       Occupational Therapy Goals          Problem: Occupational Therapy    Goal Priority Disciplines Outcome Interventions   Occupational Therapy Goal     OT, PT/OT Progressing    Description: STG:  Pt will perform grooming with setup  Pt will bathe with Min A  Pt will perform UE dressing with Min A  Pt will perform LE dressing with Mod A  Pt will sit EOB x 10 min with CG assistance  Pt will transfer bed/chair/bsc with Mod A  Pt will perform standing task x 3 min with CG assistance  Pt will tolerate 30 minutes of tx without fatigue      LT.Restore to max I with self care and mobility.                         DME Justifications:    Time Tracking:     OT Date of Treatment: 25  OT Start Time: 1023  OT Stop Time: 1036  OT Total Time (min): 13 min    Billable Minutes:Therapeutic Exercise 13 minutes    OT/SAROJ: SAROJ     Number of SAROJ visits since last OT visit: 1    SAROJ Rodriguez  2025  1:12 PM

## 2025-04-08 NOTE — PLAN OF CARE
Problem: Adult Inpatient Plan of Care  Goal: Plan of Care Review  Outcome: Progressing  Goal: Patient-Specific Goal (Individualized)  Outcome: Progressing  Goal: Absence of Hospital-Acquired Illness or Injury  Outcome: Progressing  Goal: Optimal Comfort and Wellbeing  Outcome: Progressing  Goal: Readiness for Transition of Care  Outcome: Progressing     Problem: Bariatric Environmental Safety  Goal: Safety Maintained with Care  Outcome: Progressing     Problem: Skin Injury Risk Increased  Goal: Skin Health and Integrity  Outcome: Progressing     Problem: Infection  Goal: Absence of Infection Signs and Symptoms  Outcome: Progressing     Problem: Fall Injury Risk  Goal: Absence of Fall and Fall-Related Injury  Outcome: Progressing

## 2025-04-08 NOTE — PLAN OF CARE
Problem: Adult Inpatient Plan of Care  Goal: Plan of Care Review  Outcome: Progressing  Goal: Patient-Specific Goal (Individualized)  Outcome: Progressing  Goal: Absence of Hospital-Acquired Illness or Injury  Outcome: Progressing  Goal: Optimal Comfort and Wellbeing  Outcome: Progressing  Goal: Readiness for Transition of Care  Outcome: Progressing     Problem: Bariatric Environmental Safety  Goal: Safety Maintained with Care  Outcome: Progressing     Problem: Skin Injury Risk Increased  Goal: Skin Health and Integrity  Outcome: Progressing     Problem: Infection  Goal: Absence of Infection Signs and Symptoms  Outcome: Progressing     Problem: Fall Injury Risk  Goal: Absence of Fall and Fall-Related Injury  Outcome: Progressing     Problem: UTI (Urinary Tract Infection)  Goal: Improved Infection Symptoms  Outcome: Progressing     Problem: Anxiety  Goal: Anxiety Reduction or Resolution  Outcome: Progressing     Problem: Chronic Kidney Disease  Goal: Optimal Coping with Chronic Illness  Outcome: Progressing  Goal: Electrolyte Balance  Outcome: Progressing  Goal: Fluid Balance  Outcome: Progressing  Goal: Optimal Functional Ability  Outcome: Progressing  Goal: Absence of Anemia Signs and Symptoms  Outcome: Progressing  Goal: Optimal Oral Intake  Outcome: Progressing  Goal: Acceptable Pain Control  Outcome: Progressing  Goal: Minimize Renal Failure Effects  Outcome: Progressing     Problem: Diabetes Comorbidity  Goal: Blood Glucose Level Within Targeted Range  Outcome: Progressing     Problem: Sepsis/Septic Shock  Goal: Optimal Coping  Outcome: Progressing  Goal: Absence of Bleeding  Outcome: Progressing  Goal: Blood Glucose Level Within Targeted Range  Outcome: Progressing  Goal: Absence of Infection Signs and Symptoms  Outcome: Progressing  Goal: Optimal Nutrition Intake  Outcome: Progressing     Problem: Pneumonia  Goal: Fluid Balance  Outcome: Progressing  Goal: Resolution of Infection Signs and  Symptoms  Outcome: Progressing  Goal: Effective Oxygenation and Ventilation  Outcome: Progressing     Problem: Gas Exchange Impaired  Goal: Optimal Gas Exchange  Outcome: Progressing     Problem: Wound  Goal: Optimal Coping  Outcome: Progressing  Goal: Optimal Functional Ability  Outcome: Progressing  Goal: Absence of Infection Signs and Symptoms  Outcome: Progressing  Goal: Improved Oral Intake  Outcome: Progressing  Goal: Optimal Pain Control and Function  Outcome: Progressing  Goal: Skin Health and Integrity  Outcome: Progressing  Goal: Optimal Wound Healing  Outcome: Progressing     Problem: Airway Clearance Ineffective  Goal: Effective Airway Clearance  Outcome: Progressing

## 2025-04-08 NOTE — SUBJECTIVE & OBJECTIVE
Interval History:     Review of Systems   Constitutional:  Negative for appetite change, fatigue and fever.   HENT:  Positive for trouble swallowing. Negative for congestion and hearing loss.    Respiratory:  Positive for shortness of breath. Negative for chest tightness and wheezing.    Cardiovascular:  Negative for chest pain and palpitations.   Gastrointestinal:  Negative for abdominal pain, constipation and nausea.   Genitourinary:  Negative for difficulty urinating and dysuria.   Musculoskeletal:  Positive for gait problem. Negative for back pain and neck stiffness.   Skin:  Negative for pallor and rash.   Neurological:  Negative for dizziness, speech difficulty and headaches.   Psychiatric/Behavioral:  Positive for sleep disturbance. Negative for confusion and suicidal ideas.      Objective:     Vital Signs (Most Recent):  Temp: 97.2 °F (36.2 °C) (04/07/25 2003)  Pulse: 96 (04/08/25 0011)  Resp: (!) 23 (04/08/25 0011)  BP: 128/81 (04/07/25 2003)  SpO2: (!) 94 % (04/08/25 0011) Vital Signs (24h Range):  Temp:  [96.1 °F (35.6 °C)-98.3 °F (36.8 °C)] 97.2 °F (36.2 °C)  Pulse:  [] 96  Resp:  [18-23] 23  SpO2:  [92 %-96 %] 94 %  BP: ()/(42-81) 128/81     Weight: (!) 136.1 kg (300 lb)  Body mass index is 49.92 kg/m².    Intake/Output Summary (Last 24 hours) at 4/8/2025 0049  Last data filed at 4/7/2025 2201  Gross per 24 hour   Intake 0 ml   Output 500 ml   Net -500 ml         Physical Exam  Vitals reviewed.   Constitutional:       General: She is awake. She is not in acute distress.     Appearance: She is well-developed. She is morbidly obese. She is not toxic-appearing.   HENT:      Head: Normocephalic.      Nose: Nose normal.      Mouth/Throat:      Pharynx: Oropharynx is clear.   Eyes:      Extraocular Movements: Extraocular movements intact.      Pupils: Pupils are equal, round, and reactive to light.   Neck:      Thyroid: No thyroid mass.      Vascular: No carotid bruit.   Cardiovascular:      Rate  and Rhythm: Normal rate and regular rhythm.      Pulses: Normal pulses.      Heart sounds: Normal heart sounds. No murmur heard.  Pulmonary:      Effort: Pulmonary effort is normal.      Breath sounds: Normal breath sounds and air entry. No wheezing.   Abdominal:      General: Bowel sounds are normal. There is no distension.      Palpations: Abdomen is soft.      Tenderness: There is no abdominal tenderness.   Musculoskeletal:         General: Normal range of motion.      Cervical back: Neck supple. No rigidity.   Skin:     General: Skin is warm.      Coloration: Skin is not jaundiced.      Findings: No lesion.   Neurological:      General: No focal deficit present.      Mental Status: She is alert and oriented to person, place, and time.      Cranial Nerves: No cranial nerve deficit.   Psychiatric:         Attention and Perception: Attention normal.         Mood and Affect: Mood normal.         Behavior: Behavior normal. Behavior is cooperative.         Thought Content: Thought content normal.         Cognition and Memory: Cognition normal.               Significant Labs: All pertinent labs within the past 24 hours have been reviewed.  BMP:   Recent Labs   Lab 04/07/25  0503   *   *   K 4.3   *   CO2 20*   BUN 44*   CREATININE 1.45*   CALCIUM 8.3*     CBC:   Recent Labs   Lab 04/07/25  0503   WBC 30.97*   HGB 10.2*   HCT 32.2*   *     CMP:   Recent Labs   Lab 04/06/25  2122 04/07/25  0503   * 155*   K 3.9 4.3   * 125*   CO2 20* 20*    162*   BUN 47* 44*   CREATININE 1.46* 1.45*   CALCIUM 8.3* 8.3*   PROT 4.5*  --    ALBUMIN 1.7*  --    BILITOT 0.6  --    ALKPHOS 157*  --    AST 49*  --    ALT 53  --    ANIONGAP 15 14       Significant Imaging: I have reviewed all pertinent imaging results/findings within the past 24 hours.      Intake/Output - Last 3 Shifts         04/06 0700 04/07 0659 04/07 0700 04/08 0659    P.O.  0    Total Intake(mL/kg)  0 (0)    Urine (mL/kg/hr)   500 (0.2)    Stool  0    Total Output  500    Net  -500          Urine Occurrence  1 x    Stool Occurrence  2 x          Microbiology Results (last 7 days)       Procedure Component Value Units Date/Time    Clostridioides difficile toxin/antigen with reflex to PCR [[id number]]     Order Status: Canceled Specimen: Stool

## 2025-04-08 NOTE — PT/OT/SLP PROGRESS
Physical Therapy Treatment    Patient Name:  Magan Saleem   MRN:  [id number]    Recommendations:     Discharge Recommendations: Moderate Intensity Therapy  Discharge Equipment Recommendations: to be determined by next level of care  Barriers to discharge: Inaccessible home and Decreased caregiver support    Assessment:     Magan Saleem is a 69 y.o. female admitted with a medical diagnosis of Sepsis due to pneumonia.  She presents with the following impairments/functional limitations: weakness, impaired endurance, impaired functional mobility, gait instability, impaired balance, decreased lower extremity function, decreased safety awareness, pain, decreased ROM, impaired cardiopulmonary response to activity Pt conts to require maximum assistance with all bed mobility. Pt  tolerated sitting at edge of bed for approx 30 sec with increased shortness of breath and anxiety noted.    Rehab Prognosis: Poor; patient would benefit from acute skilled PT services to address these deficits and reach maximum level of function.    Recent Surgery: * No surgery found *      Plan:     During this hospitalization, patient to be seen 5 x/week to address the identified rehab impairments via gait training, therapeutic activities, therapeutic exercises, neuromuscular re-education and progress toward the following goals:    Plan of Care Expires:  05/04/25    Subjective     Chief Complaint: weakness  Patient/Family Comments/goals: Pt is agreeable to PT, however apprehensive about attempting sitting EOB  Pain/Comfort:  Pain Rating 1: 4/10  Location - Orientation 1: generalized  Pain Addressed 1: Reposition, Distraction  Pain Rating Post-Intervention 1: 4/10      Objective:     Communicated with JOHANNA Blake RN prior to session.  Patient found HOB elevated with peripheral IV, oxygen, PureWick upon PT entry to room.     General Precautions: Standard, fall, contact  Orthopedic Precautions: N/A  Braces: N/A  Respiratory Status: Nasal cannula,  "flow 4 L/min     Functional Mobility:  Bed Mobility:     Scooting: maximal assistance and of 2 persons  Supine to Sit: maximal assistance and of 2 persons  Sit to Supine: maximal assistance and of 2 persons  Balance: static sitting balance fair      AM-PAC 6 CLICK MOBILITY  Turning over in bed (including adjusting bedclothes, sheets and blankets)?: 2  Sitting down on and standing up from a chair with arms (e.g., wheelchair, bedside commode, etc.): 1  Moving from lying on back to sitting on the side of the bed?: 2  Moving to and from a bed to a chair (including a wheelchair)?: 1  Need to walk in hospital room?: 1  Climbing 3-5 steps with a railing?: 1  Basic Mobility Total Score: 8       Treatment & Education:  Assisted to edge of bed with maximum assistance x 2 using positioning sling, pt achieved upright sitting and maintained this position for approx 30 secs with mod to max A with cueing provided to avoid posterior trunk lean.  Increased SOB and anxiety noted and pt reported "I'm about to pass out."  Pt assisted back to supine positon with Max A x 2 and scooted up in bed with Max A x 2.    Pt performed bilateral LE AAROM ex bed level exercises: ankle pumps, quad sets, heel slides, and hip abduction x 2 x 20 each.      Patient left HOB elevated with all lines intact and call button in reach..    GOALS:   Multidisciplinary Problems       Physical Therapy Goals          Problem: Physical Therapy    Goal Priority Disciplines Outcome Interventions   Physical Therapy Goal     PT, PT/OT Progressing    Description: Short term goals:  1. Supine to sit with MInimal Assistance  2. Sit to stand transfer with Moderate Assistance  3. Bed to chair transfer with Moderate Assistance using Rolling Walker  4. Sitting at edge of bed x15 minutes with Contact Guard Assistance    Long term goals:  1. Supine to sit with Contact Guard Assistance  2. Sit to stand transfer with Contact Guard Assistance  3. Bed to chair transfer with Contact " Guard Assistance using Rolling Walker  4. Gait  x 100 feet with Contact Guard Assistance using Rolling Walker.                          DME Justifications:  No DME recommended requiring DME justifications    Time Tracking:     PT Received On: 04/08/25  PT Start Time: 1440     PT Stop Time: 1509  PT Total Time (min): 29 min     Billable Minutes: Therapeutic Activity 18 and Therapeutic Exercise 11    Treatment Type: Treatment  PT/PTA: PT     Number of PTA visits since last PT visit: 0     04/08/2025

## 2025-04-09 LAB
ALBUMIN SERPL BCP-MCNC: 1.5 G/DL (ref 3.4–4.8)
ALP SERPL-CCNC: 179 U/L (ref 40–150)
ALT SERPL W P-5'-P-CCNC: 55 U/L
ANION GAP SERPL CALCULATED.3IONS-SCNC: 13 MMOL/L (ref 7–16)
ANISOCYTOSIS BLD QL SMEAR: ABNORMAL
AST SERPL W P-5'-P-CCNC: 39 U/L (ref 11–45)
BACTERIA BLD CULT: NORMAL
BACTERIA BLD CULT: NORMAL
BASOPHILS # BLD AUTO: 0.06 K/UL (ref 0–0.2)
BASOPHILS NFR BLD AUTO: 0.2 % (ref 0–1)
BILIRUB DIRECT SERPL-MCNC: 0.3 MG/DL
BILIRUB SERPL-MCNC: 0.7 MG/DL
BUN SERPL-MCNC: 36 MG/DL (ref 10–20)
BUN/CREAT SERPL: 25 (ref 6–20)
CALCIUM SERPL-MCNC: 8.1 MG/DL (ref 8.4–10.2)
CHLORIDE SERPL-SCNC: 112 MMOL/L (ref 98–107)
CO2 SERPL-SCNC: 21 MMOL/L (ref 23–31)
CREAT SERPL-MCNC: 1.42 MG/DL (ref 0.55–1.02)
DIFFERENTIAL METHOD BLD: ABNORMAL
EGFR (NO RACE VARIABLE) (RUSH/TITUS): 40 ML/MIN/1.73M2
EOSINOPHIL # BLD AUTO: 0.16 K/UL (ref 0–0.5)
EOSINOPHIL NFR BLD AUTO: 0.6 % (ref 1–4)
ERYTHROCYTE [DISTWIDTH] IN BLOOD BY AUTOMATED COUNT: 18.7 % (ref 11.5–14.5)
GLUCOSE SERPL-MCNC: 177 MG/DL (ref 70–105)
GLUCOSE SERPL-MCNC: 206 MG/DL (ref 70–105)
GLUCOSE SERPL-MCNC: 227 MG/DL (ref 82–115)
GLUCOSE SERPL-MCNC: 249 MG/DL (ref 70–105)
GLUCOSE SERPL-MCNC: 253 MG/DL (ref 70–105)
HCT VFR BLD AUTO: 31.5 % (ref 38–47)
HGB BLD-MCNC: 10.1 G/DL (ref 12–16)
IMM GRANULOCYTES # BLD AUTO: 0.73 K/UL (ref 0–0.04)
IMM GRANULOCYTES NFR BLD: 2.7 % (ref 0–0.4)
LYMPHOCYTES # BLD AUTO: 0.79 K/UL (ref 1–4.8)
LYMPHOCYTES NFR BLD AUTO: 3 % (ref 27–41)
MCH RBC QN AUTO: 28.9 PG (ref 27–31)
MCHC RBC AUTO-ENTMCNC: 32.1 G/DL (ref 32–36)
MCV RBC AUTO: 90.3 FL (ref 80–96)
METAMYELOCYTES NFR BLD MANUAL: 2 %
MONOCYTES # BLD AUTO: 0.69 K/UL (ref 0–0.8)
MONOCYTES NFR BLD AUTO: 2.6 % (ref 2–6)
MONOCYTES NFR BLD MANUAL: 1 % (ref 2–6)
MPC BLD CALC-MCNC: ABNORMAL G/DL
NEUTROPHILS # BLD AUTO: 24.15 K/UL (ref 1.8–7.7)
NEUTROPHILS NFR BLD AUTO: 90.9 % (ref 53–65)
NEUTS BAND NFR BLD MANUAL: 30 % (ref 1–5)
NEUTS SEG NFR BLD MANUAL: 67 % (ref 50–62)
NRBC # BLD AUTO: 0.18 X10E3/UL
NRBC BLD MANUAL-RTO: 1 /100 WBC
NRBC, AUTO (.00): 0.7 %
PLATELET # BLD AUTO: 130 K/UL (ref 150–400)
PLATELET MORPHOLOGY: ABNORMAL
POTASSIUM SERPL-SCNC: 3.6 MMOL/L (ref 3.5–5.1)
PROT SERPL-MCNC: 4.5 G/DL (ref 5.8–7.6)
RBC # BLD AUTO: 3.49 M/UL (ref 4.2–5.4)
SODIUM SERPL-SCNC: 142 MMOL/L (ref 136–145)
WBC # BLD AUTO: 26.58 K/UL (ref 4.5–11)

## 2025-04-09 PROCEDURE — 25000003 PHARM REV CODE 250: Performed by: HOSPITALIST

## 2025-04-09 PROCEDURE — 25000242 PHARM REV CODE 250 ALT 637 W/ HCPCS: Performed by: HOSPITALIST

## 2025-04-09 PROCEDURE — 11000001 HC ACUTE MED/SURG PRIVATE ROOM

## 2025-04-09 PROCEDURE — 27000221 HC OXYGEN, UP TO 24 HOURS

## 2025-04-09 PROCEDURE — 82962 GLUCOSE BLOOD TEST: CPT

## 2025-04-09 PROCEDURE — 99900035 HC TECH TIME PER 15 MIN (STAT)

## 2025-04-09 PROCEDURE — 63600175 PHARM REV CODE 636 W HCPCS: Performed by: FAMILY MEDICINE

## 2025-04-09 PROCEDURE — 94640 AIRWAY INHALATION TREATMENT: CPT

## 2025-04-09 PROCEDURE — 63600175 PHARM REV CODE 636 W HCPCS: Performed by: HOSPITALIST

## 2025-04-09 PROCEDURE — 94761 N-INVAS EAR/PLS OXIMETRY MLT: CPT

## 2025-04-09 PROCEDURE — 80048 BASIC METABOLIC PNL TOTAL CA: CPT | Performed by: FAMILY MEDICINE

## 2025-04-09 PROCEDURE — 97110 THERAPEUTIC EXERCISES: CPT

## 2025-04-09 PROCEDURE — 25000003 PHARM REV CODE 250: Performed by: INTERNAL MEDICINE

## 2025-04-09 PROCEDURE — 80076 HEPATIC FUNCTION PANEL: CPT | Performed by: HOSPITALIST

## 2025-04-09 PROCEDURE — 85025 COMPLETE CBC W/AUTO DIFF WBC: CPT | Performed by: FAMILY MEDICINE

## 2025-04-09 PROCEDURE — 99233 SBSQ HOSP IP/OBS HIGH 50: CPT | Mod: ,,, | Performed by: HOSPITALIST

## 2025-04-09 PROCEDURE — 27000207 HC ISOLATION

## 2025-04-09 PROCEDURE — 36415 COLL VENOUS BLD VENIPUNCTURE: CPT | Performed by: FAMILY MEDICINE

## 2025-04-09 RX ORDER — METOCLOPRAMIDE 5 MG/1
5 TABLET ORAL
Status: DISCONTINUED | OUTPATIENT
Start: 2025-04-09 | End: 2025-04-17

## 2025-04-09 RX ADMIN — APIXABAN 5 MG: 5 TABLET, FILM COATED ORAL at 09:04

## 2025-04-09 RX ADMIN — IPRATROPIUM BROMIDE AND ALBUTEROL SULFATE 3 ML: 2.5; .5 SOLUTION RESPIRATORY (INHALATION) at 08:04

## 2025-04-09 RX ADMIN — INSULIN ASPART 1 UNITS: 100 INJECTION, SOLUTION INTRAVENOUS; SUBCUTANEOUS at 09:04

## 2025-04-09 RX ADMIN — ERYTHROMYCIN 500 MG: 250 TABLET, FILM COATED ORAL at 05:04

## 2025-04-09 RX ADMIN — IPRATROPIUM BROMIDE AND ALBUTEROL SULFATE 3 ML: 2.5; .5 SOLUTION RESPIRATORY (INHALATION) at 01:04

## 2025-04-09 RX ADMIN — MONTELUKAST 10 MG: 10 TABLET, FILM COATED ORAL at 09:04

## 2025-04-09 RX ADMIN — MEROPENEM 1 G: 1 INJECTION, POWDER, FOR SOLUTION INTRAVENOUS at 03:04

## 2025-04-09 RX ADMIN — DEXTROSE MONOHYDRATE: 50 INJECTION, SOLUTION INTRAVENOUS at 07:04

## 2025-04-09 RX ADMIN — INSULIN GLARGINE 25 UNITS: 100 INJECTION, SOLUTION SUBCUTANEOUS at 09:04

## 2025-04-09 RX ADMIN — BUDESONIDE 0.5 MG: 0.5 SUSPENSION RESPIRATORY (INHALATION) at 07:04

## 2025-04-09 RX ADMIN — DEXTROSE MONOHYDRATE: 50 INJECTION, SOLUTION INTRAVENOUS at 09:04

## 2025-04-09 RX ADMIN — INSULIN ASPART 4 UNITS: 100 INJECTION, SOLUTION INTRAVENOUS; SUBCUTANEOUS at 11:04

## 2025-04-09 RX ADMIN — ATORVASTATIN CALCIUM 20 MG: 20 TABLET, FILM COATED ORAL at 09:04

## 2025-04-09 RX ADMIN — ERYTHROMYCIN 500 MG: 250 TABLET, FILM COATED ORAL at 09:04

## 2025-04-09 RX ADMIN — METOCLOPRAMIDE 10 MG: 5 TABLET ORAL at 06:04

## 2025-04-09 RX ADMIN — METOCLOPRAMIDE 10 MG: 5 TABLET ORAL at 11:04

## 2025-04-09 RX ADMIN — MEROPENEM 1 G: 1 INJECTION, POWDER, FOR SOLUTION INTRAVENOUS at 05:04

## 2025-04-09 RX ADMIN — BUDESONIDE 0.5 MG: 0.5 SUSPENSION RESPIRATORY (INHALATION) at 08:04

## 2025-04-09 RX ADMIN — PANTOPRAZOLE SODIUM 40 MG: 40 TABLET, DELAYED RELEASE ORAL at 09:04

## 2025-04-09 RX ADMIN — METOCLOPRAMIDE HYDROCHLORIDE 5 MG: 5 TABLET ORAL at 09:04

## 2025-04-09 RX ADMIN — ERYTHROMYCIN 500 MG: 250 TABLET, FILM COATED ORAL at 12:04

## 2025-04-09 RX ADMIN — INSULIN ASPART 6 UNITS: 100 INJECTION, SOLUTION INTRAVENOUS; SUBCUTANEOUS at 05:04

## 2025-04-09 RX ADMIN — IPRATROPIUM BROMIDE AND ALBUTEROL SULFATE 3 ML: 2.5; .5 SOLUTION RESPIRATORY (INHALATION) at 07:04

## 2025-04-09 RX ADMIN — ERYTHROMYCIN 500 MG: 250 TABLET, FILM COATED ORAL at 11:04

## 2025-04-09 RX ADMIN — IPRATROPIUM BROMIDE AND ALBUTEROL SULFATE 3 ML: 2.5; .5 SOLUTION RESPIRATORY (INHALATION) at 04:04

## 2025-04-09 RX ADMIN — SERTRALINE HYDROCHLORIDE 100 MG: 50 TABLET ORAL at 09:04

## 2025-04-09 RX ADMIN — METOCLOPRAMIDE HYDROCHLORIDE 5 MG: 5 TABLET ORAL at 05:04

## 2025-04-09 NOTE — ASSESSMENT & PLAN NOTE
Vitals:    04/07/25 1116 04/07/25 1655 04/07/25 2003 04/08/25 0100   BP: 106/60 102/62 128/81 116/66    04/08/25 0359 04/08/25 0738 04/08/25 0910 04/08/25 1130   BP: (!) 101/59 99/64 91/65 107/62    04/08/25 1608 04/08/25 2022   BP: (!) 105/58 110/75         Monitor BP while on Metoprolol; on IVF; off Losartan

## 2025-04-09 NOTE — RESPIRATORY THERAPY
Treatment given by RT student, Dana Mojica.       04/09/25 1330   Patient Assessment/Suction   Level of Consciousness (AVPU) alert   Respiratory Effort Unlabored   Expansion/Accessory Muscles/Retractions no use of accessory muscles;no retractions   All Lung Fields Breath Sounds Anterior:;Lateral:;equal bilaterally;wheezes, expiratory   Rhythm/Pattern, Respiratory unlabored;depth regular;no shortness of breath reported;tachypneic   PRE-TX-O2   Device (Oxygen Therapy) nasal cannula   Flow (L/min) (Oxygen Therapy) 2   Oxygen Concentration (%) 28   SpO2 (!) 92 %   Pulse Oximetry Type Intermittent   Pulse 95   Resp (!) 23   Positioning   Body Position position maintained   Head of Bed (HOB) Positioning HOB elevated   Aerosol Therapy   $ Aerosol Therapy Charges Aerosol Treatment   Daily Review of Necessity (SVN) completed   Respiratory Treatment Status (SVN) given   Treatment Route (SVN) oxygen;mask   Patient Position HOB elevated   Post Treatment Assessment (SVN) breath sounds unchanged   Signs of Intolerance (SVN) none   Breath Sounds Post-Respiratory Treatment   Throughout All Fields Post-Treatment All Fields   Throughout All Fields Post-Treatment Anterior:;Lateral:;no change   Post-treatment Heart Rate (beats/min) 82   Post-treatment Resp Rate (breaths/min) 23

## 2025-04-09 NOTE — PT/OT/SLP PROGRESS
Occupational Therapy   Treatment    Name: Magan Saleem  MRN: [id number]  Admitting Diagnosis:  Sepsis due to pneumonia       Recommendations:     Discharge Recommendations: Low Intensity Therapy  Discharge Equipment Recommendations:  to be determined by next level of care  Barriers to discharge:       Assessment:     Magan Saleem is a 69 y.o. female with a medical diagnosis of Sepsis due to pneumonia.  She presents with weakness and decline in ADLs. Performance deficits affecting function are weakness, decreased upper extremity function, impaired endurance, impaired balance, decreased safety awareness, impaired self care skills, impaired functional mobility, decreased coordination, edema.     Rehab Prognosis:  Good; patient would benefit from acute skilled OT services to address these deficits and reach maximum level of function.       Plan:     Patient to be seen 5 x/week to address the above listed problems via self-care/home management, therapeutic activities, therapeutic exercises  Plan of Care Expires:    Plan of Care Reviewed with: patient    Subjective     Chief Complaint: sepsis d/t pneumonia  Patient/Family Comments/goals: pt agreeable to OT tx  Pain/Comfort:  Pain Rating 1: 4/10  Location - Side 1: Bilateral  Location 1: arm    Objective:     Communicated with: LONDON Cisneros prior to session.  Patient found HOB elevated with peripheral IV, oxygen, telemetry upon OT entry to room.    General Precautions: Standard, fall    Orthopedic Precautions:N/A  Braces: N/A  Respiratory Status: Nasal cannula, flow 2 L/min     Occupational Performance:     Bed Mobility:    Not performed     Functional Mobility/Transfers:  Not performed    Activities of Daily Living:  Not performed      [facility] 6 Click ADL:      Treatment & Education:  Pt performed x 15 reps each AAROM shoulder flexion, chest press, elbow flexion/extension, and IR/ER in order to improve BUE strength and endurance to perform ADL and ADL t/f with less assist.      Patient left HOB elevated with all lines intact, call button in reach, and family present    GOALS:   Multidisciplinary Problems       Occupational Therapy Goals          Problem: Occupational Therapy    Goal Priority Disciplines Outcome Interventions   Occupational Therapy Goal     OT, PT/OT Progressing    Description: STG:  Pt will perform grooming with setup  Pt will bathe with Min A  Pt will perform UE dressing with Min A  Pt will perform LE dressing with Mod A  Pt will sit EOB x 10 min with CG assistance  Pt will transfer bed/chair/bsc with Mod A  Pt will perform standing task x 3 min with CG assistance  Pt will tolerate 30 minutes of tx without fatigue      LT.Restore to max I with self care and mobility.                         DME Justifications:  No DME recommended requiring DME justifications    Time Tracking:     OT Date of Treatment: 25  OT Start Time: 1354  OT Stop Time: 1409  OT Total Time (min): 15 min    Billable Minutes:Therapeutic Exercise 15 minutes    OT/SAROJ: OT     Number of SAROJ visits since last OT visit: 1    2025

## 2025-04-09 NOTE — PROGRESS NOTES
[facility]  [facility] Medicine  Progress Note    Patient Name: Magan Saleem  MRN: [id number]  Patient Class: IP- Inpatient   Admission Date: 4/3/2025  Length of Stay: 5 days  Attending Physician: Andriy Rizvi MD  Primary Care Provider: Sil Brown DO        Subjective     Principal Problem:Sepsis due to pneumonia        HPI:  68 yo F presents to [place] ED from [facility] for abnormal labs.  Patient was discharged from [facility] two days ago to skilled nursing facility for rehab.  She was diagnosed with dehydration due to gastroparesis with UTI.  Patient has metastatic lung cancer (to the brain) and follows with Dr. Swanson for IV chemotherapy every other week and takes lazertinib daily.  She has completed her course of radiation for the brain mets and follow had showed some improvement.  I thought she had either some decreased LOC due to encephalopathy or some aphasia from the brain mets but after a great effort to get her to talk, I realized she was just mad.  She wanted to know why she had been discharged two days ago when she could not eat.  She has no appetite and early satiety.  She is not nauseated and no vomiting.  She has decided on a DNR status previously (her caretaker and friend of 20 years) states that she had always said she did not want resuscitation if she experienced cardiopulmonary arrest and had told her children her wishes but she does want treatment and is not opposed to J tube if needed.  She has not had anything to eat or drink in two days and is dehydrated again.      Patient was transferred because of concern of sepsis.  She did have enterococcus faecalis UTI at [facility] and was treated.  I do not have the culture results but cultures were sent and patient does have some yeast noted.  (Will not treat a yeast colonization).  She is afebrile and hemodynamically stable and does not look septic clinically.  Her Lactic acid is stable at 2.5 dara 3.2.  Will check  another in am after volume resuscitation.  Her creat is at baseline but she has prerenal azotemia further confirming the dehydration.  Patient has an IO in place from [facility] due to dehydration and difficult stick but with some volume resuscitation, we have gotten an IV.  She is covid and flu negative.      Her WBC is 29 and I am not sure if she has received neupogen but could be a stress reaction.  Her BS is 245 and she does have some urine ketones but most likely due to starvation ketosis.  Will check a serum ketone.  Her platelets are 88K but this is most likely from her chemo.  She is not anemic.  CXR shows some patchy infiltrate but no obvious obstructive pneumonia from her lung cancer.  Her BNP about 3K but no clinical signs of CHF.  No recent echo but due to her BMI 50 most likely has some PHTN.  EKG had no ischemic changes but tachy at 114 which could also be from dehydration.  She was on ozempic for her DM and this could be the cause of her decreased appetite.  She is also on decadron for prevention of cerebral edema.      Remainder of ROS as below.  She mostly just nods and shakes her head and rolls her eyes.  You can get her to laugh if you try but she has been depressed since she has not walked since her hospitalization at [facility] on 3/19/25 and was discharged two days ago.  She says that at her last chemo she noticed she was getting weaker and her daughter had to help her in and out of the car and that was new for her.      See assessment and plan below for problem based evaluation       Overview/Hospital Course:  69 year old female presents with hypernatremia; she is not too talkative during rounds    4/5- patient seen examined today resting comfortably in bed and in no acute distress, with no acute events overnight.  Patient has a multitude of issues complicating her medical picture.  White blood cell count 72415 today, sodium 159, potassium 3.2 and BUN creatinine 59 and 1.8.  The patient is still not eating  even when assistance is provided.  We have already changed her fluids to half-normal saline with 20 of KCl at 1:25 a.m..  Have also put in a GI consult for possible feeding tube.  E coli in the urine has turned out to be ESBL and Rocephin has been changed Merrem.    4/6- the patient seen examined today resting comfortably in bed, in no acute distress, in no acute events overnight.  The patient is not very talkative today, but states she is doing okay.  She has no acute complaints.  Blood cultures remain negative.  The patient has not eaten since yesterday and is on light IV fluids.  GI has been consulted for feeding tube.  Continues on Merrem for positive urine culture.    04/07 Records reviewed. Not eating which not new, Similar during recent admit at [facility]. Dr Swanson there had question pancreatitis. No abd pain or tenderness reported now. Question of dysphagia to solids. Chart hx gastroparesis. Will discuss with GI. Ronnie says has responded so far well to treatment for lung CA.   04/08 had EGD at [facility] 03/21/25 with no obstruction and evidence gastroparesis. Still not ending. Try additional meds. Talked with GI. Increase activity    Interval History:     Review of Systems   Constitutional:  Negative for appetite change, fatigue and fever.   HENT:  Positive for trouble swallowing. Negative for congestion and hearing loss.    Respiratory:  Positive for shortness of breath. Negative for chest tightness and wheezing.    Cardiovascular:  Negative for chest pain and palpitations.   Gastrointestinal:  Negative for abdominal pain, constipation and nausea.   Genitourinary:  Negative for difficulty urinating and dysuria.   Musculoskeletal:  Positive for gait problem. Negative for back pain and neck stiffness.   Skin:  Negative for pallor and rash.   Neurological:  Negative for dizziness, speech difficulty and headaches.   Psychiatric/Behavioral:  Positive for sleep disturbance. Negative for confusion and suicidal ideas.       Objective:     Vital Signs (Most Recent):  Temp: 97.2 °F (36.2 °C) (04/08/25 2022)  Pulse: 110 (04/08/25 2022)  Resp: (!) 23 (04/08/25 1919)  BP: 110/75 (04/08/25 2022)  SpO2: (!) 93 % (04/08/25 2022) Vital Signs (24h Range):  Temp:  [96.6 °F (35.9 °C)-97.6 °F (36.4 °C)] 97.2 °F (36.2 °C)  Pulse:  [] 110  Resp:  [18-23] 23  SpO2:  [92 %-96 %] 93 %  BP: ()/(58-75) 110/75     Weight: (!) 136.1 kg (300 lb)  Body mass index is 49.92 kg/m².    Intake/Output Summary (Last 24 hours) at 4/8/2025 2256  Last data filed at 4/8/2025 1447  Gross per 24 hour   Intake --   Output 200 ml   Net -200 ml         Physical Exam  Vitals reviewed.   Constitutional:       General: She is awake. She is not in acute distress.     Appearance: She is well-developed. She is morbidly obese. She is not toxic-appearing.   HENT:      Head: Normocephalic.      Nose: Nose normal.      Mouth/Throat:      Pharynx: Oropharynx is clear.   Eyes:      Extraocular Movements: Extraocular movements intact.      Pupils: Pupils are equal, round, and reactive to light.   Neck:      Thyroid: No thyroid mass.      Vascular: No carotid bruit.   Cardiovascular:      Rate and Rhythm: Normal rate and regular rhythm.      Pulses: Normal pulses.      Heart sounds: Normal heart sounds. No murmur heard.  Pulmonary:      Effort: Pulmonary effort is normal.      Breath sounds: Normal breath sounds and air entry. No wheezing.   Abdominal:      General: Bowel sounds are normal. There is no distension.      Palpations: Abdomen is soft.      Tenderness: There is no abdominal tenderness.   Musculoskeletal:         General: Normal range of motion.      Cervical back: Neck supple. No rigidity.   Skin:     General: Skin is warm.      Coloration: Skin is not jaundiced.      Findings: No lesion.   Neurological:      General: No focal deficit present.      Mental Status: She is alert and oriented to person, place, and time.      Cranial Nerves: No cranial nerve  deficit.   Psychiatric:         Attention and Perception: Attention normal.         Mood and Affect: Mood normal.         Behavior: Behavior normal. Behavior is cooperative.         Thought Content: Thought content normal.         Cognition and Memory: Cognition normal.               Significant Labs: All pertinent labs within the past 24 hours have been reviewed.  BMP:   Recent Labs   Lab 04/08/25  0542   *   *   K 3.6   *   CO2 22*   BUN 39*   CREATININE 1.39*   CALCIUM 8.2*     CBC:   Recent Labs   Lab 04/07/25  0503 04/08/25  0542   WBC 30.97* 29.92*   HGB 10.2* 10.2*   HCT 32.2* 31.5*   * 128*     CMP:   Recent Labs   Lab 04/07/25  0503 04/08/25  0542   * 149*   K 4.3 3.6   * 118*   CO2 20* 22*   * 218*   BUN 44* 39*   CREATININE 1.45* 1.39*   CALCIUM 8.3* 8.2*   ANIONGAP 14 13       Significant Imaging: I have reviewed all pertinent imaging results/findings within the past 24 hours.      Intake/Output - Last 3 Shifts         04/07 0700 04/08 0659 04/08 0700 04/09 0659    P.O. 0     Total Intake(mL/kg) 0 (0)     Urine (mL/kg/hr) 500 (0.2) 200 (0.1)    Stool 0     Total Output 500 200    Net -500 -200          Urine Occurrence 2 x     Stool Occurrence 2 x           Microbiology Results (last 7 days)       Procedure Component Value Units Date/Time    Clostridioides difficile toxin/antigen with reflex to PCR [[id number]]     Order Status: Canceled Specimen: Stool                 Assessment & Plan  Sepsis due to pneumonia  Appears to have UTI and pneumonia; has leukocytosis; has opacities on CXR; has G- bacilli on urine culture; blood cultures pending; will place on Vancomycin and Rocephin; BP on the low side; on IVF  Hypernatremia  Due to dehydration; off diuretics; on IVF; will monitor Na levels  Lung cancer metastatic to brain  DNR but not hospice.  Receives chemo and has finished radiation.    04/07 reported stable / improved after treatment    Thrombocytopenia  On  "chemo; will monitor    Essential hypertension  Vitals:    04/07/25 1116 04/07/25 1655 04/07/25 2003 04/08/25 0100   BP: 106/60 102/62 128/81 116/66    04/08/25 0359 04/08/25 0738 04/08/25 0910 04/08/25 1130   BP: (!) 101/59 99/64 91/65 107/62    04/08/25 1608 04/08/25 2022   BP: (!) 105/58 110/75         Monitor BP while on Metoprolol; on IVF; off Losartan    Moderate persistent asthma without complication  Continue home inhaled steroid/bronchodilator and singulair    Chronic kidney disease, stage 3b  Creatine stable   Chronic pulmonary embolism without acute cor pulmonale  Eliquis was discontinued due to risk of ICH with brain mets but they have improved so the anti-coagulation has been restarted; will monitor  04/07 apparently this of several years ago    Type 2 diabetes mellitus with hyperglycemia  BS elevated; will increase Lantus to 25 units; continue SSI;monitor BS     Gastroparesis  On Reglan and PPI for erosive gastritis; will monitor    EGD by Dr. Lo during recent past admission at [facility]:  "EGD on 03/21/2025 shows LA class B erosive esophagitis but no pill esophagitis, nonerosive gastritis and retention of food and fluid suspicious for gastroparesis, due to narcotics and diabetes. "    04/ 08 try additional meds to help gastric emptying.  Morbid obesity  Body mass index is 49.92 kg/m². Morbid obesity complicates all aspects of disease management from diagnostic modalities to treatment. Weight loss encouraged and health benefits explained to patient.     Would benefit from sleep study and possible CPAP.  Does not wear oxygen at home.      VTE Risk Mitigation (From admission, onward)           Ordered     apixaban tablet 5 mg  2 times daily         04/08/25 0851                    Discharge Planning   MITUL:      Code Status: DNR   Medical Readiness for Discharge Date:   Discharge Plan A: Home with family                Please place Justification for DME        Andriy Rizvi MD  Department of " [facility] Medicine   [facility]

## 2025-04-09 NOTE — SUBJECTIVE & OBJECTIVE
Interval History:     Review of Systems   Constitutional:  Negative for appetite change, fatigue and fever.   HENT:  Positive for trouble swallowing. Negative for congestion and hearing loss.    Respiratory:  Positive for shortness of breath. Negative for chest tightness and wheezing.    Cardiovascular:  Negative for chest pain and palpitations.   Gastrointestinal:  Negative for abdominal pain, constipation and nausea.   Genitourinary:  Negative for difficulty urinating and dysuria.   Musculoskeletal:  Positive for gait problem. Negative for back pain and neck stiffness.   Skin:  Negative for pallor and rash.   Neurological:  Negative for dizziness, speech difficulty and headaches.   Psychiatric/Behavioral:  Positive for sleep disturbance. Negative for confusion and suicidal ideas.      Objective:     Vital Signs (Most Recent):  Temp: 97.2 °F (36.2 °C) (04/08/25 2022)  Pulse: 110 (04/08/25 2022)  Resp: (!) 23 (04/08/25 1919)  BP: 110/75 (04/08/25 2022)  SpO2: (!) 93 % (04/08/25 2022) Vital Signs (24h Range):  Temp:  [96.6 °F (35.9 °C)-97.6 °F (36.4 °C)] 97.2 °F (36.2 °C)  Pulse:  [] 110  Resp:  [18-23] 23  SpO2:  [92 %-96 %] 93 %  BP: ()/(58-75) 110/75     Weight: (!) 136.1 kg (300 lb)  Body mass index is 49.92 kg/m².    Intake/Output Summary (Last 24 hours) at 4/8/2025 1973  Last data filed at 4/8/2025 1447  Gross per 24 hour   Intake --   Output 200 ml   Net -200 ml         Physical Exam  Vitals reviewed.   Constitutional:       General: She is awake. She is not in acute distress.     Appearance: She is well-developed. She is morbidly obese. She is not toxic-appearing.   HENT:      Head: Normocephalic.      Nose: Nose normal.      Mouth/Throat:      Pharynx: Oropharynx is clear.   Eyes:      Extraocular Movements: Extraocular movements intact.      Pupils: Pupils are equal, round, and reactive to light.   Neck:      Thyroid: No thyroid mass.      Vascular: No carotid bruit.   Cardiovascular:      Rate  and Rhythm: Normal rate and regular rhythm.      Pulses: Normal pulses.      Heart sounds: Normal heart sounds. No murmur heard.  Pulmonary:      Effort: Pulmonary effort is normal.      Breath sounds: Normal breath sounds and air entry. No wheezing.   Abdominal:      General: Bowel sounds are normal. There is no distension.      Palpations: Abdomen is soft.      Tenderness: There is no abdominal tenderness.   Musculoskeletal:         General: Normal range of motion.      Cervical back: Neck supple. No rigidity.   Skin:     General: Skin is warm.      Coloration: Skin is not jaundiced.      Findings: No lesion.   Neurological:      General: No focal deficit present.      Mental Status: She is alert and oriented to person, place, and time.      Cranial Nerves: No cranial nerve deficit.   Psychiatric:         Attention and Perception: Attention normal.         Mood and Affect: Mood normal.         Behavior: Behavior normal. Behavior is cooperative.         Thought Content: Thought content normal.         Cognition and Memory: Cognition normal.               Significant Labs: All pertinent labs within the past 24 hours have been reviewed.  BMP:   Recent Labs   Lab 04/08/25  0542   *   *   K 3.6   *   CO2 22*   BUN 39*   CREATININE 1.39*   CALCIUM 8.2*     CBC:   Recent Labs   Lab 04/07/25  0503 04/08/25  0542   WBC 30.97* 29.92*   HGB 10.2* 10.2*   HCT 32.2* 31.5*   * 128*     CMP:   Recent Labs   Lab 04/07/25  0503 04/08/25  0542   * 149*   K 4.3 3.6   * 118*   CO2 20* 22*   * 218*   BUN 44* 39*   CREATININE 1.45* 1.39*   CALCIUM 8.3* 8.2*   ANIONGAP 14 13       Significant Imaging: I have reviewed all pertinent imaging results/findings within the past 24 hours.      Intake/Output - Last 3 Shifts         04/07 0700 04/08 0659 04/08 0700 04/09 0659    P.O. 0     Total Intake(mL/kg) 0 (0)     Urine (mL/kg/hr) 500 (0.2) 200 (0.1)    Stool 0     Total Output 500 200    Net  -500 -200          Urine Occurrence 2 x     Stool Occurrence 2 x           Microbiology Results (last 7 days)       Procedure Component Value Units Date/Time    Clostridioides difficile toxin/antigen with reflex to PCR [[id number]]     Order Status: Canceled Specimen: Stool

## 2025-04-09 NOTE — RESPIRATORY THERAPY
Treatments given at 07:28 and 07:33 by RT student, Dana Mojica.       04/09/25 0728   Patient Assessment/Suction   Level of Consciousness (AVPU) alert   Respiratory Effort Unlabored   Expansion/Accessory Muscles/Retractions no use of accessory muscles;no retractions   All Lung Fields Breath Sounds Anterior:;Lateral:;clear;equal bilaterally   Rhythm/Pattern, Respiratory unlabored;depth regular;no shortness of breath reported;tachypneic   Cough Frequency infrequent   Cough Type good   PRE-TX-O2   Device (Oxygen Therapy) nasal cannula   $ Is the patient on Low Flow Oxygen? Yes   Flow (L/min) (Oxygen Therapy) 2   Oxygen Concentration (%) 28   SpO2 95 %   Pulse Oximetry Type Intermittent   $ Pulse Oximetry - Multiple Charge Pulse Oximetry - Multiple   Pulse 98   Resp (!) 23   Positioning   Body Position position maintained   Head of Bed (HOB) Positioning HOB elevated   Aerosol Therapy   $ Aerosol Therapy Charges Aerosol Treatment   Daily Review of Necessity (SVN) completed   Respiratory Treatment Status (SVN) given   Treatment Route (SVN) oxygen;mask   Patient Position HOB elevated   Post Treatment Assessment (SVN) breath sounds unchanged   Signs of Intolerance (SVN) none   Breath Sounds Post-Respiratory Treatment   Throughout All Fields Post-Treatment All Fields   Throughout All Fields Post-Treatment Anterior:;Lateral:;no change   Post-treatment Heart Rate (beats/min) 102   Post-treatment Resp Rate (breaths/min) 25   Respiratory Evaluation   $ Care Plan Tech Time 15 min

## 2025-04-09 NOTE — PROGRESS NOTES
Pharmacist Renal Dose Adjustment Note    Magan Saleem is a 69 y.o. female being treated with the medication metoclopramide    Patient Data:    Vital Signs (Most Recent):  Temp: 97.7 °F (36.5 °C) (04/09/25 1128)  Pulse: 94 (04/09/25 1128)  Resp: (!) 22 (04/09/25 1128)  BP: 105/76 (04/09/25 1128)  SpO2: 97 % (04/09/25 1128) Vital Signs (72h Range):  Temp:  [96.1 °F (35.6 °C)-98.3 °F (36.8 °C)]   Pulse:  []   Resp:  [18-23]   BP: ()/(42-96)   SpO2:  [92 %-97 %]      Recent Labs   Lab 04/07/25  0503 04/08/25  0542 04/09/25  0515   CREATININE 1.45* 1.39* 1.42*     Serum creatinine: 1.42 mg/dL (H) 04/09/25 0515  Estimated creatinine clearance: 53 mL/min (A)    Medication:metoclopramide dose: 10mg frequency with meals + nightly  will be changed to medication:metoclopramide dose:5mg frequency:with meals + nightly    Pharmacist's Name: Linda Jackson PharmD.  Pharmacist's Extension: [phone]

## 2025-04-09 NOTE — PLAN OF CARE
Talked to patient's daughter regarding recommendation for sb for patient.  Obtained patient choice for [place] and [place].  Sent documents to [place] for referral and messaged Katarzyna at [place] for referral.  Will continue to follow.

## 2025-04-09 NOTE — PT/OT/SLP PROGRESS
Physical Therapy      Patient Name:  Magan Saleem   MRN:  [id number]    Patient not seen today secondary to Patient ill (Comment), Patient unwilling to participate. Will follow-up 4/10/25.

## 2025-04-10 LAB
ANION GAP SERPL CALCULATED.3IONS-SCNC: 13 MMOL/L (ref 7–16)
BUN SERPL-MCNC: 36 MG/DL (ref 10–20)
BUN/CREAT SERPL: 24 (ref 6–20)
CALCIUM SERPL-MCNC: 7.8 MG/DL (ref 8.4–10.2)
CHLORIDE SERPL-SCNC: 109 MMOL/L (ref 98–107)
CO2 SERPL-SCNC: 19 MMOL/L (ref 23–31)
CREAT SERPL-MCNC: 1.52 MG/DL (ref 0.55–1.02)
EGFR (NO RACE VARIABLE) (RUSH/TITUS): 37 ML/MIN/1.73M2
GLUCOSE SERPL-MCNC: 153 MG/DL (ref 70–105)
GLUCOSE SERPL-MCNC: 155 MG/DL (ref 70–105)
GLUCOSE SERPL-MCNC: 166 MG/DL (ref 70–105)
GLUCOSE SERPL-MCNC: 166 MG/DL (ref 82–115)
NT-PROBNP SERPL-MCNC: 1340 PG/ML (ref 1–125)
POTASSIUM SERPL-SCNC: 3.6 MMOL/L (ref 3.5–5.1)
SODIUM SERPL-SCNC: 137 MMOL/L (ref 136–145)

## 2025-04-10 PROCEDURE — 99233 SBSQ HOSP IP/OBS HIGH 50: CPT | Mod: ,,, | Performed by: HOSPITALIST

## 2025-04-10 PROCEDURE — 63600175 PHARM REV CODE 636 W HCPCS: Performed by: HOSPITALIST

## 2025-04-10 PROCEDURE — 25000003 PHARM REV CODE 250: Performed by: HOSPITALIST

## 2025-04-10 PROCEDURE — 25000242 PHARM REV CODE 250 ALT 637 W/ HCPCS: Performed by: HOSPITALIST

## 2025-04-10 PROCEDURE — 94640 AIRWAY INHALATION TREATMENT: CPT

## 2025-04-10 PROCEDURE — 80048 BASIC METABOLIC PNL TOTAL CA: CPT | Performed by: HOSPITALIST

## 2025-04-10 PROCEDURE — 27000207 HC ISOLATION

## 2025-04-10 PROCEDURE — 97110 THERAPEUTIC EXERCISES: CPT

## 2025-04-10 PROCEDURE — 83880 ASSAY OF NATRIURETIC PEPTIDE: CPT | Performed by: HOSPITALIST

## 2025-04-10 PROCEDURE — 86580 TB INTRADERMAL TEST: CPT | Performed by: HOSPITALIST

## 2025-04-10 PROCEDURE — 11000001 HC ACUTE MED/SURG PRIVATE ROOM

## 2025-04-10 PROCEDURE — 27000221 HC OXYGEN, UP TO 24 HOURS

## 2025-04-10 PROCEDURE — 63600175 PHARM REV CODE 636 W HCPCS: Performed by: FAMILY MEDICINE

## 2025-04-10 PROCEDURE — 36415 COLL VENOUS BLD VENIPUNCTURE: CPT | Performed by: HOSPITALIST

## 2025-04-10 PROCEDURE — 99900035 HC TECH TIME PER 15 MIN (STAT)

## 2025-04-10 PROCEDURE — 82962 GLUCOSE BLOOD TEST: CPT

## 2025-04-10 PROCEDURE — 30200315 PPD INTRADERMAL TEST REV CODE 302: Performed by: HOSPITALIST

## 2025-04-10 PROCEDURE — 94761 N-INVAS EAR/PLS OXIMETRY MLT: CPT

## 2025-04-10 PROCEDURE — 97530 THERAPEUTIC ACTIVITIES: CPT

## 2025-04-10 RX ADMIN — METOCLOPRAMIDE HYDROCHLORIDE 5 MG: 5 TABLET ORAL at 09:04

## 2025-04-10 RX ADMIN — ATORVASTATIN CALCIUM 20 MG: 20 TABLET, FILM COATED ORAL at 07:04

## 2025-04-10 RX ADMIN — MONTELUKAST 10 MG: 10 TABLET, FILM COATED ORAL at 09:04

## 2025-04-10 RX ADMIN — ERYTHROMYCIN 500 MG: 250 TABLET, FILM COATED ORAL at 07:04

## 2025-04-10 RX ADMIN — SERTRALINE HYDROCHLORIDE 100 MG: 50 TABLET ORAL at 07:04

## 2025-04-10 RX ADMIN — METOPROLOL SUCCINATE 25 MG: 25 TABLET, EXTENDED RELEASE ORAL at 07:04

## 2025-04-10 RX ADMIN — IPRATROPIUM BROMIDE AND ALBUTEROL SULFATE 3 ML: 2.5; .5 SOLUTION RESPIRATORY (INHALATION) at 12:04

## 2025-04-10 RX ADMIN — BUDESONIDE 0.5 MG: 0.5 SUSPENSION RESPIRATORY (INHALATION) at 07:04

## 2025-04-10 RX ADMIN — ERYTHROMYCIN 500 MG: 250 TABLET, FILM COATED ORAL at 04:04

## 2025-04-10 RX ADMIN — METOCLOPRAMIDE HYDROCHLORIDE 5 MG: 5 TABLET ORAL at 11:04

## 2025-04-10 RX ADMIN — METOCLOPRAMIDE HYDROCHLORIDE 5 MG: 5 TABLET ORAL at 04:04

## 2025-04-10 RX ADMIN — METOCLOPRAMIDE HYDROCHLORIDE 5 MG: 5 TABLET ORAL at 05:04

## 2025-04-10 RX ADMIN — INSULIN ASPART 1 UNITS: 100 INJECTION, SOLUTION INTRAVENOUS; SUBCUTANEOUS at 09:04

## 2025-04-10 RX ADMIN — DEXTROSE MONOHYDRATE: 50 INJECTION, SOLUTION INTRAVENOUS at 07:04

## 2025-04-10 RX ADMIN — IPRATROPIUM BROMIDE AND ALBUTEROL SULFATE 3 ML: 2.5; .5 SOLUTION RESPIRATORY (INHALATION) at 07:04

## 2025-04-10 RX ADMIN — APIXABAN 5 MG: 5 TABLET, FILM COATED ORAL at 09:04

## 2025-04-10 RX ADMIN — MEROPENEM 1 G: 1 INJECTION, POWDER, FOR SOLUTION INTRAVENOUS at 05:04

## 2025-04-10 RX ADMIN — IPRATROPIUM BROMIDE AND ALBUTEROL SULFATE 3 ML: 2.5; .5 SOLUTION RESPIRATORY (INHALATION) at 04:04

## 2025-04-10 RX ADMIN — PANTOPRAZOLE SODIUM 40 MG: 40 TABLET, DELAYED RELEASE ORAL at 07:04

## 2025-04-10 RX ADMIN — INSULIN GLARGINE 25 UNITS: 100 INJECTION, SOLUTION SUBCUTANEOUS at 09:04

## 2025-04-10 RX ADMIN — ERYTHROMYCIN 500 MG: 250 TABLET, FILM COATED ORAL at 11:04

## 2025-04-10 RX ADMIN — APIXABAN 5 MG: 5 TABLET, FILM COATED ORAL at 07:04

## 2025-04-10 RX ADMIN — TUBERCULIN PURIFIED PROTEIN DERIVATIVE 5 UNITS: 5 INJECTION, SOLUTION INTRADERMAL at 05:04

## 2025-04-10 NOTE — PLAN OF CARE
Inquired with Katarzyna Jackson and Ninoska at [place] regarding patient's admission status.  No updates yet.  Will continue to follow for placment/dc needs.

## 2025-04-10 NOTE — ASSESSMENT & PLAN NOTE
"On Reglan and PPI for erosive gastritis; will monitor    EGD by Dr. Lo during recent past admission at [facility]:  "EGD on 03/21/2025 shows LA class B erosive esophagitis but no pill esophagitis, nonerosive gastritis and retention of food and fluid suspicious for gastroparesis, due to narcotics and diabetes. "    04/ 08 try additional meds to help gastric emptying.  04/09 taking in some now orally with recent changes meds  "

## 2025-04-10 NOTE — PT/OT/SLP PROGRESS
Occupational Therapy   Treatment    Name: Magan Saleem  MRN: [id number]  Admitting Diagnosis:  Sepsis due to pneumonia       Recommendations:     Discharge Recommendations: Low Intensity Therapy  Discharge Equipment Recommendations:  to be determined by next level of care  Barriers to discharge:       Assessment:     Magan Saleem is a 69 y.o. female with a medical diagnosis of Sepsis due to pneumonia.  She presents with weakness and decline in ADLs. Performance deficits affecting function are weakness, decreased upper extremity function, impaired endurance, impaired balance, decreased safety awareness, impaired self care skills, impaired functional mobility, decreased coordination, edema.     Rehab Prognosis:  Good; patient would benefit from acute skilled OT services to address these deficits and reach maximum level of function.       Plan:     Patient to be seen 5 x/week to address the above listed problems via self-care/home management, therapeutic activities, therapeutic exercises  Plan of Care Expires:    Plan of Care Reviewed with: patient    Subjective     Chief Complaint: sepsis d/t pneumonia  Patient/Family Comments/goals: pt agreeable to OT tx  Pain/Comfort:  Pain Rating 1: 0/10    Objective:     Communicated with: LONDON Cisneros prior to session.  Patient found HOB elevated with peripheral IV, oxygen, telemetry upon OT entry to room.    General Precautions: Standard, fall    Orthopedic Precautions:N/A  Braces: N/A  Respiratory Status: Nasal cannula, flow 2 L/min     Occupational Performance:     Bed Mobility:    Not performed     Functional Mobility/Transfers:  Not performed    Activities of Daily Living:  Not performed      [facility] 6 Click ADL:      Treatment & Education:  Pt performed x 15 reps each AAROM shoulder flexion, chest press, elbow flexion/extension, and IR/ER in order to improve BUE strength and endurance to perform ADL and ADL t/f with less assist.     Patient left HOB elevated with all lines  intact, call button in reach, and family present    GOALS:   Multidisciplinary Problems       Occupational Therapy Goals          Problem: Occupational Therapy    Goal Priority Disciplines Outcome Interventions   Occupational Therapy Goal     OT, PT/OT Progressing    Description: STG:  Pt will perform grooming with setup  Pt will bathe with Min A  Pt will perform UE dressing with Min A  Pt will perform LE dressing with Mod A  Pt will sit EOB x 10 min with CG assistance  Pt will transfer bed/chair/bsc with Mod A  Pt will perform standing task x 3 min with CG assistance  Pt will tolerate 30 minutes of tx without fatigue      LT.Restore to max I with self care and mobility.                         DME Justifications:  No DME recommended requiring DME justifications    Time Tracking:     OT Date of Treatment: 04/10/25  OT Start Time: 1507  OT Stop Time: 1521  OT Total Time (min): 14 min    Billable Minutes:Therapeutic Exercise 14 minutes    OT/SAROJ: OT     Number of SAROJ visits since last OT visit: 1    4/10/2025

## 2025-04-10 NOTE — PROGRESS NOTES
[facility]  [facility] Medicine  Progress Note    Patient Name: Magan Saleem  MRN: [id number]  Patient Class: IP- Inpatient   Admission Date: 4/3/2025  Length of Stay: 6 days  Attending Physician: Andriy Rizvi MD  Primary Care Provider: Sil Brown DO        Subjective     Principal Problem:Sepsis due to pneumonia        HPI:  68 yo F presents to [facility] ED from [facility] for abnormal labs.  Patient was discharged from [facility] two days ago to skilled nursing facility for rehab.  She was diagnosed with dehydration due to gastroparesis with UTI.  Patient has metastatic lung cancer (to the brain) and follows with Dr. Swanson for IV chemotherapy every other week and takes lazertinib daily.  She has completed her course of radiation for the brain mets and follow had showed some improvement.  I thought she had either some decreased LOC due to encephalopathy or some aphasia from the brain mets but after a great effort to get her to talk, I realized she was just mad.  She wanted to know why she had been discharged two days ago when she could not eat.  She has no appetite and early satiety.  She is not nauseated and no vomiting.  She has decided on a DNR status previously (her caretaker and friend of 20 years) states that she had always said she did not want resuscitation if she experienced cardiopulmonary arrest and had told her children her wishes but she does want treatment and is not opposed to J tube if needed.  She has not had anything to eat or drink in two days and is dehydrated again.      Patient was transferred because of concern of sepsis.  She did have enterococcus faecalis UTI at [facility] and was treated.  I do not have the culture results but cultures were sent and patient does have some yeast noted.  (Will not treat a yeast colonization).  She is afebrile and hemodynamically stable and does not look septic clinically.  Her Lactic acid is stable at 2.5 dara 3.2.  Will check  another in am after volume resuscitation.  Her creat is at baseline but she has prerenal azotemia further confirming the dehydration.  Patient has an IO in place from [facility] due to dehydration and difficult stick but with some volume resuscitation, we have gotten an IV.  She is covid and flu negative.      Her WBC is 29 and I am not sure if she has received neupogen but could be a stress reaction.  Her BS is 245 and she does have some urine ketones but most likely due to starvation ketosis.  Will check a serum ketone.  Her platelets are 88K but this is most likely from her chemo.  She is not anemic.  CXR shows some patchy infiltrate but no obvious obstructive pneumonia from her lung cancer.  Her BNP about 3K but no clinical signs of CHF.  No recent echo but due to her BMI 50 most likely has some PHTN.  EKG had no ischemic changes but tachy at 114 which could also be from dehydration.  She was on ozempic for her DM and this could be the cause of her decreased appetite.  She is also on decadron for prevention of cerebral edema.      Remainder of ROS as below.  She mostly just nods and shakes her head and rolls her eyes.  You can get her to laugh if you try but she has been depressed since she has not walked since her hospitalization at [facility] on 3/19/25 and was discharged two days ago.  She says that at her last chemo she noticed she was getting weaker and her daughter had to help her in and out of the car and that was new for her.      See assessment and plan below for problem based evaluation       Overview/Hospital Course:  69 year old female presents with hypernatremia; she is not too talkative during rounds    4/5- patient seen examined today resting comfortably in bed and in no acute distress, with no acute events overnight.  Patient has a multitude of issues complicating her medical picture.  White blood cell count 58995 today, sodium 159, potassium 3.2 and BUN creatinine 59 and 1.8.  The patient is still not eating  even when assistance is provided.  We have already changed her fluids to half-normal saline with 20 of KCl at 1:25 a.m..  Have also put in a GI consult for possible feeding tube.  E coli in the urine has turned out to be ESBL and Rocephin has been changed Merrem.    4/6- the patient seen examined today resting comfortably in bed, in no acute distress, in no acute events overnight.  The patient is not very talkative today, but states she is doing okay.  She has no acute complaints.  Blood cultures remain negative.  The patient has not eaten since yesterday and is on light IV fluids.  GI has been consulted for feeding tube.  Continues on Merrem for positive urine culture.    04/07 Records reviewed. Not eating which not new, Similar during recent admit at [facility]. Dr Swanson there had question pancreatitis. No abd pain or tenderness reported now. Question of dysphagia to solids. Chart hx gastroparesis. Will discuss with GI. Ronnie says has responded so far well to treatment for lung CA.   04/08 had EGD at [facility] 03/21/25 with no obstruction and evidence gastroparesis. Still not ending. Try additional meds. Talked with GI. Increase activity  04/09 Some better with med  changes    Interval History:     Review of Systems   Constitutional:  Negative for appetite change, fatigue and fever.   HENT:  Positive for trouble swallowing. Negative for congestion and hearing loss.    Respiratory:  Positive for shortness of breath. Negative for chest tightness and wheezing.    Cardiovascular:  Negative for chest pain and palpitations.   Gastrointestinal:  Negative for abdominal pain, constipation and nausea.   Genitourinary:  Negative for difficulty urinating and dysuria.   Musculoskeletal:  Positive for gait problem. Negative for back pain and neck stiffness.   Skin:  Negative for pallor and rash.   Neurological:  Negative for dizziness, speech difficulty and headaches.   Psychiatric/Behavioral:  Positive for sleep disturbance. Negative  for confusion and suicidal ideas.      Objective:     Vital Signs (Most Recent):  Temp: 97.4 °F (36.3 °C) (04/09/25 2136)  Pulse: 96 (04/09/25 2136)  Resp: (!) 22 (04/09/25 2136)  BP: 109/68 (04/09/25 2136)  SpO2: 95 % (04/09/25 2136) Vital Signs (24h Range):  Temp:  [97.3 °F (36.3 °C)-97.7 °F (36.5 °C)] 97.4 °F (36.3 °C)  Pulse:  [] 96  Resp:  [18-28] 22  SpO2:  [92 %-100 %] 95 %  BP: ()/(54-76) 109/68     Weight: (!) 136.1 kg (300 lb)  Body mass index is 49.92 kg/m².    Intake/Output Summary (Last 24 hours) at 4/9/2025 2335  Last data filed at 4/9/2025 1715  Gross per 24 hour   Intake 0 ml   Output --   Net 0 ml         Physical Exam  Vitals reviewed.   Constitutional:       General: She is awake. She is not in acute distress.     Appearance: She is well-developed. She is morbidly obese. She is not toxic-appearing.   HENT:      Head: Normocephalic.      Nose: Nose normal.      Mouth/Throat:      Pharynx: Oropharynx is clear.   Eyes:      Extraocular Movements: Extraocular movements intact.      Pupils: Pupils are equal, round, and reactive to light.   Neck:      Thyroid: No thyroid mass.      Vascular: No carotid bruit.   Cardiovascular:      Rate and Rhythm: Normal rate and regular rhythm.      Pulses: Normal pulses.      Heart sounds: Normal heart sounds. No murmur heard.  Pulmonary:      Effort: Pulmonary effort is normal.      Breath sounds: Normal breath sounds and air entry. No wheezing.   Abdominal:      General: Bowel sounds are normal. There is no distension.      Palpations: Abdomen is soft.      Tenderness: There is no abdominal tenderness.   Musculoskeletal:         General: Normal range of motion.      Cervical back: Neck supple. No rigidity.   Skin:     General: Skin is warm.      Coloration: Skin is not jaundiced.      Findings: No lesion.   Neurological:      General: No focal deficit present.      Mental Status: She is alert and oriented to person, place, and time.      Cranial Nerves:  No cranial nerve deficit.   Psychiatric:         Attention and Perception: Attention normal.         Mood and Affect: Mood normal.         Behavior: Behavior normal. Behavior is cooperative.         Thought Content: Thought content normal.         Cognition and Memory: Cognition normal.               Significant Labs: All pertinent labs within the past 24 hours have been reviewed.  BMP:   Recent Labs   Lab 04/09/25 0515   *      K 3.6   *   CO2 21*   BUN 36*   CREATININE 1.42*   CALCIUM 8.1*     CBC:   Recent Labs   Lab 04/08/25 0542 04/09/25 0515   WBC 29.92* 26.58*   HGB 10.2* 10.1*   HCT 31.5* 31.5*   * 130*     CMP:   Recent Labs   Lab 04/08/25 0542 04/09/25 0515   * 142   K 3.6 3.6   * 112*   CO2 22* 21*   * 227*   BUN 39* 36*   CREATININE 1.39* 1.42*   CALCIUM 8.2* 8.1*   PROT  --  4.5*   ALBUMIN  --  1.5*   BILITOT  --  0.7   ALKPHOS  --  179*   AST  --  39   ALT  --  55   ANIONGAP 13 13       Significant Imaging: I have reviewed all pertinent imaging results/findings within the past 24 hours.      Intake/Output - Last 3 Shifts         04/08 0700 04/09 0659 04/09 0700  04/10 0659    P.O.  0    Total Intake(mL/kg)  0 (0)    Urine (mL/kg/hr) 200 (0.1)     Stool      Total Output 200     Net -200 0          Stool Occurrence 1 x           Microbiology Results (last 7 days)       Procedure Component Value Units Date/Time    Clostridioides difficile toxin/antigen with reflex to PCR [[id number]]     Order Status: Canceled Specimen: Stool                 Assessment & Plan  Sepsis due to pneumonia  Appears to have UTI and pneumonia; has leukocytosis; has opacities on CXR; has G- bacilli on urine culture; blood cultures pending; will place on Vancomycin and Rocephin; BP on the low side; on IVF  Hypernatremia  Due to dehydration; off diuretics; on IVF; will monitor Na levels  Lung cancer metastatic to brain  DNR but not hospice.  Receives chemo and has finished  "radiation.    04/07 reported stable / improved after treatment  04/09 more fuffy right side CXR    Thrombocytopenia  On chemo; will monitor    Essential hypertension  Vitals:    04/08/25 0910 04/08/25 1130 04/08/25 1608 04/08/25 2022   BP: 91/65 107/62 (!) 105/58 110/75    04/08/25 2352 04/09/25 0355 04/09/25 0751 04/09/25 1128   BP: 97/70 (!) 129/57 (!) 111/54 105/76    04/09/25 1618 04/09/25 2136   BP: 109/76 109/68         Monitor BP while on Metoprolol; on IVF; off Losartan    Moderate persistent asthma without complication  Continue home inhaled steroid/bronchodilator and singulair    Chronic kidney disease, stage 3b  Creatine stable   Chronic pulmonary embolism without acute cor pulmonale  Eliquis was discontinued due to risk of ICH with brain mets but they have improved so the anti-coagulation has been restarted; will monitor  04/07 apparently this of several years ago    Type 2 diabetes mellitus with hyperglycemia  BS elevated; will increase Lantus to 25 units; continue SSI;monitor BS     Gastroparesis  On Reglan and PPI for erosive gastritis; will monitor    EGD by Dr. Lo during recent past admission at [facility]:  "EGD on 03/21/2025 shows LA class B erosive esophagitis but no pill esophagitis, nonerosive gastritis and retention of food and fluid suspicious for gastroparesis, due to narcotics and diabetes. "    04/ 08 try additional meds to help gastric emptying.  04/09 taking in some now orally with recent changes meds  Morbid obesity  Body mass index is 49.92 kg/m². Morbid obesity complicates all aspects of disease management from diagnostic modalities to treatment. Weight loss encouraged and health benefits explained to patient.     Would benefit from sleep study and possible CPAP.  Does not wear oxygen at home.      VTE Risk Mitigation (From admission, onward)           Ordered     apixaban tablet 5 mg  2 times daily         04/08/25 0851                    Discharge Planning   MITUL:      " Code Status: DNR   Medical Readiness for Discharge Date:   Discharge Plan A: Home with family                Please place Justification for DME        Andriy Rizvi MD  Department of Hospital Medicine   [facility]

## 2025-04-10 NOTE — PLAN OF CARE
Problem: Adult Inpatient Plan of Care  Goal: Plan of Care Review  Outcome: Progressing     Problem: Skin Injury Risk Increased  Goal: Skin Health and Integrity  Outcome: Progressing     Problem: Infection  Goal: Absence of Infection Signs and Symptoms  Outcome: Progressing     Problem: Fall Injury Risk  Goal: Absence of Fall and Fall-Related Injury  Outcome: Progressing     Problem: UTI (Urinary Tract Infection)  Goal: Improved Infection Symptoms  Outcome: Progressing     Problem: Anxiety  Goal: Anxiety Reduction or Resolution  Outcome: Progressing     Problem: Chronic Kidney Disease  Goal: Optimal Coping with Chronic Illness  Outcome: Progressing  Goal: Acceptable Pain Control  Outcome: Progressing     Problem: Sepsis/Septic Shock  Goal: Absence of Bleeding  Outcome: Progressing

## 2025-04-10 NOTE — ASSESSMENT & PLAN NOTE
Vitals:    04/08/25 0910 04/08/25 1130 04/08/25 1608 04/08/25 2022   BP: 91/65 107/62 (!) 105/58 110/75    04/08/25 2352 04/09/25 0355 04/09/25 0751 04/09/25 1128   BP: 97/70 (!) 129/57 (!) 111/54 105/76    04/09/25 1618 04/09/25 2136   BP: 109/76 109/68         Monitor BP while on Metoprolol; on IVF; off Losartan

## 2025-04-10 NOTE — PLAN OF CARE
Patient was denied admission for [place] sb.  It was recommended that she have sb at SNF.  Will continue to purse placement at [facility] which is patient's second choice.  Will continue to follow.

## 2025-04-10 NOTE — PLAN OF CARE
Problem: Adult Inpatient Plan of Care  Goal: Plan of Care Review  Outcome: Progressing  Goal: Patient-Specific Goal (Individualized)  Outcome: Progressing  Goal: Absence of Hospital-Acquired Illness or Injury  Outcome: Progressing  Goal: Optimal Comfort and Wellbeing  Outcome: Progressing  Goal: Readiness for Transition of Care  Outcome: Progressing     Problem: Bariatric Environmental Safety  Goal: Safety Maintained with Care  Outcome: Progressing     Problem: Skin Injury Risk Increased  Goal: Skin Health and Integrity  Outcome: Progressing     Problem: Infection  Goal: Absence of Infection Signs and Symptoms  Outcome: Progressing     Problem: Fall Injury Risk  Goal: Absence of Fall and Fall-Related Injury  Outcome: Progressing     Problem: UTI (Urinary Tract Infection)  Goal: Improved Infection Symptoms  Outcome: Progressing     Problem: Anxiety  Goal: Anxiety Reduction or Resolution  Outcome: Progressing     Problem: Chronic Kidney Disease  Goal: Optimal Coping with Chronic Illness  Outcome: Progressing  Goal: Electrolyte Balance  Outcome: Progressing  Goal: Fluid Balance  Outcome: Progressing  Goal: Optimal Functional Ability  Outcome: Progressing  Goal: Absence of Anemia Signs and Symptoms  Outcome: Progressing  Goal: Optimal Oral Intake  Outcome: Progressing  Goal: Acceptable Pain Control  Outcome: Progressing  Goal: Minimize Renal Failure Effects  Outcome: Progressing     Problem: Diabetes Comorbidity  Goal: Blood Glucose Level Within Targeted Range  Outcome: Progressing     Problem: Sepsis/Septic Shock  Goal: Optimal Coping  Outcome: Progressing  Goal: Absence of Bleeding  Outcome: Progressing  Goal: Blood Glucose Level Within Targeted Range  Outcome: Progressing  Goal: Absence of Infection Signs and Symptoms  Outcome: Progressing  Goal: Optimal Nutrition Intake  Outcome: Progressing     Problem: Pneumonia  Goal: Fluid Balance  Outcome: Progressing  Goal: Resolution of Infection Signs and  Symptoms  Outcome: Progressing  Goal: Effective Oxygenation and Ventilation  Outcome: Progressing     Problem: Gas Exchange Impaired  Goal: Optimal Gas Exchange  Outcome: Progressing     Problem: Wound  Goal: Optimal Coping  Outcome: Progressing  Goal: Optimal Functional Ability  Outcome: Progressing  Goal: Absence of Infection Signs and Symptoms  Outcome: Progressing  Goal: Improved Oral Intake  Outcome: Progressing  Goal: Optimal Pain Control and Function  Outcome: Progressing  Goal: Skin Health and Integrity  Outcome: Progressing  Goal: Optimal Wound Healing  Outcome: Progressing     Problem: Airway Clearance Ineffective  Goal: Effective Airway Clearance  Outcome: Progressing     Problem: Suicide Risk  Goal: Absence of Self-Harm  Outcome: Progressing

## 2025-04-10 NOTE — ASSESSMENT & PLAN NOTE
DNR but not hospice.  Receives chemo and has finished radiation.    04/07 reported stable / improved after treatment  04/09 more fuffy right side CXR

## 2025-04-10 NOTE — PROGRESS NOTES
"[facility]  Adult Nutrition  Follow-Up Note         Reason for Assessment  Reason For Assessment: RD follow-up        Assessment and Plan  4/10/2025: RD follow up. Patient remains on Consistent Carbohydrate Diet (2000 Calories) with Boost Glucose Control (all meals). Per MD, yesterday patient noted to be taking in some now orally with recent changes to meds to help with gastric emptying. Recommend to consider changing diet to Low Residue 2/2 gastroparesis with frequent small feedings and to continue with Boost Glucose Control to better meet nutritional needs. Patient requested fruit per MD - will relay to dietary. Believe patient would benefit from working with outpatient oncology dietitian upon discharge. RD following.     4/7/2025: RD follow up. Patient is currently on a Consistent Carbohydrate Diet (2000 Calories). Per flowsheet, patient does not have an appetite and is only taking a few bites of her food and/or refusing meals altogether. SLP evaluated patient last week, and pt refused all solids during evaluation.     GI was consulted for feeding tube. Per GI yesterday: "Endoscopic PEG placement would likely be difficult due to morbid obesity and body habits. Would likely need surgical PEG or J tube per surgery due to history of gastroparesis." Per GI, when asked about PEG, she states she is unsure whether she would want one.     Recommend to consider changing diet to Low Residue 2/2 gastroparesis with addition of Boost Glucose Control (all meals) to better meet nutritional needs with daily multivitamin/mineral pending decision regarding nutrition support. If decision made to initiate nutrition support, given gastroparesis, J-tube warranted. RD following.    4/4/2025: Consult received and appreciated. Consult for gastroparesis with risk of malnutrition. Patient is a 68 yo F who presented to [facility] from [facility] for abnormal labs. Patient was discharged from [facility] two days " ago to SNF for rehab. She was diagnosed with dehydration due to gastroparesis with UTI. Patient has complex medical history including anxiety, CKD stage 3b, chronic PE without acute cor pulmonale, T2DM, gastroparesis. Pt has metastatic lung cancer (to the brain) and receives IV chemotherapy every other week. She has completed her course of radiation for brain mets and has showed some improvement.    Patient is 136.1 kg (300 lb) with a BMI of 49.92 and is morbidly obese. Chart review reveals that patient has not lost a significant amount of weight as of recent. Rather, in October 2024 patient documented to weigh 133.8 kg (295 lb) (+15 lbs x 6 mo). Patient has no evident fat or muscle depletion. However, she has no appetite, early satiety, and is depressed. She isn't nauseated nor vomiting. PTA, she had not had anything to eat or drink in two days. Despite poor intake, pt does not meet criteria for malnutrition at this time per ASPEN guidelines, though she is at high risk.     Patient is currently on a Regular Diet. Recommend to change diet to Low Residue 2/2 gastroparesis (low fat, low fiber) with addition of Boost Glucose Control TID -- though solid foods high in fat are not recommended, liquids that contain fat may be tolerated and can provide needed calories. If difficulty tolerating solid foods, ground foods may be better as solid foods in stomach don't empty well. ST consulted for swallow eval for possible dysphagia to solid foods. Recommend to consider appetite stimulant or alternate route of nutrition if intake does not improve. Per MD, she is not objective to TPN or J tube if needed.     Note: Patient was on ozempic -- gastroparesis should improve off ozempic, plan to trial low dose reglan per MD    Last Bowel Movement: 04/08/25    Medications/labs reviewed. RD following.    Learning Needs/Social Determinants of Health    Learning Assessment       04/04/2025 0021 [facility] (4/3/2025 -  Present)   Created by Deanna Rivas, RN - RN (Nurse) Status: Complete                 PRIMARY LEARNER     Primary Learner Name:  Magan Saleem  - 04/04/2025 0021    Relationship:  Patient SG - 04/04/2025 0021    Does the primary learner have any barriers to learning?:  No Barriers SG - 04/04/2025 0021    What is the preferred language of the primary learner?:  English SG - 04/04/2025 0021    Is an  required?:  No SG - 04/04/2025 0021    How does the primary learner prefer to learn new concepts?:  Listening SG - 04/04/2025 0021    How often do you need to have someone help you read instructions, pamphlets, or written material from your doctor or pharmacy?:  Never SG - 04/04/2025 0021        CO-LEARNER #1     No question answered        CO-LEARNER #2     No question answered        SPECIAL TOPICS     No question answered        ANSWERED BY:     -:  Family SG - 04/04/2025 0021        Comments         Edit History       Deanna Rivas, RN - RN (Nurse)   04/04/2025 0021                          Social Drivers of Health     Tobacco Use: Low Risk  (4/4/2025)    Patient History     Smoking Tobacco Use: Never     Smokeless Tobacco Use: Never     Passive Exposure: Not on file   Alcohol Use: Not At Risk (4/4/2025)    AUDIT-C     Frequency of Alcohol Consumption: Never     Average Number of Drinks: Patient does not drink     Frequency of Binge Drinking: Never   Financial Resource Strain: Patient Declined (4/5/2025)    Overall Financial Resource Strain (CARDIA)     Difficulty of Paying Living Expenses: Patient declined   Food Insecurity: Patient Declined (4/5/2025)    Hunger Vital Sign     Worried About Running Out of Food in the Last Year: Patient declined     Ran Out of Food in the Last Year: Patient declined   Transportation Needs: No Transportation Needs (4/4/2025)    PRAPARE - Transportation     Lack of Transportation (Medical): No     Lack of Transportation (Non-Medical): No   Physical Activity:  Inactive (4/4/2025)    Exercise Vital Sign     Days of Exercise per Week: 0 days     Minutes of Exercise per Session: 0 min   Stress: Patient Declined (4/5/2025)    [employer] [place] [employer] - Occupational Stress Questionnaire     Feeling of Stress : Patient declined   Housing Stability: Patient Declined (4/5/2025)    Housing Stability Vital Sign     Unable to Pay for Housing in the Last Year: Patient declined     Number of Times Moved in the Last Year: Not on file     Homeless in the Last Year: Patient declined   Depression: Low Risk  (10/30/2024)    Depression     Last PHQ-4: Flowsheet Data: 0   Utilities: Patient Declined (4/5/2025)    [facility] Utilities     Threatened with loss of utilities: Patient declined   Health Literacy: Patient Declined (4/5/2025)     Health Literacy     Frequency of need for help with medical instructions: Patient declines to respond   Recent Concern: Health Literacy - Inadequate Health Literacy (4/4/2025)     Health Literacy     Frequency of need for help with medical instructions: Sometimes   Social Isolation: Not on file     Malnutrition  Is Patient Malnourished: No    Nutrition Diagnosis  Decreased nutrient needs of fat, fiber  related to Compromised organ/organs related to G.I. function as evidenced by gastroparesis    Recent Labs   Lab 04/10/25  0614 04/10/25  0706   *  --    POCGLU  --  155*     Comments on Glucose: Glucose elevated (T2DM, metabolic stress)    Nutrition Prescription / Recommendations  Recommendation/Intervention: Recommend to consider changing diet order to low residue 2/2 gastroparesis and to continue with Boost Glucose Control TID to better meet nutritional needs + small frequent feedings  Goals: encourage good PO intakes, weight maintenance during admission  Nutrition Goal Status: progressing towards goal  Current Diet Order: Consistent Carbohydrate Diet (2000 kcal)  Chewing or Swallowing Difficulty: concern for dysphagia  Recommended  Diet: Fiber/Fat Restricted  Recommended Oral Supplement: Boost Glucose Control [190kcals, 16g protein, 16g Carbs] with meals  Is Nutrition Education Recommended: Yes - recommend outpatient oncology RD    Monitor and Evaluation  % current intake: 0 - 10 %  % intake to meet estimated needs: 50 - 75 %  Monitor and Evaluation: Food and beverage intake, Protein intake, Carbohydrate intake, Diet order, Weight, Beliefs and attitudes, Electrolyte and renal panel, Gastrointestinal profile, Glucose/endocrine profile, Inflammatory profile, Lipid profile, Nutrition focused physical findings    Current Medical Diagnosis and Past Medical History     Past Medical History:   Diagnosis Date    Chronic kidney disease, stage 3b     Chronic pulmonary embolism without acute cor pulmonale     Diabetes mellitus type 2 in obese     DNR (do not resuscitate)     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 Swanson    Melanocytic nevi of lower limb, including hip, left     Mixed hyperlipidemia     Moderate persistent asthma without complication 10/30/2024    Other pulmonary embolism without acute cor pulmonale     Vitamin D deficiency      Nutrition/Diet History  Spiritual, Cultural Beliefs, [employer] Practices, Values that Affect Care: no  Factors Affecting Nutritional Intake: depression, altered gastrointestinal function, decreased appetite, early satiety    Lab/Procedures/Meds  Recent Labs   Lab 04/09/25  0515 04/10/25  0614    137   K 3.6 3.6   BUN 36* 36*   CREATININE 1.42* 1.52*   CALCIUM 8.1* 7.8*   ALBUMIN 1.5*  --    * 109*   ALT 55  --    AST 39  --    Note: BUN, Cr high (dehydration, CKD stage 3). Ca++ low (likely  secondary to low alb). Alb low (inflammation, liver dysfunction). Cl- high (dehydration, IV fluids).     Last A1c:   Lab Results   Component Value Date    HGBA1C 6.7 04/02/2025    HGBA1C 6.4 (H) 02/05/2025   Note: < 7 % goal for individuals with diabetes    Lab Results   Component Value Date    RBC 3.49 (L) 04/09/2025    HGB 10.1 (L) 04/09/2025    HCT 31.5 (L) 04/09/2025    MCV 90.3 04/09/2025    MCH 28.9 04/09/2025    MCHC 32.1 04/09/2025   Note: H/H low    Pertinent Labs Reviewed: reviewed  Pertinent Medications Reviewed: reviewed    Scheduled Meds:   albuterol-ipratropium  3 mL Nebulization QID    apixaban  5 mg Oral BID    atorvastatin  20 mg Oral Daily    budesonide  0.5 mg Nebulization Q12H    erythromycin base  500 mg Oral TID WM    insulin glargine U-100  25 Units Subcutaneous QHS    meropenem IV (PEDS and ADULTS)  1 g Intravenous Q12H    metoclopramide HCl  5 mg Oral QID (AC & HS)    metoprolol succinate  25 mg Oral Daily    montelukast  10 mg Oral QHS    pantoprazole  40 mg Oral Daily    sertraline  100 mg Oral Daily   Note: atorvastatin, insulin glargine, metoclopramide, metoprolol, pantoprazole, sertraline    Continuous Infusions:   D5W   Intravenous Continuous 75 mL/hr at 04/10/25 0747 New Bag at 04/10/25 0747     PRN Meds:.  Current Facility-Administered Medications:     acetaminophen, 650 mg, Rectal, Q6H PRN    acetaminophen, 1,000 mg, Oral, Q6H PRN    aluminum & magnesium hydroxide-simethicone, 30 mL, Oral, Q6H PRN    bisacodyL, 10 mg, Oral, Daily PRN    dextromethorphan-guaiFENesin  mg/5 ml, 10 mL, Oral, Q6H PRN    dextrose 50%, 12.5 g, Intravenous, PRN    dextrose 50%, 25 g, Intravenous, PRN    diphenhydrAMINE, 50 mg, Oral, Q6H PRN    docusate sodium, 100 mg, Oral, BID PRN    glucagon (human recombinant), 1 mg, Intramuscular, PRN    glucose, 16 g, Oral, PRN    glucose, 24 g, Oral, PRN    HYDROcodone-acetaminophen, 1 tablet, Oral, Q6H PRN    insulin aspart U-100, 0-10 Units, Subcutaneous, QID  "(AC + HS) PRN    melatonin, 6 mg, Oral, Nightly PRN    traZODone, 50 mg, Oral, Nightly PRN    Anthropometrics  Height: 5' 5" (165.1 cm)  Height (inches): 65 in  Height Method: Stated  Weight: (!) 136.1 kg (300 lb)  Weight (lb): 300 lb  Weight Method: Bed Scale  Ideal Body Weight (IBW), Female: 125 lb  % Ideal Body Weight, Female (lb): 240 %  BMI (Calculated): 49.9       Estimated/Assessed Needs  RMR ([place]-St. Jeor Equation): 1891.68     Temp: 97.4 °F (36.3 °C)Oral  Weight Used For Calorie Calculations: (!) 136.1 kg (300 lb 0.7 oz)     Energy Calorie Requirements (kcal): 1,905 - 2,722 kcal (14 - 20 kcal/kg ABW) (morbidly obese-- though hypermetabolic secondary to cancer, this should be adequate given BMI)  Weight Used For Protein Calculations: 56.7 kg (125 lb)  Protein Requirements: 60 - 85 g (1 - 1.5 g/kg IBW) (increased protein needs secondary to lung cancer with brain mets; undergoing chemo-- increased protein to heal tissues, fight infection; older age)    Fluid Requirements (mL): 1 mL/kcal  CHO Requirement: 45-55% (T2DM)    Nutrition by Nursing  Diet/Nutrition Received: regular  Intake (%): other (see comments) (a few bites)  Diet/Feeding Assistance: total feed  Diet/Feeding Tolerance: poor                Nutrition Follow-Up  RD Follow-up?: Yes    Nutrition Discharge Planning: Other (comments) (Highly recommend that patient work with outpatient oncology dietitian upon discharge 2/2 poor intake/chemo/gastroparesis)       Maria C Lorenzo, MS, RD, LD  Available via Secure Chat  "

## 2025-04-11 LAB
GLUCOSE SERPL-MCNC: 141 MG/DL (ref 70–105)
GLUCOSE SERPL-MCNC: 167 MG/DL (ref 70–105)
GLUCOSE SERPL-MCNC: 170 MG/DL (ref 70–105)
GLUCOSE SERPL-MCNC: 176 MG/DL (ref 70–105)

## 2025-04-11 PROCEDURE — 99900031 HC PATIENT EDUCATION (STAT)

## 2025-04-11 PROCEDURE — 97530 THERAPEUTIC ACTIVITIES: CPT

## 2025-04-11 PROCEDURE — 63600175 PHARM REV CODE 636 W HCPCS: Performed by: HOSPITALIST

## 2025-04-11 PROCEDURE — 27000221 HC OXYGEN, UP TO 24 HOURS

## 2025-04-11 PROCEDURE — 11000001 HC ACUTE MED/SURG PRIVATE ROOM

## 2025-04-11 PROCEDURE — 94640 AIRWAY INHALATION TREATMENT: CPT

## 2025-04-11 PROCEDURE — 97110 THERAPEUTIC EXERCISES: CPT | Mod: CO

## 2025-04-11 PROCEDURE — 25000242 PHARM REV CODE 250 ALT 637 W/ HCPCS: Performed by: HOSPITALIST

## 2025-04-11 PROCEDURE — 82962 GLUCOSE BLOOD TEST: CPT

## 2025-04-11 PROCEDURE — 97110 THERAPEUTIC EXERCISES: CPT

## 2025-04-11 PROCEDURE — 63600175 PHARM REV CODE 636 W HCPCS: Performed by: FAMILY MEDICINE

## 2025-04-11 PROCEDURE — 94761 N-INVAS EAR/PLS OXIMETRY MLT: CPT

## 2025-04-11 PROCEDURE — 25000003 PHARM REV CODE 250: Performed by: HOSPITALIST

## 2025-04-11 PROCEDURE — 27000207 HC ISOLATION

## 2025-04-11 PROCEDURE — 99232 SBSQ HOSP IP/OBS MODERATE 35: CPT | Mod: ,,, | Performed by: HOSPITALIST

## 2025-04-11 PROCEDURE — 99900035 HC TECH TIME PER 15 MIN (STAT)

## 2025-04-11 RX ORDER — NYSTATIN 100000 [USP'U]/ML
500000 SUSPENSION ORAL
Status: COMPLETED | OUTPATIENT
Start: 2025-04-11 | End: 2025-04-14

## 2025-04-11 RX ADMIN — MEROPENEM 1 G: 1 INJECTION, POWDER, FOR SOLUTION INTRAVENOUS at 04:04

## 2025-04-11 RX ADMIN — DEXTROSE MONOHYDRATE: 50 INJECTION, SOLUTION INTRAVENOUS at 09:04

## 2025-04-11 RX ADMIN — BUDESONIDE 0.5 MG: 0.5 SUSPENSION RESPIRATORY (INHALATION) at 07:04

## 2025-04-11 RX ADMIN — IPRATROPIUM BROMIDE AND ALBUTEROL SULFATE 3 ML: 2.5; .5 SOLUTION RESPIRATORY (INHALATION) at 08:04

## 2025-04-11 RX ADMIN — METOCLOPRAMIDE HYDROCHLORIDE 5 MG: 5 TABLET ORAL at 12:04

## 2025-04-11 RX ADMIN — INSULIN GLARGINE 25 UNITS: 100 INJECTION, SOLUTION SUBCUTANEOUS at 09:04

## 2025-04-11 RX ADMIN — MONTELUKAST 10 MG: 10 TABLET, FILM COATED ORAL at 09:04

## 2025-04-11 RX ADMIN — DEXTROSE MONOHYDRATE: 50 INJECTION, SOLUTION INTRAVENOUS at 07:04

## 2025-04-11 RX ADMIN — METOCLOPRAMIDE HYDROCHLORIDE 5 MG: 5 TABLET ORAL at 04:04

## 2025-04-11 RX ADMIN — ERYTHROMYCIN 500 MG: 250 TABLET, FILM COATED ORAL at 12:04

## 2025-04-11 RX ADMIN — METOPROLOL SUCCINATE 25 MG: 25 TABLET, EXTENDED RELEASE ORAL at 08:04

## 2025-04-11 RX ADMIN — ERYTHROMYCIN 500 MG: 250 TABLET, FILM COATED ORAL at 08:04

## 2025-04-11 RX ADMIN — IPRATROPIUM BROMIDE AND ALBUTEROL SULFATE 3 ML: 2.5; .5 SOLUTION RESPIRATORY (INHALATION) at 07:04

## 2025-04-11 RX ADMIN — METOCLOPRAMIDE HYDROCHLORIDE 5 MG: 5 TABLET ORAL at 06:04

## 2025-04-11 RX ADMIN — NYSTATIN 500000 UNITS: 100000 SUSPENSION ORAL at 09:04

## 2025-04-11 RX ADMIN — PANTOPRAZOLE SODIUM 40 MG: 40 TABLET, DELAYED RELEASE ORAL at 08:04

## 2025-04-11 RX ADMIN — APIXABAN 5 MG: 5 TABLET, FILM COATED ORAL at 08:04

## 2025-04-11 RX ADMIN — IPRATROPIUM BROMIDE AND ALBUTEROL SULFATE 3 ML: 2.5; .5 SOLUTION RESPIRATORY (INHALATION) at 03:04

## 2025-04-11 RX ADMIN — SERTRALINE HYDROCHLORIDE 100 MG: 50 TABLET ORAL at 08:04

## 2025-04-11 RX ADMIN — ERYTHROMYCIN 500 MG: 250 TABLET, FILM COATED ORAL at 04:04

## 2025-04-11 RX ADMIN — BUDESONIDE 0.5 MG: 0.5 SUSPENSION RESPIRATORY (INHALATION) at 08:04

## 2025-04-11 RX ADMIN — ATORVASTATIN CALCIUM 20 MG: 20 TABLET, FILM COATED ORAL at 08:04

## 2025-04-11 RX ADMIN — IPRATROPIUM BROMIDE AND ALBUTEROL SULFATE 3 ML: 2.5; .5 SOLUTION RESPIRATORY (INHALATION) at 12:04

## 2025-04-11 RX ADMIN — APIXABAN 5 MG: 5 TABLET, FILM COATED ORAL at 09:04

## 2025-04-11 RX ADMIN — METOCLOPRAMIDE HYDROCHLORIDE 5 MG: 5 TABLET ORAL at 09:04

## 2025-04-11 NOTE — PT/OT/SLP PROGRESS
Occupational Therapy   Treatment    Name: Magan Saleem  MRN: [id number]  Admitting Diagnosis:  Sepsis due to pneumonia       Recommendations:     Discharge Recommendations: Low Intensity Therapy  Discharge Equipment Recommendations:  to be determined by next level of care  Barriers to discharge:       Assessment:     Magan Saleem is a 69 y.o. female with a medical diagnosis of Sepsis due to pneumonia.  She presents with the following Performance deficits affecting function are weakness, decreased upper extremity function, impaired endurance, impaired balance, decreased safety awareness, impaired self care skills, impaired functional mobility, decreased coordination, edema, impaired cardiopulmonary response to activity.     Rehab Prognosis:  Fair; patient would benefit from acute skilled OT services to address these deficits and reach maximum level of function.       Plan:     Patient to be seen 5 x/week to address the above listed problems via self-care/home management, therapeutic activities, therapeutic exercises  Plan of Care Expires:    Plan of Care Reviewed with: patient    Subjective   Patient was laying supine with HOB elevated upon ROSA arrival. Patient stated she was feeling fine with no complaints of pain and was agreeable to participate in todays session.   Chief Complaint: patient stated she was too anxious to sit EOB today.  Patient/Family Comments/goals: patient agreeable to OT tx  Pain/Comfort:  Pain Rating 1: 0/10    Objective:     Communicated with: RN prior to session.  Patient found HOB elevated with peripheral IV, PureWick, oxygen upon OT entry to room.    General Precautions: Standard, fall, contact    Orthopedic Precautions:N/A  Braces: N/A  Respiratory Status: Nasal cannula, flow 2 L/min    Therapeutic exercises:  Patient completed the following AAROM exercises on right and left upper extremities  to work on ROM/strengthening in preparation to complete of bed mobility, ADLs and  transfers:     -Elbow flexion/extension: 2 sets of 10   -Shoulder flexion: 2 sets of 10   -Chest press: 2 sets of 10   -Internal Rotation/External Rotation: 2 sets of 10   -Wrist flexion/extension: 2 sets of 10    Increased time/effort needed to complete each exercise.       Patient left HOB elevated with call button in reach and RN notified    GOALS:   Multidisciplinary Problems       Occupational Therapy Goals          Problem: Occupational Therapy    Goal Priority Disciplines Outcome Interventions   Occupational Therapy Goal     OT, PT/OT Progressing    Description: STG:  Pt will perform grooming with setup  Pt will bathe with Min A  Pt will perform UE dressing with Min A  Pt will perform LE dressing with Mod A  Pt will sit EOB x 10 min with CG assistance  Pt will transfer bed/chair/bsc with Mod A  Pt will perform standing task x 3 min with CG assistance  Pt will tolerate 30 minutes of tx without fatigue      LT.Restore to max I with self care and mobility.                         DME Justifications:    Time Tracking:     OT Date of Treatment: 25  OT Start Time: 1033  OT Stop Time: 1048  OT Total Time (min): 15 min    Billable Minutes:Therapeutic Exercise 15 minutes    OT/SAROJ: SAROJ     Number of SAROJ visits since last OT visit: 1    SAROJ Rodriguez  2025  12:02 PM

## 2025-04-11 NOTE — PROGRESS NOTES
[facility]  [facility] Medicine  Progress Note    Patient Name: Magan Saleem  MRN: [id number]  Patient Class: IP- Inpatient   Admission Date: 4/3/2025  Length of Stay: 7 days  Attending Physician: Andriy Rizvi MD  Primary Care Provider: Sil Brown DO        Subjective     Principal Problem:Sepsis due to pneumonia        HPI:  70 yo F presents to [facility] ED from [facility] for abnormal labs.  Patient was discharged from [facility] two days ago to skilled nursing facility for rehab.  She was diagnosed with dehydration due to gastroparesis with UTI.  Patient has metastatic lung cancer (to the brain) and follows with Dr. Swanson for IV chemotherapy every other week and takes lazertinib daily.  She has completed her course of radiation for the brain mets and follow had showed some improvement.  I thought she had either some decreased LOC due to encephalopathy or some aphasia from the brain mets but after a great effort to get her to talk, I realized she was just mad.  She wanted to know why she had been discharged two days ago when she could not eat.  She has no appetite and early satiety.  She is not nauseated and no vomiting.  She has decided on a DNR status previously (her caretaker and friend of 20 years) states that she had always said she did not want resuscitation if she experienced cardiopulmonary arrest and had told her children her wishes but she does want treatment and is not opposed to J tube if needed.  She has not had anything to eat or drink in two days and is dehydrated again.      Patient was transferred because of concern of sepsis.  She did have enterococcus faecalis UTI at [facility] [place] and was treated.  I do not have the culture results but cultures were sent and patient does have some yeast noted.  (Will not treat a yeast colonization).  She is afebrile and hemodynamically stable and does not look septic clinically.  Her Lactic acid is stable at 2.5 dara 3.2.  Will check  another in am after volume resuscitation.  Her creat is at baseline but she has prerenal azotemia further confirming the dehydration.  Patient has an IO in place from [facility] due to dehydration and difficult stick but with some volume resuscitation, we have gotten an IV.  She is covid and flu negative.      Her WBC is 29 and I am not sure if she has received neupogen but could be a stress reaction.  Her BS is 245 and she does have some urine ketones but most likely due to starvation ketosis.  Will check a serum ketone.  Her platelets are 88K but this is most likely from her chemo.  She is not anemic.  CXR shows some patchy infiltrate but no obvious obstructive pneumonia from her lung cancer.  Her BNP about 3K but no clinical signs of CHF.  No recent echo but due to her BMI 50 most likely has some PHTN.  EKG had no ischemic changes but tachy at 114 which could also be from dehydration.  She was on ozempic for her DM and this could be the cause of her decreased appetite.  She is also on decadron for prevention of cerebral edema.      Remainder of ROS as below.  She mostly just nods and shakes her head and rolls her eyes.  You can get her to laugh if you try but she has been depressed since she has not walked since her hospitalization at [facility] [place] on 3/19/25 and was discharged two days ago.  She says that at her last chemo she noticed she was getting weaker and her daughter had to help her in and out of the car and that was new for her.      See assessment and plan below for problem based evaluation       Overview/Hospital Course:  69 year old female presents with hypernatremia; she is not too talkative during rounds    4/5- patient seen examined today resting comfortably in bed and in no acute distress, with no acute events overnight.  Patient has a multitude of issues complicating her medical picture.  White blood cell count 78528 today, sodium 159, potassium 3.2 and BUN creatinine 59 and 1.8.  The patient is still not eating  even when assistance is provided.  We have already changed her fluids to half-normal saline with 20 of KCl at 1:25 a.m..  Have also put in a GI consult for possible feeding tube.  E coli in the urine has turned out to be ESBL and Rocephin has been changed Merrem.    4/6- the patient seen examined today resting comfortably in bed, in no acute distress, in no acute events overnight.  The patient is not very talkative today, but states she is doing okay.  She has no acute complaints.  Blood cultures remain negative.  The patient has not eaten since yesterday and is on light IV fluids.  GI has been consulted for feeding tube.  Continues on Merrem for positive urine culture.    04/07 Records reviewed. Not eating which not new, Similar during recent admit at [facility]. Dr Swanson there had question pancreatitis. No abd pain or tenderness reported now. Question of dysphagia to solids. Chart hx gastroparesis. Will discuss with GI. [place] says has responded so far well to treatment for lung CA.   04/08 had EGD at [facility] 03/21/25 with no obstruction and evidence gastroparesis. Still not ending. Try additional meds. Talked with GI. Increase activity  04/09 Some better with med  changes  04/10 Continues to do better. Ate 1/2 fish sandwich for lunch. Called by Dr Swanson and she wanting to keep feeding tube in consideration. Talked with Dr Reich and Dr Lemus. If something needed with gastroparesis would have to have post gastric position of tube.     Interval History:     Review of Systems   Constitutional:  Negative for appetite change, fatigue and fever.   HENT:  Positive for trouble swallowing. Negative for congestion and hearing loss.    Respiratory:  Positive for shortness of breath. Negative for chest tightness and wheezing.    Cardiovascular:  Negative for chest pain and palpitations.   Gastrointestinal:  Negative for abdominal pain, constipation and nausea.   Genitourinary:  Negative for difficulty urinating and  dysuria.   Musculoskeletal:  Positive for gait problem. Negative for back pain and neck stiffness.   Skin:  Negative for pallor and rash.   Neurological:  Negative for dizziness, speech difficulty and headaches.   Psychiatric/Behavioral:  Positive for sleep disturbance. Negative for confusion and suicidal ideas.      Objective:     Vital Signs (Most Recent):  Temp: 97.3 °F (36.3 °C) (04/10/25 2033)  Pulse: 107 (04/10/25 2033)  Resp: 20 (04/10/25 2033)  BP: 95/73 (04/10/25 2033)  SpO2: 95 % (04/10/25 2033) Vital Signs (24h Range):  Temp:  [97.2 °F (36.2 °C)-97.4 °F (36.3 °C)] 97.3 °F (36.3 °C)  Pulse:  [] 107  Resp:  [16-26] 20  SpO2:  [93 %-100 %] 95 %  BP: ()/(68-83) 95/73     Weight: (!) 136.1 kg (300 lb)  Body mass index is 49.92 kg/m².    Intake/Output Summary (Last 24 hours) at 4/10/2025 2251  Last data filed at 4/10/2025 1715  Gross per 24 hour   Intake 30 ml   Output 1 ml   Net 29 ml         Physical Exam  Vitals reviewed.   Constitutional:       General: She is awake. She is not in acute distress.     Appearance: She is well-developed. She is morbidly obese. She is not toxic-appearing.   HENT:      Head: Normocephalic.      Nose: Nose normal.      Mouth/Throat:      Pharynx: Oropharynx is clear.   Eyes:      Extraocular Movements: Extraocular movements intact.      Pupils: Pupils are equal, round, and reactive to light.   Neck:      Thyroid: No thyroid mass.      Vascular: No carotid bruit.   Cardiovascular:      Rate and Rhythm: Normal rate and regular rhythm.      Pulses: Normal pulses.      Heart sounds: Normal heart sounds. No murmur heard.  Pulmonary:      Effort: Pulmonary effort is normal.      Breath sounds: Normal breath sounds and air entry. No wheezing.   Abdominal:      General: Bowel sounds are normal. There is no distension.      Palpations: Abdomen is soft.      Tenderness: There is no abdominal tenderness.   Musculoskeletal:         General: Normal range of motion.      Cervical  back: Neck supple. No rigidity.   Skin:     General: Skin is warm.      Coloration: Skin is not jaundiced.      Findings: No lesion.   Neurological:      General: No focal deficit present.      Mental Status: She is alert and oriented to person, place, and time.      Cranial Nerves: No cranial nerve deficit.   Psychiatric:         Attention and Perception: Attention normal.         Mood and Affect: Mood normal.         Behavior: Behavior normal. Behavior is cooperative.         Thought Content: Thought content normal.         Cognition and Memory: Cognition normal.               Significant Labs: All pertinent labs within the past 24 hours have been reviewed.  BMP:   Recent Labs   Lab 04/10/25  0614   *      K 3.6   *   CO2 19*   BUN 36*   CREATININE 1.52*   CALCIUM 7.8*     CBC:   Recent Labs   Lab 04/09/25 0515   WBC 26.58*   HGB 10.1*   HCT 31.5*   *     CMP:   Recent Labs   Lab 04/09/25  0515 04/10/25  0614    137   K 3.6 3.6   * 109*   CO2 21* 19*   * 166*   BUN 36* 36*   CREATININE 1.42* 1.52*   CALCIUM 8.1* 7.8*   PROT 4.5*  --    ALBUMIN 1.5*  --    BILITOT 0.7  --    ALKPHOS 179*  --    AST 39  --    ALT 55  --    ANIONGAP 13 13       Significant Imaging: I have reviewed all pertinent imaging results/findings within the past 24 hours.      Intake/Output - Last 3 Shifts         04/09 0700  04/10 0659 04/10 0700 04/11 0659    P.O. 0 30    Total Intake(mL/kg) 0 (0) 30 (0.2)    Urine (mL/kg/hr)      Stool 1     Total Output 1     Net -1 +30                Microbiology Results (last 7 days)       Procedure Component Value Units Date/Time    Clostridioides difficile toxin/antigen with reflex to PCR [[id number]]     Order Status: Canceled Specimen: Stool                 Assessment & Plan  Sepsis due to pneumonia  Appears to have UTI and pneumonia; has leukocytosis; has opacities on CXR; has G- bacilli on urine culture; blood cultures pending; will place on Vancomycin  "and Rocephin; BP on the low side; on IVF  Hypernatremia  Due to dehydration; off diuretics; on IVF; will monitor Na levels  Lung cancer metastatic to brain  DNR but not hospice.  Receives chemo and has finished radiation.    04/07 reported stable / improved after treatment  04/09 more fuffy right side CXR    Thrombocytopenia  On chemo; will monitor    Essential hypertension  Vitals:    04/09/25 0751 04/09/25 1128 04/09/25 1618 04/09/25 2136   BP: (!) 111/54 105/76 109/76 109/68    04/10/25 0113 04/10/25 0510 04/10/25 0703 04/10/25 1207   BP: 113/74 134/83 138/81 104/68    04/10/25 1547 04/10/25 2033   BP: 103/71 95/73         Monitor BP while on Metoprolol; on IVF; off Losartan    Moderate persistent asthma without complication  Continue home inhaled steroid/bronchodilator and singulair    Chronic kidney disease, stage 3b  Creatine stable   Chronic pulmonary embolism without acute cor pulmonale  Eliquis was discontinued due to risk of ICH with brain mets but they have improved so the anti-coagulation has been restarted; will monitor  04/07 apparently this of several years ago    Type 2 diabetes mellitus with hyperglycemia  BS elevated; will increase Lantus to 25 units; continue SSI;monitor BS     Gastroparesis  On Reglan and PPI for erosive gastritis; will monitor    EGD by Dr. Lo during recent past admission at [facility]:  "EGD on 03/21/2025 shows LA class B erosive esophagitis but no pill esophagitis, nonerosive gastritis and retention of food and fluid suspicious for gastroparesis, due to narcotics and diabetes. "    04/ 08 try additional meds to help gastric emptying.  04/09 taking in some now orally with recent changes meds  04/10 better and ate half [place] for lunch   Morbid obesity  Body mass index is 49.92 kg/m². Morbid obesity complicates all aspects of disease management from diagnostic modalities to treatment. Weight loss encouraged and health benefits explained to patient.     Would benefit " from sleep study and possible CPAP.  Does not wear oxygen at home.      VTE Risk Mitigation (From admission, onward)           Ordered     apixaban tablet 5 mg  2 times daily         04/08/25 4352                    Discharge Planning   MITUL:      Code Status: DNR   Medical Readiness for Discharge Date:   Discharge Plan A: Home with family                Please place Justification for DME        Andriy Rizvi MD  Department of Hospital Medicine   [facility]

## 2025-04-11 NOTE — ASSESSMENT & PLAN NOTE
"On Reglan and PPI for erosive gastritis; will monitor    EGD by Dr. Lo during recent past admission at [facility]:  "EGD on 03/21/2025 shows LA class B erosive esophagitis but no pill esophagitis, nonerosive gastritis and retention of food and fluid suspicious for gastroparesis, due to narcotics and diabetes. "    04/ 08 try additional meds to help gastric emptying.  04/09 taking in some now orally with recent changes meds  04/10 better and ate half [place] for lunch   "

## 2025-04-11 NOTE — SUBJECTIVE & OBJECTIVE
Interval History:     Review of Systems   Constitutional:  Negative for appetite change, fatigue and fever.   HENT:  Positive for trouble swallowing. Negative for congestion and hearing loss.    Respiratory:  Positive for shortness of breath. Negative for chest tightness and wheezing.    Cardiovascular:  Negative for chest pain and palpitations.   Gastrointestinal:  Negative for abdominal pain, constipation and nausea.   Genitourinary:  Negative for difficulty urinating and dysuria.   Musculoskeletal:  Positive for gait problem. Negative for back pain and neck stiffness.   Skin:  Negative for pallor and rash.   Neurological:  Negative for dizziness, speech difficulty and headaches.   Psychiatric/Behavioral:  Positive for sleep disturbance. Negative for confusion and suicidal ideas.      Objective:     Vital Signs (Most Recent):  Temp: 97.3 °F (36.3 °C) (04/10/25 2033)  Pulse: 107 (04/10/25 2033)  Resp: 20 (04/10/25 2033)  BP: 95/73 (04/10/25 2033)  SpO2: 95 % (04/10/25 2033) Vital Signs (24h Range):  Temp:  [97.2 °F (36.2 °C)-97.4 °F (36.3 °C)] 97.3 °F (36.3 °C)  Pulse:  [] 107  Resp:  [16-26] 20  SpO2:  [93 %-100 %] 95 %  BP: ()/(68-83) 95/73     Weight: (!) 136.1 kg (300 lb)  Body mass index is 49.92 kg/m².    Intake/Output Summary (Last 24 hours) at 4/10/2025 2251  Last data filed at 4/10/2025 1715  Gross per 24 hour   Intake 30 ml   Output 1 ml   Net 29 ml         Physical Exam  Vitals reviewed.   Constitutional:       General: She is awake. She is not in acute distress.     Appearance: She is well-developed. She is morbidly obese. She is not toxic-appearing.   HENT:      Head: Normocephalic.      Nose: Nose normal.      Mouth/Throat:      Pharynx: Oropharynx is clear.   Eyes:      Extraocular Movements: Extraocular movements intact.      Pupils: Pupils are equal, round, and reactive to light.   Neck:      Thyroid: No thyroid mass.      Vascular: No carotid bruit.   Cardiovascular:      Rate and  Rhythm: Normal rate and regular rhythm.      Pulses: Normal pulses.      Heart sounds: Normal heart sounds. No murmur heard.  Pulmonary:      Effort: Pulmonary effort is normal.      Breath sounds: Normal breath sounds and air entry. No wheezing.   Abdominal:      General: Bowel sounds are normal. There is no distension.      Palpations: Abdomen is soft.      Tenderness: There is no abdominal tenderness.   Musculoskeletal:         General: Normal range of motion.      Cervical back: Neck supple. No rigidity.   Skin:     General: Skin is warm.      Coloration: Skin is not jaundiced.      Findings: No lesion.   Neurological:      General: No focal deficit present.      Mental Status: She is alert and oriented to person, place, and time.      Cranial Nerves: No cranial nerve deficit.   Psychiatric:         Attention and Perception: Attention normal.         Mood and Affect: Mood normal.         Behavior: Behavior normal. Behavior is cooperative.         Thought Content: Thought content normal.         Cognition and Memory: Cognition normal.               Significant Labs: All pertinent labs within the past 24 hours have been reviewed.  BMP:   Recent Labs   Lab 04/10/25  0614   *      K 3.6   *   CO2 19*   BUN 36*   CREATININE 1.52*   CALCIUM 7.8*     CBC:   Recent Labs   Lab 04/09/25  0515   WBC 26.58*   HGB 10.1*   HCT 31.5*   *     CMP:   Recent Labs   Lab 04/09/25  0515 04/10/25  0614    137   K 3.6 3.6   * 109*   CO2 21* 19*   * 166*   BUN 36* 36*   CREATININE 1.42* 1.52*   CALCIUM 8.1* 7.8*   PROT 4.5*  --    ALBUMIN 1.5*  --    BILITOT 0.7  --    ALKPHOS 179*  --    AST 39  --    ALT 55  --    ANIONGAP 13 13       Significant Imaging: I have reviewed all pertinent imaging results/findings within the past 24 hours.      Intake/Output - Last 3 Shifts         04/09 0700  04/10 0659 04/10 0700  04/11 0659    P.O. 0 30    Total Intake(mL/kg) 0 (0) 30 (0.2)    Urine  (mL/kg/hr)      Stool 1     Total Output 1     Net -1 +30                Microbiology Results (last 7 days)       Procedure Component Value Units Date/Time    Clostridioides difficile toxin/antigen with reflex to PCR [[id number]]     Order Status: Canceled Specimen: Stool

## 2025-04-11 NOTE — ASSESSMENT & PLAN NOTE
Vitals:    04/09/25 0751 04/09/25 1128 04/09/25 1618 04/09/25 2136   BP: (!) 111/54 105/76 109/76 109/68    04/10/25 0113 04/10/25 0510 04/10/25 0703 04/10/25 1207   BP: 113/74 134/83 138/81 104/68    04/10/25 1547 04/10/25 2033   BP: 103/71 95/73         Monitor BP while on Metoprolol; on IVF; off Losartan

## 2025-04-11 NOTE — PT/OT/SLP PROGRESS
Chief Complaint   Patient presents with    Follow-up     Klinefelter Syndrome Physical Therapy Treatment    Patient Name:  Magan Saleem   MRN:  [id number]    Recommendations:     Discharge Recommendations: Moderate Intensity Therapy  Discharge Equipment Recommendations: to be determined by next level of care  Barriers to discharge:  ongoing medical care    Assessment:     Magan Saleem is a 69 y.o. female admitted with a medical diagnosis of Sepsis due to pneumonia.  She presents with the following impairments/functional limitations: weakness, impaired endurance, impaired functional mobility, gait instability, impaired balance, decreased lower extremity function, decreased safety awareness, impaired cardiopulmonary response to activity. Pt agrreable to attempt EOB sitting, tolerating for approx 1.5 min. Most limited by anxiety per her report.    Rehab Prognosis: Good and Fair; patient would benefit from acute skilled PT services to address these deficits and reach maximum level of function.    Recent Surgery: * No surgery found *      Plan:     During this hospitalization, patient to be seen 5 x/week to address the identified rehab impairments via gait training, therapeutic activities, therapeutic exercises, neuromuscular re-education and progress toward the following goals:    Plan of Care Expires:  05/04/25    Subjective     Chief Complaint: Sepsis due to pneumonia   Patient/Family Comments/goals: agreeable  Pain/Comfort:         Objective:     Communicated with nursing prior to session.  Patient found HOB elevated with peripheral IV, PureWick, oxygen upon PT entry to room.     General Precautions: Standard, fall, contact  Orthopedic Precautions: N/A  Braces: N/A  Respiratory Status: Nasal cannula, flow 3 L/min     Functional Mobility:  Bed Mobility:     Rolling Left:  maximal assistance and of 2 persons  Rolling Right: maximal assistance and of 2 persons  Scooting: maximal assistance and of 2-3 persons  Supine to Sit: maximal assistance and of 2 persons  Sit to Supine: maximal assistance  and of 2 persons  Balance: EOB sitting with mod x 2 to max x 2 for 1.5 min      AM-PAC 6 CLICK MOBILITY          Treatment & Education:  Mobility as stated above.    Patient left HOB elevated with all lines intact, call button in reach, and CNA present..    GOALS:   Multidisciplinary Problems       Physical Therapy Goals          Problem: Physical Therapy    Goal Priority Disciplines Outcome Interventions   Physical Therapy Goal     PT, PT/OT Progressing    Description: Short term goals:  1. Supine to sit with MInimal Assistance  2. Sit to stand transfer with Moderate Assistance  3. Bed to chair transfer with Moderate Assistance using Rolling Walker  4. Sitting at edge of bed x15 minutes with Contact Guard Assistance    Long term goals:  1. Supine to sit with Contact Guard Assistance  2. Sit to stand transfer with Contact Guard Assistance  3. Bed to chair transfer with Contact Guard Assistance using Rolling Walker  4. Gait  x 100 feet with Contact Guard Assistance using Rolling Walker.                          DME Justifications:  No DME recommended requiring DME justifications    Time Tracking:     PT Received On: 04/10/25  PT Start Time: 1005     PT Stop Time: 1035  PT Total Time (min): 30 min     Billable Minutes: Therapeutic Activity 30    Treatment Type: Treatment  PT/PTA: PT     Number of PTA visits since last PT visit: 0     04/11/2025

## 2025-04-11 NOTE — PLAN OF CARE
Received call from [facility] and it looks as if patient can be admitted to [facility] on Monday.  Will watch over the weekend to see how she's doing with eating.  Ninoska from [facility] noted they need a note from oncology regarding her future tx plans.  Notified Dr. Rizvi of need for the oncology note.  Will continue to follow.

## 2025-04-12 PROBLEM — E88.09 EDEMA DUE TO HYPOALBUMINEMIA: Status: ACTIVE | Noted: 2025-04-12

## 2025-04-12 LAB
ANISOCYTOSIS BLD QL SMEAR: ABNORMAL
BASOPHILS # BLD AUTO: 0.05 K/UL (ref 0–0.2)
BASOPHILS NFR BLD AUTO: 0.3 % (ref 0–1)
CRENATED CELLS: ABNORMAL
DIFFERENTIAL METHOD BLD: ABNORMAL
EOSINOPHIL # BLD AUTO: 0.16 K/UL (ref 0–0.5)
EOSINOPHIL NFR BLD AUTO: 0.8 % (ref 1–4)
EOSINOPHIL NFR BLD MANUAL: 1 % (ref 1–4)
ERYTHROCYTE [DISTWIDTH] IN BLOOD BY AUTOMATED COUNT: 18.1 % (ref 11.5–14.5)
GLUCOSE SERPL-MCNC: 130 MG/DL (ref 70–105)
GLUCOSE SERPL-MCNC: 147 MG/DL (ref 70–105)
GLUCOSE SERPL-MCNC: 152 MG/DL (ref 70–105)
GLUCOSE SERPL-MCNC: 171 MG/DL (ref 70–105)
HCT VFR BLD AUTO: 28 % (ref 38–47)
HGB BLD-MCNC: 9.2 G/DL (ref 12–16)
IMM GRANULOCYTES # BLD AUTO: 0.31 K/UL (ref 0–0.04)
IMM GRANULOCYTES NFR BLD: 1.6 % (ref 0–0.4)
LYMPHOCYTES # BLD AUTO: 0.68 K/UL (ref 1–4.8)
LYMPHOCYTES NFR BLD AUTO: 3.5 % (ref 27–41)
LYMPHOCYTES NFR BLD MANUAL: 4 % (ref 27–41)
MCH RBC QN AUTO: 29 PG (ref 27–31)
MCHC RBC AUTO-ENTMCNC: 32.9 G/DL (ref 32–36)
MCV RBC AUTO: 88.3 FL (ref 80–96)
MONOCYTES # BLD AUTO: 0.66 K/UL (ref 0–0.8)
MONOCYTES NFR BLD AUTO: 3.4 % (ref 2–6)
MONOCYTES NFR BLD MANUAL: 1 % (ref 2–6)
MPC BLD CALC-MCNC: 13.7 FL (ref 9.4–12.4)
NEUTROPHILS # BLD AUTO: 17.31 K/UL (ref 1.8–7.7)
NEUTROPHILS NFR BLD AUTO: 90.4 % (ref 53–65)
NEUTS BAND NFR BLD MANUAL: 13 % (ref 1–5)
NEUTS SEG NFR BLD MANUAL: 81 % (ref 50–62)
NRBC # BLD AUTO: 0.22 X10E3/UL
NRBC BLD MANUAL-RTO: 3 /100 WBC
NRBC, AUTO (.00): 1.1 %
PLATELET # BLD AUTO: 159 K/UL (ref 150–400)
PLATELET MORPHOLOGY: ABNORMAL
RBC # BLD AUTO: 3.17 M/UL (ref 4.2–5.4)
WBC # BLD AUTO: 19.17 K/UL (ref 4.5–11)

## 2025-04-12 PROCEDURE — 11000001 HC ACUTE MED/SURG PRIVATE ROOM

## 2025-04-12 PROCEDURE — 99233 SBSQ HOSP IP/OBS HIGH 50: CPT | Mod: ,,, | Performed by: HOSPITALIST

## 2025-04-12 PROCEDURE — 94761 N-INVAS EAR/PLS OXIMETRY MLT: CPT

## 2025-04-12 PROCEDURE — 25000003 PHARM REV CODE 250: Performed by: HOSPITALIST

## 2025-04-12 PROCEDURE — 25000242 PHARM REV CODE 250 ALT 637 W/ HCPCS: Performed by: HOSPITALIST

## 2025-04-12 PROCEDURE — 94640 AIRWAY INHALATION TREATMENT: CPT

## 2025-04-12 PROCEDURE — 27000221 HC OXYGEN, UP TO 24 HOURS

## 2025-04-12 PROCEDURE — 82962 GLUCOSE BLOOD TEST: CPT

## 2025-04-12 PROCEDURE — 85025 COMPLETE CBC W/AUTO DIFF WBC: CPT | Performed by: HOSPITALIST

## 2025-04-12 PROCEDURE — 63600175 PHARM REV CODE 636 W HCPCS: Performed by: HOSPITALIST

## 2025-04-12 PROCEDURE — 96372 THER/PROPH/DIAG INJ SC/IM: CPT

## 2025-04-12 PROCEDURE — 63600175 PHARM REV CODE 636 W HCPCS: Performed by: FAMILY MEDICINE

## 2025-04-12 PROCEDURE — 87641 MR-STAPH DNA AMP PROBE: CPT | Performed by: HOSPITALIST

## 2025-04-12 PROCEDURE — 27000207 HC ISOLATION

## 2025-04-12 PROCEDURE — P9047 ALBUMIN (HUMAN), 25%, 50ML: HCPCS | Performed by: HOSPITALIST

## 2025-04-12 PROCEDURE — 99900035 HC TECH TIME PER 15 MIN (STAT)

## 2025-04-12 PROCEDURE — 36415 COLL VENOUS BLD VENIPUNCTURE: CPT | Performed by: HOSPITALIST

## 2025-04-12 RX ORDER — ALBUMIN HUMAN 250 G/1000ML
12.5 SOLUTION INTRAVENOUS 3 TIMES DAILY
Status: COMPLETED | OUTPATIENT
Start: 2025-04-12 | End: 2025-04-14

## 2025-04-12 RX ORDER — FUROSEMIDE 10 MG/ML
20 INJECTION INTRAMUSCULAR; INTRAVENOUS DAILY
Status: DISCONTINUED | OUTPATIENT
Start: 2025-04-12 | End: 2025-04-15

## 2025-04-12 RX ADMIN — ATORVASTATIN CALCIUM 20 MG: 20 TABLET, FILM COATED ORAL at 09:04

## 2025-04-12 RX ADMIN — ERYTHROMYCIN 500 MG: 250 TABLET, FILM COATED ORAL at 05:04

## 2025-04-12 RX ADMIN — NYSTATIN 500000 UNITS: 100000 SUSPENSION ORAL at 05:04

## 2025-04-12 RX ADMIN — IPRATROPIUM BROMIDE AND ALBUTEROL SULFATE 3 ML: 2.5; .5 SOLUTION RESPIRATORY (INHALATION) at 07:04

## 2025-04-12 RX ADMIN — APIXABAN 5 MG: 5 TABLET, FILM COATED ORAL at 08:04

## 2025-04-12 RX ADMIN — MEROPENEM 1 G: 1 INJECTION, POWDER, FOR SOLUTION INTRAVENOUS at 05:04

## 2025-04-12 RX ADMIN — BUDESONIDE 0.5 MG: 0.5 SUSPENSION RESPIRATORY (INHALATION) at 07:04

## 2025-04-12 RX ADMIN — METOCLOPRAMIDE HYDROCHLORIDE 5 MG: 5 TABLET ORAL at 08:04

## 2025-04-12 RX ADMIN — METOCLOPRAMIDE HYDROCHLORIDE 5 MG: 5 TABLET ORAL at 05:04

## 2025-04-12 RX ADMIN — NYSTATIN 500000 UNITS: 100000 SUSPENSION ORAL at 11:04

## 2025-04-12 RX ADMIN — MEROPENEM 1 G: 1 INJECTION, POWDER, FOR SOLUTION INTRAVENOUS at 04:04

## 2025-04-12 RX ADMIN — MONTELUKAST 10 MG: 10 TABLET, FILM COATED ORAL at 08:04

## 2025-04-12 RX ADMIN — ALBUMIN (HUMAN) 12.5 G: 5 SOLUTION INTRAVENOUS at 08:04

## 2025-04-12 RX ADMIN — APIXABAN 5 MG: 5 TABLET, FILM COATED ORAL at 09:04

## 2025-04-12 RX ADMIN — DEXTROSE MONOHYDRATE: 50 INJECTION, SOLUTION INTRAVENOUS at 11:04

## 2025-04-12 RX ADMIN — IPRATROPIUM BROMIDE AND ALBUTEROL SULFATE 3 ML: 2.5; .5 SOLUTION RESPIRATORY (INHALATION) at 04:04

## 2025-04-12 RX ADMIN — ERYTHROMYCIN 500 MG: 250 TABLET, FILM COATED ORAL at 11:04

## 2025-04-12 RX ADMIN — SERTRALINE HYDROCHLORIDE 100 MG: 50 TABLET ORAL at 09:04

## 2025-04-12 RX ADMIN — NYSTATIN 500000 UNITS: 100000 SUSPENSION ORAL at 08:04

## 2025-04-12 RX ADMIN — ERYTHROMYCIN 500 MG: 250 TABLET, FILM COATED ORAL at 09:04

## 2025-04-12 RX ADMIN — INSULIN ASPART 1 UNITS: 100 INJECTION, SOLUTION INTRAVENOUS; SUBCUTANEOUS at 08:04

## 2025-04-12 RX ADMIN — INSULIN GLARGINE 25 UNITS: 100 INJECTION, SOLUTION SUBCUTANEOUS at 08:04

## 2025-04-12 RX ADMIN — ACETAMINOPHEN 1000 MG: 500 TABLET ORAL at 12:04

## 2025-04-12 RX ADMIN — FUROSEMIDE 20 MG: 10 INJECTION, SOLUTION INTRAMUSCULAR; INTRAVENOUS at 05:04

## 2025-04-12 RX ADMIN — IPRATROPIUM BROMIDE AND ALBUTEROL SULFATE 3 ML: 2.5; .5 SOLUTION RESPIRATORY (INHALATION) at 11:04

## 2025-04-12 RX ADMIN — PANTOPRAZOLE SODIUM 40 MG: 40 TABLET, DELAYED RELEASE ORAL at 09:04

## 2025-04-12 RX ADMIN — METOCLOPRAMIDE HYDROCHLORIDE 5 MG: 5 TABLET ORAL at 11:04

## 2025-04-12 RX ADMIN — NYSTATIN 500000 UNITS: 100000 SUSPENSION ORAL at 09:04

## 2025-04-12 NOTE — ASSESSMENT & PLAN NOTE
DNR but not hospice.  Receives chemo and has finished radiation.    04/07 reported stable / improved after treatment  04/09 more fuffy right side CXR  04/12 continue to abnormality on chest x-ray and ask Pulmonary to review

## 2025-04-12 NOTE — ASSESSMENT & PLAN NOTE
"On Reglan and PPI for erosive gastritis; will monitor    EGD by Dr. Lo during recent past admission at [facility]:  "EGD on 03/21/2025 shows LA class B erosive esophagitis but no pill esophagitis, nonerosive gastritis and retention of food and fluid suspicious for gastroparesis, due to narcotics and diabetes. "    04/ 08 try additional meds to help gastric emptying.  04/09 taking in some now orally with recent changes meds  04/10 better and ate half [place] for lunch   "

## 2025-04-12 NOTE — SUBJECTIVE & OBJECTIVE
Interval History:     Review of Systems   Constitutional:  Negative for appetite change, fatigue and fever.   HENT:  Positive for trouble swallowing. Negative for congestion and hearing loss.    Respiratory:  Positive for shortness of breath. Negative for chest tightness and wheezing.    Cardiovascular:  Negative for chest pain and palpitations.   Gastrointestinal:  Negative for abdominal pain, constipation and nausea.   Genitourinary:  Negative for difficulty urinating and dysuria.   Musculoskeletal:  Positive for gait problem. Negative for back pain and neck stiffness.   Skin:  Negative for pallor and rash.   Neurological:  Negative for dizziness, speech difficulty and headaches.   Psychiatric/Behavioral:  Positive for sleep disturbance. Negative for confusion and suicidal ideas.      Objective:     Vital Signs (Most Recent):  Temp: 96.4 °F (35.8 °C) (04/12/25 1529)  Pulse: 90 (04/12/25 1529)  Resp: 19 (04/12/25 1529)  BP: (!) 92/55 (04/12/25 1529)  SpO2: 97 % (04/12/25 1529) Vital Signs (24h Range):  Temp:  [96.4 °F (35.8 °C)-98 °F (36.7 °C)] 96.4 °F (35.8 °C)  Pulse:  [83-98] 90  Resp:  [16-20] 19  SpO2:  [91 %-100 %] 97 %  BP: ()/(44-80) 92/55     Weight: (!) 136.1 kg (300 lb)  Body mass index is 49.92 kg/m².    Intake/Output Summary (Last 24 hours) at 4/12/2025 1603  Last data filed at 4/12/2025 1539  Gross per 24 hour   Intake 2040.4 ml   Output 300 ml   Net 1740.4 ml         Physical Exam  Vitals reviewed.   Constitutional:       General: She is awake. She is not in acute distress.     Appearance: She is well-developed. She is morbidly obese. She is not toxic-appearing.   HENT:      Head: Normocephalic.      Nose: Nose normal.      Mouth/Throat:      Pharynx: Oropharynx is clear.   Eyes:      Extraocular Movements: Extraocular movements intact.      Pupils: Pupils are equal, round, and reactive to light.   Neck:      Thyroid: No thyroid mass.      Vascular: No carotid bruit.   Cardiovascular:      Rate  "and Rhythm: Normal rate and regular rhythm.      Pulses: Normal pulses.      Heart sounds: Normal heart sounds. No murmur heard.  Pulmonary:      Effort: Pulmonary effort is normal.      Breath sounds: Normal breath sounds and air entry. No wheezing.   Abdominal:      General: Bowel sounds are normal. There is no distension.      Palpations: Abdomen is soft.      Tenderness: There is no abdominal tenderness.   Musculoskeletal:         General: Normal range of motion.      Cervical back: Neck supple. No rigidity.   Skin:     General: Skin is warm.      Coloration: Skin is not jaundiced.      Findings: No lesion.   Neurological:      General: No focal deficit present.      Mental Status: She is alert and oriented to person, place, and time.      Cranial Nerves: No cranial nerve deficit.   Psychiatric:         Attention and Perception: Attention normal.         Mood and Affect: Mood normal.         Behavior: Behavior normal. Behavior is cooperative.         Thought Content: Thought content normal.         Cognition and Memory: Cognition normal.               Significant Labs: All pertinent labs within the past 24 hours have been reviewed.  BMP:   No results for input(s): "GLU", "NA", "K", "CL", "CO2", "BUN", "CREATININE", "CALCIUM", "MG" in the last 48 hours.    CBC:   Recent Labs   Lab 04/12/25  0330   WBC 19.17*   HGB 9.2*   HCT 28.0*          CMP:   No results for input(s): "NA", "K", "CL", "CO2", "GLU", "BUN", "CREATININE", "CALCIUM", "PROT", "ALBUMIN", "BILITOT", "ALKPHOS", "AST", "ALT", "ANIONGAP", "EGFRNONAA" in the last 48 hours.    Invalid input(s): "ESTGFAFRICA"      Significant Imaging: I have reviewed all pertinent imaging results/findings within the past 24 hours.      Intake/Output - Last 3 Shifts         04/10 0700  04/11 0659 04/11 0700 04/12 0659 04/12 0700 04/13 0659    P.O. 30  120    I.V. (mL/kg) 9437.3 (69.3) 1920.4 (14.1)     Total Intake(mL/kg) 9467.3 (69.6) 1920.4 (14.1) 120 (0.9)    " Urine (mL/kg/hr)  350 (0.1) 300 (0.2)    Other   0    Stool       Total Output  350 300    Net +9467.3 +1570.4 -180           Urine Occurrence   1 x    Stool Occurrence 1 x            Microbiology Results (last 7 days)       ** No results found for the last 168 hours. **

## 2025-04-12 NOTE — ASSESSMENT & PLAN NOTE
Appears to have UTI and pneumonia; has leukocytosis; has opacities on CXR; has G- bacilli on urine culture; blood cultures pending; will place on Vancomycin and Rocephin; BP on the low side; on IVF    04/12 worsening chest x-ray, ask Pulmonary to review

## 2025-04-12 NOTE — SUBJECTIVE & OBJECTIVE
Interval History:     Review of Systems   Constitutional:  Negative for appetite change, fatigue and fever.   HENT:  Positive for trouble swallowing. Negative for congestion and hearing loss.    Respiratory:  Positive for shortness of breath. Negative for chest tightness and wheezing.    Cardiovascular:  Negative for chest pain and palpitations.   Gastrointestinal:  Negative for abdominal pain, constipation and nausea.   Genitourinary:  Negative for difficulty urinating and dysuria.   Musculoskeletal:  Positive for gait problem. Negative for back pain and neck stiffness.   Skin:  Negative for pallor and rash.   Neurological:  Negative for dizziness, speech difficulty and headaches.   Psychiatric/Behavioral:  Positive for sleep disturbance. Negative for confusion and suicidal ideas.      Objective:     Vital Signs (Most Recent):  Temp: 98 °F (36.7 °C) (04/11/25 1653)  Pulse: 98 (04/11/25 1653)  Resp: 20 (04/11/25 1653)  BP: 105/69 (04/11/25 1653)  SpO2: (!) 91 % (04/11/25 1653) Vital Signs (24h Range):  Temp:  [97.3 °F (36.3 °C)-98.3 °F (36.8 °C)] 98 °F (36.7 °C)  Pulse:  [] 98  Resp:  [20-22] 20  SpO2:  [91 %-98 %] 91 %  BP: ()/(68-91) 105/69     Weight: (!) 136.1 kg (300 lb)  Body mass index is 49.92 kg/m².    Intake/Output Summary (Last 24 hours) at 4/11/2025 2001  Last data filed at 4/11/2025 1320  Gross per 24 hour   Intake 9437.25 ml   Output 350 ml   Net 9087.25 ml         Physical Exam  Vitals reviewed.   Constitutional:       General: She is awake. She is not in acute distress.     Appearance: She is well-developed. She is morbidly obese. She is not toxic-appearing.   HENT:      Head: Normocephalic.      Nose: Nose normal.      Mouth/Throat:      Pharynx: Oropharynx is clear.   Eyes:      Extraocular Movements: Extraocular movements intact.      Pupils: Pupils are equal, round, and reactive to light.   Neck:      Thyroid: No thyroid mass.      Vascular: No carotid bruit.   Cardiovascular:       "Rate and Rhythm: Normal rate and regular rhythm.      Pulses: Normal pulses.      Heart sounds: Normal heart sounds. No murmur heard.  Pulmonary:      Effort: Pulmonary effort is normal.      Breath sounds: Normal breath sounds and air entry. No wheezing.   Abdominal:      General: Bowel sounds are normal. There is no distension.      Palpations: Abdomen is soft.      Tenderness: There is no abdominal tenderness.   Musculoskeletal:         General: Normal range of motion.      Cervical back: Neck supple. No rigidity.   Skin:     General: Skin is warm.      Coloration: Skin is not jaundiced.      Findings: No lesion.   Neurological:      General: No focal deficit present.      Mental Status: She is alert and oriented to person, place, and time.      Cranial Nerves: No cranial nerve deficit.   Psychiatric:         Attention and Perception: Attention normal.         Mood and Affect: Mood normal.         Behavior: Behavior normal. Behavior is cooperative.         Thought Content: Thought content normal.         Cognition and Memory: Cognition normal.               Significant Labs: All pertinent labs within the past 24 hours have been reviewed.  BMP:   Recent Labs   Lab 04/10/25  0614   *      K 3.6   *   CO2 19*   BUN 36*   CREATININE 1.52*   CALCIUM 7.8*     CBC:   No results for input(s): "WBC", "HGB", "HCT", "PLT" in the last 48 hours.    CMP:   Recent Labs   Lab 04/10/25  0614      K 3.6   *   CO2 19*   *   BUN 36*   CREATININE 1.52*   CALCIUM 7.8*   ANIONGAP 13       Significant Imaging: I have reviewed all pertinent imaging results/findings within the past 24 hours.      Intake/Output - Last 3 Shifts         04/10 0700  04/11 0659 04/11 0700  04/12 0659    P.O. 30     I.V. (mL/kg) 9437.3 (69.3)     Total Intake(mL/kg) 9467.3 (69.6)     Urine (mL/kg/hr)  350 (0.2)    Stool      Total Output  350    Net +9467.3 -350          Stool Occurrence 1 x           Microbiology Results " (last 7 days)       Procedure Component Value Units Date/Time    Clostridioides difficile toxin/antigen with reflex to PCR [[id number]]     Order Status: Canceled Specimen: Stool

## 2025-04-12 NOTE — PLAN OF CARE
Problem: Adult Inpatient Plan of Care  Goal: Plan of Care Review  Outcome: Progressing  Goal: Optimal Comfort and Wellbeing  Outcome: Progressing  Goal: Readiness for Transition of Care  Outcome: Progressing     Problem: Infection  Goal: Absence of Infection Signs and Symptoms  Outcome: Progressing     Problem: Chronic Kidney Disease  Goal: Optimal Coping with Chronic Illness  Outcome: Progressing

## 2025-04-12 NOTE — PROGRESS NOTES
[facility]  [facility] Medicine  Progress Note    Patient Name: Magan Saleem  MRN: [id number]  Patient Class: IP- Inpatient   Admission Date: 4/3/2025  Length of Stay: 9 days  Attending Physician: Andriy Rizvi MD  Primary Care Provider: Sil Brown DO        Subjective     Principal Problem:Sepsis due to pneumonia        HPI:  70 yo F presents to [facility] ED from [facility] for abnormal labs.  Patient was discharged from [facility] two days ago to skilled nursing facility for rehab.  She was diagnosed with dehydration due to gastroparesis with UTI.  Patient has metastatic lung cancer (to the brain) and follows with Dr. Swanson for IV chemotherapy every other week and takes lazertinib daily.  She has completed her course of radiation for the brain mets and follow had showed some improvement.  I thought she had either some decreased LOC due to encephalopathy or some aphasia from the brain mets but after a great effort to get her to talk, I realized she was just mad.  She wanted to know why she had been discharged two days ago when she could not eat.  She has no appetite and early satiety.  She is not nauseated and no vomiting.  She has decided on a DNR status previously (her caretaker and friend of 20 years) states that she had always said she did not want resuscitation if she experienced cardiopulmonary arrest and had told her children her wishes but she does want treatment and is not opposed to J tube if needed.  She has not had anything to eat or drink in two days and is dehydrated again.      Patient was transferred because of concern of sepsis.  She did have enterococcus faecalis UTI at [facility] and was treated.  I do not have the culture results but cultures were sent and patient does have some yeast noted.  (Will not treat a yeast colonization).  She is afebrile and hemodynamically stable and does not look septic clinically.  Her Lactic acid is stable at 2.5 dara 3.2.  Will check  another in am after volume resuscitation.  Her creat is at baseline but she has prerenal azotemia further confirming the dehydration.  Patient has an IO in place from [facility] due to dehydration and difficult stick but with some volume resuscitation, we have gotten an IV.  She is covid and flu negative.      Her WBC is 29 and I am not sure if she has received neupogen but could be a stress reaction.  Her BS is 245 and she does have some urine ketones but most likely due to starvation ketosis.  Will check a serum ketone.  Her platelets are 88K but this is most likely from her chemo.  She is not anemic.  CXR shows some patchy infiltrate but no obvious obstructive pneumonia from her lung cancer.  Her BNP about 3K but no clinical signs of CHF.  No recent echo but due to her BMI 50 most likely has some PHTN.  EKG had no ischemic changes but tachy at 114 which could also be from dehydration.  She was on ozempic for her DM and this could be the cause of her decreased appetite.  She is also on decadron for prevention of cerebral edema.      Remainder of ROS as below.  She mostly just nods and shakes her head and rolls her eyes.  You can get her to laugh if you try but she has been depressed since she has not walked since her hospitalization at [facility] on 3/19/25 and was discharged two days ago.  She says that at her last chemo she noticed she was getting weaker and her daughter had to help her in and out of the car and that was new for her.      See assessment and plan below for problem based evaluation       Overview/Hospital Course:  69 year old female presents with hypernatremia; she is not too talkative during rounds    4/5- patient seen examined today resting comfortably in bed and in no acute distress, with no acute events overnight.  Patient has a multitude of issues complicating her medical picture.  White blood cell count 72816 today, sodium 159, potassium 3.2 and BUN creatinine 59 and 1.8.  The patient is still not eating  even when assistance is provided.  We have already changed her fluids to half-normal saline with 20 of KCl at 1:25 a.m..  Have also put in a GI consult for possible feeding tube.  E coli in the urine has turned out to be ESBL and Rocephin has been changed Merrem.    4/6- the patient seen examined today resting comfortably in bed, in no acute distress, in no acute events overnight.  The patient is not very talkative today, but states she is doing okay.  She has no acute complaints.  Blood cultures remain negative.  The patient has not eaten since yesterday and is on light IV fluids.  GI has been consulted for feeding tube.  Continues on Merrem for positive urine culture.    04/07 Records reviewed. Not eating which not new, Similar during recent admit at [facility]. Dr Swanson there had question pancreatitis. No abd pain or tenderness reported now. Question of dysphagia to solids. Chart hx gastroparesis. Will discuss with GI. Ronnie says has responded so far well to treatment for lung CA.   04/08 had EGD at [facility] 03/21/25 with no obstruction and evidence gastroparesis. Still not ending. Try additional meds. Talked with GI. Increase activity  04/09 Some better with med  changes  04/10 Continues to do better. Ate 1/2 fish sandwich for lunch. Called by Dr Swanson and she wanting to keep feeding tube in consideration. Talked with Dr Reich and Dr Lemus. If something needed with gastroparesis would have to have post gastric position of tube.   04/11 eating some. Later staff requested some nystatin for sore mouth.   04/12 still not eating well.  Increased peripheral edema.  Albumin low at 1.5.  Will give some albumin and follow with some Lasix.  Placed Dobbhoff tube and start enteral feedings.  This will help with nutrition and if issue of placing surgical tube later make sure will tolerate enteral feedings.  Chest x-ray continues worse on left side.  Discuss with Pulmonary and ask Dr. Villasenor to see.     Interval History:      Review of Systems   Constitutional:  Negative for appetite change, fatigue and fever.   HENT:  Positive for trouble swallowing. Negative for congestion and hearing loss.    Respiratory:  Positive for shortness of breath. Negative for chest tightness and wheezing.    Cardiovascular:  Negative for chest pain and palpitations.   Gastrointestinal:  Negative for abdominal pain, constipation and nausea.   Genitourinary:  Negative for difficulty urinating and dysuria.   Musculoskeletal:  Positive for gait problem. Negative for back pain and neck stiffness.   Skin:  Negative for pallor and rash.   Neurological:  Negative for dizziness, speech difficulty and headaches.   Psychiatric/Behavioral:  Positive for sleep disturbance. Negative for confusion and suicidal ideas.      Objective:     Vital Signs (Most Recent):  Temp: 96.4 °F (35.8 °C) (04/12/25 1529)  Pulse: 90 (04/12/25 1529)  Resp: 19 (04/12/25 1529)  BP: (!) 92/55 (04/12/25 1529)  SpO2: 97 % (04/12/25 1529) Vital Signs (24h Range):  Temp:  [96.4 °F (35.8 °C)-98 °F (36.7 °C)] 96.4 °F (35.8 °C)  Pulse:  [83-98] 90  Resp:  [16-20] 19  SpO2:  [91 %-100 %] 97 %  BP: ()/(44-80) 92/55     Weight: (!) 136.1 kg (300 lb)  Body mass index is 49.92 kg/m².    Intake/Output Summary (Last 24 hours) at 4/12/2025 1603  Last data filed at 4/12/2025 1539  Gross per 24 hour   Intake 2040.4 ml   Output 300 ml   Net 1740.4 ml         Physical Exam  Vitals reviewed.   Constitutional:       General: She is awake. She is not in acute distress.     Appearance: She is well-developed. She is morbidly obese. She is not toxic-appearing.   HENT:      Head: Normocephalic.      Nose: Nose normal.      Mouth/Throat:      Pharynx: Oropharynx is clear.   Eyes:      Extraocular Movements: Extraocular movements intact.      Pupils: Pupils are equal, round, and reactive to light.   Neck:      Thyroid: No thyroid mass.      Vascular: No carotid bruit.   Cardiovascular:      Rate and Rhythm:  "Normal rate and regular rhythm.      Pulses: Normal pulses.      Heart sounds: Normal heart sounds. No murmur heard.  Pulmonary:      Effort: Pulmonary effort is normal.      Breath sounds: Normal breath sounds and air entry. No wheezing.   Abdominal:      General: Bowel sounds are normal. There is no distension.      Palpations: Abdomen is soft.      Tenderness: There is no abdominal tenderness.   Musculoskeletal:         General: Normal range of motion.      Cervical back: Neck supple. No rigidity.   Skin:     General: Skin is warm.      Coloration: Skin is not jaundiced.      Findings: No lesion.   Neurological:      General: No focal deficit present.      Mental Status: She is alert and oriented to person, place, and time.      Cranial Nerves: No cranial nerve deficit.   Psychiatric:         Attention and Perception: Attention normal.         Mood and Affect: Mood normal.         Behavior: Behavior normal. Behavior is cooperative.         Thought Content: Thought content normal.         Cognition and Memory: Cognition normal.               Significant Labs: All pertinent labs within the past 24 hours have been reviewed.  BMP:   No results for input(s): "GLU", "NA", "K", "CL", "CO2", "BUN", "CREATININE", "CALCIUM", "MG" in the last 48 hours.    CBC:   Recent Labs   Lab 04/12/25  0330   WBC 19.17*   HGB 9.2*   HCT 28.0*          CMP:   No results for input(s): "NA", "K", "CL", "CO2", "GLU", "BUN", "CREATININE", "CALCIUM", "PROT", "ALBUMIN", "BILITOT", "ALKPHOS", "AST", "ALT", "ANIONGAP", "EGFRNONAA" in the last 48 hours.    Invalid input(s): "ESTGFAFRICA"      Significant Imaging: I have reviewed all pertinent imaging results/findings within the past 24 hours.      Intake/Output - Last 3 Shifts         04/10 0700  04/11 0659 04/11 0700  04/12 0659 04/12 0700 04/13 0659    P.O. 30  120    I.V. (mL/kg) 9437.3 (69.3) 1920.4 (14.1)     Total Intake(mL/kg) 9467.3 (69.6) 1920.4 (14.1) 120 (0.9)    Urine " (mL/kg/hr)  350 (0.1) 300 (0.2)    Other   0    Stool       Total Output  350 300    Net +9467.3 +1570.4 -180           Urine Occurrence   1 x    Stool Occurrence 1 x            Microbiology Results (last 7 days)       ** No results found for the last 168 hours. **                Assessment & Plan  Sepsis due to pneumonia  Appears to have UTI and pneumonia; has leukocytosis; has opacities on CXR; has G- bacilli on urine culture; blood cultures pending; will place on Vancomycin and Rocephin; BP on the low side; on IVF    04/12 worsening chest x-ray, ask Pulmonary to review  Hypernatremia  Due to dehydration; off diuretics; on IVF; will monitor Na levels  Lung cancer metastatic to brain  DNR but not hospice.  Receives chemo and has finished radiation.    04/07 reported stable / improved after treatment  04/09 more fuffy right side CXR  04/12 continue to abnormality on chest x-ray and ask Pulmonary to review    Thrombocytopenia  On chemo; will monitor    Essential hypertension  Vitals:    04/11/25 0757 04/11/25 1149 04/11/25 1653 04/11/25 2053   BP: 100/68 111/78 105/69 123/80    04/12/25 0025 04/12/25 0451 04/12/25 0736 04/12/25 0900   BP: 109/60 92/63 103/70 (!) 94/55    04/12/25 1125 04/12/25 1529   BP: (!) 90/44 (!) 92/55         Monitor BP while on Metoprolol; on IVF; off Losartan    Moderate persistent asthma without complication  Continue home inhaled steroid/bronchodilator and singulair    Chronic kidney disease, stage 3b  Creatine stable   Chronic pulmonary embolism without acute cor pulmonale  Eliquis was discontinued due to risk of ICH with brain mets but they have improved so the anti-coagulation has been restarted; will monitor  04/07 apparently this of several years ago    Type 2 diabetes mellitus with hyperglycemia  BS elevated; will increase Lantus to 25 units; continue SSI;monitor BS     Gastroparesis  On Reglan and PPI for erosive gastritis; will monitor    EGD by Dr. Lo during recent past  "admission at [facility]:  "EGD on 03/21/2025 shows LA class B erosive esophagitis but no pill esophagitis, nonerosive gastritis and retention of food and fluid suspicious for gastroparesis, due to narcotics and diabetes. "    04/ 08 try additional meds to help gastric emptying.  04/09 taking in some now orally with recent changes meds  04/10 better and ate half [place] for lunch   Morbid obesity  Body mass index is 49.92 kg/m². Morbid obesity complicates all aspects of disease management from diagnostic modalities to treatment. Weight loss encouraged and health benefits explained to patient.     Would benefit from sleep study and possible CPAP.  Does not wear oxygen at home.      Edema due to hypoalbuminemia    04/12 add IV albumin and some Lasix  Start enteral feedings  VTE Risk Mitigation (From admission, onward)           Ordered     apixaban tablet 5 mg  2 times daily         04/08/25 0899                    Discharge Planning   MITUL:      Code Status: DNR   Medical Readiness for Discharge Date:   Discharge Plan A: Home with family                Please place Justification for DME        Andriy Rizvi MD  Department of Hospital Medicine   [facility]    "

## 2025-04-12 NOTE — ASSESSMENT & PLAN NOTE
Vitals:    04/11/25 0757 04/11/25 1149 04/11/25 1653 04/11/25 2053   BP: 100/68 111/78 105/69 123/80    04/12/25 0025 04/12/25 0451 04/12/25 0736 04/12/25 0900   BP: 109/60 92/63 103/70 (!) 94/55    04/12/25 1125 04/12/25 1529   BP: (!) 90/44 (!) 92/55         Monitor BP while on Metoprolol; on IVF; off Losartan

## 2025-04-12 NOTE — PT/OT/SLP PROGRESS
Physical Therapy Treatment    Patient Name:  Magan Saleem   MRN:  [id number]    Recommendations:     Discharge Recommendations: Moderate Intensity Therapy  Discharge Equipment Recommendations: to be determined by next level of care  Barriers to discharge:  ongoing medical care    Assessment:     Magan Saleem is a 69 y.o. female admitted with a medical diagnosis of Sepsis due to pneumonia.  She presents with the following impairments/functional limitations: weakness, impaired endurance, impaired functional mobility, gait instability, impaired balance, decreased lower extremity function, decreased safety awareness, impaired cardiopulmonary response to activity. Pt tolerated session fairly well.     Rehab Prognosis: Fair; patient would benefit from acute skilled PT services to address these deficits and reach maximum level of function.    Recent Surgery: * No surgery found *      Plan:     During this hospitalization, patient to be seen 5 x/week to address the identified rehab impairments via gait training, therapeutic activities, therapeutic exercises, neuromuscular re-education and progress toward the following goals:    Plan of Care Expires:  05/04/25    Subjective     Chief Complaint: Sepsis due to pneumonia   Patient/Family Comments/goals: agreeable  Pain/Comfort:         Objective:     Communicated with RN prior to session.  Patient found HOB elevated with peripheral IV, PureWick, oxygen upon PT entry to room.     General Precautions: Standard, fall, contact  Orthopedic Precautions: N/A  Braces: N/A  Respiratory Status: Nasal cannula, flow 2 L/min     Functional Mobility:  Bed Mobility:     Rolling Left:  maximal assistance and of 2 persons  Rolling Right: maximal assistance and of 2 persons  Scooting: maximal assistance and of 2 persons      AM-PAC 6 CLICK MOBILITY          Treatment & Education:  Bilateral lower extremity exercise x 15-20 reps: ankle pumps, Quad sets, glut sets, heel slides, hip  abduction/adduction, and straight leg raises with active assist ROM, verbal cues for sequencing and safety, and tactile cues     Patient left HOB elevated with all lines intact and call button in reach..    GOALS:   Multidisciplinary Problems       Physical Therapy Goals          Problem: Physical Therapy    Goal Priority Disciplines Outcome Interventions   Physical Therapy Goal     PT, PT/OT Progressing    Description: Short term goals:  1. Supine to sit with MInimal Assistance  2. Sit to stand transfer with Moderate Assistance  3. Bed to chair transfer with Moderate Assistance using Rolling Walker  4. Sitting at edge of bed x15 minutes with Contact Guard Assistance    Long term goals:  1. Supine to sit with Contact Guard Assistance  2. Sit to stand transfer with Contact Guard Assistance  3. Bed to chair transfer with Contact Guard Assistance using Rolling Walker  4. Gait  x 100 feet with Contact Guard Assistance using Rolling Walker.                          DME Justifications:  To be determined by  next facility.     Time Tracking:     PT Received On: 04/11/25  PT Start Time: 1507     PT Stop Time: 1534  PT Total Time (min): 27 min     Billable Minutes: Therapeutic Activity 12 and Therapeutic Exercise 15    Treatment Type: Treatment  PT/PTA: PT     Number of PTA visits since last PT visit: 0     04/12/2025

## 2025-04-12 NOTE — PLAN OF CARE
Problem: Adult Inpatient Plan of Care  Goal: Plan of Care Review  Outcome: Progressing  Goal: Patient-Specific Goal (Individualized)  Outcome: Progressing  Goal: Absence of Hospital-Acquired Illness or Injury  Outcome: Progressing  Goal: Optimal Comfort and Wellbeing  Outcome: Progressing     Problem: Skin Injury Risk Increased  Goal: Skin Health and Integrity  Outcome: Progressing     Problem: Pneumonia  Goal: Fluid Balance  Outcome: Progressing  Goal: Resolution of Infection Signs and Symptoms  Outcome: Progressing  Goal: Effective Oxygenation and Ventilation  Outcome: Progressing     Problem: Gas Exchange Impaired  Goal: Optimal Gas Exchange  Outcome: Progressing     Problem: Wound  Goal: Skin Health and Integrity  Outcome: Progressing  Goal: Optimal Wound Healing  Outcome: Progressing     Problem: Airway Clearance Ineffective  Goal: Effective Airway Clearance  Outcome: Progressing

## 2025-04-12 NOTE — ASSESSMENT & PLAN NOTE
Vitals:    04/10/25 0510 04/10/25 0703 04/10/25 1207 04/10/25 1547   BP: 134/83 138/81 104/68 103/71    04/10/25 2033 04/11/25 0021 04/11/25 0502 04/11/25 0757   BP: 95/73 116/80 (!) 129/91 100/68    04/11/25 1149 04/11/25 1653   BP: 111/78 105/69         Monitor BP while on Metoprolol; on IVF; off Losartan

## 2025-04-12 NOTE — PROGRESS NOTES
[facility]  [facility] Medicine  Progress Note    Patient Name: Magan Saleem  MRN: [id number]  Patient Class: IP- Inpatient   Admission Date: 4/3/2025  Length of Stay: 8 days  Attending Physician: Andriy Rizvi MD  Primary Care Provider: Sil Brown DO        Subjective     Principal Problem:Sepsis due to pneumonia        HPI:  70 yo F presents to [facility] ED from [facility] for abnormal labs.  Patient was discharged from [facility] two days ago to skilled nursing facility for rehab.  She was diagnosed with dehydration due to gastroparesis with UTI.  Patient has metastatic lung cancer (to the brain) and follows with Dr. Swanson for IV chemotherapy every other week and takes lazertinib daily.  She has completed her course of radiation for the brain mets and follow had showed some improvement.  I thought she had either some decreased LOC due to encephalopathy or some aphasia from the brain mets but after a great effort to get her to talk, I realized she was just mad.  She wanted to know why she had been discharged two days ago when she could not eat.  She has no appetite and early satiety.  She is not nauseated and no vomiting.  She has decided on a DNR status previously (her caretaker and friend of 20 years) states that she had always said she did not want resuscitation if she experienced cardiopulmonary arrest and had told her children her wishes but she does want treatment and is not opposed to J tube if needed.  She has not had anything to eat or drink in two days and is dehydrated again.      Patient was transferred because of concern of sepsis.  She did have enterococcus faecalis UTI at [facility] and was treated.  I do not have the culture results but cultures were sent and patient does have some yeast noted.  (Will not treat a yeast colonization).  She is afebrile and hemodynamically stable and does not look septic clinically.  Her Lactic acid is stable at 2.5 dara 3.2.  Will check  another in am after volume resuscitation.  Her creat is at baseline but she has prerenal azotemia further confirming the dehydration.  Patient has an IO in place from [facility] due to dehydration and difficult stick but with some volume resuscitation, we have gotten an IV.  She is covid and flu negative.      Her WBC is 29 and I am not sure if she has received neupogen but could be a stress reaction.  Her BS is 245 and she does have some urine ketones but most likely due to starvation ketosis.  Will check a serum ketone.  Her platelets are 88K but this is most likely from her chemo.  She is not anemic.  CXR shows some patchy infiltrate but no obvious obstructive pneumonia from her lung cancer.  Her BNP about 3K but no clinical signs of CHF.  No recent echo but due to her BMI 50 most likely has some PHTN.  EKG had no ischemic changes but tachy at 114 which could also be from dehydration.  She was on ozempic for her DM and this could be the cause of her decreased appetite.  She is also on decadron for prevention of cerebral edema.      Remainder of ROS as below.  She mostly just nods and shakes her head and rolls her eyes.  You can get her to laugh if you try but she has been depressed since she has not walked since her hospitalization at [facility] on 3/19/25 and was discharged two days ago.  She says that at her last chemo she noticed she was getting weaker and her daughter had to help her in and out of the car and that was new for her.      See assessment and plan below for problem based evaluation       Overview/Hospital Course:  69 year old female presents with hypernatremia; she is not too talkative during rounds    4/5- patient seen examined today resting comfortably in bed and in no acute distress, with no acute events overnight.  Patient has a multitude of issues complicating her medical picture.  White blood cell count 54660 today, sodium 159, potassium 3.2 and BUN creatinine 59 and 1.8.  The patient is still not eating  even when assistance is provided.  We have already changed her fluids to half-normal saline with 20 of KCl at 1:25 a.m..  Have also put in a GI consult for possible feeding tube.  E coli in the urine has turned out to be ESBL and Rocephin has been changed Merrem.    4/6- the patient seen examined today resting comfortably in bed, in no acute distress, in no acute events overnight.  The patient is not very talkative today, but states she is doing okay.  She has no acute complaints.  Blood cultures remain negative.  The patient has not eaten since yesterday and is on light IV fluids.  GI has been consulted for feeding tube.  Continues on Merrem for positive urine culture.    04/07 Records reviewed. Not eating which not new, Similar during recent admit at [facility]. Dr Swanson there had question pancreatitis. No abd pain or tenderness reported now. Question of dysphagia to solids. Chart hx gastroparesis. Will discuss with GI. [place] says has responded so far well to treatment for lung CA.   04/08 had EGD at [facility] 03/21/25 with no obstruction and evidence gastroparesis. Still not ending. Try additional meds. Talked with GI. Increase activity  04/09 Some better with med  changes  04/10 Continues to do better. Ate 1/2 fish sandwich for lunch. Called by Dr Swanson and she wanting to keep feeding tube in consideration. Talked with Dr Reich and Dr Lemus. If something needed with gastroparesis would have to have post gastric position of tube.   04/11 eating some. Later staff requested some nystatin for sore mouth.     Interval History:     Review of Systems   Constitutional:  Negative for appetite change, fatigue and fever.   HENT:  Positive for trouble swallowing. Negative for congestion and hearing loss.    Respiratory:  Positive for shortness of breath. Negative for chest tightness and wheezing.    Cardiovascular:  Negative for chest pain and palpitations.   Gastrointestinal:  Negative for abdominal pain, constipation  and nausea.   Genitourinary:  Negative for difficulty urinating and dysuria.   Musculoskeletal:  Positive for gait problem. Negative for back pain and neck stiffness.   Skin:  Negative for pallor and rash.   Neurological:  Negative for dizziness, speech difficulty and headaches.   Psychiatric/Behavioral:  Positive for sleep disturbance. Negative for confusion and suicidal ideas.      Objective:     Vital Signs (Most Recent):  Temp: 98 °F (36.7 °C) (04/11/25 1653)  Pulse: 98 (04/11/25 1653)  Resp: 20 (04/11/25 1653)  BP: 105/69 (04/11/25 1653)  SpO2: (!) 91 % (04/11/25 1653) Vital Signs (24h Range):  Temp:  [97.3 °F (36.3 °C)-98.3 °F (36.8 °C)] 98 °F (36.7 °C)  Pulse:  [] 98  Resp:  [20-22] 20  SpO2:  [91 %-98 %] 91 %  BP: ()/(68-91) 105/69     Weight: (!) 136.1 kg (300 lb)  Body mass index is 49.92 kg/m².    Intake/Output Summary (Last 24 hours) at 4/11/2025 2001  Last data filed at 4/11/2025 1320  Gross per 24 hour   Intake 9437.25 ml   Output 350 ml   Net 9087.25 ml         Physical Exam  Vitals reviewed.   Constitutional:       General: She is awake. She is not in acute distress.     Appearance: She is well-developed. She is morbidly obese. She is not toxic-appearing.   HENT:      Head: Normocephalic.      Nose: Nose normal.      Mouth/Throat:      Pharynx: Oropharynx is clear.   Eyes:      Extraocular Movements: Extraocular movements intact.      Pupils: Pupils are equal, round, and reactive to light.   Neck:      Thyroid: No thyroid mass.      Vascular: No carotid bruit.   Cardiovascular:      Rate and Rhythm: Normal rate and regular rhythm.      Pulses: Normal pulses.      Heart sounds: Normal heart sounds. No murmur heard.  Pulmonary:      Effort: Pulmonary effort is normal.      Breath sounds: Normal breath sounds and air entry. No wheezing.   Abdominal:      General: Bowel sounds are normal. There is no distension.      Palpations: Abdomen is soft.      Tenderness: There is no abdominal  "tenderness.   Musculoskeletal:         General: Normal range of motion.      Cervical back: Neck supple. No rigidity.   Skin:     General: Skin is warm.      Coloration: Skin is not jaundiced.      Findings: No lesion.   Neurological:      General: No focal deficit present.      Mental Status: She is alert and oriented to person, place, and time.      Cranial Nerves: No cranial nerve deficit.   Psychiatric:         Attention and Perception: Attention normal.         Mood and Affect: Mood normal.         Behavior: Behavior normal. Behavior is cooperative.         Thought Content: Thought content normal.         Cognition and Memory: Cognition normal.               Significant Labs: All pertinent labs within the past 24 hours have been reviewed.  BMP:   Recent Labs   Lab 04/10/25  0614   *      K 3.6   *   CO2 19*   BUN 36*   CREATININE 1.52*   CALCIUM 7.8*     CBC:   No results for input(s): "WBC", "HGB", "HCT", "PLT" in the last 48 hours.    CMP:   Recent Labs   Lab 04/10/25  0614      K 3.6   *   CO2 19*   *   BUN 36*   CREATININE 1.52*   CALCIUM 7.8*   ANIONGAP 13       Significant Imaging: I have reviewed all pertinent imaging results/findings within the past 24 hours.      Intake/Output - Last 3 Shifts         04/10 0700  04/11 0659 04/11 0700  04/12 0659    P.O. 30     I.V. (mL/kg) 9437.3 (69.3)     Total Intake(mL/kg) 9467.3 (69.6)     Urine (mL/kg/hr)  350 (0.2)    Stool      Total Output  350    Net +9467.3 -350          Stool Occurrence 1 x           Microbiology Results (last 7 days)       Procedure Component Value Units Date/Time    Clostridioides difficile toxin/antigen with reflex to PCR [[id number]]     Order Status: Canceled Specimen: Stool                 Assessment & Plan  Sepsis due to pneumonia  Appears to have UTI and pneumonia; has leukocytosis; has opacities on CXR; has G- bacilli on urine culture; blood cultures pending; will place on Vancomycin and " "Rocephin; BP on the low side; on IVF  Hypernatremia  Due to dehydration; off diuretics; on IVF; will monitor Na levels  Lung cancer metastatic to brain  DNR but not hospice.  Receives chemo and has finished radiation.    04/07 reported stable / improved after treatment  04/09 more fuffy right side CXR    Thrombocytopenia  On chemo; will monitor    Essential hypertension  Vitals:    04/10/25 0510 04/10/25 0703 04/10/25 1207 04/10/25 1547   BP: 134/83 138/81 104/68 103/71    04/10/25 2033 04/11/25 0021 04/11/25 0502 04/11/25 0757   BP: 95/73 116/80 (!) 129/91 100/68    04/11/25 1149 04/11/25 1653   BP: 111/78 105/69         Monitor BP while on Metoprolol; on IVF; off Losartan    Moderate persistent asthma without complication  Continue home inhaled steroid/bronchodilator and singulair    Chronic kidney disease, stage 3b  Creatine stable   Chronic pulmonary embolism without acute cor pulmonale  Eliquis was discontinued due to risk of ICH with brain mets but they have improved so the anti-coagulation has been restarted; will monitor  04/07 apparently this of several years ago    Type 2 diabetes mellitus with hyperglycemia  BS elevated; will increase Lantus to 25 units; continue SSI;monitor BS     Gastroparesis  On Reglan and PPI for erosive gastritis; will monitor    EGD by Dr. Lo during recent past admission at [facility]:  "EGD on 03/21/2025 shows LA class B erosive esophagitis but no pill esophagitis, nonerosive gastritis and retention of food and fluid suspicious for gastroparesis, due to narcotics and diabetes. "    04/ 08 try additional meds to help gastric emptying.  04/09 taking in some now orally with recent changes meds  04/10 better and ate half [place] for lunch   Morbid obesity  Body mass index is 49.92 kg/m². Morbid obesity complicates all aspects of disease management from diagnostic modalities to treatment. Weight loss encouraged and health benefits explained to patient.     Would benefit " from sleep study and possible CPAP.  Does not wear oxygen at home.      VTE Risk Mitigation (From admission, onward)           Ordered     apixaban tablet 5 mg  2 times daily         04/08/25 7404                    Discharge Planning   MITUL:      Code Status: DNR   Medical Readiness for Discharge Date:   Discharge Plan A: Home with family                Please place Justification for DME        Andriy Rizvi MD  Department of Hospital Medicine   [facility]

## 2025-04-13 PROBLEM — R93.89 ABNORMAL CHEST X-RAY: Status: ACTIVE | Noted: 2025-04-13

## 2025-04-13 LAB
ALBUMIN SERPL BCP-MCNC: 1.4 G/DL (ref 3.4–4.8)
ALBUMIN/GLOB SERPL: 0.5 {RATIO}
ALP SERPL-CCNC: 164 U/L (ref 40–150)
ALT SERPL W P-5'-P-CCNC: 33 U/L
ANION GAP SERPL CALCULATED.3IONS-SCNC: 13 MMOL/L (ref 7–16)
ANISOCYTOSIS BLD QL SMEAR: ABNORMAL
AORTIC ROOT ANNULUS: 2.6 CM
AORTIC VALVE CUSP SEPERATION: 2.01 CM
APICAL FOUR CHAMBER EJECTION FRACTION: 53 %
AST SERPL W P-5'-P-CCNC: 31 U/L (ref 11–45)
AV INDEX (PROSTH): 0.74
AV MEAN GRADIENT: 10 MMHG
AV PEAK GRADIENT: 13 MMHG
AV VALVE AREA BY VELOCITY RATIO: 2.8 CM²
AV VALVE AREA: 3.1 CM²
AV VELOCITY RATIO: 0.67
BASOPHILS # BLD AUTO: 0.04 K/UL (ref 0–0.2)
BASOPHILS NFR BLD AUTO: 0.2 % (ref 0–1)
BILIRUB SERPL-MCNC: 0.6 MG/DL
BSA FOR ECHO PROCEDURE: 2.5 M2
BUN SERPL-MCNC: 40 MG/DL (ref 10–20)
BUN/CREAT SERPL: 24 (ref 6–20)
CALCIUM SERPL-MCNC: 8 MG/DL (ref 8.4–10.2)
CHLORIDE SERPL-SCNC: 102 MMOL/L (ref 98–107)
CO2 SERPL-SCNC: 19 MMOL/L (ref 23–31)
CREAT SERPL-MCNC: 1.66 MG/DL (ref 0.55–1.02)
CV ECHO LV RWT: 0.67 CM
DIFFERENTIAL METHOD BLD: ABNORMAL
DOP CALC AO PEAK VEL: 1.8 M/S
DOP CALC AO VTI: 30.1 CM
DOP CALC LVOT AREA: 4.2 CM2
DOP CALC LVOT DIAMETER: 2.3 CM
DOP CALC LVOT PEAK VEL: 1.2 M/S
DOP CALC LVOT STROKE VOLUME: 93 CM3
DOP CALCLVOT PEAK VEL VTI: 22.4 CM
E WAVE DECELERATION TIME: 238 MSEC
E/A RATIO: 0.65
E/E' RATIO: 8 M/S
ECHO LV POSTERIOR WALL: 1.1 CM (ref 0.6–1.1)
EGFR (NO RACE VARIABLE) (RUSH/TITUS): 33 ML/MIN/1.73M2
EJECTION FRACTION: 60 %
EOSINOPHIL # BLD AUTO: 0.18 K/UL (ref 0–0.5)
EOSINOPHIL NFR BLD AUTO: 1.1 % (ref 1–4)
EOSINOPHIL NFR BLD MANUAL: 1 % (ref 1–4)
ERYTHROCYTE [DISTWIDTH] IN BLOOD BY AUTOMATED COUNT: 17.5 % (ref 11.5–14.5)
FRACTIONAL SHORTENING: 42.4 % (ref 28–44)
GLOBULIN SER-MCNC: 3 G/DL (ref 2–4)
GLUCOSE SERPL-MCNC: 105 MG/DL (ref 70–105)
GLUCOSE SERPL-MCNC: 107 MG/DL (ref 70–105)
GLUCOSE SERPL-MCNC: 119 MG/DL (ref 82–115)
GLUCOSE SERPL-MCNC: 130 MG/DL (ref 70–105)
GLUCOSE SERPL-MCNC: 144 MG/DL (ref 70–105)
HCT VFR BLD AUTO: 25.7 % (ref 38–47)
HGB BLD-MCNC: 8.6 G/DL (ref 12–16)
IMM GRANULOCYTES # BLD AUTO: 0.29 K/UL (ref 0–0.04)
IMM GRANULOCYTES NFR BLD: 1.7 % (ref 0–0.4)
INTERVENTRICULAR SEPTUM: 0.9 CM (ref 0.6–1.1)
IVC DIAMETER: 1.59 CM
LEFT ATRIUM AREA SYSTOLIC (APICAL 4 CHAMBER): 10.25 CM2
LEFT ATRIUM SIZE: 2.9 CM
LEFT INTERNAL DIMENSION IN SYSTOLE: 1.9 CM (ref 2.1–4)
LEFT VENTRICLE DIASTOLIC VOLUME INDEX: 18.72 ML/M2
LEFT VENTRICLE DIASTOLIC VOLUME: 44 ML
LEFT VENTRICLE END DIASTOLIC VOLUME APICAL 4 CHAMBER: 42.53 ML
LEFT VENTRICLE END SYSTOLIC VOLUME APICAL 4 CHAMBER: 19.16 ML
LEFT VENTRICLE MASS INDEX: 40.2 G/M2
LEFT VENTRICLE SYSTOLIC VOLUME INDEX: 5.1 ML/M2
LEFT VENTRICLE SYSTOLIC VOLUME: 12 ML
LEFT VENTRICULAR INTERNAL DIMENSION IN DIASTOLE: 3.3 CM (ref 3.5–6)
LEFT VENTRICULAR MASS: 94.6 G
LV LATERAL E/E' RATIO: 6.4 M/S
LV SEPTAL E/E' RATIO: 9.7 M/S
LVED V (TEICH): 44.09 ML
LVES V (TEICH): 11.76 ML
LVOT MG: 3.39 MMHG
LVOT MV: 0.87 CM/S
LYMPHOCYTES # BLD AUTO: 0.69 K/UL (ref 1–4.8)
LYMPHOCYTES NFR BLD AUTO: 4.1 % (ref 27–41)
LYMPHOCYTES NFR BLD MANUAL: 6 % (ref 27–41)
MAGNESIUM SERPL-MCNC: 1.7 MG/DL (ref 1.6–2.6)
MCH RBC QN AUTO: 28.8 PG (ref 27–31)
MCHC RBC AUTO-ENTMCNC: 33.5 G/DL (ref 32–36)
MCV RBC AUTO: 86 FL (ref 80–96)
MONOCYTES # BLD AUTO: 0.59 K/UL (ref 0–0.8)
MONOCYTES NFR BLD AUTO: 3.5 % (ref 2–6)
MONOCYTES NFR BLD MANUAL: 2 % (ref 2–6)
MPC BLD CALC-MCNC: 13.7 FL (ref 9.4–12.4)
MV PEAK A VEL: 0.89 M/S
MV PEAK E VEL: 0.58 M/S
MV STENOSIS PRESSURE HALF TIME: 69.14 MS
MV VALVE AREA P 1/2 METHOD: 3.18 CM2
NEUTROPHILS # BLD AUTO: 15.05 K/UL (ref 1.8–7.7)
NEUTROPHILS NFR BLD AUTO: 89.4 % (ref 53–65)
NEUTS BAND NFR BLD MANUAL: 12 % (ref 1–5)
NEUTS SEG NFR BLD MANUAL: 79 % (ref 50–62)
NRBC # BLD AUTO: 0.23 X10E3/UL
NRBC BLD MANUAL-RTO: 1 /100 WBC
NRBC, AUTO (.00): 1.4 %
OHS CV RV/LV RATIO: 1.15 CM
PISA TR MAX VEL: 3.2 M/S
PLATELET # BLD AUTO: 152 K/UL (ref 150–400)
PLATELET MORPHOLOGY: ABNORMAL
POTASSIUM SERPL-SCNC: 3.5 MMOL/L (ref 3.5–5.1)
PREALB SERPL NEPH-MCNC: <3 MG/DL (ref 14–37)
PROT SERPL-MCNC: 4.4 G/DL (ref 5.8–7.6)
RA MAJOR: 4.05 CM
RA PRESSURE ESTIMATED: 3 MMHG
RBC # BLD AUTO: 2.99 M/UL (ref 4.2–5.4)
RIGHT VENTRICLE DIASTOLIC BASEL DIMENSION: 3.8 CM
RIGHT VENTRICLE DIASTOLIC LENGTH: 4.1 CM
RIGHT VENTRICLE DIASTOLIC MID DIMENSION: 3.1 CM
RIGHT VENTRICULAR LENGTH IN DIASTOLE (APICAL 4-CHAMBER VIEW): 4.1 CM
RV MID DIAMA: 3.12 CM
RV TB RVSP: 6 MMHG
SODIUM SERPL-SCNC: 130 MMOL/L (ref 136–145)
TARGETS BLD QL SMEAR: ABNORMAL
TB INDURATION 48 - 72 HR READ: NORMAL
TB SKIN TEST 48 - 72 HR READ: NORMAL
TDI LATERAL: 0.09 M/S
TDI SEPTAL: 0.06 M/S
TDI: 0.08 M/S
TR MAX PG: 41 MMHG
TRICUSPID ANNULAR PLANE SYSTOLIC EXCURSION: 1.52 CM
TV REST PULMONARY ARTERY PRESSURE: 44 MMHG
WBC # BLD AUTO: 16.84 K/UL (ref 4.5–11)
Z-SCORE OF LEFT VENTRICULAR DIMENSION IN END DIASTOLE: -10.91
Z-SCORE OF LEFT VENTRICULAR DIMENSION IN END SYSTOLE: -8.97

## 2025-04-13 PROCEDURE — 83735 ASSAY OF MAGNESIUM: CPT | Performed by: HOSPITALIST

## 2025-04-13 PROCEDURE — 99900035 HC TECH TIME PER 15 MIN (STAT)

## 2025-04-13 PROCEDURE — 25000003 PHARM REV CODE 250: Performed by: HOSPITALIST

## 2025-04-13 PROCEDURE — 11000001 HC ACUTE MED/SURG PRIVATE ROOM

## 2025-04-13 PROCEDURE — 99223 1ST HOSP IP/OBS HIGH 75: CPT | Mod: ,,, | Performed by: INTERNAL MEDICINE

## 2025-04-13 PROCEDURE — 94761 N-INVAS EAR/PLS OXIMETRY MLT: CPT

## 2025-04-13 PROCEDURE — 27000221 HC OXYGEN, UP TO 24 HOURS

## 2025-04-13 PROCEDURE — 94640 AIRWAY INHALATION TREATMENT: CPT

## 2025-04-13 PROCEDURE — 99233 SBSQ HOSP IP/OBS HIGH 50: CPT | Mod: ,,, | Performed by: HOSPITALIST

## 2025-04-13 PROCEDURE — 63600175 PHARM REV CODE 636 W HCPCS: Performed by: INTERNAL MEDICINE

## 2025-04-13 PROCEDURE — 84134 ASSAY OF PREALBUMIN: CPT | Performed by: HOSPITALIST

## 2025-04-13 PROCEDURE — 63600175 PHARM REV CODE 636 W HCPCS: Performed by: HOSPITALIST

## 2025-04-13 PROCEDURE — 27000207 HC ISOLATION

## 2025-04-13 PROCEDURE — 63600175 PHARM REV CODE 636 W HCPCS: Performed by: FAMILY MEDICINE

## 2025-04-13 PROCEDURE — 82962 GLUCOSE BLOOD TEST: CPT

## 2025-04-13 PROCEDURE — 85025 COMPLETE CBC W/AUTO DIFF WBC: CPT | Performed by: HOSPITALIST

## 2025-04-13 PROCEDURE — 25000242 PHARM REV CODE 250 ALT 637 W/ HCPCS: Performed by: HOSPITALIST

## 2025-04-13 PROCEDURE — 80053 COMPREHEN METABOLIC PANEL: CPT | Performed by: HOSPITALIST

## 2025-04-13 PROCEDURE — 36415 COLL VENOUS BLD VENIPUNCTURE: CPT | Performed by: HOSPITALIST

## 2025-04-13 PROCEDURE — P9047 ALBUMIN (HUMAN), 25%, 50ML: HCPCS | Performed by: HOSPITALIST

## 2025-04-13 RX ORDER — LORAZEPAM 1 MG/1
1 TABLET ORAL EVERY 6 HOURS PRN
Status: DISCONTINUED | OUTPATIENT
Start: 2025-04-13 | End: 2025-04-26 | Stop reason: HOSPADM

## 2025-04-13 RX ADMIN — NYSTATIN 500000 UNITS: 100000 SUSPENSION ORAL at 09:04

## 2025-04-13 RX ADMIN — MEROPENEM 1 G: 1 INJECTION, POWDER, FOR SOLUTION INTRAVENOUS at 04:04

## 2025-04-13 RX ADMIN — IPRATROPIUM BROMIDE AND ALBUTEROL SULFATE 3 ML: 2.5; .5 SOLUTION RESPIRATORY (INHALATION) at 12:04

## 2025-04-13 RX ADMIN — METOCLOPRAMIDE HYDROCHLORIDE 5 MG: 5 TABLET ORAL at 11:04

## 2025-04-13 RX ADMIN — MEROPENEM 1 G: 1 INJECTION, POWDER, FOR SOLUTION INTRAVENOUS at 05:04

## 2025-04-13 RX ADMIN — DEXTROSE MONOHYDRATE: 50 INJECTION, SOLUTION INTRAVENOUS at 01:04

## 2025-04-13 RX ADMIN — SODIUM CHLORIDE, POTASSIUM CHLORIDE, SODIUM LACTATE AND CALCIUM CHLORIDE 500 ML: 600; 310; 30; 20 INJECTION, SOLUTION INTRAVENOUS at 12:04

## 2025-04-13 RX ADMIN — IPRATROPIUM BROMIDE AND ALBUTEROL SULFATE 3 ML: 2.5; .5 SOLUTION RESPIRATORY (INHALATION) at 04:04

## 2025-04-13 RX ADMIN — FUROSEMIDE 20 MG: 10 INJECTION, SOLUTION INTRAMUSCULAR; INTRAVENOUS at 09:04

## 2025-04-13 RX ADMIN — DEXTROSE MONOHYDRATE: 50 INJECTION, SOLUTION INTRAVENOUS at 05:04

## 2025-04-13 RX ADMIN — ALBUMIN (HUMAN) 12.5 G: 5 SOLUTION INTRAVENOUS at 09:04

## 2025-04-13 RX ADMIN — ERYTHROMYCIN 500 MG: 250 TABLET, FILM COATED ORAL at 09:04

## 2025-04-13 RX ADMIN — INSULIN GLARGINE 25 UNITS: 100 INJECTION, SOLUTION SUBCUTANEOUS at 09:04

## 2025-04-13 RX ADMIN — PANTOPRAZOLE SODIUM 40 MG: 40 TABLET, DELAYED RELEASE ORAL at 09:04

## 2025-04-13 RX ADMIN — NYSTATIN 500000 UNITS: 100000 SUSPENSION ORAL at 05:04

## 2025-04-13 RX ADMIN — IPRATROPIUM BROMIDE AND ALBUTEROL SULFATE 3 ML: 2.5; .5 SOLUTION RESPIRATORY (INHALATION) at 07:04

## 2025-04-13 RX ADMIN — ATORVASTATIN CALCIUM 20 MG: 20 TABLET, FILM COATED ORAL at 09:04

## 2025-04-13 RX ADMIN — NYSTATIN 500000 UNITS: 100000 SUSPENSION ORAL at 11:04

## 2025-04-13 RX ADMIN — METOCLOPRAMIDE HYDROCHLORIDE 5 MG: 5 TABLET ORAL at 05:04

## 2025-04-13 RX ADMIN — BUDESONIDE 0.5 MG: 0.5 SUSPENSION RESPIRATORY (INHALATION) at 07:04

## 2025-04-13 RX ADMIN — SERTRALINE HYDROCHLORIDE 100 MG: 50 TABLET ORAL at 09:04

## 2025-04-13 RX ADMIN — ALBUMIN (HUMAN) 12.5 G: 5 SOLUTION INTRAVENOUS at 06:04

## 2025-04-13 RX ADMIN — MONTELUKAST 10 MG: 10 TABLET, FILM COATED ORAL at 09:04

## 2025-04-13 RX ADMIN — ERYTHROMYCIN 500 MG: 250 TABLET, FILM COATED ORAL at 11:04

## 2025-04-13 RX ADMIN — METOCLOPRAMIDE HYDROCHLORIDE 5 MG: 5 TABLET ORAL at 09:04

## 2025-04-13 RX ADMIN — METOCLOPRAMIDE HYDROCHLORIDE 5 MG: 5 TABLET ORAL at 06:04

## 2025-04-13 NOTE — PROGRESS NOTES
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  Patient was initially admitted to the hospital on April 4 would dehydration gastroparesis UTI.  She has not been able to eat previously in his now admitted to the hospital with concerns over nutrition since she has been in the hospital she has had shortness of breath and has developed worsening right-sided infiltrates.  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 Swanson    Melanocytic nevi of lower limb, including hip, left     Mixed hyperlipidemia     Moderate persistent asthma without complication 10/30/2024    Other pulmonary embolism without acute cor pulmonale     Vitamin D deficiency        Past Surgical History:   Procedure Laterality Date    CHOLECYSTECTOMY      CSF SHUNT      HYSTERECTOMY      TONSILLECTOMY         Review of patient's allergies indicates:   Allergen Reactions    Penicillins  Hives    Sulfa (sulfonamide antibiotics) Hives       Family History       Problem Relation (Age of Onset)    Diabetes Mother    Hypertension Mother    Lung cancer Father          Tobacco Use    Smoking status: Never    Smokeless tobacco: Never   Substance and Sexual Activity    Alcohol use: Never    Drug use: Never    Sexual activity: Not Currently      Review of Systems  Objective:     Vital Signs (Most Recent):  Temp: 97.5 °F (36.4 °C) (04/13/25 0501)  Pulse: 103 (04/13/25 0745)  Resp: 20 (04/13/25 0745)  BP: 101/60 (04/13/25 0501)  SpO2: 97 % (04/13/25 0745) Vital Signs (24h Range):  Temp:  [96.4 °F (35.8 °C)-98.6 °F (37 °C)] 97.5 °F (36.4 °C)  Pulse:  [] 103  Resp:  [16-20] 20  SpO2:  [95 %-100 %] 97 %  BP: ()/(44-92) 101/60   Weight: (!) 136.1 kg (300 lb)  Body mass index is 49.92 kg/m².      Intake/Output Summary (Last 24 hours) at 4/13/2025 0851  Last data filed at 4/13/2025 0158  Gross per 24 hour   Intake 256 ml   Output 1050 ml   Net -794 ml          Physical Exam  Vitals reviewed.   Constitutional:       Appearance: Normal appearance.      Interventions: She is not intubated.  HENT:      Head: Normocephalic and atraumatic.      Nose: Nose normal.      Mouth/Throat:      Mouth: Mucous membranes are dry.      Pharynx: Oropharynx is clear.   Eyes:      Extraocular Movements: Extraocular movements intact.      Conjunctiva/sclera: Conjunctivae normal.      Pupils: Pupils are equal, round, and reactive to light.   Cardiovascular:      Rate and Rhythm: Normal rate.      Heart sounds: Normal heart sounds. No murmur heard.  Pulmonary:      Effort: Pulmonary effort is normal. She is not intubated.      Breath sounds: Normal breath sounds.   Abdominal:      General: Abdomen is flat. Bowel sounds are normal.      Palpations: Abdomen is soft.   Musculoskeletal:         General: Normal range of motion.      Cervical back: Normal range of motion and neck supple.      Right lower leg: No edema.      Left  lower leg: No edema.   Skin:     General: Skin is warm and dry.      Capillary Refill: Capillary refill takes less than 2 seconds.   Neurological:      General: No focal deficit present.      Mental Status: She is alert and oriented to person, place, and time.   Psychiatric:         Mood and Affect: Mood normal.         Behavior: Behavior normal.            Vents:  Oxygen Concentration (%): 28 (04/12/25 1637)  Lines/Drains/Airways       Drain  Duration                  NG/OG Tube 04/12/25 1620 nasogastric;Other (comments) Right nostril <1 day    Female External Urinary Catheter w/ Suction 04/13/25 0445 <1 day              Peripheral Intravenous Line  Duration                  Peripheral IV - Single Lumen 04/05/25 1430 20 G Other (Comments) Anterior;Left Forearm 7 days                  Significant Labs:    CBC/Anemia Profile:  Recent Labs   Lab 04/12/25  0330 04/13/25  0331   WBC 19.17* 16.84*   HGB 9.2* 8.6*   HCT 28.0* 25.7*    152   MCV 88.3 86.0   RDW 18.1* 17.5*        Chemistries:  Recent Labs   Lab 04/13/25  0331   *   K 3.5      CO2 19*   BUN 40*   CREATININE 1.66*   CALCIUM 8.0*   ALBUMIN 1.4*   PROT 4.4*   BILITOT 0.6   ALKPHOS 164*   ALT 33   AST 31   MG 1.7       Recent Lab Results         04/13/25  0331   04/12/25  2019   04/12/25  1653   04/12/25  1118        TB Skin Test 48 - 72 hr read             TB Induration 48 - 72 hr read             Albumin/Globulin Ratio 0.5             Albumin 1.4                          ALT 33             Anion Gap 13             Aniso 1+             AST 31             Bands 12             Baso # 0.04             Basophil % 0.2             BILIRUBIN TOTAL 0.6             BUN 40             BUN/CREAT RATIO 24             Calcium 8.0             Chloride 102             CO2 19             Creatinine 1.66             Differential Method Manual             eGFR 33  Comment: Estimated GFR calculated using the CKD-EPI creatinine (2021) equation.              Eos # 0.18             Eos % 1.1              1             Globulin, Total 3.0             Glucose 119             Hematocrit 25.7             Hemoglobin 8.6             Immature Grans (Abs) 0.29             Immature Granulocytes 1.7             Lymph # 0.69             Lymph % 4.1              6             Magnesium  1.7             MCH 28.8             MCHC 33.5             MCV 86.0             Mono # 0.59             Mono % 3.5              2             MPV 13.7             Neutrophils, Abs 15.05             Neutrophils Relative 89.4             nRBC 1              1.4             NUCLEATED RBC ABSOLUTE 0.23             PLATELET MORPHOLOGY Large Platelets             Platelet Count 152             POC Glucose   171   152   130       Potassium 3.5             Prealbumin <3             PROTEIN TOTAL 4.4             RBC 2.99             RDW 17.5             Segmented Neutrophils, Man % 79             Sodium 130             Target Cells Few             WBC 16.84                     Significant Imaging: I have reviewed all pertinent imaging results/findings within the past 24 hours.    ABG  Recent Labs   Lab 04/08/25  0621   PH 7.51*   PO2 66*   PCO2 29*   HCO3 23.1     Assessment/Plan:     Pulmonary  Abnormal chest x-ray  Asked to see the patient was originally diagnosed with lung cancer in February 23 she has subsequently had brain Mets she is now admitted with inability to eat and shortness of breath.  Her chest x-ray has progressed with mostly interstitial infiltrates right greater than left there is some alveolar component.  She is symptomatic.  The differential diagnosis would include recurrent cancer, bacterial pneumonia, volume overload, an interstitial lung disease which is grade 2-3 by history related to the use of rybrevent and lazcluse.  There is described incident is a 3.1% incidence.  Typically takes a couple of months prior to seeing this.  I have looked and can not find what patho physiology you  would find on biopsy but I suspect it would be similar to hypersensitivity pneumonitis.  I would proceed with ruling out infection and malignancy with EBUS/broncho alveolar lavage.  If no evidence of infection deny would consider starting steroids I will ask Dr. Guerra our pulmonary interventionalist to review the case tomorrow             Todd Villasenor MD  Critical Care Medicine  [facility]

## 2025-04-13 NOTE — CONSULTS
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  Patient was initially admitted to the hospital on April 4 would dehydration gastroparesis UTI.  She has not been able to eat previously in his now admitted to the hospital with concerns over nutrition since she has been in the hospital she has had shortness of breath and has developed worsening right-sided infiltrates.  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 Swanson    Melanocytic nevi of lower limb, including hip, left     Mixed hyperlipidemia     Moderate persistent asthma without complication 10/30/2024    Other pulmonary embolism without acute cor pulmonale     Vitamin D deficiency        Past Surgical History:   Procedure Laterality Date    CHOLECYSTECTOMY      CSF SHUNT      HYSTERECTOMY      TONSILLECTOMY         Review of patient's allergies indicates:   Allergen Reactions     Penicillins Hives    Sulfa (sulfonamide antibiotics) Hives       Family History       Problem Relation (Age of Onset)    Diabetes Mother    Hypertension Mother    Lung cancer Father          Tobacco Use    Smoking status: Never    Smokeless tobacco: Never   Substance and Sexual Activity    Alcohol use: Never    Drug use: Never    Sexual activity: Not Currently      Review of Systems  Objective:     Vital Signs (Most Recent):  Temp: 97.5 °F (36.4 °C) (04/13/25 0501)  Pulse: 103 (04/13/25 0745)  Resp: 20 (04/13/25 0745)  BP: 101/60 (04/13/25 0501)  SpO2: 97 % (04/13/25 0745) Vital Signs (24h Range):  Temp:  [96.4 °F (35.8 °C)-98.6 °F (37 °C)] 97.5 °F (36.4 °C)  Pulse:  [] 103  Resp:  [16-20] 20  SpO2:  [95 %-100 %] 97 %  BP: ()/(44-92) 101/60   Weight: (!) 136.1 kg (300 lb)  Body mass index is 49.92 kg/m².      Intake/Output Summary (Last 24 hours) at 4/13/2025 0851  Last data filed at 4/13/2025 0158  Gross per 24 hour   Intake 256 ml   Output 1050 ml   Net -794 ml          Physical Exam  Vitals reviewed.   Constitutional:       Appearance: Normal appearance. She is obese.      Interventions: She is not intubated.  HENT:      Head: Normocephalic and atraumatic.      Nose: Nose normal.      Mouth/Throat:      Mouth: Mucous membranes are dry.      Pharynx: Oropharynx is clear.   Eyes:      Extraocular Movements: Extraocular movements intact.      Conjunctiva/sclera: Conjunctivae normal.      Pupils: Pupils are equal, round, and reactive to light.   Cardiovascular:      Rate and Rhythm: Normal rate.      Heart sounds: Normal heart sounds. No murmur heard.  Pulmonary:      Effort: Pulmonary effort is normal. She is not intubated.      Breath sounds: Rales present.   Abdominal:      General: Abdomen is flat. Bowel sounds are normal.      Palpations: Abdomen is soft.   Musculoskeletal:         General: Normal range of motion.      Cervical back: Normal range of motion and neck supple.      Right lower leg: No  edema.      Left lower leg: No edema.   Skin:     General: Skin is warm and dry.      Capillary Refill: Capillary refill takes less than 2 seconds.   Neurological:      General: No focal deficit present.      Mental Status: She is alert and oriented to person, place, and time.   Psychiatric:         Mood and Affect: Mood normal.         Behavior: Behavior normal.            Vents:  Oxygen Concentration (%): 28 (04/12/25 1637)  Lines/Drains/Airways       Drain  Duration                  NG/OG Tube 04/12/25 1620 nasogastric;Other (comments) Right nostril <1 day    Female External Urinary Catheter w/ Suction 04/13/25 0445 <1 day              Peripheral Intravenous Line  Duration                  Peripheral IV - Single Lumen 04/05/25 1430 20 G Other (Comments) Anterior;Left Forearm 7 days                  Significant Labs:    CBC/Anemia Profile:  Recent Labs   Lab 04/12/25  0330 04/13/25  0331   WBC 19.17* 16.84*   HGB 9.2* 8.6*   HCT 28.0* 25.7*    152   MCV 88.3 86.0   RDW 18.1* 17.5*        Chemistries:  Recent Labs   Lab 04/13/25  0331   *   K 3.5      CO2 19*   BUN 40*   CREATININE 1.66*   CALCIUM 8.0*   ALBUMIN 1.4*   PROT 4.4*   BILITOT 0.6   ALKPHOS 164*   ALT 33   AST 31   MG 1.7       Recent Lab Results         04/13/25  0331   04/12/25  2019   04/12/25  1653   04/12/25  1118        TB Skin Test 48 - 72 hr read             TB Induration 48 - 72 hr read             Albumin/Globulin Ratio 0.5             Albumin 1.4                          ALT 33             Anion Gap 13             Aniso 1+             AST 31             Bands 12             Baso # 0.04             Basophil % 0.2             BILIRUBIN TOTAL 0.6             BUN 40             BUN/CREAT RATIO 24             Calcium 8.0             Chloride 102             CO2 19             Creatinine 1.66             Differential Method Manual             eGFR 33  Comment: Estimated GFR calculated using the CKD-EPI creatinine (2021)  equation.             Eos # 0.18             Eos % 1.1              1             Globulin, Total 3.0             Glucose 119             Hematocrit 25.7             Hemoglobin 8.6             Immature Grans (Abs) 0.29             Immature Granulocytes 1.7             Lymph # 0.69             Lymph % 4.1              6             Magnesium  1.7             MCH 28.8             MCHC 33.5             MCV 86.0             Mono # 0.59             Mono % 3.5              2             MPV 13.7             Neutrophils, Abs 15.05             Neutrophils Relative 89.4             nRBC 1              1.4             NUCLEATED RBC ABSOLUTE 0.23             PLATELET MORPHOLOGY Large Platelets             Platelet Count 152             POC Glucose   171   152   130       Potassium 3.5             Prealbumin <3             PROTEIN TOTAL 4.4             RBC 2.99             RDW 17.5             Segmented Neutrophils, Man % 79             Sodium 130             Target Cells Few             WBC 16.84                     Significant Imaging: I have reviewed all pertinent imaging results/findings within the past 24 hours.    ABG  Recent Labs   Lab 04/08/25  0621   PH 7.51*   PO2 66*   PCO2 29*   HCO3 23.1     Assessment/Plan:     Pulmonary  Abnormal chest x-ray  Asked to see the patient was originally diagnosed with lung cancer in February 23 she has subsequently had brain Mets she is now admitted with inability to eat and shortness of breath.  Her chest x-ray has progressed with mostly interstitial infiltrates right greater than left there is some alveolar component.  She is symptomatic.  The differential diagnosis would include recurrent cancer, bacterial pneumonia, volume overload, an interstitial lung disease which is grade 2-3 by history related to the use of rybrevent and lazcluse.  There is described incident is a 3.1% incidence.  Typically takes a couple of months prior to seeing this.  I have looked and can not find what  patho physiology you would find on biopsy but I suspect it would be similar to hypersensitivity pneumonitis.  I would proceed with ruling out infection and malignancy with EBUS/broncho alveolar lavage.  If no evidence of infection deny would consider starting steroids I will ask Dr. Guerra our pulmonary interventionalist to review the case tomorrow             Thank you for your consult. I will follow-up with patient. Please contact us if you have any additional questions.     Todd Villasenor MD  Critical Care Medicine  [facility]   Calcipotriene Counseling:  I discussed with the patient the risks of calcipotriene including but not limited to erythema, scaling, itching, and irritation.

## 2025-04-13 NOTE — PROGRESS NOTES
[facility]  [facility] Medicine  Progress Note    Patient Name: Magan Saleem  MRN: [id number]  Patient Class: IP- Inpatient   Admission Date: 4/3/2025  Length of Stay: 10 days  Attending Physician: Andriy Rizvi MD  Primary Care Provider: Sil Brown DO        Subjective     Principal Problem:Sepsis due to pneumonia        HPI:  68 yo F presents to [place] ED from [facility] for abnormal labs.  Patient was discharged from [facility] two days ago to skilled nursing facility for rehab.  She was diagnosed with dehydration due to gastroparesis with UTI.  Patient has metastatic lung cancer (to the brain) and follows with Dr. Swanson for IV chemotherapy every other week and takes lazertinib daily.  She has completed her course of radiation for the brain mets and follow had showed some improvement.  I thought she had either some decreased LOC due to encephalopathy or some aphasia from the brain mets but after a great effort to get her to talk, I realized she was just mad.  She wanted to know why she had been discharged two days ago when she could not eat.  She has no appetite and early satiety.  She is not nauseated and no vomiting.  She has decided on a DNR status previously (her caretaker and friend of 20 years) states that she had always said she did not want resuscitation if she experienced cardiopulmonary arrest and had told her children her wishes but she does want treatment and is not opposed to J tube if needed.  She has not had anything to eat or drink in two days and is dehydrated again.      Patient was transferred because of concern of sepsis.  She did have enterococcus faecalis UTI at [facility] and was treated.  I do not have the culture results but cultures were sent and patient does have some yeast noted.  (Will not treat a yeast colonization).  She is afebrile and hemodynamically stable and does not look septic clinically.  Her Lactic acid is stable at 2.5 dara 3.2.  Will check  another in am after volume resuscitation.  Her creat is at baseline but she has prerenal azotemia further confirming the dehydration.  Patient has an IO in place from [facility] due to dehydration and difficult stick but with some volume resuscitation, we have gotten an IV.  She is covid and flu negative.      Her WBC is 29 and I am not sure if she has received neupogen but could be a stress reaction.  Her BS is 245 and she does have some urine ketones but most likely due to starvation ketosis.  Will check a serum ketone.  Her platelets are 88K but this is most likely from her chemo.  She is not anemic.  CXR shows some patchy infiltrate but no obvious obstructive pneumonia from her lung cancer.  Her BNP about 3K but no clinical signs of CHF.  No recent echo but due to her BMI 50 most likely has some PHTN.  EKG had no ischemic changes but tachy at 114 which could also be from dehydration.  She was on ozempic for her DM and this could be the cause of her decreased appetite.  She is also on decadron for prevention of cerebral edema.      Remainder of ROS as below.  She mostly just nods and shakes her head and rolls her eyes.  You can get her to laugh if you try but she has been depressed since she has not walked since her hospitalization at [facility] on 3/19/25 and was discharged two days ago.  She says that at her last chemo she noticed she was getting weaker and her daughter had to help her in and out of the car and that was new for her.      See assessment and plan below for problem based evaluation       Overview/Hospital Course:  69 year old female presents with hypernatremia; she is not too talkative during rounds    4/5- patient seen examined today resting comfortably in bed and in no acute distress, with no acute events overnight.  Patient has a multitude of issues complicating her medical picture.  White blood cell count 88219 today, sodium 159, potassium 3.2 and BUN creatinine 59 and 1.8.  The patient is still not eating  even when assistance is provided.  We have already changed her fluids to half-normal saline with 20 of KCl at 1:25 a.m..  Have also put in a GI consult for possible feeding tube.  E coli in the urine has turned out to be ESBL and Rocephin has been changed Merrem.    4/6- the patient seen examined today resting comfortably in bed, in no acute distress, in no acute events overnight.  The patient is not very talkative today, but states she is doing okay.  She has no acute complaints.  Blood cultures remain negative.  The patient has not eaten since yesterday and is on light IV fluids.  GI has been consulted for feeding tube.  Continues on Merrem for positive urine culture.    04/07 Records reviewed. Not eating which not new, Similar during recent admit at [facility]. Dr Swanson there had question pancreatitis. No abd pain or tenderness reported now. Question of dysphagia to solids. Chart hx gastroparesis. Will discuss with GI. Ronnie says has responded so far well to treatment for lung CA.   04/08 had EGD at [facility] 03/21/25 with no obstruction and evidence gastroparesis. Still not ending. Try additional meds. Talked with GI. Increase activity  04/09 Some better with med  changes  04/10 Continues to do better. Ate 1/2 fish sandwich for lunch. Called by Dr Swanson and she wanting to keep feeding tube in consideration. Talked with Dr Reich and Dr Lemus. If something needed with gastroparesis would have to have post gastric position of tube.   04/11 eating some. Later staff requested some nystatin for sore mouth.   04/12 still not eating well.  Increased peripheral edema.  Albumin low at 1.5.  Will give some albumin and follow with some Lasix.  Placed Dobbhoff tube and start enteral feedings.  This will help with nutrition and if issue of placing surgical tube later make sure will tolerate enteral feedings.  Chest x-ray continues worse on left side.  Discuss with Pulmonary and ask Dr. Villasenor to see.   04/13 no new issues.   Enteral feedings to be started.  Pulmonary evaluation appreciated and plans noted.    Interval History:     Review of Systems   Constitutional:  Negative for appetite change, fatigue and fever.   HENT:  Positive for trouble swallowing. Negative for congestion and hearing loss.    Respiratory:  Positive for shortness of breath. Negative for chest tightness and wheezing.    Cardiovascular:  Negative for chest pain and palpitations.   Gastrointestinal:  Negative for abdominal pain, constipation and nausea.   Genitourinary:  Negative for difficulty urinating and dysuria.   Musculoskeletal:  Positive for gait problem. Negative for back pain and neck stiffness.   Skin:  Negative for pallor and rash.   Neurological:  Negative for dizziness, speech difficulty and headaches.   Psychiatric/Behavioral:  Positive for sleep disturbance. Negative for confusion and suicidal ideas.      Objective:     Vital Signs (Most Recent):  Temp: 97.3 °F (36.3 °C) (04/13/25 1623)  Pulse: 90 (04/13/25 1656)  Resp: 18 (04/13/25 1656)  BP: (!) 94/53 (04/13/25 1623)  SpO2: 96 % (04/13/25 1656) Vital Signs (24h Range):  Temp:  [97.3 °F (36.3 °C)-98.6 °F (37 °C)] 97.3 °F (36.3 °C)  Pulse:  [] 90  Resp:  [16-20] 18  SpO2:  [95 %-97 %] 96 %  BP: ()/(46-92) 94/53     Weight: (!) 136.1 kg (300 lb)  Body mass index is 49.92 kg/m².    Intake/Output Summary (Last 24 hours) at 4/13/2025 1714  Last data filed at 4/13/2025 1241  Gross per 24 hour   Intake 136 ml   Output 1100 ml   Net -964 ml         Physical Exam  Vitals reviewed.   Constitutional:       General: She is awake. She is not in acute distress.     Appearance: She is well-developed. She is morbidly obese. She is not toxic-appearing.   HENT:      Head: Normocephalic.      Nose: Nose normal.      Mouth/Throat:      Pharynx: Oropharynx is clear.   Eyes:      Extraocular Movements: Extraocular movements intact.      Pupils: Pupils are equal, round, and reactive to light.   Neck:       Thyroid: No thyroid mass.      Vascular: No carotid bruit.   Cardiovascular:      Rate and Rhythm: Normal rate and regular rhythm.      Pulses: Normal pulses.      Heart sounds: Normal heart sounds. No murmur heard.  Pulmonary:      Effort: Pulmonary effort is normal.      Breath sounds: Normal breath sounds and air entry. No wheezing.   Abdominal:      General: Bowel sounds are normal. There is no distension.      Palpations: Abdomen is soft.      Tenderness: There is no abdominal tenderness.   Musculoskeletal:         General: Normal range of motion.      Cervical back: Neck supple. No rigidity.   Skin:     General: Skin is warm.      Coloration: Skin is not jaundiced.      Findings: No lesion.   Neurological:      General: No focal deficit present.      Mental Status: She is alert and oriented to person, place, and time.      Cranial Nerves: No cranial nerve deficit.   Psychiatric:         Attention and Perception: Attention normal.         Mood and Affect: Mood normal.         Behavior: Behavior normal. Behavior is cooperative.         Thought Content: Thought content normal.         Cognition and Memory: Cognition normal.               Significant Labs: All pertinent labs within the past 24 hours have been reviewed.  BMP:   Recent Labs   Lab 04/13/25  0331   *   *   K 3.5      CO2 19*   BUN 40*   CREATININE 1.66*   CALCIUM 8.0*   MG 1.7       CBC:   Recent Labs   Lab 04/12/25  0330 04/13/25  0331   WBC 19.17* 16.84*   HGB 9.2* 8.6*   HCT 28.0* 25.7*    152       CMP:   Recent Labs   Lab 04/13/25  0331   *   K 3.5      CO2 19*   *   BUN 40*   CREATININE 1.66*   CALCIUM 8.0*   PROT 4.4*   ALBUMIN 1.4*   BILITOT 0.6   ALKPHOS 164*   AST 31   ALT 33   ANIONGAP 13         Significant Imaging: I have reviewed all pertinent imaging results/findings within the past 24 hours.      Intake/Output - Last 3 Shifts         04/11 0700 04/12 0659 04/12 0700 04/13 0659 04/13  0700  04/14 0659    P.O.  120     I.V. (mL/kg) 1920.4 (14.1)      NG/GT  136     Total Intake(mL/kg) 1920.4 (14.1) 256 (1.9)     Urine (mL/kg/hr) 350 (0.1) 1050 (0.3) 650 (0.5)    Other  0     Total Output 350 1050 650    Net +1570.4 -794 -650           Urine Occurrence  1 x           Microbiology Results (last 7 days)       ** No results found for the last 168 hours. **                Assessment & Plan  Sepsis due to pneumonia  Appears to have UTI and pneumonia; has leukocytosis; has opacities on CXR; has G- bacilli on urine culture; blood cultures pending; will place on Vancomycin and Rocephin; BP on the low side; on IVF    04/12 worsening chest x-ray, ask Pulmonary to review  Hypernatremia  Due to dehydration; off diuretics; on IVF; will monitor Na levels  Lung cancer metastatic to brain  DNR but not hospice.  Receives chemo and has finished radiation.    04/07 reported stable / improved after treatment  04/09 more fuffy right side CXR  04/12 continue to abnormality on chest x-ray and ask Pulmonary to review    Thrombocytopenia  On chemo; will monitor    Essential hypertension  Vitals:    04/12/25 1125 04/12/25 1529 04/12/25 2025 04/13/25 0015   BP: (!) 90/44 (!) 92/55 (!) 117/92 (!) 82/48    04/13/25 0017 04/13/25 0020 04/13/25 0501 04/13/25 0900   BP: (!) 84/47 (!) 88/50 101/60 (!) 90/46    04/13/25 1140 04/13/25 1623   BP: 134/82 (!) 94/53         Monitor BP while on Metoprolol; on IVF; off Losartan    Moderate persistent asthma without complication  Continue home inhaled steroid/bronchodilator and singulair    Chronic kidney disease, stage 3b  Creatine stable   Chronic pulmonary embolism without acute cor pulmonale  Eliquis was discontinued due to risk of ICH with brain mets but they have improved so the anti-coagulation has been restarted; will monitor  04/07 apparently this of several years ago    Type 2 diabetes mellitus with hyperglycemia  BS elevated; will increase Lantus to 25 units; continue  "SSI;monitor BS     Gastroparesis  On Reglan and PPI for erosive gastritis; will monitor    EGD by Dr. Lo during recent past admission at [facility]:  "EGD on 03/21/2025 shows LA class B erosive esophagitis but no pill esophagitis, nonerosive gastritis and retention of food and fluid suspicious for gastroparesis, due to narcotics and diabetes. "    04/ 08 try additional meds to help gastric emptying.  04/09 taking in some now orally with recent changes meds  04/10 better and ate half [place] for lunch   Morbid obesity  Body mass index is 49.92 kg/m². Morbid obesity complicates all aspects of disease management from diagnostic modalities to treatment. Weight loss encouraged and health benefits explained to patient.     Would benefit from sleep study and possible CPAP.  Does not wear oxygen at home.      Edema due to hypoalbuminemia    04/12 add IV albumin and some Lasix  Start enteral feedings  04/13 start enteral feedings  Abnormal chest x-ray    04/13 addressed by Pulmonary with further investigation plans  VTE Risk Mitigation (From admission, onward)      None            Discharge Planning   MITUL:      Code Status: DNR   Medical Readiness for Discharge Date:   Discharge Plan A: Home with family                Please place Justification for DME        Andriy Rizvi MD  Department of Hospital Medicine   [facility]    "

## 2025-04-13 NOTE — CONSULTS
[facility]  Adult Nutrition  Consult Note         Reason for Assessment  Reason For Assessment: consult (enteral feeding)        Assessment and Plan     Consult received and appreciated. Consult for enteral feeding. Patient is a 68yo female admitted 4/3 for sepsis due to pneumonia.     Patient is 136.1kg with a BMI of 49.92 and is obese. She has a PMH of CKD3b with elevated BUN, Cr, and low eGFR. Recommend start Suplena with a goal rate of 45mL/hour with 30mL/hour free water flush. Keep HOB at 30-45 degrees to reduce risk of aspiration. Start rate at 20mL/hour and advance by 10mL q8h until goal rate is reached. Monitor for tolerance: n/v/c/d. Hold feeding and notify RD if symptoms of intolerance develop.     Last Bowel Movement: 04/11/25    Medications/labs reviewed. RD following.    Learning Needs/Social Determinants of Health    Learning Assessment       04/04/2025 0021 [facility] (4/3/2025 - Present)   Created by Deanna Rivas, RN - RN (Nurse) Status: Complete                 PRIMARY LEARNER     Primary Learner Name:  Magan Saleem  - 04/04/2025 0021    Relationship:  Patient SG - 04/04/2025 0021    Does the primary learner have any barriers to learning?:  No Barriers SG - 04/04/2025 0021    What is the preferred language of the primary learner?:  English SG - 04/04/2025 0021    Is an  required?:  No SG - 04/04/2025 0021    How does the primary learner prefer to learn new concepts?:  Listening SG - 04/04/2025 0021    How often do you need to have someone help you read instructions, pamphlets, or written material from your doctor or pharmacy?:  Never SG - 04/04/2025 0021        CO-LEARNER #1     No question answered        CO-LEARNER #2     No question answered        SPECIAL TOPICS     No question answered        ANSWERED BY:     -:  Family SG - 04/04/2025 0021        Comments         Edit History       Deanna Rivas, RN - RN (Nurse)    04/04/2025 0021                             Social Drivers of Health     Tobacco Use: Low Risk  (4/4/2025)    Patient History     Smoking Tobacco Use: Never     Smokeless Tobacco Use: Never     Passive Exposure: Not on file   Alcohol Use: Not At Risk (4/4/2025)    AUDIT-C     Frequency of Alcohol Consumption: Never     Average Number of Drinks: Patient does not drink     Frequency of Binge Drinking: Never   Financial Resource Strain: Patient Declined (4/5/2025)    Overall Financial Resource Strain (CARDIA)     Difficulty of Paying Living Expenses: Patient declined   Food Insecurity: Patient Declined (4/5/2025)    Hunger Vital Sign     Worried About Running Out of Food in the Last Year: Patient declined     Ran Out of Food in the Last Year: Patient declined   Transportation Needs: No Transportation Needs (4/4/2025)    PRAPARE - Transportation     Lack of Transportation (Medical): No     Lack of Transportation (Non-Medical): No   Physical Activity: Inactive (4/4/2025)    Exercise Vital Sign     Days of Exercise per Week: 0 days     Minutes of Exercise per Session: 0 min   Stress: Patient Declined (4/5/2025)    [employer] [place] [employer] - Occupational Stress Questionnaire     Feeling of Stress : Patient declined   Housing Stability: Patient Declined (4/5/2025)    Housing Stability Vital Sign     Unable to Pay for Housing in the Last Year: Patient declined     Number of Times Moved in the Last Year: Not on file     Homeless in the Last Year: Patient declined   Depression: Low Risk  (10/30/2024)    Depression     Last PHQ-4: Flowsheet Data: 0   Utilities: Patient Declined (4/5/2025)    [facility] Utilities     Threatened with loss of utilities: Patient declined   Health Literacy: Patient Declined (4/5/2025)     Health Literacy     Frequency of need for help with medical instructions: Patient declines to respond   Recent Concern: Health Literacy - Inadequate Health Literacy (4/4/2025)     Health Literacy      Frequency of need for help with medical instructions: Sometimes   Social Isolation: Not on file          Malnutrition  Is Patient Malnourished: No    Nutrition Diagnosis  Inadequate energy intake related to Appetite loss as evidenced by poor PO intakes  Comments: initiate enteral feeding    Recent Labs   Lab 04/13/25  0331 04/13/25  0847 04/13/25  1138   *  --   --    POCGLU  --    < > 107*    < > = values in this interval not displayed.     Comments on Glucose: Glucose elevated. PMH DM2.       Nutrition Prescription / Recommendations  Recommendation/Intervention: Recommend start Suplena with a goal rate of 45mL/hour with 30mL/hour free water flush. Keep HOB at 30-45 degrees to reduce risk of aspiration. Start rate at 20mL/hour and advance by 10mL q8h until goal rate is reached. Monitor for tolerance: n/v/d/c. Hold feeding and notify RD if symptoms of intolerance develop.  Goals: Tube feeding tolerance at goal, weight maintenance during admission  Nutrition Goal Status: new  Current Diet Order: Low residue  Chewing or Swallowing Difficulty?: No Chewing or swallowing difficulty  Recommended Diet: Enteral Nutrition and Low Residue  Recommended Oral Supplement: No Oral Supplements  Is Nutrition Support Recommended:     Needs Calculated    Energy Calorie Requirements (kcal): 1905-2722kcal (20-25kcal/kg)  Protein Requirements: 34-45g (0.6-0.8g/kg ideal body weight)  Enteral Nutrition   Enteral Nutrition Formula Provides:  1938 kcals Propofol Rate: No  49 g Protein  212 g Carbohydrates  104 g Fat Propofol Rate: No  797 ml Fluid without Flush    720 ml Fluid by flush   1517 ml Total Fluid  Enteral Nutrition Recommended Order:  Tube feeding via NG/ Dobhoff  Tube feeding formula: Suplena 1.8 NG/ Dobhoff at 45mL/hour  Free Water Flush: 30 ml hourly  Modular Supplements:No Modular Supplements needed  Enteral Nutrition meets needs?: yes  Enteral Nutrition Status: New Order  Is Nutrition Education Recommended:  No    Monitor and Evaluation  % current Intake: P.O. intake of 0 - 10%  % intake to meet estimated needs: 25 - 50 % and Enteral Nutrition   Monitor and Evaluation: Enteral and parenteral nutrition administration, Diet order, Weight, Electrolyte and renal panel, Gastrointestinal profile, Glucose/endocrine profile, Inflammatory profile, Lipid profile  Enteral and parenteral nutrition administration, Diet order, Weight, Electrolyte and renal panel, Gastrointestinal profile, Glucose/endocrine profile, Inflammatory profile, Lipid profile    Current Medical Diagnosis and Past Medical History  Diagnosis: infection/sepsis  Past Medical History:   Diagnosis Date    Chronic kidney disease, stage 3b     Chronic pulmonary embolism without acute cor pulmonale     Diabetes mellitus type 2 in obese     DNR (do not resuscitate)     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 Swanson    Melanocytic nevi of lower limb, including hip, left     Mixed hyperlipidemia     Moderate persistent asthma without complication 10/30/2024    Other pulmonary embolism without acute cor pulmonale     Vitamin D deficiency        Nutrition/Diet History  Spiritual, Cultural Beliefs, Tenriism Practices, Values that Affect Care: no  Food Allergies: NKFA  Factors Affecting Nutritional Intake: depression, altered gastrointestinal function, decreased appetite, early satiety    Lab/Procedures/Meds  Recent Labs   Lab 04/13/25  0331   *   K 3.5   BUN 40*   CREATININE 1.66*   CALCIUM 8.0*   ALBUMIN 1.4*      ALT 33   AST 31   Note: Na+, Ca++ low. Recommend consider replete to WNL as appropriate. Alb low, likely due to edema. BUN, Cr elevated. PMH  "CKD3b  Last A1c:   Lab Results   Component Value Date    HGBA1C 6.7 04/02/2025    HGBA1C 6.4 (H) 02/05/2025     Lab Results   Component Value Date    RBC 2.99 (L) 04/13/2025    HGB 8.6 (L) 04/13/2025    HCT 25.7 (L) 04/13/2025    MCV 86.0 04/13/2025    MCH 28.8 04/13/2025    MCHC 33.5 04/13/2025   Note: H&H low    Pertinent Labs Reviewed: reviewed  Pertinent Medications Reviewed: reviewed  Scheduled Meds:   albumin human 25%  12.5 g Intravenous TID    albuterol-ipratropium  3 mL Nebulization QID    atorvastatin  20 mg Oral Daily    budesonide  0.5 mg Nebulization Q12H    furosemide (LASIX) injection  20 mg Intravenous Daily    insulin glargine U-100  25 Units Subcutaneous QHS    meropenem IV (PEDS and ADULTS)  1 g Intravenous Q12H    metoclopramide HCl  5 mg Oral QID (AC & HS)    metoprolol succinate  25 mg Oral Daily    montelukast  10 mg Oral QHS    nystatin  500,000 Units Oral QID (WM & HS)    pantoprazole  40 mg Oral Daily    sertraline  100 mg Oral Daily     Continuous Infusions:   D5W   Intravenous Continuous 75 mL/hr at 04/13/25 0158 New Bag at 04/13/25 0158     PRN Meds:.  Current Facility-Administered Medications:     acetaminophen, 650 mg, Rectal, Q6H PRN    acetaminophen, 1,000 mg, Oral, Q6H PRN    aluminum & magnesium hydroxide-simethicone, 30 mL, Oral, Q6H PRN    bisacodyL, 10 mg, Oral, Daily PRN    dextromethorphan-guaiFENesin  mg/5 ml, 10 mL, Oral, Q6H PRN    dextrose 50%, 12.5 g, Intravenous, PRN    dextrose 50%, 25 g, Intravenous, PRN    diphenhydrAMINE, 50 mg, Oral, Q6H PRN    docusate sodium, 100 mg, Oral, BID PRN    glucagon (human recombinant), 1 mg, Intramuscular, PRN    glucose, 16 g, Oral, PRN    glucose, 24 g, Oral, PRN    HYDROcodone-acetaminophen, 1 tablet, Oral, Q6H PRN    insulin aspart U-100, 0-10 Units, Subcutaneous, QID (AC + HS) PRN    melatonin, 6 mg, Oral, Nightly PRN    traZODone, 50 mg, Oral, Nightly PRN    Anthropometrics  Height: 5' 5" (165.1 cm)  Height (inches): 65 " in  Height Method: Stated  Weight: (!) 136.1 kg (300 lb)  Weight (lb): 300 lb  Weight Method: Bed Scale  Ideal Body Weight (IBW), Female: 125 lb  % Ideal Body Weight, Female (lb): 240 %  BMI (Calculated): 49.9       Estimated/Assessed Needs  RMR (Sibley-St. Jeor Equation): 1891.68     Temp: 98.4 °F (36.9 °C)Oral  Weight Used For Calorie Calculations: (!) 136.1 kg (300 lb 0.7 oz)     Energy Calorie Requirements (kcal): 1905-2722kcal (20-25kcal/kg)  Weight Used For Protein Calculations: 56.7 kg (125 lb)  Protein Requirements: 34-45g (0.6-0.8g/kg ideal body weight)    Fluid Requirements (mL): 1 mL/kcal  RDA Method (mL): 1905  CHO Requirement: 45-55% (T2DM)    Nutrition by Nursing  Diet/Nutrition Received: NPO  Intake (%): other (see comments) (a few bites)  Diet/Feeding Assistance: total feed  Diet/Feeding Tolerance: poor                Nutrition Follow-Up  RD Follow-up?: Yes      Nutrition Discharge Planning: Too early to determine, pending clinical course             Dorita Palma, MS, RD, LD  Available via Secure Chat

## 2025-04-13 NOTE — PLAN OF CARE
Problem: Adult Inpatient Plan of Care  Goal: Plan of Care Review  Outcome: Progressing  Goal: Patient-Specific Goal (Individualized)  Outcome: Progressing  Goal: Absence of Hospital-Acquired Illness or Injury  Outcome: Progressing  Goal: Optimal Comfort and Wellbeing  Outcome: Progressing  Goal: Readiness for Transition of Care  Outcome: Progressing     Problem: Bariatric Environmental Safety  Goal: Safety Maintained with Care  Outcome: Progressing     Problem: Skin Injury Risk Increased  Goal: Skin Health and Integrity  Outcome: Progressing     Problem: Infection  Goal: Absence of Infection Signs and Symptoms  Outcome: Progressing     Problem: Fall Injury Risk  Goal: Absence of Fall and Fall-Related Injury  Outcome: Progressing     Problem: UTI (Urinary Tract Infection)  Goal: Improved Infection Symptoms  Outcome: Progressing     Problem: Anxiety  Goal: Anxiety Reduction or Resolution  Outcome: Progressing     Problem: Chronic Kidney Disease  Goal: Optimal Coping with Chronic Illness  Outcome: Progressing  Goal: Electrolyte Balance  Outcome: Progressing  Goal: Fluid Balance  Outcome: Progressing  Goal: Optimal Functional Ability  Outcome: Progressing  Goal: Absence of Anemia Signs and Symptoms  Outcome: Progressing  Goal: Optimal Oral Intake  Outcome: Progressing  Goal: Acceptable Pain Control  Outcome: Progressing  Goal: Minimize Renal Failure Effects  Outcome: Progressing     Problem: Diabetes Comorbidity  Goal: Blood Glucose Level Within Targeted Range  Outcome: Progressing     Problem: Sepsis/Septic Shock  Goal: Optimal Coping  Outcome: Progressing  Goal: Absence of Bleeding  Outcome: Progressing  Goal: Blood Glucose Level Within Targeted Range  Outcome: Progressing  Goal: Absence of Infection Signs and Symptoms  Outcome: Progressing  Goal: Optimal Nutrition Intake  Outcome: Progressing     Problem: Pneumonia  Goal: Fluid Balance  Outcome: Progressing  Goal: Resolution of Infection Signs and  Symptoms  Outcome: Progressing  Goal: Effective Oxygenation and Ventilation  Outcome: Progressing     Problem: Gas Exchange Impaired  Goal: Optimal Gas Exchange  Outcome: Progressing     Problem: Wound  Goal: Optimal Coping  Outcome: Progressing  Goal: Optimal Functional Ability  Outcome: Progressing  Goal: Absence of Infection Signs and Symptoms  Outcome: Progressing  Goal: Improved Oral Intake  Outcome: Progressing  Goal: Optimal Pain Control and Function  Outcome: Progressing  Goal: Skin Health and Integrity  Outcome: Progressing  Goal: Optimal Wound Healing  Outcome: Progressing     Problem: Airway Clearance Ineffective  Goal: Effective Airway Clearance  Outcome: Progressing     Problem: Suicide Risk  Goal: Absence of Self-Harm  Outcome: Progressing      Statement Selected

## 2025-04-13 NOTE — ASSESSMENT & PLAN NOTE
Asked to see the patient was originally diagnosed with lung cancer in February 23 she has subsequently had brain Mets she is now admitted with inability to eat and shortness of breath.  Her chest x-ray has progressed with mostly interstitial infiltrates right greater than left there is some alveolar component.  She is symptomatic.  The differential diagnosis would include recurrent cancer, bacterial pneumonia, volume overload, an interstitial lung disease which is grade 2-3 by history related to the use of rybrevent and lazcluse.  There is described incident is a 3.1% incidence.  Typically takes a couple of months prior to seeing this.  I have looked and can not find what patho physiology you would find on biopsy but I suspect it would be similar to hypersensitivity pneumonitis.  I would proceed with ruling out infection and malignancy with EBUS/broncho alveolar lavage.  If no evidence of infection deny would consider starting steroids I will ask Dr. Guerra our pulmonary interventionalist to review the case tomorrow

## 2025-04-13 NOTE — SUBJECTIVE & OBJECTIVE
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 Swanson    Melanocytic nevi of lower limb, including hip, left     Mixed hyperlipidemia     Moderate persistent asthma without complication 10/30/2024    Other pulmonary embolism without acute cor pulmonale     Vitamin D deficiency        Past Surgical History:   Procedure Laterality Date    CHOLECYSTECTOMY      CSF SHUNT      HYSTERECTOMY      TONSILLECTOMY         Review of patient's allergies indicates:   Allergen Reactions    Penicillins Hives    Sulfa (sulfonamide antibiotics) Hives       Family History       Problem Relation (Age of Onset)    Diabetes Mother    Hypertension Mother    Lung cancer Father          Tobacco Use    Smoking status: Never    Smokeless tobacco: Never   Substance and Sexual Activity    Alcohol use: Never    Drug use: Never    Sexual activity: Not Currently      Review of Systems  Objective:     Vital Signs (Most Recent):  Temp: 97.5 °F (36.4 °C) (04/13/25 0501)  Pulse: 103 (04/13/25 0745)  Resp: 20 (04/13/25 0745)  BP: 101/60 (04/13/25 0501)  SpO2: 97 % (04/13/25 0745) Vital Signs (24h Range):  Temp:  [96.4 °F (35.8 °C)-98.6 °F (37 °C)] 97.5 °F (36.4 °C)  Pulse:  [] 103  Resp:  [16-20] 20  SpO2:  [95 %-100 %] 97 %  BP: ()/(44-92) 101/60   Weight: (!) 136.1 kg (300 lb)  Body mass index is 49.92 kg/m².      Intake/Output Summary (Last 24 hours) at 4/13/2025 0815  Last data filed at 4/13/2025 0150  Gross per 24 hour   Intake 256 ml    Output 1050 ml   Net -794 ml          Physical Exam  Vitals reviewed.   Constitutional:       Appearance: Normal appearance. She is obese.      Interventions: She is not intubated.  HENT:      Head: Normocephalic and atraumatic.      Nose: Nose normal.      Mouth/Throat:      Mouth: Mucous membranes are dry.      Pharynx: Oropharynx is clear.   Eyes:      Extraocular Movements: Extraocular movements intact.      Conjunctiva/sclera: Conjunctivae normal.      Pupils: Pupils are equal, round, and reactive to light.   Cardiovascular:      Rate and Rhythm: Normal rate.      Heart sounds: Normal heart sounds. No murmur heard.  Pulmonary:      Effort: Pulmonary effort is normal. She is not intubated.      Breath sounds: Rales present.   Abdominal:      General: Abdomen is flat. Bowel sounds are normal.      Palpations: Abdomen is soft.   Musculoskeletal:         General: Normal range of motion.      Cervical back: Normal range of motion and neck supple.      Right lower leg: No edema.      Left lower leg: No edema.   Skin:     General: Skin is warm and dry.      Capillary Refill: Capillary refill takes less than 2 seconds.   Neurological:      General: No focal deficit present.      Mental Status: She is alert and oriented to person, place, and time.   Psychiatric:         Mood and Affect: Mood normal.         Behavior: Behavior normal.            Vents:  Oxygen Concentration (%): 28 (04/12/25 1637)  Lines/Drains/Airways       Drain  Duration                  NG/OG Tube 04/12/25 1620 nasogastric;Other (comments) Right nostril <1 day    Female External Urinary Catheter w/ Suction 04/13/25 0445 <1 day              Peripheral Intravenous Line  Duration                  Peripheral IV - Single Lumen 04/05/25 1430 20 G Other (Comments) Anterior;Left Forearm 7 days                  Significant Labs:    CBC/Anemia Profile:  Recent Labs   Lab 04/12/25  0330 04/13/25  0331   WBC 19.17* 16.84*   HGB 9.2* 8.6*   HCT 28.0* 25.7*     152   MCV 88.3 86.0   RDW 18.1* 17.5*        Chemistries:  Recent Labs   Lab 04/13/25  0331   *   K 3.5      CO2 19*   BUN 40*   CREATININE 1.66*   CALCIUM 8.0*   ALBUMIN 1.4*   PROT 4.4*   BILITOT 0.6   ALKPHOS 164*   ALT 33   AST 31   MG 1.7       Recent Lab Results         04/13/25  0331   04/12/25  2019   04/12/25  1653   04/12/25  1118        TB Skin Test 48 - 72 hr read             TB Induration 48 - 72 hr read             Albumin/Globulin Ratio 0.5             Albumin 1.4                          ALT 33             Anion Gap 13             Aniso 1+             AST 31             Bands 12             Baso # 0.04             Basophil % 0.2             BILIRUBIN TOTAL 0.6             BUN 40             BUN/CREAT RATIO 24             Calcium 8.0             Chloride 102             CO2 19             Creatinine 1.66             Differential Method Manual             eGFR 33  Comment: Estimated GFR calculated using the CKD-EPI creatinine (2021) equation.             Eos # 0.18             Eos % 1.1              1             Globulin, Total 3.0             Glucose 119             Hematocrit 25.7             Hemoglobin 8.6             Immature Grans (Abs) 0.29             Immature Granulocytes 1.7             Lymph # 0.69             Lymph % 4.1              6             Magnesium  1.7             MCH 28.8             MCHC 33.5             MCV 86.0             Mono # 0.59             Mono % 3.5              2             MPV 13.7             Neutrophils, Abs 15.05             Neutrophils Relative 89.4             nRBC 1              1.4             NUCLEATED RBC ABSOLUTE 0.23             PLATELET MORPHOLOGY Large Platelets             Platelet Count 152             POC Glucose   171   152   130       Potassium 3.5             Prealbumin <3             PROTEIN TOTAL 4.4             RBC 2.99             RDW 17.5             Segmented Neutrophils, Man % 79             Sodium 130              Target Cells Few             WBC 16.84                     Significant Imaging: I have reviewed all pertinent imaging results/findings within the past 24 hours.

## 2025-04-13 NOTE — ASSESSMENT & PLAN NOTE
Vitals:    04/12/25 1125 04/12/25 1529 04/12/25 2025 04/13/25 0015   BP: (!) 90/44 (!) 92/55 (!) 117/92 (!) 82/48    04/13/25 0017 04/13/25 0020 04/13/25 0501 04/13/25 0900   BP: (!) 84/47 (!) 88/50 101/60 (!) 90/46    04/13/25 1140 04/13/25 1623   BP: 134/82 (!) 94/53         Monitor BP while on Metoprolol; on IVF; off Losartan

## 2025-04-13 NOTE — ASSESSMENT & PLAN NOTE
"On Reglan and PPI for erosive gastritis; will monitor    EGD by Dr. Lo during recent past admission at [facility]:  "EGD on 03/21/2025 shows LA class B erosive esophagitis but no pill esophagitis, nonerosive gastritis and retention of food and fluid suspicious for gastroparesis, due to narcotics and diabetes. "    04/ 08 try additional meds to help gastric emptying.  04/09 taking in some now orally with recent changes meds  04/10 better and ate half [place] for lunch   "

## 2025-04-13 NOTE — NURSING
BP 84/47.  Manual BP 88/50.  Hospitalist notified via secure chat, received new order for a LR bolus.

## 2025-04-13 NOTE — SUBJECTIVE & OBJECTIVE
Interval History:     Review of Systems   Constitutional:  Negative for appetite change, fatigue and fever.   HENT:  Positive for trouble swallowing. Negative for congestion and hearing loss.    Respiratory:  Positive for shortness of breath. Negative for chest tightness and wheezing.    Cardiovascular:  Negative for chest pain and palpitations.   Gastrointestinal:  Negative for abdominal pain, constipation and nausea.   Genitourinary:  Negative for difficulty urinating and dysuria.   Musculoskeletal:  Positive for gait problem. Negative for back pain and neck stiffness.   Skin:  Negative for pallor and rash.   Neurological:  Negative for dizziness, speech difficulty and headaches.   Psychiatric/Behavioral:  Positive for sleep disturbance. Negative for confusion and suicidal ideas.      Objective:     Vital Signs (Most Recent):  Temp: 97.3 °F (36.3 °C) (04/13/25 1623)  Pulse: 90 (04/13/25 1656)  Resp: 18 (04/13/25 1656)  BP: (!) 94/53 (04/13/25 1623)  SpO2: 96 % (04/13/25 1656) Vital Signs (24h Range):  Temp:  [97.3 °F (36.3 °C)-98.6 °F (37 °C)] 97.3 °F (36.3 °C)  Pulse:  [] 90  Resp:  [16-20] 18  SpO2:  [95 %-97 %] 96 %  BP: ()/(46-92) 94/53     Weight: (!) 136.1 kg (300 lb)  Body mass index is 49.92 kg/m².    Intake/Output Summary (Last 24 hours) at 4/13/2025 1714  Last data filed at 4/13/2025 1241  Gross per 24 hour   Intake 136 ml   Output 1100 ml   Net -964 ml         Physical Exam  Vitals reviewed.   Constitutional:       General: She is awake. She is not in acute distress.     Appearance: She is well-developed. She is morbidly obese. She is not toxic-appearing.   HENT:      Head: Normocephalic.      Nose: Nose normal.      Mouth/Throat:      Pharynx: Oropharynx is clear.   Eyes:      Extraocular Movements: Extraocular movements intact.      Pupils: Pupils are equal, round, and reactive to light.   Neck:      Thyroid: No thyroid mass.      Vascular: No carotid bruit.   Cardiovascular:      Rate and  Rhythm: Normal rate and regular rhythm.      Pulses: Normal pulses.      Heart sounds: Normal heart sounds. No murmur heard.  Pulmonary:      Effort: Pulmonary effort is normal.      Breath sounds: Normal breath sounds and air entry. No wheezing.   Abdominal:      General: Bowel sounds are normal. There is no distension.      Palpations: Abdomen is soft.      Tenderness: There is no abdominal tenderness.   Musculoskeletal:         General: Normal range of motion.      Cervical back: Neck supple. No rigidity.   Skin:     General: Skin is warm.      Coloration: Skin is not jaundiced.      Findings: No lesion.   Neurological:      General: No focal deficit present.      Mental Status: She is alert and oriented to person, place, and time.      Cranial Nerves: No cranial nerve deficit.   Psychiatric:         Attention and Perception: Attention normal.         Mood and Affect: Mood normal.         Behavior: Behavior normal. Behavior is cooperative.         Thought Content: Thought content normal.         Cognition and Memory: Cognition normal.               Significant Labs: All pertinent labs within the past 24 hours have been reviewed.  BMP:   Recent Labs   Lab 04/13/25  0331   *   *   K 3.5      CO2 19*   BUN 40*   CREATININE 1.66*   CALCIUM 8.0*   MG 1.7       CBC:   Recent Labs   Lab 04/12/25  0330 04/13/25  0331   WBC 19.17* 16.84*   HGB 9.2* 8.6*   HCT 28.0* 25.7*    152       CMP:   Recent Labs   Lab 04/13/25  0331   *   K 3.5      CO2 19*   *   BUN 40*   CREATININE 1.66*   CALCIUM 8.0*   PROT 4.4*   ALBUMIN 1.4*   BILITOT 0.6   ALKPHOS 164*   AST 31   ALT 33   ANIONGAP 13         Significant Imaging: I have reviewed all pertinent imaging results/findings within the past 24 hours.      Intake/Output - Last 3 Shifts         04/11 0700  04/12 0659 04/12 0700  04/13 0659 04/13 0700  04/14 0659    P.O.  120     I.V. (mL/kg) 1920.4 (14.1)      NG/GT  136     Total  Intake(mL/kg) 1920.4 (14.1) 256 (1.9)     Urine (mL/kg/hr) 350 (0.1) 1050 (0.3) 650 (0.5)    Other  0     Total Output 350 1050 650    Net +1570.4 -794 -650           Urine Occurrence  1 x           Microbiology Results (last 7 days)       ** No results found for the last 168 hours. **

## 2025-04-13 NOTE — HPI
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  Patient was initially admitted to the hospital on April 4 would dehydration gastroparesis UTI.  She has not been able to eat previously in his now admitted to the hospital with concerns over nutrition since she has been in the hospital she has had shortness of breath and has developed worsening right-sided infiltrates.

## 2025-04-13 NOTE — PLAN OF CARE
Problem: Pneumonia  Goal: Fluid Balance  Outcome: Progressing  Goal: Effective Oxygenation and Ventilation  Outcome: Progressing     Problem: Pneumonia  Goal: Effective Oxygenation and Ventilation  Outcome: Progressing     Problem: Gas Exchange Impaired  Goal: Optimal Gas Exchange  Outcome: Progressing

## 2025-04-14 LAB
ANISOCYTOSIS BLD QL SMEAR: ABNORMAL
BASOPHILS # BLD AUTO: 0.06 K/UL (ref 0–0.2)
BASOPHILS NFR BLD AUTO: 0.3 % (ref 0–1)
CRENATED CELLS: ABNORMAL
DIFFERENTIAL METHOD BLD: ABNORMAL
EOSINOPHIL # BLD AUTO: 0.18 K/UL (ref 0–0.5)
EOSINOPHIL NFR BLD AUTO: 1 % (ref 1–4)
EOSINOPHIL NFR BLD MANUAL: 3 % (ref 1–4)
ERYTHROCYTE [DISTWIDTH] IN BLOOD BY AUTOMATED COUNT: 17.8 % (ref 11.5–14.5)
GLUCOSE SERPL-MCNC: 190 MG/DL (ref 70–105)
GLUCOSE SERPL-MCNC: 192 MG/DL (ref 70–105)
GLUCOSE SERPL-MCNC: 199 MG/DL (ref 70–105)
GLUCOSE SERPL-MCNC: 200 MG/DL (ref 70–105)
GLUCOSE SERPL-MCNC: 212 MG/DL (ref 70–105)
HCT VFR BLD AUTO: 25.4 % (ref 38–47)
HGB BLD-MCNC: 8.4 G/DL (ref 12–16)
IMM GRANULOCYTES # BLD AUTO: 0.38 K/UL (ref 0–0.04)
IMM GRANULOCYTES NFR BLD: 2.2 % (ref 0–0.4)
LYMPHOCYTES # BLD AUTO: 0.61 K/UL (ref 1–4.8)
LYMPHOCYTES NFR BLD AUTO: 3.6 % (ref 27–41)
LYMPHOCYTES NFR BLD MANUAL: 3 % (ref 27–41)
MCH RBC QN AUTO: 29 PG (ref 27–31)
MCHC RBC AUTO-ENTMCNC: 33.1 G/DL (ref 32–36)
MCV RBC AUTO: 87.6 FL (ref 80–96)
METHICILLIN RESISTANT STAPHYLOCOCCUS AUREUS: NEGATIVE
MONOCYTES # BLD AUTO: 0.68 K/UL (ref 0–0.8)
MONOCYTES NFR BLD AUTO: 4 % (ref 2–6)
MONOCYTES NFR BLD MANUAL: 5 % (ref 2–6)
MPC BLD CALC-MCNC: 13.5 FL (ref 9.4–12.4)
NEUTROPHILS # BLD AUTO: 15.25 K/UL (ref 1.8–7.7)
NEUTROPHILS NFR BLD AUTO: 88.9 % (ref 53–65)
NEUTS BAND NFR BLD MANUAL: 13 % (ref 1–5)
NEUTS SEG NFR BLD MANUAL: 76 % (ref 50–62)
NRBC # BLD AUTO: 0.35 X10E3/UL
NRBC, AUTO (.00): 2 %
PLATELET # BLD AUTO: 145 K/UL (ref 150–400)
PLATELET MORPHOLOGY: ABNORMAL
RBC # BLD AUTO: 2.9 M/UL (ref 4.2–5.4)
WBC # BLD AUTO: 17.16 K/UL (ref 4.5–11)

## 2025-04-14 PROCEDURE — 63600175 PHARM REV CODE 636 W HCPCS: Performed by: HOSPITALIST

## 2025-04-14 PROCEDURE — 63600175 PHARM REV CODE 636 W HCPCS: Performed by: FAMILY MEDICINE

## 2025-04-14 PROCEDURE — 97530 THERAPEUTIC ACTIVITIES: CPT | Mod: CO

## 2025-04-14 PROCEDURE — 99900035 HC TECH TIME PER 15 MIN (STAT)

## 2025-04-14 PROCEDURE — 85025 COMPLETE CBC W/AUTO DIFF WBC: CPT | Performed by: HOSPITALIST

## 2025-04-14 PROCEDURE — 94640 AIRWAY INHALATION TREATMENT: CPT

## 2025-04-14 PROCEDURE — 25000003 PHARM REV CODE 250: Performed by: HOSPITALIST

## 2025-04-14 PROCEDURE — 27000221 HC OXYGEN, UP TO 24 HOURS

## 2025-04-14 PROCEDURE — 99233 SBSQ HOSP IP/OBS HIGH 50: CPT | Mod: ,,, | Performed by: STUDENT IN AN ORGANIZED HEALTH CARE EDUCATION/TRAINING PROGRAM

## 2025-04-14 PROCEDURE — 36415 COLL VENOUS BLD VENIPUNCTURE: CPT | Performed by: HOSPITALIST

## 2025-04-14 PROCEDURE — 25000242 PHARM REV CODE 250 ALT 637 W/ HCPCS: Performed by: HOSPITALIST

## 2025-04-14 PROCEDURE — 99233 SBSQ HOSP IP/OBS HIGH 50: CPT | Mod: ,,, | Performed by: HOSPITALIST

## 2025-04-14 PROCEDURE — 97530 THERAPEUTIC ACTIVITIES: CPT

## 2025-04-14 PROCEDURE — 94761 N-INVAS EAR/PLS OXIMETRY MLT: CPT

## 2025-04-14 PROCEDURE — 11000001 HC ACUTE MED/SURG PRIVATE ROOM

## 2025-04-14 PROCEDURE — 97110 THERAPEUTIC EXERCISES: CPT

## 2025-04-14 PROCEDURE — 82962 GLUCOSE BLOOD TEST: CPT

## 2025-04-14 PROCEDURE — 27000207 HC ISOLATION

## 2025-04-14 PROCEDURE — P9047 ALBUMIN (HUMAN), 25%, 50ML: HCPCS | Performed by: HOSPITALIST

## 2025-04-14 RX ORDER — ATORVASTATIN CALCIUM 20 MG/1
20 TABLET, FILM COATED ORAL NIGHTLY
Status: DISCONTINUED | OUTPATIENT
Start: 2025-04-15 | End: 2025-04-26 | Stop reason: HOSPADM

## 2025-04-14 RX ORDER — DEXTROSE MONOHYDRATE, SODIUM CHLORIDE, SODIUM LACTATE, POTASSIUM CHLORIDE, CALCIUM CHLORIDE 5; 600; 310; 179; 20 G/100ML; MG/100ML; MG/100ML; MG/100ML; MG/100ML
INJECTION, SOLUTION INTRAVENOUS CONTINUOUS
Status: DISCONTINUED | OUTPATIENT
Start: 2025-04-14 | End: 2025-04-16

## 2025-04-14 RX ADMIN — MEROPENEM 1 G: 1 INJECTION, POWDER, FOR SOLUTION INTRAVENOUS at 04:04

## 2025-04-14 RX ADMIN — ATORVASTATIN CALCIUM 20 MG: 20 TABLET, FILM COATED ORAL at 09:04

## 2025-04-14 RX ADMIN — ALBUMIN (HUMAN) 12.5 G: 5 SOLUTION INTRAVENOUS at 09:04

## 2025-04-14 RX ADMIN — NYSTATIN 500000 UNITS: 100000 SUSPENSION ORAL at 09:04

## 2025-04-14 RX ADMIN — BUDESONIDE 0.5 MG: 0.5 SUSPENSION RESPIRATORY (INHALATION) at 07:04

## 2025-04-14 RX ADMIN — SERTRALINE HYDROCHLORIDE 100 MG: 50 TABLET ORAL at 09:04

## 2025-04-14 RX ADMIN — NYSTATIN 500000 UNITS: 100000 SUSPENSION ORAL at 12:04

## 2025-04-14 RX ADMIN — IPRATROPIUM BROMIDE AND ALBUTEROL SULFATE 3 ML: 2.5; .5 SOLUTION RESPIRATORY (INHALATION) at 03:04

## 2025-04-14 RX ADMIN — METOCLOPRAMIDE HYDROCHLORIDE 5 MG: 5 TABLET ORAL at 05:04

## 2025-04-14 RX ADMIN — METOCLOPRAMIDE HYDROCHLORIDE 5 MG: 5 TABLET ORAL at 09:04

## 2025-04-14 RX ADMIN — METOCLOPRAMIDE HYDROCHLORIDE 5 MG: 5 TABLET ORAL at 04:04

## 2025-04-14 RX ADMIN — INSULIN ASPART 1 UNITS: 100 INJECTION, SOLUTION INTRAVENOUS; SUBCUTANEOUS at 08:04

## 2025-04-14 RX ADMIN — IPRATROPIUM BROMIDE AND ALBUTEROL SULFATE 3 ML: 2.5; .5 SOLUTION RESPIRATORY (INHALATION) at 07:04

## 2025-04-14 RX ADMIN — METOPROLOL SUCCINATE 25 MG: 25 TABLET, EXTENDED RELEASE ORAL at 09:04

## 2025-04-14 RX ADMIN — MONTELUKAST 10 MG: 10 TABLET, FILM COATED ORAL at 08:04

## 2025-04-14 RX ADMIN — IPRATROPIUM BROMIDE AND ALBUTEROL SULFATE 3 ML: 2.5; .5 SOLUTION RESPIRATORY (INHALATION) at 12:04

## 2025-04-14 RX ADMIN — MEROPENEM 1 G: 1 INJECTION, POWDER, FOR SOLUTION INTRAVENOUS at 05:04

## 2025-04-14 RX ADMIN — NYSTATIN 500000 UNITS: 100000 SUSPENSION ORAL at 04:04

## 2025-04-14 RX ADMIN — INSULIN GLARGINE 25 UNITS: 100 INJECTION, SOLUTION SUBCUTANEOUS at 08:04

## 2025-04-14 RX ADMIN — PANTOPRAZOLE SODIUM 40 MG: 40 TABLET, DELAYED RELEASE ORAL at 09:04

## 2025-04-14 RX ADMIN — METOCLOPRAMIDE HYDROCHLORIDE 5 MG: 5 TABLET ORAL at 08:04

## 2025-04-14 RX ADMIN — DEXTROSE MONOHYDRATE, SODIUM CHLORIDE, SODIUM LACTATE, POTASSIUM CHLORIDE, CALCIUM CHLORIDE: 5; 600; 310; 179; 20 INJECTION, SOLUTION INTRAVENOUS at 04:04

## 2025-04-14 RX ADMIN — FUROSEMIDE 20 MG: 10 INJECTION, SOLUTION INTRAMUSCULAR; INTRAVENOUS at 09:04

## 2025-04-14 RX ADMIN — DEXTROSE MONOHYDRATE: 50 INJECTION, SOLUTION INTRAVENOUS at 05:04

## 2025-04-14 NOTE — PT/OT/SLP PROGRESS
Physical Therapy Treatment    Patient Name:  Magan Saleem   MRN:  [id number]    Recommendations:     Discharge Recommendations: Moderate Intensity Therapy  Discharge Equipment Recommendations: to be determined by next level of care  Barriers to discharge:  ongoing medical care    Assessment:     Magan Saleem is a 69 y.o. female admitted with a medical diagnosis of Sepsis due to pneumonia.  She presents with the following impairments/functional limitations: weakness, impaired endurance, impaired functional mobility, gait instability, impaired balance, decreased lower extremity function, decreased safety awareness, impaired cardiopulmonary response to activity. Pt demo improved tolerance to EOB sitting today, 4mins 15 sec.     Rehab Prognosis: Fair; patient would benefit from acute skilled PT services to address these deficits and reach maximum level of function.    Recent Surgery: * No surgery found *      Plan:     During this hospitalization, patient to be seen 5 x/week to address the identified rehab impairments via gait training, therapeutic activities, therapeutic exercises, neuromuscular re-education and progress toward the following goals:    Plan of Care Expires:  05/04/25    Subjective     Chief Complaint: Sepsis due to pneumonia   Patient/Family Comments/goals: agreeable  Pain/Comfort: not rated          Objective:     Communicated with RN and ROSA prior to session.  Patient found HOB elevated with peripheral IV, PureWick, oxygen, NG tube upon PT entry to room.     General Precautions: Standard, fall, contact  Orthopedic Precautions: N/A  Braces: N/A  Respiratory Status: Nasal cannula, flow 2 L/min     Functional Mobility:  Bed Mobility:     Scooting: maximal assistance and of 2 persons  Supine to Sit: maximal assistance and of 2 persons  Sit to Supine: maximal assistance and of 2 persons  Balance: EOB sitting with max A for 4.25 mins      AM-PAC 6 CLICK MOBILITY          Treatment & Education:  Bilateral  lower extremity exercise x 15 reps: ankle pumps, Quad sets, glut sets, short arc quads, heel slides, and hip abduction/adduction with active assist ROM, verbal cues for sequencing and safety, and tactile cues     Patient left HOB elevated with all lines intact, call button in reach, and nursing notified..    GOALS:   Multidisciplinary Problems       Physical Therapy Goals          Problem: Physical Therapy    Goal Priority Disciplines Outcome Interventions   Physical Therapy Goal     PT, PT/OT Progressing    Description: Short term goals:  1. Supine to sit with MInimal Assistance  2. Sit to stand transfer with Moderate Assistance  3. Bed to chair transfer with Moderate Assistance using Rolling Walker  4. Sitting at edge of bed x15 minutes with Contact Guard Assistance    Long term goals:  1. Supine to sit with Contact Guard Assistance  2. Sit to stand transfer with Contact Guard Assistance  3. Bed to chair transfer with Contact Guard Assistance using Rolling Walker  4. Gait  x 100 feet with Contact Guard Assistance using Rolling Walker.                          DME Justifications:  N/a    Time Tracking:     PT Received On: 04/14/25  PT Start Time: 0944     PT Stop Time: 1030  PT Total Time (min): 46 min     Billable Minutes: Therapeutic Activity 30 and Therapeutic Exercise 14    Treatment Type: Treatment  PT/PTA: PT     Number of PTA visits since last PT visit: 0     04/14/2025

## 2025-04-14 NOTE — RESPIRATORY THERAPY
Treatment given by RT student, Dana Mojica.       04/14/25 1205   Patient Assessment/Suction   Level of Consciousness (AVPU) alert   Respiratory Effort Unlabored   Expansion/Accessory Muscles/Retractions no use of accessory muscles;no retractions   All Lung Fields Breath Sounds Anterior:;Lateral:;equal bilaterally;wheezes, expiratory   Rhythm/Pattern, Respiratory unlabored;depth regular;no shortness of breath reported;tachypneic   PRE-TX-O2   Device (Oxygen Therapy) nasal cannula with humidification   Flow (L/min) (Oxygen Therapy) 2   Oxygen Concentration (%) 28   SpO2 (!) 93 %   Pulse Oximetry Type Intermittent   Pulse 103   Resp (!) 22   Positioning   Body Position position maintained   Head of Bed (HOB) Positioning HOB elevated   Aerosol Therapy   $ Aerosol Therapy Charges Aerosol Treatment   Daily Review of Necessity (SVN) completed   Respiratory Treatment Status (SVN) given   Treatment Route (SVN) oxygen;mask   Patient Position HOB elevated   Post Treatment Assessment (SVN) breath sounds unchanged   Signs of Intolerance (SVN) none   Breath Sounds Post-Respiratory Treatment   Throughout All Fields Post-Treatment All Fields   Throughout All Fields Post-Treatment Anterior:;Lateral:;no change   Post-treatment Heart Rate (beats/min) 102   Post-treatment Resp Rate (breaths/min) 20

## 2025-04-14 NOTE — PLAN OF CARE
Talked to Palomo Cantrell with [facility].  Notified him that patient is not yet ready for dc.  He stated that it's fine, however, he noted that they will need a statement/note from physician regarding oncology treatments-whether patient should/should not be getting them as reportedly she had been seen by Dr. Swanson and is currently not getting treatments.   Notified Palomo that I had notified Dr. Rizvi of this but will also notify hospitalist that will be following patient beginning tomorrow.  Plan is for patient to dc to [facility] in [place].  Will continue to follow for anticipated dc needs.

## 2025-04-14 NOTE — RESPIRATORY THERAPY
Treatment given at 07:45 and 07:52 by RT student, Dana Mojica.       04/14/25 0745   Patient Assessment/Suction   Level of Consciousness (AVPU) alert   Respiratory Effort Unlabored   Expansion/Accessory Muscles/Retractions no use of accessory muscles;no retractions   All Lung Fields Breath Sounds Anterior:;Lateral:;equal bilaterally;wheezes, expiratory   Rhythm/Pattern, Respiratory unlabored;pattern regular;depth regular;no shortness of breath reported   PRE-TX-O2   Device (Oxygen Therapy) nasal cannula with humidification   $ Is the patient on Low Flow Oxygen? Yes   Flow (L/min) (Oxygen Therapy) 2   Oxygen Concentration (%) 28   SpO2 (!) 94 %   Pulse Oximetry Type Intermittent   $ Pulse Oximetry - Multiple Charge Pulse Oximetry - Multiple   Pulse 99   Resp 17   Positioning   Body Position position maintained   Head of Bed (HOB) Positioning HOB elevated   Aerosol Therapy   $ Aerosol Therapy Charges Aerosol Treatment   Daily Review of Necessity (SVN) completed   Respiratory Treatment Status (SVN) given   Treatment Route (SVN) oxygen;mask   Patient Position HOB elevated   Post Treatment Assessment (SVN) breath sounds unchanged   Signs of Intolerance (SVN) none   Breath Sounds Post-Respiratory Treatment   Throughout All Fields Post-Treatment All Fields   Throughout All Fields Post-Treatment Anterior:;Lateral:;no change   Post-treatment Heart Rate (beats/min) 99   Post-treatment Resp Rate (breaths/min) 17   Respiratory Evaluation   $ Care Plan Tech Time 15 min

## 2025-04-14 NOTE — PT/OT/SLP PROGRESS
Occupational Therapy   Treatment    Name: Magan Saleem  MRN: [id number]  Admitting Diagnosis:  Sepsis due to pneumonia       Recommendations:     Discharge Recommendations: Low Intensity Therapy  Discharge Equipment Recommendations:  to be determined by next level of care  Barriers to discharge:       Assessment:     Magan Saleem is a 69 y.o. female with a medical diagnosis of Sepsis due to pneumonia.. Performance deficits affecting function are weakness, impaired endurance, impaired self care skills, impaired cardiopulmonary response to activity, impaired functional mobility, edema.     Rehab Prognosis:  Good; patient would benefit from acute skilled OT services to address these deficits and reach maximum level of function.       Plan:     Patient to be seen 5 x/week to address the above listed problems via self-care/home management, therapeutic activities, therapeutic exercises  Plan of Care Expires:    Plan of Care Reviewed with: patient    Subjective     Chief Complaint:   Patient/Family Comments/goals:   Pain/Comfort:  Pain Rating 1: 0/10    Objective:     Communicated with: Mary Hall RN prior to session.  Patient found HOB elevated with PureWick, peripheral IV upon OT entry to room.    General Precautions: Standard, fall, contact    Orthopedic Precautions:N/A  Braces: N/A  Respiratory Status: Nasal cannula, flow 2 L/min     Occupational Performance:     Bed Mobility:    Supine to sit max ax  2  Sit to supine max x 3  Moving up to hob max a 3       Functional Mobility/Transfers:    Functional Mobility:     Activities of Daily Living:  Sat eob 4 mins 15 secs max a x 2      AMPAC 6 Click ADL:      Treatment & Education:  Pt performed aarom with 1 lb wt 15 reps x 2 B shld flex,abd/add,elbow flex/ext,sup/pron, wrist flex/ext ,hand helper with 2 rubber band resistances 20 reps x 2  Patient left HOB elevated with all lines intact, call button in reach, and PT Juju Sanchez  present    GOALS:    Multidisciplinary Problems       Occupational Therapy Goals          Problem: Occupational Therapy    Goal Priority Disciplines Outcome Interventions   Occupational Therapy Goal     OT, PT/OT Progressing    Description: STG:  Pt will perform grooming with setup  Pt will bathe with Min A  Pt will perform UE dressing with Min A  Pt will perform LE dressing with Mod A  Pt will sit EOB x 10 min with CG assistance  Pt will transfer bed/chair/bsc with Mod A  Pt will perform standing task x 3 min with CG assistance  Pt will tolerate 30 minutes of tx without fatigue      LT.Restore to max I with self care and mobility.                         DME Justifications:      Time Tracking:     OT Date of Treatment: 25  OT Start Time: 931  OT Stop Time: 1018  OT Total Time (min): 47 min    Billable Minutes:Therapeutic Activity 45    OT/SAROJ: SAROJ     Number of SAROJ visits since last OT visit: 2025

## 2025-04-14 NOTE — CARE UPDATE
Treatment given by RT student, Dana Mojica.       04/14/25 1510   Patient Assessment/Suction   Level of Consciousness (AVPU) alert   Respiratory Effort Unlabored   Expansion/Accessory Muscles/Retractions no use of accessory muscles;no retractions   All Lung Fields Breath Sounds Anterior:;Lateral:;equal bilaterally;wheezes, expiratory   Rhythm/Pattern, Respiratory unlabored;pattern regular;depth regular;no shortness of breath reported   PRE-TX-O2   Device (Oxygen Therapy) nasal cannula with humidification   Flow (L/min) (Oxygen Therapy) 2   Oxygen Concentration (%) 28   SpO2 97 %   Pulse Oximetry Type Intermittent   Pulse 108   Resp 19   Positioning   Body Position position maintained   Head of Bed (HOB) Positioning HOB elevated   Aerosol Therapy   $ Aerosol Therapy Charges Aerosol Treatment   Daily Review of Necessity (SVN) completed   Respiratory Treatment Status (SVN) given   Treatment Route (SVN) oxygen;mask   Patient Position HOB elevated   Post Treatment Assessment (SVN) breath sounds unchanged   Signs of Intolerance (SVN) none   Breath Sounds Post-Respiratory Treatment   Throughout All Fields Post-Treatment All Fields   Throughout All Fields Post-Treatment Anterior:;Lateral:;no change   Post-treatment Heart Rate (beats/min) 107   Post-treatment Resp Rate (breaths/min) 19

## 2025-04-15 PROBLEM — D64.9 ANEMIA: Status: ACTIVE | Noted: 2025-04-15

## 2025-04-15 PROBLEM — R53.0 NEOPLASTIC (MALIGNANT) RELATED FATIGUE: Status: ACTIVE | Noted: 2025-04-15

## 2025-04-15 PROBLEM — E87.1 HYPONATREMIA: Status: ACTIVE | Noted: 2025-04-15

## 2025-04-15 LAB
AFB SMEAR (FA): NORMAL
ALBUMIN SERPL BCP-MCNC: 1.9 G/DL (ref 3.4–4.8)
ALP SERPL-CCNC: 211 U/L (ref 40–150)
ALT SERPL W P-5'-P-CCNC: 27 U/L
ANION GAP SERPL CALCULATED.3IONS-SCNC: 13 MMOL/L (ref 7–16)
AST SERPL W P-5'-P-CCNC: 31 U/L (ref 11–45)
BILIRUB DIRECT SERPL-MCNC: 0.3 MG/DL
BILIRUB SERPL-MCNC: 0.7 MG/DL
BUN SERPL-MCNC: 44 MG/DL (ref 10–20)
BUN/CREAT SERPL: 21 (ref 6–20)
CALCIUM SERPL-MCNC: 8.3 MG/DL (ref 8.4–10.2)
CHLORIDE SERPL-SCNC: 101 MMOL/L (ref 98–107)
CLARITY FLD: ABNORMAL
CO2 SERPL-SCNC: 19 MMOL/L (ref 23–31)
COLOR FLD: COLORLESS
CREAT SERPL-MCNC: 2.09 MG/DL (ref 0.55–1.02)
DIRECT PREP: NORMAL
EGFR (NO RACE VARIABLE) (RUSH/TITUS): 25 ML/MIN/1.73M2
GLUCOSE SERPL-MCNC: 110 MG/DL (ref 70–105)
GLUCOSE SERPL-MCNC: 113 MG/DL (ref 70–105)
GLUCOSE SERPL-MCNC: 124 MG/DL (ref 70–105)
GLUCOSE SERPL-MCNC: 142 MG/DL (ref 82–115)
GLUCOSE SERPL-MCNC: 155 MG/DL (ref 70–105)
GLUCOSE SERPL-MCNC: 219 MG/DL (ref 70–105)
GRAM STN SPEC: NORMAL
GRANULOCYTES NFR FLD MANUAL: 96 % (ref 0–25)
LYMPHOCYTES NFR FLD MANUAL: 3 %
MACROPHAGES NFR FLD MANUAL: 1 %
POTASSIUM SERPL-SCNC: 3.9 MMOL/L (ref 3.5–5.1)
PROT SERPL-MCNC: 4.6 G/DL (ref 5.8–7.6)
RBC # FLD MANUAL: <3000 /CUMM
SODIUM SERPL-SCNC: 129 MMOL/L (ref 136–145)
WBC # FLD MANUAL: 133 /CUMM

## 2025-04-15 PROCEDURE — 88112 CYTOPATH CELL ENHANCE TECH: CPT | Mod: TC,MCY | Performed by: INTERNAL MEDICINE

## 2025-04-15 PROCEDURE — 87210 SMEAR WET MOUNT SALINE/INK: CPT | Performed by: INTERNAL MEDICINE

## 2025-04-15 PROCEDURE — 80048 BASIC METABOLIC PNL TOTAL CA: CPT | Performed by: HOSPITALIST

## 2025-04-15 PROCEDURE — 63600175 PHARM REV CODE 636 W HCPCS: Performed by: HOSPITALIST

## 2025-04-15 PROCEDURE — 25000242 PHARM REV CODE 250 ALT 637 W/ HCPCS: Performed by: HOSPITALIST

## 2025-04-15 PROCEDURE — 25000003 PHARM REV CODE 250: Performed by: HOSPITALIST

## 2025-04-15 PROCEDURE — 36415 COLL VENOUS BLD VENIPUNCTURE: CPT | Performed by: HOSPITALIST

## 2025-04-15 PROCEDURE — 31624 DX BRONCHOSCOPE/LAVAGE: CPT | Mod: ,,, | Performed by: INTERNAL MEDICINE

## 2025-04-15 PROCEDURE — 0B9K8ZX DRAINAGE OF RIGHT LUNG, VIA NATURAL OR ARTIFICIAL OPENING ENDOSCOPIC, DIAGNOSTIC: ICD-10-PCS | Performed by: INTERNAL MEDICINE

## 2025-04-15 PROCEDURE — 63600175 PHARM REV CODE 636 W HCPCS: Performed by: INTERNAL MEDICINE

## 2025-04-15 PROCEDURE — 87077 CULTURE AEROBIC IDENTIFY: CPT | Performed by: INTERNAL MEDICINE

## 2025-04-15 PROCEDURE — 63600175 PHARM REV CODE 636 W HCPCS: Performed by: FAMILY MEDICINE

## 2025-04-15 PROCEDURE — 11000001 HC ACUTE MED/SURG PRIVATE ROOM

## 2025-04-15 PROCEDURE — 80076 HEPATIC FUNCTION PANEL: CPT | Performed by: HOSPITALIST

## 2025-04-15 PROCEDURE — 87206 SMEAR FLUORESCENT/ACID STAI: CPT | Performed by: INTERNAL MEDICINE

## 2025-04-15 PROCEDURE — 27000221 HC OXYGEN, UP TO 24 HOURS

## 2025-04-15 PROCEDURE — 94640 AIRWAY INHALATION TREATMENT: CPT

## 2025-04-15 PROCEDURE — 87102 FUNGUS ISOLATION CULTURE: CPT | Performed by: INTERNAL MEDICINE

## 2025-04-15 PROCEDURE — 96372 THER/PROPH/DIAG INJ SC/IM: CPT

## 2025-04-15 PROCEDURE — 99231 SBSQ HOSP IP/OBS SF/LOW 25: CPT | Mod: 25,,, | Performed by: INTERNAL MEDICINE

## 2025-04-15 PROCEDURE — 87205 SMEAR GRAM STAIN: CPT | Performed by: INTERNAL MEDICINE

## 2025-04-15 PROCEDURE — 27000207 HC ISOLATION

## 2025-04-15 PROCEDURE — 87116 MYCOBACTERIA CULTURE: CPT | Performed by: INTERNAL MEDICINE

## 2025-04-15 PROCEDURE — 94761 N-INVAS EAR/PLS OXIMETRY MLT: CPT

## 2025-04-15 PROCEDURE — 99900035 HC TECH TIME PER 15 MIN (STAT)

## 2025-04-15 PROCEDURE — 99233 SBSQ HOSP IP/OBS HIGH 50: CPT | Mod: ,,, | Performed by: STUDENT IN AN ORGANIZED HEALTH CARE EDUCATION/TRAINING PROGRAM

## 2025-04-15 PROCEDURE — 25000003 PHARM REV CODE 250: Performed by: INTERNAL MEDICINE

## 2025-04-15 PROCEDURE — 82962 GLUCOSE BLOOD TEST: CPT

## 2025-04-15 PROCEDURE — 89050 BODY FLUID CELL COUNT: CPT | Performed by: INTERNAL MEDICINE

## 2025-04-15 RX ORDER — TOPICAL ANESTHETIC 200 MG/ML
SPRAY DENTAL; PERIODONTAL
Status: COMPLETED | OUTPATIENT
Start: 2025-04-15 | End: 2025-04-15

## 2025-04-15 RX ORDER — MEPERIDINE HYDROCHLORIDE 25 MG/ML
INJECTION INTRAMUSCULAR; INTRAVENOUS; SUBCUTANEOUS
Status: COMPLETED | OUTPATIENT
Start: 2025-04-15 | End: 2025-04-15

## 2025-04-15 RX ORDER — MIDAZOLAM HYDROCHLORIDE 2 MG/2ML
INJECTION, SOLUTION INTRAMUSCULAR; INTRAVENOUS
Status: COMPLETED | OUTPATIENT
Start: 2025-04-15 | End: 2025-04-15

## 2025-04-15 RX ADMIN — METOCLOPRAMIDE HYDROCHLORIDE 5 MG: 5 TABLET ORAL at 12:04

## 2025-04-15 RX ADMIN — MEROPENEM 1 G: 1 INJECTION, POWDER, FOR SOLUTION INTRAVENOUS at 03:04

## 2025-04-15 RX ADMIN — INSULIN ASPART 2 UNITS: 100 INJECTION, SOLUTION INTRAVENOUS; SUBCUTANEOUS at 09:04

## 2025-04-15 RX ADMIN — METOCLOPRAMIDE HYDROCHLORIDE 5 MG: 5 TABLET ORAL at 09:04

## 2025-04-15 RX ADMIN — SERTRALINE HYDROCHLORIDE 100 MG: 50 TABLET ORAL at 10:04

## 2025-04-15 RX ADMIN — MEPERIDINE HYDROCHLORIDE 25 MG: 25 INJECTION INTRAMUSCULAR; INTRAVENOUS; SUBCUTANEOUS at 09:04

## 2025-04-15 RX ADMIN — MIDAZOLAM HYDROCHLORIDE 1 MG: 1 INJECTION, SOLUTION INTRAMUSCULAR; INTRAVENOUS at 09:04

## 2025-04-15 RX ADMIN — IPRATROPIUM BROMIDE AND ALBUTEROL SULFATE 3 ML: 2.5; .5 SOLUTION RESPIRATORY (INHALATION) at 03:04

## 2025-04-15 RX ADMIN — IPRATROPIUM BROMIDE AND ALBUTEROL SULFATE 3 ML: 2.5; .5 SOLUTION RESPIRATORY (INHALATION) at 11:04

## 2025-04-15 RX ADMIN — IPRATROPIUM BROMIDE AND ALBUTEROL SULFATE 3 ML: 2.5; .5 SOLUTION RESPIRATORY (INHALATION) at 07:04

## 2025-04-15 RX ADMIN — INSULIN GLARGINE 25 UNITS: 100 INJECTION, SOLUTION SUBCUTANEOUS at 09:04

## 2025-04-15 RX ADMIN — BUDESONIDE 0.5 MG: 0.5 SUSPENSION RESPIRATORY (INHALATION) at 07:04

## 2025-04-15 RX ADMIN — METOCLOPRAMIDE HYDROCHLORIDE 5 MG: 5 TABLET ORAL at 06:04

## 2025-04-15 RX ADMIN — MEROPENEM 1 G: 1 INJECTION, POWDER, FOR SOLUTION INTRAVENOUS at 04:04

## 2025-04-15 RX ADMIN — TOPICAL ANESTHETIC 1 EACH: 200 SPRAY DENTAL; PERIODONTAL at 09:04

## 2025-04-15 RX ADMIN — DEXTROSE MONOHYDRATE, SODIUM CHLORIDE, SODIUM LACTATE, POTASSIUM CHLORIDE, CALCIUM CHLORIDE: 5; 600; 310; 179; 20 INJECTION, SOLUTION INTRAVENOUS at 07:04

## 2025-04-15 RX ADMIN — PANTOPRAZOLE SODIUM 40 MG: 40 TABLET, DELAYED RELEASE ORAL at 10:04

## 2025-04-15 RX ADMIN — ATORVASTATIN CALCIUM 20 MG: 20 TABLET, FILM COATED ORAL at 09:04

## 2025-04-15 RX ADMIN — MONTELUKAST 10 MG: 10 TABLET, FILM COATED ORAL at 09:04

## 2025-04-15 RX ADMIN — METOCLOPRAMIDE HYDROCHLORIDE 5 MG: 5 TABLET ORAL at 03:04

## 2025-04-15 NOTE — ASSESSMENT & PLAN NOTE
Vitals:    04/15/25 1000 04/15/25 1004 04/15/25 1008 04/15/25 1010   BP: (!) 91/54 (!) 82/52 (!) 90/53 94/63    04/15/25 1014 04/15/25 1016 04/15/25 1018 04/15/25 1020   BP: (!) 90/49 105/62 (!) 114/50 (!) 97/55    04/15/25 1049 04/15/25 1104   BP: 114/64 96/64         Monitor BP while on Metoprolol; on IVF; off Losartan

## 2025-04-15 NOTE — PROGRESS NOTES
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  Patient was initially admitted to the hospital on April 4 would dehydration gastroparesis UTI.  She has not been able to eat previously in his now admitted to the hospital with concerns over nutrition since she has been in the hospital she has had shortness of breath and has developed worsening right-sided infiltrates.  Patient shortness of breath    Hospital/ICU Course:  4/14/25-asked to see by General Pulmonary for evaluation of possible EBUS bronchoscopy.  Patient with prior EBUS bronchoscopy performed 10/21/24 with evidence of malignancy present in station 4 L and station 4R.  Recent cessation of her EGFR TKI.    Interval History/Significant Events:  Refer to hospital course    Review of Systems  Objective:     Vital Signs (Most Recent):  Temp: 97.3 °F (36.3 °C) (04/14/25 1942)  Pulse: 83 (04/14/25 1944)  Resp: 20 (04/14/25 1933)  BP: (!) 103/51 (04/14/25 1944)  SpO2: 98 % (04/14/25 1944) Vital Signs (24h Range):  Temp:  [97.2 °F (36.2 °C)-98.4 °F (36.9 °C)] 97.3 °F (36.3 °C)  Pulse:  [] 83  Resp:  [17-22] 20  SpO2:  [92 %-99 %] 98 %  BP: ()/() 103/51   Weight: (!) 136.1 kg (300 lb)  Body mass index is 49.92 kg/m².      Intake/Output Summary (Last 24 hours) at 4/14/2025 2022  Last data filed at 4/14/2025 1651  Gross per 24 hour   Intake 574 ml   Output 500 ml   Net 74 ml          Physical Exam  Vitals reviewed.   Constitutional:       General: She is awake.  She is not in acute distress.     Appearance: She is well-developed. She is morbidly obese. She is not toxic-appearing.   HENT:      Head: Normocephalic.      Nose: Nose normal.      Mouth/Throat:      Pharynx: Oropharynx is clear.   Eyes:      Extraocular Movements: Extraocular movements intact.      Pupils: Pupils are equal, round, and reactive to light.   Neck:      Thyroid: No thyroid mass.      Vascular: No carotid bruit.   Cardiovascular:      Rate and Rhythm: Normal rate and regular rhythm.      Pulses: Normal pulses.      Heart sounds: Normal heart sounds. No murmur heard.  Pulmonary:      Effort: Pulmonary effort is normal.      Breath sounds: Normal air entry. Rhonchi present. No wheezing.   Abdominal:      General: Bowel sounds are normal. There is no distension.      Palpations: Abdomen is soft.      Tenderness: There is no abdominal tenderness.   Musculoskeletal:         General: Normal range of motion.      Cervical back: Neck supple. No rigidity.   Skin:     General: Skin is warm.      Coloration: Skin is not jaundiced.      Findings: No lesion.   Neurological:      General: No focal deficit present.      Mental Status: She is alert and oriented to person, place, and time.      Cranial Nerves: No cranial nerve deficit.   Psychiatric:         Attention and Perception: Attention normal.         Mood and Affect: Mood normal.         Behavior: Behavior normal. Behavior is cooperative.         Thought Content: Thought content normal.         Cognition and Memory: Cognition normal.            Vents:  Oxygen Concentration (%): 28 (04/14/25 1510)  Lines/Drains/Airways       Drain  Duration                  NG/OG Tube 04/12/25 1620 nasogastric;Other (comments) Right nostril 2 days    Female External Urinary Catheter w/ Suction 04/13/25 0445 1 day              Peripheral Intravenous Line  Duration                  Peripheral IV - Single Lumen 04/05/25 1430 20 G Other (Comments) Anterior;Left Forearm 9 days                   Significant Labs:    CBC/Anemia Profile:  Recent Labs   Lab 04/13/25  0331 04/14/25  0504   WBC 16.84* 17.16*   HGB 8.6* 8.4*   HCT 25.7* 25.4*    145*   MCV 86.0 87.6   RDW 17.5* 17.8*        Chemistries:  Recent Labs   Lab 04/13/25  0331   *   K 3.5      CO2 19*   BUN 40*   CREATININE 1.66*   CALCIUM 8.0*   ALBUMIN 1.4*   PROT 4.4*   BILITOT 0.6   ALKPHOS 164*   ALT 33   AST 31   MG 1.7       All pertinent labs within the past 24 hours have been reviewed.    Significant Imaging:  I have reviewed all pertinent imaging results/findings within the past 24 hours.    ABG  Recent Labs   Lab 04/08/25  0621   PH 7.51*   PO2 66*   PCO2 29*   HCO3 23.1     Assessment/Plan:     Pulmonary  Abnormal chest x-ray  Asked to evaluate the patient by Dr. Villasenor today.  I reviewed the patient's imaging which included a chest x-ray from 4/12/25 which showed right upper lobe consolidation.  At this time, an EBUS bronchoscopy may not benefit the patient significantly.  However, bronchoalveolar lavage may help rule out an infection (given the consolidative changes seen in bilateral lungs) prior to considering high-dose systemic corticosteroids for possible EGFR TKI toxicity (pneumonitis).      I have discussed in detail with the patient regarding the risks and benefits of performing a bronchoalveolar lavage under moderate sedation and at this time she is on amenable to proceeding with the bronchoalveolar lavage will be be performed by Dr. Villasenor in the a.m. tomorrow.  NPO orders placed for after midnight.    In addition, I have placed an order referred CT chest to be performed without contrast to help evaluate the parenchymal space (her prior CT chest was performed on 3/21/25 which had noted progression of metastatic disease including in the right paratracheal and lower lung zones).  I also briefly discussed the case with the patient's primary general pulmonologist Dr. York who agrees with proceeding with a  bronchoalveolar lavage and CT chest without contrast.    Dr. Villasenor to continue to follow from Pulmonary Service 4/15/25 onwards.     Colten Guerra MD  Critical Care Medicine  [facility]

## 2025-04-15 NOTE — ASSESSMENT & PLAN NOTE
"On Reglan and PPI for erosive gastritis; will monitor    EGD by Dr. Lo during recent past admission at [facility]:  "EGD on 03/21/2025 shows LA class B erosive esophagitis but no pill esophagitis, nonerosive gastritis and retention of food and fluid suspicious for gastroparesis, due to narcotics and diabetes. "    04/ 08 try additional meds to help gastric emptying.  04/09 taking in some now orally with recent changes meds  04/10 better and ate half [place] for lunch   04/14 tolerating enteral feedings so far  "

## 2025-04-15 NOTE — ASSESSMENT & PLAN NOTE
Asked to evaluate the patient by Dr. Villasenor today.  I reviewed the patient's imaging which included a chest x-ray from 4/12/25 which showed right upper lobe consolidation.  At this time, an EBUS bronchoscopy may not benefit the patient significantly.  However, bronchoalveolar lavage may help rule out an infection (given the consolidative changes seen in bilateral lungs) prior to considering high-dose systemic corticosteroids for possible EGFR TKI toxicity (pneumonitis).      I have discussed in detail with the patient regarding the risks and benefits of performing a bronchoalveolar lavage under moderate sedation and at this time she is on amenable to proceeding with the bronchoalveolar lavage will be be performed by Dr. Villasenor in the a.m. tomorrow.  NPO orders placed for after midnight.    In addition, I have placed an order referred CT chest to be performed without contrast to help evaluate the parenchymal space (her prior CT chest was performed on 3/21/25 which had noted progression of metastatic disease including in the right paratracheal and lower lung zones).  I also briefly discussed the case with the patient's primary general pulmonologist Dr. York who agrees with proceeding with a bronchoalveolar lavage and CT chest without contrast.

## 2025-04-15 NOTE — ASSESSMENT & PLAN NOTE
Vitals:    04/13/25 2019 04/13/25 2021 04/14/25 0025 04/14/25 0538   BP: (!) 91/54 98/60 (!) 112/55 96/63    04/14/25 0724 04/14/25 1154 04/14/25 1639 04/14/25 1641   BP: 121/82 105/75 (!) 84/58 127/79    04/14/25 1942 04/14/25 1944   BP: (!) 144/104 (!) 103/51         Monitor BP while on Metoprolol; on IVF; off Losartan

## 2025-04-15 NOTE — SUBJECTIVE & OBJECTIVE
Interval History:     Review of Systems   Constitutional:  Negative for appetite change, fatigue and fever.   HENT:  Positive for trouble swallowing. Negative for congestion and hearing loss.    Respiratory:  Positive for shortness of breath. Negative for chest tightness and wheezing.    Cardiovascular:  Negative for chest pain and palpitations.   Gastrointestinal:  Negative for abdominal pain, constipation and nausea.   Genitourinary:  Negative for difficulty urinating and dysuria.   Musculoskeletal:  Positive for gait problem. Negative for back pain and neck stiffness.   Skin:  Negative for pallor and rash.   Neurological:  Negative for dizziness, speech difficulty and headaches.   Psychiatric/Behavioral:  Positive for sleep disturbance. Negative for confusion and suicidal ideas.      Objective:     Vital Signs (Most Recent):  Temp: 97.3 °F (36.3 °C) (04/14/25 1942)  Pulse: 83 (04/14/25 1944)  Resp: 20 (04/14/25 1933)  BP: (!) 103/51 (04/14/25 1944)  SpO2: 98 % (04/14/25 1944) Vital Signs (24h Range):  Temp:  [97.2 °F (36.2 °C)-98.4 °F (36.9 °C)] 97.3 °F (36.3 °C)  Pulse:  [] 83  Resp:  [17-22] 20  SpO2:  [92 %-99 %] 98 %  BP: ()/() 103/51     Weight: (!) 136.1 kg (300 lb)  Body mass index is 49.92 kg/m².    Intake/Output Summary (Last 24 hours) at 4/14/2025 2134  Last data filed at 4/14/2025 1651  Gross per 24 hour   Intake 574 ml   Output 500 ml   Net 74 ml         Physical Exam  Vitals reviewed.   Constitutional:       General: She is awake. She is not in acute distress.     Appearance: She is well-developed. She is morbidly obese. She is not toxic-appearing.   HENT:      Head: Normocephalic.      Nose: Nose normal.      Mouth/Throat:      Pharynx: Oropharynx is clear.   Eyes:      Extraocular Movements: Extraocular movements intact.      Pupils: Pupils are equal, round, and reactive to light.   Neck:      Thyroid: No thyroid mass.      Vascular: No carotid bruit.   Cardiovascular:      Rate  and Rhythm: Normal rate and regular rhythm.      Pulses: Normal pulses.      Heart sounds: Normal heart sounds. No murmur heard.  Pulmonary:      Effort: Pulmonary effort is normal.      Breath sounds: Normal breath sounds and air entry. No wheezing.   Abdominal:      General: Bowel sounds are normal. There is no distension.      Palpations: Abdomen is soft.      Tenderness: There is no abdominal tenderness.   Musculoskeletal:         General: Normal range of motion.      Cervical back: Neck supple. No rigidity.   Skin:     General: Skin is warm.      Coloration: Skin is not jaundiced.      Findings: No lesion.   Neurological:      General: No focal deficit present.      Mental Status: She is alert and oriented to person, place, and time.      Cranial Nerves: No cranial nerve deficit.   Psychiatric:         Attention and Perception: Attention normal.         Mood and Affect: Mood normal.         Behavior: Behavior normal. Behavior is cooperative.         Thought Content: Thought content normal.         Cognition and Memory: Cognition normal.               Significant Labs: All pertinent labs within the past 24 hours have been reviewed.  BMP:   Recent Labs   Lab 04/13/25  0331   *   *   K 3.5      CO2 19*   BUN 40*   CREATININE 1.66*   CALCIUM 8.0*   MG 1.7       CBC:   Recent Labs   Lab 04/13/25  0331 04/14/25  0504   WBC 16.84* 17.16*   HGB 8.6* 8.4*   HCT 25.7* 25.4*    145*       CMP:   Recent Labs   Lab 04/13/25  0331   *   K 3.5      CO2 19*   *   BUN 40*   CREATININE 1.66*   CALCIUM 8.0*   PROT 4.4*   ALBUMIN 1.4*   BILITOT 0.6   ALKPHOS 164*   AST 31   ALT 33   ANIONGAP 13         Significant Imaging: I have reviewed all pertinent imaging results/findings within the past 24 hours.      Intake/Output - Last 3 Shifts         04/13 0700  04/14 0659 04/14 0700  04/15 0659    P.O.      NG/     Total Intake(mL/kg) 604 (4.4)     Urine (mL/kg/hr) 650 (0.2) 500 (0.3)     Other      Total Output 650 500    Net -46 -500                Microbiology Results (last 7 days)       ** No results found for the last 168 hours. **

## 2025-04-15 NOTE — SUBJECTIVE & OBJECTIVE
Interval History/Significant Events:  Refer to hospital course    Review of Systems  Objective:     Vital Signs (Most Recent):  Temp: 97.3 °F (36.3 °C) (04/14/25 1942)  Pulse: 83 (04/14/25 1944)  Resp: 20 (04/14/25 1933)  BP: (!) 103/51 (04/14/25 1944)  SpO2: 98 % (04/14/25 1944) Vital Signs (24h Range):  Temp:  [97.2 °F (36.2 °C)-98.4 °F (36.9 °C)] 97.3 °F (36.3 °C)  Pulse:  [] 83  Resp:  [17-22] 20  SpO2:  [92 %-99 %] 98 %  BP: ()/() 103/51   Weight: (!) 136.1 kg (300 lb)  Body mass index is 49.92 kg/m².      Intake/Output Summary (Last 24 hours) at 4/14/2025 2022  Last data filed at 4/14/2025 1651  Gross per 24 hour   Intake 574 ml   Output 500 ml   Net 74 ml          Physical Exam  Vitals reviewed.   Constitutional:       General: She is awake. She is not in acute distress.     Appearance: She is well-developed. She is morbidly obese. She is not toxic-appearing.   HENT:      Head: Normocephalic.      Nose: Nose normal.      Mouth/Throat:      Pharynx: Oropharynx is clear.   Eyes:      Extraocular Movements: Extraocular movements intact.      Pupils: Pupils are equal, round, and reactive to light.   Neck:      Thyroid: No thyroid mass.      Vascular: No carotid bruit.   Cardiovascular:      Rate and Rhythm: Normal rate and regular rhythm.      Pulses: Normal pulses.      Heart sounds: Normal heart sounds. No murmur heard.  Pulmonary:      Effort: Pulmonary effort is normal.      Breath sounds: Normal air entry. Rhonchi present. No wheezing.   Abdominal:      General: Bowel sounds are normal. There is no distension.      Palpations: Abdomen is soft.      Tenderness: There is no abdominal tenderness.   Musculoskeletal:         General: Normal range of motion.      Cervical back: Neck supple. No rigidity.   Skin:     General: Skin is warm.      Coloration: Skin is not jaundiced.      Findings: No lesion.   Neurological:      General: No focal deficit present.      Mental Status: She is alert and  oriented to person, place, and time.      Cranial Nerves: No cranial nerve deficit.   Psychiatric:         Attention and Perception: Attention normal.         Mood and Affect: Mood normal.         Behavior: Behavior normal. Behavior is cooperative.         Thought Content: Thought content normal.         Cognition and Memory: Cognition normal.            Vents:  Oxygen Concentration (%): 28 (04/14/25 1510)  Lines/Drains/Airways       Drain  Duration                  NG/OG Tube 04/12/25 1620 nasogastric;Other (comments) Right nostril 2 days    Female External Urinary Catheter w/ Suction 04/13/25 0445 1 day              Peripheral Intravenous Line  Duration                  Peripheral IV - Single Lumen 04/05/25 1430 20 G Other (Comments) Anterior;Left Forearm 9 days                  Significant Labs:    CBC/Anemia Profile:  Recent Labs   Lab 04/13/25  0331 04/14/25  0504   WBC 16.84* 17.16*   HGB 8.6* 8.4*   HCT 25.7* 25.4*    145*   MCV 86.0 87.6   RDW 17.5* 17.8*        Chemistries:  Recent Labs   Lab 04/13/25  0331   *   K 3.5      CO2 19*   BUN 40*   CREATININE 1.66*   CALCIUM 8.0*   ALBUMIN 1.4*   PROT 4.4*   BILITOT 0.6   ALKPHOS 164*   ALT 33   AST 31   MG 1.7       All pertinent labs within the past 24 hours have been reviewed.    Significant Imaging:  I have reviewed all pertinent imaging results/findings within the past 24 hours.

## 2025-04-15 NOTE — HOSPITAL COURSE
4/14/25-asked to see by General Pulmonary for evaluation of possible EBUS bronchoscopy.  Patient with prior EBUS bronchoscopy performed 10/21/24 with evidence of malignancy present in station 4 L and station 4R.  Recent cessation of her EGFR TKI.    Pulmonary re-engaged for lower respiratory infection and concern for pneumonitis.

## 2025-04-15 NOTE — PROGRESS NOTES
[facility]  [place] [facility]  Progress Note    Patient Name: Magan Saleem  MRN: [id number]  Patient Class: IP- Inpatient   Admission Date: 4/3/2025  Length of Stay: 11 days  Attending Physician: Andriy Rizvi MD  Primary Care Provider: Sil Brown DO        Subjective     Principal Problem:Sepsis due to pneumonia        HPI:  68 yo F presents to [place] ED from [facility] for abnormal labs.  Patient was discharged from [facility] two days ago to skilled nursing facility for rehab.  She was diagnosed with dehydration due to gastroparesis with UTI.  Patient has metastatic lung cancer (to the brain) and follows with Dr. Swanson for IV chemotherapy every other week and takes lazertinib daily.  She has completed her course of radiation for the brain mets and follow had showed some improvement.  I thought she had either some decreased LOC due to encephalopathy or some aphasia from the brain mets but after a great effort to get her to talk, I realized she was just mad.  She wanted to know why she had been discharged two days ago when she could not eat.  She has no appetite and early satiety.  She is not nauseated and no vomiting.  She has decided on a DNR status previously (her caretaker and friend of 20 years) states that she had always said she did not want resuscitation if she experienced cardiopulmonary arrest and had told her children her wishes but she does want treatment and is not opposed to J tube if needed.  She has not had anything to eat or drink in two days and is dehydrated again.      Patient was transferred because of concern of sepsis.  She did have enterococcus faecalis UTI at [facility] and was treated.  I do not have the culture results but cultures were sent and patient does have some yeast noted.  (Will not treat a yeast colonization).  She is afebrile and hemodynamically stable and does not look septic clinically.  Her Lactic acid is stable at 2.5 dara 3.2.  Will check  another in am after volume resuscitation.  Her creat is at baseline but she has prerenal azotemia further confirming the dehydration.  Patient has an IO in place from [facility] due to dehydration and difficult stick but with some volume resuscitation, we have gotten an IV.  She is covid and flu negative.      Her WBC is 29 and I am not sure if she has received neupogen but could be a stress reaction.  Her BS is 245 and she does have some urine ketones but most likely due to starvation ketosis.  Will check a serum ketone.  Her platelets are 88K but this is most likely from her chemo.  She is not anemic.  CXR shows some patchy infiltrate but no obvious obstructive pneumonia from her lung cancer.  Her BNP about 3K but no clinical signs of CHF.  No recent echo but due to her BMI 50 most likely has some PHTN.  EKG had no ischemic changes but tachy at 114 which could also be from dehydration.  She was on ozempic for her DM and this could be the cause of her decreased appetite.  She is also on decadron for prevention of cerebral edema.      Remainder of ROS as below.  She mostly just nods and shakes her head and rolls her eyes.  You can get her to laugh if you try but she has been depressed since she has not walked since her hospitalization at [facility] on 3/19/25 and was discharged two days ago.  She says that at her last chemo she noticed she was getting weaker and her daughter had to help her in and out of the car and that was new for her.      See assessment and plan below for problem based evaluation       Overview/Hospital Course:  69 year old female presents with hypernatremia; she is not too talkative during rounds    4/5- patient seen examined today resting comfortably in bed and in no acute distress, with no acute events overnight.  Patient has a multitude of issues complicating her medical picture.  White blood cell count 26605 today, sodium 159, potassium 3.2 and BUN creatinine 59 and 1.8.  The patient is still not eating  even when assistance is provided.  We have already changed her fluids to half-normal saline with 20 of KCl at 1:25 a.m..  Have also put in a GI consult for possible feeding tube.  E coli in the urine has turned out to be ESBL and Rocephin has been changed Merrem.    4/6- the patient seen examined today resting comfortably in bed, in no acute distress, in no acute events overnight.  The patient is not very talkative today, but states she is doing okay.  She has no acute complaints.  Blood cultures remain negative.  The patient has not eaten since yesterday and is on light IV fluids.  GI has been consulted for feeding tube.  Continues on Merrem for positive urine culture.    04/07 Records reviewed. Not eating which not new, Similar during recent admit at [facility]. Dr Swanson there had question pancreatitis. No abd pain or tenderness reported now. Question of dysphagia to solids. Chart hx gastroparesis. Will discuss with GI. Ronnie says has responded so far well to treatment for lung CA.   04/08 had EGD at [facility] 03/21/25 with no obstruction and evidence gastroparesis. Still not ending. Try additional meds. Talked with GI. Increase activity  04/09 Some better with med  changes  04/10 Continues to do better. Ate 1/2 fish sandwich for lunch. Called by Dr Swanson and she wanting to keep feeding tube in consideration. Talked with Dr Reich and Dr Lemus. If something needed with gastroparesis would have to have post gastric position of tube.   04/11 eating some. Later staff requested some nystatin for sore mouth.   04/12 still not eating well.  Increased peripheral edema.  Albumin low at 1.5.  Will give some albumin and follow with some Lasix.  Placed Dobbhoff tube and start enteral feedings.  This will help with nutrition and if issue of placing surgical tube later make sure will tolerate enteral feedings.  Chest x-ray continues worse on left side.  Discuss with Pulmonary and ask Dr. Villasenor to see.   04/13 no new issues.   Enteral feedings to be started.  Pulmonary evaluation appreciated and plans noted.  04/14 Tolerating feedings Therapy working with her. Bronchoscopy planned.     Interval History:     Review of Systems   Constitutional:  Negative for appetite change, fatigue and fever.   HENT:  Positive for trouble swallowing. Negative for congestion and hearing loss.    Respiratory:  Positive for shortness of breath. Negative for chest tightness and wheezing.    Cardiovascular:  Negative for chest pain and palpitations.   Gastrointestinal:  Negative for abdominal pain, constipation and nausea.   Genitourinary:  Negative for difficulty urinating and dysuria.   Musculoskeletal:  Positive for gait problem. Negative for back pain and neck stiffness.   Skin:  Negative for pallor and rash.   Neurological:  Negative for dizziness, speech difficulty and headaches.   Psychiatric/Behavioral:  Positive for sleep disturbance. Negative for confusion and suicidal ideas.      Objective:     Vital Signs (Most Recent):  Temp: 97.3 °F (36.3 °C) (04/14/25 1942)  Pulse: 83 (04/14/25 1944)  Resp: 20 (04/14/25 1933)  BP: (!) 103/51 (04/14/25 1944)  SpO2: 98 % (04/14/25 1944) Vital Signs (24h Range):  Temp:  [97.2 °F (36.2 °C)-98.4 °F (36.9 °C)] 97.3 °F (36.3 °C)  Pulse:  [] 83  Resp:  [17-22] 20  SpO2:  [92 %-99 %] 98 %  BP: ()/() 103/51     Weight: (!) 136.1 kg (300 lb)  Body mass index is 49.92 kg/m².    Intake/Output Summary (Last 24 hours) at 4/14/2025 2134  Last data filed at 4/14/2025 1651  Gross per 24 hour   Intake 574 ml   Output 500 ml   Net 74 ml         Physical Exam  Vitals reviewed.   Constitutional:       General: She is awake. She is not in acute distress.     Appearance: She is well-developed. She is morbidly obese. She is not toxic-appearing.   HENT:      Head: Normocephalic.      Nose: Nose normal.      Mouth/Throat:      Pharynx: Oropharynx is clear.   Eyes:      Extraocular Movements: Extraocular movements intact.       Pupils: Pupils are equal, round, and reactive to light.   Neck:      Thyroid: No thyroid mass.      Vascular: No carotid bruit.   Cardiovascular:      Rate and Rhythm: Normal rate and regular rhythm.      Pulses: Normal pulses.      Heart sounds: Normal heart sounds. No murmur heard.  Pulmonary:      Effort: Pulmonary effort is normal.      Breath sounds: Normal breath sounds and air entry. No wheezing.   Abdominal:      General: Bowel sounds are normal. There is no distension.      Palpations: Abdomen is soft.      Tenderness: There is no abdominal tenderness.   Musculoskeletal:         General: Normal range of motion.      Cervical back: Neck supple. No rigidity.   Skin:     General: Skin is warm.      Coloration: Skin is not jaundiced.      Findings: No lesion.   Neurological:      General: No focal deficit present.      Mental Status: She is alert and oriented to person, place, and time.      Cranial Nerves: No cranial nerve deficit.   Psychiatric:         Attention and Perception: Attention normal.         Mood and Affect: Mood normal.         Behavior: Behavior normal. Behavior is cooperative.         Thought Content: Thought content normal.         Cognition and Memory: Cognition normal.               Significant Labs: All pertinent labs within the past 24 hours have been reviewed.  BMP:   Recent Labs   Lab 04/13/25  0331   *   *   K 3.5      CO2 19*   BUN 40*   CREATININE 1.66*   CALCIUM 8.0*   MG 1.7       CBC:   Recent Labs   Lab 04/13/25  0331 04/14/25  0504   WBC 16.84* 17.16*   HGB 8.6* 8.4*   HCT 25.7* 25.4*    145*       CMP:   Recent Labs   Lab 04/13/25  0331   *   K 3.5      CO2 19*   *   BUN 40*   CREATININE 1.66*   CALCIUM 8.0*   PROT 4.4*   ALBUMIN 1.4*   BILITOT 0.6   ALKPHOS 164*   AST 31   ALT 33   ANIONGAP 13         Significant Imaging: I have reviewed all pertinent imaging results/findings within the past 24 hours.      Intake/Output - Last  3 Shifts         04/13 0700  04/14 0659 04/14 0700  04/15 0659    P.O.      NG/     Total Intake(mL/kg) 604 (4.4)     Urine (mL/kg/hr) 650 (0.2) 500 (0.3)    Other      Total Output 650 500    Net -46 -500                Microbiology Results (last 7 days)       ** No results found for the last 168 hours. **                Assessment & Plan  Sepsis due to pneumonia  Appears to have UTI and pneumonia; has leukocytosis; has opacities on CXR; has G- bacilli on urine culture; blood cultures pending; will place on Vancomycin and Rocephin; BP on the low side; on IVF    04/12 worsening chest x-ray, ask Pulmonary to review  Hypernatremia  Due to dehydration; off diuretics; on IVF; will monitor Na levels  Lung cancer metastatic to brain  DNR but not hospice.  Receives chemo and has finished radiation.    04/07 reported stable / improved after treatment  04/09 more fuffy right side CXR  04/12 continue to abnormality on chest x-ray and ask Pulmonary to review    Thrombocytopenia  On chemo; will monitor    Essential hypertension  Vitals:    04/13/25 2019 04/13/25 2021 04/14/25 0025 04/14/25 0538   BP: (!) 91/54 98/60 (!) 112/55 96/63    04/14/25 0724 04/14/25 1154 04/14/25 1639 04/14/25 1641   BP: 121/82 105/75 (!) 84/58 127/79    04/14/25 1942 04/14/25 1944   BP: (!) 144/104 (!) 103/51         Monitor BP while on Metoprolol; on IVF; off Losartan    Moderate persistent asthma without complication  Continue home inhaled steroid/bronchodilator and singulair    Chronic kidney disease, stage 3b  Creatine stable   Chronic pulmonary embolism without acute cor pulmonale  Eliquis was discontinued due to risk of ICH with brain mets but they have improved so the anti-coagulation has been restarted; will monitor  04/07 apparently this of several years ago    Type 2 diabetes mellitus with hyperglycemia  BS elevated; will increase Lantus to 25 units; continue SSI;monitor BS     Gastroparesis  On Reglan and PPI for erosive gastritis;  "will monitor    EGD by Dr. Lo during recent past admission at [facility]:  "EGD on 03/21/2025 shows LA class B erosive esophagitis but no pill esophagitis, nonerosive gastritis and retention of food and fluid suspicious for gastroparesis, due to narcotics and diabetes. "    04/ 08 try additional meds to help gastric emptying.  04/09 taking in some now orally with recent changes meds  04/10 better and ate half [place] for lunch   04/14 tolerating enteral feedings so far  Morbid obesity  Body mass index is 49.92 kg/m². Morbid obesity complicates all aspects of disease management from diagnostic modalities to treatment. Weight loss encouraged and health benefits explained to patient.     Would benefit from sleep study and possible CPAP.  Does not wear oxygen at home.      Edema due to hypoalbuminemia    04/12 add IV albumin and some Lasix  Start enteral feedings  04/13 start enteral feedings  Abnormal chest x-ray    04/13 addressed by Pulmonary with further investigation plans  VTE Risk Mitigation (From admission, onward)      None            Discharge Planning   MITUL:      Code Status: DNR   Medical Readiness for Discharge Date:   Discharge Plan A: Home with family                Please place Justification for DME        Andriy Rizvi MD  Department of Hospital Medicine   [facility]    "

## 2025-04-15 NOTE — PLAN OF CARE
Notified Dr. Lagos that patient has been accepted to [facility] when medically ready.  Noted that [facility] needs an oncology statement regarding treatment.  Patient not quite ready for dc as she has an NG tube.  Will continue to follow for dc needs.

## 2025-04-15 NOTE — PROGRESS NOTES
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  Patient was initially admitted to the hospital on April 4 would dehydration gastroparesis UTI.  She has not been able to eat previously in his now admitted to the hospital with concerns over nutrition since she has been in the hospital she has had shortness of breath and has developed worsening right-sided infiltrates.  Patient shortness of breath    Hospital/ICU Course:  4/14/25-asked to see by General Pulmonary for evaluation of possible EBUS bronchoscopy.  Patient with prior EBUS bronchoscopy performed 10/21/24 with evidence of malignancy present in station 4 L and station 4R.  Recent cessation of her EGFR TKI.    Interval History/Significant Events:  Patient without complaints    Review of Systems  Objective:     Vital Signs (Most Recent):  Temp: 97.9 °F (36.6 °C) (04/15/25 0357)  Pulse: 97 (04/15/25 0357)  Resp: 20 (04/14/25 1933)  BP: (!) 109/50 (04/15/25 0357)  SpO2: 100 % (04/15/25 0357) Vital Signs (24h Range):  Temp:  [97.3 °F (36.3 °C)-98.4 °F (36.9 °C)] 97.9 °F (36.6 °C)  Pulse:  [] 97  Resp:  [17-22] 20  SpO2:  [93 %-100 %] 100 %  BP: ()/() 109/50   Weight: (!) 136.1 kg (300 lb)  Body mass index is 49.92 kg/m².      Intake/Output Summary (Last 24 hours) at 4/15/2025 0564  Last data filed at 4/15/2025 0523  Gross per 24 hour   Intake 1158.47 ml   Output 500 ml   Net 658.47 ml          Physical Exam  Vitals reviewed.   Constitutional:        Appearance: Normal appearance.      Interventions: She is not intubated.  HENT:      Head: Normocephalic and atraumatic.      Nose: Nose normal.      Mouth/Throat:      Mouth: Mucous membranes are dry.      Pharynx: Oropharynx is clear.   Eyes:      Extraocular Movements: Extraocular movements intact.      Conjunctiva/sclera: Conjunctivae normal.      Pupils: Pupils are equal, round, and reactive to light.   Cardiovascular:      Rate and Rhythm: Normal rate.      Heart sounds: Normal heart sounds. No murmur heard.  Pulmonary:      Effort: Pulmonary effort is normal. She is not intubated.      Breath sounds: Normal breath sounds.   Abdominal:      General: Abdomen is flat. Bowel sounds are normal.      Palpations: Abdomen is soft.   Musculoskeletal:         General: Normal range of motion.      Cervical back: Normal range of motion and neck supple.      Right lower leg: No edema.      Left lower leg: No edema.   Skin:     General: Skin is warm and dry.      Capillary Refill: Capillary refill takes less than 2 seconds.   Neurological:      General: No focal deficit present.      Mental Status: She is alert and oriented to person, place, and time.   Psychiatric:         Mood and Affect: Mood normal.         Behavior: Behavior normal.            Vents:  Oxygen Concentration (%): 28 (04/14/25 1933)  Lines/Drains/Airways       Drain  Duration                  NG/OG Tube 04/12/25 1620 nasogastric;Other (comments) Right nostril 2 days    Female External Urinary Catheter w/ Suction 04/13/25 0445 2 days              Peripheral Intravenous Line  Duration                  Peripheral IV - Single Lumen 04/05/25 1430 20 G Other (Comments) Anterior;Left Forearm 9 days                  Significant Labs:    CBC/Anemia Profile:  Recent Labs   Lab 04/14/25  0504   WBC 17.16*   HGB 8.4*   HCT 25.4*   *   MCV 87.6   RDW 17.8*        Chemistries:  Recent Labs   Lab 04/15/25  0315   *   K 3.9      CO2 19*   BUN 44*    CREATININE 2.09*   CALCIUM 8.3*   ALBUMIN 1.9*   PROT 4.6*   BILITOT 0.7   ALKPHOS 211*   ALT 27   AST 31       Recent Lab Results  (Last 5 results in the past 24 hours)        04/15/25  0315   04/14/25 2027 04/14/25  1945   04/14/25  1639   04/14/25  1152        Albumin 1.9                              ALT 27               Anion Gap 13               AST 31               Bilirubin Direct 0.3               BILIRUBIN TOTAL 0.7               BUN 44               BUN/CREAT RATIO 21               Calcium 8.3               Chloride 101               CO2 19               Creatinine 2.09               eGFR 25  Comment: Estimated GFR calculated using the CKD-EPI creatinine (2021) equation.               Glucose 142               POC Glucose   199   200   190   192       Potassium 3.9               PROTEIN TOTAL 4.6               Sodium 129                                      Significant Imaging:  I have reviewed all pertinent imaging results/findings within the past 24 hours.    ABG  Recent Labs   Lab 04/08/25  0621   PH 7.51*   PO2 66*   PCO2 29*   HCO3 23.1     Assessment/Plan:     Pulmonary  Abnormal chest x-ray  Will proceed with broncho alveolar lavage today patient seems comfortable this morning             Todd Villasenor MD  Critical Care Medicine  [facility]

## 2025-04-15 NOTE — ASSESSMENT & PLAN NOTE
Hyponatremia is likely due to renal insufficiency. The patient's most recent sodium results are listed below.  Recent Labs     04/13/25  0331 04/15/25  0315   * 129*     Plan  - Correct the sodium by 4-6mEq in 24 hours.   - Obtain the following studies:  In a.  - Will treat the hyponatremia with continue LR  - Monitor sodium Daily.   - Patient hyponatremia is stable  - follow

## 2025-04-15 NOTE — PROGRESS NOTES
Attempted to see pt @ this time, pt not in room, pt downstairs for procedure. Will attempt to see pt @ later time.

## 2025-04-15 NOTE — ASSESSMENT & PLAN NOTE
"On Reglan and PPI for erosive gastritis; will monitor    EGD by Dr. Lo during recent past admission at [facility]:  "EGD on 03/21/2025 shows LA class B erosive esophagitis but no pill esophagitis, nonerosive gastritis and retention of food and fluid suspicious for gastroparesis, due to narcotics and diabetes. "    04/ 08 try additional meds to help gastric emptying.  04/09 taking in some now orally with recent changes meds  04/10 better and ate half [place] for lunch   04/14 tolerating enteral feedings so far  4/15 we will get PEG tube if patient is agreeable.  Daughter is agreeable.  Discussed with her  "

## 2025-04-15 NOTE — SUBJECTIVE & OBJECTIVE
Interval History/Significant Events:  Patient without complaints    Review of Systems  Objective:     Vital Signs (Most Recent):  Temp: 97.9 °F (36.6 °C) (04/15/25 0357)  Pulse: 97 (04/15/25 0357)  Resp: 20 (04/14/25 1933)  BP: (!) 109/50 (04/15/25 0357)  SpO2: 100 % (04/15/25 0357) Vital Signs (24h Range):  Temp:  [97.3 °F (36.3 °C)-98.4 °F (36.9 °C)] 97.9 °F (36.6 °C)  Pulse:  [] 97  Resp:  [17-22] 20  SpO2:  [93 %-100 %] 100 %  BP: ()/() 109/50   Weight: (!) 136.1 kg (300 lb)  Body mass index is 49.92 kg/m².      Intake/Output Summary (Last 24 hours) at 4/15/2025 0541  Last data filed at 4/15/2025 0523  Gross per 24 hour   Intake 1158.47 ml   Output 500 ml   Net 658.47 ml          Physical Exam  Vitals reviewed.   Constitutional:       Appearance: Normal appearance.      Interventions: She is not intubated.  HENT:      Head: Normocephalic and atraumatic.      Nose: Nose normal.      Mouth/Throat:      Mouth: Mucous membranes are dry.      Pharynx: Oropharynx is clear.   Eyes:      Extraocular Movements: Extraocular movements intact.      Conjunctiva/sclera: Conjunctivae normal.      Pupils: Pupils are equal, round, and reactive to light.   Cardiovascular:      Rate and Rhythm: Normal rate.      Heart sounds: Normal heart sounds. No murmur heard.  Pulmonary:      Effort: Pulmonary effort is normal. She is not intubated.      Breath sounds: Normal breath sounds.   Abdominal:      General: Abdomen is flat. Bowel sounds are normal.      Palpations: Abdomen is soft.   Musculoskeletal:         General: Normal range of motion.      Cervical back: Normal range of motion and neck supple.      Right lower leg: No edema.      Left lower leg: No edema.   Skin:     General: Skin is warm and dry.      Capillary Refill: Capillary refill takes less than 2 seconds.   Neurological:      General: No focal deficit present.      Mental Status: She is alert and oriented to person, place, and time.   Psychiatric:          Mood and Affect: Mood normal.         Behavior: Behavior normal.            Vents:  Oxygen Concentration (%): 28 (04/14/25 1933)  Lines/Drains/Airways       Drain  Duration                  NG/OG Tube 04/12/25 1620 nasogastric;Other (comments) Right nostril 2 days    Female External Urinary Catheter w/ Suction 04/13/25 0445 2 days              Peripheral Intravenous Line  Duration                  Peripheral IV - Single Lumen 04/05/25 1430 20 G Other (Comments) Anterior;Left Forearm 9 days                  Significant Labs:    CBC/Anemia Profile:  Recent Labs   Lab 04/14/25  0504   WBC 17.16*   HGB 8.4*   HCT 25.4*   *   MCV 87.6   RDW 17.8*        Chemistries:  Recent Labs   Lab 04/15/25  0315   *   K 3.9      CO2 19*   BUN 44*   CREATININE 2.09*   CALCIUM 8.3*   ALBUMIN 1.9*   PROT 4.6*   BILITOT 0.7   ALKPHOS 211*   ALT 27   AST 31       Recent Lab Results  (Last 5 results in the past 24 hours)        04/15/25  0315   04/14/25  2027 04/14/25  1945   04/14/25  1639   04/14/25  1152        Albumin 1.9                              ALT 27               Anion Gap 13               AST 31               Bilirubin Direct 0.3               BILIRUBIN TOTAL 0.7               BUN 44               BUN/CREAT RATIO 21               Calcium 8.3               Chloride 101               CO2 19               Creatinine 2.09               eGFR 25  Comment: Estimated GFR calculated using the CKD-EPI creatinine (2021) equation.               Glucose 142               POC Glucose   199   200   190   192       Potassium 3.9               PROTEIN TOTAL 4.6               Sodium 129                                      Significant Imaging:  I have reviewed all pertinent imaging results/findings within the past 24 hours.

## 2025-04-15 NOTE — ASSESSMENT & PLAN NOTE
Appears to have UTI and pneumonia; has leukocytosis; has opacities on CXR; has G- bacilli on urine culture; blood cultures pending; will place on Vancomycin and Rocephin; BP on the low side; on IVF    04/12 worsening chest x-ray, ask Pulmonary to review  4/15 plan for BAL today.  Continue antibiotic

## 2025-04-15 NOTE — ASSESSMENT & PLAN NOTE
DNR but not hospice.  Receives chemo and has finished radiation.    04/07 reported stable / improved after treatment  04/09 more fuffy right side CXR  04/12 continue to abnormality on chest x-ray and ask Pulmonary to review  4/15 follows with Dr. Swanson.  No more chemotherapy treatments until patient is stronger.  Discussed with Dr. Swanson

## 2025-04-15 NOTE — ASSESSMENT & PLAN NOTE
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 Most recent hemoglobin and hematocrit are listed below.  Recent Labs     04/13/25  0331 04/14/25  0504   HGB 8.6* 8.4*   HCT 25.7* 25.4*     Plan  - Monitor serial CBC: Daily  - Transfuse PRBC if patient becomes hemodynamically unstable, symptomatic or H/H drops below 7/21.  - Patient has not received any PRBC transfusions to date  - Patient's anemia is currently stable  - follow

## 2025-04-15 NOTE — SUBJECTIVE & OBJECTIVE
Interval History:  No acute events overnight      Objective:     Vital Signs (Most Recent):  Temp: 97.5 °F (36.4 °C) (04/15/25 1049)  Pulse: 95 (04/15/25 1144)  Resp: 16 (04/15/25 1144)  BP: 96/64 (04/15/25 1104)  SpO2: 97 % (04/15/25 1144) Vital Signs (24h Range):  Temp:  [97.3 °F (36.3 °C)-98.3 °F (36.8 °C)] 97.5 °F (36.4 °C)  Pulse:  [] 95  Resp:  [16-33] 16  SpO2:  [90 %-100 %] 97 %  BP: ()/() 96/64     Weight: (!) 136.1 kg (300 lb)  Body mass index is 49.92 kg/m².    Intake/Output Summary (Last 24 hours) at 4/15/2025 1359  Last data filed at 4/15/2025 1114  Gross per 24 hour   Intake 584.47 ml   Output 500 ml   Net 84.47 ml         Physical Exam  Vitals reviewed.   Constitutional:       General: She is awake. She is not in acute distress.     Appearance: She is well-developed. She is morbidly obese. She is not toxic-appearing.   HENT:      Head: Normocephalic.      Nose: Nose normal.      Mouth/Throat:      Pharynx: Oropharynx is clear.   Eyes:      Extraocular Movements: Extraocular movements intact.      Pupils: Pupils are equal, round, and reactive to light.   Neck:      Thyroid: No thyroid mass.      Vascular: No carotid bruit.   Cardiovascular:      Rate and Rhythm: Normal rate and regular rhythm.      Pulses: Normal pulses.      Heart sounds: Normal heart sounds. No murmur heard.  Pulmonary:      Effort: Pulmonary effort is normal.      Breath sounds: Normal breath sounds and air entry. No wheezing.   Abdominal:      General: Bowel sounds are normal. There is no distension.      Palpations: Abdomen is soft.      Tenderness: There is no abdominal tenderness.   Musculoskeletal:         General: Normal range of motion.      Cervical back: Neck supple. No rigidity.   Skin:     General: Skin is warm.      Coloration: Skin is not jaundiced.      Findings: No lesion.   Neurological:      General: No focal deficit present.      Mental Status: She is alert and oriented to person, place, and time.       Cranial Nerves: No cranial nerve deficit.   Psychiatric:         Attention and Perception: Attention normal.         Mood and Affect: Mood normal.         Behavior: Behavior normal. Behavior is cooperative.         Thought Content: Thought content normal.         Cognition and Memory: Cognition normal.               Significant Labs: All pertinent labs within the past 24 hours have been reviewed.  BMP:   Recent Labs   Lab 04/15/25  0315   *   *   K 3.9      CO2 19*   BUN 44*   CREATININE 2.09*   CALCIUM 8.3*       CBC:   Recent Labs   Lab 04/14/25  0504   WBC 17.16*   HGB 8.4*   HCT 25.4*   *       CMP:   Recent Labs   Lab 04/15/25  0315   *   K 3.9      CO2 19*   *   BUN 44*   CREATININE 2.09*   CALCIUM 8.3*   PROT 4.6*   ALBUMIN 1.9*   BILITOT 0.7   ALKPHOS 211*   AST 31   ALT 27   ANIONGAP 13         Significant Imaging: I have reviewed all pertinent imaging results/findings within the past 24 hours.      Intake/Output - Last 3 Shifts         04/13 0700 04/14 0659 04/14 0700  04/15 0659 04/15 0700 04/16 0659    P.O.       I.V. (mL/kg)  584.5 (4.3)     NG/      Total Intake(mL/kg) 604 (4.4) 584.5 (4.3)     Urine (mL/kg/hr) 650 (0.2) 500 (0.2) 200 (0.2)    Other       Total Output 650 500 200    Net -46 +84.5 -200                 Microbiology Results (last 7 days)       Procedure Component Value Units Date/Time    Gram Stain [[id number]] Collected: 04/15/25 0943    Order Status: Completed Specimen: Respiratory from BAL Updated: 04/15/25 1249     Gram Stain Result Moderate WBC observed      Few WBC observed      Rare Gram positive cocci    Direct Prep (Other) Fungus [[id number]] Collected: 04/15/25 0943    Order Status: Completed Specimen: Respiratory from BAL Updated: 04/15/25 1249     Direct Prep No fungal elements seen    Culture, Fungus (Other) [[id number]] Collected: 04/15/25 0943    Order Status: Resulted Specimen: Respiratory from BAL Updated: 04/15/25  1123    Culture, Lower Respiratory [[id number]] Collected: 04/15/25 0943    Order Status: Resulted Specimen: Respiratory from BAL Updated: 04/15/25 1122    Culture, AFB [[id number]] Collected: 04/15/25 0943    Order Status: Sent Specimen: Respiratory from BAL Updated: 04/15/25 1110    AFB Smear Only [[id number]] Collected: 04/15/25 0943    Order Status: Sent Specimen: Respiratory from BAL Updated: 04/15/25 1110

## 2025-04-15 NOTE — DISCHARGE INSTRUCTIONS
Procedure Date  4/15/25     Impression  Overall Impression:   All observed locations appeared normal, including the larynx, left vocal cord, right vocal cord, upper trachea, middle trachea, lower trachea, main juan, left main stem, FRANCIS, lingula, LLL, right main stem, RUL, bronchus intermedius, RML and RLL.        Recommendation  Await pathology results

## 2025-04-15 NOTE — PT/OT/SLP PROGRESS
Occupational Therapy      Patient Name:  Magan Saleem   MRN:  [id number]    Patient not seen today secondary to Off the floor for procedure/surgery, Patient fatigue (OT attempted tx this AM; pt gone for bronchoscopy. OT attempted tx this PM; pt reports too fatigued following testing.). Will follow-up 4/16/25.    4/15/2025

## 2025-04-15 NOTE — PROGRESS NOTES
[facility]  [facility] Medicine  Progress Note    Patient Name: Magan Saleem  MRN: [id number]  Patient Class: IP- Inpatient   Admission Date: 4/3/2025  Length of Stay: 12 days  Attending Physician: Wally Lagos DO  Primary Care Provider: Sil Brown DO        Subjective     Principal Problem:Sepsis due to pneumonia        HPI:  68 yo F presents to [facility] ED from [facility] for abnormal labs.  [facility] to skilled nursing facility for rehab.  She was diagnosed with dehydration due to gastroparesis with UTI.  Patient has metastatic lung cancer (to the brain) and follows with Dr. Swanson for IV chemotherapy every other week and takes lazertinib daily.  She has completed her course of radiation for the brain mets and follow had showed some improvement.  I thought she had either some decreased LOC due to encephalopathy or some aphasia from the brain mets but after a great effort to get her to talk, I realized she was just mad.  She wanted to know why she had been discharged two days ago when she could not eat.  She has no appetite and early satiety.  She is not nauseated and no vomiting.  She has decided on a DNR status previously (her caretaker and friend of 20 years) states that she had always said she did not want resuscitation if she experienced cardiopulmonary arrest and had told her children her wishes but she does want treatment and is not opposed to J tube if needed.  She has not had anything to eat or drink in two days and is dehydrated again.      Patient was transferred because of concern of sepsis.  She did have enterococcus faecalis UTI at [facility] and was treated.  I do not have the culture results but cultures were sent and patient does have some yeast noted.  (Will not treat a yeast colonization).  She is afebrile and hemodynamically stable and does not look septic clinically.  Her Lactic acid is stable at 2.5 dara 3.2.  Will check  another in am after volume resuscitation.  Her creat is at baseline but she has prerenal azotemia further confirming the dehydration.  Patient has an IO in place from [facility] due to dehydration and difficult stick but with some volume resuscitation, we have gotten an IV.  She is covid and flu negative.      Her WBC is 29 and I am not sure if she has received neupogen but could be a stress reaction.  Her BS is 245 and she does have some urine ketones but most likely due to starvation ketosis.  Will check a serum ketone.  Her platelets are 88K but this is most likely from her chemo.  She is not anemic.  CXR shows some patchy infiltrate but no obvious obstructive pneumonia from her lung cancer.  Her BNP about 3K but no clinical signs of CHF.  No recent echo but due to her BMI 50 most likely has some PHTN.  EKG had no ischemic changes but tachy at 114 which could also be from dehydration.  She was on ozempic for her DM and this could be the cause of her decreased appetite.  She is also on decadron for prevention of cerebral edema.      Remainder of ROS as below.  She mostly just nods and shakes her head and rolls her eyes.  You can get her to laugh if you try but she has been depressed since she has not walked since her hospitalization at [facility] on 3/19/25 and was discharged two days ago.  She says that at her last chemo she noticed she was getting weaker and her daughter had to help her in and out of the car and that was new for her.      See assessment and plan below for problem based evaluation       Overview/Hospital Course:  69 year old female presents with hypernatremia; she is not too talkative during rounds    4/5- patient seen examined today resting comfortably in bed and in no acute distress, with no acute events overnight.  Patient has a multitude of issues complicating her medical picture.  White blood cell count 90418 today, sodium 159, potassium 3.2 and BUN creatinine 59 and 1.8.  The patient is still not eating  even when assistance is provided.  We have already changed her fluids to half-normal saline with 20 of KCl at 1:25 a.m..  Have also put in a GI consult for possible feeding tube.  E coli in the urine has turned out to be ESBL and Rocephin has been changed Merrem.    4/6- the patient seen examined today resting comfortably in bed, in no acute distress, in no acute events overnight.  The patient is not very talkative today, but states she is doing okay.  She has no acute complaints.  Blood cultures remain negative.  The patient has not eaten since yesterday and is on light IV fluids.  GI has been consulted for feeding tube.  Continues on Merrem for positive urine culture.    04/07 Records reviewed. Not eating which not new, Similar during recent admit at [facility]. Dr Swanson there had question pancreatitis. No abd pain or tenderness reported now. Question of dysphagia to solids. Chart hx gastroparesis. Will discuss with GI. Ronnie says has responded so far well to treatment for lung CA.   04/08 had EGD at [facility] 03/21/25 with no obstruction and evidence gastroparesis. Still not ending. Try additional meds. Talked with GI. Increase activity  04/09 Some better with med  changes  04/10 Continues to do better. Ate 1/2 fish sandwich for lunch. Called by Dr Swanson and she wanting to keep feeding tube in consideration. Talked with Dr Reich and Dr Lemus. If something needed with gastroparesis would have to have post gastric position of tube.   04/11 eating some. Later staff requested some nystatin for sore mouth.   04/12 still not eating well.  Increased peripheral edema.  Albumin low at 1.5.  Will give some albumin and follow with some Lasix.  Placed Dobbhoff tube and start enteral feedings.  This will help with nutrition and if issue of placing surgical tube later make sure will tolerate enteral feedings.  Chest x-ray continues worse on left side.  Discuss with Pulmonary and ask Dr. Villasenor to see.   04/13 no new issues.   Enteral feedings to be started.  Pulmonary evaluation appreciated and plans noted.  04/14 Tolerating feedings Therapy working with her. Bronchoscopy planned.   4/15 BAL today    Interval History:  No acute events overnight      Objective:     Vital Signs (Most Recent):  Temp: 97.5 °F (36.4 °C) (04/15/25 1049)  Pulse: 95 (04/15/25 1144)  Resp: 16 (04/15/25 1144)  BP: 96/64 (04/15/25 1104)  SpO2: 97 % (04/15/25 1144) Vital Signs (24h Range):  Temp:  [97.3 °F (36.3 °C)-98.3 °F (36.8 °C)] 97.5 °F (36.4 °C)  Pulse:  [] 95  Resp:  [16-33] 16  SpO2:  [90 %-100 %] 97 %  BP: ()/() 96/64     Weight: (!) 136.1 kg (300 lb)  Body mass index is 49.92 kg/m².    Intake/Output Summary (Last 24 hours) at 4/15/2025 1359  Last data filed at 4/15/2025 1114  Gross per 24 hour   Intake 584.47 ml   Output 500 ml   Net 84.47 ml         Physical Exam  Vitals reviewed.   Constitutional:       General: She is awake. She is not in acute distress.     Appearance: She is well-developed. She is morbidly obese. She is not toxic-appearing.   HENT:      Head: Normocephalic.      Nose: Nose normal.      Mouth/Throat:      Pharynx: Oropharynx is clear.   Eyes:      Extraocular Movements: Extraocular movements intact.      Pupils: Pupils are equal, round, and reactive to light.   Neck:      Thyroid: No thyroid mass.      Vascular: No carotid bruit.   Cardiovascular:      Rate and Rhythm: Normal rate and regular rhythm.      Pulses: Normal pulses.      Heart sounds: Normal heart sounds. No murmur heard.  Pulmonary:      Effort: Pulmonary effort is normal.      Breath sounds: Normal breath sounds and air entry. No wheezing.   Abdominal:      General: Bowel sounds are normal. There is no distension.      Palpations: Abdomen is soft.      Tenderness: There is no abdominal tenderness.   Musculoskeletal:         General: Normal range of motion.      Cervical back: Neck supple. No rigidity.   Skin:     General: Skin is warm.      Coloration:  Skin is not jaundiced.      Findings: No lesion.   Neurological:      General: No focal deficit present.      Mental Status: She is alert and oriented to person, place, and time.      Cranial Nerves: No cranial nerve deficit.   Psychiatric:         Attention and Perception: Attention normal.         Mood and Affect: Mood normal.         Behavior: Behavior normal. Behavior is cooperative.         Thought Content: Thought content normal.         Cognition and Memory: Cognition normal.               Significant Labs: All pertinent labs within the past 24 hours have been reviewed.  BMP:   Recent Labs   Lab 04/15/25  0315   *   *   K 3.9      CO2 19*   BUN 44*   CREATININE 2.09*   CALCIUM 8.3*       CBC:   Recent Labs   Lab 04/14/25  0504   WBC 17.16*   HGB 8.4*   HCT 25.4*   *       CMP:   Recent Labs   Lab 04/15/25  0315   *   K 3.9      CO2 19*   *   BUN 44*   CREATININE 2.09*   CALCIUM 8.3*   PROT 4.6*   ALBUMIN 1.9*   BILITOT 0.7   ALKPHOS 211*   AST 31   ALT 27   ANIONGAP 13         Significant Imaging: I have reviewed all pertinent imaging results/findings within the past 24 hours.      Intake/Output - Last 3 Shifts         04/13 0700  04/14 0659 04/14 0700  04/15 0659 04/15 0700  04/16 0659    P.O.       I.V. (mL/kg)  584.5 (4.3)     NG/      Total Intake(mL/kg) 604 (4.4) 584.5 (4.3)     Urine (mL/kg/hr) 650 (0.2) 500 (0.2) 200 (0.2)    Other       Total Output 650 500 200    Net -46 +84.5 -200                 Microbiology Results (last 7 days)       Procedure Component Value Units Date/Time    Gram Stain [[id number]] Collected: 04/15/25 0943    Order Status: Completed Specimen: Respiratory from BAL Updated: 04/15/25 1249     Gram Stain Result Moderate WBC observed      Few WBC observed      Rare Gram positive cocci    Direct Prep (Other) Fungus [[id number]] Collected: 04/15/25 0943    Order Status: Completed Specimen: Respiratory from BAL Updated: 04/15/25 1249      Direct Prep No fungal elements seen    Culture, Fungus (Other) [[id number]] Collected: 04/15/25 0943    Order Status: Resulted Specimen: Respiratory from BAL Updated: 04/15/25 1123    Culture, Lower Respiratory [[id number]] Collected: 04/15/25 0943    Order Status: Resulted Specimen: Respiratory from BAL Updated: 04/15/25 1122    Culture, AFB [[id number]] Collected: 04/15/25 0943    Order Status: Sent Specimen: Respiratory from BAL Updated: 04/15/25 1110    AFB Smear Only [[id number]] Collected: 04/15/25 0943    Order Status: Sent Specimen: Respiratory from BAL Updated: 04/15/25 1110                Assessment & Plan  Sepsis due to pneumonia  Appears to have UTI and pneumonia; has leukocytosis; has opacities on CXR; has G- bacilli on urine culture; blood cultures pending; will place on Vancomycin and Rocephin; BP on the low side; on IVF    04/12 worsening chest x-ray, ask Pulmonary to review  4/15 plan for BAL today.  Continue antibiotic  Hypernatremia  Due to dehydration; off diuretics; on IVF; will monitor Na levels  Lung cancer metastatic to brain  DNR but not hospice.  Receives chemo and has finished radiation.    04/07 reported stable / improved after treatment  04/09 more fuffy right side CXR  04/12 continue to abnormality on chest x-ray and ask Pulmonary to review  4/15 follows with Dr. Swanson.  No more chemotherapy treatments until patient is stronger.  Discussed with Dr. Swanson    Thrombocytopenia  On chemo; will monitor    Essential hypertension  Vitals:    04/15/25 1000 04/15/25 1004 04/15/25 1008 04/15/25 1010   BP: (!) 91/54 (!) 82/52 (!) 90/53 94/63    04/15/25 1014 04/15/25 1016 04/15/25 1018 04/15/25 1020   BP: (!) 90/49 105/62 (!) 114/50 (!) 97/55    04/15/25 1049 04/15/25 1104   BP: 114/64 96/64         Monitor BP while on Metoprolol; on IVF; off Losartan    Moderate persistent asthma without complication  Continue home inhaled steroid/bronchodilator and singulair    Chronic kidney disease,  "stage 3b  Creatine stable   Chronic pulmonary embolism without acute cor pulmonale  Eliquis was discontinued due to risk of ICH with brain mets but they have improved so the anti-coagulation has been restarted; will monitor  04/07 apparently this of several years ago    Type 2 diabetes mellitus with hyperglycemia  BS elevated; will increase Lantus to 25 units; continue SSI;monitor BS     Gastroparesis  On Reglan and PPI for erosive gastritis; will monitor    EGD by Dr. Lo during recent past admission at [facility]:  "EGD on 03/21/2025 shows LA class B erosive esophagitis but no pill esophagitis, nonerosive gastritis and retention of food and fluid suspicious for gastroparesis, due to narcotics and diabetes. "    04/ 08 try additional meds to help gastric emptying.  04/09 taking in some now orally with recent changes meds  04/10 better and ate half [place] for lunch   04/14 tolerating enteral feedings so far  4/15 we will get PEG tube if patient is agreeable.  Daughter is agreeable.  Discussed with her  Morbid obesity  Body mass index is 49.92 kg/m². Morbid obesity complicates all aspects of disease management from diagnostic modalities to treatment. Weight loss encouraged and health benefits explained to patient.     Would benefit from sleep study and possible CPAP.  Does not wear oxygen at home.      Edema due to hypoalbuminemia    04/12 add IV albumin and some Lasix  Start enteral feedings  04/13 start enteral feedings  Abnormal chest x-ray    04/13 addressed by Pulmonary with further investigation plans  Anemia  Anemia is likely due to chronic disease due to Malignancy. Most recent hemoglobin and hematocrit are listed below.  Recent Labs     04/13/25  0331 04/14/25  0504   HGB 8.6* 8.4*   HCT 25.7* 25.4*     Plan  - Monitor serial CBC: Daily  - Transfuse PRBC if patient becomes hemodynamically unstable, symptomatic or H/H drops below 7/21.  - Patient has not received any PRBC transfusions to date  - " Patient's anemia is currently stable  - follow  Hyponatremia  Hyponatremia is likely due to renal insufficiency. The patient's most recent sodium results are listed below.  Recent Labs     04/13/25  0331 04/15/25  0315   * 129*     Plan  - Correct the sodium by 4-6mEq in 24 hours.   - Obtain the following studies:  In a.  - Will treat the hyponatremia with continue LR  - Monitor sodium Daily.   - Patient hyponatremia is stable  - follow  VTE Risk Mitigation (From admission, onward)      None            Discharge Planning   MITUL:      Code Status: DNR   Medical Readiness for Discharge Date:   Discharge Plan A: Home with family        Chest x-ray reviewed and independently interpreted.  Extensive opacities in the right lung field.  Discussed case with Dr. Swanson today.  No more chemotherapy until the patient is stronger.  The discussed care with daughter.  She would like a PEG tube but we will defer to her mother's decision.  Labs and outside records reviewed.  Check renal function tomorrow        Please place Justification for DME        Wally Lagos DO  Department of Hospital Medicine   [facility]

## 2025-04-16 PROBLEM — J98.4 PNEUMONITIS: Status: ACTIVE | Noted: 2025-04-16

## 2025-04-16 PROBLEM — E86.0 DEHYDRATION, SEVERE: Status: ACTIVE | Noted: 2025-04-16

## 2025-04-16 LAB
ANION GAP SERPL CALCULATED.3IONS-SCNC: 13 MMOL/L (ref 7–16)
ANISOCYTOSIS BLD QL SMEAR: ABNORMAL
BASOPHILS # BLD AUTO: 0.09 K/UL (ref 0–0.2)
BASOPHILS NFR BLD AUTO: 0.4 % (ref 0–1)
BUN SERPL-MCNC: 48 MG/DL (ref 10–20)
BUN/CREAT SERPL: 21 (ref 6–20)
CALCIUM SERPL-MCNC: 8.5 MG/DL (ref 8.4–10.2)
CHLORIDE SERPL-SCNC: 98 MMOL/L (ref 98–107)
CO2 SERPL-SCNC: 19 MMOL/L (ref 23–31)
CREAT SERPL-MCNC: 2.27 MG/DL (ref 0.55–1.02)
DIFFERENTIAL METHOD BLD: ABNORMAL
EGFR (NO RACE VARIABLE) (RUSH/TITUS): 23 ML/MIN/1.73M2
EOSINOPHIL # BLD AUTO: 0.18 K/UL (ref 0–0.5)
EOSINOPHIL NFR BLD AUTO: 0.9 % (ref 1–4)
ERYTHROCYTE [DISTWIDTH] IN BLOOD BY AUTOMATED COUNT: 18.6 % (ref 11.5–14.5)
GLUCOSE SERPL-MCNC: 211 MG/DL (ref 70–105)
GLUCOSE SERPL-MCNC: 212 MG/DL (ref 82–115)
GLUCOSE SERPL-MCNC: 236 MG/DL (ref 70–105)
GLUCOSE SERPL-MCNC: 252 MG/DL (ref 70–105)
GLUCOSE SERPL-MCNC: 254 MG/DL (ref 70–105)
HCT VFR BLD AUTO: 25.4 % (ref 38–47)
HGB BLD-MCNC: 7.9 G/DL (ref 12–16)
HYPOCHROMIA BLD QL SMEAR: ABNORMAL
IMM GRANULOCYTES # BLD AUTO: 0.76 K/UL (ref 0–0.04)
IMM GRANULOCYTES NFR BLD: 3.7 % (ref 0–0.4)
INSULIN SERPL-ACNC: NORMAL U[IU]/ML
LAB AP GROSS DESCRIPTION: NORMAL
LAB AP LABORATORY NOTES: NORMAL
LAB AP SPECIMEN A NON-GYN GENERAL CATEGORIZATION: NORMAL
LAB AP SPECIMEN A NON-GYN INTERPETATION: NORMAL
LYMPHOCYTES # BLD AUTO: 0.72 K/UL (ref 1–4.8)
LYMPHOCYTES NFR BLD AUTO: 3.5 % (ref 27–41)
LYMPHOCYTES NFR BLD MANUAL: 3 % (ref 27–41)
MCH RBC QN AUTO: 29.3 PG (ref 27–31)
MCHC RBC AUTO-ENTMCNC: 31.1 G/DL (ref 32–36)
MCV RBC AUTO: 94.1 FL (ref 80–96)
MONOCYTES # BLD AUTO: 0.85 K/UL (ref 0–0.8)
MONOCYTES NFR BLD AUTO: 4.1 % (ref 2–6)
MONOCYTES NFR BLD MANUAL: 3 % (ref 2–6)
MPC BLD CALC-MCNC: 13.1 FL (ref 9.4–12.4)
NEUTROPHILS # BLD AUTO: 17.91 K/UL (ref 1.8–7.7)
NEUTROPHILS NFR BLD AUTO: 87.4 % (ref 53–65)
NEUTS BAND NFR BLD MANUAL: 24 % (ref 1–5)
NEUTS SEG NFR BLD MANUAL: 70 % (ref 50–62)
NRBC # BLD AUTO: 0.59 X10E3/UL
NRBC BLD MANUAL-RTO: 4 /100 WBC
NRBC, AUTO (.00): 2.9 %
PLATELET # BLD AUTO: 90 K/UL (ref 150–400)
PLATELET MORPHOLOGY: ABNORMAL
POLYCHROMASIA BLD QL SMEAR: ABNORMAL
POTASSIUM SERPL-SCNC: 4.2 MMOL/L (ref 3.5–5.1)
RBC # BLD AUTO: 2.7 M/UL (ref 4.2–5.4)
SODIUM SERPL-SCNC: 126 MMOL/L (ref 136–145)
TARGETS BLD QL SMEAR: ABNORMAL
WBC # BLD AUTO: 20.51 K/UL (ref 4.5–11)

## 2025-04-16 PROCEDURE — 99232 SBSQ HOSP IP/OBS MODERATE 35: CPT | Mod: ,,, | Performed by: INTERNAL MEDICINE

## 2025-04-16 PROCEDURE — 94761 N-INVAS EAR/PLS OXIMETRY MLT: CPT

## 2025-04-16 PROCEDURE — 94640 AIRWAY INHALATION TREATMENT: CPT

## 2025-04-16 PROCEDURE — 99900035 HC TECH TIME PER 15 MIN (STAT)

## 2025-04-16 PROCEDURE — 27000221 HC OXYGEN, UP TO 24 HOURS

## 2025-04-16 PROCEDURE — 99497 ADVNCD CARE PLAN 30 MIN: CPT | Mod: 25,,, | Performed by: STUDENT IN AN ORGANIZED HEALTH CARE EDUCATION/TRAINING PROGRAM

## 2025-04-16 PROCEDURE — 25000003 PHARM REV CODE 250: Performed by: STUDENT IN AN ORGANIZED HEALTH CARE EDUCATION/TRAINING PROGRAM

## 2025-04-16 PROCEDURE — 63600175 PHARM REV CODE 636 W HCPCS: Performed by: HOSPITALIST

## 2025-04-16 PROCEDURE — 25000242 PHARM REV CODE 250 ALT 637 W/ HCPCS: Performed by: HOSPITALIST

## 2025-04-16 PROCEDURE — 82962 GLUCOSE BLOOD TEST: CPT

## 2025-04-16 PROCEDURE — 97110 THERAPEUTIC EXERCISES: CPT

## 2025-04-16 PROCEDURE — 80048 BASIC METABOLIC PNL TOTAL CA: CPT | Performed by: HOSPITALIST

## 2025-04-16 PROCEDURE — 99233 SBSQ HOSP IP/OBS HIGH 50: CPT | Mod: 25,,, | Performed by: STUDENT IN AN ORGANIZED HEALTH CARE EDUCATION/TRAINING PROGRAM

## 2025-04-16 PROCEDURE — 25000003 PHARM REV CODE 250: Performed by: HOSPITALIST

## 2025-04-16 PROCEDURE — 85025 COMPLETE CBC W/AUTO DIFF WBC: CPT | Performed by: HOSPITALIST

## 2025-04-16 PROCEDURE — 27000207 HC ISOLATION

## 2025-04-16 PROCEDURE — 36415 COLL VENOUS BLD VENIPUNCTURE: CPT | Performed by: HOSPITALIST

## 2025-04-16 PROCEDURE — 11000001 HC ACUTE MED/SURG PRIVATE ROOM

## 2025-04-16 PROCEDURE — 63600175 PHARM REV CODE 636 W HCPCS: Mod: JZ,TB | Performed by: INTERNAL MEDICINE

## 2025-04-16 PROCEDURE — 63600175 PHARM REV CODE 636 W HCPCS: Performed by: FAMILY MEDICINE

## 2025-04-16 RX ORDER — METHYLPREDNISOLONE SOD SUCC 125 MG
250 VIAL (EA) INJECTION EVERY 6 HOURS
Status: DISCONTINUED | OUTPATIENT
Start: 2025-04-16 | End: 2025-04-20

## 2025-04-16 RX ORDER — SODIUM CHLORIDE 9 MG/ML
INJECTION, SOLUTION INTRAVENOUS CONTINUOUS
Status: DISCONTINUED | OUTPATIENT
Start: 2025-04-16 | End: 2025-04-17

## 2025-04-16 RX ADMIN — METHYLPREDNISOLONE SODIUM SUCCINATE 250 MG: 125 INJECTION, POWDER, FOR SOLUTION INTRAMUSCULAR; INTRAVENOUS at 06:04

## 2025-04-16 RX ADMIN — IPRATROPIUM BROMIDE AND ALBUTEROL SULFATE 3 ML: 2.5; .5 SOLUTION RESPIRATORY (INHALATION) at 08:04

## 2025-04-16 RX ADMIN — IPRATROPIUM BROMIDE AND ALBUTEROL SULFATE 3 ML: 2.5; .5 SOLUTION RESPIRATORY (INHALATION) at 07:04

## 2025-04-16 RX ADMIN — MONTELUKAST 10 MG: 10 TABLET, FILM COATED ORAL at 09:04

## 2025-04-16 RX ADMIN — METOCLOPRAMIDE HYDROCHLORIDE 5 MG: 5 TABLET ORAL at 05:04

## 2025-04-16 RX ADMIN — BUDESONIDE 0.5 MG: 0.5 SUSPENSION RESPIRATORY (INHALATION) at 08:04

## 2025-04-16 RX ADMIN — SODIUM CHLORIDE: 9 INJECTION, SOLUTION INTRAVENOUS at 06:04

## 2025-04-16 RX ADMIN — INSULIN ASPART 6 UNITS: 100 INJECTION, SOLUTION INTRAVENOUS; SUBCUTANEOUS at 12:04

## 2025-04-16 RX ADMIN — IPRATROPIUM BROMIDE AND ALBUTEROL SULFATE 3 ML: 2.5; .5 SOLUTION RESPIRATORY (INHALATION) at 03:04

## 2025-04-16 RX ADMIN — SERTRALINE HYDROCHLORIDE 100 MG: 50 TABLET ORAL at 09:04

## 2025-04-16 RX ADMIN — METOCLOPRAMIDE HYDROCHLORIDE 5 MG: 5 TABLET ORAL at 12:04

## 2025-04-16 RX ADMIN — METOCLOPRAMIDE HYDROCHLORIDE 5 MG: 5 TABLET ORAL at 09:04

## 2025-04-16 RX ADMIN — PANTOPRAZOLE SODIUM 40 MG: 40 TABLET, DELAYED RELEASE ORAL at 09:04

## 2025-04-16 RX ADMIN — METOPROLOL SUCCINATE 25 MG: 25 TABLET, EXTENDED RELEASE ORAL at 09:04

## 2025-04-16 RX ADMIN — INSULIN ASPART 3 UNITS: 100 INJECTION, SOLUTION INTRAVENOUS; SUBCUTANEOUS at 10:04

## 2025-04-16 RX ADMIN — INSULIN GLARGINE 25 UNITS: 100 INJECTION, SOLUTION SUBCUTANEOUS at 10:04

## 2025-04-16 RX ADMIN — ATORVASTATIN CALCIUM 20 MG: 20 TABLET, FILM COATED ORAL at 09:04

## 2025-04-16 RX ADMIN — BUDESONIDE 0.5 MG: 0.5 SUSPENSION RESPIRATORY (INHALATION) at 07:04

## 2025-04-16 RX ADMIN — MEROPENEM 1 G: 1 INJECTION, POWDER, FOR SOLUTION INTRAVENOUS at 03:04

## 2025-04-16 RX ADMIN — METHYLPREDNISOLONE SODIUM SUCCINATE 250 MG: 125 INJECTION, POWDER, FOR SOLUTION INTRAMUSCULAR; INTRAVENOUS at 12:04

## 2025-04-16 RX ADMIN — IPRATROPIUM BROMIDE AND ALBUTEROL SULFATE 3 ML: 2.5; .5 SOLUTION RESPIRATORY (INHALATION) at 11:04

## 2025-04-16 RX ADMIN — METOCLOPRAMIDE HYDROCHLORIDE 5 MG: 5 TABLET ORAL at 06:04

## 2025-04-16 NOTE — ASSESSMENT & PLAN NOTE
Vitals:    04/15/25 1149 04/15/25 1220 04/15/25 1248 04/15/25 1348   BP: 120/68 113/63 110/79 116/83    04/15/25 1449 04/15/25 1545 04/15/25 2000 04/16/25 0003   BP: (!) 106/58 106/79 112/70 (!) 103/56    04/16/25 0342 04/16/25 0747   BP: (!) 99/46 110/80         Monitor BP while on Metoprolol; on IVF; off Losartan

## 2025-04-16 NOTE — ASSESSMENT & PLAN NOTE
Patient felt to have pneumonitis secondary to immunotherapy has a broncho alveolar lavage done yesterday which is unremarkable she is short of breath and probably has stage III pneumonitis we will plan with negative Bal to start a bolus of steroids 1 g per day x3 days then weaned to oral medicines

## 2025-04-16 NOTE — ACP (ADVANCE CARE PLANNING)
Advance Care Planning     Date: 04/16/2025    Today a voluntary meeting took place: bedside    Patient Participation: Patient is able to participate     Attendees (Name and  Relationship to patient):  Patient    Staff attendees (Name and  Role): Self    ACP Conversation (General): Understanding of current condition metastatic cancer    Code Status: DNR; status confirmed/order placed in chart     ACP Documents: None    Goals of care: The patient endorses that what is most important right now is to focus on extending life as long as possible, even it it means sacrificing quality    Accordingly, we have decided that the best plan to meet the patient's goals includes continuing with treatment      Recommendations/  Follow-up tasks: Other (specify below) available to meet with family if goals change.  Discussed this with patient       Length of ACP   conversation in minutes: 31 minutes

## 2025-04-16 NOTE — PLAN OF CARE
Faxed tb skin test and chest xray to [facility] at [facility] this am.  [place] that patient must complete IV steroids for 3 days so will not be ready for dc until Friday.  Notified Kya of [facility].  Noted that ng tube will be removed and patient will get a peg tube.  Notified patient's daughter of expected dc date and admission to sb at [facility].  Packet taken to the unit.  Will continue to follow for anticipated dc needs.

## 2025-04-16 NOTE — PLAN OF CARE
Problem: Skin Injury Risk Increased  Goal: Skin Health and Integrity  Outcome: Progressing     Problem: Pneumonia  Goal: Effective Oxygenation and Ventilation  Outcome: Progressing     Problem: Gas Exchange Impaired  Goal: Optimal Gas Exchange  Outcome: Progressing     Problem: Airway Clearance Ineffective  Goal: Effective Airway Clearance  Outcome: Progressing

## 2025-04-16 NOTE — PROGRESS NOTES
[facility]  Adult Nutrition  Progress Note         Reason for Assessment  Reason For Assessment: RD follow-up        Assessment and Plan     4/16/2025: RD follow up. Patient remains on Suplena at 45mL/hour with 30mL/hour free water flush. Tube feed at goal, no issues with tolerance noted. Also ordered low residue diet, however PO intakes very poor, generally 0%. MD notes discussion of possible PEG placement. Patient agreeable. Recommend continue current POC as tolerated. Current weight 136.1kg. Last weight obtained 4/3. Recommend reweigh patient. Last BM 4/16 per flowsheet. RD following.    4/13/2025: Consult received and appreciated. Consult for enteral feeding. Patient is a 68yo female admitted 4/3 for sepsis due to pneumonia.     Patient is 136.1kg with a BMI of 49.92 and is obese. She has a PMH of CKD3b with elevated BUN, Cr, and low eGFR. Recommend start Suplena with a goal rate of 45mL/hour with 30mL/hour free water flush. Keep HOB at 30-45 degrees to reduce risk of aspiration. Start rate at 20mL/hour and advance by 10mL q8h until goal rate is reached. Monitor for tolerance: n/v/c/d. Hold feeding and notify RD if symptoms of intolerance develop.     Last Bowel Movement: 04/16/25    Medications/labs reviewed. RD following.    Learning Needs/Social Determinants of Health    Learning Assessment       04/04/2025 0021 [facility] (4/3/2025 - Present)   Created by Deanna Rivas, RN - RN (Nurse) Status: Complete                 PRIMARY LEARNER     Primary Learner Name:  Magan Saleem  - 04/04/2025 0021    Relationship:  Patient SG - 04/04/2025 0021    Does the primary learner have any barriers to learning?:  No Barriers  - 04/04/2025 0021    What is the preferred language of the primary learner?:  English SG - 04/04/2025 0021    Is an  required?:  No SG - 04/04/2025 0021    How does the primary learner prefer to learn new concepts?:  Listening SG -  04/04/2025 0021    How often do you need to have someone help you read instructions, pamphlets, or written material from your doctor or pharmacy?:  Never SG - 04/04/2025 0021        CO-LEARNER #1     No question answered        CO-LEARNER #2     No question answered        SPECIAL TOPICS     No question answered        ANSWERED BY:     -:  Family SG - 04/04/2025 0021        Comments         Edit History       Deanna Rivas RN - RN (Nurse)   04/04/2025 0021                             Social Drivers of Health     Tobacco Use: Low Risk  (4/15/2025)    Patient History     Smoking Tobacco Use: Never     Smokeless Tobacco Use: Never     Passive Exposure: Not on file   Alcohol Use: Not At Risk (4/4/2025)    AUDIT-C     Frequency of Alcohol Consumption: Never     Average Number of Drinks: Patient does not drink     Frequency of Binge Drinking: Never   Financial Resource Strain: Patient Declined (4/5/2025)    Overall Financial Resource Strain (CARDIA)     Difficulty of Paying Living Expenses: Patient declined   Food Insecurity: Patient Declined (4/5/2025)    Hunger Vital Sign     Worried About Running Out of Food in the Last Year: Patient declined     Ran Out of Food in the Last Year: Patient declined   Transportation Needs: No Transportation Needs (4/4/2025)    PRAPARE - Transportation     Lack of Transportation (Medical): No     Lack of Transportation (Non-Medical): No   Physical Activity: Inactive (4/4/2025)    Exercise Vital Sign     Days of Exercise per Week: 0 days     Minutes of Exercise per Session: 0 min   Stress: Patient Declined (4/5/2025)    [employer] - Occupational Stress Questionnaire     Feeling of Stress : Patient declined   Housing Stability: Patient Declined (4/5/2025)    Housing Stability Vital Sign     Unable to Pay for Housing in the Last Year: Patient declined     Number of Times Moved in the Last Year: Not on file     Homeless in the Last Year: Patient declined    Depression: Low Risk  (10/30/2024)    Depression     Last PHQ-4: Flowsheet Data: 0   Utilities: Patient Declined (4/5/2025)    [facility] Utilities     Threatened with loss of utilities: Patient declined   Health Literacy: Patient Declined (4/5/2025)     Health Literacy     Frequency of need for help with medical instructions: Patient declines to respond   Recent Concern: Health Literacy - Inadequate Health Literacy (4/4/2025)     Health Literacy     Frequency of need for help with medical instructions: Sometimes   Social Isolation: Not on file          Malnutrition  Is Patient Malnourished: No    Nutrition Diagnosis  Inadequate energy intake related to Appetite loss as evidenced by poor PO intakes  Comments: initiate enteral feeding    Recent Labs   Lab 04/16/25  0315 04/16/25  0743   *  --    POCGLU  --  211*     Comments on Glucose: Glucose elevated. PMH DM2.       Nutrition Prescription / Recommendations  Recommendation/Intervention: recommend continue current POC as tolerated.  Goals: Tube feeding tolerance at goal, weight maintenance during admission  Nutrition Goal Status: progressing towards goal  Current Diet Order: Low residue  Chewing or Swallowing Difficulty?: No Chewing or swallowing difficulty  Recommended Diet: Enteral Nutrition and Low Residue  Recommended Oral Supplement: No Oral Supplements  Is Nutrition Support Recommended:     Needs Calculated    Energy Calorie Requirements (kcal): 1905-2722kcal (20-25kcal/kg)  Protein Requirements: 34-45g (0.6-0.8g/kg ideal body weight)  Enteral Nutrition   Enteral Nutrition Formula Provides:  1938 kcals Propofol Rate: No  49 g Protein  212 g Carbohydrates  104 g Fat Propofol Rate: No  797 ml Fluid without Flush    720 ml Fluid by flush   1517 ml Total Fluid  Enteral Nutrition Recommended Order:  Tube feeding via NG/ Dobhoff  Tube feeding formula: Suplena 1.8 NG/ Dobhoff at 45mL/hour  Free Water Flush: 30 ml hourly  Modular Supplements:No Modular  Supplements needed  Enteral Nutrition meets needs?: yes  Enteral Nutrition Status: Continue Enteral Nutrition  Is Nutrition Education Recommended: No    Monitor and Evaluation  % current Intake: P.O. intake of 0 - 10% and Enteral Nutrition at goal  % intake to meet estimated needs: 25 - 50 % and Enteral Nutrition   Monitor and Evaluation: Diet order, Enteral and parenteral nutrition administration, Weight, Food and beverage intake, Electrolyte and renal panel, Gastrointestinal profile, Glucose/endocrine profile, Inflammatory profile, Lipid profile  Diet order, Enteral and parenteral nutrition administration, Weight, Food and beverage intake, Electrolyte and renal panel, Gastrointestinal profile, Glucose/endocrine profile, Inflammatory profile, Lipid profile    Current Medical Diagnosis and Past Medical History  Diagnosis: infection/sepsis  Past Medical History:   Diagnosis Date    Chronic kidney disease, stage 3b     Chronic pulmonary embolism without acute cor pulmonale     Diabetes mellitus type 2 in obese     DNR (do not resuscitate)     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 Swanson    Melanocytic nevi of lower limb, including hip, left     Mixed hyperlipidemia     Moderate persistent asthma without complication 10/30/2024    Other pulmonary embolism without acute cor pulmonale     Vitamin D deficiency        Nutrition/Diet History  Spiritual, Cultural Beliefs, Gnosticist Practices, Values that Affect Care: no  Food Allergies: NKFA  Factors Affecting Nutritional Intake: depression, altered gastrointestinal function, decreased appetite, early satiety    Lab/Procedures/Meds  Recent Labs   Lab 04/15/25  8442  04/16/25  0315   * 126*   K 3.9 4.2   BUN 44* 48*   CREATININE 2.09* 2.27*   CALCIUM 8.3* 8.5   ALBUMIN 1.9*  --     98   ALT 27  --    AST 31  --    Note: Na+ low. Recommend consider replete to WNL as appropriate. Alb low, likely due to edema. BUN, Cr elevated. PMH CKD3b  Last A1c:   Lab Results   Component Value Date    HGBA1C 6.7 04/02/2025    HGBA1C 6.4 (H) 02/05/2025     Lab Results   Component Value Date    RBC 2.70 (L) 04/16/2025    HGB 7.9 (L) 04/16/2025    HCT 25.4 (L) 04/16/2025    MCV 94.1 04/16/2025    MCH 29.3 04/16/2025    MCHC 31.1 (L) 04/16/2025   Note: H&H low    Pertinent Labs Reviewed: reviewed  Pertinent Medications Reviewed: reviewed  Scheduled Meds:   albuterol-ipratropium  3 mL Nebulization QID    atorvastatin  20 mg Oral QHS    budesonide  0.5 mg Nebulization Q12H    insulin glargine U-100  25 Units Subcutaneous QHS    methylPREDNISolone injection (PEDS and ADULTS)  250 mg Intravenous Q6H    metoclopramide HCl  5 mg Oral QID (AC & HS)    metoprolol succinate  25 mg Oral Daily    montelukast  10 mg Oral QHS    pantoprazole  40 mg Oral Daily    sertraline  100 mg Oral Daily     Continuous Infusions:   0.9% NaCl   Intravenous Continuous 75 mL/hr at 04/16/25 0618 New Bag at 04/16/25 0618     PRN Meds:.  Current Facility-Administered Medications:     acetaminophen, 650 mg, Rectal, Q6H PRN    acetaminophen, 1,000 mg, Oral, Q6H PRN    aluminum & magnesium hydroxide-simethicone, 30 mL, Oral, Q6H PRN    bisacodyL, 10 mg, Oral, Daily PRN    dextromethorphan-guaiFENesin  mg/5 ml, 10 mL, Oral, Q6H PRN    dextrose 50%, 12.5 g, Intravenous, PRN    dextrose 50%, 25 g, Intravenous, PRN    diphenhydrAMINE, 50 mg, Oral, Q6H PRN    docusate sodium, 100 mg, Oral, BID PRN    glucagon (human recombinant), 1 mg, Intramuscular, PRN    glucose, 16 g, Oral, PRN    glucose, 24 g, Oral, PRN    HYDROcodone-acetaminophen, 1 tablet, Oral, Q6H PRN    insulin aspart U-100, 0-10 Units, Subcutaneous, QID (AC  "+ HS) PRN    LORazepam, 1 mg, Oral, Q6H PRN    melatonin, 6 mg, Oral, Nightly PRN    traZODone, 50 mg, Oral, Nightly PRN    Anthropometrics  Height: 5' 5" (165.1 cm)  Height (inches): 65 in  Height Method: Stated  Weight: (!) 136.1 kg (300 lb)  Weight (lb): 300 lb  Weight Method: Bed Scale  Ideal Body Weight (IBW), Female: 125 lb  % Ideal Body Weight, Female (lb): 240 %  BMI (Calculated): 49.9       Estimated/Assessed Needs  RMR (Harvey-St. Jeor Equation): 1891.68     Temp: 98.1 °F (36.7 °C)Oral  Weight Used For Calorie Calculations: (!) 136.1 kg (300 lb 0.7 oz)     Energy Calorie Requirements (kcal): 1905-2722kcal (20-25kcal/kg)  Weight Used For Protein Calculations: 56.7 kg (125 lb)  Protein Requirements: 34-45g (0.6-0.8g/kg ideal body weight)    Fluid Requirements (mL): 1 mL/kcal  RDA Method (mL): 1905  CHO Requirement: 45-55% (T2DM)    Nutrition by Nursing  Diet/Nutrition Received: NPO, tube feeding  Intake (%): other (see comments) (a few bites)  Diet/Feeding Assistance: total feed  Diet/Feeding Tolerance: other (see comments)       NG/OG Tube 04/12/25 1620 nasogastric;Other (comments) Right nostril-Feeding Type: continuous, by pump       NG/OG Tube 04/12/25 1620 nasogastric;Other (comments) Right nostril-Current Rate (mL/hr): 45 mL/hr       NG/OG Tube 04/12/25 1620 nasogastric;Other (comments) Right nostril-Goal Rate (mL/hr): 45 mL/hr       NG/OG Tube 04/12/25 1620 nasogastric;Other (comments) Right nostril-Formula Name: Suplena    Nutrition Follow-Up  RD Follow-up?: Yes      Nutrition Discharge Planning: Too early to determine, pending clinical course             Dorita Palma, MS, RD, LD  Available via Secure Chat  "

## 2025-04-16 NOTE — HPI
Patient is admitted to hospital medicine for uti and dehydration. She has history of lung cancer with metastasis to the brain.  Patient was recently hospitalized at [facility] and was discharged to swing bed.  She returned to [facility] with dehydration and UTI.  She has not been able to eat or drink since her discharge.  She requests PEG tube placement and General surgery was consulted.  Patient has receiving tube feeding per enteral tube.   Patient is awake and alert.  She is agreeable to peg placement.  Discussed with Dr Tapia, who recommends GI consult to evaluate nausea and vomiting prior to surgical procedure.

## 2025-04-16 NOTE — SUBJECTIVE & OBJECTIVE
No current facility-administered medications on file prior to encounter.     Current Outpatient Medications on File Prior to Encounter   Medication Sig    albuterol (VENTOLIN HFA) 90 mcg/actuation inhaler Inhale 2 puffs into the lungs every 6 (six) hours as needed for Wheezing. Rescue    albuterol-ipratropium (DUO-NEB) 2.5 mg-0.5 mg/3 mL nebulizer solution Take 3 mLs by nebulization every 4 (four) hours as needed for Wheezing or Shortness of Breath. Rescue    ALLERGY RELIEF, CETIRIZINE, 10 mg tablet TAKE ONE TABLET BY MOUTH ONCE DAILY    ascorbic acid, vitamin C, (VITAMIN C) 500 MG tablet Twice Daily    aspirin 81 MG Chew Take 1 tablet (81 mg total) by mouth once daily. (Patient not taking: Reported on 10/30/2024)    atorvastatin (LIPITOR) 10 MG tablet TAKE ONE TABLET BY MOUTH ONCE DAILY    benzonatate (TESSALON) 100 MG capsule Take 100 mg by mouth 3 (three) times daily as needed for Cough. (Patient not taking: Reported on 10/30/2024)    blood-glucose meter,continuous (FREESTYLE OLIVA 3 READER) [facility] [place]    blood-glucose sensor (FREESTYLE OLIVA 3 SENSOR) [facility] use as directed    cholecalciferol, vitamin D3, (VITAMIN D3) 50 mcg (2,000 unit) Tab Take 1 tablet (2,000 Units total) by mouth once daily.    dexAMETHasone (DECADRON) 4 MG Tab Take 4 mg by mouth.    doxycycline (DORYX) 100 MG EC tablet Take 100 mg by mouth 2 (two) times daily.    ELIQUIS 5 mg Tab TAKE ONE TABLET BY MOUTH TWICE DAILY    empagliflozin (JARDIANCE) 10 mg tablet Take 10 mg by mouth.    fluticasone-umeclidin-vilanter (TRELEGY ELLIPTA) 100-62.5-25 mcg DsDv Inhale 1 puff into the lungs once daily.    furosemide (LASIX) 40 MG tablet Take by mouth.    glipiZIDE 5 MG TR24 Take 5 mg by mouth every morning. (Patient not taking: Reported on 10/30/2024)    insulin aspart U-100 (NOVOLOG FLEXPEN U-100 INSULIN) 100 unit/mL (3 mL) InPn pen Per sliding scale max daily dose 30 units; subcutaneously; three times a day with meals     losartan-hydrochlorothiazide 50-12.5 mg (HYZAAR) 50-12.5 mg per tablet Take 1 tablet by mouth.    metoclopramide HCl (REGLAN) 5 MG tablet Take 5 mg by mouth 4 (four) times daily.    metoprolol succinate (TOPROL-XL) 50 MG 24 hr tablet Take 50 mg by mouth once daily.    metoprolol tartrate (LOPRESSOR) 25 MG tablet TAKE ONE TABLET BY MOUTH TWICE DAILY    montelukast (SINGULAIR) 10 mg tablet TAKE ONE TABLET BY MOUTH EVERY EVENING    ondansetron (ZOFRAN) 4 MG tablet Take 4 mg by mouth every 8 (eight) hours as needed. AS NEEDED FOR NAUSEA    osimertinib (TAGRISSO) 80 mg Tab Take 40 mg by mouth once daily.    OZEMPIC 0.25 mg or 0.5 mg (2 mg/3 mL) pen injector Inject 0.25 mg into the skin.    pantoprazole (PROTONIX) 40 MG tablet Take 40 mg by mouth once daily.    potassium chloride (KLOR-CON) 10 MEQ TbSR TAKE ONE TABLET BY MOUTH ONCE DAILY    predniSONE (DELTASONE) 20 MG tablet Take 20 mg by mouth. for five days    rOPINIRole (REQUIP) 0.25 MG tablet Take 0.25 mg by mouth 3 (three) times daily.    sertraline (ZOLOFT) 25 MG tablet Take 1 tablet (25 mg total) by mouth once daily.    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 Swanson     Melanocytic nevi of lower limb, including hip, left     Mixed hyperlipidemia     Moderate persistent asthma without complication 10/30/2024    Other pulmonary embolism without acute cor pulmonale     Vitamin D deficiency      Past Surgical History:   Procedure Laterality Date    CHOLECYSTECTOMY      CSF SHUNT      HYSTERECTOMY      TONSILLECTOMY       Family History       Problem Relation (Age of Onset)    Diabetes Mother    Hypertension Mother    Lung cancer Father          Tobacco Use    Smoking status: Never    Smokeless tobacco: Never   Substance and Sexual Activity    Alcohol use: Never    Drug use: Never    Sexual activity: Not Currently     Review of Systems   Constitutional:  Positive for activity change and fatigue.   Respiratory:  Positive for shortness of breath.    Gastrointestinal:  Positive for nausea and vomiting.   Neurological:  Positive for weakness.   All other systems reviewed and are negative.    Objective:     Vital Signs (Most Recent):  Temp: 97.5 °F (36.4 °C) (04/16/25 1154)  Pulse: 96 (04/16/25 1200)  Resp: 16 (04/16/25 1154)  BP: (!) 112/58 (04/16/25 1154)  SpO2: 95 % (04/16/25 1200) Vital Signs (24h Range):  Temp:  [97.2 °F (36.2 °C)-98.4 °F (36.9 °C)] 97.5 °F (36.4 °C)  Pulse:  [] 96  Resp:  [16-20] 16  SpO2:  [92 %-99 %] 95 %  BP: ()/(46-83) 112/58     Weight: (!) 136.1 kg (300 lb)  Body mass index is 49.92 kg/m².     Physical Exam  Vitals reviewed.   Constitutional:       Appearance: She is obese. She is ill-appearing.   HENT:      Head: Normocephalic.      Nose: Nose normal.      Mouth/Throat:      Mouth: Mucous membranes are dry.   Eyes:      Extraocular Movements: Extraocular movements intact.   Cardiovascular:      Rate and Rhythm: Tachycardia present.   Pulmonary:      Breath sounds: Rhonchi present.   Abdominal:      Tenderness: There is no abdominal tenderness.   Musculoskeletal:         General: Normal range of motion.      Cervical back: Normal range of motion.    Skin:     General: Skin is warm and dry.      Capillary Refill: Capillary refill takes 2 to 3 seconds.   Neurological:      General: No focal deficit present.      Mental Status: She is alert.            I have reviewed all pertinent lab results within the past 24 hours.  CBC:   Recent Labs   Lab 04/16/25  0503   WBC 20.51*   RBC 2.70*   HGB 7.9*   HCT 25.4*   PLT 90*   MCV 94.1   MCH 29.3   MCHC 31.1*     BMP:   Recent Labs   Lab 04/13/25  0331 04/15/25  0315 04/16/25  0315   *   < > 212*   *   < > 126*   K 3.5   < > 4.2      < > 98   CO2 19*   < > 19*   BUN 40*   < > 48*   CREATININE 1.66*   < > 2.27*   CALCIUM 8.0*   < > 8.5   MG 1.7  --   --     < > = values in this interval not displayed.     CMP:   Recent Labs   Lab 04/15/25  0315 04/16/25  0315   * 212*   CALCIUM 8.3* 8.5   ALBUMIN 1.9*  --    PROT 4.6*  --    * 126*   K 3.9 4.2   CO2 19* 19*    98   BUN 44* 48*   CREATININE 2.09* 2.27*   ALKPHOS 211*  --    ALT 27  --    AST 31  --    BILITOT 0.7  --      Microbiology Results (last 7 days)       Procedure Component Value Units Date/Time    Culture, Lower Respiratory [[id number]] Collected: 04/15/25 0943    Order Status: Completed Specimen: Respiratory from BAL Updated: 04/16/25 0842     Culture, Lower Respiratory No Growth To Date    AFB Smear Only [[id number]] Collected: 04/15/25 0943    Order Status: Completed Specimen: Respiratory from BAL Updated: 04/15/25 2200     AFB Smear No acid fast bacilli seen    Gram Stain [[id number]] Collected: 04/15/25 0943    Order Status: Completed Specimen: Respiratory from BAL Updated: 04/15/25 1249     Gram Stain Result Moderate WBC observed      Few WBC observed      Rare Gram positive cocci    Direct Prep (Other) Fungus [[id number]] Collected: 04/15/25 0943    Order Status: Completed Specimen: Respiratory from BAL Updated: 04/15/25 1249     Direct Prep No fungal elements seen    Culture, Fungus (Other) [[id number]] Collected:  "04/15/25 0943    Order Status: Resulted Specimen: Respiratory from BAL Updated: 04/15/25 1123    Culture, AFB [[id number]] Collected: 04/15/25 0943    Order Status: Sent Specimen: Respiratory from BAL Updated: 04/15/25 1110          Specimen (24h ago, onward)      None          No results for input(s): "COLORU", "CLARITYU", "SPECGRAV", "PHUR", "PROTEINUA", "GLUCOSEU", "BILIRUBINCON", "BLOODU", "WBCU", "RBCU", "BACTERIA", "MUCUS", "NITRITE", "LEUKOCYTESUR", "UROBILINOGEN", "HYALINECASTS" in the last 168 hours.    Significant Diagnostics:  I have reviewed all pertinent imaging results/findings within the past 24 hours.    "

## 2025-04-16 NOTE — ASSESSMENT & PLAN NOTE
Hyponatremia is likely due to renal insufficiency. The patient's most recent sodium results are listed below.  Recent Labs     04/15/25  0315 04/16/25  0315   * 126*     Plan  - Correct the sodium by 4-6mEq in 24 hours.   - Obtain the following studies:  In a.  - Will treat the hyponatremia with continue LR  - Monitor sodium Daily.   - Patient hyponatremia is stable  - follow  Follow

## 2025-04-16 NOTE — ASSESSMENT & PLAN NOTE
DNR but not hospice.  Receives chemo and has finished radiation.    04/07 reported stable / improved after treatment  04/09 more fuffy right side CXR  04/12 continue to abnormality on chest x-ray and ask Pulmonary to review  4/15 follows with Dr. Swanson.  No more chemotherapy treatments until patient is stronger.  Discussed with Dr. Swanson  4/16 hold treatments for now

## 2025-04-16 NOTE — PLAN OF CARE
Problem: Pneumonia  Goal: Fluid Balance  Outcome: Progressing  Goal: Resolution of Infection Signs and Symptoms  Outcome: Progressing  Goal: Effective Oxygenation and Ventilation  Outcome: Progressing     Problem: Gas Exchange Impaired  Goal: Optimal Gas Exchange  Outcome: Progressing     Problem: Airway Clearance Ineffective  Goal: Effective Airway Clearance  Outcome: Progressing

## 2025-04-16 NOTE — SUBJECTIVE & OBJECTIVE
Interval History/Significant Events:  Patient without complaints    Review of Systems  Objective:     Vital Signs (Most Recent):  Temp: 97.2 °F (36.2 °C) (04/16/25 0342)  Pulse: 78 (04/16/25 0342)  Resp: 16 (04/15/25 1951)  BP: (!) 99/46 (04/16/25 0342)  SpO2: 99 % (04/16/25 0342) Vital Signs (24h Range):  Temp:  [97.2 °F (36.2 °C)-98.4 °F (36.9 °C)] 97.2 °F (36.2 °C)  Pulse:  [] 78  Resp:  [16-33] 16  SpO2:  [90 %-99 %] 99 %  BP: ()/(46-88) 99/46   Weight: (!) 136.1 kg (300 lb)  Body mass index is 49.92 kg/m².      Intake/Output Summary (Last 24 hours) at 4/16/2025 0546  Last data filed at 4/16/2025 0415  Gross per 24 hour   Intake 60 ml   Output 500 ml   Net -440 ml          Physical Exam  Vitals reviewed.   Constitutional:       Appearance: Normal appearance. She is obese.      Interventions: She is not intubated.  HENT:      Head: Normocephalic and atraumatic.      Nose: Nose normal.      Mouth/Throat:      Mouth: Mucous membranes are dry.      Pharynx: Oropharynx is clear.   Eyes:      Extraocular Movements: Extraocular movements intact.      Conjunctiva/sclera: Conjunctivae normal.      Pupils: Pupils are equal, round, and reactive to light.   Cardiovascular:      Rate and Rhythm: Normal rate.      Heart sounds: Normal heart sounds. No murmur heard.  Pulmonary:      Effort: Pulmonary effort is normal. She is not intubated.      Breath sounds: Normal breath sounds.   Abdominal:      General: Abdomen is flat. Bowel sounds are normal.      Palpations: Abdomen is soft.   Musculoskeletal:         General: Normal range of motion.      Cervical back: Normal range of motion and neck supple.      Right lower leg: No edema.      Left lower leg: No edema.   Skin:     General: Skin is warm and dry.      Capillary Refill: Capillary refill takes less than 2 seconds.   Neurological:      General: No focal deficit present.      Mental Status: She is alert and oriented to person, place, and time.   Psychiatric:     "     Mood and Affect: Mood normal.         Behavior: Behavior normal.            Vents:  Oxygen Concentration (%): 28 (04/15/25 1947)  Lines/Drains/Airways       Drain  Duration                  NG/OG Tube 04/12/25 1620 nasogastric;Other (comments) Right nostril 3 days    Female External Urinary Catheter w/ Suction 04/16/25 0415 <1 day              Peripheral Intravenous Line  Duration                  Peripheral IV - Single Lumen 04/05/25 1430 20 G Other (Comments) Anterior;Left Forearm 10 days                  Significant Labs:    CBC/Anemia Profile:  No results for input(s): "WBC", "HGB", "HCT", "PLT", "MCV", "RDW", "IRON", "FERRITIN", "RETIC", "FOLATE", "QBEITEPE77", "OCCULTBLOOD" in the last 48 hours.     Chemistries:  Recent Labs   Lab 04/15/25  0315 04/16/25  0315   * 126*   K 3.9 4.2    98   CO2 19* 19*   BUN 44* 48*   CREATININE 2.09* 2.27*   CALCIUM 8.3* 8.5   ALBUMIN 1.9*  --    PROT 4.6*  --    BILITOT 0.7  --    ALKPHOS 211*  --    ALT 27  --    AST 31  --        Recent Lab Results  (Last 5 results in the past 24 hours)        04/16/25  0315   04/15/25  2003   04/15/25  1545   04/15/25  1114   04/15/25  0943        Lymphs, Fluid         3       AFB SMEAR         No acid fast bacilli seen       Anion Gap 13               BUN 48               BUN/CREAT RATIO 21               Calcium 8.5               Cell Count Excluding RBCs         133       Chloride 98               Clarity, Body Fluid         Hazy       CO2 19               Color, Body Fluid         Colorless       Creatinine 2.27               Direct Prep         No fungal elements seen       eGFR 23  Comment: Estimated GFR calculated using the CKD-EPI creatinine (2021) equation.               Glucose 212               GRAM STAIN         Moderate WBC observed                Few WBC observed                Rare Gram positive cocci       Macrophages, Fluid Man %         1       POC Glucose   219   155   110         Polys, Fluid Man %        "  96       Potassium 4.2               RBC, Body Fluid         <3,000       Sodium 126                                      Significant Imaging:  I have reviewed all pertinent imaging results/findings within the past 24 hours.

## 2025-04-16 NOTE — PT/OT/SLP PROGRESS
Occupational Therapy   Treatment    Name: Magan Saleem  MRN: [id number]  Admitting Diagnosis:  Sepsis due to pneumonia       Recommendations:     Discharge Recommendations: Low Intensity Therapy  Discharge Equipment Recommendations:  to be determined by next level of care  Barriers to discharge:       Assessment:     Magan Saleem is a 69 y.o. female with a medical diagnosis of Sepsis due to pneumonia.  She presents with weakness and decline in ADLs. Performance deficits affecting function are weakness, impaired endurance, impaired self care skills, impaired cardiopulmonary response to activity, impaired functional mobility, edema. Pt noted with increased edema to LUE; LONDON Leiva notified.     Rehab Prognosis:  Good; patient would benefit from acute skilled OT services to address these deficits and reach maximum level of function.       Plan:     Patient to be seen 5 x/week to address the above listed problems via self-care/home management, therapeutic activities, therapeutic exercises  Plan of Care Expires:    Plan of Care Reviewed with: patient    Subjective     Chief Complaint: sepsis  Patient/Family Comments/goals: pt agreeable to OT tx  Pain/Comfort:  Pain Rating 1: 4/10  Location - Side 1: Left  Location 1: arm  Pain Addressed 1: Nurse notified    Objective:     Communicated with: LONDON Davies prior to session.  Patient found HOB elevated with NG tube, peripheral IV, PureWick, telemetry, oxygen upon OT entry to room.    General Precautions: Standard, fall, contact    Orthopedic Precautions:N/A  Braces: N/A  Respiratory Status: Nasal cannula, flow 2 L/min     Occupational Performance:     Bed Mobility:    Not performed     Functional Mobility/Transfers:  Not performed    Activities of Daily Living:  Not performed      [facility] 6 Click ADL:      Treatment & Education:  Pt performed 2 sets x 15 reps each RUE AAROM shoulder flexion, ER/IR, chest press, and elbow flexion/extension in order to improve UE strength and  endurance. Pt noted with increased LUE edema and pain; range of motion withheld and LONDON Davies notified.     Patient left HOB elevated with all lines intact, call button in reach, and LONDON Davies notified    GOALS:   Multidisciplinary Problems       Occupational Therapy Goals          Problem: Occupational Therapy    Goal Priority Disciplines Outcome Interventions   Occupational Therapy Goal     OT, PT/OT Progressing    Description: STG:  Pt will perform grooming with setup  Pt will bathe with Min A  Pt will perform UE dressing with Min A  Pt will perform LE dressing with Mod A  Pt will sit EOB x 10 min with CG assistance  Pt will transfer bed/chair/bsc with Mod A  Pt will perform standing task x 3 min with CG assistance  Pt will tolerate 30 minutes of tx without fatigue      LT.Restore to max I with self care and mobility.                         DME Justifications:  No DME recommended requiring DME justifications    Time Tracking:     OT Date of Treatment: 25  OT Start Time: 1400  OT Stop Time: 1419  OT Total Time (min): 19 min    Billable Minutes:Therapeutic Exercise 15 minutes    OT/SAROJ: OT     Number of SAROJ visits since last OT visit: 1    2025

## 2025-04-16 NOTE — PT/OT/SLP PROGRESS
"Physical Therapy Treatment    Patient Name:  Magan Saleem   MRN:  [id number]    Recommendations:     Discharge Recommendations: Moderate Intensity Therapy  Discharge Equipment Recommendations: to be determined by next level of care  Barriers to discharge:  ongoing medical treatment    Assessment:     Magan Saleem is a 69 y.o. female admitted with a medical diagnosis of Sepsis due to pneumonia.  She presents with the following impairments/functional limitations: weakness, impaired endurance, impaired self care skills, impaired functional mobility .    Rehab Prognosis: Good; patient would benefit from acute skilled PT services to address these deficits and reach maximum level of function.    Recent Surgery: * No surgery found *      Plan:     During this hospitalization, patient to be seen 5 x/week to address the identified rehab impairments via gait training, therapeutic activities, therapeutic exercises, neuromuscular re-education and progress toward the following goals:    Plan of Care Expires:  05/04/25    Subjective     Chief Complaint: fatigue   Patient/Family Comments/goals: "I can't sit up today, I will do anything but that"  Pain/Comfort:         Objective:     Communicated with nurse prior to session.  Patient found HOB elevated with NG tube, peripheral IV, PureWick, telemetry, oxygen upon PT entry to room.     General Precautions: Standard, fall, contact  Orthopedic Precautions: N/A  Braces: N/A  Respiratory Status: Nasal cannula, flow 2 L/min     Functional Mobility:  NT (patient declined)      AM-PAC 6 CLICK MOBILITY  Turning over in bed (including adjusting bedclothes, sheets and blankets)?: 2  Sitting down on and standing up from a chair with arms (e.g., wheelchair, bedside commode, etc.): 1  Moving from lying on back to sitting on the side of the bed?: 2  Moving to and from a bed to a chair (including a wheelchair)?: 1  Need to walk in hospital room?: 1  Climbing 3-5 steps with a railing?: 1  Basic " Mobility Total Score: 8       Treatment & Education:  -AP, qs, Hip add/abduction, LE flexion/ext; all 3x10 repetitions BLE      Patient left HOB elevated with all lines intact, call button in reach, and Nurse notified..    GOALS:   Multidisciplinary Problems       Physical Therapy Goals          Problem: Physical Therapy    Goal Priority Disciplines Outcome Interventions   Physical Therapy Goal     PT, PT/OT Progressing    Description: Short term goals:  1. Supine to sit with MInimal Assistance  2. Sit to stand transfer with Moderate Assistance  3. Bed to chair transfer with Moderate Assistance using Rolling Walker  4. Sitting at edge of bed x15 minutes with Contact Guard Assistance    Long term goals:  1. Supine to sit with Contact Guard Assistance  2. Sit to stand transfer with Contact Guard Assistance  3. Bed to chair transfer with Contact Guard Assistance using Rolling Walker  4. Gait  x 100 feet with Contact Guard Assistance using Rolling Walker.                          DME Justifications:      Time Tracking:     PT Received On: 04/16/25  PT Start Time: 1550     PT Stop Time: 1602  PT Total Time (min): 12 min     Billable Minutes: Therapeutic Exercise 12    Treatment Type: Treatment  PT/PTA: PT     Number of PTA visits since last PT visit: 0     04/16/2025

## 2025-04-16 NOTE — CONSULTS
[facility]  General Surgery  Consult Note    Patient Name: Magan Saleem  MRN: [id number]  Code Status: DNR  Admission Date: 4/3/2025  Hospital Length of Stay: 13 days  Attending Physician: Wally Lagos DO  Primary Care Provider: Sil Brown DO  Consulting provider: Cabrera Tapia MD  Patient information was obtained from patient, past medical records, ER records, and primary team.     Inpatient consult to General Surgery  Consult performed by: Debra Pizarro FNP  Consult ordered by: Wally Lagos DO  Reason for consult: Dehydration PEG tube placement history of metastatic lung/brain cancer        Subjective:     Principal Problem: Sepsis due to pneumonia    History of Present Illness: Patient is admitted to hospital medicine for uti and dehydration. She has history of lung cancer with metastasis to the brain.  Patient was recently hospitalized at [facility] and was discharged to [facility] bed.  She returned to [facility] with dehydration and UTI.  She has not been able to eat or drink since her discharge.  She requests PEG tube placement.  General surgery was consulted for PEG tube placement.  Patient has receiving tube feeding per enteral tube.  Tube feedings are stopped on exam. Patient is awake and alert.  She is agreeable to peg placement per Dr Tapia.  Consent will be signed and placed with chart for gastrostomy tube placement per Dr. Tapia with anesthesia.    No current facility-administered medications on file prior to encounter.     Current Outpatient Medications on File Prior to Encounter   Medication Sig    albuterol (VENTOLIN HFA) 90 mcg/actuation inhaler Inhale 2 puffs into the lungs every 6 (six) hours as needed for Wheezing. Rescue    albuterol-ipratropium (DUO-NEB) 2.5 mg-0.5 mg/3 mL nebulizer solution Take 3 mLs by nebulization every 4 (four) hours as needed for Wheezing or Shortness of Breath. Rescue    ALLERGY RELIEF, CETIRIZINE, 10 mg tablet TAKE ONE TABLET BY  MOUTH ONCE DAILY    ascorbic acid, vitamin C, (VITAMIN C) 500 MG tablet Twice Daily    aspirin 81 MG Chew Take 1 tablet (81 mg total) by mouth once daily. (Patient not taking: Reported on 10/30/2024)    atorvastatin (LIPITOR) 10 MG tablet TAKE ONE TABLET BY MOUTH ONCE DAILY    benzonatate (TESSALON) 100 MG capsule Take 100 mg by mouth 3 (three) times daily as needed for Cough. (Patient not taking: Reported on 10/30/2024)    blood-glucose meter,continuous (FREESTYLE OLIVA 3 READER) [facility] use as directed    blood-glucose sensor (FREESTYLE OLIVA 3 SENSOR) [facility] use as directed    cholecalciferol, vitamin D3, (VITAMIN D3) 50 mcg (2,000 unit) Tab Take 1 tablet (2,000 Units total) by mouth once daily.    dexAMETHasone (DECADRON) 4 MG Tab Take 4 mg by mouth.    doxycycline (DORYX) 100 MG EC tablet Take 100 mg by mouth 2 (two) times daily.    ELIQUIS 5 mg Tab TAKE ONE TABLET BY MOUTH TWICE DAILY    empagliflozin (JARDIANCE) 10 mg tablet Take 10 mg by mouth.    fluticasone-umeclidin-vilanter (TRELEGY ELLIPTA) 100-62.5-25 mcg DsDv Inhale 1 puff into the lungs once daily.    furosemide (LASIX) 40 MG tablet Take by mouth.    glipiZIDE 5 MG TR24 Take 5 mg by mouth every morning. (Patient not taking: Reported on 10/30/2024)    insulin aspart U-100 (NOVOLOG FLEXPEN U-100 INSULIN) 100 unit/mL (3 mL) InPn pen Per sliding scale max daily dose 30 units; subcutaneously; three times a day with meals    losartan-hydrochlorothiazide 50-12.5 mg (HYZAAR) 50-12.5 mg per tablet Take 1 tablet by mouth.    metoclopramide HCl (REGLAN) 5 MG tablet Take 5 mg by mouth 4 (four) times daily.    metoprolol succinate (TOPROL-XL) 50 MG 24 hr tablet Take 50 mg by mouth once daily.    metoprolol tartrate (LOPRESSOR) 25 MG tablet TAKE ONE TABLET BY MOUTH TWICE DAILY    montelukast (SINGULAIR) 10 mg tablet TAKE ONE TABLET BY MOUTH EVERY EVENING    ondansetron (ZOFRAN) 4 MG tablet Take 4 mg by mouth every 8 (eight) hours as needed. AS NEEDED FOR NAUSEA     osimertinib (TAGRISSO) 80 mg Tab Take 40 mg by mouth once daily.    OZEMPIC 0.25 mg or 0.5 mg (2 mg/3 mL) pen injector Inject 0.25 mg into the skin.    pantoprazole (PROTONIX) 40 MG tablet Take 40 mg by mouth once daily.    potassium chloride (KLOR-CON) 10 MEQ TbSR TAKE ONE TABLET BY MOUTH ONCE DAILY    predniSONE (DELTASONE) 20 MG tablet Take 20 mg by mouth. for five days    rOPINIRole (REQUIP) 0.25 MG tablet Take 0.25 mg by mouth 3 (three) times daily.    sertraline (ZOLOFT) 25 MG tablet Take 1 tablet (25 mg total) by mouth once daily.    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 Swanson    Melanocytic nevi of lower limb, including hip, left     Mixed hyperlipidemia     Moderate persistent asthma without complication 10/30/2024    Other pulmonary embolism without acute cor pulmonale     Vitamin D deficiency      Past Surgical History:   Procedure Laterality Date    CHOLECYSTECTOMY      CSF SHUNT      HYSTERECTOMY      TONSILLECTOMY       Family History       Problem Relation (Age of Onset)    Diabetes Mother    Hypertension Mother    Lung cancer Father          Tobacco Use    Smoking status: Never    Smokeless tobacco: Never    Substance and Sexual Activity    Alcohol use: Never    Drug use: Never    Sexual activity: Not Currently     Review of Systems   Constitutional:  Positive for activity change and fatigue.   Respiratory:  Positive for shortness of breath.    Gastrointestinal:  Positive for nausea and vomiting.   Neurological:  Positive for weakness.   All other systems reviewed and are negative.    Objective:     Vital Signs (Most Recent):  Temp: 97.5 °F (36.4 °C) (04/16/25 1154)  Pulse: 96 (04/16/25 1200)  Resp: 16 (04/16/25 1154)  BP: (!) 112/58 (04/16/25 1154)  SpO2: 95 % (04/16/25 1200) Vital Signs (24h Range):  Temp:  [97.2 °F (36.2 °C)-98.4 °F (36.9 °C)] 97.5 °F (36.4 °C)  Pulse:  [] 96  Resp:  [16-20] 16  SpO2:  [92 %-99 %] 95 %  BP: ()/(46-83) 112/58     Weight: (!) 136.1 kg (300 lb)  Body mass index is 49.92 kg/m².     Physical Exam  Vitals reviewed.   Constitutional:       Appearance: She is obese. She is ill-appearing.   HENT:      Head: Normocephalic.      Nose: Nose normal.      Mouth/Throat:      Mouth: Mucous membranes are dry.   Eyes:      Extraocular Movements: Extraocular movements intact.   Cardiovascular:      Rate and Rhythm: Tachycardia present.   Pulmonary:      Breath sounds: Rhonchi present.   Abdominal:      Tenderness: There is no abdominal tenderness.   Musculoskeletal:         General: Normal range of motion.      Cervical back: Normal range of motion.   Skin:     General: Skin is warm and dry.      Capillary Refill: Capillary refill takes 2 to 3 seconds.   Neurological:      General: No focal deficit present.      Mental Status: She is alert.            I have reviewed all pertinent lab results within the past 24 hours.  CBC:   Recent Labs   Lab 04/16/25  0503   WBC 20.51*   RBC 2.70*   HGB 7.9*   HCT 25.4*   PLT 90*   MCV 94.1   MCH 29.3   MCHC 31.1*     BMP:   Recent Labs   Lab 04/13/25  0331 04/15/25  0315 04/16/25  0315   *   < > 212*   *   < > 126*   K 3.5   < > 4.2   CL  "102   < > 98   CO2 19*   < > 19*   BUN 40*   < > 48*   CREATININE 1.66*   < > 2.27*   CALCIUM 8.0*   < > 8.5   MG 1.7  --   --     < > = values in this interval not displayed.     CMP:   Recent Labs   Lab 04/15/25  0315 04/16/25  0315   * 212*   CALCIUM 8.3* 8.5   ALBUMIN 1.9*  --    PROT 4.6*  --    * 126*   K 3.9 4.2   CO2 19* 19*    98   BUN 44* 48*   CREATININE 2.09* 2.27*   ALKPHOS 211*  --    ALT 27  --    AST 31  --    BILITOT 0.7  --      Microbiology Results (last 7 days)       Procedure Component Value Units Date/Time    Culture, Lower Respiratory [[id number]] Collected: 04/15/25 0943    Order Status: Completed Specimen: Respiratory from BAL Updated: 04/16/25 0842     Culture, Lower Respiratory No Growth To Date    AFB Smear Only [[id number]] Collected: 04/15/25 0943    Order Status: Completed Specimen: Respiratory from BAL Updated: 04/15/25 2200     AFB Smear No acid fast bacilli seen    Gram Stain [[id number]] Collected: 04/15/25 0943    Order Status: Completed Specimen: Respiratory from BAL Updated: 04/15/25 1249     Gram Stain Result Moderate WBC observed      Few WBC observed      Rare Gram positive cocci    Direct Prep (Other) Fungus [[id number]] Collected: 04/15/25 0943    Order Status: Completed Specimen: Respiratory from BAL Updated: 04/15/25 1249     Direct Prep No fungal elements seen    Culture, Fungus (Other) [[id number]] Collected: 04/15/25 0943    Order Status: Resulted Specimen: Respiratory from BAL Updated: 04/15/25 1123    Culture, AFB [[id number]] Collected: 04/15/25 0943    Order Status: Sent Specimen: Respiratory from BAL Updated: 04/15/25 1110          Specimen (24h ago, onward)      None          No results for input(s): "COLORU", "CLARITYU", "SPECGRAV", "PHUR", "PROTEINUA", "GLUCOSEU", "BILIRUBINCON", "BLOODU", "WBCU", "RBCU", "BACTERIA", "MUCUS", "NITRITE", "LEUKOCYTESUR", "UROBILINOGEN", "HYALINECASTS" in the last 168 hours.    Significant Diagnostics:  I have " reviewed all pertinent imaging results/findings within the past 24 hours.    Assessment/Plan:     No notes have been filed under this hospital service.  Service: General Surgery    VTE Risk Mitigation (From admission, onward)      None            Thank you for your consult. I will follow-up with patient. Please contact us if you have any additional questions.    GISELLA Smith  General Surgery  [facility]

## 2025-04-16 NOTE — ASSESSMENT & PLAN NOTE
"On Reglan and PPI for erosive gastritis; will monitor    EGD by Dr. Lo during recent past admission at [facility]:  "EGD on 03/21/2025 shows LA class B erosive esophagitis but no pill esophagitis, nonerosive gastritis and retention of food and fluid suspicious for gastroparesis, due to narcotics and diabetes. "    04/ 08 try additional meds to help gastric emptying.  04/09 taking in some now orally with recent changes meds  04/10 better and ate half [place] for lunch   04/14 tolerating enteral feedings so far  4/15 we will get PEG tube if patient is agreeable.  Daughter is agreeable.  Discussed with her  4/16 patient wants PEG tube  "

## 2025-04-16 NOTE — HOSPITAL COURSE
04/16/25 Recommend GI consult to evaluate nausea and vomiting.     04/17/25 No acute changes overnight.  Patient is sleeping this morning on exam, but easily aroused.  She denies nausea or vomiting overnight.  Dr Tapia does not recommend gastrostomy tube placement at this time due to patient's other comorbidities (including gastroparesis and metastatic cancer. )  Recommendation for small frequent meals as tolerated.  Possible enteral tube on discharge. This was discussed with Dr. Lagos and Dr. Tapia.  Dr. Lagos to discussed with the family.  General surgery will sign off for now but we will be available for further assistance with this patient as needed.    04/22/25 General surgery re-consulted to evaluate abnormal CT scan and elevated WBC. It is also reported there is hard area above umbilicus on abdomnial wall,  Ct Scan noted was reviewed by Dr Tapia.  Small fluid collection noted in upper abdomen near pancreatic head consistent with pancreatitis due to elevation of liver enzymes.  Ct note no abnormalities on abdominal wall.  Evaluation per Dr Tapia noted small fat containing umbilical hernia without incarceration.  No surgical intervention at this time.

## 2025-04-16 NOTE — ASSESSMENT & PLAN NOTE
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 Most recent hemoglobin and hematocrit are listed below.  Recent Labs     04/14/25  0504 04/16/25  0503   HGB 8.4* 7.9*   HCT 25.4* 25.4*     Plan  - Monitor serial CBC: Daily  - Transfuse PRBC if patient becomes hemodynamically unstable, symptomatic or H/H drops below 7/21.  - Patient has not received any PRBC transfusions to date  - Patient's anemia is currently stable  - follow  No evidence of bleeding

## 2025-04-16 NOTE — ASSESSMENT & PLAN NOTE
Appears to have UTI and pneumonia; has leukocytosis; has opacities on CXR; has G- bacilli on urine culture; blood cultures pending; will place on Vancomycin and Rocephin; BP on the low side; on IVF    04/12 worsening chest x-ray, ask Pulmonary to review  4/15 plan for BAL today.  Continue antibiotic  4/16 7 days antibiotic

## 2025-04-16 NOTE — SUBJECTIVE & OBJECTIVE
Interval History:  No acute events overnight      Objective:     Vital Signs (Most Recent):  Temp: 98.1 °F (36.7 °C) (04/16/25 0747)  Pulse: 108 (04/16/25 0747)  Resp: 18 (04/16/25 0747)  BP: 110/80 (04/16/25 0747)  SpO2: 98 % (04/16/25 0747) Vital Signs (24h Range):  Temp:  [97.2 °F (36.2 °C)-98.4 °F (36.9 °C)] 98.1 °F (36.7 °C)  Pulse:  [] 108  Resp:  [16-18] 18  SpO2:  [92 %-99 %] 98 %  BP: ()/(46-88) 110/80     Weight: (!) 136.1 kg (300 lb)  Body mass index is 49.92 kg/m².    Intake/Output Summary (Last 24 hours) at 4/16/2025 1102  Last data filed at 4/16/2025 0415  Gross per 24 hour   Intake 60 ml   Output 500 ml   Net -440 ml         Physical Exam  Vitals reviewed.   Constitutional:       General: She is awake. She is not in acute distress.     Appearance: She is well-developed. She is morbidly obese. She is not toxic-appearing.   HENT:      Head: Normocephalic.      Nose: Nose normal.      Mouth/Throat:      Pharynx: Oropharynx is clear.   Eyes:      Extraocular Movements: Extraocular movements intact.      Pupils: Pupils are equal, round, and reactive to light.   Neck:      Thyroid: No thyroid mass.      Vascular: No carotid bruit.   Cardiovascular:      Rate and Rhythm: Normal rate and regular rhythm.      Pulses: Normal pulses.      Heart sounds: Normal heart sounds. No murmur heard.  Pulmonary:      Effort: Pulmonary effort is normal.      Breath sounds: Normal breath sounds and air entry. No wheezing.   Abdominal:      General: Bowel sounds are normal. There is no distension.      Palpations: Abdomen is soft.      Tenderness: There is no abdominal tenderness.   Musculoskeletal:         General: Normal range of motion.      Cervical back: Neck supple. No rigidity.   Skin:     General: Skin is warm.      Coloration: Skin is not jaundiced.      Findings: No lesion.   Neurological:      General: No focal deficit present.      Mental Status: She is alert and oriented to person, place, and time.       Cranial Nerves: No cranial nerve deficit.   Psychiatric:         Attention and Perception: Attention normal.         Mood and Affect: Mood normal.         Behavior: Behavior normal. Behavior is cooperative.         Thought Content: Thought content normal.         Cognition and Memory: Cognition normal.               Significant Labs: All pertinent labs within the past 24 hours have been reviewed.  BMP:   Recent Labs   Lab 04/16/25  0315   *   *   K 4.2   CL 98   CO2 19*   BUN 48*   CREATININE 2.27*   CALCIUM 8.5       CBC:   Recent Labs   Lab 04/16/25  0503   WBC 20.51*   HGB 7.9*   HCT 25.4*   PLT 90*       CMP:   Recent Labs   Lab 04/15/25  0315 04/16/25  0315   * 126*   K 3.9 4.2    98   CO2 19* 19*   * 212*   BUN 44* 48*   CREATININE 2.09* 2.27*   CALCIUM 8.3* 8.5   PROT 4.6*  --    ALBUMIN 1.9*  --    BILITOT 0.7  --    ALKPHOS 211*  --    AST 31  --    ALT 27  --    ANIONGAP 13 13         Significant Imaging: I have reviewed all pertinent imaging results/findings within the past 24 hours.      Intake/Output - Last 3 Shifts         04/14 0700  04/15 0659 04/15 0700  04/16 0659 04/16 0700 04/17 0659    I.V. (mL/kg) 584.5 (4.3)      NG/GT  60     Total Intake(mL/kg) 584.5 (4.3) 60 (0.4)     Urine (mL/kg/hr) 500 (0.2) 500 (0.2)     Stool  0     Total Output 500 500     Net +84.5 -440            Stool Occurrence  1 x           Microbiology Results (last 7 days)       Procedure Component Value Units Date/Time    Culture, Lower Respiratory [[id number]] Collected: 04/15/25 0943    Order Status: Completed Specimen: Respiratory from BAL Updated: 04/16/25 0842     Culture, Lower Respiratory No Growth To Date    AFB Smear Only [[id number]] Collected: 04/15/25 0943    Order Status: Completed Specimen: Respiratory from BAL Updated: 04/15/25 2200     AFB Smear No acid fast bacilli seen    Gram Stain [[id number]] Collected: 04/15/25 0943    Order Status: Completed Specimen: Respiratory from  BAL Updated: 04/15/25 1249     Gram Stain Result Moderate WBC observed      Few WBC observed      Rare Gram positive cocci    Direct Prep (Other) Fungus [[id number]] Collected: 04/15/25 0943    Order Status: Completed Specimen: Respiratory from BAL Updated: 04/15/25 1249     Direct Prep No fungal elements seen    Culture, Fungus (Other) [[id number]] Collected: 04/15/25 0943    Order Status: Resulted Specimen: Respiratory from BAL Updated: 04/15/25 1123    Culture, AFB [[id number]] Collected: 04/15/25 0943    Order Status: Sent Specimen: Respiratory from BAL Updated: 04/15/25 1110            Review of Systems

## 2025-04-16 NOTE — PROGRESS NOTES
[facility]  [facility]  Progress Note    Patient Name: Magan Saleem  MRN: [id number]  Patient Class: IP- Inpatient   Admission Date: 4/3/2025  Length of Stay: 13 days  Attending Physician: Wally Lagos DO  Primary Care Provider: Sil Brown DO        Subjective     Principal Problem:Sepsis due to pneumonia        HPI:  68 yo F presents to [place] ED from [facility] for abnormal labs.  Patient was discharged from [facility] two days ago to skilled nursing facility for rehab.  She was diagnosed with dehydration due to gastroparesis with UTI.  Patient has metastatic lung cancer (to the brain) and follows with Dr. Swanson for IV chemotherapy every other week and takes lazertinib daily.  She has completed her course of radiation for the brain mets and follow had showed some improvement.  I thought she had either some decreased LOC due to encephalopathy or some aphasia from the brain mets but after a great effort to get her to talk, I realized she was just mad.  She wanted to know why she had been discharged two days ago when she could not eat.  She has no appetite and early satiety.  She is not nauseated and no vomiting.  She has decided on a DNR status previously (her caretaker and friend of 20 years) states that she had always said she did not want resuscitation if she experienced cardiopulmonary arrest and had told her children her wishes but she does want treatment and is not opposed to J tube if needed.  She has not had anything to eat or drink in two days and is dehydrated again.      Patient was transferred because of concern of sepsis.  She did have enterococcus faecalis UTI at [facility] and was treated.  I do not have the culture results but cultures were sent and patient does have some yeast noted.  (Will not treat a yeast colonization).  She is afebrile and hemodynamically stable and does not look septic clinically.  Her Lactic acid is stable at 2.5 dara 3.2.  Will check  another in am after volume resuscitation.  Her creat is at baseline but she has prerenal azotemia further confirming the dehydration.  Patient has an IO in place from [facility] due to dehydration and difficult stick but with some volume resuscitation, we have gotten an IV.  She is covid and flu negative.      Her WBC is 29 and I am not sure if she has received neupogen but could be a stress reaction.  Her BS is 245 and she does have some urine ketones but most likely due to starvation ketosis.  Will check a serum ketone.  Her platelets are 88K but this is most likely from her chemo.  She is not anemic.  CXR shows some patchy infiltrate but no obvious obstructive pneumonia from her lung cancer.  Her BNP about 3K but no clinical signs of CHF.  No recent echo but due to her BMI 50 most likely has some PHTN.  EKG had no ischemic changes but tachy at 114 which could also be from dehydration.  She was on ozempic for her DM and this could be the cause of her decreased appetite.  She is also on decadron for prevention of cerebral edema.      Remainder of ROS as below.  She mostly just nods and shakes her head and rolls her eyes.  You can get her to laugh if you try but she has been depressed since she has not walked since her hospitalization at [facility] on 3/19/25 and was discharged two days ago.  She says that at her last chemo she noticed she was getting weaker and her daughter had to help her in and out of the car and that was new for her.      See assessment and plan below for problem based evaluation       Overview/Hospital Course:  69 year old female presents with hypernatremia; she is not too talkative during rounds    4/5- patient seen examined today resting comfortably in bed and in no acute distress, with no acute events overnight.  Patient has a multitude of issues complicating her medical picture.  White blood cell count 71931 today, sodium 159, potassium 3.2 and BUN creatinine 59 and 1.8.  The patient is still not eating  even when assistance is provided.  We have already changed her fluids to half-normal saline with 20 of KCl at 1:25 a.m..  Have also put in a GI consult for possible feeding tube.  E coli in the urine has turned out to be ESBL and Rocephin has been changed Merrem.    4/6- the patient seen examined today resting comfortably in bed, in no acute distress, in no acute events overnight.  The patient is not very talkative today, but states she is doing okay.  She has no acute complaints.  Blood cultures remain negative.  The patient has not eaten since yesterday and is on light IV fluids.  GI has been consulted for feeding tube.  Continues on Merrem for positive urine culture.    04/07 Records reviewed. Not eating which not new, Similar during recent admit at [facility]. Dr Swanson there had question pancreatitis. No abd pain or tenderness reported now. Question of dysphagia to solids. Chart hx gastroparesis. Will discuss with GI. Ronnie says has responded so far well to treatment for lung CA.   04/08 had EGD at [facility] 03/21/25 with no obstruction and evidence gastroparesis. Still not ending. Try additional meds. Talked with GI. Increase activity  04/09 Some better with med  changes  04/10 Continues to do better. Ate 1/2 fish sandwich for lunch. Called by Dr Swanson and she wanting to keep feeding tube in consideration. Talked with Dr Reich and Dr Lemus. If something needed with gastroparesis would have to have post gastric position of tube.   04/11 eating some. Later staff requested some nystatin for sore mouth.   04/12 still not eating well.  Increased peripheral edema.  Albumin low at 1.5.  Will give some albumin and follow with some Lasix.  Placed Dobbhoff tube and start enteral feedings.  This will help with nutrition and if issue of placing surgical tube later make sure will tolerate enteral feedings.  Chest x-ray continues worse on left side.  Discuss with Pulmonary and ask Dr. Villasenor to see.   04/13 no new issues.   Enteral feedings to be started.  Pulmonary evaluation appreciated and plans noted.  04/14 Tolerating feedings Therapy working with her. Bronchoscopy planned.   4/15 BAL today  4/16 bronch yesterday looks like pneumonitis    Interval History:  No acute events overnight      Objective:     Vital Signs (Most Recent):  Temp: 98.1 °F (36.7 °C) (04/16/25 0747)  Pulse: 108 (04/16/25 0747)  Resp: 18 (04/16/25 0747)  BP: 110/80 (04/16/25 0747)  SpO2: 98 % (04/16/25 0747) Vital Signs (24h Range):  Temp:  [97.2 °F (36.2 °C)-98.4 °F (36.9 °C)] 98.1 °F (36.7 °C)  Pulse:  [] 108  Resp:  [16-18] 18  SpO2:  [92 %-99 %] 98 %  BP: ()/(46-88) 110/80     Weight: (!) 136.1 kg (300 lb)  Body mass index is 49.92 kg/m².    Intake/Output Summary (Last 24 hours) at 4/16/2025 1102  Last data filed at 4/16/2025 0415  Gross per 24 hour   Intake 60 ml   Output 500 ml   Net -440 ml         Physical Exam  Vitals reviewed.   Constitutional:       General: She is awake. She is not in acute distress.     Appearance: She is well-developed. She is morbidly obese. She is not toxic-appearing.   HENT:      Head: Normocephalic.      Nose: Nose normal.      Mouth/Throat:      Pharynx: Oropharynx is clear.   Eyes:      Extraocular Movements: Extraocular movements intact.      Pupils: Pupils are equal, round, and reactive to light.   Neck:      Thyroid: No thyroid mass.      Vascular: No carotid bruit.   Cardiovascular:      Rate and Rhythm: Normal rate and regular rhythm.      Pulses: Normal pulses.      Heart sounds: Normal heart sounds. No murmur heard.  Pulmonary:      Effort: Pulmonary effort is normal.      Breath sounds: Normal breath sounds and air entry. No wheezing.   Abdominal:      General: Bowel sounds are normal. There is no distension.      Palpations: Abdomen is soft.      Tenderness: There is no abdominal tenderness.   Musculoskeletal:         General: Normal range of motion.      Cervical back: Neck supple. No rigidity.   Skin:      General: Skin is warm.      Coloration: Skin is not jaundiced.      Findings: No lesion.   Neurological:      General: No focal deficit present.      Mental Status: She is alert and oriented to person, place, and time.      Cranial Nerves: No cranial nerve deficit.   Psychiatric:         Attention and Perception: Attention normal.         Mood and Affect: Mood normal.         Behavior: Behavior normal. Behavior is cooperative.         Thought Content: Thought content normal.         Cognition and Memory: Cognition normal.               Significant Labs: All pertinent labs within the past 24 hours have been reviewed.  BMP:   Recent Labs   Lab 04/16/25  0315   *   *   K 4.2   CL 98   CO2 19*   BUN 48*   CREATININE 2.27*   CALCIUM 8.5       CBC:   Recent Labs   Lab 04/16/25  0503   WBC 20.51*   HGB 7.9*   HCT 25.4*   PLT 90*       CMP:   Recent Labs   Lab 04/15/25  0315 04/16/25 0315   * 126*   K 3.9 4.2    98   CO2 19* 19*   * 212*   BUN 44* 48*   CREATININE 2.09* 2.27*   CALCIUM 8.3* 8.5   PROT 4.6*  --    ALBUMIN 1.9*  --    BILITOT 0.7  --    ALKPHOS 211*  --    AST 31  --    ALT 27  --    ANIONGAP 13 13         Significant Imaging: I have reviewed all pertinent imaging results/findings within the past 24 hours.      Intake/Output - Last 3 Shifts         04/14 0700  04/15 0659 04/15 0700 04/16 0659 04/16 0700 04/17 0659    I.V. (mL/kg) 584.5 (4.3)      NG/GT  60     Total Intake(mL/kg) 584.5 (4.3) 60 (0.4)     Urine (mL/kg/hr) 500 (0.2) 500 (0.2)     Stool  0     Total Output 500 500     Net +84.5 -440            Stool Occurrence  1 x           Microbiology Results (last 7 days)       Procedure Component Value Units Date/Time    Culture, Lower Respiratory [[id number]] Collected: 04/15/25 0943    Order Status: Completed Specimen: Respiratory from BAL Updated: 04/16/25 0842     Culture, Lower Respiratory No Growth To Date    AFB Smear Only [[id number]] Collected: 04/15/25 0943     Order Status: Completed Specimen: Respiratory from BAL Updated: 04/15/25 2200     AFB Smear No acid fast bacilli seen    Gram Stain [[id number]] Collected: 04/15/25 0943    Order Status: Completed Specimen: Respiratory from BAL Updated: 04/15/25 1249     Gram Stain Result Moderate WBC observed      Few WBC observed      Rare Gram positive cocci    Direct Prep (Other) Fungus [[id number]] Collected: 04/15/25 0943    Order Status: Completed Specimen: Respiratory from BAL Updated: 04/15/25 1249     Direct Prep No fungal elements seen    Culture, Fungus (Other) [[id number]] Collected: 04/15/25 0943    Order Status: Resulted Specimen: Respiratory from BAL Updated: 04/15/25 1123    Culture, AFB [[id number]] Collected: 04/15/25 0943    Order Status: Sent Specimen: Respiratory from BAL Updated: 04/15/25 1110            Review of Systems      Assessment & Plan  Sepsis due to pneumonia  Appears to have UTI and pneumonia; has leukocytosis; has opacities on CXR; has G- bacilli on urine culture; blood cultures pending; will place on Vancomycin and Rocephin; BP on the low side; on IVF    04/12 worsening chest x-ray, ask Pulmonary to review  4/15 plan for BAL today.  Continue antibiotic  4/16 7 days antibiotic  Hypernatremia  Due to dehydration; off diuretics; on IVF; will monitor Na levels  Lung cancer metastatic to brain  DNR but not hospice.  Receives chemo and has finished radiation.    04/07 reported stable / improved after treatment  04/09 more fuffy right side CXR  04/12 continue to abnormality on chest x-ray and ask Pulmonary to review  4/15 follows with Dr. Swanson.  No more chemotherapy treatments until patient is stronger.  Discussed with Dr. Swanson  4/16 hold treatments for now    Thrombocytopenia  On chemo; will monitor    Essential hypertension  Vitals:    04/15/25 1149 04/15/25 1220 04/15/25 1248 04/15/25 1348   BP: 120/68 113/63 110/79 116/83    04/15/25 1449 04/15/25 1545 04/15/25 2000 04/16/25 0003   BP: (!)  "106/58 106/79 112/70 (!) 103/56    04/16/25 0342 04/16/25 0747   BP: (!) 99/46 110/80         Monitor BP while on Metoprolol; on IVF; off Losartan    Moderate persistent asthma without complication  Continue home inhaled steroid/bronchodilator and singulair    Chronic kidney disease, stage 3b  Creatine stable   Chronic pulmonary embolism without acute cor pulmonale  Eliquis was discontinued due to risk of ICH with brain mets but they have improved so the anti-coagulation has been restarted; will monitor  04/07 apparently this of several years ago    Type 2 diabetes mellitus with hyperglycemia  BS elevated; will increase Lantus to 25 units; continue SSI;monitor BS     Gastroparesis  On Reglan and PPI for erosive gastritis; will monitor    EGD by Dr. Lo during recent past admission at [facility]:  "EGD on 03/21/2025 shows LA class B erosive esophagitis but no pill esophagitis, nonerosive gastritis and retention of food and fluid suspicious for gastroparesis, due to narcotics and diabetes. "    04/ 08 try additional meds to help gastric emptying.  04/09 taking in some now orally with recent changes meds  04/10 better and ate half [place] for lunch   04/14 tolerating enteral feedings so far  4/15 we will get PEG tube if patient is agreeable.  Daughter is agreeable.  Discussed with her  4/16 patient wants PEG tube  Morbid obesity  Body mass index is 49.92 kg/m². Morbid obesity complicates all aspects of disease management from diagnostic modalities to treatment. Weight loss encouraged and health benefits explained to patient.     Would benefit from sleep study and possible CPAP.  Does not wear oxygen at home.      Edema due to hypoalbuminemia    04/12 add IV albumin and some Lasix  Start enteral feedings  04/13 start enteral feedings  Abnormal chest x-ray    04/13 addressed by Pulmonary with further investigation plans  Anemia  Anemia is likely due to chronic disease due to Malignancy. Most recent hemoglobin " and hematocrit are listed below.  Recent Labs     04/14/25  0504 04/16/25  0503   HGB 8.4* 7.9*   HCT 25.4* 25.4*     Plan  - Monitor serial CBC: Daily  - Transfuse PRBC if patient becomes hemodynamically unstable, symptomatic or H/H drops below 7/21.  - Patient has not received any PRBC transfusions to date  - Patient's anemia is currently stable  - follow  No evidence of bleeding  Hyponatremia  Hyponatremia is likely due to renal insufficiency. The patient's most recent sodium results are listed below.  Recent Labs     04/15/25  0315 04/16/25  0315   * 126*     Plan  - Correct the sodium by 4-6mEq in 24 hours.   - Obtain the following studies:  In a.  - Will treat the hyponatremia with continue LR  - Monitor sodium Daily.   - Patient hyponatremia is stable  - follow  Follow  Neoplastic (malignant) related fatigue    Progressive mobility protocol  Pneumonitis  IV steroids per pulmonology    VTE Risk Mitigation (From admission, onward)      None            Discharge Planning   MITUL:      Code Status: DNR   Medical Readiness for Discharge Date:   Discharge Plan A: Home with family        51 minutes independent of advanced care planning spent on face to face patient interaction, discussion with family, ancillary staff, and/or other physicians as well as review of pertinent labs, images, and notes.           Please place Justification for DME        Wally Lagos DO  Department of Hospital Medicine   [facility]

## 2025-04-16 NOTE — CONSULTS
[facility]  General Surgery  Consult Note    Patient Name: Magan Saleem  MRN: [id number]  Code Status: DNR  Admission Date: 4/3/2025  Hospital Length of Stay: 13 days  Attending Physician: Wally Lagos DO  Primary Care Provider: Sil Brown DO  Consulting provider:  Cabrera tapia MD  Patient information was obtained from patient, past medical records, ER records, and primary team.     Inpatient consult to General Surgery  Consult performed by: Debra Pizarro FNP  Consult ordered by: Wally Lagos DO  Reason for consult: peg tube placement        Subjective:     Principal Problem: Sepsis due to pneumonia    History of Present Illness: Patient is admitted to hospital medicine for uti and dehydration. She has history of lung cancer with metastasis to the brain.  Patient was recently hospitalized at [facility] and was discharged to [facility] bed.  She returned to [facility] with dehydration and UTI.  She has not been able to eat or drink since her discharge.  She requests PEG tube placement and General surgery was consulted.  Patient has receiving tube feeding per enteral tube.   Patient is awake and alert.  She is agreeable to peg placement.  Discussed with Dr Tapia, who recommends GI consult to evaluate nausea and vomiting prior to surgical procedure.     No current facility-administered medications on file prior to encounter.     Current Outpatient Medications on File Prior to Encounter   Medication Sig    albuterol (VENTOLIN HFA) 90 mcg/actuation inhaler Inhale 2 puffs into the lungs every 6 (six) hours as needed for Wheezing. Rescue    albuterol-ipratropium (DUO-NEB) 2.5 mg-0.5 mg/3 mL nebulizer solution Take 3 mLs by nebulization every 4 (four) hours as needed for Wheezing or Shortness of Breath. Rescue    ALLERGY RELIEF, CETIRIZINE, 10 mg tablet TAKE ONE TABLET BY MOUTH ONCE DAILY    ascorbic acid, vitamin C, (VITAMIN C) 500 MG tablet Twice Daily    aspirin 81 MG Chew Take 1  tablet (81 mg total) by mouth once daily. (Patient not taking: Reported on 10/30/2024)    atorvastatin (LIPITOR) 10 MG tablet TAKE ONE TABLET BY MOUTH ONCE DAILY    benzonatate (TESSALON) 100 MG capsule Take 100 mg by mouth 3 (three) times daily as needed for Cough. (Patient not taking: Reported on 10/30/2024)    blood-glucose meter,continuous (FREESTYLE OLIVA 3 READER) [facility] use as directed    blood-glucose sensor (FREESTYLE OLIVA 3 SENSOR) [facility] use as directed    cholecalciferol, vitamin D3, (VITAMIN D3) 50 mcg (2,000 unit) Tab Take 1 tablet (2,000 Units total) by mouth once daily.    dexAMETHasone (DECADRON) 4 MG Tab Take 4 mg by mouth.    doxycycline (DORYX) 100 MG EC tablet Take 100 mg by mouth 2 (two) times daily.    ELIQUIS 5 mg Tab TAKE ONE TABLET BY MOUTH TWICE DAILY    empagliflozin (JARDIANCE) 10 mg tablet Take 10 mg by mouth.    fluticasone-umeclidin-vilanter (TRELEGY ELLIPTA) 100-62.5-25 mcg DsDv Inhale 1 puff into the lungs once daily.    furosemide (LASIX) 40 MG tablet Take by mouth.    glipiZIDE 5 MG TR24 Take 5 mg by mouth every morning. (Patient not taking: Reported on 10/30/2024)    insulin aspart U-100 (NOVOLOG FLEXPEN U-100 INSULIN) 100 unit/mL (3 mL) InPn pen Per sliding scale max daily dose 30 units; subcutaneously; three times a day with meals    losartan-hydrochlorothiazide 50-12.5 mg (HYZAAR) 50-12.5 mg per tablet Take 1 tablet by mouth.    metoclopramide HCl (REGLAN) 5 MG tablet Take 5 mg by mouth 4 (four) times daily.    metoprolol succinate (TOPROL-XL) 50 MG 24 hr tablet Take 50 mg by mouth once daily.    metoprolol tartrate (LOPRESSOR) 25 MG tablet TAKE ONE TABLET BY MOUTH TWICE DAILY    montelukast (SINGULAIR) 10 mg tablet TAKE ONE TABLET BY MOUTH EVERY EVENING    ondansetron (ZOFRAN) 4 MG tablet Take 4 mg by mouth every 8 (eight) hours as needed. AS NEEDED FOR NAUSEA    osimertinib (TAGRISSO) 80 mg Tab Take 40 mg by mouth once daily.    OZEMPIC 0.25 mg or 0.5 mg (2 mg/3 mL) pen  injector Inject 0.25 mg into the skin.    pantoprazole (PROTONIX) 40 MG tablet Take 40 mg by mouth once daily.    potassium chloride (KLOR-CON) 10 MEQ TbSR TAKE ONE TABLET BY MOUTH ONCE DAILY    predniSONE (DELTASONE) 20 MG tablet Take 20 mg by mouth. for five days    rOPINIRole (REQUIP) 0.25 MG tablet Take 0.25 mg by mouth 3 (three) times daily.    sertraline (ZOLOFT) 25 MG tablet Take 1 tablet (25 mg total) by mouth once daily.    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 Swanson    Melanocytic nevi of lower limb, including hip, left     Mixed hyperlipidemia     Moderate persistent asthma without complication 10/30/2024    Other pulmonary embolism without acute cor pulmonale     Vitamin D deficiency      Past Surgical History:   Procedure Laterality Date    CHOLECYSTECTOMY      CSF SHUNT      HYSTERECTOMY      TONSILLECTOMY       Family History       Problem Relation (Age of Onset)    Diabetes Mother    Hypertension Mother    Lung cancer Father          Tobacco Use    Smoking status: Never    Smokeless tobacco: Never   Substance and Sexual Activity    Alcohol use: Never    Drug use: Never    Sexual activity: Not Currently      Review of Systems   Constitutional:  Positive for activity change and fatigue.   Respiratory:  Positive for shortness of breath.    Gastrointestinal:  Positive for nausea and vomiting.   Neurological:  Positive for weakness.   All other systems reviewed and are negative.    Objective:     Vital Signs (Most Recent):  Temp: 97.5 °F (36.4 °C) (04/16/25 1154)  Pulse: 96 (04/16/25 1200)  Resp: 16 (04/16/25 1154)  BP: (!) 112/58 (04/16/25 1154)  SpO2: 95 % (04/16/25 1200) Vital Signs (24h Range):  Temp:  [97.2 °F (36.2 °C)-98.4 °F (36.9 °C)] 97.5 °F (36.4 °C)  Pulse:  [] 96  Resp:  [16-20] 16  SpO2:  [92 %-99 %] 95 %  BP: ()/(46-83) 112/58     Weight: (!) 136.1 kg (300 lb)  Body mass index is 49.92 kg/m².     Physical Exam  Vitals reviewed.   Constitutional:       Appearance: She is obese. She is ill-appearing.   HENT:      Head: Normocephalic.      Nose: Nose normal.      Mouth/Throat:      Mouth: Mucous membranes are dry.   Eyes:      Extraocular Movements: Extraocular movements intact.   Cardiovascular:      Rate and Rhythm: Tachycardia present.   Pulmonary:      Breath sounds: Rhonchi present.   Abdominal:      Tenderness: There is no abdominal tenderness.   Musculoskeletal:         General: Normal range of motion.      Cervical back: Normal range of motion.   Skin:     General: Skin is warm and dry.      Capillary Refill: Capillary refill takes 2 to 3 seconds.   Neurological:      General: No focal deficit present.      Mental Status: She is alert.            I have reviewed all pertinent lab results within the past 24 hours.  CBC:   Recent Labs   Lab 04/16/25  0503   WBC 20.51*   RBC 2.70*   HGB 7.9*   HCT 25.4*   PLT 90*   MCV 94.1   MCH 29.3   MCHC 31.1*     BMP:   Recent Labs   Lab 04/13/25  0331 04/15/25  0315 04/16/25  0315   *   < > 212*   *   < > 126*   K 3.5   < > 4.2      < > 98   CO2 19*   < > 19*   BUN 40*   < > 48*   CREATININE 1.66*   < > 2.27*   CALCIUM 8.0*   < > 8.5  "  MG 1.7  --   --     < > = values in this interval not displayed.     CMP:   Recent Labs   Lab 04/15/25  0315 04/16/25  0315   * 212*   CALCIUM 8.3* 8.5   ALBUMIN 1.9*  --    PROT 4.6*  --    * 126*   K 3.9 4.2   CO2 19* 19*    98   BUN 44* 48*   CREATININE 2.09* 2.27*   ALKPHOS 211*  --    ALT 27  --    AST 31  --    BILITOT 0.7  --      Microbiology Results (last 7 days)       Procedure Component Value Units Date/Time    Culture, Lower Respiratory [[id number]] Collected: 04/15/25 0943    Order Status: Completed Specimen: Respiratory from BAL Updated: 04/16/25 0842     Culture, Lower Respiratory No Growth To Date    AFB Smear Only [[id number]] Collected: 04/15/25 0943    Order Status: Completed Specimen: Respiratory from BAL Updated: 04/15/25 2200     AFB Smear No acid fast bacilli seen    Gram Stain [[id number]] Collected: 04/15/25 0943    Order Status: Completed Specimen: Respiratory from BAL Updated: 04/15/25 1249     Gram Stain Result Moderate WBC observed      Few WBC observed      Rare Gram positive cocci    Direct Prep (Other) Fungus [[id number]] Collected: 04/15/25 0943    Order Status: Completed Specimen: Respiratory from BAL Updated: 04/15/25 1249     Direct Prep No fungal elements seen    Culture, Fungus (Other) [[id number]] Collected: 04/15/25 0943    Order Status: Resulted Specimen: Respiratory from BAL Updated: 04/15/25 1123    Culture, AFB [[id number]] Collected: 04/15/25 0943    Order Status: Sent Specimen: Respiratory from BAL Updated: 04/15/25 1110          Specimen (24h ago, onward)      None          No results for input(s): "COLORU", "CLARITYU", "SPECGRAV", "PHUR", "PROTEINUA", "GLUCOSEU", "BILIRUBINCON", "BLOODU", "WBCU", "RBCU", "BACTERIA", "MUCUS", "NITRITE", "LEUKOCYTESUR", "UROBILINOGEN", "HYALINECASTS" in the last 168 hours.    Significant Diagnostics:  I have reviewed all pertinent imaging results/findings within the past 24 hours.    Assessment/Plan:     No notes " have been filed under this hospital service.  Service: General Surgery    VTE Risk Mitigation (From admission, onward)      None            Thank you for your consult. I will follow-up with patient. Please contact us if you have any additional questions.    GISELLA Smith  General Surgery  [facility]

## 2025-04-16 NOTE — PROGRESS NOTES
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  Patient was initially admitted to the hospital on April 4 would dehydration gastroparesis UTI.  She has not been able to eat previously in his now admitted to the hospital with concerns over nutrition since she has been in the hospital she has had shortness of breath and has developed worsening right-sided infiltrates.  Patient shortness of breath    Hospital/ICU Course:  4/14/25-asked to see by General Pulmonary for evaluation of possible EBUS bronchoscopy.  Patient with prior EBUS bronchoscopy performed 10/21/24 with evidence of malignancy present in station 4 L and station 4R.  Recent cessation of her EGFR TKI.    Interval History/Significant Events:  Patient without complaints    Review of Systems  Objective:     Vital Signs (Most Recent):  Temp: 97.2 °F (36.2 °C) (04/16/25 0342)  Pulse: 78 (04/16/25 0342)  Resp: 16 (04/15/25 1951)  BP: (!) 99/46 (04/16/25 0342)  SpO2: 99 % (04/16/25 0342) Vital Signs (24h Range):  Temp:  [97.2 °F (36.2 °C)-98.4 °F (36.9 °C)] 97.2 °F (36.2 °C)  Pulse:  [] 78  Resp:  [16-33] 16  SpO2:  [90 %-99 %] 99 %  BP: ()/(46-88) 99/46   Weight: (!) 136.1 kg (300 lb)  Body mass index is 49.92 kg/m².      Intake/Output Summary (Last 24 hours) at 4/16/2025 0560  Last data filed at 4/16/2025 0415  Gross per 24 hour   Intake 60 ml   Output 500 ml   Net -440 ml          Physical Exam  Vitals reviewed.   Constitutional:       Appearance: Normal  "appearance. She is obese.      Interventions: She is not intubated.  HENT:      Head: Normocephalic and atraumatic.      Nose: Nose normal.      Mouth/Throat:      Mouth: Mucous membranes are dry.      Pharynx: Oropharynx is clear.   Eyes:      Extraocular Movements: Extraocular movements intact.      Conjunctiva/sclera: Conjunctivae normal.      Pupils: Pupils are equal, round, and reactive to light.   Cardiovascular:      Rate and Rhythm: Normal rate.      Heart sounds: Normal heart sounds. No murmur heard.  Pulmonary:      Effort: Pulmonary effort is normal. She is not intubated.      Breath sounds: Normal breath sounds.   Abdominal:      General: Abdomen is flat. Bowel sounds are normal.      Palpations: Abdomen is soft.   Musculoskeletal:         General: Normal range of motion.      Cervical back: Normal range of motion and neck supple.      Right lower leg: No edema.      Left lower leg: No edema.   Skin:     General: Skin is warm and dry.      Capillary Refill: Capillary refill takes less than 2 seconds.   Neurological:      General: No focal deficit present.      Mental Status: She is alert and oriented to person, place, and time.   Psychiatric:         Mood and Affect: Mood normal.         Behavior: Behavior normal.            Vents:  Oxygen Concentration (%): 28 (04/15/25 1947)  Lines/Drains/Airways       Drain  Duration                  NG/OG Tube 04/12/25 1620 nasogastric;Other (comments) Right nostril 3 days    Female External Urinary Catheter w/ Suction 04/16/25 0415 <1 day              Peripheral Intravenous Line  Duration                  Peripheral IV - Single Lumen 04/05/25 1430 20 G Other (Comments) Anterior;Left Forearm 10 days                  Significant Labs:    CBC/Anemia Profile:  No results for input(s): "WBC", "HGB", "HCT", "PLT", "MCV", "RDW", "IRON", "FERRITIN", "RETIC", "FOLATE", "OVIDNHVG15", "OCCULTBLOOD" in the last 48 hours.     Chemistries:  Recent Labs   Lab 04/15/25  0319 " "04/16/25 0315   * 126*   K 3.9 4.2    98   CO2 19* 19*   BUN 44* 48*   CREATININE 2.09* 2.27*   CALCIUM 8.3* 8.5   ALBUMIN 1.9*  --    PROT 4.6*  --    BILITOT 0.7  --    ALKPHOS 211*  --    ALT 27  --    AST 31  --        Recent Lab Results  (Last 5 results in the past 24 hours)        04/16/25  0315   04/15/25  2003   04/15/25  1545   04/15/25  1114   04/15/25  0943        Lymphs, Fluid         3       AFB SMEAR         No acid fast bacilli seen       Anion Gap 13               BUN 48               BUN/CREAT RATIO 21               Calcium 8.5               Cell Count Excluding RBCs         133       Chloride 98               Clarity, Body Fluid         Hazy       CO2 19               Color, Body Fluid         Colorless       Creatinine 2.27               Direct Prep         No fungal elements seen       eGFR 23  Comment: Estimated GFR calculated using the CKD-EPI creatinine (2021) equation.               Glucose 212               GRAM STAIN         Moderate WBC observed                Few WBC observed                Rare Gram positive cocci       Macrophages, Fluid Man %         1       POC Glucose   219   155   110         Polys, Fluid Man %         96       Potassium 4.2               RBC, Body Fluid         <3,000       Sodium 126                                      Significant Imaging:  I have reviewed all pertinent imaging results/findings within the past 24 hours.    ABG  No results for input(s): "PH", "PO2", "PCO2", "HCO3", "BE" in the last 168 hours.  Assessment/Plan:     Pulmonary  Pneumonitis  Patient felt to have pneumonitis secondary to immunotherapy has a broncho alveolar lavage done yesterday which is unremarkable she is short of breath and probably has stage III pneumonitis we will plan with negative Bal to start a bolus of steroids 1 g per day x3 days then weaned to oral medicines    Abnormal chest x-ray  Will proceed with broncho alveolar lavage today patient seems comfortable this " morning             Todd Villasenor MD  Critical Care Medicine  [facility]

## 2025-04-17 LAB
ANION GAP SERPL CALCULATED.3IONS-SCNC: 12 MMOL/L (ref 7–16)
BUN SERPL-MCNC: 52 MG/DL (ref 10–20)
BUN/CREAT SERPL: 22 (ref 6–20)
CALCIUM SERPL-MCNC: 8.6 MG/DL (ref 8.4–10.2)
CHLORIDE SERPL-SCNC: 100 MMOL/L (ref 98–107)
CO2 SERPL-SCNC: 19 MMOL/L (ref 23–31)
CREAT SERPL-MCNC: 2.34 MG/DL (ref 0.55–1.02)
EGFR (NO RACE VARIABLE) (RUSH/TITUS): 22 ML/MIN/1.73M2
GLUCOSE SERPL-MCNC: 152 MG/DL (ref 70–105)
GLUCOSE SERPL-MCNC: 168 MG/DL (ref 70–105)
GLUCOSE SERPL-MCNC: 188 MG/DL (ref 70–105)
GLUCOSE SERPL-MCNC: 226 MG/DL (ref 70–105)
GLUCOSE SERPL-MCNC: 229 MG/DL (ref 82–115)
POTASSIUM SERPL-SCNC: 4 MMOL/L (ref 3.5–5.1)
SODIUM SERPL-SCNC: 127 MMOL/L (ref 136–145)

## 2025-04-17 PROCEDURE — 94640 AIRWAY INHALATION TREATMENT: CPT

## 2025-04-17 PROCEDURE — 97110 THERAPEUTIC EXERCISES: CPT

## 2025-04-17 PROCEDURE — 99233 SBSQ HOSP IP/OBS HIGH 50: CPT | Mod: ,,, | Performed by: STUDENT IN AN ORGANIZED HEALTH CARE EDUCATION/TRAINING PROGRAM

## 2025-04-17 PROCEDURE — 36415 COLL VENOUS BLD VENIPUNCTURE: CPT | Performed by: STUDENT IN AN ORGANIZED HEALTH CARE EDUCATION/TRAINING PROGRAM

## 2025-04-17 PROCEDURE — 25000242 PHARM REV CODE 250 ALT 637 W/ HCPCS: Performed by: HOSPITALIST

## 2025-04-17 PROCEDURE — 63600175 PHARM REV CODE 636 W HCPCS: Mod: JZ,TB | Performed by: INTERNAL MEDICINE

## 2025-04-17 PROCEDURE — 27000221 HC OXYGEN, UP TO 24 HOURS

## 2025-04-17 PROCEDURE — 63600175 PHARM REV CODE 636 W HCPCS: Performed by: HOSPITALIST

## 2025-04-17 PROCEDURE — 25000003 PHARM REV CODE 250: Performed by: HOSPITALIST

## 2025-04-17 PROCEDURE — 94761 N-INVAS EAR/PLS OXIMETRY MLT: CPT

## 2025-04-17 PROCEDURE — 25000003 PHARM REV CODE 250: Performed by: STUDENT IN AN ORGANIZED HEALTH CARE EDUCATION/TRAINING PROGRAM

## 2025-04-17 PROCEDURE — 27000207 HC ISOLATION

## 2025-04-17 PROCEDURE — 99233 SBSQ HOSP IP/OBS HIGH 50: CPT | Mod: ,,, | Performed by: INTERNAL MEDICINE

## 2025-04-17 PROCEDURE — 99900035 HC TECH TIME PER 15 MIN (STAT)

## 2025-04-17 PROCEDURE — 80048 BASIC METABOLIC PNL TOTAL CA: CPT | Performed by: HOSPITALIST

## 2025-04-17 PROCEDURE — 36415 COLL VENOUS BLD VENIPUNCTURE: CPT | Performed by: HOSPITALIST

## 2025-04-17 PROCEDURE — 82962 GLUCOSE BLOOD TEST: CPT

## 2025-04-17 PROCEDURE — 11000001 HC ACUTE MED/SURG PRIVATE ROOM

## 2025-04-17 RX ORDER — METOCLOPRAMIDE 5 MG/1
10 TABLET ORAL
Status: DISCONTINUED | OUTPATIENT
Start: 2025-04-17 | End: 2025-04-26 | Stop reason: HOSPADM

## 2025-04-17 RX ADMIN — PANTOPRAZOLE SODIUM 40 MG: 40 TABLET, DELAYED RELEASE ORAL at 08:04

## 2025-04-17 RX ADMIN — INSULIN GLARGINE 25 UNITS: 100 INJECTION, SOLUTION SUBCUTANEOUS at 09:04

## 2025-04-17 RX ADMIN — IPRATROPIUM BROMIDE AND ALBUTEROL SULFATE 3 ML: 2.5; .5 SOLUTION RESPIRATORY (INHALATION) at 03:04

## 2025-04-17 RX ADMIN — MONTELUKAST 10 MG: 10 TABLET, FILM COATED ORAL at 09:04

## 2025-04-17 RX ADMIN — IPRATROPIUM BROMIDE AND ALBUTEROL SULFATE 3 ML: 2.5; .5 SOLUTION RESPIRATORY (INHALATION) at 11:04

## 2025-04-17 RX ADMIN — BUDESONIDE 0.5 MG: 0.5 SUSPENSION RESPIRATORY (INHALATION) at 07:04

## 2025-04-17 RX ADMIN — METHYLPREDNISOLONE SODIUM SUCCINATE 250 MG: 125 INJECTION, POWDER, FOR SOLUTION INTRAMUSCULAR; INTRAVENOUS at 05:04

## 2025-04-17 RX ADMIN — IPRATROPIUM BROMIDE AND ALBUTEROL SULFATE 3 ML: 2.5; .5 SOLUTION RESPIRATORY (INHALATION) at 08:04

## 2025-04-17 RX ADMIN — INSULIN ASPART 2 UNITS: 100 INJECTION, SOLUTION INTRAVENOUS; SUBCUTANEOUS at 01:04

## 2025-04-17 RX ADMIN — ATORVASTATIN CALCIUM 20 MG: 20 TABLET, FILM COATED ORAL at 09:04

## 2025-04-17 RX ADMIN — METOCLOPRAMIDE HYDROCHLORIDE 5 MG: 5 TABLET ORAL at 10:04

## 2025-04-17 RX ADMIN — INSULIN ASPART 4 UNITS: 100 INJECTION, SOLUTION INTRAVENOUS; SUBCUTANEOUS at 08:04

## 2025-04-17 RX ADMIN — SERTRALINE HYDROCHLORIDE 100 MG: 50 TABLET ORAL at 08:04

## 2025-04-17 RX ADMIN — METOCLOPRAMIDE HYDROCHLORIDE 10 MG: 5 TABLET ORAL at 09:04

## 2025-04-17 RX ADMIN — BUDESONIDE 0.5 MG: 0.5 SUSPENSION RESPIRATORY (INHALATION) at 08:04

## 2025-04-17 RX ADMIN — METOCLOPRAMIDE HYDROCHLORIDE 10 MG: 5 TABLET ORAL at 05:04

## 2025-04-17 RX ADMIN — METOCLOPRAMIDE HYDROCHLORIDE 5 MG: 5 TABLET ORAL at 06:04

## 2025-04-17 RX ADMIN — IPRATROPIUM BROMIDE AND ALBUTEROL SULFATE 3 ML: 2.5; .5 SOLUTION RESPIRATORY (INHALATION) at 07:04

## 2025-04-17 NOTE — NURSING
Contacted Hussein for a midline and he suggested ICU since he is in residency. Called ICU and they said no one available to do a midline and suggested ER. Called ER but they also said no one available to do a midline. Nursing supervisor and MD notified.

## 2025-04-17 NOTE — ASSESSMENT & PLAN NOTE
"On Reglan and PPI for erosive gastritis; will monitor    EGD by Dr. Lo during recent past admission at [facility]:  "EGD on 03/21/2025 shows LA class B erosive esophagitis but no pill esophagitis, nonerosive gastritis and retention of food and fluid suspicious for gastroparesis, due to narcotics and diabetes. "    04/ 08 try additional meds to help gastric emptying.  04/09 taking in some now orally with recent changes meds  04/10 better and ate half [place] for lunch   04/14 tolerating enteral feedings so far  4/15 we will get PEG tube if patient is agreeable.  Daughter is agreeable.  Discussed with her  4/16 patient wants PEG tube  4/17 not candidate for surgical PEG.  NGT out, start full liquids  "

## 2025-04-17 NOTE — ASSESSMENT & PLAN NOTE
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 Most recent hemoglobin and hematocrit are listed below.  Recent Labs     04/16/25  0503   HGB 7.9*   HCT 25.4*     Plan  - Monitor serial CBC: Daily  - Transfuse PRBC if patient becomes hemodynamically unstable, symptomatic or H/H drops below 7/21.  - Patient has not received any PRBC transfusions to date  - Patient's anemia is currently stable  - follow  No evidence of bleeding

## 2025-04-17 NOTE — PT/OT/SLP PROGRESS
"Physical Therapy Treatment    Patient Name:  Magan Saleem   MRN:  [id number]    Recommendations:     Discharge Recommendations: Moderate Intensity Therapy  Discharge Equipment Recommendations: to be determined by next level of care  Barriers to discharge:  ongoing medical treatment    Assessment:     Magan Saleem is a 69 y.o. female admitted with a medical diagnosis of Sepsis due to pneumonia.  She presents with the following impairments/functional limitations: weakness, impaired endurance, impaired self care skills, impaired functional mobility, decreased upper extremity function, decreased lower extremity function .    Rehab Prognosis: Good; patient would benefit from acute skilled PT services to address these deficits and reach maximum level of function.    Recent Surgery: * No surgery found *      Plan:     During this hospitalization, patient to be seen 5 x/week to address the identified rehab impairments via gait training, therapeutic activities, therapeutic exercises, neuromuscular re-education and progress toward the following goals:    Plan of Care Expires:  05/04/25    Subjective     Chief Complaint: fatigue   Patient/Family Comments/goals: "I don't feel like I can sit today"  Pain/Comfort:  Pain Rating 1: 0/10      Objective:     Communicated with nurse prior to session.  Patient found HOB elevated with peripheral IV, PureWick, telemetry, oxygen upon PT entry to room.     General Precautions: Standard, fall, contact  Orthopedic Precautions: N/A  Braces: N/A  Respiratory Status: Nasal cannula, flow 2 L/min     Functional Mobility:  NT (patient declined)      AM-PAC 6 CLICK MOBILITY  Turning over in bed (including adjusting bedclothes, sheets and blankets)?: 2  Sitting down on and standing up from a chair with arms (e.g., wheelchair, bedside commode, etc.): 1  Moving from lying on back to sitting on the side of the bed?: 2  Moving to and from a bed to a chair (including a wheelchair)?: 1  Need to walk in " hospital room?: 1  Climbing 3-5 steps with a railing?: 1  Basic Mobility Total Score: 8       Treatment & Education:  -AP, qs, Hip add/abduction, LE flexion/ext; all 3x10 repetitions BLE, rest breaks as needed      Patient left HOB elevated with all lines intact, call button in reach, and Nurse notified..    GOALS:   Multidisciplinary Problems       Physical Therapy Goals          Problem: Physical Therapy    Goal Priority Disciplines Outcome Interventions   Physical Therapy Goal     PT, PT/OT Progressing    Description: Short term goals:  1. Supine to sit with MInimal Assistance  2. Sit to stand transfer with Moderate Assistance  3. Bed to chair transfer with Moderate Assistance using Rolling Walker  4. Sitting at edge of bed x15 minutes with Contact Guard Assistance    Long term goals:  1. Supine to sit with Contact Guard Assistance  2. Sit to stand transfer with Contact Guard Assistance  3. Bed to chair transfer with Contact Guard Assistance using Rolling Walker  4. Gait  x 100 feet with Contact Guard Assistance using Rolling Walker.                          DME Justifications:      Time Tracking:     PT Received On: 04/17/25  PT Start Time: 1105     PT Stop Time: 1123  PT Total Time (min): 18 min     Billable Minutes: Therapeutic Exercise 18    Treatment Type: Treatment  PT/PTA: PT     Number of PTA visits since last PT visit: 0     04/17/2025

## 2025-04-17 NOTE — PROGRESS NOTES
[facility]  [facility] Medicine  Progress Note    Patient Name: Magan Saleem  MRN: [id number]  Patient Class: IP- Inpatient   Admission Date: 4/3/2025  Length of Stay: 14 days  Attending Physician: Wally Lagos DO  Primary Care Provider: Sil Brown DO        Subjective     Principal Problem:Sepsis due to pneumonia        HPI:  70 yo F presents to [place] ED from [facility] for abnormal labs.  [facility].  She was diagnosed with dehydration due to gastroparesis with UTI.  Patient has metastatic lung cancer (to the brain) and follows with Dr. Swanson for IV chemotherapy every other week and takes lazertinib daily.  She has completed her course of radiation for the brain mets and follow had showed some improvement.  I thought she had either some decreased LOC due to encephalopathy or some aphasia from the brain mets but after a great effort to get her to talk, I realized she was just mad.  She wanted to know why she had been discharged two days ago when she could not eat.  She has no appetite and early satiety.  She is not nauseated and no vomiting.  She has decided on a DNR status previously (her caretaker and friend of 20 years) states that she had always said she did not want resuscitation if she experienced cardiopulmonary arrest and had told her children her wishes but she does want treatment and is not opposed to J tube if needed.  She has not had anything to eat or drink in two days and is dehydrated again.      Patient was transferred because of concern of sepsis.  She did have enterococcus faecalis UTI at [facility] and was treated.  I do not have the culture results but cultures were sent and patient does have some yeast noted.  (Will not treat a yeast colonization).  She is afebrile and hemodynamically stable and does not look septic clinically.  Her Lactic acid is stable at 2.5 dara 3.2.  Will check  another in am after volume resuscitation.  Her creat is at baseline but she has prerenal azotemia further confirming the dehydration.  Patient has an IO in place from [facility] due to dehydration and difficult stick but with some volume resuscitation, we have gotten an IV.  She is covid and flu negative.      Her WBC is 29 and I am not sure if she has received neupogen but could be a stress reaction.  Her BS is 245 and she does have some urine ketones but most likely due to starvation ketosis.  Will check a serum ketone.  Her platelets are 88K but this is most likely from her chemo.  She is not anemic.  CXR shows some patchy infiltrate but no obvious obstructive pneumonia from her lung cancer.  Her BNP about 3K but no clinical signs of CHF.  No recent echo but due to her BMI 50 most likely has some PHTN.  EKG had no ischemic changes but tachy at 114 which could also be from dehydration.  She was on ozempic for her DM and this could be the cause of her decreased appetite.  She is also on decadron for prevention of cerebral edema.      Remainder of ROS as below.  She mostly just nods and shakes her head and rolls her eyes.  You can get her to laugh if you try but she has been depressed since she has not walked since her hospitalization at [facility] on 3/19/25 and was discharged two days ago.  She says that at her last chemo she noticed she was getting weaker and her daughter had to help her in and out of the car and that was new for her.      See assessment and plan below for problem based evaluation       Overview/Hospital Course:  69 year old female presents with hypernatremia; she is not too talkative during rounds    4/5- patient seen examined today resting comfortably in bed and in no acute distress, with no acute events overnight.  Patient has a multitude of issues complicating her medical picture.  White blood cell count 42077 today, sodium 159, potassium 3.2 and BUN creatinine 59 and 1.8.  The patient is still not eating  even when assistance is provided.  We have already changed her fluids to half-normal saline with 20 of KCl at 1:25 a.m..  Have also put in a GI consult for possible feeding tube.  E coli in the urine has turned out to be ESBL and Rocephin has been changed Merrem.    4/6- the patient seen examined today resting comfortably in bed, in no acute distress, in no acute events overnight.  The patient is not very talkative today, but states she is doing okay.  She has no acute complaints.  Blood cultures remain negative.  The patient has not eaten since yesterday and is on light IV fluids.  GI has been consulted for feeding tube.  Continues on Merrem for positive urine culture.    04/07 Records reviewed. Not eating which not new, Similar during recent admit at [facility]. Dr Swanson there had question pancreatitis. No abd pain or tenderness reported now. Question of dysphagia to solids. Chart hx gastroparesis. Will discuss with GI. Ronnie says has responded so far well to treatment for lung CA.   04/08 had EGD at [facility] 03/21/25 with no obstruction and evidence gastroparesis. Still not ending. Try additional meds. Talked with GI. Increase activity  04/09 Some better with med  changes  04/10 Continues to do better. Ate 1/2 fish sandwich for lunch. Called by Dr Swanson and she wanting to keep feeding tube in consideration. Talked with Dr Reich and Dr Lemus. If something needed with gastroparesis would have to have post gastric position of tube.   04/11 eating some. Later staff requested some nystatin for sore mouth.   04/12 still not eating well.  Increased peripheral edema.  Albumin low at 1.5.  Will give some albumin and follow with some Lasix.  Placed Dobbhoff tube and start enteral feedings.  This will help with nutrition and if issue of placing surgical tube later make sure will tolerate enteral feedings.  Chest x-ray continues worse on left side.  Discuss with Pulmonary and ask Dr. Villasenor to see.   04/13 no new issues.   Enteral feedings to be started.  Pulmonary evaluation appreciated and plans noted.  04/14 Tolerating feedings Therapy working with her. Bronchoscopy planned.   4/15 BAL today  4/16 bronch yesterday looks like pneumonitis  4/17 not candidate for PEG    Interval History:  No acute events overnight      Objective:     Vital Signs (Most Recent):  Temp: 98.6 °F (37 °C) (04/17/25 1139)  Pulse: 85 (04/17/25 1139)  Resp: 18 (04/17/25 1139)  BP: 99/71 (04/17/25 1139)  SpO2: 96 % (04/17/25 1139) Vital Signs (24h Range):  Temp:  [97.1 °F (36.2 °C)-98.6 °F (37 °C)] 98.6 °F (37 °C)  Pulse:  [] 85  Resp:  [18-20] 18  SpO2:  [94 %-99 %] 96 %  BP: ()/(48-71) 99/71     Weight: (!) 136.1 kg (300 lb)  Body mass index is 49.92 kg/m².  No intake or output data in the 24 hours ending 04/17/25 1310        Physical Exam  Vitals reviewed.   Constitutional:       General: She is awake. She is not in acute distress.     Appearance: She is well-developed. She is morbidly obese. She is not toxic-appearing.   HENT:      Head: Normocephalic.      Nose: Nose normal.      Mouth/Throat:      Pharynx: Oropharynx is clear.   Eyes:      Extraocular Movements: Extraocular movements intact.      Pupils: Pupils are equal, round, and reactive to light.   Neck:      Thyroid: No thyroid mass.      Vascular: No carotid bruit.   Cardiovascular:      Rate and Rhythm: Normal rate and regular rhythm.      Pulses: Normal pulses.      Heart sounds: Normal heart sounds. No murmur heard.  Pulmonary:      Effort: Pulmonary effort is normal.      Breath sounds: Normal breath sounds and air entry. No wheezing.   Abdominal:      General: Bowel sounds are normal. There is no distension.      Palpations: Abdomen is soft.      Tenderness: There is no abdominal tenderness.   Musculoskeletal:         General: Normal range of motion.      Cervical back: Neck supple. No rigidity.   Skin:     General: Skin is warm.      Coloration: Skin is not jaundiced.       Findings: No lesion.   Neurological:      General: No focal deficit present.      Mental Status: She is alert and oriented to person, place, and time.      Cranial Nerves: No cranial nerve deficit.   Psychiatric:         Attention and Perception: Attention normal.         Mood and Affect: Mood normal.         Behavior: Behavior normal. Behavior is cooperative.         Thought Content: Thought content normal.         Cognition and Memory: Cognition normal.               Significant Labs: All pertinent labs within the past 24 hours have been reviewed.  BMP:   Recent Labs   Lab 04/17/25  0330   *   *   K 4.0      CO2 19*   BUN 52*   CREATININE 2.34*   CALCIUM 8.6       CBC:   Recent Labs   Lab 04/16/25  0503   WBC 20.51*   HGB 7.9*   HCT 25.4*   PLT 90*       CMP:   Recent Labs   Lab 04/16/25  0315 04/17/25  0330   * 127*   K 4.2 4.0   CL 98 100   CO2 19* 19*   * 229*   BUN 48* 52*   CREATININE 2.27* 2.34*   CALCIUM 8.5 8.6   ANIONGAP 13 12         Significant Imaging: I have reviewed all pertinent imaging results/findings within the past 24 hours.      Intake/Output - Last 3 Shifts         04/15 0700  04/16 0659 04/16 0700  04/17 0659 04/17 0700  04/18 0659    I.V. (mL/kg)       NG/GT 60      Total Intake(mL/kg) 60 (0.4)      Urine (mL/kg/hr) 500 (0.2)      Stool 0      Total Output 500      Net -440             Urine Occurrence  2 x     Stool Occurrence 1 x 1 x           Microbiology Results (last 7 days)       Procedure Component Value Units Date/Time    Culture, Lower Respiratory [[id number]]  (Abnormal) Collected: 04/15/25 0943    Order Status: Completed Specimen: Respiratory from BAL Updated: 04/17/25 0727     Culture, Lower Respiratory Light Growth Gram-negative Bacilli     Comment: Identification and Susceptibility to Follow       AFB Smear Only [[id number]] Collected: 04/15/25 0943    Order Status: Completed Specimen: Respiratory from BAL Updated: 04/15/25 2200     AFB Smear No  acid fast bacilli seen    Gram Stain [[id number]] Collected: 04/15/25 0943    Order Status: Completed Specimen: Respiratory from BAL Updated: 04/15/25 1249     Gram Stain Result Moderate WBC observed      Few WBC observed      Rare Gram positive cocci    Direct Prep (Other) Fungus [[id number]] Collected: 04/15/25 0943    Order Status: Completed Specimen: Respiratory from BAL Updated: 04/15/25 1249     Direct Prep No fungal elements seen    Culture, Fungus (Other) [[id number]] Collected: 04/15/25 0943    Order Status: Resulted Specimen: Respiratory from BAL Updated: 04/15/25 1123    Culture, AFB [[id number]] Collected: 04/15/25 0943    Order Status: Sent Specimen: Respiratory from BAL Updated: 04/15/25 1110            Review of Systems      Assessment & Plan  Sepsis due to pneumonia  Appears to have UTI and pneumonia; has leukocytosis; has opacities on CXR; has G- bacilli on urine culture; blood cultures pending; will place on Vancomycin and Rocephin; BP on the low side; on IVF    04/12 worsening chest x-ray, ask Pulmonary to review  4/15 plan for BAL today.  Continue antibiotic  4/16 7 days antibiotic  completed  Hypernatremia  Due to dehydration; off diuretics; on IVF; will monitor Na levels  Lung cancer metastatic to brain  DNR but not hospice.  Receives chemo and has finished radiation.    04/07 reported stable / improved after treatment  04/09 more fuffy right side CXR  04/12 continue to abnormality on chest x-ray and ask Pulmonary to review  4/15 follows with Dr. Swanson.  No more chemotherapy treatments until patient is stronger.  Discussed with Dr. Swanson  4/16 hold treatments for now    Thrombocytopenia  On chemo; will monitor    Essential hypertension  Vitals:    04/16/25 1154 04/16/25 1646 04/16/25 1958 04/16/25 2357   BP: (!) 112/58 96/60 (!) 96/52 (!) 88/54    04/16/25 2358 04/17/25 0547 04/17/25 0549 04/17/25 0741   BP: 97/60 (!) 95/48 (!) 94/51 (!) 103/58    04/17/25 0822 04/17/25 1139   BP: (!) 93/53  "99/71         Monitor BP while on Metoprolol; on IVF; off Losartan    Moderate persistent asthma without complication  Continue home inhaled steroid/bronchodilator and singulair    Chronic kidney disease, stage 3b  Creatine stable   Chronic pulmonary embolism without acute cor pulmonale  Eliquis was discontinued due to risk of ICH with brain mets but they have improved so the anti-coagulation has been restarted; will monitor  04/07 apparently this of several years ago    Type 2 diabetes mellitus with hyperglycemia  BS elevated; will increase Lantus to 25 units; continue SSI;monitor BS     Gastroparesis  On Reglan and PPI for erosive gastritis; will monitor    EGD by Dr. Lo during recent past admission at [facility]:  "EGD on 03/21/2025 shows LA class B erosive esophagitis but no pill esophagitis, nonerosive gastritis and retention of food and fluid suspicious for gastroparesis, due to narcotics and diabetes. "    04/ 08 try additional meds to help gastric emptying.  04/09 taking in some now orally with recent changes meds  04/10 better and ate half [place] for lunch   04/14 tolerating enteral feedings so far  4/15 we will get PEG tube if patient is agreeable.  Daughter is agreeable.  Discussed with her  4/16 patient wants PEG tube  4/17 not candidate for surgical PEG.  NGT out, start full liquids  Morbid obesity  Body mass index is 49.92 kg/m². Morbid obesity complicates all aspects of disease management from diagnostic modalities to treatment. Weight loss encouraged and health benefits explained to patient.     Would benefit from sleep study and possible CPAP.  Does not wear oxygen at home.      Edema due to hypoalbuminemia    04/12 add IV albumin and some Lasix  Start enteral feedings  04/13 start enteral feedings  Abnormal chest x-ray    04/13 addressed by Pulmonary with further investigation plans  Anemia  Anemia is likely due to chronic disease due to Malignancy. Most recent hemoglobin and " hematocrit are listed below.  Recent Labs     04/16/25  0503   HGB 7.9*   HCT 25.4*     Plan  - Monitor serial CBC: Daily  - Transfuse PRBC if patient becomes hemodynamically unstable, symptomatic or H/H drops below 7/21.  - Patient has not received any PRBC transfusions to date  - Patient's anemia is currently stable  - follow  No evidence of bleeding  Hyponatremia  Hyponatremia is likely due to renal insufficiency. The patient's most recent sodium results are listed below.  Recent Labs     04/15/25  0315 04/16/25  0315 04/17/25  0330   * 126* 127*     Plan  - Correct the sodium by 4-6mEq in 24 hours.   - Obtain the following studies:  In a.  - Will treat the hyponatremia with continue LR  - Monitor sodium Daily.   - Patient hyponatremia is stable  - follow  Follow  Neoplastic (malignant) related fatigue    Progressive mobility protocol  Pneumonitis  IV steroids per pulmonology    Dehydration, severe  Encourage orals    VTE Risk Mitigation (From admission, onward)      None            Discharge Planning   MITUL:      Code Status: DNR   Medical Readiness for Discharge Date:   Discharge Plan A: Home with family        Discussed case with Dr. Tapia.  Labs and consultant notes reviewed. Metabolic panel tomorrow.        Please place Justification for DME        Wally Lagos DO  Department of Hospital Medicine   [facility]

## 2025-04-17 NOTE — PROGRESS NOTES
PICC insertion  Performed by A Dusty A  Consent obtained for PICC line insertion.  A formal timeout was called all staff present agree to patient and procedure.  The left upper extremity was prepped with ChloraPrep and sterile field was established.  1% lidocaine was used as local anesthetic.  Under ultrasound guidance the brachial vein was localized and accessed with a micropuncture needle.  An 018 guidewire was passed through the vein to the level superior vena cava.  A 50 cm PICC line was then placed over the wire with its tip terminating at the atrial caval junction.  The wire was removed both ports were flushed with heparinized saline.  The PICC line was then cleaned and secured.  The patient tolerated the procedure well there were no immediate postprocedure complications.  Total fluoroscopy time was 12 seconds.

## 2025-04-17 NOTE — ASSESSMENT & PLAN NOTE
Hyponatremia is likely due to renal insufficiency. The patient's most recent sodium results are listed below.  Recent Labs     04/15/25  0315 04/16/25  0315 04/17/25  0330   * 126* 127*     Plan  - Correct the sodium by 4-6mEq in 24 hours.   - Obtain the following studies:  In a.  - Will treat the hyponatremia with continue LR  - Monitor sodium Daily.   - Patient hyponatremia is stable  - follow  Follow

## 2025-04-17 NOTE — SUBJECTIVE & OBJECTIVE
Interval History: 04/17/25 No acute changes overnight.  Patient is sleeping this morning on exam, but easily aroused.  She denies nausea or vomiting overnight.  Dr Tapia does not recommend gastrostomy tube placement at this time due to patient's other comorbidities (including gastroparesis and metastatic cancer. )  Recommendation for small frequent meals as tolerated.  Possible enteral tube on discharge. This was discussed with Dr. Lagos and Dr. Tapia.  Dr. Lagos to discussed with the family.  General surgery will sign off for now but we will be available for further assistance with this patient as needed.    Medications:  Continuous Infusions:  Scheduled Meds:   albuterol-ipratropium  3 mL Nebulization QID    atorvastatin  20 mg Oral QHS    budesonide  0.5 mg Nebulization Q12H    insulin glargine U-100  25 Units Subcutaneous QHS    methylPREDNISolone injection (PEDS and ADULTS)  250 mg Intravenous Q6H    metoclopramide HCl  5 mg Oral QID (AC & HS)    metoprolol succinate  25 mg Oral Daily    montelukast  10 mg Oral QHS    pantoprazole  40 mg Oral Daily    sertraline  100 mg Oral Daily     PRN Meds:  Current Facility-Administered Medications:     acetaminophen, 650 mg, Rectal, Q6H PRN    acetaminophen, 1,000 mg, Oral, Q6H PRN    aluminum & magnesium hydroxide-simethicone, 30 mL, Oral, Q6H PRN    bisacodyL, 10 mg, Oral, Daily PRN    dextromethorphan-guaiFENesin  mg/5 ml, 10 mL, Oral, Q6H PRN    dextrose 50%, 12.5 g, Intravenous, PRN    dextrose 50%, 25 g, Intravenous, PRN    diphenhydrAMINE, 50 mg, Oral, Q6H PRN    docusate sodium, 100 mg, Oral, BID PRN    glucagon (human recombinant), 1 mg, Intramuscular, PRN    glucose, 16 g, Oral, PRN    glucose, 24 g, Oral, PRN    HYDROcodone-acetaminophen, 1 tablet, Oral, Q6H PRN    insulin aspart U-100, 0-10 Units, Subcutaneous, QID (AC + HS) PRN    LORazepam, 1 mg, Oral, Q6H PRN    melatonin, 6 mg, Oral, Nightly PRN    traZODone, 50 mg, Oral, Nightly PRN     Review of  patient's allergies indicates:   Allergen Reactions    Penicillins Hives    Sulfa (sulfonamide antibiotics) Hives     Objective:     Vital Signs (Most Recent):  Temp: 98.6 °F (37 °C) (04/17/25 1139)  Pulse: 85 (04/17/25 1139)  Resp: 18 (04/17/25 1139)  BP: 99/71 (04/17/25 1139)  SpO2: 96 % (04/17/25 1139) Vital Signs (24h Range):  Temp:  [97.1 °F (36.2 °C)-98.6 °F (37 °C)] 98.6 °F (37 °C)  Pulse:  [] 85  Resp:  [16-20] 18  SpO2:  [93 %-99 %] 96 %  BP: ()/(48-71) 99/71     Weight: (!) 136.1 kg (300 lb)  Body mass index is 49.92 kg/m².    Intake/Output - Last 3 Shifts         04/15 0700  04/16 0659 04/16 0700  04/17 0659 04/17 0700  04/18 0659    I.V. (mL/kg)       NG/GT 60      Total Intake(mL/kg) 60 (0.4)      Urine (mL/kg/hr) 500 (0.2)      Stool 0      Total Output 500      Net -440             Urine Occurrence  2 x     Stool Occurrence 1 x 1 x              Physical Exam  Vitals and nursing note reviewed.   HENT:      Head: Normocephalic.      Nose: Nose normal.      Mouth/Throat:      Mouth: Mucous membranes are moist.   Eyes:      Extraocular Movements: Extraocular movements intact.   Cardiovascular:      Rate and Rhythm: Normal rate.   Pulmonary:      Effort: Pulmonary effort is normal.   Abdominal:      General: Bowel sounds are normal. There is distension.      Tenderness: There is no abdominal tenderness.   Musculoskeletal:         General: Normal range of motion.      Cervical back: Normal range of motion.   Skin:     General: Skin is warm and dry.      Capillary Refill: Capillary refill takes less than 2 seconds.   Neurological:      General: No focal deficit present.      Mental Status: She is alert and oriented to person, place, and time.   Psychiatric:         Mood and Affect: Mood normal.          Significant Labs:  I have reviewed all pertinent lab results within the past 24 hours.  CBC:   Recent Labs   Lab 04/16/25  0503   WBC 20.51*   RBC 2.70*   HGB 7.9*   HCT 25.4*   PLT 90*   MCV 94.1    MCH 29.3   MCHC 31.1*     BMP:   Recent Labs   Lab 04/13/25  0331 04/15/25  0315 04/17/25  0330   *   < > 229*   *   < > 127*   K 3.5   < > 4.0      < > 100   CO2 19*   < > 19*   BUN 40*   < > 52*   CREATININE 1.66*   < > 2.34*   CALCIUM 8.0*   < > 8.6   MG 1.7  --   --     < > = values in this interval not displayed.     CMP:   Recent Labs   Lab 04/15/25  0315 04/16/25  0315 04/17/25  0330   *   < > 229*   CALCIUM 8.3*   < > 8.6   ALBUMIN 1.9*  --   --    PROT 4.6*  --   --    *   < > 127*   K 3.9   < > 4.0   CO2 19*   < > 19*      < > 100   BUN 44*   < > 52*   CREATININE 2.09*   < > 2.34*   ALKPHOS 211*  --   --    ALT 27  --   --    AST 31  --   --    BILITOT 0.7  --   --     < > = values in this interval not displayed.     Microbiology Results (last 7 days)       Procedure Component Value Units Date/Time    Culture, Lower Respiratory [[id number]]  (Abnormal) Collected: 04/15/25 0943    Order Status: Completed Specimen: Respiratory from BAL Updated: 04/17/25 0727     Culture, Lower Respiratory Light Growth Gram-negative Bacilli     Comment: Identification and Susceptibility to Follow       AFB Smear Only [[id number]] Collected: 04/15/25 0943    Order Status: Completed Specimen: Respiratory from BAL Updated: 04/15/25 2200     AFB Smear No acid fast bacilli seen    Gram Stain [[id number]] Collected: 04/15/25 0943    Order Status: Completed Specimen: Respiratory from BAL Updated: 04/15/25 1249     Gram Stain Result Moderate WBC observed      Few WBC observed      Rare Gram positive cocci    Direct Prep (Other) Fungus [[id number]] Collected: 04/15/25 0943    Order Status: Completed Specimen: Respiratory from BAL Updated: 04/15/25 1249     Direct Prep No fungal elements seen    Culture, Fungus (Other) [[id number]] Collected: 04/15/25 0943    Order Status: Resulted Specimen: Respiratory from BAL Updated: 04/15/25 1123    Culture, AFB [[id number]] Collected: 04/15/25 0943    Order  "Status: Sent Specimen: Respiratory from BAL Updated: 04/15/25 1110          Specimen (24h ago, onward)      None          No results for input(s): "COLORU", "CLARITYU", "SPECGRAV", "PHUR", "PROTEINUA", "GLUCOSEU", "BILIRUBINCON", "BLOODU", "WBCU", "RBCU", "BACTERIA", "MUCUS", "NITRITE", "LEUKOCYTESUR", "UROBILINOGEN", "HYALINECASTS" in the last 168 hours.    Significant Diagnostics:  I have reviewed all pertinent imaging results/findings within the past 24 hours.  "

## 2025-04-17 NOTE — SUBJECTIVE & OBJECTIVE
Interval History/Significant Events:  Patient without complaints    Review of Systems  Objective:     Vital Signs (Most Recent):  Temp: 97.4 °F (36.3 °C) (04/17/25 0547)  Pulse: 70 (04/17/25 0549)  Resp: 18 (04/16/25 2009)  BP: (!) 94/51 (04/17/25 0549)  SpO2: 95 % (04/17/25 0547) Vital Signs (24h Range):  Temp:  [97.1 °F (36.2 °C)-98.1 °F (36.7 °C)] 97.4 °F (36.3 °C)  Pulse:  [] 70  Resp:  [16-20] 18  SpO2:  [93 %-99 %] 95 %  BP: ()/(48-80) 94/51   Weight: (!) 136.1 kg (300 lb)  Body mass index is 49.92 kg/m².    No intake or output data in the 24 hours ending 04/17/25 0706       Physical Exam  Vitals reviewed.   Constitutional:       Appearance: Normal appearance.      Interventions: She is not intubated.  HENT:      Head: Normocephalic and atraumatic.      Nose: Nose normal.      Mouth/Throat:      Mouth: Mucous membranes are dry.      Pharynx: Oropharynx is clear.   Eyes:      Extraocular Movements: Extraocular movements intact.      Conjunctiva/sclera: Conjunctivae normal.      Pupils: Pupils are equal, round, and reactive to light.   Cardiovascular:      Rate and Rhythm: Normal rate.      Heart sounds: Normal heart sounds. No murmur heard.  Pulmonary:      Effort: Pulmonary effort is normal. She is not intubated.      Breath sounds: Normal breath sounds.   Abdominal:      General: Abdomen is flat. Bowel sounds are normal.      Palpations: Abdomen is soft.   Musculoskeletal:         General: Normal range of motion.      Cervical back: Normal range of motion and neck supple.      Right lower leg: No edema.      Left lower leg: No edema.   Skin:     General: Skin is warm and dry.      Capillary Refill: Capillary refill takes less than 2 seconds.   Neurological:      General: No focal deficit present.      Mental Status: She is alert and oriented to person, place, and time.   Psychiatric:         Mood and Affect: Mood normal.         Behavior: Behavior normal.            Vents:  Oxygen Concentration  (%): 28 (04/16/25 1520)  Lines/Drains/Airways       Drain  Duration                  NG/OG Tube 04/12/25 1620 nasogastric;Other (comments) Right nostril 4 days    Female External Urinary Catheter w/ Suction 04/16/25 0415 1 day                  Significant Labs:    CBC/Anemia Profile:  Recent Labs   Lab 04/16/25  0503   WBC 20.51*   HGB 7.9*   HCT 25.4*   PLT 90*   MCV 94.1   RDW 18.6*        Chemistries:  Recent Labs   Lab 04/16/25  0315 04/17/25  0330   * 127*   K 4.2 4.0   CL 98 100   CO2 19* 19*   BUN 48* 52*   CREATININE 2.27* 2.34*   CALCIUM 8.5 8.6       Recent Lab Results  (Last 5 results in the past 24 hours)        04/17/25  0330   04/16/25  2047   04/16/25  1636   04/16/25  1156   04/16/25  0743        Anion Gap 12               BUN 52               BUN/CREAT RATIO 22               Calcium 8.6               Chloride 100               CO2 19               Creatinine 2.34               eGFR 22  Comment: Estimated GFR calculated using the CKD-EPI creatinine (2021) equation.               Glucose 229               POC Glucose   254   236   252   211       Potassium 4.0               Sodium 127                                      Significant Imaging:  I have reviewed all pertinent imaging results/findings within the past 24 hours.

## 2025-04-17 NOTE — PROGRESS NOTES
[facility]  General Surgery  Progress Note    Subjective:     History of Present Illness:  Patient is admitted to hospital medicine for uti and dehydration. She has history of lung cancer with metastasis to the brain.  Patient was recently hospitalized at [facility] and was discharged to swing bed.  She returned to [facility] with dehydration and UTI.  She has not been able to eat or drink since her discharge.  She requests PEG tube placement and General surgery was consulted.  Patient has receiving tube feeding per enteral tube.   Patient is awake and alert.  She is agreeable to peg placement.  Discussed with Dr Tapia, who recommends GI consult to evaluate nausea and vomiting prior to surgical procedure.     Post-Op Info:  * No surgery found *         Interval History: 04/17/25 No acute changes overnight.  Patient is sleeping this morning on exam, but easily aroused.  She denies nausea or vomiting overnight.  Dr Tapia does not recommend gastrostomy tube placement at this time due to patient's other comorbidities (including gastroparesis and metastatic cancer. )  Recommendation for small frequent meals as tolerated.  Possible enteral tube on discharge. This was discussed with Dr. Lagos and Dr. Tapia.  Dr. Lagos to discussed with the family.  General surgery will sign off for now but we will be available for further assistance with this patient as needed.    Medications:  Continuous Infusions:  Scheduled Meds:   albuterol-ipratropium  3 mL Nebulization QID    atorvastatin  20 mg Oral QHS    budesonide  0.5 mg Nebulization Q12H    insulin glargine U-100  25 Units Subcutaneous QHS    methylPREDNISolone injection (PEDS and ADULTS)  250 mg Intravenous Q6H    metoclopramide HCl  5 mg Oral QID (AC & HS)    metoprolol succinate  25 mg Oral Daily    montelukast  10 mg Oral QHS    pantoprazole  40 mg Oral Daily    sertraline  100 mg Oral Daily     PRN Meds:  Current Facility-Administered  Medications:     acetaminophen, 650 mg, Rectal, Q6H PRN    acetaminophen, 1,000 mg, Oral, Q6H PRN    aluminum & magnesium hydroxide-simethicone, 30 mL, Oral, Q6H PRN    bisacodyL, 10 mg, Oral, Daily PRN    dextromethorphan-guaiFENesin  mg/5 ml, 10 mL, Oral, Q6H PRN    dextrose 50%, 12.5 g, Intravenous, PRN    dextrose 50%, 25 g, Intravenous, PRN    diphenhydrAMINE, 50 mg, Oral, Q6H PRN    docusate sodium, 100 mg, Oral, BID PRN    glucagon (human recombinant), 1 mg, Intramuscular, PRN    glucose, 16 g, Oral, PRN    glucose, 24 g, Oral, PRN    HYDROcodone-acetaminophen, 1 tablet, Oral, Q6H PRN    insulin aspart U-100, 0-10 Units, Subcutaneous, QID (AC + HS) PRN    LORazepam, 1 mg, Oral, Q6H PRN    melatonin, 6 mg, Oral, Nightly PRN    traZODone, 50 mg, Oral, Nightly PRN     Review of patient's allergies indicates:   Allergen Reactions    Penicillins Hives    Sulfa (sulfonamide antibiotics) Hives     Objective:     Vital Signs (Most Recent):  Temp: 98.6 °F (37 °C) (04/17/25 1139)  Pulse: 85 (04/17/25 1139)  Resp: 18 (04/17/25 1139)  BP: 99/71 (04/17/25 1139)  SpO2: 96 % (04/17/25 1139) Vital Signs (24h Range):  Temp:  [97.1 °F (36.2 °C)-98.6 °F (37 °C)] 98.6 °F (37 °C)  Pulse:  [] 85  Resp:  [16-20] 18  SpO2:  [93 %-99 %] 96 %  BP: ()/(48-71) 99/71     Weight: (!) 136.1 kg (300 lb)  Body mass index is 49.92 kg/m².    Intake/Output - Last 3 Shifts         04/15 0700  04/16 0659 04/16 0700  04/17 0659 04/17 0700 04/18 0659    I.V. (mL/kg)       NG/GT 60      Total Intake(mL/kg) 60 (0.4)      Urine (mL/kg/hr) 500 (0.2)      Stool 0      Total Output 500      Net -440             Urine Occurrence  2 x     Stool Occurrence 1 x 1 x              Physical Exam  Vitals and nursing note reviewed.   HENT:      Head: Normocephalic.      Nose: Nose normal.      Mouth/Throat:      Mouth: Mucous membranes are moist.   Eyes:      Extraocular Movements: Extraocular movements intact.   Cardiovascular:      Rate and  Rhythm: Normal rate.   Pulmonary:      Effort: Pulmonary effort is normal.   Abdominal:      General: Bowel sounds are normal. There is distension.      Tenderness: There is no abdominal tenderness.   Musculoskeletal:         General: Normal range of motion.      Cervical back: Normal range of motion.   Skin:     General: Skin is warm and dry.      Capillary Refill: Capillary refill takes less than 2 seconds.   Neurological:      General: No focal deficit present.      Mental Status: She is alert and oriented to person, place, and time.   Psychiatric:         Mood and Affect: Mood normal.          Significant Labs:  I have reviewed all pertinent lab results within the past 24 hours.  CBC:   Recent Labs   Lab 04/16/25  0503   WBC 20.51*   RBC 2.70*   HGB 7.9*   HCT 25.4*   PLT 90*   MCV 94.1   MCH 29.3   MCHC 31.1*     BMP:   Recent Labs   Lab 04/13/25  0331 04/15/25  0315 04/17/25  0330   *   < > 229*   *   < > 127*   K 3.5   < > 4.0      < > 100   CO2 19*   < > 19*   BUN 40*   < > 52*   CREATININE 1.66*   < > 2.34*   CALCIUM 8.0*   < > 8.6   MG 1.7  --   --     < > = values in this interval not displayed.     CMP:   Recent Labs   Lab 04/15/25  0315 04/16/25  0315 04/17/25  0330   *   < > 229*   CALCIUM 8.3*   < > 8.6   ALBUMIN 1.9*  --   --    PROT 4.6*  --   --    *   < > 127*   K 3.9   < > 4.0   CO2 19*   < > 19*      < > 100   BUN 44*   < > 52*   CREATININE 2.09*   < > 2.34*   ALKPHOS 211*  --   --    ALT 27  --   --    AST 31  --   --    BILITOT 0.7  --   --     < > = values in this interval not displayed.     Microbiology Results (last 7 days)       Procedure Component Value Units Date/Time    Culture, Lower Respiratory [[id number]]  (Abnormal) Collected: 04/15/25 0943    Order Status: Completed Specimen: Respiratory from BAL Updated: 04/17/25 0727     Culture, Lower Respiratory Light Growth Gram-negative Bacilli     Comment: Identification and Susceptibility to Follow     "   AFB Smear Only [[id number]] Collected: 04/15/25 0943    Order Status: Completed Specimen: Respiratory from BAL Updated: 04/15/25 2200     AFB Smear No acid fast bacilli seen    Gram Stain [[id number]] Collected: 04/15/25 0943    Order Status: Completed Specimen: Respiratory from BAL Updated: 04/15/25 1249     Gram Stain Result Moderate WBC observed      Few WBC observed      Rare Gram positive cocci    Direct Prep (Other) Fungus [[id number]] Collected: 04/15/25 0943    Order Status: Completed Specimen: Respiratory from BAL Updated: 04/15/25 1249     Direct Prep No fungal elements seen    Culture, Fungus (Other) [[id number]] Collected: 04/15/25 0943    Order Status: Resulted Specimen: Respiratory from BAL Updated: 04/15/25 1123    Culture, AFB [[id number]] Collected: 04/15/25 0943    Order Status: Sent Specimen: Respiratory from BAL Updated: 04/15/25 1110          Specimen (24h ago, onward)      None          No results for input(s): "COLORU", "CLARITYU", "SPECGRAV", "PHUR", "PROTEINUA", "GLUCOSEU", "BILIRUBINCON", "BLOODU", "WBCU", "RBCU", "BACTERIA", "MUCUS", "NITRITE", "LEUKOCYTESUR", "UROBILINOGEN", "HYALINECASTS" in the last 168 hours.    Significant Diagnostics:  I have reviewed all pertinent imaging results/findings within the past 24 hours.  Assessment/Plan:     No notes have been filed under this hospital service.  Service: General Surgery      GISELLA Smith  General Surgery  [facility]  "

## 2025-04-17 NOTE — SUBJECTIVE & OBJECTIVE
Interval History:  No acute events overnight      Objective:     Vital Signs (Most Recent):  Temp: 98.6 °F (37 °C) (04/17/25 1139)  Pulse: 85 (04/17/25 1139)  Resp: 18 (04/17/25 1139)  BP: 99/71 (04/17/25 1139)  SpO2: 96 % (04/17/25 1139) Vital Signs (24h Range):  Temp:  [97.1 °F (36.2 °C)-98.6 °F (37 °C)] 98.6 °F (37 °C)  Pulse:  [] 85  Resp:  [18-20] 18  SpO2:  [94 %-99 %] 96 %  BP: ()/(48-71) 99/71     Weight: (!) 136.1 kg (300 lb)  Body mass index is 49.92 kg/m².  No intake or output data in the 24 hours ending 04/17/25 1310        Physical Exam  Vitals reviewed.   Constitutional:       General: She is awake. She is not in acute distress.     Appearance: She is well-developed. She is morbidly obese. She is not toxic-appearing.   HENT:      Head: Normocephalic.      Nose: Nose normal.      Mouth/Throat:      Pharynx: Oropharynx is clear.   Eyes:      Extraocular Movements: Extraocular movements intact.      Pupils: Pupils are equal, round, and reactive to light.   Neck:      Thyroid: No thyroid mass.      Vascular: No carotid bruit.   Cardiovascular:      Rate and Rhythm: Normal rate and regular rhythm.      Pulses: Normal pulses.      Heart sounds: Normal heart sounds. No murmur heard.  Pulmonary:      Effort: Pulmonary effort is normal.      Breath sounds: Normal breath sounds and air entry. No wheezing.   Abdominal:      General: Bowel sounds are normal. There is no distension.      Palpations: Abdomen is soft.      Tenderness: There is no abdominal tenderness.   Musculoskeletal:         General: Normal range of motion.      Cervical back: Neck supple. No rigidity.   Skin:     General: Skin is warm.      Coloration: Skin is not jaundiced.      Findings: No lesion.   Neurological:      General: No focal deficit present.      Mental Status: She is alert and oriented to person, place, and time.      Cranial Nerves: No cranial nerve deficit.   Psychiatric:         Attention and Perception: Attention  normal.         Mood and Affect: Mood normal.         Behavior: Behavior normal. Behavior is cooperative.         Thought Content: Thought content normal.         Cognition and Memory: Cognition normal.               Significant Labs: All pertinent labs within the past 24 hours have been reviewed.  BMP:   Recent Labs   Lab 04/17/25  0330   *   *   K 4.0      CO2 19*   BUN 52*   CREATININE 2.34*   CALCIUM 8.6       CBC:   Recent Labs   Lab 04/16/25  0503   WBC 20.51*   HGB 7.9*   HCT 25.4*   PLT 90*       CMP:   Recent Labs   Lab 04/16/25  0315 04/17/25  0330   * 127*   K 4.2 4.0   CL 98 100   CO2 19* 19*   * 229*   BUN 48* 52*   CREATININE 2.27* 2.34*   CALCIUM 8.5 8.6   ANIONGAP 13 12         Significant Imaging: I have reviewed all pertinent imaging results/findings within the past 24 hours.      Intake/Output - Last 3 Shifts         04/15 0700 04/16 0659 04/16 0700 04/17 0659 04/17 0700 04/18 0659    I.V. (mL/kg)       NG/GT 60      Total Intake(mL/kg) 60 (0.4)      Urine (mL/kg/hr) 500 (0.2)      Stool 0      Total Output 500      Net -440             Urine Occurrence  2 x     Stool Occurrence 1 x 1 x           Microbiology Results (last 7 days)       Procedure Component Value Units Date/Time    Culture, Lower Respiratory [[id number]]  (Abnormal) Collected: 04/15/25 0943    Order Status: Completed Specimen: Respiratory from BAL Updated: 04/17/25 0727     Culture, Lower Respiratory Light Growth Gram-negative Bacilli     Comment: Identification and Susceptibility to Follow       AFB Smear Only [[id number]] Collected: 04/15/25 0943    Order Status: Completed Specimen: Respiratory from BAL Updated: 04/15/25 2200     AFB Smear No acid fast bacilli seen    Gram Stain [[id number]] Collected: 04/15/25 0943    Order Status: Completed Specimen: Respiratory from BAL Updated: 04/15/25 1249     Gram Stain Result Moderate WBC observed      Few WBC observed      Rare Gram positive cocci     Direct Prep (Other) Fungus [[id number]] Collected: 04/15/25 0943    Order Status: Completed Specimen: Respiratory from BAL Updated: 04/15/25 1249     Direct Prep No fungal elements seen    Culture, Fungus (Other) [[id number]] Collected: 04/15/25 0943    Order Status: Resulted Specimen: Respiratory from BAL Updated: 04/15/25 1123    Culture, AFB [[id number]] Collected: 04/15/25 0943    Order Status: Sent Specimen: Respiratory from BAL Updated: 04/15/25 1110            Review of Systems

## 2025-04-17 NOTE — ASSESSMENT & PLAN NOTE
Appears to have UTI and pneumonia; has leukocytosis; has opacities on CXR; has G- bacilli on urine culture; blood cultures pending; will place on Vancomycin and Rocephin; BP on the low side; on IVF    04/12 worsening chest x-ray, ask Pulmonary to review  4/15 plan for BAL today.  Continue antibiotic  4/16 7 days antibiotic  completed

## 2025-04-17 NOTE — PROGRESS NOTES
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  Patient was initially admitted to the hospital on April 4 would dehydration gastroparesis UTI.  She has not been able to eat previously in his now admitted to the hospital with concerns over nutrition since she has been in the hospital she has had shortness of breath and has developed worsening right-sided infiltrates.  Patient shortness of breath    Hospital/ICU Course:  4/14/25-asked to see by General Pulmonary for evaluation of possible EBUS bronchoscopy.  Patient with prior EBUS bronchoscopy performed 10/21/24 with evidence of malignancy present in station 4 L and station 4R.  Recent cessation of her EGFR TKI.    Interval History/Significant Events:  Patient without complaints    Review of Systems  Objective:     Vital Signs (Most Recent):  Temp: 97.4 °F (36.3 °C) (04/17/25 0547)  Pulse: 70 (04/17/25 0549)  Resp: 18 (04/16/25 2009)  BP: (!) 94/51 (04/17/25 0549)  SpO2: 95 % (04/17/25 0547) Vital Signs (24h Range):  Temp:  [97.1 °F (36.2 °C)-98.1 °F (36.7 °C)] 97.4 °F (36.3 °C)  Pulse:  [] 70  Resp:  [16-20] 18  SpO2:  [93 %-99 %] 95 %  BP: ()/(48-80) 94/51   Weight: (!) 136.1 kg (300 lb)  Body mass index is 49.92 kg/m².    No intake or output data in the 24 hours ending 04/17/25 0706       Physical Exam  Vitals reviewed.   Constitutional:       Appearance: Normal appearance.      Interventions: She is not intubated.  HENT:      Head: Normocephalic and atraumatic.       Nose: Nose normal.      Mouth/Throat:      Mouth: Mucous membranes are dry.      Pharynx: Oropharynx is clear.   Eyes:      Extraocular Movements: Extraocular movements intact.      Conjunctiva/sclera: Conjunctivae normal.      Pupils: Pupils are equal, round, and reactive to light.   Cardiovascular:      Rate and Rhythm: Normal rate.      Heart sounds: Normal heart sounds. No murmur heard.  Pulmonary:      Effort: Pulmonary effort is normal. She is not intubated.      Breath sounds: Normal breath sounds.   Abdominal:      General: Abdomen is flat. Bowel sounds are normal.      Palpations: Abdomen is soft.   Musculoskeletal:         General: Normal range of motion.      Cervical back: Normal range of motion and neck supple.      Right lower leg: No edema.      Left lower leg: No edema.   Skin:     General: Skin is warm and dry.      Capillary Refill: Capillary refill takes less than 2 seconds.   Neurological:      General: No focal deficit present.      Mental Status: She is alert and oriented to person, place, and time.   Psychiatric:         Mood and Affect: Mood normal.         Behavior: Behavior normal.            Vents:  Oxygen Concentration (%): 28 (04/16/25 1520)  Lines/Drains/Airways       Drain  Duration                  NG/OG Tube 04/12/25 1620 nasogastric;Other (comments) Right nostril 4 days    Female External Urinary Catheter w/ Suction 04/16/25 0415 1 day                  Significant Labs:    CBC/Anemia Profile:  Recent Labs   Lab 04/16/25  0503   WBC 20.51*   HGB 7.9*   HCT 25.4*   PLT 90*   MCV 94.1   RDW 18.6*        Chemistries:  Recent Labs   Lab 04/16/25  0315 04/17/25  0330   * 127*   K 4.2 4.0   CL 98 100   CO2 19* 19*   BUN 48* 52*   CREATININE 2.27* 2.34*   CALCIUM 8.5 8.6       Recent Lab Results  (Last 5 results in the past 24 hours)        04/17/25  0330   04/16/25  2047   04/16/25  1636   04/16/25  1156   04/16/25  0743        Anion Gap 12               BUN 52                "BUN/CREAT RATIO 22               Calcium 8.6               Chloride 100               CO2 19               Creatinine 2.34               eGFR 22  Comment: Estimated GFR calculated using the CKD-EPI creatinine (2021) equation.               Glucose 229               POC Glucose   254   236   252   211       Potassium 4.0               Sodium 127                                      Significant Imaging:  I have reviewed all pertinent imaging results/findings within the past 24 hours.    ABG  No results for input(s): "PH", "PO2", "PCO2", "HCO3", "BE" in the last 168 hours.  Assessment/Plan:     Pulmonary  Pneumonitis  Patient looks worse to me this morning.  Chest x-ray yesterday showed more prominent consolidation of the right upper lobe broncho alveolar lavage has so far been negative for any infectious etiology she only got 1 dose of increase steroids yesterday.  She is still needs 2 more and a half days of therapy for pulsing her steroids.  Differential to me as still adverse effect from immunotherapy versus progression of lung cancer.  Prognosis is guarded to me    Abnormal chest x-ray  Will proceed with broncho alveolar lavage today patient seems comfortable this morning             Todd Villasenor MD  Critical Care Medicine  [facility]  "

## 2025-04-17 NOTE — ASSESSMENT & PLAN NOTE
Patient looks worse to me this morning.  Chest x-ray yesterday showed more prominent consolidation of the right upper lobe broncho alveolar lavage has so far been negative for any infectious etiology she only got 1 dose of increase steroids yesterday.  She is still needs 2 more and a half days of therapy for pulsing her steroids.  Differential to me as still adverse effect from immunotherapy versus progression of lung cancer.  Prognosis is guarded to me

## 2025-04-17 NOTE — PT/OT/SLP PROGRESS
Occupational Therapy   Treatment    Name: Magan Saleem  MRN: [id number]  Admitting Diagnosis:  Sepsis due to pneumonia       Recommendations:     Discharge Recommendations: Low Intensity Therapy  Discharge Equipment Recommendations:  to be determined by next level of care  Barriers to discharge:       Assessment:     Magan Saleem is a 69 y.o. female with a medical diagnosis of Sepsis due to pneumonia.  She presents with weakness and decline in ADLs. Performance deficits affecting function are weakness, impaired endurance, impaired self care skills, impaired cardiopulmonary response to activity, impaired functional mobility, edema.     Rehab Prognosis:  Fair; patient would benefit from acute skilled OT services to address these deficits and reach maximum level of function.       Plan:     Patient to be seen 5 x/week to address the above listed problems via self-care/home management, therapeutic activities, therapeutic exercises  Plan of Care Expires:    Plan of Care Reviewed with: patient    Subjective     Chief Complaint: sepsis   Patient/Family Comments/goals: pt agreeable to OT tx  Pain/Comfort:  Pain Rating 1: 0/10    Objective:     Communicated with: LONDON Banerjee prior to session.  Patient found HOB elevated with oxygen, telemetry upon OT entry to room.    General Precautions: Standard, fall, contact    Orthopedic Precautions:N/A  Braces: N/A  Respiratory Status: Nasal cannula, flow 2 L/min     Occupational Performance:     Bed Mobility:    Not performed     Functional Mobility/Transfers:  Not performed    Activities of Daily Living:  Not performed      [facility] 6 Click ADL:      Treatment & Education:  Pt performed 2 sets x 15 reps each AAROM shoulder flexion, chest press, IR/ER, and elbow flexion/extension in order to improve BUE strength and endurance to perform ADL and ADL t/f with less assist.     Patient left HOB elevated with all lines intact, call button in reach, and LONDON Banerjee notified    GOALS:    Multidisciplinary Problems       Occupational Therapy Goals          Problem: Occupational Therapy    Goal Priority Disciplines Outcome Interventions   Occupational Therapy Goal     OT, PT/OT Progressing    Description: STG:  Pt will perform grooming with setup  Pt will bathe with Min A  Pt will perform UE dressing with Min A  Pt will perform LE dressing with Mod A  Pt will sit EOB x 10 min with CG assistance  Pt will transfer bed/chair/bsc with Mod A  Pt will perform standing task x 3 min with CG assistance  Pt will tolerate 30 minutes of tx without fatigue      LT.Restore to max I with self care and mobility.                         DME Justifications:  No DME recommended requiring DME justifications    Time Tracking:     OT Date of Treatment: 25  OT Start Time: 1420  OT Stop Time: 1439  OT Total Time (min): 19 min    Billable Minutes:Therapeutic Exercise 19 minutes    OT/SAROJ: OT     Number of SAROJ visits since last OT visit: 1    2025

## 2025-04-17 NOTE — ASSESSMENT & PLAN NOTE
Vitals:    04/16/25 1154 04/16/25 1646 04/16/25 1958 04/16/25 2357   BP: (!) 112/58 96/60 (!) 96/52 (!) 88/54    04/16/25 2358 04/17/25 0547 04/17/25 0549 04/17/25 0741   BP: 97/60 (!) 95/48 (!) 94/51 (!) 103/58    04/17/25 0822 04/17/25 1139   BP: (!) 93/53 99/71         Monitor BP while on Metoprolol; on IVF; off Losartan

## 2025-04-18 LAB
ANION GAP SERPL CALCULATED.3IONS-SCNC: 15 MMOL/L (ref 7–16)
BASOPHILS # BLD AUTO: 0.08 K/UL (ref 0–0.2)
BASOPHILS NFR BLD AUTO: 0.4 % (ref 0–1)
BUN SERPL-MCNC: 64 MG/DL (ref 10–20)
BUN/CREAT SERPL: 25 (ref 6–20)
CALCIUM SERPL-MCNC: 9.2 MG/DL (ref 8.4–10.2)
CHLORIDE SERPL-SCNC: 100 MMOL/L (ref 98–107)
CO2 SERPL-SCNC: 20 MMOL/L (ref 23–31)
CREAT SERPL-MCNC: 2.56 MG/DL (ref 0.55–1.02)
DIFFERENTIAL METHOD BLD: ABNORMAL
EGFR (NO RACE VARIABLE) (RUSH/TITUS): 20 ML/MIN/1.73M2
EOSINOPHIL # BLD AUTO: 0 K/UL (ref 0–0.5)
EOSINOPHIL NFR BLD AUTO: 0 % (ref 1–4)
ERYTHROCYTE [DISTWIDTH] IN BLOOD BY AUTOMATED COUNT: 17.5 % (ref 11.5–14.5)
GLUCOSE SERPL-MCNC: 153 MG/DL (ref 82–115)
GLUCOSE SERPL-MCNC: 163 MG/DL (ref 70–105)
GLUCOSE SERPL-MCNC: 175 MG/DL (ref 70–105)
GLUCOSE SERPL-MCNC: 180 MG/DL (ref 70–105)
GLUCOSE SERPL-MCNC: 184 MG/DL (ref 70–105)
HCT VFR BLD AUTO: 25.7 % (ref 38–47)
HGB BLD-MCNC: 8.7 G/DL (ref 12–16)
HYPOCHROMIA BLD QL SMEAR: ABNORMAL
IMM GRANULOCYTES # BLD AUTO: 1.71 K/UL (ref 0–0.04)
IMM GRANULOCYTES NFR BLD: 7.5 % (ref 0–0.4)
LYMPHOCYTES # BLD AUTO: 0.36 K/UL (ref 1–4.8)
LYMPHOCYTES NFR BLD AUTO: 1.6 % (ref 27–41)
LYMPHOCYTES NFR BLD MANUAL: 3 % (ref 27–41)
MCH RBC QN AUTO: 28.9 PG (ref 27–31)
MCHC RBC AUTO-ENTMCNC: 33.9 G/DL (ref 32–36)
MCV RBC AUTO: 85.4 FL (ref 80–96)
METAMYELOCYTES NFR BLD MANUAL: 1 %
MONOCYTES # BLD AUTO: 1.17 K/UL (ref 0–0.8)
MONOCYTES NFR BLD AUTO: 5.1 % (ref 2–6)
MONOCYTES NFR BLD MANUAL: 2 % (ref 2–6)
MPC BLD CALC-MCNC: 13.1 FL (ref 9.4–12.4)
NEUTROPHILS # BLD AUTO: 19.46 K/UL (ref 1.8–7.7)
NEUTROPHILS NFR BLD AUTO: 85.4 % (ref 53–65)
NEUTS BAND NFR BLD MANUAL: 5 % (ref 1–5)
NEUTS SEG NFR BLD MANUAL: 89 % (ref 50–62)
NRBC # BLD AUTO: 2.87 X10E3/UL
NRBC BLD MANUAL-RTO: 23 /100 WBC
NRBC, AUTO (.00): 12.6 %
PLATELET # BLD AUTO: 149 K/UL (ref 150–400)
PLATELET MORPHOLOGY: ABNORMAL
POTASSIUM SERPL-SCNC: 4.6 MMOL/L (ref 3.5–5.1)
RBC # BLD AUTO: 3.01 M/UL (ref 4.2–5.4)
SODIUM SERPL-SCNC: 130 MMOL/L (ref 136–145)
WBC # BLD AUTO: 22.78 K/UL (ref 4.5–11)

## 2025-04-18 PROCEDURE — 25000242 PHARM REV CODE 250 ALT 637 W/ HCPCS: Performed by: HOSPITALIST

## 2025-04-18 PROCEDURE — 96372 THER/PROPH/DIAG INJ SC/IM: CPT

## 2025-04-18 PROCEDURE — 99232 SBSQ HOSP IP/OBS MODERATE 35: CPT | Mod: ,,, | Performed by: INTERNAL MEDICINE

## 2025-04-18 PROCEDURE — 63600175 PHARM REV CODE 636 W HCPCS: Mod: JZ,TB | Performed by: INTERNAL MEDICINE

## 2025-04-18 PROCEDURE — 63600175 PHARM REV CODE 636 W HCPCS: Performed by: HOSPITALIST

## 2025-04-18 PROCEDURE — 99900035 HC TECH TIME PER 15 MIN (STAT)

## 2025-04-18 PROCEDURE — 94640 AIRWAY INHALATION TREATMENT: CPT

## 2025-04-18 PROCEDURE — 27000221 HC OXYGEN, UP TO 24 HOURS

## 2025-04-18 PROCEDURE — 94761 N-INVAS EAR/PLS OXIMETRY MLT: CPT

## 2025-04-18 PROCEDURE — 82962 GLUCOSE BLOOD TEST: CPT

## 2025-04-18 PROCEDURE — 11000001 HC ACUTE MED/SURG PRIVATE ROOM

## 2025-04-18 PROCEDURE — 27000207 HC ISOLATION

## 2025-04-18 PROCEDURE — 25000003 PHARM REV CODE 250: Performed by: STUDENT IN AN ORGANIZED HEALTH CARE EDUCATION/TRAINING PROGRAM

## 2025-04-18 PROCEDURE — 25000003 PHARM REV CODE 250: Performed by: HOSPITALIST

## 2025-04-18 PROCEDURE — 80048 BASIC METABOLIC PNL TOTAL CA: CPT | Performed by: STUDENT IN AN ORGANIZED HEALTH CARE EDUCATION/TRAINING PROGRAM

## 2025-04-18 PROCEDURE — 85025 COMPLETE CBC W/AUTO DIFF WBC: CPT | Performed by: HOSPITALIST

## 2025-04-18 PROCEDURE — 36415 COLL VENOUS BLD VENIPUNCTURE: CPT | Performed by: HOSPITALIST

## 2025-04-18 PROCEDURE — 99233 SBSQ HOSP IP/OBS HIGH 50: CPT | Mod: ,,, | Performed by: HOSPITALIST

## 2025-04-18 RX ADMIN — METOCLOPRAMIDE HYDROCHLORIDE 10 MG: 5 TABLET ORAL at 09:04

## 2025-04-18 RX ADMIN — BUDESONIDE 0.5 MG: 0.5 SUSPENSION RESPIRATORY (INHALATION) at 07:04

## 2025-04-18 RX ADMIN — IPRATROPIUM BROMIDE AND ALBUTEROL SULFATE 3 ML: 2.5; .5 SOLUTION RESPIRATORY (INHALATION) at 03:04

## 2025-04-18 RX ADMIN — METHYLPREDNISOLONE SODIUM SUCCINATE 250 MG: 125 INJECTION, POWDER, FOR SOLUTION INTRAMUSCULAR; INTRAVENOUS at 05:04

## 2025-04-18 RX ADMIN — IPRATROPIUM BROMIDE AND ALBUTEROL SULFATE 3 ML: 2.5; .5 SOLUTION RESPIRATORY (INHALATION) at 07:04

## 2025-04-18 RX ADMIN — MONTELUKAST 10 MG: 10 TABLET, FILM COATED ORAL at 09:04

## 2025-04-18 RX ADMIN — INSULIN GLARGINE 25 UNITS: 100 INJECTION, SOLUTION SUBCUTANEOUS at 09:04

## 2025-04-18 RX ADMIN — ATORVASTATIN CALCIUM 20 MG: 20 TABLET, FILM COATED ORAL at 09:04

## 2025-04-18 RX ADMIN — INSULIN ASPART 1 UNITS: 100 INJECTION, SOLUTION INTRAVENOUS; SUBCUTANEOUS at 09:04

## 2025-04-18 RX ADMIN — METHYLPREDNISOLONE SODIUM SUCCINATE 250 MG: 125 INJECTION, POWDER, FOR SOLUTION INTRAMUSCULAR; INTRAVENOUS at 02:04

## 2025-04-18 RX ADMIN — METOPROLOL SUCCINATE 25 MG: 25 TABLET, EXTENDED RELEASE ORAL at 09:04

## 2025-04-18 RX ADMIN — METHYLPREDNISOLONE SODIUM SUCCINATE 250 MG: 125 INJECTION, POWDER, FOR SOLUTION INTRAMUSCULAR; INTRAVENOUS at 11:04

## 2025-04-18 RX ADMIN — SERTRALINE HYDROCHLORIDE 100 MG: 50 TABLET ORAL at 09:04

## 2025-04-18 RX ADMIN — METHYLPREDNISOLONE SODIUM SUCCINATE 250 MG: 125 INJECTION, POWDER, FOR SOLUTION INTRAMUSCULAR; INTRAVENOUS at 12:04

## 2025-04-18 RX ADMIN — IPRATROPIUM BROMIDE AND ALBUTEROL SULFATE 3 ML: 2.5; .5 SOLUTION RESPIRATORY (INHALATION) at 11:04

## 2025-04-18 RX ADMIN — METOCLOPRAMIDE HYDROCHLORIDE 10 MG: 5 TABLET ORAL at 05:04

## 2025-04-18 RX ADMIN — METOCLOPRAMIDE HYDROCHLORIDE 10 MG: 5 TABLET ORAL at 02:04

## 2025-04-18 RX ADMIN — PANTOPRAZOLE SODIUM 40 MG: 40 TABLET, DELAYED RELEASE ORAL at 09:04

## 2025-04-18 NOTE — SUBJECTIVE & OBJECTIVE
Interval History/Significant Events:  Patient is says she is breathing little bit better and has little better appetite    Review of Systems  Objective:     Vital Signs (Most Recent):  Temp: 97.5 °F (36.4 °C) (04/18/25 0441)  Pulse: 90 (04/18/25 0442)  Resp: 18 (04/17/25 2039)  BP: 105/61 (04/18/25 0442)  SpO2: 97 % (04/18/25 0442) Vital Signs (24h Range):  Temp:  [97.3 °F (36.3 °C)-98.6 °F (37 °C)] 97.5 °F (36.4 °C)  Pulse:  [] 90  Resp:  [18-20] 18  SpO2:  [95 %-98 %] 97 %  BP: ()/(45-83) 105/61   Weight: (!) 136.1 kg (300 lb)  Body mass index is 49.92 kg/m².    No intake or output data in the 24 hours ending 04/18/25 0733       Physical Exam  Vitals reviewed.   Constitutional:       Appearance: Normal appearance.      Interventions: She is not intubated.  HENT:      Head: Normocephalic and atraumatic.      Nose: Nose normal.      Mouth/Throat:      Mouth: Mucous membranes are dry.      Pharynx: Oropharynx is clear.   Eyes:      Extraocular Movements: Extraocular movements intact.      Conjunctiva/sclera: Conjunctivae normal.      Pupils: Pupils are equal, round, and reactive to light.   Cardiovascular:      Rate and Rhythm: Normal rate.      Heart sounds: Normal heart sounds. No murmur heard.  Pulmonary:      Effort: Pulmonary effort is normal. She is not intubated.      Breath sounds: Normal breath sounds.   Abdominal:      General: Abdomen is flat. Bowel sounds are normal.      Palpations: Abdomen is soft.   Musculoskeletal:         General: Normal range of motion.      Cervical back: Normal range of motion and neck supple.      Right lower leg: No edema.      Left lower leg: No edema.   Skin:     General: Skin is warm and dry.      Capillary Refill: Capillary refill takes less than 2 seconds.   Neurological:      General: No focal deficit present.      Mental Status: She is alert and oriented to person, place, and time.   Psychiatric:         Mood and Affect: Mood normal.         Behavior: Behavior  normal.            Vents:  Oxygen Concentration (%): 28 (04/17/25 1503)  Lines/Drains/Airways       Peripherally Inserted Central Catheter Line  Duration             PICC Double Lumen 04/17/25 1547 left brachial <1 day              Drain  Duration             Female External Urinary Catheter w/ Suction 04/16/25 0415 2 days                  Significant Labs:    CBC/Anemia Profile:  Recent Labs   Lab 04/18/25  0420   WBC 22.78*   HGB 8.7*   HCT 25.7*   *   MCV 85.4   RDW 17.5*        Chemistries:  Recent Labs   Lab 04/17/25  0330 04/18/25  0420   * 130*   K 4.0 4.6    100   CO2 19* 20*   BUN 52* 64*   CREATININE 2.34* 2.56*   CALCIUM 8.6 9.2       Recent Lab Results  (Last 5 results in the past 24 hours)        04/18/25  0420   04/17/25  2036   04/17/25  1616   04/17/25  1137   04/17/25  0738        Anion Gap 15               Bands 5               Baso # 0.08               Basophil % 0.4               BUN 64               BUN/CREAT RATIO 25               Calcium 9.2               Chloride 100               CO2 20               Creatinine 2.56               Differential Method Manual               eGFR 20  Comment: Estimated GFR calculated using the CKD-EPI creatinine (2021) equation.               Eos # 0.00               Eos % 0.0               Glucose 153               Hematocrit 25.7               Hemoglobin 8.7               Hypo Few               Immature Grans (Abs) 1.71               Immature Granulocytes 7.5               Lymph # 0.36               Lymph % 1.6                3               MCH 28.9               MCHC 33.9               MCV 85.4               Metamyelocytes 1               Mono # 1.17               Mono % 5.1                2               MPV 13.1               Neutrophils, Abs 19.46               Neutrophils Relative 85.4               nRBC 23                12.6               NUCLEATED RBC ABSOLUTE 2.87               PLATELET MORPHOLOGY Large Platelets                Platelet Count 149               POC Glucose   168   152   188   226       Potassium 4.6               RBC 3.01               RDW 17.5               Segmented Neutrophils, Man % 89               Sodium 130               WBC 22.78                                      Significant Imaging:  I have reviewed all pertinent imaging results/findings within the past 24 hours.

## 2025-04-18 NOTE — PROGRESS NOTES
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  Patient was initially admitted to the hospital on April 4 would dehydration gastroparesis UTI.  She has not been able to eat previously in his now admitted to the hospital with concerns over nutrition since she has been in the hospital she has had shortness of breath and has developed worsening right-sided infiltrates.  Patient shortness of breath    Hospital/ICU Course:  4/14/25-asked to see by General Pulmonary for evaluation of possible EBUS bronchoscopy.  Patient with prior EBUS bronchoscopy performed 10/21/24 with evidence of malignancy present in station 4 L and station 4R.  Recent cessation of her EGFR TKI.    Interval History/Significant Events:  Patient is says she is breathing little bit better and has little better appetite    Review of Systems  Objective:     Vital Signs (Most Recent):  Temp: 97.5 °F (36.4 °C) (04/18/25 0441)  Pulse: 90 (04/18/25 0442)  Resp: 18 (04/17/25 2039)  BP: 105/61 (04/18/25 0442)  SpO2: 97 % (04/18/25 0442) Vital Signs (24h Range):  Temp:  [97.3 °F (36.3 °C)-98.6 °F (37 °C)] 97.5 °F (36.4 °C)  Pulse:  [] 90  Resp:  [18-20] 18  SpO2:  [95 %-98 %] 97 %  BP: ()/(45-83) 105/61   Weight: (!) 136.1 kg (300 lb)  Body mass index is 49.92 kg/m².    No intake or output data in the 24 hours ending 04/18/25 0733       Physical Exam  Vitals reviewed.   Constitutional:       Appearance: Normal appearance.      Interventions: She is not  intubated.  HENT:      Head: Normocephalic and atraumatic.      Nose: Nose normal.      Mouth/Throat:      Mouth: Mucous membranes are dry.      Pharynx: Oropharynx is clear.   Eyes:      Extraocular Movements: Extraocular movements intact.      Conjunctiva/sclera: Conjunctivae normal.      Pupils: Pupils are equal, round, and reactive to light.   Cardiovascular:      Rate and Rhythm: Normal rate.      Heart sounds: Normal heart sounds. No murmur heard.  Pulmonary:      Effort: Pulmonary effort is normal. She is not intubated.      Breath sounds: Normal breath sounds.   Abdominal:      General: Abdomen is flat. Bowel sounds are normal.      Palpations: Abdomen is soft.   Musculoskeletal:         General: Normal range of motion.      Cervical back: Normal range of motion and neck supple.      Right lower leg: No edema.      Left lower leg: No edema.   Skin:     General: Skin is warm and dry.      Capillary Refill: Capillary refill takes less than 2 seconds.   Neurological:      General: No focal deficit present.      Mental Status: She is alert and oriented to person, place, and time.   Psychiatric:         Mood and Affect: Mood normal.         Behavior: Behavior normal.            Vents:  Oxygen Concentration (%): 28 (04/17/25 1503)  Lines/Drains/Airways       Peripherally Inserted Central Catheter Line  Duration             PICC Double Lumen 04/17/25 1547 left brachial <1 day              Drain  Duration             Female External Urinary Catheter w/ Suction 04/16/25 0415 2 days                  Significant Labs:    CBC/Anemia Profile:  Recent Labs   Lab 04/18/25  0420   WBC 22.78*   HGB 8.7*   HCT 25.7*   *   MCV 85.4   RDW 17.5*        Chemistries:  Recent Labs   Lab 04/17/25  0330 04/18/25  0420   * 130*   K 4.0 4.6    100   CO2 19* 20*   BUN 52* 64*   CREATININE 2.34* 2.56*   CALCIUM 8.6 9.2       Recent Lab Results  (Last 5 results in the past 24 hours)        04/18/25  0420    "04/17/25 2036 04/17/25  1616   04/17/25  1137   04/17/25  0738        Anion Gap 15               Bands 5               Baso # 0.08               Basophil % 0.4               BUN 64               BUN/CREAT RATIO 25               Calcium 9.2               Chloride 100               CO2 20               Creatinine 2.56               Differential Method Manual               eGFR 20  Comment: Estimated GFR calculated using the CKD-EPI creatinine (2021) equation.               Eos # 0.00               Eos % 0.0               Glucose 153               Hematocrit 25.7               Hemoglobin 8.7               Hypo Few               Immature Grans (Abs) 1.71               Immature Granulocytes 7.5               Lymph # 0.36               Lymph % 1.6                3               MCH 28.9               MCHC 33.9               MCV 85.4               Metamyelocytes 1               Mono # 1.17               Mono % 5.1                2               MPV 13.1               Neutrophils, Abs 19.46               Neutrophils Relative 85.4               nRBC 23                12.6               NUCLEATED RBC ABSOLUTE 2.87               PLATELET MORPHOLOGY Large Platelets               Platelet Count 149               POC Glucose   168   152   188   226       Potassium 4.6               RBC 3.01               RDW 17.5               Segmented Neutrophils, Man % 89               Sodium 130               WBC 22.78                                      Significant Imaging:  I have reviewed all pertinent imaging results/findings within the past 24 hours.    ABG  No results for input(s): "PH", "PO2", "PCO2", "HCO3", "BE" in the last 168 hours.  Assessment/Plan:     Pulmonary  Pneumonitis  Patient has received 5 doses of the 250 mg Solu-Medrol due to IV issues is now need 7 more doses I will repeat chest x-ray tomorrow symptomatically she feels like she is little better    Abnormal chest x-ray  Will proceed with broncho alveolar lavage " today patient seems comfortable this morning        .     Todd Villasenor MD  Critical Care Medicine  [facility]

## 2025-04-18 NOTE — ASSESSMENT & PLAN NOTE
Patient has received 5 doses of the 250 mg Solu-Medrol due to IV issues is now need 7 more doses I will repeat chest x-ray tomorrow symptomatically she feels like she is little better

## 2025-04-19 LAB
ANION GAP SERPL CALCULATED.3IONS-SCNC: 15 MMOL/L (ref 7–16)
BUN SERPL-MCNC: 72 MG/DL (ref 10–20)
BUN/CREAT SERPL: 27 (ref 6–20)
CALCIUM SERPL-MCNC: 9 MG/DL (ref 8.4–10.2)
CHLORIDE SERPL-SCNC: 101 MMOL/L (ref 98–107)
CO2 SERPL-SCNC: 20 MMOL/L (ref 23–31)
CREAT SERPL-MCNC: 2.67 MG/DL (ref 0.55–1.02)
EGFR (NO RACE VARIABLE) (RUSH/TITUS): 19 ML/MIN/1.73M2
GLUCOSE SERPL-MCNC: 142 MG/DL (ref 82–115)
GLUCOSE SERPL-MCNC: 145 MG/DL (ref 70–105)
GLUCOSE SERPL-MCNC: 160 MG/DL (ref 70–105)
GLUCOSE SERPL-MCNC: 167 MG/DL (ref 70–105)
GLUCOSE SERPL-MCNC: 167 MG/DL (ref 70–105)
POTASSIUM SERPL-SCNC: 4.6 MMOL/L (ref 3.5–5.1)
SODIUM SERPL-SCNC: 131 MMOL/L (ref 136–145)

## 2025-04-19 PROCEDURE — 99233 SBSQ HOSP IP/OBS HIGH 50: CPT | Mod: ,,, | Performed by: HOSPITALIST

## 2025-04-19 PROCEDURE — 63600175 PHARM REV CODE 636 W HCPCS: Performed by: HOSPITALIST

## 2025-04-19 PROCEDURE — 27000207 HC ISOLATION

## 2025-04-19 PROCEDURE — 94640 AIRWAY INHALATION TREATMENT: CPT

## 2025-04-19 PROCEDURE — 94761 N-INVAS EAR/PLS OXIMETRY MLT: CPT

## 2025-04-19 PROCEDURE — 82962 GLUCOSE BLOOD TEST: CPT

## 2025-04-19 PROCEDURE — 36415 COLL VENOUS BLD VENIPUNCTURE: CPT | Performed by: STUDENT IN AN ORGANIZED HEALTH CARE EDUCATION/TRAINING PROGRAM

## 2025-04-19 PROCEDURE — 63600175 PHARM REV CODE 636 W HCPCS: Mod: JZ,TB | Performed by: INTERNAL MEDICINE

## 2025-04-19 PROCEDURE — 99232 SBSQ HOSP IP/OBS MODERATE 35: CPT | Mod: ,,, | Performed by: INTERNAL MEDICINE

## 2025-04-19 PROCEDURE — 80048 BASIC METABOLIC PNL TOTAL CA: CPT | Performed by: STUDENT IN AN ORGANIZED HEALTH CARE EDUCATION/TRAINING PROGRAM

## 2025-04-19 PROCEDURE — 25000003 PHARM REV CODE 250: Performed by: INTERNAL MEDICINE

## 2025-04-19 PROCEDURE — 27000221 HC OXYGEN, UP TO 24 HOURS

## 2025-04-19 PROCEDURE — 25000242 PHARM REV CODE 250 ALT 637 W/ HCPCS: Performed by: HOSPITALIST

## 2025-04-19 PROCEDURE — 25000003 PHARM REV CODE 250: Performed by: HOSPITALIST

## 2025-04-19 PROCEDURE — 99900035 HC TECH TIME PER 15 MIN (STAT)

## 2025-04-19 PROCEDURE — 11000001 HC ACUTE MED/SURG PRIVATE ROOM

## 2025-04-19 PROCEDURE — 25000003 PHARM REV CODE 250: Performed by: STUDENT IN AN ORGANIZED HEALTH CARE EDUCATION/TRAINING PROGRAM

## 2025-04-19 PROCEDURE — 96372 THER/PROPH/DIAG INJ SC/IM: CPT

## 2025-04-19 RX ORDER — LEVOFLOXACIN 500 MG/1
500 TABLET, FILM COATED ORAL DAILY
Status: DISCONTINUED | OUTPATIENT
Start: 2025-04-19 | End: 2025-04-19

## 2025-04-19 RX ORDER — LEVOFLOXACIN 750 MG/1
750 TABLET, FILM COATED ORAL
Status: DISCONTINUED | OUTPATIENT
Start: 2025-04-21 | End: 2025-04-22

## 2025-04-19 RX ADMIN — METHYLPREDNISOLONE SODIUM SUCCINATE 250 MG: 125 INJECTION, POWDER, FOR SOLUTION INTRAMUSCULAR; INTRAVENOUS at 05:04

## 2025-04-19 RX ADMIN — METOCLOPRAMIDE HYDROCHLORIDE 10 MG: 5 TABLET ORAL at 06:04

## 2025-04-19 RX ADMIN — IPRATROPIUM BROMIDE AND ALBUTEROL SULFATE 3 ML: 2.5; .5 SOLUTION RESPIRATORY (INHALATION) at 07:04

## 2025-04-19 RX ADMIN — METOCLOPRAMIDE HYDROCHLORIDE 10 MG: 5 TABLET ORAL at 05:04

## 2025-04-19 RX ADMIN — INSULIN GLARGINE 25 UNITS: 100 INJECTION, SOLUTION SUBCUTANEOUS at 08:04

## 2025-04-19 RX ADMIN — METOCLOPRAMIDE HYDROCHLORIDE 10 MG: 5 TABLET ORAL at 08:04

## 2025-04-19 RX ADMIN — MONTELUKAST 10 MG: 10 TABLET, FILM COATED ORAL at 08:04

## 2025-04-19 RX ADMIN — PANTOPRAZOLE SODIUM 40 MG: 40 TABLET, DELAYED RELEASE ORAL at 08:04

## 2025-04-19 RX ADMIN — LEVOFLOXACIN 500 MG: 500 TABLET, FILM COATED ORAL at 08:04

## 2025-04-19 RX ADMIN — METHYLPREDNISOLONE SODIUM SUCCINATE 250 MG: 125 INJECTION, POWDER, FOR SOLUTION INTRAMUSCULAR; INTRAVENOUS at 01:04

## 2025-04-19 RX ADMIN — SERTRALINE HYDROCHLORIDE 100 MG: 50 TABLET ORAL at 08:04

## 2025-04-19 RX ADMIN — INSULIN ASPART 1 UNITS: 100 INJECTION, SOLUTION INTRAVENOUS; SUBCUTANEOUS at 08:04

## 2025-04-19 RX ADMIN — METHYLPREDNISOLONE SODIUM SUCCINATE 250 MG: 125 INJECTION, POWDER, FOR SOLUTION INTRAMUSCULAR; INTRAVENOUS at 06:04

## 2025-04-19 RX ADMIN — IPRATROPIUM BROMIDE AND ALBUTEROL SULFATE 3 ML: 2.5; .5 SOLUTION RESPIRATORY (INHALATION) at 11:04

## 2025-04-19 RX ADMIN — ATORVASTATIN CALCIUM 20 MG: 20 TABLET, FILM COATED ORAL at 08:04

## 2025-04-19 RX ADMIN — BUDESONIDE 0.5 MG: 0.5 SUSPENSION RESPIRATORY (INHALATION) at 08:04

## 2025-04-19 RX ADMIN — BUDESONIDE 0.5 MG: 0.5 SUSPENSION RESPIRATORY (INHALATION) at 07:04

## 2025-04-19 RX ADMIN — IPRATROPIUM BROMIDE AND ALBUTEROL SULFATE 3 ML: 2.5; .5 SOLUTION RESPIRATORY (INHALATION) at 08:04

## 2025-04-19 RX ADMIN — IPRATROPIUM BROMIDE AND ALBUTEROL SULFATE 3 ML: 2.5; .5 SOLUTION RESPIRATORY (INHALATION) at 03:04

## 2025-04-19 RX ADMIN — METOCLOPRAMIDE HYDROCHLORIDE 10 MG: 5 TABLET ORAL at 11:04

## 2025-04-19 RX ADMIN — METHYLPREDNISOLONE SODIUM SUCCINATE 250 MG: 125 INJECTION, POWDER, FOR SOLUTION INTRAMUSCULAR; INTRAVENOUS at 11:04

## 2025-04-19 RX ADMIN — METOPROLOL SUCCINATE 25 MG: 25 TABLET, EXTENDED RELEASE ORAL at 08:04

## 2025-04-19 NOTE — ASSESSMENT & PLAN NOTE
Vitals:    04/18/25 0441 04/18/25 0442 04/18/25 0748 04/18/25 1118   BP: (!) 92/45 105/61 116/80 (!) 103/54    04/18/25 1557 04/18/25 1941 04/19/25 0008 04/19/25 0523   BP: 111/76 93/70 105/77 108/68    04/19/25 0734 04/19/25 1117   BP: 118/66 (!) 99/56         Monitor BP while on Metoprolol; on IVF; off Losartan

## 2025-04-19 NOTE — ASSESSMENT & PLAN NOTE
Hyponatremia is likely due to renal insufficiency. The patient's most recent sodium results are listed below.  Recent Labs     04/16/25  0315 04/17/25  0330 04/18/25  0420   * 127* 130*     Plan  - Correct the sodium by 4-6mEq in 24 hours.   - Obtain the following studies:  In a.  - Will treat the hyponatremia with continue LR  - Monitor sodium Daily.   - Patient hyponatremia is stable  - follow  Follow

## 2025-04-19 NOTE — ASSESSMENT & PLAN NOTE
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 Most recent hemoglobin and hematocrit are listed below.  Recent Labs     04/16/25  0503 04/18/25  0420   HGB 7.9* 8.7*   HCT 25.4* 25.7*     Plan  - Monitor serial CBC: Daily  - Transfuse PRBC if patient becomes hemodynamically unstable, symptomatic or H/H drops below 7/21.  - Patient has not received any PRBC transfusions to date  - Patient's anemia is currently stable  - follow  No evidence of bleeding

## 2025-04-19 NOTE — ASSESSMENT & PLAN NOTE
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 Most recent hemoglobin and hematocrit are listed below.  Recent Labs     04/18/25  0420   HGB 8.7*   HCT 25.7*     Plan  - Monitor serial CBC: Daily  - Transfuse PRBC if patient becomes hemodynamically unstable, symptomatic or H/H drops below 7/21.  - Patient has not received any PRBC transfusions to date  - Patient's anemia is currently stable  - follow  No evidence of bleeding

## 2025-04-19 NOTE — ASSESSMENT & PLAN NOTE
Patient seems better chest x-ray was little bit better I have weaned from high-dose steroids to 40 mg prednisone today and I would wean over the next several weeks.  Finish a course of Levaquin for stenotrophomonas  in sputum

## 2025-04-19 NOTE — ASSESSMENT & PLAN NOTE
Vitals:    04/17/25 1139 04/17/25 1618 04/17/25 2039 04/18/25 0041   BP: 99/71 (!) 94/59 118/83 108/68    04/18/25 0441 04/18/25 0442 04/18/25 0748 04/18/25 1118   BP: (!) 92/45 105/61 116/80 (!) 103/54    04/18/25 1557 04/18/25 1941   BP: 111/76 93/70         Monitor BP while on Metoprolol; on IVF; off Losartan

## 2025-04-19 NOTE — SUBJECTIVE & OBJECTIVE
Interval History:     Review of Systems   Constitutional:  Negative for appetite change, fatigue and fever.   HENT:  Negative for congestion and hearing loss.    Respiratory:  Negative for chest tightness and wheezing.    Cardiovascular:  Negative for chest pain and palpitations.   Gastrointestinal:  Negative for abdominal pain, constipation and nausea.   Genitourinary:  Negative for difficulty urinating and dysuria.   Musculoskeletal:  Positive for gait problem. Negative for back pain and neck stiffness.   Skin:  Negative for pallor and rash.   Neurological:  Negative for dizziness, speech difficulty and headaches.   Psychiatric/Behavioral:  Positive for sleep disturbance. Negative for confusion and suicidal ideas.      Objective:     Vital Signs (Most Recent):  Temp: 97.4 °F (36.3 °C) (04/18/25 1941)  Pulse: 87 (04/18/25 1941)  Resp: 18 (04/18/25 1941)  BP: 93/70 (04/18/25 1941)  SpO2: 95 % (04/18/25 1941) Vital Signs (24h Range):  Temp:  [96.2 °F (35.7 °C)-97.5 °F (36.4 °C)] 97.4 °F (36.3 °C)  Pulse:  [74-97] 87  Resp:  [17-20] 18  SpO2:  [95 %-99 %] 95 %  BP: ()/(45-83) 93/70     Weight: (!) 136.1 kg (300 lb)  Body mass index is 49.92 kg/m².    Intake/Output Summary (Last 24 hours) at 4/18/2025 2023  Last data filed at 4/18/2025 1715  Gross per 24 hour   Intake 600 ml   Output --   Net 600 ml         Physical Exam  Vitals reviewed.   Constitutional:       General: She is awake. She is not in acute distress.     Appearance: She is well-developed. She is morbidly obese. She is not toxic-appearing.   HENT:      Head: Normocephalic.      Nose: Nose normal.      Mouth/Throat:      Pharynx: Oropharynx is clear.   Eyes:      Extraocular Movements: Extraocular movements intact.      Pupils: Pupils are equal, round, and reactive to light.   Neck:      Thyroid: No thyroid mass.      Vascular: No carotid bruit.   Cardiovascular:      Rate and Rhythm: Normal rate and regular rhythm.      Pulses: Normal pulses.       Heart sounds: Normal heart sounds. No murmur heard.  Pulmonary:      Effort: Pulmonary effort is normal.      Breath sounds: Normal breath sounds and air entry. No wheezing.   Abdominal:      General: Bowel sounds are normal. There is no distension.      Palpations: Abdomen is soft.      Tenderness: There is no abdominal tenderness.   Musculoskeletal:         General: Normal range of motion.      Cervical back: Neck supple. No rigidity.   Skin:     General: Skin is warm.      Coloration: Skin is not jaundiced.      Findings: No lesion.   Neurological:      General: No focal deficit present.      Mental Status: She is alert and oriented to person, place, and time.      Cranial Nerves: No cranial nerve deficit.   Psychiatric:         Attention and Perception: Attention normal.         Mood and Affect: Mood normal.         Behavior: Behavior normal. Behavior is cooperative.         Thought Content: Thought content normal.         Cognition and Memory: Cognition normal.               Significant Labs: All pertinent labs within the past 24 hours have been reviewed.  BMP:   Recent Labs   Lab 04/18/25  0420   *   *   K 4.6      CO2 20*   BUN 64*   CREATININE 2.56*   CALCIUM 9.2       CBC:   Recent Labs   Lab 04/18/25  0420   WBC 22.78*   HGB 8.7*   HCT 25.7*   *       CMP:   Recent Labs   Lab 04/17/25  0330 04/18/25  0420   * 130*   K 4.0 4.6    100   CO2 19* 20*   * 153*   BUN 52* 64*   CREATININE 2.34* 2.56*   CALCIUM 8.6 9.2   ANIONGAP 12 15         Significant Imaging: I have reviewed all pertinent imaging results/findings within the past 24 hours.      Intake/Output - Last 3 Shifts         04/17 0700  04/18 0659 04/18 0700  04/19 0659    P.O.  600    Total Intake(mL/kg)  600 (4.4)    Net  +600          Urine Occurrence  2 x          Microbiology Results (last 7 days)       Procedure Component Value Units Date/Time    Culture, Lower Respiratory [[id number]]  (Abnormal)  Collected: 04/15/25 0943    Order Status: Completed Specimen: Respiratory from BAL Updated: 04/17/25 0727     Culture, Lower Respiratory Light Growth Gram-negative Bacilli     Comment: Identification and Susceptibility to Follow       AFB Smear Only [[id number]] Collected: 04/15/25 0943    Order Status: Completed Specimen: Respiratory from BAL Updated: 04/15/25 2200     AFB Smear No acid fast bacilli seen    Gram Stain [[id number]] Collected: 04/15/25 0943    Order Status: Completed Specimen: Respiratory from BAL Updated: 04/15/25 1249     Gram Stain Result Moderate WBC observed      Few WBC observed      Rare Gram positive cocci    Direct Prep (Other) Fungus [[id number]] Collected: 04/15/25 0943    Order Status: Completed Specimen: Respiratory from BAL Updated: 04/15/25 1249     Direct Prep No fungal elements seen    Culture, Fungus (Other) [[id number]] Collected: 04/15/25 0943    Order Status: Resulted Specimen: Respiratory from BAL Updated: 04/15/25 1123    Culture, AFB [[id number]] Collected: 04/15/25 0943    Order Status: Sent Specimen: Respiratory from BAL Updated: 04/15/25 1110

## 2025-04-19 NOTE — PROGRESS NOTES
[facility]  [facility] Medicine  Progress Note    Patient Name: Magan Saleem  MRN: [id number]  Patient Class: IP- Inpatient   Admission Date: 4/3/2025  Length of Stay: 16 days  Attending Physician: Andriy Rizvi MD  Primary Care Provider: Sil Brown DO        Subjective     Principal Problem:Sepsis due to pneumonia        HPI:  70 yo F presents to [place] ED from [facility] for abnormal labs.  Patient was discharged from [facility] two days ago to skilled nursing facility for rehab.  She was diagnosed with dehydration due to gastroparesis with UTI.  Patient has metastatic lung cancer (to the brain) and follows with Dr. Swanson for IV chemotherapy every other week and takes lazertinib daily.  She has completed her course of radiation for the brain mets and follow had showed some improvement.  I thought she had either some decreased LOC due to encephalopathy or some aphasia from the brain mets but after a great effort to get her to talk, I realized she was just mad.  She wanted to know why she had been discharged two days ago when she could not eat.  She has no appetite and early satiety.  She is not nauseated and no vomiting.  She has decided on a DNR status previously (her caretaker and friend of 20 years) states that she had always said she did not want resuscitation if she experienced cardiopulmonary arrest and had told her children her wishes but she does want treatment and is not opposed to J tube if needed.  She has not had anything to eat or drink in two days and is dehydrated again.      Patient was transferred because of concern of sepsis.  She did have enterococcus faecalis UTI at [facility] and was treated.  I do not have the culture results but cultures were sent and patient does have some yeast noted.  (Will not treat a yeast colonization).  She is afebrile and hemodynamically stable and does not look septic clinically.  Her Lactic acid is stable at 2.5 dara 3.2.  Will check  another in am after volume resuscitation.  Her creat is at baseline but she has prerenal azotemia further confirming the dehydration.  Patient has an IO in place from [facility] due to dehydration and difficult stick but with some volume resuscitation, we have gotten an IV.  She is covid and flu negative.      Her WBC is 29 and I am not sure if she has received neupogen but could be a stress reaction.  Her BS is 245 and she does have some urine ketones but most likely due to starvation ketosis.  Will check a serum ketone.  Her platelets are 88K but this is most likely from her chemo.  She is not anemic.  CXR shows some patchy infiltrate but no obvious obstructive pneumonia from her lung cancer.  Her BNP about 3K but no clinical signs of CHF.  No recent echo but due to her BMI 50 most likely has some PHTN.  EKG had no ischemic changes but tachy at 114 which could also be from dehydration.  She was on ozempic for her DM and this could be the cause of her decreased appetite.  She is also on decadron for prevention of cerebral edema.      Remainder of ROS as below.  She mostly just nods and shakes her head and rolls her eyes.  You can get her to laugh if you try but she has been depressed since she has not walked since her hospitalization at [facility] on 3/19/25 and was discharged two days ago.  She says that at her last chemo she noticed she was getting weaker and her daughter had to help her in and out of the car and that was new for her.      See assessment and plan below for problem based evaluation       Overview/Hospital Course:  69 year old female presents with hypernatremia; she is not too talkative during rounds    4/5- patient seen examined today resting comfortably in bed and in no acute distress, with no acute events overnight.  Patient has a multitude of issues complicating her medical picture.  White blood cell count 16945 today, sodium 159, potassium 3.2 and BUN creatinine 59 and 1.8.  The patient is still not eating  even when assistance is provided.  We have already changed her fluids to half-normal saline with 20 of KCl at 1:25 a.m..  Have also put in a GI consult for possible feeding tube.  E coli in the urine has turned out to be ESBL and Rocephin has been changed Merrem.    4/6- the patient seen examined today resting comfortably in bed, in no acute distress, in no acute events overnight.  The patient is not very talkative today, but states she is doing okay.  She has no acute complaints.  Blood cultures remain negative.  The patient has not eaten since yesterday and is on light IV fluids.  GI has been consulted for feeding tube.  Continues on Merrem for positive urine culture.    04/07 Records reviewed. Not eating which not new, Similar during recent admit at [facility]. Dr Swanson there had question pancreatitis. No abd pain or tenderness reported now. Question of dysphagia to solids. Chart hx gastroparesis. Will discuss with GI. Ronnie says has responded so far well to treatment for lung CA.   04/08 had EGD at [facility] 03/21/25 with no obstruction and evidence gastroparesis. Still not ending. Try additional meds. Talked with GI. Increase activity  04/09 Some better with med  changes  04/10 Continues to do better. Ate 1/2 fish sandwich for lunch. Called by Dr Swanson and she wanting to keep feeding tube in consideration. Talked with Dr Reich and Dr Lemus. If something needed with gastroparesis would have to have post gastric position of tube.   04/11 eating some. Later staff requested some nystatin for sore mouth.   04/12 still not eating well.  Increased peripheral edema.  Albumin low at 1.5.  Will give some albumin and follow with some Lasix.  Placed Dobbhoff tube and start enteral feedings.  This will help with nutrition and if issue of placing surgical tube later make sure will tolerate enteral feedings.  Chest x-ray continues worse on left side.  Discuss with Pulmonary and ask Dr. Villasenor to see.   04/13 no new issues.   Enteral feedings to be started.  Pulmonary evaluation appreciated and plans noted.  04/14 Tolerating feedings Therapy working with her. Bronchoscopy planned.   4/15 BAL today  4/16 bronch yesterday looks like pneumonitis  4/17 not candidate for PEG    04/18 Eating some now. Pt says feels some better than last seen. Look at placement. High dose steroids by pulmonary. BS not as bad as would expect.  04/19 states continued eat some.  Less nausea.  Blood sugar not sure bad considering the steroids.  Pulmonary adjusted antibiotics based on cultures.  Look at it placement next week.       Interval History:     Review of Systems   Constitutional:  Negative for appetite change, fatigue and fever.   HENT:  Negative for congestion and hearing loss.    Respiratory:  Negative for chest tightness and wheezing.    Cardiovascular:  Negative for chest pain and palpitations.   Gastrointestinal:  Negative for abdominal pain, constipation and nausea.   Genitourinary:  Negative for difficulty urinating and dysuria.   Musculoskeletal:  Positive for gait problem. Negative for back pain and neck stiffness.   Skin:  Negative for pallor and rash.   Neurological:  Negative for dizziness, speech difficulty and headaches.   Psychiatric/Behavioral:  Positive for sleep disturbance. Negative for confusion and suicidal ideas.      Objective:     Vital Signs (Most Recent):  Temp: 97.5 °F (36.4 °C) (04/19/25 1117)  Pulse: 78 (04/19/25 1122)  Resp: 18 (04/19/25 1122)  BP: (!) 99/56 (04/19/25 1117)  SpO2: 98 % (04/19/25 1122) Vital Signs (24h Range):  Temp:  [96.9 °F (36.1 °C)-97.5 °F (36.4 °C)] 97.5 °F (36.4 °C)  Pulse:  [75-97] 78  Resp:  [16-20] 18  SpO2:  [95 %-99 %] 98 %  BP: ()/(56-77) 99/56     Weight: (!) 136.1 kg (300 lb)  Body mass index is 49.92 kg/m².    Intake/Output Summary (Last 24 hours) at 4/19/2025 1406  Last data filed at 4/19/2025 1200  Gross per 24 hour   Intake 220 ml   Output --   Net 220 ml         Physical Exam  Vitals  reviewed.   Constitutional:       General: She is awake. She is not in acute distress.     Appearance: She is well-developed. She is morbidly obese. She is not toxic-appearing.   HENT:      Head: Normocephalic.      Nose: Nose normal.      Mouth/Throat:      Pharynx: Oropharynx is clear.   Eyes:      Extraocular Movements: Extraocular movements intact.      Pupils: Pupils are equal, round, and reactive to light.   Neck:      Thyroid: No thyroid mass.      Vascular: No carotid bruit.   Cardiovascular:      Rate and Rhythm: Normal rate and regular rhythm.      Pulses: Normal pulses.      Heart sounds: Normal heart sounds. No murmur heard.  Pulmonary:      Effort: Pulmonary effort is normal.      Breath sounds: Normal breath sounds and air entry. No wheezing.   Abdominal:      General: Bowel sounds are normal. There is no distension.      Palpations: Abdomen is soft.      Tenderness: There is no abdominal tenderness.   Musculoskeletal:         General: Normal range of motion.      Cervical back: Neck supple. No rigidity.   Skin:     General: Skin is warm.      Coloration: Skin is not jaundiced.      Findings: No lesion.   Neurological:      General: No focal deficit present.      Mental Status: She is alert and oriented to person, place, and time.      Cranial Nerves: No cranial nerve deficit.   Psychiatric:         Attention and Perception: Attention normal.         Mood and Affect: Mood normal.         Behavior: Behavior normal. Behavior is cooperative.         Thought Content: Thought content normal.         Cognition and Memory: Cognition normal.               Significant Labs: All pertinent labs within the past 24 hours have been reviewed.  BMP:   Recent Labs   Lab 04/19/25  0336   *   *   K 4.6      CO2 20*   BUN 72*   CREATININE 2.67*   CALCIUM 9.0       CBC:   Recent Labs   Lab 04/18/25  0420   WBC 22.78*   HGB 8.7*   HCT 25.7*   *       CMP:   Recent Labs   Lab 04/18/25  0420  04/19/25  0336   * 131*   K 4.6 4.6    101   CO2 20* 20*   * 142*   BUN 64* 72*   CREATININE 2.56* 2.67*   CALCIUM 9.2 9.0   ANIONGAP 15 15         Significant Imaging: I have reviewed all pertinent imaging results/findings within the past 24 hours.      Intake/Output - Last 3 Shifts         04/17 0700 04/18 0659 04/18 0700 04/19 0659 04/19 0700 04/20 0659    P.O.  600 100    Total Intake(mL/kg)  600 (4.4) 100 (0.7)    Net  +600 +100           Urine Occurrence  2 x     Stool Occurrence  2 x           Microbiology Results (last 7 days)       Procedure Component Value Units Date/Time    Culture, Lower Respiratory [[id number]]  (Abnormal)  (Susceptibility) Collected: 04/15/25 0943    Order Status: Completed Specimen: Respiratory from BAL Updated: 04/19/25 0711     Culture, Lower Respiratory Light Growth Stenotrophomonas maltophilia    AFB Smear Only [[id number]] Collected: 04/15/25 0943    Order Status: Completed Specimen: Respiratory from BAL Updated: 04/15/25 2200     AFB Smear No acid fast bacilli seen    Gram Stain [[id number]] Collected: 04/15/25 0943    Order Status: Completed Specimen: Respiratory from BAL Updated: 04/15/25 1249     Gram Stain Result Moderate WBC observed      Few WBC observed      Rare Gram positive cocci    Direct Prep (Other) Fungus [[id number]] Collected: 04/15/25 0943    Order Status: Completed Specimen: Respiratory from BAL Updated: 04/15/25 1249     Direct Prep No fungal elements seen    Culture, Fungus (Other) [[id number]] Collected: 04/15/25 0943    Order Status: Resulted Specimen: Respiratory from BAL Updated: 04/15/25 1123    Culture, AFB [[id number]] Collected: 04/15/25 0943    Order Status: Sent Specimen: Respiratory from BAL Updated: 04/15/25 1110                Assessment & Plan  Sepsis due to pneumonia  Appears to have UTI and pneumonia; has leukocytosis; has opacities on CXR; has G- bacilli on urine culture; blood cultures pending; will place on Vancomycin  "and Rocephin; BP on the low side; on IVF    04/12 worsening chest x-ray, ask Pulmonary to review  4/15 plan for BAL today.  Continue antibiotic  4/16 7 days antibiotic  completed  Hypernatremia  Due to dehydration; off diuretics; on IVF; will monitor Na levels  Lung cancer metastatic to brain  DNR but not hospice.  Receives chemo and has finished radiation.    04/07 reported stable / improved after treatment  04/09 more fuffy right side CXR  04/12 continue to abnormality on chest x-ray and ask Pulmonary to review  4/15 follows with Dr. Swanson.  No more chemotherapy treatments until patient is stronger.  Discussed with Dr. Swanson  4/16 hold treatments for now    Thrombocytopenia  On chemo; will monitor  04/19 platelet count 149 wishes improved    Essential hypertension  Vitals:    04/18/25 0441 04/18/25 0442 04/18/25 0748 04/18/25 1118   BP: (!) 92/45 105/61 116/80 (!) 103/54    04/18/25 1557 04/18/25 1941 04/19/25 0008 04/19/25 0523   BP: 111/76 93/70 105/77 108/68    04/19/25 0734 04/19/25 1117   BP: 118/66 (!) 99/56         Monitor BP while on Metoprolol; on IVF; off Losartan    Moderate persistent asthma without complication  Continue home inhaled steroid/bronchodilator and singulair    Chronic kidney disease, stage 3b  Creatine stable   Chronic pulmonary embolism without acute cor pulmonale  Eliquis was discontinued due to risk of ICH with brain mets but they have improved so the anti-coagulation has been restarted; will monitor  04/07 apparently this of several years ago    Type 2 diabetes mellitus with hyperglycemia  BS elevated; will increase Lantus to 25 units; continue SSI;monitor BS     Gastroparesis  On Reglan and PPI for erosive gastritis; will monitor    EGD by Dr. Lo during recent past admission at [facility]:  "EGD on 03/21/2025 shows LA class B erosive esophagitis but no pill esophagitis, nonerosive gastritis and retention of food and fluid suspicious for gastroparesis, due to narcotics " "and diabetes. "    04/ 08 try additional meds to help gastric emptying.  04/09 taking in some now orally with recent changes meds  04/10 better and ate half [place] for lunch   04/14 tolerating enteral feedings so far  4/15 we will get PEG tube if patient is agreeable.  Daughter is agreeable.  Discussed with her  4/16 patient wants PEG tube  4/17 not candidate for surgical PEG.  NGT out, start full liquids  04/18 taking some orally  Morbid obesity  Body mass index is 49.92 kg/m². Morbid obesity complicates all aspects of disease management from diagnostic modalities to treatment. Weight loss encouraged and health benefits explained to patient.     Would benefit from sleep study and possible CPAP.  Does not wear oxygen at home.      Edema due to hypoalbuminemia    04/12 add IV albumin and some Lasix  Start enteral feedings  04/13 start enteral feedings  Abnormal chest x-ray    04/13 addressed by Pulmonary with further investigation plans  Anemia  Anemia is likely due to chronic disease due to Malignancy. Most recent hemoglobin and hematocrit are listed below.  Recent Labs     04/18/25  0420   HGB 8.7*   HCT 25.7*     Plan  - Monitor serial CBC: Daily  - Transfuse PRBC if patient becomes hemodynamically unstable, symptomatic or H/H drops below 7/21.  - Patient has not received any PRBC transfusions to date  - Patient's anemia is currently stable  - follow  No evidence of bleeding  Hyponatremia  Hyponatremia is likely due to renal insufficiency. The patient's most recent sodium results are listed below.  Recent Labs     04/17/25  0330 04/18/25  0420 04/19/25  0336   * 130* 131*     Plan  - Correct the sodium by 4-6mEq in 24 hours.   - Obtain the following studies:  In a.  - Will treat the hyponatremia with continue LR  - Monitor sodium Daily.   - Patient hyponatremia is stable  - follow  Follow  Neoplastic (malignant) related fatigue    Progressive mobility protocol  Pneumonitis  IV steroids per " pulmonology  04/19 antibiotic adjustment based on cultures by Pulmonary  Dehydration, severe  Encourage orals    VTE Risk Mitigation (From admission, onward)      None            Discharge Planning   MITUL:      Code Status: DNR   Medical Readiness for Discharge Date:   Discharge Plan A: Home with family                Please place Justification for DME        Andriy Rizvi MD  Department of Hospital Medicine   [facility]

## 2025-04-19 NOTE — SUBJECTIVE & OBJECTIVE
Interval History/Significant Events:  Patient says she is breathing better    Review of Systems  Objective:     Vital Signs (Most Recent):  Temp: 97.5 °F (36.4 °C) (04/19/25 0734)  Pulse: 79 (04/19/25 0734)  Resp: 16 (04/19/25 0734)  BP: 118/66 (04/19/25 0734)  SpO2: 97 % (04/19/25 0734) Vital Signs (24h Range):  Temp:  [96.3 °F (35.7 °C)-97.5 °F (36.4 °C)] 97.5 °F (36.4 °C)  Pulse:  [79-97] 79  Resp:  [16-20] 16  SpO2:  [95 %-97 %] 97 %  BP: ()/(54-77) 118/66   Weight: (!) 136.1 kg (300 lb)  Body mass index is 49.92 kg/m².      Intake/Output Summary (Last 24 hours) at 4/19/2025 0753  Last data filed at 4/18/2025 1715  Gross per 24 hour   Intake 600 ml   Output --   Net 600 ml          Physical Exam  Vitals reviewed.   Constitutional:       Appearance: Normal appearance.      Interventions: She is not intubated.  HENT:      Head: Normocephalic and atraumatic.      Nose: Nose normal.      Mouth/Throat:      Mouth: Mucous membranes are dry.      Pharynx: Oropharynx is clear.   Eyes:      Extraocular Movements: Extraocular movements intact.      Conjunctiva/sclera: Conjunctivae normal.      Pupils: Pupils are equal, round, and reactive to light.   Cardiovascular:      Rate and Rhythm: Normal rate.      Heart sounds: Normal heart sounds. No murmur heard.  Pulmonary:      Effort: Pulmonary effort is normal. She is not intubated.      Breath sounds: Normal breath sounds.   Abdominal:      General: Abdomen is flat. Bowel sounds are normal.      Palpations: Abdomen is soft.   Musculoskeletal:         General: Normal range of motion.      Cervical back: Normal range of motion and neck supple.      Right lower leg: No edema.      Left lower leg: No edema.   Skin:     General: Skin is warm and dry.      Capillary Refill: Capillary refill takes less than 2 seconds.   Neurological:      General: No focal deficit present.      Mental Status: She is alert and oriented to person, place, and time.   Psychiatric:         Mood  and Affect: Mood normal.         Behavior: Behavior normal.            Vents:  Oxygen Concentration (%): 28 (04/18/25 1516)  Lines/Drains/Airways       Peripherally Inserted Central Catheter Line  Duration             PICC Double Lumen 04/17/25 1547 left brachial 1 day                  Significant Labs:    CBC/Anemia Profile:  Recent Labs   Lab 04/18/25  0420   WBC 22.78*   HGB 8.7*   HCT 25.7*   *   MCV 85.4   RDW 17.5*        Chemistries:  Recent Labs   Lab 04/18/25  0420 04/19/25  0336   * 131*   K 4.6 4.6    101   CO2 20* 20*   BUN 64* 72*   CREATININE 2.56* 2.67*   CALCIUM 9.2 9.0       Recent Lab Results  (Last 5 results in the past 24 hours)        04/19/25  0735   04/19/25  0336   04/18/25  1938   04/18/25  1555   04/18/25  1113        Anion Gap   15             BUN   72             BUN/CREAT RATIO   27             Calcium   9.0             Chloride   101             CO2   20             Creatinine   2.67             eGFR   19  Comment: Estimated GFR calculated using the CKD-EPI creatinine (2021) equation.             Glucose   142             POC Glucose 145     184   175   163       Potassium   4.6             Sodium   131                                    Significant Imaging:  I have reviewed all pertinent imaging results/findings within the past 24 hours.

## 2025-04-19 NOTE — ASSESSMENT & PLAN NOTE
"On Reglan and PPI for erosive gastritis; will monitor    EGD by Dr. Lo during recent past admission at [facility]:  "EGD on 03/21/2025 shows LA class B erosive esophagitis but no pill esophagitis, nonerosive gastritis and retention of food and fluid suspicious for gastroparesis, due to narcotics and diabetes. "    04/ 08 try additional meds to help gastric emptying.  04/09 taking in some now orally with recent changes meds  04/10 better and ate half [place] for lunch   04/14 tolerating enteral feedings so far  4/15 we will get PEG tube if patient is agreeable.  Daughter is agreeable.  Discussed with her  4/16 patient wants PEG tube  4/17 not candidate for surgical PEG.  NGT out, start full liquids  04/18 taking some orally  "

## 2025-04-19 NOTE — ASSESSMENT & PLAN NOTE
Hyponatremia is likely due to renal insufficiency. The patient's most recent sodium results are listed below.  Recent Labs     04/17/25  0330 04/18/25  0420 04/19/25  0336   * 130* 131*     Plan  - Correct the sodium by 4-6mEq in 24 hours.   - Obtain the following studies:  In a.  - Will treat the hyponatremia with continue LR  - Monitor sodium Daily.   - Patient hyponatremia is stable  - follow  Follow

## 2025-04-19 NOTE — SUBJECTIVE & OBJECTIVE
Interval History:     Review of Systems   Constitutional:  Negative for appetite change, fatigue and fever.   HENT:  Negative for congestion and hearing loss.    Respiratory:  Negative for chest tightness and wheezing.    Cardiovascular:  Negative for chest pain and palpitations.   Gastrointestinal:  Negative for abdominal pain, constipation and nausea.   Genitourinary:  Negative for difficulty urinating and dysuria.   Musculoskeletal:  Positive for gait problem. Negative for back pain and neck stiffness.   Skin:  Negative for pallor and rash.   Neurological:  Negative for dizziness, speech difficulty and headaches.   Psychiatric/Behavioral:  Positive for sleep disturbance. Negative for confusion and suicidal ideas.      Objective:     Vital Signs (Most Recent):  Temp: 97.5 °F (36.4 °C) (04/19/25 1117)  Pulse: 78 (04/19/25 1122)  Resp: 18 (04/19/25 1122)  BP: (!) 99/56 (04/19/25 1117)  SpO2: 98 % (04/19/25 1122) Vital Signs (24h Range):  Temp:  [96.9 °F (36.1 °C)-97.5 °F (36.4 °C)] 97.5 °F (36.4 °C)  Pulse:  [75-97] 78  Resp:  [16-20] 18  SpO2:  [95 %-99 %] 98 %  BP: ()/(56-77) 99/56     Weight: (!) 136.1 kg (300 lb)  Body mass index is 49.92 kg/m².    Intake/Output Summary (Last 24 hours) at 4/19/2025 1406  Last data filed at 4/19/2025 1200  Gross per 24 hour   Intake 220 ml   Output --   Net 220 ml         Physical Exam  Vitals reviewed.   Constitutional:       General: She is awake. She is not in acute distress.     Appearance: She is well-developed. She is morbidly obese. She is not toxic-appearing.   HENT:      Head: Normocephalic.      Nose: Nose normal.      Mouth/Throat:      Pharynx: Oropharynx is clear.   Eyes:      Extraocular Movements: Extraocular movements intact.      Pupils: Pupils are equal, round, and reactive to light.   Neck:      Thyroid: No thyroid mass.      Vascular: No carotid bruit.   Cardiovascular:      Rate and Rhythm: Normal rate and regular rhythm.      Pulses: Normal pulses.       Heart sounds: Normal heart sounds. No murmur heard.  Pulmonary:      Effort: Pulmonary effort is normal.      Breath sounds: Normal breath sounds and air entry. No wheezing.   Abdominal:      General: Bowel sounds are normal. There is no distension.      Palpations: Abdomen is soft.      Tenderness: There is no abdominal tenderness.   Musculoskeletal:         General: Normal range of motion.      Cervical back: Neck supple. No rigidity.   Skin:     General: Skin is warm.      Coloration: Skin is not jaundiced.      Findings: No lesion.   Neurological:      General: No focal deficit present.      Mental Status: She is alert and oriented to person, place, and time.      Cranial Nerves: No cranial nerve deficit.   Psychiatric:         Attention and Perception: Attention normal.         Mood and Affect: Mood normal.         Behavior: Behavior normal. Behavior is cooperative.         Thought Content: Thought content normal.         Cognition and Memory: Cognition normal.               Significant Labs: All pertinent labs within the past 24 hours have been reviewed.  BMP:   Recent Labs   Lab 04/19/25  0336   *   *   K 4.6      CO2 20*   BUN 72*   CREATININE 2.67*   CALCIUM 9.0       CBC:   Recent Labs   Lab 04/18/25  0420   WBC 22.78*   HGB 8.7*   HCT 25.7*   *       CMP:   Recent Labs   Lab 04/18/25  0420 04/19/25  0336   * 131*   K 4.6 4.6    101   CO2 20* 20*   * 142*   BUN 64* 72*   CREATININE 2.56* 2.67*   CALCIUM 9.2 9.0   ANIONGAP 15 15         Significant Imaging: I have reviewed all pertinent imaging results/findings within the past 24 hours.      Intake/Output - Last 3 Shifts         04/17 0700  04/18 0659 04/18 0700  04/19 0659 04/19 0700  04/20 0659    P.O.  600 100    Total Intake(mL/kg)  600 (4.4) 100 (0.7)    Net  +600 +100           Urine Occurrence  2 x     Stool Occurrence  2 x           Microbiology Results (last 7 days)       Procedure Component Value Units  Date/Time    Culture, Lower Respiratory [[id number]]  (Abnormal)  (Susceptibility) Collected: 04/15/25 0943    Order Status: Completed Specimen: Respiratory from BAL Updated: 04/19/25 0711     Culture, Lower Respiratory Light Growth Stenotrophomonas maltophilia    AFB Smear Only [[id number]] Collected: 04/15/25 0943    Order Status: Completed Specimen: Respiratory from BAL Updated: 04/15/25 2200     AFB Smear No acid fast bacilli seen    Gram Stain [[id number]] Collected: 04/15/25 0943    Order Status: Completed Specimen: Respiratory from BAL Updated: 04/15/25 1249     Gram Stain Result Moderate WBC observed      Few WBC observed      Rare Gram positive cocci    Direct Prep (Other) Fungus [[id number]] Collected: 04/15/25 0943    Order Status: Completed Specimen: Respiratory from BAL Updated: 04/15/25 1249     Direct Prep No fungal elements seen    Culture, Fungus (Other) [[id number]] Collected: 04/15/25 0943    Order Status: Resulted Specimen: Respiratory from BAL Updated: 04/15/25 1123    Culture, AFB [[id number]] Collected: 04/15/25 0943    Order Status: Sent Specimen: Respiratory from BAL Updated: 04/15/25 1110

## 2025-04-19 NOTE — PROGRESS NOTES
[facility]  [facility]  Progress Note    Patient Name: Magan Saleem  MRN: [id number]  Patient Class: IP- Inpatient   Admission Date: 4/3/2025  Length of Stay: 15 days  Attending Physician: Andriy Rizvi MD  Primary Care Provider: Sil Brown DO        Subjective     Principal Problem:Sepsis due to pneumonia        HPI:  68 yo F presents to [facility] ED from [facility] for abnormal labs.  Patient was discharged from [facility] two days ago to skilled nursing facility for rehab.  She was diagnosed with dehydration due to gastroparesis with UTI.  Patient has metastatic lung cancer (to the brain) and follows with Dr. Swanson for IV chemotherapy every other week and takes lazertinib daily.  She has completed her course of radiation for the brain mets and follow had showed some improvement.  I thought she had either some decreased LOC due to encephalopathy or some aphasia from the brain mets but after a great effort to get her to talk, I realized she was just mad.  She wanted to know why she had been discharged two days ago when she could not eat.  She has no appetite and early satiety.  She is not nauseated and no vomiting.  She has decided on a DNR status previously (her caretaker and friend of 20 years) states that she had always said she did not want resuscitation if she experienced cardiopulmonary arrest and had told her children her wishes but she does want treatment and is not opposed to J tube if needed.  She has not had anything to eat or drink in two days and is dehydrated again.      Patient was transferred because of concern of sepsis.  She did have enterococcus faecalis UTI at [facility] and was treated.  I do not have the culture results but cultures were sent and patient does have some yeast noted.  (Will not treat a yeast colonization).  She is afebrile and hemodynamically stable and does not look septic clinically.  Her Lactic acid is stable at 2.5 dara 3.2.  Will check  another in am after volume resuscitation.  Her creat is at baseline but she has prerenal azotemia further confirming the dehydration.  Patient has an IO in place from [facility] due to dehydration and difficult stick but with some volume resuscitation, we have gotten an IV.  She is covid and flu negative.      Her WBC is 29 and I am not sure if she has received neupogen but could be a stress reaction.  Her BS is 245 and she does have some urine ketones but most likely due to starvation ketosis.  Will check a serum ketone.  Her platelets are 88K but this is most likely from her chemo.  She is not anemic.  CXR shows some patchy infiltrate but no obvious obstructive pneumonia from her lung cancer.  Her BNP about 3K but no clinical signs of CHF.  No recent echo but due to her BMI 50 most likely has some PHTN.  EKG had no ischemic changes but tachy at 114 which could also be from dehydration.  She was on ozempic for her DM and this could be the cause of her decreased appetite.  She is also on decadron for prevention of cerebral edema.      Remainder of ROS as below.  She mostly just nods and shakes her head and rolls her eyes.  You can get her to laugh if you try but she has been depressed since she has not walked since her hospitalization at [facility] on 3/19/25 and was discharged two days ago.  She says that at her last chemo she noticed she was getting weaker and her daughter had to help her in and out of the car and that was new for her.      See assessment and plan below for problem based evaluation       Overview/Hospital Course:  69 year old female presents with hypernatremia; she is not too talkative during rounds    4/5- patient seen examined today resting comfortably in bed and in no acute distress, with no acute events overnight.  Patient has a multitude of issues complicating her medical picture.  White blood cell count 18889 today, sodium 159, potassium 3.2 and BUN creatinine 59 and 1.8.  The patient is still not eating  even when assistance is provided.  We have already changed her fluids to half-normal saline with 20 of KCl at 1:25 a.m..  Have also put in a GI consult for possible feeding tube.  E coli in the urine has turned out to be ESBL and Rocephin has been changed Merrem.    4/6- the patient seen examined today resting comfortably in bed, in no acute distress, in no acute events overnight.  The patient is not very talkative today, but states she is doing okay.  She has no acute complaints.  Blood cultures remain negative.  The patient has not eaten since yesterday and is on light IV fluids.  GI has been consulted for feeding tube.  Continues on Merrem for positive urine culture.    04/07 Records reviewed. Not eating which not new, Similar during recent admit at [facility]. Dr Swanson there had question pancreatitis. No abd pain or tenderness reported now. Question of dysphagia to solids. Chart hx gastroparesis. Will discuss with GI. Ronnie says has responded so far well to treatment for lung CA.   04/08 had EGD at [facility] 03/21/25 with no obstruction and evidence gastroparesis. Still not ending. Try additional meds. Talked with GI. Increase activity  04/09 Some better with med  changes  04/10 Continues to do better. Ate 1/2 fish sandwich for lunch. Called by Dr Swanson and she wanting to keep feeding tube in consideration. Talked with Dr Reich and Dr Lemus. If something needed with gastroparesis would have to have post gastric position of tube.   04/11 eating some. Later staff requested some nystatin for sore mouth.   04/12 still not eating well.  Increased peripheral edema.  Albumin low at 1.5.  Will give some albumin and follow with some Lasix.  Placed Dobbhoff tube and start enteral feedings.  This will help with nutrition and if issue of placing surgical tube later make sure will tolerate enteral feedings.  Chest x-ray continues worse on left side.  Discuss with Pulmonary and ask Dr. Villasenor to see.   04/13 no new issues.   Enteral feedings to be started.  Pulmonary evaluation appreciated and plans noted.  04/14 Tolerating feedings Therapy working with her. Bronchoscopy planned.   4/15 BAL today  4/16 bronch yesterday looks like pneumonitis  4/17 not candidate for PEG    04/18 Eating some now. Pt says feels some better than last seen. Look at placement. High dose steroids by pulmonary. BS not as bad as would expect.    Interval History:     Review of Systems   Constitutional:  Negative for appetite change, fatigue and fever.   HENT:  Negative for congestion and hearing loss.    Respiratory:  Negative for chest tightness and wheezing.    Cardiovascular:  Negative for chest pain and palpitations.   Gastrointestinal:  Negative for abdominal pain, constipation and nausea.   Genitourinary:  Negative for difficulty urinating and dysuria.   Musculoskeletal:  Positive for gait problem. Negative for back pain and neck stiffness.   Skin:  Negative for pallor and rash.   Neurological:  Negative for dizziness, speech difficulty and headaches.   Psychiatric/Behavioral:  Positive for sleep disturbance. Negative for confusion and suicidal ideas.      Objective:     Vital Signs (Most Recent):  Temp: 97.4 °F (36.3 °C) (04/18/25 1941)  Pulse: 87 (04/18/25 1941)  Resp: 18 (04/18/25 1941)  BP: 93/70 (04/18/25 1941)  SpO2: 95 % (04/18/25 1941) Vital Signs (24h Range):  Temp:  [96.2 °F (35.7 °C)-97.5 °F (36.4 °C)] 97.4 °F (36.3 °C)  Pulse:  [74-97] 87  Resp:  [17-20] 18  SpO2:  [95 %-99 %] 95 %  BP: ()/(45-83) 93/70     Weight: (!) 136.1 kg (300 lb)  Body mass index is 49.92 kg/m².    Intake/Output Summary (Last 24 hours) at 4/18/2025 2023  Last data filed at 4/18/2025 1715  Gross per 24 hour   Intake 600 ml   Output --   Net 600 ml         Physical Exam  Vitals reviewed.   Constitutional:       General: She is awake. She is not in acute distress.     Appearance: She is well-developed. She is morbidly obese. She is not toxic-appearing.   HENT:       Head: Normocephalic.      Nose: Nose normal.      Mouth/Throat:      Pharynx: Oropharynx is clear.   Eyes:      Extraocular Movements: Extraocular movements intact.      Pupils: Pupils are equal, round, and reactive to light.   Neck:      Thyroid: No thyroid mass.      Vascular: No carotid bruit.   Cardiovascular:      Rate and Rhythm: Normal rate and regular rhythm.      Pulses: Normal pulses.      Heart sounds: Normal heart sounds. No murmur heard.  Pulmonary:      Effort: Pulmonary effort is normal.      Breath sounds: Normal breath sounds and air entry. No wheezing.   Abdominal:      General: Bowel sounds are normal. There is no distension.      Palpations: Abdomen is soft.      Tenderness: There is no abdominal tenderness.   Musculoskeletal:         General: Normal range of motion.      Cervical back: Neck supple. No rigidity.   Skin:     General: Skin is warm.      Coloration: Skin is not jaundiced.      Findings: No lesion.   Neurological:      General: No focal deficit present.      Mental Status: She is alert and oriented to person, place, and time.      Cranial Nerves: No cranial nerve deficit.   Psychiatric:         Attention and Perception: Attention normal.         Mood and Affect: Mood normal.         Behavior: Behavior normal. Behavior is cooperative.         Thought Content: Thought content normal.         Cognition and Memory: Cognition normal.               Significant Labs: All pertinent labs within the past 24 hours have been reviewed.  BMP:   Recent Labs   Lab 04/18/25  0420   *   *   K 4.6      CO2 20*   BUN 64*   CREATININE 2.56*   CALCIUM 9.2       CBC:   Recent Labs   Lab 04/18/25  0420   WBC 22.78*   HGB 8.7*   HCT 25.7*   *       CMP:   Recent Labs   Lab 04/17/25  0330 04/18/25  0420   * 130*   K 4.0 4.6    100   CO2 19* 20*   * 153*   BUN 52* 64*   CREATININE 2.34* 2.56*   CALCIUM 8.6 9.2   ANIONGAP 12 15         Significant Imaging: I have  reviewed all pertinent imaging results/findings within the past 24 hours.      Intake/Output - Last 3 Shifts         04/17 0700  04/18 0659 04/18 0700  04/19 0659    P.O.  600    Total Intake(mL/kg)  600 (4.4)    Net  +600          Urine Occurrence  2 x          Microbiology Results (last 7 days)       Procedure Component Value Units Date/Time    Culture, Lower Respiratory [[id number]]  (Abnormal) Collected: 04/15/25 0943    Order Status: Completed Specimen: Respiratory from BAL Updated: 04/17/25 0727     Culture, Lower Respiratory Light Growth Gram-negative Bacilli     Comment: Identification and Susceptibility to Follow       AFB Smear Only [[id number]] Collected: 04/15/25 0943    Order Status: Completed Specimen: Respiratory from BAL Updated: 04/15/25 2200     AFB Smear No acid fast bacilli seen    Gram Stain [[id number]] Collected: 04/15/25 0943    Order Status: Completed Specimen: Respiratory from BAL Updated: 04/15/25 1249     Gram Stain Result Moderate WBC observed      Few WBC observed      Rare Gram positive cocci    Direct Prep (Other) Fungus [[id number]] Collected: 04/15/25 0943    Order Status: Completed Specimen: Respiratory from BAL Updated: 04/15/25 1249     Direct Prep No fungal elements seen    Culture, Fungus (Other) [[id number]] Collected: 04/15/25 0943    Order Status: Resulted Specimen: Respiratory from BAL Updated: 04/15/25 1123    Culture, AFB [[id number]] Collected: 04/15/25 0943    Order Status: Sent Specimen: Respiratory from BAL Updated: 04/15/25 1110                Assessment & Plan  Sepsis due to pneumonia  Appears to have UTI and pneumonia; has leukocytosis; has opacities on CXR; has G- bacilli on urine culture; blood cultures pending; will place on Vancomycin and Rocephin; BP on the low side; on IVF    04/12 worsening chest x-ray, ask Pulmonary to review  4/15 plan for BAL today.  Continue antibiotic  4/16 7 days antibiotic  completed  Hypernatremia  Due to dehydration; off diuretics;  "on IVF; will monitor Na levels  Lung cancer metastatic to brain  DNR but not hospice.  Receives chemo and has finished radiation.    04/07 reported stable / improved after treatment  04/09 more fuffy right side CXR  04/12 continue to abnormality on chest x-ray and ask Pulmonary to review  4/15 follows with Dr. Swanson.  No more chemotherapy treatments until patient is stronger.  Discussed with Dr. Swanson  4/16 hold treatments for now    Thrombocytopenia  On chemo; will monitor    Essential hypertension  Vitals:    04/17/25 1139 04/17/25 1618 04/17/25 2039 04/18/25 0041   BP: 99/71 (!) 94/59 118/83 108/68    04/18/25 0441 04/18/25 0442 04/18/25 0748 04/18/25 1118   BP: (!) 92/45 105/61 116/80 (!) 103/54    04/18/25 1557 04/18/25 1941   BP: 111/76 93/70         Monitor BP while on Metoprolol; on IVF; off Losartan    Moderate persistent asthma without complication  Continue home inhaled steroid/bronchodilator and singulair    Chronic kidney disease, stage 3b  Creatine stable   Chronic pulmonary embolism without acute cor pulmonale  Eliquis was discontinued due to risk of ICH with brain mets but they have improved so the anti-coagulation has been restarted; will monitor  04/07 apparently this of several years ago    Type 2 diabetes mellitus with hyperglycemia  BS elevated; will increase Lantus to 25 units; continue SSI;monitor BS     Gastroparesis  On Reglan and PPI for erosive gastritis; will monitor    EGD by Dr. Lo during recent past admission at [facility]:  "EGD on 03/21/2025 shows LA class B erosive esophagitis but no pill esophagitis, nonerosive gastritis and retention of food and fluid suspicious for gastroparesis, due to narcotics and diabetes. "    04/ 08 try additional meds to help gastric emptying.  04/09 taking in some now orally with recent changes meds  04/10 better and ate half [place] for lunch   04/14 tolerating enteral feedings so far  4/15 we will get PEG tube if patient is agreeable.  " Daughter is agreeable.  Discussed with her  4/16 patient wants PEG tube  4/17 not candidate for surgical PEG.  NGT out, start full liquids  04/18 taking some orally  Morbid obesity  Body mass index is 49.92 kg/m². Morbid obesity complicates all aspects of disease management from diagnostic modalities to treatment. Weight loss encouraged and health benefits explained to patient.     Would benefit from sleep study and possible CPAP.  Does not wear oxygen at home.      Edema due to hypoalbuminemia    04/12 add IV albumin and some Lasix  Start enteral feedings  04/13 start enteral feedings  Abnormal chest x-ray    04/13 addressed by Pulmonary with further investigation plans  Anemia  Anemia is likely due to chronic disease due to Malignancy. Most recent hemoglobin and hematocrit are listed below.  Recent Labs     04/16/25  0503 04/18/25  0420   HGB 7.9* 8.7*   HCT 25.4* 25.7*     Plan  - Monitor serial CBC: Daily  - Transfuse PRBC if patient becomes hemodynamically unstable, symptomatic or H/H drops below 7/21.  - Patient has not received any PRBC transfusions to date  - Patient's anemia is currently stable  - follow  No evidence of bleeding  Hyponatremia  Hyponatremia is likely due to renal insufficiency. The patient's most recent sodium results are listed below.  Recent Labs     04/16/25  0315 04/17/25  0330 04/18/25  0420   * 127* 130*     Plan  - Correct the sodium by 4-6mEq in 24 hours.   - Obtain the following studies:  In a.  - Will treat the hyponatremia with continue LR  - Monitor sodium Daily.   - Patient hyponatremia is stable  - follow  Follow  Neoplastic (malignant) related fatigue    Progressive mobility protocol  Pneumonitis  IV steroids per pulmonology    Dehydration, severe  Encourage orals    VTE Risk Mitigation (From admission, onward)      None            Discharge Planning   MITUL:      Code Status: DNR   Medical Readiness for Discharge Date:   Discharge Plan A: Home with family                 Please place Justification for DME        Andriy Rizvi MD  Department of Hospital Medicine   [facility]

## 2025-04-19 NOTE — ASSESSMENT & PLAN NOTE
"On Reglan and PPI for erosive gastritis; will monitor    EGD by Dr. Lo during recent past admission at [facility]:  "EGD on 03/21/2025 shows LA class B erosive esophagitis but no pill esophagitis, nonerosive gastritis and retention of food and fluid suspicious for gastroparesis, due to narcotics and diabetes. "    04/ 08 try additional meds to help gastric emptying.  04/09 taking in some now orally with recent changes meds  04/10 better and ate half [place] for lunch   04/14 tolerating enteral feedings so far  4/15 we will get PEG tube if patient is agreeable.  Daughter is agreeable.  Discussed with her  4/16 patient wants PEG tube  4/17 not candidate for surgical PEG.  NGT out, start full liquids  04/18 taking some orally  "

## 2025-04-20 LAB
ANION GAP SERPL CALCULATED.3IONS-SCNC: 16 MMOL/L (ref 7–16)
BUN SERPL-MCNC: 82 MG/DL (ref 10–20)
BUN/CREAT SERPL: 25 (ref 6–20)
CALCIUM SERPL-MCNC: 9.1 MG/DL (ref 8.4–10.2)
CHLORIDE SERPL-SCNC: 101 MMOL/L (ref 98–107)
CO2 SERPL-SCNC: 20 MMOL/L (ref 23–31)
CREAT SERPL-MCNC: 3.33 MG/DL (ref 0.55–1.02)
EGFR (NO RACE VARIABLE) (RUSH/TITUS): 15 ML/MIN/1.73M2
GLUCOSE SERPL-MCNC: 148 MG/DL (ref 82–115)
GLUCOSE SERPL-MCNC: 161 MG/DL (ref 70–105)
GLUCOSE SERPL-MCNC: 162 MG/DL (ref 70–105)
GLUCOSE SERPL-MCNC: 162 MG/DL (ref 70–105)
GLUCOSE SERPL-MCNC: 163 MG/DL (ref 70–105)
GLUCOSE SERPL-MCNC: 165 MG/DL (ref 70–105)
HCO3 UR-SCNC: 21.4 MMOL/L (ref 21–28)
OHS QRS DURATION: 84 MS
OHS QTC CALCULATION: 433 MS
PCO2 BLDA: 37 MMHG (ref 35–48)
PH SMN: 7.37 [PH] (ref 7.35–7.45)
PO2 BLDA: 78 MMHG (ref 83–108)
POC BASE EXCESS: -3.4 MMOL/L (ref -2–3)
POC SATURATED O2: 95 % (ref 95–98)
POTASSIUM SERPL-SCNC: 5.2 MMOL/L (ref 3.5–5.1)
SODIUM SERPL-SCNC: 132 MMOL/L (ref 136–145)

## 2025-04-20 PROCEDURE — 27000207 HC ISOLATION

## 2025-04-20 PROCEDURE — 25000242 PHARM REV CODE 250 ALT 637 W/ HCPCS: Performed by: HOSPITALIST

## 2025-04-20 PROCEDURE — 96372 THER/PROPH/DIAG INJ SC/IM: CPT

## 2025-04-20 PROCEDURE — 25000003 PHARM REV CODE 250: Performed by: HOSPITALIST

## 2025-04-20 PROCEDURE — 93005 ELECTROCARDIOGRAM TRACING: CPT

## 2025-04-20 PROCEDURE — 11000001 HC ACUTE MED/SURG PRIVATE ROOM

## 2025-04-20 PROCEDURE — 63600175 PHARM REV CODE 636 W HCPCS: Performed by: HOSPITALIST

## 2025-04-20 PROCEDURE — 36600 WITHDRAWAL OF ARTERIAL BLOOD: CPT

## 2025-04-20 PROCEDURE — 82962 GLUCOSE BLOOD TEST: CPT

## 2025-04-20 PROCEDURE — 25000003 PHARM REV CODE 250: Performed by: INTERNAL MEDICINE

## 2025-04-20 PROCEDURE — 93010 ELECTROCARDIOGRAM REPORT: CPT | Mod: ,,, | Performed by: HOSPITALIST

## 2025-04-20 PROCEDURE — 27000221 HC OXYGEN, UP TO 24 HOURS

## 2025-04-20 PROCEDURE — 82803 BLOOD GASES ANY COMBINATION: CPT

## 2025-04-20 PROCEDURE — 99900035 HC TECH TIME PER 15 MIN (STAT)

## 2025-04-20 PROCEDURE — 99233 SBSQ HOSP IP/OBS HIGH 50: CPT | Mod: ,,, | Performed by: HOSPITALIST

## 2025-04-20 PROCEDURE — 94640 AIRWAY INHALATION TREATMENT: CPT

## 2025-04-20 PROCEDURE — 99232 SBSQ HOSP IP/OBS MODERATE 35: CPT | Mod: ,,, | Performed by: INTERNAL MEDICINE

## 2025-04-20 PROCEDURE — 80048 BASIC METABOLIC PNL TOTAL CA: CPT | Performed by: STUDENT IN AN ORGANIZED HEALTH CARE EDUCATION/TRAINING PROGRAM

## 2025-04-20 PROCEDURE — 94761 N-INVAS EAR/PLS OXIMETRY MLT: CPT

## 2025-04-20 PROCEDURE — 36415 COLL VENOUS BLD VENIPUNCTURE: CPT | Performed by: STUDENT IN AN ORGANIZED HEALTH CARE EDUCATION/TRAINING PROGRAM

## 2025-04-20 PROCEDURE — 63600175 PHARM REV CODE 636 W HCPCS: Performed by: INTERNAL MEDICINE

## 2025-04-20 PROCEDURE — 25000003 PHARM REV CODE 250: Performed by: STUDENT IN AN ORGANIZED HEALTH CARE EDUCATION/TRAINING PROGRAM

## 2025-04-20 RX ORDER — MORPHINE SULFATE 4 MG/ML
4 INJECTION, SOLUTION INTRAMUSCULAR; INTRAVENOUS ONCE
Status: COMPLETED | OUTPATIENT
Start: 2025-04-20 | End: 2025-04-20

## 2025-04-20 RX ORDER — PREDNISONE 20 MG/1
40 TABLET ORAL DAILY
Status: DISCONTINUED | OUTPATIENT
Start: 2025-04-20 | End: 2025-04-22

## 2025-04-20 RX ORDER — ALBUTEROL SULFATE 0.83 MG/ML
2.5 SOLUTION RESPIRATORY (INHALATION)
Status: DISCONTINUED | OUTPATIENT
Start: 2025-04-20 | End: 2025-04-26 | Stop reason: HOSPADM

## 2025-04-20 RX ADMIN — BUDESONIDE 0.5 MG: 0.5 SUSPENSION RESPIRATORY (INHALATION) at 07:04

## 2025-04-20 RX ADMIN — IPRATROPIUM BROMIDE AND ALBUTEROL SULFATE 3 ML: 2.5; .5 SOLUTION RESPIRATORY (INHALATION) at 11:04

## 2025-04-20 RX ADMIN — ATORVASTATIN CALCIUM 20 MG: 20 TABLET, FILM COATED ORAL at 09:04

## 2025-04-20 RX ADMIN — INSULIN ASPART 2 UNITS: 100 INJECTION, SOLUTION INTRAVENOUS; SUBCUTANEOUS at 05:04

## 2025-04-20 RX ADMIN — INSULIN ASPART 1 UNITS: 100 INJECTION, SOLUTION INTRAVENOUS; SUBCUTANEOUS at 10:04

## 2025-04-20 RX ADMIN — METOCLOPRAMIDE HYDROCHLORIDE 10 MG: 5 TABLET ORAL at 06:04

## 2025-04-20 RX ADMIN — IPRATROPIUM BROMIDE AND ALBUTEROL SULFATE 3 ML: 2.5; .5 SOLUTION RESPIRATORY (INHALATION) at 02:04

## 2025-04-20 RX ADMIN — METOCLOPRAMIDE HYDROCHLORIDE 10 MG: 5 TABLET ORAL at 03:04

## 2025-04-20 RX ADMIN — HYDROCODONE BITARTRATE AND ACETAMINOPHEN 1 TABLET: 7.5; 325 TABLET ORAL at 12:04

## 2025-04-20 RX ADMIN — LORAZEPAM 1 MG: 1 TABLET ORAL at 12:04

## 2025-04-20 RX ADMIN — SERTRALINE HYDROCHLORIDE 100 MG: 50 TABLET ORAL at 09:04

## 2025-04-20 RX ADMIN — PREDNISONE 40 MG: 20 TABLET ORAL at 09:04

## 2025-04-20 RX ADMIN — METHYLPREDNISOLONE SODIUM SUCCINATE 250 MG: 125 INJECTION, POWDER, FOR SOLUTION INTRAMUSCULAR; INTRAVENOUS at 06:04

## 2025-04-20 RX ADMIN — INSULIN GLARGINE 25 UNITS: 100 INJECTION, SOLUTION SUBCUTANEOUS at 09:04

## 2025-04-20 RX ADMIN — IPRATROPIUM BROMIDE AND ALBUTEROL SULFATE 3 ML: 2.5; .5 SOLUTION RESPIRATORY (INHALATION) at 07:04

## 2025-04-20 RX ADMIN — PANTOPRAZOLE SODIUM 40 MG: 40 TABLET, DELAYED RELEASE ORAL at 09:04

## 2025-04-20 RX ADMIN — ALBUTEROL SULFATE 2.5 MG: 2.5 SOLUTION RESPIRATORY (INHALATION) at 01:04

## 2025-04-20 RX ADMIN — HYDROCODONE BITARTRATE AND ACETAMINOPHEN 1 TABLET: 7.5; 325 TABLET ORAL at 10:04

## 2025-04-20 RX ADMIN — MONTELUKAST 10 MG: 10 TABLET, FILM COATED ORAL at 09:04

## 2025-04-20 RX ADMIN — METOCLOPRAMIDE HYDROCHLORIDE 10 MG: 5 TABLET ORAL at 09:04

## 2025-04-20 RX ADMIN — METOCLOPRAMIDE HYDROCHLORIDE 10 MG: 5 TABLET ORAL at 12:04

## 2025-04-20 RX ADMIN — MORPHINE SULFATE 4 MG: 4 INJECTION INTRAVENOUS at 01:04

## 2025-04-20 NOTE — SUBJECTIVE & OBJECTIVE
Interval History/Significant Events:  Patient without complaints    Review of Systems  Objective:     Vital Signs (Most Recent):  Temp: 96.8 °F (36 °C) (04/20/25 0351)  Pulse: 83 (04/20/25 0351)  Resp: 20 (04/20/25 0154)  BP: 95/66 (04/20/25 0351)  SpO2: 96 % (04/20/25 0351) Vital Signs (24h Range):  Temp:  [96.8 °F (36 °C)-97.6 °F (36.4 °C)] 96.8 °F (36 °C)  Pulse:  [72-88] 83  Resp:  [16-20] 20  SpO2:  [95 %-99 %] 96 %  BP: ()/(56-82) 95/66   Weight: (!) 136.1 kg (300 lb)  Body mass index is 49.92 kg/m².      Intake/Output Summary (Last 24 hours) at 4/20/2025 0718  Last data filed at 4/19/2025 1715  Gross per 24 hour   Intake 100 ml   Output --   Net 100 ml          Physical Exam  Vitals reviewed.   Constitutional:       Appearance: Normal appearance.      Interventions: She is not intubated.  HENT:      Head: Normocephalic and atraumatic.      Nose: Nose normal.      Mouth/Throat:      Mouth: Mucous membranes are dry.      Pharynx: Oropharynx is clear.   Eyes:      Extraocular Movements: Extraocular movements intact.      Conjunctiva/sclera: Conjunctivae normal.      Pupils: Pupils are equal, round, and reactive to light.   Cardiovascular:      Rate and Rhythm: Normal rate.      Heart sounds: Normal heart sounds. No murmur heard.  Pulmonary:      Effort: Pulmonary effort is normal. She is not intubated.      Breath sounds: Normal breath sounds.   Abdominal:      General: Abdomen is flat. Bowel sounds are normal.      Palpations: Abdomen is soft.   Musculoskeletal:         General: Normal range of motion.      Cervical back: Normal range of motion and neck supple.      Right lower leg: No edema.      Left lower leg: No edema.   Skin:     General: Skin is warm and dry.      Capillary Refill: Capillary refill takes less than 2 seconds.   Neurological:      General: No focal deficit present.      Mental Status: She is alert and oriented to person, place, and time.   Psychiatric:         Mood and Affect: Mood  "normal.         Behavior: Behavior normal.            Vents:  Oxygen Concentration (%): 28 (04/19/25 1512)  Lines/Drains/Airways       Peripherally Inserted Central Catheter Line  Duration             PICC Double Lumen 04/17/25 1547 left brachial 2 days                  Significant Labs:    CBC/Anemia Profile:  No results for input(s): "WBC", "HGB", "HCT", "PLT", "MCV", "RDW", "IRON", "FERRITIN", "RETIC", "FOLATE", "[id number]", "OCCULTBLOOD" in the last 48 hours.     Chemistries:  Recent Labs   Lab 04/19/25  0336 04/20/25  0554   * 132*   K 4.6 5.2*    101   CO2 20* 20*   BUN 72* 82*   CREATININE 2.67* 3.33*   CALCIUM 9.0 9.1       Recent Lab Results  (Last 5 results in the past 24 hours)        04/20/25  0554   04/20/25  0058   04/19/25  2006   04/19/25  1605   04/19/25  1118        Anion Gap 16               BUN 82               BUN/CREAT RATIO 25               Calcium 9.1               Chloride 101               CO2 20               Creatinine 3.33               eGFR 15  Comment: Estimated GFR calculated using the CKD-EPI creatinine (2021) equation.               Glucose 148               POC Glucose   163   167   160   167       Potassium 5.2               Sodium 132                                      Significant Imaging:  I have reviewed all pertinent imaging results/findings within the past 24 hours.  "

## 2025-04-20 NOTE — PLAN OF CARE
Problem: Gas Exchange Impaired  Goal: Optimal Gas Exchange  Outcome: Progressing      Then he would take 2.5 ml po at bedtime x 2 weeks.  Family will phone me with an update prior to refill and we will likely titrate dose at that time (2.5 ml po bid, then work toward 5 ml po bid).     [FreeTextEntry2] : right shoulder

## 2025-04-20 NOTE — PROGRESS NOTES
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  Patient was initially admitted to the hospital on April 4 would dehydration gastroparesis UTI.  She has not been able to eat previously in his now admitted to the hospital with concerns over nutrition since she has been in the hospital she has had shortness of breath and has developed worsening right-sided infiltrates.  Patient shortness of breath    Hospital/ICU Course:  4/14/25-asked to see by General Pulmonary for evaluation of possible EBUS bronchoscopy.  Patient with prior EBUS bronchoscopy performed 10/21/24 with evidence of malignancy present in station 4 L and station 4R.  Recent cessation of her EGFR TKI.    Interval History/Significant Events:  Patient without complaints    Review of Systems  Objective:     Vital Signs (Most Recent):  Temp: 96.8 °F (36 °C) (04/20/25 0351)  Pulse: 83 (04/20/25 0351)  Resp: 20 (04/20/25 0154)  BP: 95/66 (04/20/25 0351)  SpO2: 96 % (04/20/25 0351) Vital Signs (24h Range):  Temp:  [96.8 °F (36 °C)-97.6 °F (36.4 °C)] 96.8 °F (36 °C)  Pulse:  [72-88] 83  Resp:  [16-20] 20  SpO2:  [95 %-99 %] 96 %  BP: ()/(56-82) 95/66   Weight: (!) 136.1 kg (300 lb)  Body mass index is 49.92 kg/m².      Intake/Output Summary (Last 24 hours) at 4/20/2025 0718  Last data filed at 4/19/2025 1715  Gross per 24 hour   Intake 100 ml   Output --   Net 100 ml          Physical Exam  Vitals reviewed.   Constitutional:       Appearance: Normal appearance.       "Interventions: She is not intubated.  HENT:      Head: Normocephalic and atraumatic.      Nose: Nose normal.      Mouth/Throat:      Mouth: Mucous membranes are dry.      Pharynx: Oropharynx is clear.   Eyes:      Extraocular Movements: Extraocular movements intact.      Conjunctiva/sclera: Conjunctivae normal.      Pupils: Pupils are equal, round, and reactive to light.   Cardiovascular:      Rate and Rhythm: Normal rate.      Heart sounds: Normal heart sounds. No murmur heard.  Pulmonary:      Effort: Pulmonary effort is normal. She is not intubated.      Breath sounds: Normal breath sounds.   Abdominal:      General: Abdomen is flat. Bowel sounds are normal.      Palpations: Abdomen is soft.   Musculoskeletal:         General: Normal range of motion.      Cervical back: Normal range of motion and neck supple.      Right lower leg: No edema.      Left lower leg: No edema.   Skin:     General: Skin is warm and dry.      Capillary Refill: Capillary refill takes less than 2 seconds.   Neurological:      General: No focal deficit present.      Mental Status: She is alert and oriented to person, place, and time.   Psychiatric:         Mood and Affect: Mood normal.         Behavior: Behavior normal.            Vents:  Oxygen Concentration (%): 28 (04/19/25 1512)  Lines/Drains/Airways       Peripherally Inserted Central Catheter Line  Duration             PICC Double Lumen 04/17/25 1547 left brachial 2 days                  Significant Labs:    CBC/Anemia Profile:  No results for input(s): "WBC", "HGB", "HCT", "PLT", "MCV", "RDW", "IRON", "FERRITIN", "RETIC", "FOLATE", "[id number]", "OCCULTBLOOD" in the last 48 hours.     Chemistries:  Recent Labs   Lab 04/19/25  0336 04/20/25  0554   * 132*   K 4.6 5.2*    101   CO2 20* 20*   BUN 72* 82*   CREATININE 2.67* 3.33*   CALCIUM 9.0 9.1       Recent Lab Results  (Last 5 results in the past 24 hours)        04/20/25  0554   04/20/25  0058   04/19/25 2006   " "04/19/25  1605   04/19/25  1118        Anion Gap 16               BUN 82               BUN/CREAT RATIO 25               Calcium 9.1               Chloride 101               CO2 20               Creatinine 3.33               eGFR 15  Comment: Estimated GFR calculated using the CKD-EPI creatinine (2021) equation.               Glucose 148               POC Glucose   163   167   160   167       Potassium 5.2               Sodium 132                                      Significant Imaging:  I have reviewed all pertinent imaging results/findings within the past 24 hours.    ABG  No results for input(s): "PH", "PO2", "PCO2", "HCO3", "BE" in the last 168 hours.  Assessment/Plan:     Pulmonary  Pneumonitis  Patient seems better chest x-ray was little bit better I have weaned from high-dose steroids to 40 mg prednisone today and I would wean over the next several weeks.  Finish a course of Levaquin for stenotrophomonas  in sputum    Abnormal chest x-ray  Will proceed with broncho alveolar lavage today patient seems comfortable this morning             Todd Villasenor MD  Critical Care Medicine  [facility]  "

## 2025-04-20 NOTE — NURSING
Called for PRN breathing tx because pt states she is still short of breath.  O2= 97% and HR= 72.    1:06- One time dose of IV Morphine given for chest pain.    00:57- Chest pain unresolved by PRN meds and patient states the chest pain is worse now than it was before and that she has never felt this way and that she cannot breathe.  RRT called.     00:54- Received response from hospitalist via secure chat with new orders for PRN breathing txs q2H, 12 lead EKG and one time dose of IV Morphine, but I did not see the orders before calling RRT.    00:17- Patient c/o medial chest pain and that she is SOB and asked for a breathing tx.  Her breathing txs are QID and next one is not due until 0800.  Sent a secure chat to hospitalist asking for a PRN breathing tx and I am awaiting her response.  I offered the patient a pain pill and she took it.  I then asked patient if she was nervous or upset about anything and if she has ever had a panic attack before, and she said yes she has had panic attacks before and that she is anxious about going to [place] and upset because her brother passed away and she is still mourning him.  PRN Lorazepam given.  Will stay in patient's room for a while to see if pills help her feel better.

## 2025-04-21 PROBLEM — K85.90 ACUTE PANCREATITIS: Status: ACTIVE | Noted: 2025-04-21

## 2025-04-21 LAB
ANION GAP SERPL CALCULATED.3IONS-SCNC: 20 MMOL/L (ref 7–16)
ANISOCYTOSIS BLD QL SMEAR: ABNORMAL
BASOPHILS # BLD AUTO: 0.13 K/UL (ref 0–0.2)
BASOPHILS NFR BLD AUTO: 0.4 % (ref 0–1)
BUN SERPL-MCNC: 90 MG/DL (ref 10–20)
BUN/CREAT SERPL: 23 (ref 6–20)
CALCIUM SERPL-MCNC: 9.1 MG/DL (ref 8.4–10.2)
CHLORIDE SERPL-SCNC: 102 MMOL/L (ref 98–107)
CO2 SERPL-SCNC: 16 MMOL/L (ref 23–31)
CREAT SERPL-MCNC: 3.96 MG/DL (ref 0.55–1.02)
CULTURE, LOWER RESPIRATORY: ABNORMAL
DIFFERENTIAL METHOD BLD: ABNORMAL
EGFR (NO RACE VARIABLE) (RUSH/TITUS): 12 ML/MIN/1.73M2
EOSINOPHIL # BLD AUTO: 0.17 K/UL (ref 0–0.5)
EOSINOPHIL NFR BLD AUTO: 0.5 % (ref 1–4)
ERYTHROCYTE [DISTWIDTH] IN BLOOD BY AUTOMATED COUNT: 20 % (ref 11.5–14.5)
GLUCOSE SERPL-MCNC: 109 MG/DL (ref 82–115)
GLUCOSE SERPL-MCNC: 118 MG/DL (ref 70–105)
GLUCOSE SERPL-MCNC: 126 MG/DL (ref 70–105)
GLUCOSE SERPL-MCNC: 127 MG/DL (ref 70–105)
GLUCOSE SERPL-MCNC: 136 MG/DL (ref 70–105)
HCT VFR BLD AUTO: 29.3 % (ref 38–47)
HGB BLD-MCNC: 9.6 G/DL (ref 12–16)
IMM GRANULOCYTES # BLD AUTO: 1.94 K/UL (ref 0–0.04)
IMM GRANULOCYTES NFR BLD: 5.6 % (ref 0–0.4)
LYMPHOCYTES # BLD AUTO: 0 K/UL (ref 1–4.8)
LYMPHOCYTES NFR BLD AUTO: 0 % (ref 27–41)
LYMPHOCYTES NFR BLD MANUAL: 2 % (ref 27–41)
MCH RBC QN AUTO: 29.2 PG (ref 27–31)
MCHC RBC AUTO-ENTMCNC: 32.8 G/DL (ref 32–36)
MCV RBC AUTO: 89.1 FL (ref 80–96)
MONOCYTES # BLD AUTO: 2.19 K/UL (ref 0–0.8)
MONOCYTES NFR BLD AUTO: 6.3 % (ref 2–6)
MONOCYTES NFR BLD MANUAL: 5 % (ref 2–6)
MPC BLD CALC-MCNC: 12.2 FL (ref 9.4–12.4)
NEUTROPHILS # BLD AUTO: 30.35 K/UL (ref 1.8–7.7)
NEUTROPHILS NFR BLD AUTO: 87.2 % (ref 53–65)
NEUTS BAND NFR BLD MANUAL: 1 % (ref 1–5)
NEUTS SEG NFR BLD MANUAL: 92 % (ref 50–62)
NRBC # BLD AUTO: 3.22 X10E3/UL
NRBC BLD MANUAL-RTO: 16 /100 WBC
NRBC, AUTO (.00): 9.3 %
NT-PROBNP SERPL-MCNC: 1870 PG/ML (ref 1–125)
PLATELET # BLD AUTO: 198 K/UL (ref 150–400)
PLATELET MORPHOLOGY: ABNORMAL
POLYCHROMASIA BLD QL SMEAR: ABNORMAL
POTASSIUM SERPL-SCNC: 5.6 MMOL/L (ref 3.5–5.1)
RBC # BLD AUTO: 3.29 M/UL (ref 4.2–5.4)
SODIUM SERPL-SCNC: 132 MMOL/L (ref 136–145)
WBC # BLD AUTO: 34.78 K/UL (ref 4.5–11)

## 2025-04-21 PROCEDURE — 25000003 PHARM REV CODE 250: Performed by: STUDENT IN AN ORGANIZED HEALTH CARE EDUCATION/TRAINING PROGRAM

## 2025-04-21 PROCEDURE — 99900035 HC TECH TIME PER 15 MIN (STAT)

## 2025-04-21 PROCEDURE — 27000221 HC OXYGEN, UP TO 24 HOURS

## 2025-04-21 PROCEDURE — 63600175 PHARM REV CODE 636 W HCPCS: Performed by: INTERNAL MEDICINE

## 2025-04-21 PROCEDURE — 94761 N-INVAS EAR/PLS OXIMETRY MLT: CPT

## 2025-04-21 PROCEDURE — 99233 SBSQ HOSP IP/OBS HIGH 50: CPT | Mod: ,,, | Performed by: HOSPITALIST

## 2025-04-21 PROCEDURE — 11000001 HC ACUTE MED/SURG PRIVATE ROOM

## 2025-04-21 PROCEDURE — 94640 AIRWAY INHALATION TREATMENT: CPT

## 2025-04-21 PROCEDURE — 36415 COLL VENOUS BLD VENIPUNCTURE: CPT | Performed by: STUDENT IN AN ORGANIZED HEALTH CARE EDUCATION/TRAINING PROGRAM

## 2025-04-21 PROCEDURE — 97110 THERAPEUTIC EXERCISES: CPT | Mod: CO

## 2025-04-21 PROCEDURE — 25000003 PHARM REV CODE 250: Performed by: HOSPITALIST

## 2025-04-21 PROCEDURE — 80048 BASIC METABOLIC PNL TOTAL CA: CPT | Performed by: STUDENT IN AN ORGANIZED HEALTH CARE EDUCATION/TRAINING PROGRAM

## 2025-04-21 PROCEDURE — 82962 GLUCOSE BLOOD TEST: CPT

## 2025-04-21 PROCEDURE — 83880 ASSAY OF NATRIURETIC PEPTIDE: CPT | Performed by: HOSPITALIST

## 2025-04-21 PROCEDURE — 85025 COMPLETE CBC W/AUTO DIFF WBC: CPT | Performed by: HOSPITALIST

## 2025-04-21 PROCEDURE — 63600175 PHARM REV CODE 636 W HCPCS: Performed by: HOSPITALIST

## 2025-04-21 PROCEDURE — 25000242 PHARM REV CODE 250 ALT 637 W/ HCPCS: Performed by: HOSPITALIST

## 2025-04-21 PROCEDURE — 97110 THERAPEUTIC EXERCISES: CPT

## 2025-04-21 PROCEDURE — 27000207 HC ISOLATION

## 2025-04-21 RX ORDER — INSULIN GLARGINE 100 [IU]/ML
20 INJECTION, SOLUTION SUBCUTANEOUS NIGHTLY
Status: DISCONTINUED | OUTPATIENT
Start: 2025-04-22 | End: 2025-04-22

## 2025-04-21 RX ORDER — SODIUM BICARBONATE 650 MG/1
650 TABLET ORAL ONCE
Status: COMPLETED | OUTPATIENT
Start: 2025-04-21 | End: 2025-04-21

## 2025-04-21 RX ORDER — SODIUM CHLORIDE 9 MG/ML
INJECTION, SOLUTION INTRAVENOUS CONTINUOUS
Status: DISCONTINUED | OUTPATIENT
Start: 2025-04-21 | End: 2025-04-22

## 2025-04-21 RX ADMIN — IPRATROPIUM BROMIDE AND ALBUTEROL SULFATE 3 ML: 2.5; .5 SOLUTION RESPIRATORY (INHALATION) at 07:04

## 2025-04-21 RX ADMIN — METOCLOPRAMIDE HYDROCHLORIDE 10 MG: 5 TABLET ORAL at 10:04

## 2025-04-21 RX ADMIN — HYDROCODONE BITARTRATE AND ACETAMINOPHEN 1 TABLET: 7.5; 325 TABLET ORAL at 04:04

## 2025-04-21 RX ADMIN — LEVOFLOXACIN 750 MG: 750 TABLET, FILM COATED ORAL at 09:04

## 2025-04-21 RX ADMIN — SERTRALINE HYDROCHLORIDE 100 MG: 50 TABLET ORAL at 09:04

## 2025-04-21 RX ADMIN — METOCLOPRAMIDE HYDROCHLORIDE 10 MG: 5 TABLET ORAL at 04:04

## 2025-04-21 RX ADMIN — MONTELUKAST 10 MG: 10 TABLET, FILM COATED ORAL at 10:04

## 2025-04-21 RX ADMIN — IPRATROPIUM BROMIDE AND ALBUTEROL SULFATE 3 ML: 2.5; .5 SOLUTION RESPIRATORY (INHALATION) at 08:04

## 2025-04-21 RX ADMIN — METOCLOPRAMIDE HYDROCHLORIDE 10 MG: 5 TABLET ORAL at 09:04

## 2025-04-21 RX ADMIN — PANTOPRAZOLE SODIUM 40 MG: 40 TABLET, DELAYED RELEASE ORAL at 09:04

## 2025-04-21 RX ADMIN — IPRATROPIUM BROMIDE AND ALBUTEROL SULFATE 3 ML: 2.5; .5 SOLUTION RESPIRATORY (INHALATION) at 11:04

## 2025-04-21 RX ADMIN — METOPROLOL SUCCINATE 25 MG: 25 TABLET, EXTENDED RELEASE ORAL at 09:04

## 2025-04-21 RX ADMIN — SODIUM BICARBONATE 650 MG TABLET 650 MG: at 09:04

## 2025-04-21 RX ADMIN — PREDNISONE 40 MG: 20 TABLET ORAL at 09:04

## 2025-04-21 RX ADMIN — ATORVASTATIN CALCIUM 20 MG: 20 TABLET, FILM COATED ORAL at 10:04

## 2025-04-21 RX ADMIN — BUDESONIDE 0.5 MG: 0.5 SUSPENSION RESPIRATORY (INHALATION) at 08:04

## 2025-04-21 RX ADMIN — SODIUM CHLORIDE: 9 INJECTION, SOLUTION INTRAVENOUS at 09:04

## 2025-04-21 RX ADMIN — BUDESONIDE 0.5 MG: 0.5 SUSPENSION RESPIRATORY (INHALATION) at 07:04

## 2025-04-21 RX ADMIN — INSULIN GLARGINE 25 UNITS: 100 INJECTION, SOLUTION SUBCUTANEOUS at 10:04

## 2025-04-21 RX ADMIN — IPRATROPIUM BROMIDE AND ALBUTEROL SULFATE 3 ML: 2.5; .5 SOLUTION RESPIRATORY (INHALATION) at 03:04

## 2025-04-21 RX ADMIN — METOCLOPRAMIDE HYDROCHLORIDE 10 MG: 5 TABLET ORAL at 05:04

## 2025-04-21 NOTE — ASSESSMENT & PLAN NOTE
DNR but not hospice.  Receives chemo and has finished radiation.    04/07 reported stable / improved after treatment  04/09 more fuffy right side CXR  04/12 continue to abnormality on chest x-ray and ask Pulmonary to review  4/15 follows with Dr. Swanson.  No more chemotherapy treatments until patient is stronger.  Discussed with Dr. Swanson  4/16 hold treatments for now  04/20 brain lesion prior treated and I told been stable

## 2025-04-21 NOTE — ASSESSMENT & PLAN NOTE
Vitals:    04/19/25 1604 04/19/25 2006 04/20/25 0000 04/20/25 0351   BP: 109/67 123/77 121/82 95/66    04/20/25 0725 04/20/25 1114 04/20/25 1626 04/20/25 1939   BP: (!) 90/55 (!) 92/47 94/63 (!) 67/39    04/20/25 1940 04/20/25 1952   BP: (!) 77/43 (!) 98/50         Monitor BP while on Metoprolol; on IVF; off Losartan

## 2025-04-21 NOTE — SUBJECTIVE & OBJECTIVE
Interval History:     Review of Systems   Constitutional:  Negative for appetite change, fatigue and fever.   HENT:  Negative for congestion and hearing loss.    Respiratory:  Negative for chest tightness and wheezing.    Cardiovascular:  Negative for chest pain and palpitations.   Gastrointestinal:  Negative for abdominal pain, constipation and nausea.   Genitourinary:  Negative for difficulty urinating and dysuria.   Musculoskeletal:  Positive for gait problem. Negative for back pain and neck stiffness.   Skin:  Negative for pallor and rash.   Neurological:  Negative for dizziness, speech difficulty and headaches.   Psychiatric/Behavioral:  Positive for sleep disturbance. Negative for confusion and suicidal ideas.      Objective:     Vital Signs (Most Recent):  Temp: 97.1 °F (36.2 °C) (04/20/25 1939)  Pulse: 96 (04/20/25 1940)  Resp: 18 (04/20/25 1918)  BP: (!) 98/50 (04/20/25 1952)  SpO2: 99 % (04/20/25 1940) Vital Signs (24h Range):  Temp:  [96.8 °F (36 °C)-97.5 °F (36.4 °C)] 97.1 °F (36.2 °C)  Pulse:  [72-98] 96  Resp:  [16-22] 18  SpO2:  [93 %-100 %] 99 %  BP: ()/(39-82) 98/50     Weight: (!) 136.1 kg (300 lb)  Body mass index is 49.92 kg/m².  No intake or output data in the 24 hours ending 04/20/25 2158        Physical Exam  Vitals reviewed.   Constitutional:       General: She is awake. She is not in acute distress.     Appearance: She is well-developed. She is morbidly obese. She is not toxic-appearing.   HENT:      Head: Normocephalic.      Nose: Nose normal.      Mouth/Throat:      Pharynx: Oropharynx is clear.   Eyes:      Extraocular Movements: Extraocular movements intact.      Pupils: Pupils are equal, round, and reactive to light.   Neck:      Thyroid: No thyroid mass.      Vascular: No carotid bruit.   Cardiovascular:      Rate and Rhythm: Normal rate and regular rhythm.      Pulses: Normal pulses.      Heart sounds: Normal heart sounds. No murmur heard.  Pulmonary:      Effort: Pulmonary  "effort is normal.      Breath sounds: Normal breath sounds and air entry. No wheezing.   Abdominal:      General: Bowel sounds are normal. There is no distension.      Palpations: Abdomen is soft.      Tenderness: There is no abdominal tenderness.   Musculoskeletal:         General: Normal range of motion.      Cervical back: Neck supple. No rigidity.   Skin:     General: Skin is warm.      Coloration: Skin is not jaundiced.      Findings: No lesion.   Neurological:      General: No focal deficit present.      Mental Status: She is alert and oriented to person, place, and time.      Cranial Nerves: No cranial nerve deficit.   Psychiatric:         Attention and Perception: Attention normal.         Mood and Affect: Mood normal.         Behavior: Behavior normal. Behavior is cooperative.         Thought Content: Thought content normal.         Cognition and Memory: Cognition normal.               Significant Labs: All pertinent labs within the past 24 hours have been reviewed.  BMP:   Recent Labs   Lab 04/20/25  0554   *   *   K 5.2*      CO2 20*   BUN 82*   CREATININE 3.33*   CALCIUM 9.1       CBC:   No results for input(s): "WBC", "HGB", "HCT", "PLT" in the last 48 hours.      CMP:   Recent Labs   Lab 04/19/25  0336 04/20/25  0554   * 132*   K 4.6 5.2*    101   CO2 20* 20*   * 148*   BUN 72* 82*   CREATININE 2.67* 3.33*   CALCIUM 9.0 9.1   ANIONGAP 15 16         Significant Imaging: I have reviewed all pertinent imaging results/findings within the past 24 hours.      Intake/Output - Last 3 Shifts         04/19 0700  04/20 0659 04/20 0700  04/21 0659    P.O. 100     Total Intake(mL/kg) 100 (0.7)     Net +100                 Microbiology Results (last 7 days)       Procedure Component Value Units Date/Time    Culture, Lower Respiratory [[id number]]  (Abnormal)  (Susceptibility) Collected: 04/15/25 0943    Order Status: Completed Specimen: Respiratory from BAL Updated: 04/19/25 " 0711     Culture, Lower Respiratory Light Growth Stenotrophomonas maltophilia    AFB Smear Only [[id number]] Collected: 04/15/25 0943    Order Status: Completed Specimen: Respiratory from BAL Updated: 04/15/25 2200     AFB Smear No acid fast bacilli seen    Gram Stain [[id number]] Collected: 04/15/25 0943    Order Status: Completed Specimen: Respiratory from BAL Updated: 04/15/25 1249     Gram Stain Result Moderate WBC observed      Few WBC observed      Rare Gram positive cocci    Direct Prep (Other) Fungus [[id number]] Collected: 04/15/25 0943    Order Status: Completed Specimen: Respiratory from BAL Updated: 04/15/25 1249     Direct Prep No fungal elements seen    Culture, Fungus (Other) [[id number]] Collected: 04/15/25 0943    Order Status: Resulted Specimen: Respiratory from BAL Updated: 04/15/25 1123    Culture, AFB [[id number]] Collected: 04/15/25 0943    Order Status: Sent Specimen: Respiratory from BAL Updated: 04/15/25 1110

## 2025-04-21 NOTE — ASSESSMENT & PLAN NOTE
Hyponatremia is likely due to renal insufficiency. The patient's most recent sodium results are listed below.  Recent Labs     04/18/25  0420 04/19/25  0336 04/20/25  0554   * 131* 132*     Plan  - Correct the sodium by 4-6mEq in 24 hours.   - Obtain the following studies:  In a.  - Will treat the hyponatremia with continue LR  - Monitor sodium Daily.   - Patient hyponatremia is stable  - follow  Follow

## 2025-04-21 NOTE — PT/OT/SLP PROGRESS
Occupational Therapy   Treatment    Name: Magan Saleem  MRN: [id number]  Admitting Diagnosis:  Sepsis due to pneumonia       Recommendations:     Discharge Recommendations: Low Intensity Therapy  Discharge Equipment Recommendations:  to be determined by next level of care  Barriers to discharge:       Assessment:     Magan Saleem is a 69 y.o. female with a medical diagnosis of Sepsis due to pneumonia.  She presents with the following Performance deficits affecting function are weakness, decreased upper extremity function, impaired endurance, impaired balance, decreased safety awareness, impaired self care skills, impaired functional mobility, decreased coordination, impaired cardiopulmonary response to activity.     Rehab Prognosis:  Fair and Poor; patient would benefit from acute skilled OT services to address these deficits and reach maximum level of function.       Plan:     Patient to be seen 5 x/week to address the above listed problems via self-care/home management, therapeutic exercises, therapeutic activities  Plan of Care Expires:    Plan of Care Reviewed with: patient    Subjective   Patient was laying supine in bed upon ROSA arrival. RN was in room checking blood pressure due to it being low. Blood pressure was 106/61. Patient stated she was feeling okay with no complaints of pain and was agreeable to participate in todays session.   Chief Complaint: no complaints voiced  Patient/Family Comments/goals: to return home.   Pain/Comfort:  Pain Rating 1: 0/10    Objective:     Communicated with: RN prior to session.  Patient found HOB elevated with oxygen, PICC line, telemetry upon OT entry to room.    General Precautions: Standard, contact, fall    Orthopedic Precautions:N/A  Braces: N/A  Respiratory Status: Nasal cannula, flow 2 L/min    Therapeutic exercises 13 minutes:  Patient completed the following AAROM exercises on right and left upper extremities  to work on ROM/strengthening in preparation to  complete of bed mobility, ADLs and transfers:     -Elbow flexion/extension: 2 sets of 10   -Shoulder flexion: 1 sets of 10   -Chest press: 2 sets of 10   -Internal Rotation/External Rotation: 2 sets of 10   -Wrist flexion/extension: 2 sets of 10   -Pronation/Supination: 2 sets of 10    Increased time/effort needed to complete each exercise.       Patient left HOB elevated with all lines intact, call button in reach, and RN notified    GOALS:   Multidisciplinary Problems       Occupational Therapy Goals          Problem: Occupational Therapy    Goal Priority Disciplines Outcome Interventions   Occupational Therapy Goal     OT, PT/OT Progressing    Description: STG:  Pt will perform grooming with setup  Pt will bathe with Min A  Pt will perform UE dressing with Min A  Pt will perform LE dressing with Mod A  Pt will sit EOB x 10 min with CG assistance  Pt will transfer bed/chair/bsc with Mod A  Pt will perform standing task x 3 min with CG assistance  Pt will tolerate 30 minutes of tx without fatigue      LT.Restore to max I with self care and mobility.                         DME Justifications:    Time Tracking:     OT Date of Treatment: 25  OT Start Time: 1147  OT Stop Time: 1200  OT Total Time (min): 13 min    Billable Minutes:Therapeutic Exercise 13 minutes    OT/SAROJ: SAROJ     Number of SAROJ visits since last OT visit: 1    SAROJ Rodriguez  2025  12:24 PM

## 2025-04-21 NOTE — PLAN OF CARE
Notified Ninoska at [facility] that patient is not yet ready for dc as Dr. Rizvi notes her white count is up and she is not eating and complains of belly pain.  Will continue to follow for dc planning.

## 2025-04-21 NOTE — PROGRESS NOTES
[facility]  [facility] Medicine  Progress Note    Patient Name: Magan Saleem  MRN: [id number]  Patient Class: IP- Inpatient   Admission Date: 4/3/2025  Length of Stay: 17 days  Attending Physician: Andriy Rizvi MD  Primary Care Provider: Sil Brown DO        Subjective     Principal Problem:Sepsis due to pneumonia        HPI:  70 yo F presents to [facility] ED from [facility] for abnormal labs.  Patient was discharged from [facility] two days ago to skilled nursing facility for rehab.  She was diagnosed with dehydration due to gastroparesis with UTI.  Patient has metastatic lung cancer (to the brain) and follows with Dr. Swanson for IV chemotherapy every other week and takes lazertinib daily.  She has completed her course of radiation for the brain mets and follow had showed some improvement.  I thought she had either some decreased LOC due to encephalopathy or some aphasia from the brain mets but after a great effort to get her to talk, I realized she was just mad.  She wanted to know why she had been discharged two days ago when she could not eat.  She has no appetite and early satiety.  She is not nauseated and no vomiting.  She has decided on a DNR status previously (her caretaker and friend of 20 years) states that she had always said she did not want resuscitation if she experienced cardiopulmonary arrest and had told her children her wishes but she does want treatment and is not opposed to J tube if needed.  She has not had anything to eat or drink in two days and is dehydrated again.      Patient was transferred because of concern of sepsis.  She did have enterococcus faecalis UTI at [facility] and was treated.  I do not have the culture results but cultures were sent and patient does have some yeast noted.  (Will not treat a yeast colonization).  She is afebrile and hemodynamically stable and does not look septic clinically.  Her Lactic acid is stable at 2.5 dara 3.2.  Will check  another in am after volume resuscitation.  Her creat is at baseline but she has prerenal azotemia further confirming the dehydration.  Patient has an IO in place from [facility] due to dehydration and difficult stick but with some volume resuscitation, we have gotten an IV.  She is covid and flu negative.      Her WBC is 29 and I am not sure if she has received neupogen but could be a stress reaction.  Her BS is 245 and she does have some urine ketones but most likely due to starvation ketosis.  Will check a serum ketone.  Her platelets are 88K but this is most likely from her chemo.  She is not anemic.  CXR shows some patchy infiltrate but no obvious obstructive pneumonia from her lung cancer.  Her BNP about 3K but no clinical signs of CHF.  No recent echo but due to her BMI 50 most likely has some PHTN.  EKG had no ischemic changes but tachy at 114 which could also be from dehydration.  She was on ozempic for her DM and this could be the cause of her decreased appetite.  She is also on decadron for prevention of cerebral edema.      Remainder of ROS as below.  She mostly just nods and shakes her head and rolls her eyes.  You can get her to laugh if you try but she has been depressed since she has not walked since her hospitalization at [facility] on 3/19/25 and was discharged two days ago.  She says that at her last chemo she noticed she was getting weaker and her daughter had to help her in and out of the car and that was new for her.      See assessment and plan below for problem based evaluation       Overview/Hospital Course:  69 year old female presents with hypernatremia; she is not too talkative during rounds    4/5- patient seen examined today resting comfortably in bed and in no acute distress, with no acute events overnight.  Patient has a multitude of issues complicating her medical picture.  White blood cell count 54323 today, sodium 159, potassium 3.2 and BUN creatinine 59 and 1.8.  The patient is still not eating  even when assistance is provided.  We have already changed her fluids to half-normal saline with 20 of KCl at 1:25 a.m..  Have also put in a GI consult for possible feeding tube.  E coli in the urine has turned out to be ESBL and Rocephin has been changed Merrem.    4/6- the patient seen examined today resting comfortably in bed, in no acute distress, in no acute events overnight.  The patient is not very talkative today, but states she is doing okay.  She has no acute complaints.  Blood cultures remain negative.  The patient has not eaten since yesterday and is on light IV fluids.  GI has been consulted for feeding tube.  Continues on Merrem for positive urine culture.    04/07 Records reviewed. Not eating which not new, Similar during recent admit at [facility]. Dr Swanson there had question pancreatitis. No abd pain or tenderness reported now. Question of dysphagia to solids. Chart hx gastroparesis. Will discuss with GI. oRnnie says has responded so far well to treatment for lung CA.   04/08 had EGD at [facility] 03/21/25 with no obstruction and evidence gastroparesis. Still not ending. Try additional meds. Talked with GI. Increase activity  04/09 Some better with med  changes  04/10 Continues to do better. Ate 1/2 fish sandwich for lunch. Called by Dr Swanson and she wanting to keep feeding tube in consideration. Talked with Dr Reich and Dr Lemus. If something needed with gastroparesis would have to have post gastric position of tube.   04/11 eating some. Later staff requested some nystatin for sore mouth.   04/12 still not eating well.  Increased peripheral edema.  Albumin low at 1.5.  Will give some albumin and follow with some Lasix.  Placed Dobbhoff tube and start enteral feedings.  This will help with nutrition and if issue of placing surgical tube later make sure will tolerate enteral feedings.  Chest x-ray continues worse on left side.  Discuss with Pulmonary and ask Dr. Villasenor to see.   04/13 no new issues.   Enteral feedings to be started.  Pulmonary evaluation appreciated and plans noted.  04/14 Tolerating feedings Therapy working with her. Bronchoscopy planned.   4/15 BAL today  4/16 bronch yesterday looks like pneumonitis  4/17 not candidate for PEG    04/18 Eating some now. Pt says feels some better than last seen. Look at placement. High dose steroids by pulmonary. BS not as bad as would expect.  04/19 states continued eat some.  Less nausea.  Blood sugar not sure bad considering the steroids.  Pulmonary adjusted antibiotics based on cultures.  Look at it placement next week.   04/20 Looks the small. C/O SOB to nursing staff earlier but ABG OK. Poor oral intake ; encourage for pt to do more. Looking into placement.    If No procedures planned restart eliquis. Not give heparin because severe thrombocytosis ( plt down 47). Check with all.    Interval History:     Review of Systems   Constitutional:  Negative for appetite change, fatigue and fever.   HENT:  Negative for congestion and hearing loss.    Respiratory:  Negative for chest tightness and wheezing.    Cardiovascular:  Negative for chest pain and palpitations.   Gastrointestinal:  Negative for abdominal pain, constipation and nausea.   Genitourinary:  Negative for difficulty urinating and dysuria.   Musculoskeletal:  Positive for gait problem. Negative for back pain and neck stiffness.   Skin:  Negative for pallor and rash.   Neurological:  Negative for dizziness, speech difficulty and headaches.   Psychiatric/Behavioral:  Positive for sleep disturbance. Negative for confusion and suicidal ideas.      Objective:     Vital Signs (Most Recent):  Temp: 97.1 °F (36.2 °C) (04/20/25 1939)  Pulse: 96 (04/20/25 1940)  Resp: 18 (04/20/25 1918)  BP: (!) 98/50 (04/20/25 1952)  SpO2: 99 % (04/20/25 1940) Vital Signs (24h Range):  Temp:  [96.8 °F (36 °C)-97.5 °F (36.4 °C)] 97.1 °F (36.2 °C)  Pulse:  [72-98] 96  Resp:  [16-22] 18  SpO2:  [93 %-100 %] 99 %  BP:  ()/(39-82) 98/50     Weight: (!) 136.1 kg (300 lb)  Body mass index is 49.92 kg/m².  No intake or output data in the 24 hours ending 04/20/25 2158        Physical Exam  Vitals reviewed.   Constitutional:       General: She is awake. She is not in acute distress.     Appearance: She is well-developed. She is morbidly obese. She is not toxic-appearing.   HENT:      Head: Normocephalic.      Nose: Nose normal.      Mouth/Throat:      Pharynx: Oropharynx is clear.   Eyes:      Extraocular Movements: Extraocular movements intact.      Pupils: Pupils are equal, round, and reactive to light.   Neck:      Thyroid: No thyroid mass.      Vascular: No carotid bruit.   Cardiovascular:      Rate and Rhythm: Normal rate and regular rhythm.      Pulses: Normal pulses.      Heart sounds: Normal heart sounds. No murmur heard.  Pulmonary:      Effort: Pulmonary effort is normal.      Breath sounds: Normal breath sounds and air entry. No wheezing.   Abdominal:      General: Bowel sounds are normal. There is no distension.      Palpations: Abdomen is soft.      Tenderness: There is no abdominal tenderness.   Musculoskeletal:         General: Normal range of motion.      Cervical back: Neck supple. No rigidity.   Skin:     General: Skin is warm.      Coloration: Skin is not jaundiced.      Findings: No lesion.   Neurological:      General: No focal deficit present.      Mental Status: She is alert and oriented to person, place, and time.      Cranial Nerves: No cranial nerve deficit.   Psychiatric:         Attention and Perception: Attention normal.         Mood and Affect: Mood normal.         Behavior: Behavior normal. Behavior is cooperative.         Thought Content: Thought content normal.         Cognition and Memory: Cognition normal.               Significant Labs: All pertinent labs within the past 24 hours have been reviewed.  BMP:   Recent Labs   Lab 04/20/25  0554   *   *   K 5.2*      CO2 20*   BUN  "82*   CREATININE 3.33*   CALCIUM 9.1       CBC:   No results for input(s): "WBC", "HGB", "HCT", "PLT" in the last 48 hours.      CMP:   Recent Labs   Lab 04/19/25  0336 04/20/25  0554   * 132*   K 4.6 5.2*    101   CO2 20* 20*   * 148*   BUN 72* 82*   CREATININE 2.67* 3.33*   CALCIUM 9.0 9.1   ANIONGAP 15 16         Significant Imaging: I have reviewed all pertinent imaging results/findings within the past 24 hours.      Intake/Output - Last 3 Shifts         04/19 0700 04/20 0659 04/20 0700 04/21 0659    P.O. 100     Total Intake(mL/kg) 100 (0.7)     Net +100                 Microbiology Results (last 7 days)       Procedure Component Value Units Date/Time    Culture, Lower Respiratory [[id number]]  (Abnormal)  (Susceptibility) Collected: 04/15/25 0943    Order Status: Completed Specimen: Respiratory from BAL Updated: 04/19/25 0711     Culture, Lower Respiratory Light Growth Stenotrophomonas maltophilia    AFB Smear Only [[id number]] Collected: 04/15/25 0943    Order Status: Completed Specimen: Respiratory from BAL Updated: 04/15/25 2200     AFB Smear No acid fast bacilli seen    Gram Stain [[id number]] Collected: 04/15/25 0943    Order Status: Completed Specimen: Respiratory from BAL Updated: 04/15/25 1249     Gram Stain Result Moderate WBC observed      Few WBC observed      Rare Gram positive cocci    Direct Prep (Other) Fungus [[id number]] Collected: 04/15/25 0943    Order Status: Completed Specimen: Respiratory from BAL Updated: 04/15/25 1249     Direct Prep No fungal elements seen    Culture, Fungus (Other) [[id number]] Collected: 04/15/25 0943    Order Status: Resulted Specimen: Respiratory from BAL Updated: 04/15/25 1123    Culture, AFB [[id number]] Collected: 04/15/25 0943    Order Status: Sent Specimen: Respiratory from BAL Updated: 04/15/25 1110                Assessment & Plan  Sepsis due to pneumonia  Appears to have UTI and pneumonia; has leukocytosis; has opacities on CXR; has G- " bacilli on urine culture; blood cultures pending; will place on Vancomycin and Rocephin; BP on the low side; on IVF    04/12 worsening chest x-ray, ask Pulmonary to review  4/15 plan for BAL today.  Continue antibiotic  4/16 7 days antibiotic  completed  Hypernatremia  Due to dehydration; off diuretics; on IVF; will monitor Na levels  Lung cancer metastatic to brain  DNR but not hospice.  Receives chemo and has finished radiation.    04/07 reported stable / improved after treatment  04/09 more fuffy right side CXR  04/12 continue to abnormality on chest x-ray and ask Pulmonary to review  4/15 follows with Dr. Swanson.  No more chemotherapy treatments until patient is stronger.  Discussed with Dr. Swanson  4/16 hold treatments for now  04/20 brain lesion prior treated and I told been stable    Thrombocytopenia  On chemo; will monitor  04/19 platelet count 149 wishes improved    Essential hypertension  Vitals:    04/19/25 1604 04/19/25 2006 04/20/25 0000 04/20/25 0351   BP: 109/67 123/77 121/82 95/66    04/20/25 0725 04/20/25 1114 04/20/25 1626 04/20/25 1939   BP: (!) 90/55 (!) 92/47 94/63 (!) 67/39    04/20/25 1940 04/20/25 1952   BP: (!) 77/43 (!) 98/50         Monitor BP while on Metoprolol; on IVF; off Losartan    Moderate persistent asthma without complication  Continue home inhaled steroid/bronchodilator and singulair    Chronic kidney disease, stage 3b  Creatine stable     04/20 Cr recently been climbing. May need some IVF. Check BNP in am  Chronic pulmonary embolism without acute cor pulmonale  Eliquis was discontinued due to risk of ICH with brain mets but they have improved so the anti-coagulation has been restarted; will monitor  04/07 apparently this of several years ago    Type 2 diabetes mellitus with hyperglycemia  BS elevated; will increase Lantus to 25 units; continue SSI;monitor BS     Gastroparesis  On Reglan and PPI for erosive gastritis; will monitor    EGD by Dr. Lo during recent past  "admission at [facility]:  "EGD on 03/21/2025 shows LA class B erosive esophagitis but no pill esophagitis, nonerosive gastritis and retention of food and fluid suspicious for gastroparesis, due to narcotics and diabetes. "    04/ 08 try additional meds to help gastric emptying.  04/09 taking in some now orally with recent changes meds  04/10 better and ate half [place] for lunch   04/14 tolerating enteral feedings so far  4/15 we will get PEG tube if patient is agreeable.  Daughter is agreeable.  Discussed with her  4/16 patient wants PEG tube  4/17 not candidate for surgical PEG.  NGT out, start full liquids  04/18 taking some orally  Morbid obesity  Body mass index is 49.92 kg/m². Morbid obesity complicates all aspects of disease management from diagnostic modalities to treatment. Weight loss encouraged and health benefits explained to patient.     Would benefit from sleep study and possible CPAP.  Does not wear oxygen at home.      Edema due to hypoalbuminemia    04/12 add IV albumin and some Lasix  Start enteral feedings  04/13 start enteral feedings  Abnormal chest x-ray    04/13 addressed by Pulmonary with further investigation plans  Anemia  Anemia is likely due to chronic disease due to Malignancy. Most recent hemoglobin and hematocrit are listed below.  Recent Labs     04/18/25  0420   HGB 8.7*   HCT 25.7*     Plan  - Monitor serial CBC: Daily  - Transfuse PRBC if patient becomes hemodynamically unstable, symptomatic or H/H drops below 7/21.  - Patient has not received any PRBC transfusions to date  - Patient's anemia is currently stable  - follow  No evidence of bleeding  Hyponatremia  Hyponatremia is likely due to renal insufficiency. The patient's most recent sodium results are listed below.  Recent Labs     04/18/25  0420 04/19/25  0336 04/20/25  0554   * 131* 132*     Plan  - Correct the sodium by 4-6mEq in 24 hours.   - Obtain the following studies:  In a.  - Will treat the hyponatremia " with continue LR  - Monitor sodium Daily.   - Patient hyponatremia is stable  - follow  Follow  Neoplastic (malignant) related fatigue    Progressive mobility protocol  Pneumonitis  IV steroids per pulmonology  04/19 antibiotic adjustment based on cultures by Pulmonary  Dehydration, severe  Encourage orals    VTE Risk Mitigation (From admission, onward)      None            Discharge Planning   MITUL:      Code Status: DNR   Medical Readiness for Discharge Date:   Discharge Plan A: Home with family                Please place Justification for DME        Andriy Rizvi MD  Department of Hospital Medicine   [facility]

## 2025-04-21 NOTE — NURSING
Lab called a critical WBC of 34.78.  Notified Dmitri Nicolas via secure chat and awaiting his response.

## 2025-04-21 NOTE — PT/OT/SLP PROGRESS
Physical Therapy Treatment    Patient Name:  Magan Saleem   MRN:  [id number]    Recommendations:     Discharge Recommendations: Moderate Intensity Therapy  Discharge Equipment Recommendations: to be determined by next level of care  Barriers to discharge:  ongoing medical treatment    Assessment:     Magan Saleem is a 69 y.o. female admitted with a medical diagnosis of Sepsis due to pneumonia.  She presents with the following impairments/functional limitations: weakness, impaired endurance, impaired functional mobility, impaired cardiopulmonary response to activity     Patient with c/o of sob and fatigue today. OOB activity not attempted. Only able to perform bed exercises    Rehab Prognosis: Good; patient would benefit from acute skilled PT services to address these deficits and reach maximum level of function.    Recent Surgery: * No surgery found *      Plan:     During this hospitalization, patient to be seen 5 x/week to address the identified rehab impairments via gait training, therapeutic activities, therapeutic exercises, neuromuscular re-education and progress toward the following goals:    Plan of Care Expires:  05/04/25    Subjective     Chief Complaint: fatigue; sob  Patient/Family Comments/goals: agreeable to exercise  Pain/Comfort:  Pain Rating 1: 0/10      Objective:     Communicated with nurse prior to session.  Patient found HOB elevated with oxygen, telemetry upon PT entry to room.     General Precautions: Standard, fall, contact  Orthopedic Precautions: N/A  Braces: N/A  Respiratory Status: Nasal cannula, flow 2 L/min     Functional Mobility:  NT      AM-PAC 6 CLICK MOBILITY  Turning over in bed (including adjusting bedclothes, sheets and blankets)?: 2  Sitting down on and standing up from a chair with arms (e.g., wheelchair, bedside commode, etc.): 1  Moving from lying on back to sitting on the side of the bed?: 2  Moving to and from a bed to a chair (including a wheelchair)?: 1  Need to walk  in hospital room?: 1  Climbing 3-5 steps with a railing?: 1  Basic Mobility Total Score: 8       Treatment & Education:  -AP, QS, Hip add/abduction, LE flexion/ext; all 3x10 repetitions BLE      Patient left HOB elevated with all lines intact, call button in reach, and nurse notified..    GOALS:   Multidisciplinary Problems       Physical Therapy Goals          Problem: Physical Therapy    Goal Priority Disciplines Outcome Interventions   Physical Therapy Goal     PT, PT/OT Progressing    Description: Short term goals:  1. Supine to sit with MInimal Assistance  2. Sit to stand transfer with Moderate Assistance  3. Bed to chair transfer with Moderate Assistance using Rolling Walker  4. Sitting at edge of bed x15 minutes with Contact Guard Assistance    Long term goals:  1. Supine to sit with Contact Guard Assistance  2. Sit to stand transfer with Contact Guard Assistance  3. Bed to chair transfer with Contact Guard Assistance using Rolling Walker  4. Gait  x 100 feet with Contact Guard Assistance using Rolling Walker.                          DME Justifications:      Time Tracking:     PT Received On: 04/21/25  PT Start Time: 0915     PT Stop Time: 0928  PT Total Time (min): 13 min     Billable Minutes: Therapeutic Exercise 13    Treatment Type: Treatment  PT/PTA: PT     Number of PTA visits since last PT visit: 0     04/21/2025

## 2025-04-21 NOTE — ASSESSMENT & PLAN NOTE
"On Reglan and PPI for erosive gastritis; will monitor    EGD by Dr. Lo during recent past admission at [facility]:  "EGD on 03/21/2025 shows LA class B erosive esophagitis but no pill esophagitis, nonerosive gastritis and retention of food and fluid suspicious for gastroparesis, due to narcotics and diabetes. "    04/ 08 try additional meds to help gastric emptying.  04/09 taking in some now orally with recent changes meds  04/10 better and ate half [place] for lunch   04/14 tolerating enteral feedings so far  4/15 we will get PEG tube if patient is agreeable.  Daughter is agreeable.  Discussed with her  4/16 patient wants PEG tube  4/17 not candidate for surgical PEG.  NGT out, start full liquids  04/18 taking some orally  "

## 2025-04-22 PROBLEM — A49.8 INFECTION DUE TO STENOTROPHOMONAS MALTOPHILIA: Status: ACTIVE | Noted: 2025-04-22

## 2025-04-22 PROBLEM — N18.32 CHRONIC KIDNEY DISEASE, STAGE 3B: Status: ACTIVE | Noted: 2025-04-22

## 2025-04-22 PROBLEM — R93.89 ABNORMAL CHEST X-RAY: Status: RESOLVED | Noted: 2025-04-13 | Resolved: 2025-04-22

## 2025-04-22 LAB
ALBUMIN SERPL BCP-MCNC: 1.6 G/DL (ref 3.4–4.8)
ALP SERPL-CCNC: 145 U/L (ref 40–150)
ALT SERPL W P-5'-P-CCNC: 17 U/L
AMYLASE SERPL-CCNC: 379 U/L (ref 25–125)
ANION GAP SERPL CALCULATED.3IONS-SCNC: 18 MMOL/L (ref 7–16)
ANISOCYTOSIS BLD QL SMEAR: ABNORMAL
AST SERPL W P-5'-P-CCNC: 34 U/L (ref 11–45)
BASOPHILS # BLD AUTO: 0.05 K/UL (ref 0–0.2)
BASOPHILS NFR BLD AUTO: 0.2 % (ref 0–1)
BILIRUB DIRECT SERPL-MCNC: 0.7 MG/DL
BILIRUB SERPL-MCNC: 0.7 MG/DL
BUN SERPL-MCNC: 100 MG/DL (ref 10–20)
BUN/CREAT SERPL: 26 (ref 6–20)
CALCIUM SERPL-MCNC: 8.2 MG/DL (ref 8.4–10.2)
CHLORIDE SERPL-SCNC: 106 MMOL/L (ref 98–107)
CO2 SERPL-SCNC: 18 MMOL/L (ref 23–31)
CREAT SERPL-MCNC: 3.85 MG/DL (ref 0.55–1.02)
DIFFERENTIAL METHOD BLD: ABNORMAL
EGFR (NO RACE VARIABLE) (RUSH/TITUS): 12 ML/MIN/1.73M2
EOSINOPHIL # BLD AUTO: 0.01 K/UL (ref 0–0.5)
EOSINOPHIL NFR BLD AUTO: 0 % (ref 1–4)
ERYTHROCYTE [DISTWIDTH] IN BLOOD BY AUTOMATED COUNT: 20.9 % (ref 11.5–14.5)
GLUCOSE SERPL-MCNC: 79 MG/DL (ref 70–105)
GLUCOSE SERPL-MCNC: 87 MG/DL (ref 70–105)
GLUCOSE SERPL-MCNC: 89 MG/DL (ref 82–115)
GLUCOSE SERPL-MCNC: 90 MG/DL (ref 70–105)
GLUCOSE SERPL-MCNC: 92 MG/DL (ref 70–105)
HCT VFR BLD AUTO: 25.9 % (ref 38–47)
HGB BLD-MCNC: 8.3 G/DL (ref 12–16)
IMM GRANULOCYTES # BLD AUTO: 1.25 K/UL (ref 0–0.04)
IMM GRANULOCYTES NFR BLD: 4.4 % (ref 0–0.4)
LIPASE SERPL-CCNC: 46 U/L
LYMPHOCYTES # BLD AUTO: 0.39 K/UL (ref 1–4.8)
LYMPHOCYTES NFR BLD AUTO: 1.4 % (ref 27–41)
LYMPHOCYTES NFR BLD MANUAL: 4 % (ref 27–41)
MAGNESIUM SERPL-MCNC: 2.3 MG/DL (ref 1.6–2.6)
MCH RBC QN AUTO: 29.2 PG (ref 27–31)
MCHC RBC AUTO-ENTMCNC: 32 G/DL (ref 32–36)
MCV RBC AUTO: 91.2 FL (ref 80–96)
MONOCYTES # BLD AUTO: 1.35 K/UL (ref 0–0.8)
MONOCYTES NFR BLD AUTO: 4.8 % (ref 2–6)
MONOCYTES NFR BLD MANUAL: 5 % (ref 2–6)
MPC BLD CALC-MCNC: 13 FL (ref 9.4–12.4)
NEUTROPHILS # BLD AUTO: 25.08 K/UL (ref 1.8–7.7)
NEUTROPHILS NFR BLD AUTO: 89.2 % (ref 53–65)
NEUTS SEG NFR BLD MANUAL: 91 % (ref 50–62)
NRBC # BLD AUTO: 0.54 X10E3/UL
NRBC BLD MANUAL-RTO: 2 /100 WBC
NRBC, AUTO (.00): 1.9 %
PLATELET # BLD AUTO: 168 K/UL (ref 150–400)
PLATELET MORPHOLOGY: ABNORMAL
POLYCHROMASIA BLD QL SMEAR: ABNORMAL
POTASSIUM SERPL-SCNC: 5.7 MMOL/L (ref 3.5–5.1)
PROT SERPL-MCNC: 4 G/DL (ref 5.8–7.6)
RBC # BLD AUTO: 2.84 M/UL (ref 4.2–5.4)
SODIUM SERPL-SCNC: 136 MMOL/L (ref 136–145)
TARGETS BLD QL SMEAR: ABNORMAL
WBC # BLD AUTO: 28.13 K/UL (ref 4.5–11)

## 2025-04-22 PROCEDURE — 63600175 PHARM REV CODE 636 W HCPCS: Performed by: INTERNAL MEDICINE

## 2025-04-22 PROCEDURE — 80076 HEPATIC FUNCTION PANEL: CPT | Performed by: HOSPITALIST

## 2025-04-22 PROCEDURE — 27000207 HC ISOLATION

## 2025-04-22 PROCEDURE — 80048 BASIC METABOLIC PNL TOTAL CA: CPT | Performed by: STUDENT IN AN ORGANIZED HEALTH CARE EDUCATION/TRAINING PROGRAM

## 2025-04-22 PROCEDURE — 36415 COLL VENOUS BLD VENIPUNCTURE: CPT | Performed by: HOSPITALIST

## 2025-04-22 PROCEDURE — 94640 AIRWAY INHALATION TREATMENT: CPT

## 2025-04-22 PROCEDURE — 83735 ASSAY OF MAGNESIUM: CPT | Performed by: HOSPITALIST

## 2025-04-22 PROCEDURE — 99900035 HC TECH TIME PER 15 MIN (STAT)

## 2025-04-22 PROCEDURE — 82150 ASSAY OF AMYLASE: CPT | Performed by: HOSPITALIST

## 2025-04-22 PROCEDURE — 99233 SBSQ HOSP IP/OBS HIGH 50: CPT | Mod: ,,, | Performed by: HOSPITALIST

## 2025-04-22 PROCEDURE — 99233 SBSQ HOSP IP/OBS HIGH 50: CPT | Mod: ,,, | Performed by: STUDENT IN AN ORGANIZED HEALTH CARE EDUCATION/TRAINING PROGRAM

## 2025-04-22 PROCEDURE — 94761 N-INVAS EAR/PLS OXIMETRY MLT: CPT

## 2025-04-22 PROCEDURE — 25000242 PHARM REV CODE 250 ALT 637 W/ HCPCS: Performed by: HOSPITALIST

## 2025-04-22 PROCEDURE — 92610 EVALUATE SWALLOWING FUNCTION: CPT

## 2025-04-22 PROCEDURE — 25000003 PHARM REV CODE 250: Performed by: STUDENT IN AN ORGANIZED HEALTH CARE EDUCATION/TRAINING PROGRAM

## 2025-04-22 PROCEDURE — 85025 COMPLETE CBC W/AUTO DIFF WBC: CPT | Performed by: HOSPITALIST

## 2025-04-22 PROCEDURE — 97110 THERAPEUTIC EXERCISES: CPT

## 2025-04-22 PROCEDURE — 25000003 PHARM REV CODE 250: Performed by: HOSPITALIST

## 2025-04-22 PROCEDURE — 83690 ASSAY OF LIPASE: CPT | Performed by: HOSPITALIST

## 2025-04-22 PROCEDURE — 27000221 HC OXYGEN, UP TO 24 HOURS

## 2025-04-22 PROCEDURE — 82962 GLUCOSE BLOOD TEST: CPT

## 2025-04-22 PROCEDURE — 11000001 HC ACUTE MED/SURG PRIVATE ROOM

## 2025-04-22 RX ORDER — INSULIN GLARGINE 100 [IU]/ML
6 INJECTION, SOLUTION SUBCUTANEOUS NIGHTLY
Status: DISCONTINUED | OUTPATIENT
Start: 2025-04-22 | End: 2025-04-23

## 2025-04-22 RX ORDER — PREDNISONE 10 MG/1
10 TABLET ORAL DAILY
Status: DISCONTINUED | OUTPATIENT
Start: 2025-05-17 | End: 2025-04-26 | Stop reason: HOSPADM

## 2025-04-22 RX ORDER — LEVOFLOXACIN 500 MG/1
500 TABLET, FILM COATED ORAL
Status: DISCONTINUED | OUTPATIENT
Start: 2025-04-23 | End: 2025-04-26 | Stop reason: HOSPADM

## 2025-04-22 RX ORDER — PREDNISONE 20 MG/1
20 TABLET ORAL DAILY
Status: DISCONTINUED | OUTPATIENT
Start: 2025-04-27 | End: 2025-04-22

## 2025-04-22 RX ORDER — PREDNISONE 10 MG/1
10 TABLET ORAL DAILY
Status: DISCONTINUED | OUTPATIENT
Start: 2025-04-28 | End: 2025-04-22

## 2025-04-22 RX ORDER — SELENIUM 50 MCG
2 TABLET ORAL
Status: DISCONTINUED | OUTPATIENT
Start: 2025-04-23 | End: 2025-04-26 | Stop reason: HOSPADM

## 2025-04-22 RX ORDER — PREDNISONE 20 MG/1
20 TABLET ORAL DAILY
Status: DISCONTINUED | OUTPATIENT
Start: 2025-04-23 | End: 2025-04-22

## 2025-04-22 RX ORDER — PREDNISONE 20 MG/1
40 TABLET ORAL DAILY
Status: DISCONTINUED | OUTPATIENT
Start: 2025-04-23 | End: 2025-04-22

## 2025-04-22 RX ORDER — PREDNISONE 10 MG/1
10 TABLET ORAL DAILY
Status: DISCONTINUED | OUTPATIENT
Start: 2025-05-07 | End: 2025-04-22

## 2025-04-22 RX ORDER — PREDNISONE 20 MG/1
20 TABLET ORAL DAILY
Status: DISCONTINUED | OUTPATIENT
Start: 2025-05-03 | End: 2025-04-26 | Stop reason: HOSPADM

## 2025-04-22 RX ORDER — PREDNISONE 20 MG/1
40 TABLET ORAL DAILY
Status: DISCONTINUED | OUTPATIENT
Start: 2025-04-23 | End: 2025-04-26 | Stop reason: HOSPADM

## 2025-04-22 RX ADMIN — SODIUM CHLORIDE: 9 INJECTION, SOLUTION INTRAVENOUS at 08:04

## 2025-04-22 RX ADMIN — IPRATROPIUM BROMIDE AND ALBUTEROL SULFATE 3 ML: 2.5; .5 SOLUTION RESPIRATORY (INHALATION) at 08:04

## 2025-04-22 RX ADMIN — METOCLOPRAMIDE HYDROCHLORIDE 10 MG: 5 TABLET ORAL at 05:04

## 2025-04-22 RX ADMIN — METOCLOPRAMIDE HYDROCHLORIDE 10 MG: 5 TABLET ORAL at 11:04

## 2025-04-22 RX ADMIN — IPRATROPIUM BROMIDE AND ALBUTEROL SULFATE 3 ML: 2.5; .5 SOLUTION RESPIRATORY (INHALATION) at 11:04

## 2025-04-22 RX ADMIN — IPRATROPIUM BROMIDE AND ALBUTEROL SULFATE 3 ML: 2.5; .5 SOLUTION RESPIRATORY (INHALATION) at 07:04

## 2025-04-22 RX ADMIN — IPRATROPIUM BROMIDE AND ALBUTEROL SULFATE 3 ML: 2.5; .5 SOLUTION RESPIRATORY (INHALATION) at 03:04

## 2025-04-22 RX ADMIN — BUDESONIDE 0.5 MG: 0.5 SUSPENSION RESPIRATORY (INHALATION) at 08:04

## 2025-04-22 RX ADMIN — METOCLOPRAMIDE HYDROCHLORIDE 10 MG: 5 TABLET ORAL at 09:04

## 2025-04-22 RX ADMIN — BUDESONIDE 0.5 MG: 0.5 SUSPENSION RESPIRATORY (INHALATION) at 07:04

## 2025-04-22 RX ADMIN — PREDNISONE 40 MG: 20 TABLET ORAL at 08:04

## 2025-04-22 RX ADMIN — PANTOPRAZOLE SODIUM 40 MG: 40 TABLET, DELAYED RELEASE ORAL at 08:04

## 2025-04-22 RX ADMIN — SERTRALINE HYDROCHLORIDE 100 MG: 50 TABLET ORAL at 08:04

## 2025-04-22 RX ADMIN — MONTELUKAST 10 MG: 10 TABLET, FILM COATED ORAL at 09:04

## 2025-04-22 RX ADMIN — ATORVASTATIN CALCIUM 20 MG: 20 TABLET, FILM COATED ORAL at 09:04

## 2025-04-22 RX ADMIN — METOCLOPRAMIDE HYDROCHLORIDE 10 MG: 5 TABLET ORAL at 04:04

## 2025-04-22 NOTE — ASSESSMENT & PLAN NOTE
70 yo F with immunosuppression from ongoing therapy for metastatic lung cancer now found to have Stenotrophomonas lower respiratory infection in this patient with multifocal infiltrates and consolidative opacities on CT Chest. Ideally, treatment with Bactrim and given severity of illness and immunosuppression, dual therapy with Levaquin. This is limited by ongoing JOHNATHON with hyperkalemia. Will plan on treatment with Levaquin for a goal 14 day course with Pharmacy assisting with renal dosing. Erring on side of caution given degree of illness and will recommend against minocycline treatment at this time for side effect profile and will consider for recurrent infection after this treatment course.

## 2025-04-22 NOTE — SUBJECTIVE & OBJECTIVE
Interval History: 04/22/25 General surgery re-consulted to evaluate abnormal CT scan and elevated WBC. It is also reported there is hard area above umbilicus on abdomnial wall,  Ct Scan noted was reviewed by Dr Tapia.  Small fluid collection noted in upper abdomen near pancreatic head consistent with pancreatitis due to elevation of liver enzymes.  Ct note no abnormalities on abdominal wall.  Evaluation per Dr Tapia noted small fat containing umbilical hernia without incarceration.  No surgical intervention at this time.     Medications:  Continuous Infusions:   0.9% NaCl   Intravenous Continuous 100 mL/hr at 04/22/25 0820 New Bag at 04/22/25 0820     Scheduled Meds:   albuterol-ipratropium  3 mL Nebulization QID    atorvastatin  20 mg Oral QHS    budesonide  0.5 mg Nebulization Q12H    insulin glargine U-100  20 Units Subcutaneous QHS    [START ON 4/23/2025] levoFLOXacin  500 mg Oral Q48H    metoclopramide HCl  10 mg Oral QID (AC & HS)    metoprolol succinate  25 mg Oral Daily    montelukast  10 mg Oral QHS    pantoprazole  40 mg Oral Daily    predniSONE  40 mg Oral Daily    sertraline  100 mg Oral Daily     PRN Meds:  Current Facility-Administered Medications:     acetaminophen, 650 mg, Rectal, Q6H PRN    acetaminophen, 1,000 mg, Oral, Q6H PRN    albuterol sulfate, 2.5 mg, Nebulization, Q2H PRN    aluminum & magnesium hydroxide-simethicone, 30 mL, Oral, Q6H PRN    bisacodyL, 10 mg, Oral, Daily PRN    dextromethorphan-guaiFENesin  mg/5 ml, 10 mL, Oral, Q6H PRN    dextrose 50%, 12.5 g, Intravenous, PRN    dextrose 50%, 25 g, Intravenous, PRN    diphenhydrAMINE, 50 mg, Oral, Q6H PRN    docusate sodium, 100 mg, Oral, BID PRN    glucagon (human recombinant), 1 mg, Intramuscular, PRN    glucose, 16 g, Oral, PRN    glucose, 24 g, Oral, PRN    HYDROcodone-acetaminophen, 1 tablet, Oral, Q6H PRN    insulin aspart U-100, 0-10 Units, Subcutaneous, QID (AC + HS) PRN    LORazepam, 1 mg, Oral, Q6H PRN    melatonin, 6 mg,  Oral, Nightly PRN    traZODone, 50 mg, Oral, Nightly PRN     Review of patient's allergies indicates:   Allergen Reactions    Penicillins Hives    Sulfa (sulfonamide antibiotics) Hives     Objective:     Vital Signs (Most Recent):  Temp: 97.6 °F (36.4 °C) (04/22/25 0828)  Pulse: 88 (04/22/25 1105)  Resp: 18 (04/22/25 1105)  BP: 119/68 (04/22/25 0828)  SpO2: 97 % (04/22/25 0828) Vital Signs (24h Range):  Temp:  [97.4 °F (36.3 °C)-98.2 °F (36.8 °C)] 97.6 °F (36.4 °C)  Pulse:  [] 88  Resp:  [18-22] 18  SpO2:  [92 %-98 %] 97 %  BP: ()/(50-68) 119/68     Weight: (!) 136.1 kg (300 lb)  Body mass index is 49.92 kg/m².    Intake/Output - Last 3 Shifts         04/20 0700  04/21 0659 04/21 0700  04/22 0659 04/22 0700  04/23 0659    P.O. 100 220     I.V. (mL/kg)  1808.8 (13.3)     Total Intake(mL/kg) 100 (0.7) 2028.8 (14.9)     Urine (mL/kg/hr)  0 (0)     Total Output  0     Net +100 +2028.8            Urine Occurrence  3 x              Physical Exam  Vitals reviewed.   HENT:      Head: Normocephalic.      Nose: Nose normal.      Mouth/Throat:      Mouth: Mucous membranes are moist.   Eyes:      Extraocular Movements: Extraocular movements intact.      Conjunctiva/sclera: Conjunctivae normal.   Cardiovascular:      Rate and Rhythm: Normal rate and regular rhythm.   Pulmonary:      Effort: Pulmonary effort is normal.      Breath sounds: Normal breath sounds.   Abdominal:      General: Bowel sounds are normal.   Musculoskeletal:         General: Normal range of motion.      Cervical back: Normal range of motion.   Skin:     General: Skin is warm.      Capillary Refill: Capillary refill takes less than 2 seconds.   Neurological:      General: No focal deficit present.      Mental Status: She is alert and oriented to person, place, and time.   Psychiatric:         Mood and Affect: Mood normal.         Behavior: Behavior normal.          Significant Labs:  I have reviewed all pertinent lab results within the past 24  "hours.  CBC:   Recent Labs   Lab 04/22/25 0524   WBC 28.13*   RBC 2.84*   HGB 8.3*   HCT 25.9*      MCV 91.2   MCH 29.2   MCHC 32.0     BMP:   Recent Labs   Lab 04/22/25 0524   GLU 89      K 5.7*      CO2 18*   *   CREATININE 3.85*   CALCIUM 8.2*   MG 2.3     CMP:   Recent Labs   Lab 04/22/25 0524   GLU 89   CALCIUM 8.2*   ALBUMIN 1.6*   PROT 4.0*      K 5.7*   CO2 18*      *   CREATININE 3.85*   ALKPHOS 145   ALT 17   AST 34   BILITOT 0.7     Microbiology Results (last 7 days)       Procedure Component Value Units Date/Time    Culture, Lower Respiratory [[id number]]  (Abnormal)  (Susceptibility) Collected: 04/15/25 0943    Order Status: Completed Specimen: Respiratory from BAL Updated: 04/21/25 1516     Culture, Lower Respiratory Light Growth Stenotrophomonas maltophilia    AFB Smear Only [[id number]] Collected: 04/15/25 0943    Order Status: Completed Specimen: Respiratory from BAL Updated: 04/15/25 2200     AFB Smear No acid fast bacilli seen    Gram Stain [[id number]] Collected: 04/15/25 0943    Order Status: Completed Specimen: Respiratory from BAL Updated: 04/15/25 1249     Gram Stain Result Moderate WBC observed      Few WBC observed      Rare Gram positive cocci    Direct Prep (Other) Fungus [[id number]] Collected: 04/15/25 0943    Order Status: Completed Specimen: Respiratory from BAL Updated: 04/15/25 1249     Direct Prep No fungal elements seen    Culture, Fungus (Other) [[id number]] Collected: 04/15/25 0943    Order Status: Resulted Specimen: Respiratory from BAL Updated: 04/15/25 1123    Culture, AFB [[id number]] Collected: 04/15/25 0943    Order Status: Resulted Specimen: Respiratory from BAL Updated: 04/15/25 1110          Specimen (24h ago, onward)      None          No results for input(s): "COLORU", "CLARITYU", "SPECGRAV", "PHUR", "PROTEINUA", "GLUCOSEU", "BILIRUBINCON", "BLOODU", "WBCU", "RBCU", "BACTERIA", "MUCUS", "NITRITE", "LEUKOCYTESUR", " ""UROBILINOGEN", "HYALINECASTS" in the last 168 hours.    Significant Diagnostics:  I have reviewed all pertinent imaging results/findings within the past 24 hours.  "

## 2025-04-22 NOTE — SUBJECTIVE & OBJECTIVE
Interval History:     Review of Systems   Constitutional:  Negative for appetite change, fatigue and fever.   HENT:  Negative for congestion and hearing loss.    Respiratory:  Negative for chest tightness and wheezing.    Cardiovascular:  Negative for chest pain and palpitations.   Gastrointestinal:  Negative for abdominal pain, constipation and nausea.   Genitourinary:  Negative for difficulty urinating and dysuria.   Musculoskeletal:  Positive for gait problem. Negative for back pain and neck stiffness.   Skin:  Negative for pallor and rash.   Neurological:  Negative for dizziness, speech difficulty and headaches.   Psychiatric/Behavioral:  Positive for sleep disturbance. Negative for confusion and suicidal ideas.      Objective:     Vital Signs (Most Recent):  Temp: 97.5 °F (36.4 °C) (04/21/25 1950)  Pulse: 97 (04/21/25 2044)  Resp: 20 (04/21/25 2044)  BP: (!) 89/53 (04/21/25 1950)  SpO2: 98 % (04/21/25 2044) Vital Signs (24h Range):  Temp:  [97 °F (36.1 °C)-98.2 °F (36.8 °C)] 97.5 °F (36.4 °C)  Pulse:  [] 97  Resp:  [18-24] 20  SpO2:  [92 %-100 %] 98 %  BP: ()/(50-80) 89/53     Weight: (!) 136.1 kg (300 lb)  Body mass index is 49.92 kg/m².  No intake or output data in the 24 hours ending 04/21/25 2214        Physical Exam  Vitals reviewed.   Constitutional:       General: She is awake. She is not in acute distress.     Appearance: She is well-developed. She is morbidly obese. She is not toxic-appearing.   HENT:      Head: Normocephalic.      Nose: Nose normal.      Mouth/Throat:      Pharynx: Oropharynx is clear.   Eyes:      Extraocular Movements: Extraocular movements intact.      Pupils: Pupils are equal, round, and reactive to light.   Neck:      Thyroid: No thyroid mass.      Vascular: No carotid bruit.   Cardiovascular:      Rate and Rhythm: Normal rate and regular rhythm.      Pulses: Normal pulses.      Heart sounds: Normal heart sounds. No murmur heard.  Pulmonary:      Effort: Pulmonary  effort is normal.      Breath sounds: Normal breath sounds and air entry. No wheezing.   Abdominal:      General: Bowel sounds are normal. There is no distension.      Palpations: Abdomen is soft.      Tenderness: There is no abdominal tenderness.   Musculoskeletal:         General: Normal range of motion.      Cervical back: Neck supple. No rigidity.   Skin:     General: Skin is warm.      Coloration: Skin is not jaundiced.      Findings: No lesion.   Neurological:      General: No focal deficit present.      Mental Status: She is alert and oriented to person, place, and time.      Cranial Nerves: No cranial nerve deficit.   Psychiatric:         Attention and Perception: Attention normal.         Mood and Affect: Mood normal.         Behavior: Behavior normal. Behavior is cooperative.         Thought Content: Thought content normal.         Cognition and Memory: Cognition normal.               Significant Labs: All pertinent labs within the past 24 hours have been reviewed.  BMP:   Recent Labs   Lab 04/21/25  0439      *   K 5.6*      CO2 16*   BUN 90*   CREATININE 3.96*   CALCIUM 9.1       CBC:   Recent Labs   Lab 04/21/25  0439   WBC 34.78*   HGB 9.6*   HCT 29.3*            CMP:   Recent Labs   Lab 04/20/25  0554 04/21/25  0439   * 132*   K 5.2* 5.6*    102   CO2 20* 16*   * 109   BUN 82* 90*   CREATININE 3.33* 3.96*   CALCIUM 9.1 9.1   ANIONGAP 16 20*         Significant Imaging: I have reviewed all pertinent imaging results/findings within the past 24 hours.      Intake/Output - Last 3 Shifts         04/20 0700  04/21 0659 04/21 0700  04/22 0659    P.O. 100     Total Intake(mL/kg) 100 (0.7)     Net +100           Urine Occurrence  1 x          Microbiology Results (last 7 days)       Procedure Component Value Units Date/Time    Culture, Lower Respiratory [[id number]]  (Abnormal)  (Susceptibility) Collected: 04/15/25 0992    Order Status: Completed Specimen:  Respiratory from BAL Updated: 04/21/25 1516     Culture, Lower Respiratory Light Growth Stenotrophomonas maltophilia    AFB Smear Only [[id number]] Collected: 04/15/25 0943    Order Status: Completed Specimen: Respiratory from BAL Updated: 04/15/25 2200     AFB Smear No acid fast bacilli seen    Gram Stain [[id number]] Collected: 04/15/25 0943    Order Status: Completed Specimen: Respiratory from BAL Updated: 04/15/25 1249     Gram Stain Result Moderate WBC observed      Few WBC observed      Rare Gram positive cocci    Direct Prep (Other) Fungus [[id number]] Collected: 04/15/25 0943    Order Status: Completed Specimen: Respiratory from BAL Updated: 04/15/25 1249     Direct Prep No fungal elements seen    Culture, Fungus (Other) [[id number]] Collected: 04/15/25 0943    Order Status: Resulted Specimen: Respiratory from BAL Updated: 04/15/25 1123    Culture, AFB [[id number]] Collected: 04/15/25 0943    Order Status: Resulted Specimen: Respiratory from BAL Updated: 04/15/25 1110

## 2025-04-22 NOTE — ASSESSMENT & PLAN NOTE
"On Reglan and PPI for erosive gastritis; will monitor    EGD by Dr. Lo during recent past admission at [facility]:  "EGD on 03/21/2025 shows LA class B erosive esophagitis but no pill esophagitis, nonerosive gastritis and retention of food and fluid suspicious for gastroparesis, due to narcotics and diabetes. "    04/ 08 try additional meds to help gastric emptying.  04/09 taking in some now orally with recent changes meds  04/10 better and ate half [place] for lunch   04/14 tolerating enteral feedings so far  4/15 we will get PEG tube if patient is agreeable.  Daughter is agreeable.  Discussed with her  4/16 patient wants PEG tube  4/17 not candidate for surgical PEG.  NGT out, start full liquids  04/18 taking some orally  "

## 2025-04-22 NOTE — PROGRESS NOTES
Pharmacist Renal Dose Adjustment Note    Magan Saleem is a 69 y.o. female being treated with the medication levaquin    Patient Data:    Vital Signs (Most Recent):  Temp: 97.6 °F (36.4 °C) (04/22/25 0828)  Pulse: 88 (04/22/25 0828)  Resp: 18 (04/22/25 0828)  BP: 119/68 (04/22/25 0828)  SpO2: 97 % (04/22/25 0828) Vital Signs (72h Range):  Temp:  [96.8 °F (36 °C)-98.2 °F (36.8 °C)]   Pulse:  []   Resp:  [16-24]   BP: ()/(39-82)   SpO2:  [92 %-100 %]      Recent Labs   Lab 04/20/25  0554 04/21/25  0439 04/22/25  0524   CREATININE 3.33* 3.96* 3.85*     Serum creatinine: 3.85 mg/dL (H) 04/22/25 0524  Estimated creatinine clearance: 19.6 mL/min (A)    Medication:levaquin dose: 750 mg frequency q48hrs will be changed to medication:levaquin dose:500 mg frequency:q48hrs    Pharmacist's Name: Rodríguez [place]  Pharmacist's Extension: [phone]

## 2025-04-22 NOTE — ASSESSMENT & PLAN NOTE
Hyponatremia is likely due to renal insufficiency. The patient's most recent sodium results are listed below.  Recent Labs     04/19/25  0336 04/20/25  0554 04/21/25  0439   * 132* 132*     Plan  - Correct the sodium by 4-6mEq in 24 hours.   - Obtain the following studies:  In a.  - Will treat the hyponatremia with continue LR  - Monitor sodium Daily.   - Patient hyponatremia is stable  - follow  Follow

## 2025-04-22 NOTE — PLAN OF CARE
Problem: Gas Exchange Impaired  Goal: Optimal Gas Exchange  4/22/2025 0717 by Estelita Tinoco, RRT  Outcome: Progressing  4/22/2025 0717 by Estelita Tinoco, RRT  Outcome: Progressing     Problem: Airway Clearance Ineffective  Goal: Effective Airway Clearance  Outcome: Progressing

## 2025-04-22 NOTE — ASSESSMENT & PLAN NOTE
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 Most recent hemoglobin and hematocrit are listed below.  Recent Labs     04/21/25  0439   HGB 9.6*   HCT 29.3*     Plan  - Monitor serial CBC: Daily  - Transfuse PRBC if patient becomes hemodynamically unstable, symptomatic or H/H drops below 7/21.  - Patient has not received any PRBC transfusions to date  - Patient's anemia is currently stable  - follow  No evidence of bleeding

## 2025-04-22 NOTE — PT/OT/SLP PROGRESS
Physical Therapy Treatment    Patient Name:  Magan Saleem   MRN:  [id number]    Recommendations:     Discharge Recommendations: Moderate Intensity Therapy  Discharge Equipment Recommendations: to be determined by next level of care  Barriers to discharge:  ongoing medical treatment    Assessment:     Magan Saleem is a 69 y.o. female admitted with a medical diagnosis of Sepsis due to pneumonia.  She presents with the following impairments/functional limitations: weakness, impaired endurance, impaired self care skills, impaired functional mobility, decreased lower extremity function     Rehab Prognosis: Good; patient would benefit from acute skilled PT services to address these deficits and reach maximum level of function.    Recent Surgery: * No surgery found *      Plan:     During this hospitalization, patient to be seen 5 x/week to address the identified rehab impairments via gait training, therapeutic activities, therapeutic exercises, neuromuscular re-education and progress toward the following goals:    Plan of Care Expires:  05/04/25    Subjective     Chief Complaint: fatigue; sob  Patient/Family Comments/goals: agreeable to exercise  Pain/Comfort:  Pain Rating 1: 0/10      Objective:     Communicated with nurse prior to session.  Patient found HOB elevated with oxygen, telemetry, SCD upon PT entry to room.     General Precautions: Standard, fall, contact  Orthopedic Precautions: N/A  Braces: N/A  Respiratory Status: Nasal cannula, flow 2 L/min     Functional Mobility:  NT      AM-PAC 6 CLICK MOBILITY  Turning over in bed (including adjusting bedclothes, sheets and blankets)?: 2  Sitting down on and standing up from a chair with arms (e.g., wheelchair, bedside commode, etc.): 1  Moving from lying on back to sitting on the side of the bed?: 2  Moving to and from a bed to a chair (including a wheelchair)?: 1  Need to walk in hospital room?: 1  Climbing 3-5 steps with a railing?: 1  Basic Mobility Total Score:  8       Treatment & Education:  -AP, QS, Hip add/abduction, LE flexion/ext; all 3x10 repetitions BLE      Patient left HOB elevated with all lines intact, call button in reach, and nurse notified..    GOALS:   Multidisciplinary Problems       Physical Therapy Goals          Problem: Physical Therapy    Goal Priority Disciplines Outcome Interventions   Physical Therapy Goal     PT, PT/OT Progressing    Description: Short term goals:  1. Supine to sit with MInimal Assistance  2. Sit to stand transfer with Moderate Assistance  3. Bed to chair transfer with Moderate Assistance using Rolling Walker  4. Sitting at edge of bed x15 minutes with Contact Guard Assistance    Long term goals:  1. Supine to sit with Contact Guard Assistance  2. Sit to stand transfer with Contact Guard Assistance  3. Bed to chair transfer with Contact Guard Assistance using Rolling Walker  4. Gait  x 100 feet with Contact Guard Assistance using Rolling Walker.                          DME Justifications:      Time Tracking:     PT Received On: 04/22/25  PT Start Time: 1010     PT Stop Time: 1027  PT Total Time (min): 17 min     Billable Minutes: Therapeutic Exercise 17    Treatment Type: Treatment  PT/PTA: PT     Number of PTA visits since last PT visit: 0     04/22/2025

## 2025-04-22 NOTE — PT/OT/SLP PROGRESS
Occupational Therapy   Treatment    Name: Magan Saleem  MRN: [id number]  Admitting Diagnosis:  Sepsis due to pneumonia       Recommendations:     Discharge Recommendations: Low Intensity Therapy  Discharge Equipment Recommendations:  to be determined by next level of care  Barriers to discharge:       Assessment:     Magan Saleem is a 69 y.o. female with a medical diagnosis of Sepsis due to pneumonia.  She presents with weakness and decline in ADLs. Performance deficits affecting function are weakness, decreased upper extremity function, impaired endurance, impaired balance, decreased safety awareness, impaired self care skills, impaired functional mobility, decreased coordination, impaired cardiopulmonary response to activity.     Rehab Prognosis:  Fair; patient would benefit from acute skilled OT services to address these deficits and reach maximum level of function.       Plan:     Patient to be seen 5 x/week to address the above listed problems via self-care/home management, therapeutic exercises, therapeutic activities  Plan of Care Expires:    Plan of Care Reviewed with: patient    Subjective     Chief Complaint: sepsis   Patient/Family Comments/goals: pt agreeable to OT tx  Pain/Comfort:  Pain Rating 1: 0/10    Objective:     Communicated with: LONDON Banerjee prior to session.  Patient found HOB elevated with oxygen, PureWick, PICC line upon OT entry to room.    General Precautions: Standard, contact, fall    Orthopedic Precautions:N/A  Braces: N/A  Respiratory Status: Nasal cannula, flow 2 L/min     Occupational Performance:     Bed Mobility:    Not performed     Functional Mobility/Transfers:  Not performed    Activities of Daily Living:  Not performed      [facility] 6 Click ADL:      Treatment & Education:  Pt performed x 15 reps each AAROM shoulder flexion, chest press, IR/ER, and elbow flexion/extension in order to improve BUE strength and endurance to perform ADL and ADL t/f with less assist.     Patient left  HOB elevated with all lines intact, call button in reach, and LONDON Banerjee notified    GOALS:   Multidisciplinary Problems       Occupational Therapy Goals          Problem: Occupational Therapy    Goal Priority Disciplines Outcome Interventions   Occupational Therapy Goal     OT, PT/OT Progressing    Description: STG:  Pt will perform grooming with setup  Pt will bathe with Min A  Pt will perform UE dressing with Min A  Pt will perform LE dressing with Mod A  Pt will sit EOB x 10 min with CG assistance  Pt will transfer bed/chair/bsc with Mod A  Pt will perform standing task x 3 min with CG assistance  Pt will tolerate 30 minutes of tx without fatigue      LT.Restore to max I with self care and mobility.                         DME Justifications:  No DME recommended requiring DME justifications    Time Tracking:     OT Date of Treatment: 25  OT Start Time: 1348  OT Stop Time: 1400  OT Total Time (min): 12 min    Billable Minutes:Therapeutic Exercise 12 minutes    OT/ASROJ: OT     Number of SAROJ visits since last OT visit: 1    2025

## 2025-04-22 NOTE — PROGRESS NOTES
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  Patient was initially admitted to the hospital on April 4 would dehydration gastroparesis UTI.  She has not been able to eat previously in his now admitted to the hospital with concerns over nutrition since she has been in the hospital she has had shortness of breath and has developed worsening right-sided infiltrates.  Patient shortness of breath    Hospital/ICU Course:  4/14/25-asked to see by General Pulmonary for evaluation of possible EBUS bronchoscopy.  Patient with prior EBUS bronchoscopy performed 10/21/24 with evidence of malignancy present in station 4 L and station 4R.  Recent cessation of her EGFR TKI.    Interval History/Significant Events: no acute complaints, wet cough, generalized tiredness    Review of Systems  Objective:     Vital Signs (Most Recent):  Temp: 97.5 °F (36.4 °C) (04/22/25 1607)  Pulse: 93 (04/22/25 1607)  Resp: 18 (04/22/25 1607)  BP: 109/63 (04/22/25 1607)  SpO2: 95 % (04/22/25 1607) Vital Signs (24h Range):  Temp:  [97.5 °F (36.4 °C)-97.6 °F (36.4 °C)] 97.5 °F (36.4 °C)  Pulse:  [87-97] 93  Resp:  [18-21] 18  SpO2:  [95 %-98 %] 95 %  BP: ()/(50-70) 109/63   Weight: (!) 136.1 kg (300 lb)  Body mass index is 49.92 kg/m².      Intake/Output Summary (Last 24 hours) at 4/22/2025 1657  Last data filed at 4/22/2025 0611  Gross per 24 hour   Intake 2028.75 ml   Output 0 ml   Net 2028.75 ml          Physical Exam  Constitutional:        General: She is not in acute distress.     Appearance: She is not toxic-appearing.   HENT:      Head: Normocephalic and atraumatic.   Eyes:      General: No scleral icterus.  Cardiovascular:      Rate and Rhythm: Normal rate and regular rhythm.      Heart sounds: Murmur heard.   Pulmonary:      Effort: Pulmonary effort is normal.      Breath sounds: Rhonchi present.   Skin:     General: Skin is warm.      Coloration: Skin is not jaundiced.   Neurological:      Mental Status: She is alert. Mental status is at baseline.            Vents:  Oxygen Concentration (%): 28 (04/22/25 1507)  Lines/Drains/Airways       Peripherally Inserted Central Catheter Line  Duration             PICC Double Lumen 04/17/25 1547 left brachial 5 days              Drain  Duration             Female External Urinary Catheter w/ Suction 04/21/25 1950 <1 day                  Significant Labs:    CBC/Anemia Profile:  Recent Labs   Lab 04/21/25  0439 04/22/25  0524   WBC 34.78* 28.13*   HGB 9.6* 8.3*   HCT 29.3* 25.9*    168   MCV 89.1 91.2   RDW 20.0* 20.9*        Chemistries:  Recent Labs   Lab 04/21/25  0439 04/22/25  0524   * 136   K 5.6* 5.7*    106   CO2 16* 18*   BUN 90* 100*   CREATININE 3.96* 3.85*   CALCIUM 9.1 8.2*   ALBUMIN  --  1.6*   PROT  --  4.0*   BILITOT  --  0.7   ALKPHOS  --  145   ALT  --  17   AST  --  34   MG  --  2.3       All pertinent labs within the past 24 hours have been reviewed.    Significant Imaging:  I have reviewed all pertinent imaging results/findings within the past 24 hours.    ABG  Recent Labs   Lab 04/20/25  1146   PH 7.37   PO2 78*   PCO2 37   HCO3 21.4     Assessment/Plan:     Pulmonary  Pneumonitis  Initially concerned for ICI with pneumonitis. Concern for previous c/b pancreatitis as well. CXR with interval improvement. Now with infectious component as well. Given improvement and extrapulmonary organ involvement, plan on taper to 10 mg prednisone maintenance outpatient.   - cont  Prednisone 40 mg; taper ordered for 20 mg and finally 10 mg maintenance   -- if recurrence of symptoms with step down in therapy, step up to previous steroid dose and maintain without taper  - lowest tolerable dose of steroid planned, choice of taper course has included consideration of PJP ppx Rx need, none at present  - will consider PJP ppx if remains of prednisone 20 mg >3 weeks; Atovaquone for ppx  - grade 2 pneumonitis given clinical course, will require steroid therapy with future ICI after resolution of symptoms, feasible for rechallenge   -- literature reviewed with 1% incidence of pneumonitis during study    Renal/  Acute on chronic kidney failure  JOHNATHON progressing with ongoing hyperkalemia. Medications reviewed. Stopping continuous IVF with clinical and lab evidence of progressive edema. Will monitor BMP daily and assess for SCr stability. Concern for cardiorenal syndrome and will consider diuresis 04/23.       ID  Infection due to Stenotrophomonas maltophilia  70 yo F with immunosuppression from ongoing therapy for metastatic lung cancer now found to have Stenotrophomonas lower respiratory infection in this patient with multifocal infiltrates and consolidative opacities on CT Chest. Ideally, treatment with Bactrim and given severity of illness and immunosuppression, dual therapy with Levaquin. This is limited by ongoing JOHNATHON with hyperkalemia. Will plan on treatment with Levaquin for a goal 14 day course with Pharmacy assisting with renal dosing. Erring on side of caution given degree of illness and will recommend against minocycline treatment at this time for side effect profile and will consider for recurrent infection after this treatment course.          Bea York MD  Critical Care Medicine  [facility]

## 2025-04-22 NOTE — ASSESSMENT & PLAN NOTE
Vitals:    04/20/25 1952 04/20/25 2358 04/21/25 0436 04/21/25 0748   BP: (!) 98/50 101/61 117/80 104/67    04/21/25 1131 04/21/25 1132 04/21/25 1149 04/21/25 1149   BP: (!) 81/51 (!) 83/50 108/61 114/65    04/21/25 1648 04/21/25 1950   BP: (!) 83/50 (!) 89/53         Monitor BP while on Metoprolol; on IVF; off Losartan

## 2025-04-22 NOTE — PT/OT/SLP EVAL
Speech Language Pathology Evaluation  Bedside Swallow    Patient Name:  Magan Saleem   MRN:  [id number]  Admitting Diagnosis: Sepsis due to pneumonia    Recommendations:                 General Recommendations:   Patient will need assistance with all PO intake.   Diet recommendations:  Other (Comment) (Unable to recommend a solid diet level secondary to pt did not participate.), Full liquids   Aspiration Precautions:  Give meds slowly, 1 bite/sip at a time, Assistance with meals, HOB to 90 degrees, Meds crushed in puree, Remain upright 30 minutes post meal, Small bites/sips, and Standard aspiration precautions   General Precautions: Standard   Communication strategies:  none    Assessment:     Magan Saleem is a 69 y.o. female with an SLP diagnosis of  possible dysphagia .  She presents with passing her BEDSIDE SWALLOW EVALUATION with thin liquids.    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 Swanson    Melanocytic nevi of lower limb, including hip, left     Mixed hyperlipidemia     Moderate persistent asthma without complication 10/30/2024    Other pulmonary embolism without acute cor pulmonale     Vitamin D deficiency        Past Surgical History:   Procedure Laterality Date    CHOLECYSTECTOMY      CSF SHUNT      HYSTERECTOMY      TONSILLECTOMY         Social History: Patient planning to d/c to nursing home.    Prior Intubation HX:  n/a    Modified Barium Swallow: n/a    Chest X-Rays: see  chart    Prior diet: full liquid diet.    Occupation/hobbies/homemaking: not stated.    Subjective     Patient lying in bed awake. Patient agreeable to BEDSIDE SWALLOW EVALUATION.  Patient goals: not stated     Pain/Comfort:  Pain Rating 1: 0/10 (No pain reported by pt during BSE.)    Respiratory Status: Nasal cannula, flow 2 L/min    Objective:     Oral Musculature Evaluation  Oral Musculature: WFL  Dentition: edentulous, other (see comments) (Pt reports that she has dentures; however, they are not with her.)  Secretion Management: adequate  Mucosal Quality: adequate  Oral Labial Strength and Mobility: WFL  Lingual Strength and Mobility: WF    Bedside Swallow Eval:   Consistencies Assessed:  Thin liquids Patient was given small trials of water, tomato soup, and Boost via a spoon and straw. No difficulties noted.    Patient expressed that she dislikes applesauce, yogurt, and pudding. Patient also states she feels like she is being rushed when given her meds. Patient has a poor appetite, but expresses that she does like tomato soup and chicken soup. All of this info was discussed with patient's nurse (Lavonne).    Oral Phase:   WFL    Pharyngeal Phase:   no overt clinical signs/symptoms of aspiration  no overt clinical signs/symptoms of pharyngeal dysphagia    Compensatory Strategies  None    Treatment: Recommend liquids and soft foods as tolerated. Patient needs assistance with all PO intake. Patient requires increased time to take meds.     Goals:   Multidisciplinary Problems       SLP Goals       Not on file                    Plan:     Patient to be seen:      Plan of Care expires:     Plan of Care reviewed with:      SLP Follow-Up:  No       Discharge recommendations:      Barriers to Discharge:  Level of Skilled Assistance Needed nursing home care.     Time Tracking:     SLP Treatment Date:      Speech Start Time:  1248  Speech Stop Time:  1308     Speech Total Time (min):  20 min    Billable Minutes: Eval  Swallow and Oral Function 20 04/22/2025

## 2025-04-22 NOTE — ASSESSMENT & PLAN NOTE
JOHNATHON progressing with ongoing hyperkalemia. Medications reviewed. Stopping continuous IVF with clinical and lab evidence of progressive edema. Will monitor BMP daily and assess for SCr stability. Concern for cardiorenal syndrome.   - diuresis with Lasix IV x1  - f/u BMP

## 2025-04-22 NOTE — PLAN OF CARE
Problem: Adult Inpatient Plan of Care  Goal: Plan of Care Review  Outcome: Progressing     Problem: Adult Inpatient Plan of Care  Goal: Optimal Comfort and Wellbeing  Outcome: Progressing     Problem: Bariatric Environmental Safety  Goal: Safety Maintained with Care  Outcome: Progressing     Problem: Skin Injury Risk Increased  Goal: Skin Health and Integrity  Outcome: Progressing     Problem: Fall Injury Risk  Goal: Absence of Fall and Fall-Related Injury  Outcome: Progressing     Problem: Chronic Kidney Disease  Goal: Optimal Oral Intake  Outcome: Progressing     Problem: Diabetes Comorbidity  Goal: Blood Glucose Level Within Targeted Range  Outcome: Progressing

## 2025-04-22 NOTE — SUBJECTIVE & OBJECTIVE
Interval History/Significant Events: no acute complaints, wet cough, generalized tiredness    Review of Systems  Objective:     Vital Signs (Most Recent):  Temp: 97.5 °F (36.4 °C) (04/22/25 1607)  Pulse: 93 (04/22/25 1607)  Resp: 18 (04/22/25 1607)  BP: 109/63 (04/22/25 1607)  SpO2: 95 % (04/22/25 1607) Vital Signs (24h Range):  Temp:  [97.5 °F (36.4 °C)-97.6 °F (36.4 °C)] 97.5 °F (36.4 °C)  Pulse:  [87-97] 93  Resp:  [18-21] 18  SpO2:  [95 %-98 %] 95 %  BP: ()/(50-70) 109/63   Weight: (!) 136.1 kg (300 lb)  Body mass index is 49.92 kg/m².      Intake/Output Summary (Last 24 hours) at 4/22/2025 1657  Last data filed at 4/22/2025 0611  Gross per 24 hour   Intake 2028.75 ml   Output 0 ml   Net 2028.75 ml          Physical Exam  Constitutional:       General: She is not in acute distress.     Appearance: She is not toxic-appearing.   HENT:      Head: Normocephalic and atraumatic.   Eyes:      General: No scleral icterus.  Cardiovascular:      Rate and Rhythm: Normal rate and regular rhythm.      Heart sounds: Murmur heard.   Pulmonary:      Effort: Pulmonary effort is normal.      Breath sounds: Rhonchi present.   Skin:     General: Skin is warm.      Coloration: Skin is not jaundiced.   Neurological:      Mental Status: She is alert. Mental status is at baseline.            Vents:  Oxygen Concentration (%): 28 (04/22/25 1507)  Lines/Drains/Airways       Peripherally Inserted Central Catheter Line  Duration             PICC Double Lumen 04/17/25 1547 left brachial 5 days              Drain  Duration             Female External Urinary Catheter w/ Suction 04/21/25 1950 <1 day                  Significant Labs:    CBC/Anemia Profile:  Recent Labs   Lab 04/21/25  0439 04/22/25  0524   WBC 34.78* 28.13*   HGB 9.6* 8.3*   HCT 29.3* 25.9*    168   MCV 89.1 91.2   RDW 20.0* 20.9*        Chemistries:  Recent Labs   Lab 04/21/25  0439 04/22/25  0524   * 136   K 5.6* 5.7*    106   CO2 16* 18*   BUN 90*  100*   CREATININE 3.96* 3.85*   CALCIUM 9.1 8.2*   ALBUMIN  --  1.6*   PROT  --  4.0*   BILITOT  --  0.7   ALKPHOS  --  145   ALT  --  17   AST  --  34   MG  --  2.3       All pertinent labs within the past 24 hours have been reviewed.    Significant Imaging:  I have reviewed all pertinent imaging results/findings within the past 24 hours.

## 2025-04-22 NOTE — ASSESSMENT & PLAN NOTE
Initially concerned for ICI with pneumonitis. Concern for previous c/b pancreatitis as well. CXR with interval improvement. Now with infectious component as well. Given improvement and extrapulmonary organ involvement, plan on taper to 10 mg prednisone maintenance outpatient.   - cont Prednisone 40 mg; taper ordered for 20 mg and finally 10 mg maintenance   -- if recurrence of symptoms with step down in therapy, step up to previous steroid dose and maintain without taper  - lowest tolerable dose of steroid planned, choice of taper course has included consideration of PJP ppx Rx need, none at present  - will consider PJP ppx if remains of prednisone 20 mg >3 weeks; Atovaquone for ppx  - grade 2 pneumonitis given clinical course, will require steroid therapy with future ICI after resolution of symptoms, feasible for rechallenge   -- literature reviewed with 1% incidence of pneumonitis during study

## 2025-04-22 NOTE — PROGRESS NOTES
[facility]  [facility] Medicine  Progress Note    Patient Name: Magan Saleem  MRN: [id number]  Patient Class: IP- Inpatient   Admission Date: 4/3/2025  Length of Stay: 18 days  Attending Physician: Andriy Rizvi MD  Primary Care Provider: Sil Brown DO        Subjective     Principal Problem:Sepsis due to pneumonia        HPI:  68 yo F presents to [place] ED from [facility] for abnormal labs.  Patient was discharged from [facility] two days ago to skilled nursing facility for rehab.  She was diagnosed with dehydration due to gastroparesis with UTI.  Patient has metastatic lung cancer (to the brain) and follows with Dr. Swanson for IV chemotherapy every other week and takes lazertinib daily.  She has completed her course of radiation for the brain mets and follow had showed some improvement.  I thought she had either some decreased LOC due to encephalopathy or some aphasia from the brain mets but after a great effort to get her to talk, I realized she was just mad.  She wanted to know why she had been discharged two days ago when she could not eat.  She has no appetite and early satiety.  She is not nauseated and no vomiting.  She has decided on a DNR status previously (her caretaker and friend of 20 years) states that she had always said she did not want resuscitation if she experienced cardiopulmonary arrest and had told her children her wishes but she does want treatment and is not opposed to J tube if needed.  She has not had anything to eat or drink in two days and is dehydrated again.      Patient was transferred because of concern of sepsis.  She did have enterococcus faecalis UTI at [facility] and was treated.  I do not have the culture results but cultures were sent and patient does have some yeast noted.  (Will not treat a yeast colonization).  She is afebrile and hemodynamically stable and does not look septic clinically.  Her Lactic acid is stable at 2.5 dara 3.2.  Will check  another in am after volume resuscitation.  Her creat is at baseline but she has prerenal azotemia further confirming the dehydration.  Patient has an IO in place from [facility] due to dehydration and difficult stick but with some volume resuscitation, we have gotten an IV.  She is covid and flu negative.      Her WBC is 29 and I am not sure if she has received neupogen but could be a stress reaction.  Her BS is 245 and she does have some urine ketones but most likely due to starvation ketosis.  Will check a serum ketone.  Her platelets are 88K but this is most likely from her chemo.  She is not anemic.  CXR shows some patchy infiltrate but no obvious obstructive pneumonia from her lung cancer.  Her BNP about 3K but no clinical signs of CHF.  No recent echo but due to her BMI 50 most likely has some PHTN.  EKG had no ischemic changes but tachy at 114 which could also be from dehydration.  She was on ozempic for her DM and this could be the cause of her decreased appetite.  She is also on decadron for prevention of cerebral edema.      Remainder of ROS as below.  She mostly just nods and shakes her head and rolls her eyes.  You can get her to laugh if you try but she has been depressed since she has not walked since her hospitalization at [facility] on 3/19/25 and was discharged two days ago.  She says that at her last chemo she noticed she was getting weaker and her daughter had to help her in and out of the car and that was new for her.      See assessment and plan below for problem based evaluation       Overview/Hospital Course:  69 year old female presents with hypernatremia; she is not too talkative during rounds    4/5- patient seen examined today resting comfortably in bed and in no acute distress, with no acute events overnight.  Patient has a multitude of issues complicating her medical picture.  White blood cell count 98218 today, sodium 159, potassium 3.2 and BUN creatinine 59 and 1.8.  The patient is still not eating  even when assistance is provided.  We have already changed her fluids to half-normal saline with 20 of KCl at 1:25 a.m..  Have also put in a GI consult for possible feeding tube.  E coli in the urine has turned out to be ESBL and Rocephin has been changed Merrem.    4/6- the patient seen examined today resting comfortably in bed, in no acute distress, in no acute events overnight.  The patient is not very talkative today, but states she is doing okay.  She has no acute complaints.  Blood cultures remain negative.  The patient has not eaten since yesterday and is on light IV fluids.  GI has been consulted for feeding tube.  Continues on Merrem for positive urine culture.    04/07 Records reviewed. Not eating which not new, Similar during recent admit at [facility]. Dr Swanson there had question pancreatitis. No abd pain or tenderness reported now. Question of dysphagia to solids. Chart hx gastroparesis. Will discuss with GI. Ronnie says has responded so far well to treatment for lung CA.   04/08 had EGD at [facility] 03/21/25 with no obstruction and evidence gastroparesis. Still not ending. Try additional meds. Talked with GI. Increase activity  04/09 Some better with med  changes  04/10 Continues to do better. Ate 1/2 fish sandwich for lunch. Called by Dr Swanson and she wanting to keep feeding tube in consideration. Talked with Dr Reich and Dr Lemus. If something needed with gastroparesis would have to have post gastric position of tube.   04/11 eating some. Later staff requested some nystatin for sore mouth.   04/12 still not eating well.  Increased peripheral edema.  Albumin low at 1.5.  Will give some albumin and follow with some Lasix.  Placed Dobbhoff tube and start enteral feedings.  This will help with nutrition and if issue of placing surgical tube later make sure will tolerate enteral feedings.  Chest x-ray continues worse on left side.  Discuss with Pulmonary and ask Dr. Villasenor to see.   04/13 no new issues.   Enteral feedings to be started.  Pulmonary evaluation appreciated and plans noted.  04/14 Tolerating feedings Therapy working with her. Bronchoscopy planned.   4/15 BAL today  4/16 bronch yesterday looks like pneumonitis  4/17 not candidate for PEG    04/18 Eating some now. Pt says feels some better than last seen. Look at placement. High dose steroids by pulmonary. BS not as bad as would expect.  04/19 states continued eat some.  Less nausea.  Blood sugar not sure bad considering the steroids.  Pulmonary adjusted antibiotics based on cultures.  Look at it placement next week.   04/20 Looks the small. C/O SOB to nursing staff earlier but ABG OK. Poor oral intake ; encourage for pt to do more. Looking into placement.  04/21 Firm tender area in abd wall above umbilicus; ?hernia. Abnormal CT de santiago noted and will discuss with surgery.     Interval History:     Review of Systems   Constitutional:  Negative for appetite change, fatigue and fever.   HENT:  Negative for congestion and hearing loss.    Respiratory:  Negative for chest tightness and wheezing.    Cardiovascular:  Negative for chest pain and palpitations.   Gastrointestinal:  Negative for abdominal pain, constipation and nausea.   Genitourinary:  Negative for difficulty urinating and dysuria.   Musculoskeletal:  Positive for gait problem. Negative for back pain and neck stiffness.   Skin:  Negative for pallor and rash.   Neurological:  Negative for dizziness, speech difficulty and headaches.   Psychiatric/Behavioral:  Positive for sleep disturbance. Negative for confusion and suicidal ideas.      Objective:     Vital Signs (Most Recent):  Temp: 97.5 °F (36.4 °C) (04/21/25 1950)  Pulse: 97 (04/21/25 2044)  Resp: 20 (04/21/25 2044)  BP: (!) 89/53 (04/21/25 1950)  SpO2: 98 % (04/21/25 2044) Vital Signs (24h Range):  Temp:  [97 °F (36.1 °C)-98.2 °F (36.8 °C)] 97.5 °F (36.4 °C)  Pulse:  [] 97  Resp:  [18-24] 20  SpO2:  [92 %-100 %] 98 %  BP: ()/(50-80)  89/53     Weight: (!) 136.1 kg (300 lb)  Body mass index is 49.92 kg/m².  No intake or output data in the 24 hours ending 04/21/25 2214        Physical Exam  Vitals reviewed.   Constitutional:       General: She is awake. She is not in acute distress.     Appearance: She is well-developed. She is morbidly obese. She is not toxic-appearing.   HENT:      Head: Normocephalic.      Nose: Nose normal.      Mouth/Throat:      Pharynx: Oropharynx is clear.   Eyes:      Extraocular Movements: Extraocular movements intact.      Pupils: Pupils are equal, round, and reactive to light.   Neck:      Thyroid: No thyroid mass.      Vascular: No carotid bruit.   Cardiovascular:      Rate and Rhythm: Normal rate and regular rhythm.      Pulses: Normal pulses.      Heart sounds: Normal heart sounds. No murmur heard.  Pulmonary:      Effort: Pulmonary effort is normal.      Breath sounds: Normal breath sounds and air entry. No wheezing.   Abdominal:      General: Bowel sounds are normal. There is no distension.      Palpations: Abdomen is soft.      Tenderness: There is no abdominal tenderness.   Musculoskeletal:         General: Normal range of motion.      Cervical back: Neck supple. No rigidity.   Skin:     General: Skin is warm.      Coloration: Skin is not jaundiced.      Findings: No lesion.   Neurological:      General: No focal deficit present.      Mental Status: She is alert and oriented to person, place, and time.      Cranial Nerves: No cranial nerve deficit.   Psychiatric:         Attention and Perception: Attention normal.         Mood and Affect: Mood normal.         Behavior: Behavior normal. Behavior is cooperative.         Thought Content: Thought content normal.         Cognition and Memory: Cognition normal.               Significant Labs: All pertinent labs within the past 24 hours have been reviewed.  BMP:   Recent Labs   Lab 04/21/25  0439      *   K 5.6*      CO2 16*   BUN 90*   CREATININE  3.96*   CALCIUM 9.1       CBC:   Recent Labs   Lab 04/21/25  0439   WBC 34.78*   HGB 9.6*   HCT 29.3*            CMP:   Recent Labs   Lab 04/20/25  0554 04/21/25  0439   * 132*   K 5.2* 5.6*    102   CO2 20* 16*   * 109   BUN 82* 90*   CREATININE 3.33* 3.96*   CALCIUM 9.1 9.1   ANIONGAP 16 20*         Significant Imaging: I have reviewed all pertinent imaging results/findings within the past 24 hours.      Intake/Output - Last 3 Shifts         04/20 0700 04/21 0659 04/21 0700 04/22 0659    P.O. 100     Total Intake(mL/kg) 100 (0.7)     Net +100           Urine Occurrence  1 x          Microbiology Results (last 7 days)       Procedure Component Value Units Date/Time    Culture, Lower Respiratory [[id number]]  (Abnormal)  (Susceptibility) Collected: 04/15/25 0943    Order Status: Completed Specimen: Respiratory from BAL Updated: 04/21/25 1516     Culture, Lower Respiratory Light Growth Stenotrophomonas maltophilia    AFB Smear Only [[id number]] Collected: 04/15/25 0943    Order Status: Completed Specimen: Respiratory from BAL Updated: 04/15/25 2200     AFB Smear No acid fast bacilli seen    Gram Stain [[id number]] Collected: 04/15/25 0943    Order Status: Completed Specimen: Respiratory from BAL Updated: 04/15/25 1249     Gram Stain Result Moderate WBC observed      Few WBC observed      Rare Gram positive cocci    Direct Prep (Other) Fungus [[id number]] Collected: 04/15/25 0943    Order Status: Completed Specimen: Respiratory from BAL Updated: 04/15/25 1249     Direct Prep No fungal elements seen    Culture, Fungus (Other) [[id number]] Collected: 04/15/25 0943    Order Status: Resulted Specimen: Respiratory from BAL Updated: 04/15/25 1123    Culture, AFB [[id number]] Collected: 04/15/25 0943    Order Status: Resulted Specimen: Respiratory from BAL Updated: 04/15/25 1110                Assessment & Plan  Sepsis due to pneumonia  Appears to have UTI and pneumonia; has leukocytosis; has  opacities on CXR; has G- bacilli on urine culture; blood cultures pending; will place on Vancomycin and Rocephin; BP on the low side; on IVF    04/12 worsening chest x-ray, ask Pulmonary to review  4/15 plan for BAL today.  Continue antibiotic  4/16 7 days antibiotic  completed  Hypernatremia  Due to dehydration; off diuretics; on IVF; will monitor Na levels  Lung cancer metastatic to brain  DNR but not hospice.  Receives chemo and has finished radiation.    04/07 reported stable / improved after treatment  04/09 more fuffy right side CXR  04/12 continue to abnormality on chest x-ray and ask Pulmonary to review  4/15 follows with Dr. Swanson.  No more chemotherapy treatments until patient is stronger.  Discussed with Dr. Swanson  4/16 hold treatments for now  04/20 brain lesion prior treated and I told been stable    Thrombocytopenia  On chemo; will monitor  04/19 platelet count 149 wishes improved    Essential hypertension  Vitals:    04/20/25 1952 04/20/25 2358 04/21/25 0436 04/21/25 0748   BP: (!) 98/50 101/61 117/80 104/67    04/21/25 1131 04/21/25 1132 04/21/25 1149 04/21/25 1149   BP: (!) 81/51 (!) 83/50 108/61 114/65    04/21/25 1648 04/21/25 1950   BP: (!) 83/50 (!) 89/53         Monitor BP while on Metoprolol; on IVF; off Losartan    Moderate persistent asthma without complication  Continue home inhaled steroid/bronchodilator and singulair    Chronic kidney disease, stage 3b  Creatine stable     04/20 Cr recently been climbing. May need some IVF. Check BNP in am  Chronic pulmonary embolism without acute cor pulmonale  Eliquis was discontinued due to risk of ICH with brain mets but they have improved so the anti-coagulation has been restarted; will monitor  04/07 apparently this of several years ago    Type 2 diabetes mellitus with hyperglycemia  BS elevated; will increase Lantus to 25 units; continue SSI;monitor BS     Gastroparesis  On Reglan and PPI for erosive gastritis; will monitor    EGD by Dr. Lo  "during recent past admission at [facility]:  "EGD on 03/21/2025 shows LA class B erosive esophagitis but no pill esophagitis, nonerosive gastritis and retention of food and fluid suspicious for gastroparesis, due to narcotics and diabetes. "    04/ 08 try additional meds to help gastric emptying.  04/09 taking in some now orally with recent changes meds  04/10 better and ate half [place] for lunch   04/14 tolerating enteral feedings so far  4/15 we will get PEG tube if patient is agreeable.  Daughter is agreeable.  Discussed with her  4/16 patient wants PEG tube  4/17 not candidate for surgical PEG.  NGT out, start full liquids  04/18 taking some orally  Morbid obesity  Body mass index is 49.92 kg/m². Morbid obesity complicates all aspects of disease management from diagnostic modalities to treatment. Weight loss encouraged and health benefits explained to patient.     Would benefit from sleep study and possible CPAP.  Does not wear oxygen at home.      Edema due to hypoalbuminemia    04/12 add IV albumin and some Lasix  Start enteral feedings  04/13 start enteral feedings  Abnormal chest x-ray    04/13 addressed by Pulmonary with further investigation plans  Anemia  Anemia is likely due to chronic disease due to Malignancy. Most recent hemoglobin and hematocrit are listed below.  Recent Labs     04/21/25  0439   HGB 9.6*   HCT 29.3*     Plan  - Monitor serial CBC: Daily  - Transfuse PRBC if patient becomes hemodynamically unstable, symptomatic or H/H drops below 7/21.  - Patient has not received any PRBC transfusions to date  - Patient's anemia is currently stable  - follow  No evidence of bleeding  Hyponatremia  Hyponatremia is likely due to renal insufficiency. The patient's most recent sodium results are listed below.  Recent Labs     04/19/25  0336 04/20/25  0554 04/21/25  0439   * 132* 132*     Plan  - Correct the sodium by 4-6mEq in 24 hours.   - Obtain the following studies:  In a.  - Will " treat the hyponatremia with continue LR  - Monitor sodium Daily.   - Patient hyponatremia is stable  - follow  Follow  Neoplastic (malignant) related fatigue    Progressive mobility protocol  Pneumonitis  IV steroids per pulmonology  04/19 antibiotic adjustment based on cultures by Pulmonary  Dehydration, severe  Encourage orals    Acute pancreatitis    04/21 Abd fluid collection in area pancreas noted on CT abd, discuss with surgery  VTE Risk Mitigation (From admission, onward)      None            Discharge Planning   MITUL:      Code Status: DNR   Medical Readiness for Discharge Date:   Discharge Plan A: Home with family                Please place Justification for DME        Andriy Rizvi MD  Department of Hospital Medicine   [facility]

## 2025-04-23 PROBLEM — E87.5 HYPERKALEMIA: Status: ACTIVE | Noted: 2025-04-23

## 2025-04-23 LAB
ANION GAP SERPL CALCULATED.3IONS-SCNC: 19 MMOL/L (ref 7–16)
ANISOCYTOSIS BLD QL SMEAR: ABNORMAL
BASOPHILS # BLD AUTO: 0.04 K/UL (ref 0–0.2)
BASOPHILS NFR BLD AUTO: 0.2 % (ref 0–1)
BUN SERPL-MCNC: 99 MG/DL (ref 10–20)
BUN/CREAT SERPL: 26 (ref 6–20)
CALCIUM SERPL-MCNC: 8.3 MG/DL (ref 8.4–10.2)
CHLORIDE SERPL-SCNC: 108 MMOL/L (ref 98–107)
CO2 SERPL-SCNC: 16 MMOL/L (ref 23–31)
CREAT SERPL-MCNC: 3.81 MG/DL (ref 0.55–1.02)
DIFFERENTIAL METHOD BLD: ABNORMAL
EGFR (NO RACE VARIABLE) (RUSH/TITUS): 12 ML/MIN/1.73M2
EOSINOPHIL # BLD AUTO: 0.03 K/UL (ref 0–0.5)
EOSINOPHIL NFR BLD AUTO: 0.1 % (ref 1–4)
ERYTHROCYTE [DISTWIDTH] IN BLOOD BY AUTOMATED COUNT: 21.2 % (ref 11.5–14.5)
GLUCOSE SERPL-MCNC: 101 MG/DL (ref 70–105)
GLUCOSE SERPL-MCNC: 56 MG/DL (ref 82–115)
GLUCOSE SERPL-MCNC: 72 MG/DL (ref 70–105)
GLUCOSE SERPL-MCNC: 81 MG/DL (ref 70–105)
GLUCOSE SERPL-MCNC: 99 MG/DL (ref 70–105)
HCT VFR BLD AUTO: 26.8 % (ref 38–47)
HGB BLD-MCNC: 8.8 G/DL (ref 12–16)
IMM GRANULOCYTES # BLD AUTO: 0.96 K/UL (ref 0–0.04)
IMM GRANULOCYTES NFR BLD: 3.7 % (ref 0–0.4)
LYMPHOCYTES # BLD AUTO: 0.5 K/UL (ref 1–4.8)
LYMPHOCYTES NFR BLD AUTO: 1.9 % (ref 27–41)
LYMPHOCYTES NFR BLD MANUAL: 2 % (ref 27–41)
MCH RBC QN AUTO: 29.6 PG (ref 27–31)
MCHC RBC AUTO-ENTMCNC: 32.8 G/DL (ref 32–36)
MCV RBC AUTO: 90.2 FL (ref 80–96)
MONOCYTES # BLD AUTO: 1.04 K/UL (ref 0–0.8)
MONOCYTES NFR BLD AUTO: 4 % (ref 2–6)
MONOCYTES NFR BLD MANUAL: 6 % (ref 2–6)
MPC BLD CALC-MCNC: 12.2 FL (ref 9.4–12.4)
NEUTROPHILS # BLD AUTO: 23.56 K/UL (ref 1.8–7.7)
NEUTROPHILS NFR BLD AUTO: 90.1 % (ref 53–65)
NEUTS BAND NFR BLD MANUAL: 18 % (ref 1–5)
NEUTS SEG NFR BLD MANUAL: 74 % (ref 50–62)
NRBC # BLD AUTO: 0.2 X10E3/UL
NRBC BLD MANUAL-RTO: 1 /100 WBC
NRBC, AUTO (.00): 0.8 %
PLATELET # BLD AUTO: 160 K/UL (ref 150–400)
PLATELET MORPHOLOGY: ABNORMAL
POLYCHROMASIA BLD QL SMEAR: ABNORMAL
POTASSIUM SERPL-SCNC: 5.6 MMOL/L (ref 3.5–5.1)
RBC # BLD AUTO: 2.97 M/UL (ref 4.2–5.4)
SODIUM SERPL-SCNC: 137 MMOL/L (ref 136–145)
WBC # BLD AUTO: 26.13 K/UL (ref 4.5–11)

## 2025-04-23 PROCEDURE — 97530 THERAPEUTIC ACTIVITIES: CPT

## 2025-04-23 PROCEDURE — 25000242 PHARM REV CODE 250 ALT 637 W/ HCPCS: Performed by: HOSPITALIST

## 2025-04-23 PROCEDURE — 99900035 HC TECH TIME PER 15 MIN (STAT)

## 2025-04-23 PROCEDURE — 27000207 HC ISOLATION

## 2025-04-23 PROCEDURE — 27000221 HC OXYGEN, UP TO 24 HOURS

## 2025-04-23 PROCEDURE — 36415 COLL VENOUS BLD VENIPUNCTURE: CPT | Performed by: STUDENT IN AN ORGANIZED HEALTH CARE EDUCATION/TRAINING PROGRAM

## 2025-04-23 PROCEDURE — 25000003 PHARM REV CODE 250: Performed by: HOSPITALIST

## 2025-04-23 PROCEDURE — 25000003 PHARM REV CODE 250: Performed by: STUDENT IN AN ORGANIZED HEALTH CARE EDUCATION/TRAINING PROGRAM

## 2025-04-23 PROCEDURE — 82962 GLUCOSE BLOOD TEST: CPT

## 2025-04-23 PROCEDURE — 99233 SBSQ HOSP IP/OBS HIGH 50: CPT | Mod: ,,, | Performed by: STUDENT IN AN ORGANIZED HEALTH CARE EDUCATION/TRAINING PROGRAM

## 2025-04-23 PROCEDURE — 85025 COMPLETE CBC W/AUTO DIFF WBC: CPT | Performed by: HOSPITALIST

## 2025-04-23 PROCEDURE — 63600175 PHARM REV CODE 636 W HCPCS: Performed by: STUDENT IN AN ORGANIZED HEALTH CARE EDUCATION/TRAINING PROGRAM

## 2025-04-23 PROCEDURE — 80048 BASIC METABOLIC PNL TOTAL CA: CPT | Performed by: STUDENT IN AN ORGANIZED HEALTH CARE EDUCATION/TRAINING PROGRAM

## 2025-04-23 PROCEDURE — 94640 AIRWAY INHALATION TREATMENT: CPT

## 2025-04-23 PROCEDURE — 11000001 HC ACUTE MED/SURG PRIVATE ROOM

## 2025-04-23 PROCEDURE — 99233 SBSQ HOSP IP/OBS HIGH 50: CPT | Mod: ,,, | Performed by: HOSPITALIST

## 2025-04-23 PROCEDURE — 94761 N-INVAS EAR/PLS OXIMETRY MLT: CPT

## 2025-04-23 RX ORDER — SODIUM BICARBONATE 650 MG/1
650 TABLET ORAL 2 TIMES DAILY
Status: DISCONTINUED | OUTPATIENT
Start: 2025-04-23 | End: 2025-04-24

## 2025-04-23 RX ORDER — FUROSEMIDE 10 MG/ML
40 INJECTION INTRAMUSCULAR; INTRAVENOUS ONCE
Status: COMPLETED | OUTPATIENT
Start: 2025-04-23 | End: 2025-04-23

## 2025-04-23 RX ADMIN — PANTOPRAZOLE SODIUM 40 MG: 40 TABLET, DELAYED RELEASE ORAL at 08:04

## 2025-04-23 RX ADMIN — PREDNISONE 40 MG: 20 TABLET ORAL at 08:04

## 2025-04-23 RX ADMIN — SERTRALINE HYDROCHLORIDE 100 MG: 50 TABLET ORAL at 08:04

## 2025-04-23 RX ADMIN — Medication 2 CAPSULE: at 08:04

## 2025-04-23 RX ADMIN — SODIUM BICARBONATE 650 MG TABLET 650 MG: at 11:04

## 2025-04-23 RX ADMIN — BUDESONIDE 0.5 MG: 0.5 SUSPENSION RESPIRATORY (INHALATION) at 07:04

## 2025-04-23 RX ADMIN — FUROSEMIDE 40 MG: 10 INJECTION, SOLUTION INTRAVENOUS at 08:04

## 2025-04-23 RX ADMIN — LEVOFLOXACIN 500 MG: 500 TABLET, FILM COATED ORAL at 08:04

## 2025-04-23 RX ADMIN — IPRATROPIUM BROMIDE AND ALBUTEROL SULFATE 3 ML: 2.5; .5 SOLUTION RESPIRATORY (INHALATION) at 07:04

## 2025-04-23 RX ADMIN — IPRATROPIUM BROMIDE AND ALBUTEROL SULFATE 3 ML: 2.5; .5 SOLUTION RESPIRATORY (INHALATION) at 11:04

## 2025-04-23 RX ADMIN — METOCLOPRAMIDE HYDROCHLORIDE 10 MG: 5 TABLET ORAL at 06:04

## 2025-04-23 RX ADMIN — METOPROLOL SUCCINATE 25 MG: 25 TABLET, EXTENDED RELEASE ORAL at 08:04

## 2025-04-23 RX ADMIN — HYDROCODONE BITARTRATE AND ACETAMINOPHEN 1 TABLET: 7.5; 325 TABLET ORAL at 10:04

## 2025-04-23 RX ADMIN — IPRATROPIUM BROMIDE AND ALBUTEROL SULFATE 3 ML: 2.5; .5 SOLUTION RESPIRATORY (INHALATION) at 03:04

## 2025-04-23 NOTE — ASSESSMENT & PLAN NOTE
DNR but not hospice.  Receives chemo and has finished radiation.    04/07 reported stable / improved after treatment  04/09 more fuffy right side CXR  04/12 continue to abnormality on chest x-ray and ask Pulmonary to review  4/15 follows with Dr. Swasnon.  No more chemotherapy treatments until patient is stronger.  Discussed with Dr. Swanson  4/16 hold treatments for now  04/20 brain lesion prior treated and I told been stable

## 2025-04-23 NOTE — ASSESSMENT & PLAN NOTE
"On Reglan and PPI for erosive gastritis; will monitor    EGD by Dr. Lo during recent past admission at [facility]:  "EGD on 03/21/2025 shows LA class B erosive esophagitis but no pill esophagitis, nonerosive gastritis and retention of food and fluid suspicious for gastroparesis, due to narcotics and diabetes. "    04/ 08 try additional meds to help gastric emptying.  04/09 taking in some now orally with recent changes meds  04/10 better and ate half [place] for lunch   04/14 tolerating enteral feedings so far  4/15 we will get PEG tube if patient is agreeable.  Daughter is agreeable.  Discussed with her  4/16 patient wants PEG tube  4/17 not candidate for surgical PEG.  NGT out, start full liquids  04/18 taking some orally  "

## 2025-04-23 NOTE — PT/OT/SLP PROGRESS
"Occupational Therapy      Patient Name:  Magan Saleem   MRN:  [id number]    Patient not seen today secondary to Patient fatigue, Patient unwilling to participate (pt reports "this has been too much for too long." LONDON Banerjee notified.). Will follow-up 4/24/25.    4/23/2025  " supple

## 2025-04-23 NOTE — ASSESSMENT & PLAN NOTE
68 yo F with immunosuppression from ongoing therapy for metastatic lung cancer now found to have Stenotrophomonas lower respiratory infection in this patient with multifocal infiltrates and consolidative opacities on CT Chest. Ideally, treatment with Bactrim and given severity of illness and immunosuppression, dual therapy with Levaquin. This is limited by ongoing JOHNATHON with hyperkalemia. Will plan on treatment with Levaquin for a goal 14 day course with Pharmacy assisting with renal dosing. Erring on side of caution given degree of illness and will recommend against minocycline treatment at this time for side effect profile and will consider for recurrent infection after this treatment course.   - cont Levaquin, end date ordered

## 2025-04-23 NOTE — ASSESSMENT & PLAN NOTE
04/21 Abd fluid collection in area pancreas noted on CT abd, discuss with surgery  04/22 likely evolving pancreatitis dx acouple weeks ago at [facility].  No severe epigastric pain.

## 2025-04-23 NOTE — PROGRESS NOTES
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  Patient was initially admitted to the hospital on April 4 would dehydration gastroparesis UTI.  She has not been able to eat previously in his now admitted to the hospital with concerns over nutrition since she has been in the hospital she has had shortness of breath and has developed worsening right-sided infiltrates.      Hospital/ICU Course:  4/14/25-asked to see by General Pulmonary for evaluation of possible EBUS bronchoscopy.  Patient with prior EBUS bronchoscopy performed 10/21/24 with evidence of malignancy present in station 4 L and station 4R.  Recent cessation of her EGFR TKI.    Pulmonary re-engaged for lower respiratory infection and concern for pneumonitis.     Interval History/Significant Events: no acute complaints, stable renal function, persistent hyperkalemia    Review of Systems  Objective:     Vital Signs (Most Recent):  Temp: 98.6 °F (37 °C) (04/23/25 1609)  Pulse: 106 (04/23/25 1609)  Resp: (!) 22 (04/23/25 1609)  BP: (!) 101/52 (04/23/25 1609)  SpO2: (!) 94 % (04/23/25 1609) Vital Signs (24h Range):  Temp:  [98.1 °F (36.7 °C)-98.6 °F (37 °C)] 98.6 °F (37 °C)  Pulse:  [] 106  Resp:  [18-22] 22  SpO2:  [92 %-99 %] 94 %  BP: ()/(51-60) 101/52   Weight: (!) 136.1 kg (300 lb)  Body mass index is 49.92 kg/m².      Intake/Output Summary (Last 24 hours) at 4/23/2025 1830  Last data filed at 4/23/2025 1609  Gross per 24 hour   Intake --    Output 1500 ml   Net -1500 ml          Physical Exam  Constitutional:       General: She is not in acute distress.     Appearance: She is not toxic-appearing.   HENT:      Head: Normocephalic and atraumatic.   Eyes:      General: No scleral icterus.  Cardiovascular:      Rate and Rhythm: Normal rate and regular rhythm.      Heart sounds: Murmur heard.   Pulmonary:      Effort: Pulmonary effort is normal.      Breath sounds: Rhonchi present.   Skin:     General: Skin is warm.      Coloration: Skin is not jaundiced.   Neurological:      Mental Status: She is alert. Mental status is at baseline.            Vents:  Oxygen Concentration (%): 28 (04/23/25 1500)  Lines/Drains/Airways       Peripherally Inserted Central Catheter Line  Duration             PICC Double Lumen 04/17/25 1547 left brachial 6 days              Drain  Duration             Female External Urinary Catheter w/ Suction 04/21/25 1950 1 day                  Significant Labs:    CBC/Anemia Profile:  Recent Labs   Lab 04/22/25  0524 04/23/25  0532   WBC 28.13* 26.13*   HGB 8.3* 8.8*   HCT 25.9* 26.8*    160   MCV 91.2 90.2   RDW 20.9* 21.2*        Chemistries:  Recent Labs   Lab 04/22/25  0524 04/23/25  0531    137   K 5.7* 5.6*    108*   CO2 18* 16*   * 99*   CREATININE 3.85* 3.81*   CALCIUM 8.2* 8.3*   ALBUMIN 1.6*  --    PROT 4.0*  --    BILITOT 0.7  --    ALKPHOS 145  --    ALT 17  --    AST 34  --    MG 2.3  --        All pertinent labs within the past 24 hours have been reviewed.    Significant Imaging:  I have reviewed all pertinent imaging results/findings within the past 24 hours.    ABG  Recent Labs   Lab 04/20/25  1146   PH 7.37   PO2 78*   PCO2 37   HCO3 21.4     Assessment/Plan:     Pulmonary  Pneumonitis  Initially concerned for ICI with pneumonitis. Concern for previous c/b pancreatitis as well. CXR with interval improvement. Now with infectious component as well. Given improvement and extrapulmonary organ  involvement, plan on taper to 10 mg prednisone maintenance outpatient.   - cont Prednisone 40 mg; taper ordered for 20 mg and finally 10 mg maintenance   -- if recurrence of symptoms with step down in therapy, step up to previous steroid dose and maintain without taper  - lowest tolerable dose of steroid planned, choice of taper course has included consideration of PJP ppx Rx need, none at present  - will consider PJP ppx if remains of prednisone 20 mg >3 weeks; Atovaquone for ppx  - grade 2 pneumonitis given clinical course, will require steroid therapy with future ICI after resolution of symptoms, feasible for rechallenge   -- literature reviewed with 1% incidence of pneumonitis during study    Renal/  Acute on chronic kidney failure  JOHNATHON progressing with ongoing hyperkalemia. Medications reviewed. Stopping continuous IVF with clinical and lab evidence of progressive edema. Will monitor BMP daily and assess for SCr stability. Concern for cardiorenal syndrome.   - diuresis with Lasix IV x1  - f/u BMP      ID  Infection due to Stenotrophomonas maltophilia  70 yo F with immunosuppression from ongoing therapy for metastatic lung cancer now found to have Stenotrophomonas lower respiratory infection in this patient with multifocal infiltrates and consolidative opacities on CT Chest. Ideally, treatment with Bactrim and given severity of illness and immunosuppression, dual therapy with Levaquin. This is limited by ongoing JOHNATHON with hyperkalemia. Will plan on treatment with Levaquin for a goal 14 day course with Pharmacy assisting with renal dosing. Erring on side of caution given degree of illness and will recommend against minocycline treatment at this time for side effect profile and will consider for recurrent infection after this treatment course.   - cont Levaquin, end date ordered         June MD Jaylen  Critical Care Medicine  [facility]

## 2025-04-23 NOTE — PLAN OF CARE
Talked to Ninoska at [facility].  She noted that the updated records that were faxed yesterday did not come through.  Manually faxed DP 7 days to her at [phone].  Will continue to follow for dc needs.    0108  Notified Ninoska that Dr. Rizvi mentioned in SIBR rounds today that patient may be able to dc tomorrow.  Will continue to follow for dc planning.

## 2025-04-23 NOTE — PLAN OF CARE
Problem: Adult Inpatient Plan of Care  Goal: Plan of Care Review  Outcome: Progressing     Problem: Skin Injury Risk Increased  Goal: Skin Health and Integrity  Outcome: Progressing     Problem: Gas Exchange Impaired  Goal: Optimal Gas Exchange  Outcome: Progressing

## 2025-04-23 NOTE — RESPIRATORY THERAPY
Treatment given at 07:08 and 07:13 by RT student, Dalila Schuster.       04/23/25 0708   Patient Assessment/Suction   Level of Consciousness (AVPU) alert   Respiratory Effort Unlabored   Expansion/Accessory Muscles/Retractions no retractions;no use of accessory muscles   All Lung Fields Breath Sounds Anterior:;Lateral:;equal bilaterally;wheezes, expiratory;coarse;rhonchi   Rhythm/Pattern, Respiratory unlabored;pattern regular;depth regular;no shortness of breath reported   PRE-TX-O2   Device (Oxygen Therapy) nasal cannula with humidification   $ Is the patient on Low Flow Oxygen? Yes   Flow (L/min) (Oxygen Therapy) 2   Oxygen Concentration (%) 28   SpO2 95 %   Pulse Oximetry Type Intermittent   $ Pulse Oximetry - Multiple Charge Pulse Oximetry - Multiple   Pulse (!) 111   Resp (!) 22   Positioning   Body Position position maintained   Head of Bed (HOB) Positioning HOB elevated   Aerosol Therapy   $ Aerosol Therapy Charges Aerosol Treatment   Daily Review of Necessity (SVN) completed   Respiratory Treatment Status (SVN) given   Treatment Route (SVN) oxygen;mask   Patient Position HOB elevated   Post Treatment Assessment (SVN) breath sounds unchanged   Signs of Intolerance (SVN) none   Breath Sounds Post-Respiratory Treatment   Throughout All Fields Post-Treatment All Fields   Throughout All Fields Post-Treatment Anterior:;Lateral:;no change   Post-treatment Heart Rate (beats/min) 109   Post-treatment Resp Rate (breaths/min) 18   Respiratory Evaluation   $ Care Plan Tech Time 15 min

## 2025-04-23 NOTE — ASSESSMENT & PLAN NOTE
04/12 add IV albumin and some Lasix  Start enteral feedings  04/13 start enteral feedings  04/22 albumin 1.6

## 2025-04-23 NOTE — ASSESSMENT & PLAN NOTE
Hyponatremia is likely due to renal insufficiency. The patient's most recent sodium results are listed below.  Recent Labs     04/20/25  0554 04/21/25  0439 04/22/25  0524   * 132* 136     Plan  - Correct the sodium by 4-6mEq in 24 hours.   - Obtain the following studies:  In a.  - Will treat the hyponatremia with continue LR  - Monitor sodium Daily.   - Patient hyponatremia is stable  - follow  Follow

## 2025-04-23 NOTE — RESPIRATORY THERAPY
Treatment given by RT student, Dalila Schuster.       04/23/25 1500   Patient Assessment/Suction   Level of Consciousness (AVPU) alert   Respiratory Effort Unlabored   Expansion/Accessory Muscles/Retractions no retractions;no use of accessory muscles   All Lung Fields Breath Sounds Anterior:;Lateral:;coarse;diminished;equal bilaterally   Rhythm/Pattern, Respiratory depth regular;unlabored;pattern regular;no shortness of breath reported   Cough Frequency infrequent   Cough Type nonproductive;congested   PRE-TX-O2   Device (Oxygen Therapy) nasal cannula with humidification   Flow (L/min) (Oxygen Therapy) 2   Oxygen Concentration (%) 28   SpO2 (!) 93 %   Pulse Oximetry Type Intermittent   Pulse 104   Resp 18   Positioning   Body Position position maintained   Head of Bed (HOB) Positioning HOB elevated   Aerosol Therapy   $ Aerosol Therapy Charges Aerosol Treatment   Daily Review of Necessity (SVN) completed   Respiratory Treatment Status (SVN) given   Treatment Route (SVN) oxygen;mask   Patient Position HOB elevated   Post Treatment Assessment (SVN) breath sounds unchanged   Signs of Intolerance (SVN) none   Breath Sounds Post-Respiratory Treatment   Throughout All Fields Post-Treatment All Fields   Throughout All Fields Post-Treatment Anterior:;Lateral:;no change   Post-treatment Heart Rate (beats/min) 107   Post-treatment Resp Rate (breaths/min) 18

## 2025-04-23 NOTE — SUBJECTIVE & OBJECTIVE
Interval History/Significant Events: planned enteral nutrition via doboff, worsening renal function    Review of Systems  Objective:     Vital Signs (Most Recent):  Temp: 97.4 °F (36.3 °C) (04/25/25 0756)  Pulse: 100 (04/25/25 1206)  Resp: 16 (04/25/25 1206)  BP: 114/68 (04/25/25 0756)  SpO2: 99 % (04/25/25 1206) Vital Signs (24h Range):  Temp:  [97.4 °F (36.3 °C)-97.6 °F (36.4 °C)] 97.4 °F (36.3 °C)  Pulse:  [] 100  Resp:  [16-20] 16  SpO2:  [95 %-100 %] 99 %  BP: ()/(58-74) 114/68   Weight: (!) 136.1 kg (300 lb)  Body mass index is 49.92 kg/m².    No intake or output data in the 24 hours ending 04/25/25 1430         Physical Exam  Constitutional:       General: She is not in acute distress.     Appearance: She is not toxic-appearing.   HENT:      Head: Normocephalic and atraumatic.   Eyes:      General: No scleral icterus.  Cardiovascular:      Rate and Rhythm: Normal rate and regular rhythm.      Heart sounds: Murmur heard.   Pulmonary:      Effort: Pulmonary effort is normal.      Breath sounds: Rales present. No rhonchi.      Comments: Diminished lung sounds  Skin:     General: Skin is warm.      Coloration: Skin is not jaundiced.   Neurological:      Mental Status: She is alert. Mental status is at baseline.            Vents:  Oxygen Concentration (%): 28 (04/25/25 0754)  Lines/Drains/Airways       Peripherally Inserted Central Catheter Line  Duration             PICC Double Lumen 04/17/25 1547 left brachial 7 days              Drain  Duration             Female External Urinary Catheter w/ Suction 04/21/25 1950 3 days         NG/OG Tube 04/25/25 0913 Right nostril <1 day                  Significant Labs:    CBC/Anemia Profile:  Recent Labs   Lab 04/25/25  0422   WBC 36.40*   HGB 9.0*   HCT 27.9*      MCV 92.4   RDW 22.2*        Chemistries:  Recent Labs   Lab 04/24/25  0429 04/25/25  0422    141   K 5.5* 5.5*   * 110*   CO2 18* 15*   * 108*   CREATININE 4.31* 4.50*    CALCIUM 8.5 8.6       All pertinent labs within the past 24 hours have been reviewed.    Significant Imaging:  I have reviewed all pertinent imaging results/findings within the past 24 hours.

## 2025-04-23 NOTE — PT/OT/SLP PROGRESS
"Physical Therapy Treatment    Patient Name:  Magan Saleem   MRN:  [id number]    Recommendations:     Discharge Recommendations: Moderate Intensity Therapy  Discharge Equipment Recommendations: to be determined by next level of care  Barriers to discharge:  ongoing medical care    Assessment:     Magan Saleem is a 69 y.o. female admitted with a medical diagnosis of Sepsis due to pneumonia.  She presents with the following impairments/functional limitations: weakness, impaired endurance, impaired self care skills, impaired functional mobility, decreased lower extremity function. Pt stated she "feels like giving up. It's too hard, too much, too long." Dr. Dmitri MD informed of pt's statement, as well as CALISTA Smith RN.     Rehab Prognosis: Fair; patient would benefit from acute skilled PT services to address these deficits and reach maximum level of function.    Recent Surgery: * No surgery found *      Plan:     During this hospitalization, patient to be seen 5 x/week to address the identified rehab impairments via gait training, therapeutic activities, therapeutic exercises, neuromuscular re-education and progress toward the following goals:    Plan of Care Expires:  05/04/25    Subjective     Chief Complaint: Sepsis due to pneumonia   Patient/Family Comments/goals: agreeable  Pain/Comfort: 0/10         Objective:     Communicated with RN prior to session.  Patient found HOB elevated with peripheral IV, PureWick, oxygen upon PT entry to room.     General Precautions: Standard, fall, contact  Orthopedic Precautions: N/A  Braces: N/A  Respiratory Status: Nasal cannula, flow 2 L/min     Functional Mobility:  Bed Mobility:     Scooting: multiple trials with HOB lowered and feet raised and also with bed in [place] with maximal assistance and of 2 persons  Bridging: maximal assistance and of 2 persons      AM-PAC 6 CLICK MOBILITY          Treatment & Education:  Mobility as stated above. Pt requires much effort for " repositioning, possibly attributed to type of mattress she is on.     Patient left HOB elevated with all lines intact, call button in reach, and MD and RTT present..    GOALS:   Multidisciplinary Problems       Physical Therapy Goals          Problem: Physical Therapy    Goal Priority Disciplines Outcome Interventions   Physical Therapy Goal     PT, PT/OT Progressing    Description: Short term goals:  1. Supine to sit with MInimal Assistance  2. Sit to stand transfer with Moderate Assistance  3. Bed to chair transfer with Moderate Assistance using Rolling Walker  4. Sitting at edge of bed x15 minutes with Contact Guard Assistance    Long term goals:  1. Supine to sit with Contact Guard Assistance  2. Sit to stand transfer with Contact Guard Assistance  3. Bed to chair transfer with Contact Guard Assistance using Rolling Walker  4. Gait  x 100 feet with Contact Guard Assistance using Rolling Walker.                          DME Justifications:  To be determined    Time Tracking:     PT Received On: 04/23/25  PT Start Time: 1120     PT Stop Time: 1136  PT Total Time (min): 16 min     Billable Minutes: Therapeutic Activity 10    Treatment Type: Treatment  PT/PTA: PT     Number of PTA visits since last PT visit: 0     04/23/2025

## 2025-04-23 NOTE — PROGRESS NOTES
[facility]  [facility] Medicine  Progress Note    Patient Name: Magan Saleem  MRN: [id number]  Patient Class: IP- Inpatient   Admission Date: 4/3/2025  Length of Stay: 19 days  Attending Physician: Andriy Rizvi MD  Primary Care Provider: Sil Brown DO        Subjective     Principal Problem:Sepsis due to pneumonia        HPI:  70 yo F presents to [place] ED from [facility] for abnormal labs.  Patient was discharged from [facility] two days ago to skilled nursing facility for rehab.  She was diagnosed with dehydration due to gastroparesis with UTI.  Patient has metastatic lung cancer (to the brain) and follows with Dr. Swanson for IV chemotherapy every other week and takes lazertinib daily.  She has completed her course of radiation for the brain mets and follow had showed some improvement.  I thought she had either some decreased LOC due to encephalopathy or some aphasia from the brain mets but after a great effort to get her to talk, I realized she was just mad.  She wanted to know why she had been discharged two days ago when she could not eat.  She has no appetite and early satiety.  She is not nauseated and no vomiting.  She has decided on a DNR status previously (her caretaker and friend of 20 years) states that she had always said she did not want resuscitation if she experienced cardiopulmonary arrest and had told her children her wishes but she does want treatment and is not opposed to J tube if needed.  She has not had anything to eat or drink in two days and is dehydrated again.      Patient was transferred because of concern of sepsis.  She did have enterococcus faecalis UTI at [facility] and was treated.  I do not have the culture results but cultures were sent and patient does have some yeast noted.  (Will not treat a yeast colonization).  She is afebrile and hemodynamically stable and does not look septic clinically.  Her Lactic acid is stable at 2.5 dara 3.2.  Will check  another in am after volume resuscitation.  Her creat is at baseline but she has prerenal azotemia further confirming the dehydration.  Patient has an IO in place from [facility] due to dehydration and difficult stick but with some volume resuscitation, we have gotten an IV.  She is covid and flu negative.      Her WBC is 29 and I am not sure if she has received neupogen but could be a stress reaction.  Her BS is 245 and she does have some urine ketones but most likely due to starvation ketosis.  Will check a serum ketone.  Her platelets are 88K but this is most likely from her chemo.  She is not anemic.  CXR shows some patchy infiltrate but no obvious obstructive pneumonia from her lung cancer.  Her BNP about 3K but no clinical signs of CHF.  No recent echo but due to her BMI 50 most likely has some PHTN.  EKG had no ischemic changes but tachy at 114 which could also be from dehydration.  She was on ozempic for her DM and this could be the cause of her decreased appetite.  She is also on decadron for prevention of cerebral edema.      Remainder of ROS as below.  She mostly just nods and shakes her head and rolls her eyes.  You can get her to laugh if you try but she has been depressed since she has not walked since her hospitalization at [facility] on 3/19/25 and was discharged two days ago.  She says that at her last chemo she noticed she was getting weaker and her daughter had to help her in and out of the car and that was new for her.      See assessment and plan below for problem based evaluation       Overview/Hospital Course:  69 year old female presents with hypernatremia; she is not too talkative during rounds    4/5- patient seen examined today resting comfortably in bed and in no acute distress, with no acute events overnight.  Patient has a multitude of issues complicating her medical picture.  White blood cell count 88313 today, sodium 159, potassium 3.2 and BUN creatinine 59 and 1.8.  The patient is still not eating  even when assistance is provided.  We have already changed her fluids to half-normal saline with 20 of KCl at 1:25 a.m..  Have also put in a GI consult for possible feeding tube.  E coli in the urine has turned out to be ESBL and Rocephin has been changed Merrem.    4/6- the patient seen examined today resting comfortably in bed, in no acute distress, in no acute events overnight.  The patient is not very talkative today, but states she is doing okay.  She has no acute complaints.  Blood cultures remain negative.  The patient has not eaten since yesterday and is on light IV fluids.  GI has been consulted for feeding tube.  Continues on Merrem for positive urine culture.    04/07 Records reviewed. Not eating which not new, Similar during recent admit at [facility]. Dr Swanson there had question pancreatitis. No abd pain or tenderness reported now. Question of dysphagia to solids. Chart hx gastroparesis. Will discuss with GI. Ronnie says has responded so far well to treatment for lung CA.   04/08 had EGD at [facility] 03/21/25 with no obstruction and evidence gastroparesis. Still not ending. Try additional meds. Talked with GI. Increase activity  04/09 Some better with med  changes  04/10 Continues to do better. Ate 1/2 fish sandwich for lunch. Called by Dr Swanson and she wanting to keep feeding tube in consideration. Talked with Dr Reich and Dr Lemus. If something needed with gastroparesis would have to have post gastric position of tube.   04/11 eating some. Later staff requested some nystatin for sore mouth.   04/12 still not eating well.  Increased peripheral edema.  Albumin low at 1.5.  Will give some albumin and follow with some Lasix.  Placed Dobbhoff tube and start enteral feedings.  This will help with nutrition and if issue of placing surgical tube later make sure will tolerate enteral feedings.  Chest x-ray continues worse on left side.  Discuss with Pulmonary and ask Dr. Villasenor to see.   04/13 no new issues.   Enteral feedings to be started.  Pulmonary evaluation appreciated and plans noted.  04/14 Tolerating feedings Therapy working with her. Bronchoscopy planned.   4/15 BAL today  4/16 bronch yesterday looks like pneumonitis  4/17 not candidate for PEG    04/18 Eating some now. Pt says feels some better than last seen. Look at placement. High dose steroids by pulmonary. BS not as bad as would expect.  04/19 states continued eat some.  Less nausea.  Blood sugar not sure bad considering the steroids.  Pulmonary adjusted antibiotics based on cultures.  Look at it placement next week.   04/20 Looks the small. C/O SOB to nursing staff earlier but ABG OK. Poor oral intake ; encourage for pt to do more. Looking into placement.  04/21 Firm tender area in abd wall above umbilicus; ?hernia. Abnormal CT de santiago noted and will discuss with surgery.   04/22 CT shows evolving pancreatitis which pt treated for acouple weeks earlier at [facility]. Reviewed with Dr Tapia. No plan to operate on ventral hernia. Discussed later with Dr York. See how does off IVF and encourage oral intake. WBC and Cr both slight better.     Interval History:     Review of Systems   Constitutional:  Negative for appetite change, fatigue and fever.   HENT:  Negative for congestion and hearing loss.    Respiratory:  Negative for chest tightness and wheezing.    Cardiovascular:  Negative for chest pain and palpitations.   Gastrointestinal:  Negative for abdominal pain, constipation and nausea.   Genitourinary:  Negative for difficulty urinating and dysuria.   Musculoskeletal:  Positive for gait problem. Negative for back pain and neck stiffness.   Skin:  Negative for pallor and rash.   Neurological:  Negative for dizziness, speech difficulty and headaches.   Psychiatric/Behavioral:  Positive for sleep disturbance. Negative for confusion and suicidal ideas.      Objective:     Vital Signs (Most Recent):  Temp: 98.1 °F (36.7 °C) (04/22/25 2018)  Pulse: 93 (04/22/25  2029)  Resp: 19 (04/22/25 2029)  BP: 112/60 (04/22/25 2018)  SpO2: 99 % (04/22/25 2029) Vital Signs (24h Range):  Temp:  [97.5 °F (36.4 °C)-98.1 °F (36.7 °C)] 98.1 °F (36.7 °C)  Pulse:  [87-97] 93  Resp:  [18-20] 19  SpO2:  [94 %-99 %] 99 %  BP: ()/(50-70) 112/60     Weight: (!) 136.1 kg (300 lb)  Body mass index is 49.92 kg/m².    Intake/Output Summary (Last 24 hours) at 4/22/2025 2031  Last data filed at 4/22/2025 0611  Gross per 24 hour   Intake 2028.75 ml   Output 0 ml   Net 2028.75 ml           Physical Exam  Vitals reviewed.   Constitutional:       General: She is awake. She is not in acute distress.     Appearance: She is well-developed. She is morbidly obese. She is not toxic-appearing.   HENT:      Head: Normocephalic.      Nose: Nose normal.      Mouth/Throat:      Pharynx: Oropharynx is clear.   Eyes:      Extraocular Movements: Extraocular movements intact.      Pupils: Pupils are equal, round, and reactive to light.   Neck:      Thyroid: No thyroid mass.      Vascular: No carotid bruit.   Cardiovascular:      Rate and Rhythm: Normal rate and regular rhythm.      Pulses: Normal pulses.      Heart sounds: Normal heart sounds. No murmur heard.  Pulmonary:      Effort: Pulmonary effort is normal.      Breath sounds: Normal breath sounds and air entry. No wheezing.   Abdominal:      General: Bowel sounds are normal. There is no distension.      Palpations: Abdomen is soft.      Tenderness: There is no abdominal tenderness.   Musculoskeletal:         General: Normal range of motion.      Cervical back: Neck supple. No rigidity.   Skin:     General: Skin is warm.      Coloration: Skin is not jaundiced.      Findings: No lesion.   Neurological:      General: No focal deficit present.      Mental Status: She is alert and oriented to person, place, and time.      Cranial Nerves: No cranial nerve deficit.   Psychiatric:         Attention and Perception: Attention normal.         Mood and Affect: Mood normal.          Behavior: Behavior normal. Behavior is cooperative.         Thought Content: Thought content normal.         Cognition and Memory: Cognition normal.               Significant Labs: All pertinent labs within the past 24 hours have been reviewed.  BMP:   Recent Labs   Lab 04/22/25  0524   GLU 89      K 5.7*      CO2 18*   *   CREATININE 3.85*   CALCIUM 8.2*   MG 2.3       CBC:   Recent Labs   Lab 04/21/25 0439 04/22/25  0524   WBC 34.78* 28.13*   HGB 9.6* 8.3*   HCT 29.3* 25.9*    168         CMP:   Recent Labs   Lab 04/21/25 0439 04/22/25  0524   * 136   K 5.6* 5.7*    106   CO2 16* 18*    89   BUN 90* 100*   CREATININE 3.96* 3.85*   CALCIUM 9.1 8.2*   PROT  --  4.0*   ALBUMIN  --  1.6*   BILITOT  --  0.7   ALKPHOS  --  145   AST  --  34   ALT  --  17   ANIONGAP 20* 18*         Significant Imaging: I have reviewed all pertinent imaging results/findings within the past 24 hours.      Intake/Output - Last 3 Shifts         04/21 0700  04/22 0659 04/22 0700  04/23 0659    P.O. 220     I.V. (mL/kg) 1808.8 (13.3)     Total Intake(mL/kg) 2028.8 (14.9)     Urine (mL/kg/hr) 0 (0)     Total Output 0     Net +2028.8           Urine Occurrence 3 x           Microbiology Results (last 7 days)       Procedure Component Value Units Date/Time    Culture, Fungus (Other) [[id number]]  (Abnormal) Collected: 04/15/25 0943    Order Status: Completed Specimen: Respiratory from BAL Updated: 04/22/25 1617     Culture, Fungus (Other) Yeast isolated.    Culture, Lower Respiratory [[id number]]  (Abnormal)  (Susceptibility) Collected: 04/15/25 0943    Order Status: Completed Specimen: Respiratory from BAL Updated: 04/21/25 1516     Culture, Lower Respiratory Light Growth Stenotrophomonas maltophilia    AFB Smear Only [[id number]] Collected: 04/15/25 0943    Order Status: Completed Specimen: Respiratory from BAL Updated: 04/15/25 2200     AFB Smear No acid fast bacilli seen                 Assessment & Plan  Sepsis due to pneumonia  Appears to have UTI and pneumonia; has leukocytosis; has opacities on CXR; has G- bacilli on urine culture; blood cultures pending; will place on Vancomycin and Rocephin; BP on the low side; on IVF    04/12 worsening chest x-ray, ask Pulmonary to review  4/15 plan for BAL today.  Continue antibiotic  4/16 7 days antibiotic  completed  Hypernatremia  Due to dehydration; off diuretics; on IVF; will monitor Na levels  Lung cancer metastatic to brain  DNR but not hospice.  Receives chemo and has finished radiation.    04/07 reported stable / improved after treatment  04/09 more fuffy right side CXR  04/12 continue to abnormality on chest x-ray and ask Pulmonary to review  4/15 follows with Dr. Swanson.  No more chemotherapy treatments until patient is stronger.  Discussed with Dr. Swanson  4/16 hold treatments for now  04/20 brain lesion prior treated and I told been stable    Thrombocytopenia  On chemo; will monitor  04/19 platelet count 149 wishes improved    Essential hypertension  Vitals:    04/21/25 1149 04/21/25 1648 04/21/25 1950 04/22/25 0022   BP: 114/65 (!) 83/50 (!) 89/53 (!) 93/50    04/22/25 0400 04/22/25 0818 04/22/25 0828 04/22/25 1131   BP: (!) 96/52 (!) 96/52 119/68 116/70    04/22/25 1607 04/22/25 2018   BP: 109/63 112/60         Monitor BP while on Metoprolol; on IVF; off Losartan    Moderate persistent asthma without complication  Continue home inhaled steroid/bronchodilator and singulair    Acute on chronic kidney failure  Creatine stable     04/20 Cr recently been climbing. May need some IVF. Check BNP in am  Chronic pulmonary embolism without acute cor pulmonale  Eliquis was discontinued due to risk of ICH with brain mets but they have improved so the anti-coagulation has been restarted; will monitor  04/07 apparently this of several years ago    Type 2 diabetes mellitus with hyperglycemia  BS elevated; will increase Lantus to 25 units;  "continue SSI;monitor BS     Gastroparesis  On Reglan and PPI for erosive gastritis; will monitor    EGD by Dr. Lo during recent past admission at [facility]:  "EGD on 03/21/2025 shows LA class B erosive esophagitis but no pill esophagitis, nonerosive gastritis and retention of food and fluid suspicious for gastroparesis, due to narcotics and diabetes. "    04/ 08 try additional meds to help gastric emptying.  04/09 taking in some now orally with recent changes meds  04/10 better and ate half [place] for lunch   04/14 tolerating enteral feedings so far  4/15 we will get PEG tube if patient is agreeable.  Daughter is agreeable.  Discussed with her  4/16 patient wants PEG tube  4/17 not candidate for surgical PEG.  NGT out, start full liquids  04/18 taking some orally  Morbid obesity  Body mass index is 49.92 kg/m². Morbid obesity complicates all aspects of disease management from diagnostic modalities to treatment. Weight loss encouraged and health benefits explained to patient.     Would benefit from sleep study and possible CPAP.  Does not wear oxygen at home.      Edema due to hypoalbuminemia    04/12 add IV albumin and some Lasix  Start enteral feedings  04/13 start enteral feedings  04/22 albumin 1.6  Anemia  Anemia is likely due to chronic disease due to Malignancy. Most recent hemoglobin and hematocrit are listed below.  Recent Labs     04/21/25  0439 04/22/25  0524   HGB 9.6* 8.3*   HCT 29.3* 25.9*     Plan  - Monitor serial CBC: Daily  - Transfuse PRBC if patient becomes hemodynamically unstable, symptomatic or H/H drops below 7/21.  - Patient has not received any PRBC transfusions to date  - Patient's anemia is currently stable  - follow  No evidence of bleeding  Hyponatremia  Hyponatremia is likely due to renal insufficiency. The patient's most recent sodium results are listed below.  Recent Labs     04/20/25  0554 04/21/25  0439 04/22/25  0524   * 132* 136     Plan  - Correct the sodium " by 4-6mEq in 24 hours.   - Obtain the following studies:  In a.  - Will treat the hyponatremia with continue LR  - Monitor sodium Daily.   - Patient hyponatremia is stable  - follow  Follow  Neoplastic (malignant) related fatigue    Progressive mobility protocol  Pneumonitis  IV steroids per pulmonology  04/19 antibiotic adjustment based on cultures by Pulmonary  Dehydration, severe  Encourage orals    Acute pancreatitis    04/21 Abd fluid collection in area pancreas noted on CT abd, discuss with surgery  04/22 likely evolving pancreatitis dx acouple weeks ago at [facility].  No severe epigastric pain.  Infection due to Stenotrophomonas maltophilia    Continue with levaquin  VTE Risk Mitigation (From admission, onward)      None            Discharge Planning   MITUL:      Code Status: DNR   Medical Readiness for Discharge Date:   Discharge Plan A: Home with family                Please place Justification for DME        Andriy Rizvi MD  Department of Hospital Medicine   [facility]

## 2025-04-23 NOTE — PROGRESS NOTES
[facility]  Adult Nutrition  Progress Note         Reason for Assessment  Reason For Assessment: RD follow-up        Assessment and Plan  4/23/2025: RD follow up. Patient no longer on tube feeds. Remains on Reglan for gastroparesis. Patient wanted PEG tube, but is not a candidate for surgical PEG. NG out, full liquids started 4/17. Poor oral intake continues. Recent CT showed evolving pancreatitis, which pt treated for a couple weeks earlier at [facility]. SLP evaluated patient yesterday, though unable to recommend solid diet level as patient didn't participate. SLP recommended liquids/soft foods as tolerated.     Nutrition Recommendations  Recommend to continue with Full Liquid Diet as tolerated with Boost Glucose Control. Patients with gastroparesis often tolerate liquids better, even if solids are not passing well. Pureed foods may be better also. A diet low in fiber/fat is generally recommended for gastroparesis, though fats in liquid form are often better tolerated. Liquids empty stomach more easily than solids - continue current plan of care. RD following.    4/16/2025: RD follow up. Patient remains on Suplena at 45mL/hour with 30mL/hour free water flush. Tube feed at goal, no issues with tolerance noted. Also ordered low residue diet, however PO intakes very poor, generally 0%. MD notes discussion of possible PEG placement. Patient agreeable. Recommend continue current POC as tolerated. Current weight 136.1kg. Last weight obtained 4/3. Recommend reweigh patient. Last BM 4/16 per flowsheet. RD following.    4/13/2025: Consult received and appreciated. Consult for enteral feeding. Patient is a 68yo female admitted 4/3 for sepsis due to pneumonia.     Patient is 136.1kg with a BMI of 49.92 and is obese. She has a PMH of CKD3b with elevated BUN, Cr, and low eGFR. Recommend start Suplena with a goal rate of 45mL/hour with 30mL/hour free water flush. Keep HOB at 30-45 degrees to reduce risk of  aspiration. Start rate at 20mL/hour and advance by 10mL q8h until goal rate is reached. Monitor for tolerance: n/v/c/d. Hold feeding and notify RD if symptoms of intolerance develop.     Last Bowel Movement: 04/20/25    Medications/labs reviewed. RD following.    Learning Needs/Social Determinants of Health    Learning Assessment       04/04/2025 0021 [facility] (4/3/2025 - Present)   Created by Deanna Rivas, RN - RN (Nurse) Status: Complete                 PRIMARY LEARNER     Primary Learner Name:  Magan Saleem  - 04/04/2025 0021    Relationship:  Patient SG - 04/04/2025 0021    Does the primary learner have any barriers to learning?:  No Barriers SG - 04/04/2025 0021    What is the preferred language of the primary learner?:  English SG - 04/04/2025 0021    Is an  required?:  No SG - 04/04/2025 0021    How does the primary learner prefer to learn new concepts?:  Listening SG - 04/04/2025 0021    How often do you need to have someone help you read instructions, pamphlets, or written material from your doctor or pharmacy?:  Never SG - 04/04/2025 0021        CO-LEARNER #1     No question answered        CO-LEARNER #2     No question answered        SPECIAL TOPICS     No question answered        ANSWERED BY:     -:  Family SG - 04/04/2025 0021        Comments         Edit History       Deanna Rivas, RN - RN (Nurse)   04/04/2025 0021                          Social Drivers of Health     Tobacco Use: Low Risk  (4/15/2025)    Patient History     Smoking Tobacco Use: Never     Smokeless Tobacco Use: Never     Passive Exposure: Not on file   Alcohol Use: Not At Risk (4/4/2025)    AUDIT-C     Frequency of Alcohol Consumption: Never     Average Number of Drinks: Patient does not drink     Frequency of Binge Drinking: Never   Financial Resource Strain: Patient Declined (4/5/2025)    Overall Financial Resource Strain (CARDIA)     Difficulty of Paying Living Expenses: Patient  declined   Food Insecurity: Patient Declined (4/5/2025)    Hunger Vital Sign     Worried About Running Out of Food in the Last Year: Patient declined     Ran Out of Food in the Last Year: Patient declined   Transportation Needs: No Transportation Needs (4/4/2025)    PRAPARE - Transportation     Lack of Transportation (Medical): No     Lack of Transportation (Non-Medical): No   Physical Activity: Inactive (4/4/2025)    Exercise Vital Sign     Days of Exercise per Week: 0 days     Minutes of Exercise per Session: 0 min   Stress: Patient Declined (4/5/2025)    [employer] [place] [employer] - Occupational Stress Questionnaire     Feeling of Stress : Patient declined   Housing Stability: Patient Declined (4/5/2025)    Housing Stability Vital Sign     Unable to Pay for Housing in the Last Year: Patient declined     Number of Times Moved in the Last Year: Not on file     Homeless in the Last Year: Patient declined   Depression: Low Risk  (10/30/2024)    Depression     Last PHQ-4: Flowsheet Data: 0   Utilities: Patient Declined (4/5/2025)    [facility] Utilities     Threatened with loss of utilities: Patient declined   Health Literacy: Patient Declined (4/5/2025)     Health Literacy     Frequency of need for help with medical instructions: Patient declines to respond   Recent Concern: Health Literacy - Inadequate Health Literacy (4/4/2025)     Health Literacy     Frequency of need for help with medical instructions: Sometimes   Social Isolation: Not on file     Malnutrition  Is Patient Malnourished: No    Nutrition Diagnosis  Inadequate energy intake related to Appetite loss as evidenced by poor PO intakes  Comments: initiate enteral feeding - discontinued    Recent Labs   Lab 04/23/25  0531 04/23/25  0712   GLU 56*  --    POCGLU  --  72     Comments on Glucose: Glucose low - poor oral intake    Nutrition Prescription / Recommendations  Recommendation/Intervention: Recommend to continue with current diet as  tolerated with Boost Glucose Control (all meals)  Goals: weight maintenance during admission, improve blood glucose levels, improve gastric emptying, consuming 50 - 75% meals  Nutrition Goal Status: progressing towards goal  Current Diet Order: Full Liquids  Nutrition Order Comments: gastroparesis  Chewing or Swallowing Difficulty?: No Chewing or swallowing difficulty documented  Recommended Diet: Full Liquids  Recommended Oral Supplement: Boost Glucose Control  Is Nutrition Support Recommended: Though patient would benefit, complex medical hx makes her not a candidate for PEG tube     Previous tube feed regimen (since discontinued):  Needs Calculated    Energy Calorie Requirements (kcal): 1905-2722kcal (20-25kcal/kg)  Protein Requirements: 34-45g (0.6-0.8g/kg ideal body weight)  Enteral Nutrition   Enteral Nutrition Formula Provides:  1938 kcals Propofol Rate: No  49 g Protein  212 g Carbohydrates  104 g Fat Propofol Rate: No  797 ml Fluid without Flush    720 ml Fluid by flush   1517 ml Total Fluid  Enteral Nutrition Recommended Order:  Tube feeding via NG/ Dobhoff  Tube feeding formula: Suplena 1.8 NG/ Dobhoff at 45mL/hour  Free Water Flush: 30 ml hourly  Modular Supplements:No Modular Supplements needed  Enteral Nutrition meets needs?: yes  Enteral Nutrition Status: Continue Enteral Nutrition  Is Nutrition Education Recommended: provided family with Gastroparesis Nutrition Therapy handout during previous follow up visit    Monitor and Evaluation  % current intake: 0 - 50 % (inadequate, remains poor)  % intake to meet estimated needs: 50 - 75 %  Monitor and Evaluation: Food and beverage intake, Protein intake, Carbohydrate intake, Diet order, Enteral and parenteral nutrition administration, Weight, Beliefs and attitudes, Electrolyte and renal panel, Glucose/endocrine profile, Gastrointestinal profile, Inflammatory profile, Lipid profile, Nutrition focused physical findings, Skin    Current Medical Diagnosis and  Past Medical History  Diagnosis: infection/sepsis  Past Medical History:   Diagnosis Date    Chronic kidney disease, stage 3b     Chronic pulmonary embolism without acute cor pulmonale     Diabetes mellitus type 2 in obese     DNR (do not resuscitate)     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 Swanson    Melanocytic nevi of lower limb, including hip, left     Mixed hyperlipidemia     Moderate persistent asthma without complication 10/30/2024    Other pulmonary embolism without acute cor pulmonale     Vitamin D deficiency      Nutrition/Diet History  Spiritual, Cultural Beliefs, Spiritism Practices, Values that Affect Care: no  Food Allergies: NKFA  Factors Affecting Nutritional Intake: depression, altered gastrointestinal function, decreased appetite, early satiety, N/V    Lab/Procedures/Meds  Recent Labs   Lab 04/22/25  0524 04/23/25  0531    137   K 5.7* 5.6*   * 99*   CREATININE 3.85* 3.81*   CALCIUM 8.2* 8.3*   ALBUMIN 1.6*  --     108*   ALT 17  --    AST 34  --    Note: K+, BUN, Cr, Cl- elevated (CKD3b). Alb low. Likely related to inflammation, fluid retention. Ca++ low (likely iso low alb)    Last A1c:   Lab Results   Component Value Date    HGBA1C 6.7 04/02/2025    HGBA1C 6.4 (H) 02/05/2025   Note: < 7 % goal for patients with diabetes    Lab Results   Component Value Date    RBC 2.97 (L) 04/23/2025    HGB 8.8 (L) 04/23/2025    HCT 26.8 (L) 04/23/2025    MCV 90.2 04/23/2025    MCH 29.6 04/23/2025    MCHC 32.8 04/23/2025   Note: H&H low    Pertinent Labs Reviewed: reviewed  Pertinent Medications Reviewed: reviewed    Scheduled Meds:   albuterol-ipratropium  3 mL Nebulization QID     "atorvastatin  20 mg Oral QHS    budesonide  0.5 mg Nebulization Q12H    insulin glargine U-100  6 Units Subcutaneous QHS    Lactobacillus acidophilus  2 capsule Oral TID WM    levoFLOXacin  500 mg Oral Q48H    metoclopramide HCl  10 mg Oral QID (AC & HS)    metoprolol succinate  25 mg Oral Daily    montelukast  10 mg Oral QHS    pantoprazole  40 mg Oral Daily    predniSONE  40 mg Oral Daily    Followed by    [START ON 5/3/2025] predniSONE  20 mg Oral Daily    Followed by    [START ON 5/17/2025] predniSONE  10 mg Oral Daily    sertraline  100 mg Oral Daily   Note: atorvastatin (not with grapefruit/grapefruit juice), insulin glargine, lactobacillus acidophilus, metoclopramide (gastroparesis), pantoprazole, prednisone, sertraline, metoprolol     Continuous Infusions:    PRN Meds:.  Current Facility-Administered Medications:     acetaminophen, 650 mg, Rectal, Q6H PRN    acetaminophen, 1,000 mg, Oral, Q6H PRN    albuterol sulfate, 2.5 mg, Nebulization, Q2H PRN    aluminum & magnesium hydroxide-simethicone, 30 mL, Oral, Q6H PRN    bisacodyL, 10 mg, Oral, Daily PRN    dextromethorphan-guaiFENesin  mg/5 ml, 10 mL, Oral, Q6H PRN    dextrose 50%, 12.5 g, Intravenous, PRN    dextrose 50%, 25 g, Intravenous, PRN    diphenhydrAMINE, 50 mg, Oral, Q6H PRN    docusate sodium, 100 mg, Oral, BID PRN    glucagon (human recombinant), 1 mg, Intramuscular, PRN    glucose, 16 g, Oral, PRN    glucose, 24 g, Oral, PRN    HYDROcodone-acetaminophen, 1 tablet, Oral, Q6H PRN    insulin aspart U-100, 0-10 Units, Subcutaneous, QID (AC + HS) PRN    LORazepam, 1 mg, Oral, Q6H PRN    melatonin, 6 mg, Oral, Nightly PRN    traZODone, 50 mg, Oral, Nightly PRN    Anthropometrics  Height: 5' 5" (165.1 cm)  Height (inches): 65 in  Height Method: Stated  Weight: (!) 136.1 kg (300 lb)  Weight (lb): 300 lb  Weight Method: Bed Scale  Ideal Body Weight (IBW), Female: 125 lb  % Ideal Body Weight, Female (lb): 240 %  BMI (Calculated): 49.9   "     Estimated/Assessed Needs  RMR ([place]-St. Jeor Equation): 1891.68     Temp: 98.4 °F (36.9 °C)Oral  Weight Used For Calorie Calculations: (!) 136.1 kg (300 lb 0.7 oz)     Energy Calorie Requirements (kcal): 1905-2722kcal (20-25kcal/kg)  Weight Used For Protein Calculations: 56.7 kg (125 lb)  Protein Requirements: 34-45g (0.6-0.8g/kg ideal body weight)    Fluid Requirements (mL): 1 mL/kcal  RDA Method (mL): 1905  CHO Requirement: 45-55% (T2DM)    Nutrition by Nursing  Diet/Nutrition Received: full liquid  Intake (%): 50%  Diet/Feeding Assistance: assisted with feeding (Family feeding her)  Diet/Feeding Tolerance: poor  [REMOVED]      NG/OG Tube 04/12/25 1620 nasogastric;Other (comments) Right nostril-Feeding Type: continuous, by pump  [REMOVED]      NG/OG Tube 04/12/25 1620 nasogastric;Other (comments) Right nostril-Current Rate (mL/hr): 45 mL/hr  [REMOVED]      NG/OG Tube 04/12/25 1620 nasogastric;Other (comments) Right nostril-Goal Rate (mL/hr): 45 mL/hr  [REMOVED]      NG/OG Tube 04/12/25 1620 nasogastric;Other (comments) Right nostril-Formula Name: Suplena    Nutrition Follow-Up  RD Follow-up?: Yes    Nutrition Discharge Planning: Too early to determine, pending clinical course       Maria C Lorenzo, MS, RD, LD  Available via Secure Chat

## 2025-04-23 NOTE — PROGRESS NOTES
No acute changes overnight.  Patient denies pain currently.  Denies nausea or vomiting.  Reports decreased appetite.  Will continue follow patient's progress.  No surgical interventions at this time.

## 2025-04-23 NOTE — SUBJECTIVE & OBJECTIVE
Interval History:     Review of Systems   Constitutional:  Negative for appetite change, fatigue and fever.   HENT:  Negative for congestion and hearing loss.    Respiratory:  Negative for chest tightness and wheezing.    Cardiovascular:  Negative for chest pain and palpitations.   Gastrointestinal:  Negative for abdominal pain, constipation and nausea.   Genitourinary:  Negative for difficulty urinating and dysuria.   Musculoskeletal:  Positive for gait problem. Negative for back pain and neck stiffness.   Skin:  Negative for pallor and rash.   Neurological:  Negative for dizziness, speech difficulty and headaches.   Psychiatric/Behavioral:  Positive for sleep disturbance. Negative for confusion and suicidal ideas.      Objective:     Vital Signs (Most Recent):  Temp: 98.1 °F (36.7 °C) (04/22/25 2018)  Pulse: 93 (04/22/25 2029)  Resp: 19 (04/22/25 2029)  BP: 112/60 (04/22/25 2018)  SpO2: 99 % (04/22/25 2029) Vital Signs (24h Range):  Temp:  [97.5 °F (36.4 °C)-98.1 °F (36.7 °C)] 98.1 °F (36.7 °C)  Pulse:  [87-97] 93  Resp:  [18-20] 19  SpO2:  [94 %-99 %] 99 %  BP: ()/(50-70) 112/60     Weight: (!) 136.1 kg (300 lb)  Body mass index is 49.92 kg/m².    Intake/Output Summary (Last 24 hours) at 4/22/2025 2031  Last data filed at 4/22/2025 0611  Gross per 24 hour   Intake 2028.75 ml   Output 0 ml   Net 2028.75 ml           Physical Exam  Vitals reviewed.   Constitutional:       General: She is awake. She is not in acute distress.     Appearance: She is well-developed. She is morbidly obese. She is not toxic-appearing.   HENT:      Head: Normocephalic.      Nose: Nose normal.      Mouth/Throat:      Pharynx: Oropharynx is clear.   Eyes:      Extraocular Movements: Extraocular movements intact.      Pupils: Pupils are equal, round, and reactive to light.   Neck:      Thyroid: No thyroid mass.      Vascular: No carotid bruit.   Cardiovascular:      Rate and Rhythm: Normal rate and regular rhythm.      Pulses: Normal  pulses.      Heart sounds: Normal heart sounds. No murmur heard.  Pulmonary:      Effort: Pulmonary effort is normal.      Breath sounds: Normal breath sounds and air entry. No wheezing.   Abdominal:      General: Bowel sounds are normal. There is no distension.      Palpations: Abdomen is soft.      Tenderness: There is no abdominal tenderness.   Musculoskeletal:         General: Normal range of motion.      Cervical back: Neck supple. No rigidity.   Skin:     General: Skin is warm.      Coloration: Skin is not jaundiced.      Findings: No lesion.   Neurological:      General: No focal deficit present.      Mental Status: She is alert and oriented to person, place, and time.      Cranial Nerves: No cranial nerve deficit.   Psychiatric:         Attention and Perception: Attention normal.         Mood and Affect: Mood normal.         Behavior: Behavior normal. Behavior is cooperative.         Thought Content: Thought content normal.         Cognition and Memory: Cognition normal.               Significant Labs: All pertinent labs within the past 24 hours have been reviewed.  BMP:   Recent Labs   Lab 04/22/25 0524   GLU 89      K 5.7*      CO2 18*   *   CREATININE 3.85*   CALCIUM 8.2*   MG 2.3       CBC:   Recent Labs   Lab 04/21/25 0439 04/22/25 0524   WBC 34.78* 28.13*   HGB 9.6* 8.3*   HCT 29.3* 25.9*    168         CMP:   Recent Labs   Lab 04/21/25 0439 04/22/25 0524   * 136   K 5.6* 5.7*    106   CO2 16* 18*    89   BUN 90* 100*   CREATININE 3.96* 3.85*   CALCIUM 9.1 8.2*   PROT  --  4.0*   ALBUMIN  --  1.6*   BILITOT  --  0.7   ALKPHOS  --  145   AST  --  34   ALT  --  17   ANIONGAP 20* 18*         Significant Imaging: I have reviewed all pertinent imaging results/findings within the past 24 hours.      Intake/Output - Last 3 Shifts         04/21 0700  04/22 0659 04/22 0700 04/23 0659    P.O. 220     I.V. (mL/kg) 1808.8 (13.3)     Total Intake(mL/kg) 2028.8  (14.9)     Urine (mL/kg/hr) 0 (0)     Total Output 0     Net +2028.8           Urine Occurrence 3 x           Microbiology Results (last 7 days)       Procedure Component Value Units Date/Time    Culture, Fungus (Other) [[id number]]  (Abnormal) Collected: 04/15/25 0943    Order Status: Completed Specimen: Respiratory from BAL Updated: 04/22/25 1617     Culture, Fungus (Other) Yeast isolated.    Culture, Lower Respiratory [[id number]]  (Abnormal)  (Susceptibility) Collected: 04/15/25 0943    Order Status: Completed Specimen: Respiratory from BAL Updated: 04/21/25 1516     Culture, Lower Respiratory Light Growth Stenotrophomonas maltophilia    AFB Smear Only [[id number]] Collected: 04/15/25 0943    Order Status: Completed Specimen: Respiratory from BAL Updated: 04/15/25 2200     AFB Smear No acid fast bacilli seen

## 2025-04-23 NOTE — RESPIRATORY THERAPY
Treatment given by RT student, Dalila Schuster.        04/23/25 1130   Patient Assessment/Suction   Level of Consciousness (AVPU) alert   Respiratory Effort Unlabored   Expansion/Accessory Muscles/Retractions no retractions;no use of accessory muscles   All Lung Fields Breath Sounds Anterior:;Lateral:;equal bilaterally;coarse;wheezes, expiratory;rhonchi   Rhythm/Pattern, Respiratory unlabored;pattern regular;depth regular;no shortness of breath reported   Cough Frequency infrequent   Cough Type nonproductive;congested   PRE-TX-O2   Device (Oxygen Therapy) nasal cannula with humidification   Flow (L/min) (Oxygen Therapy) 2   Oxygen Concentration (%) 28   SpO2 (!) 94 %   Pulse Oximetry Type Intermittent   Pulse 105   Resp (!) 22   Positioning   Body Position position maintained   Head of Bed (HOB) Positioning HOB elevated   Aerosol Therapy   $ Aerosol Therapy Charges Aerosol Treatment   Daily Review of Necessity (SVN) completed   Respiratory Treatment Status (SVN) given   Treatment Route (SVN) oxygen;mask   Patient Position HOB elevated   Post Treatment Assessment (SVN) breath sounds unchanged   Signs of Intolerance (SVN) none   Breath Sounds Post-Respiratory Treatment   Throughout All Fields Post-Treatment All Fields   Throughout All Fields Post-Treatment Anterior:;Lateral:;no change   Post-treatment Heart Rate (beats/min) 103   Post-treatment Resp Rate (breaths/min) 20

## 2025-04-23 NOTE — ASSESSMENT & PLAN NOTE
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 Most recent hemoglobin and hematocrit are listed below.  Recent Labs     04/21/25  0439 04/22/25  0524   HGB 9.6* 8.3*   HCT 29.3* 25.9*     Plan  - Monitor serial CBC: Daily  - Transfuse PRBC if patient becomes hemodynamically unstable, symptomatic or H/H drops below 7/21.  - Patient has not received any PRBC transfusions to date  - Patient's anemia is currently stable  - follow  No evidence of bleeding

## 2025-04-23 NOTE — NURSING
Patient expressed that she just wanted to be discharged on home hospice. MD notified. Also gave MD Villatoro's number because she requested an update from the doctor.

## 2025-04-23 NOTE — PLAN OF CARE
Problem: Adult Inpatient Plan of Care  Goal: Plan of Care Review  Outcome: Progressing  Goal: Patient-Specific Goal (Individualized)  Outcome: Progressing  Goal: Absence of Hospital-Acquired Illness or Injury  Outcome: Progressing  Goal: Optimal Comfort and Wellbeing  Outcome: Progressing  Goal: Readiness for Transition of Care  Outcome: Progressing     Problem: Bariatric Environmental Safety  Goal: Safety Maintained with Care  Outcome: Progressing     Problem: Skin Injury Risk Increased  Goal: Skin Health and Integrity  Outcome: Progressing     Problem: Infection  Goal: Absence of Infection Signs and Symptoms  Outcome: Progressing     Problem: Fall Injury Risk  Goal: Absence of Fall and Fall-Related Injury  Outcome: Progressing     Problem: UTI (Urinary Tract Infection)  Goal: Improved Infection Symptoms  Outcome: Progressing     Problem: Anxiety  Goal: Anxiety Reduction or Resolution  Outcome: Progressing     Problem: Chronic Kidney Disease  Goal: Optimal Coping with Chronic Illness  Outcome: Progressing  Goal: Electrolyte Balance  Outcome: Progressing  Goal: Fluid Balance  Outcome: Progressing  Goal: Optimal Functional Ability  Outcome: Progressing  Goal: Absence of Anemia Signs and Symptoms  Outcome: Progressing  Goal: Optimal Oral Intake  Outcome: Progressing  Goal: Acceptable Pain Control  Outcome: Progressing  Goal: Minimize Renal Failure Effects  Outcome: Progressing     Problem: Diabetes Comorbidity  Goal: Blood Glucose Level Within Targeted Range  Outcome: Progressing     Problem: Sepsis/Septic Shock  Goal: Optimal Coping  Outcome: Progressing  Goal: Absence of Bleeding  Outcome: Progressing  Goal: Blood Glucose Level Within Targeted Range  Outcome: Progressing  Goal: Absence of Infection Signs and Symptoms  Outcome: Progressing  Goal: Optimal Nutrition Intake  Outcome: Progressing     Problem: Pneumonia  Goal: Fluid Balance  Outcome: Progressing  Goal: Resolution of Infection Signs and  Symptoms  Outcome: Progressing  Goal: Effective Oxygenation and Ventilation  Outcome: Progressing     Problem: Gas Exchange Impaired  Goal: Optimal Gas Exchange  Outcome: Progressing     Problem: Wound  Goal: Optimal Coping  Outcome: Progressing  Goal: Optimal Functional Ability  Outcome: Progressing  Goal: Absence of Infection Signs and Symptoms  Outcome: Progressing  Goal: Improved Oral Intake  Outcome: Progressing  Goal: Optimal Pain Control and Function  Outcome: Progressing  Goal: Skin Health and Integrity  Outcome: Progressing  Goal: Optimal Wound Healing  Outcome: Progressing     Problem: Airway Clearance Ineffective  Goal: Effective Airway Clearance  Outcome: Progressing     Problem: Suicide Risk  Goal: Absence of Self-Harm  Outcome: Progressing     Problem: Acute Kidney Injury/Impairment  Goal: Fluid and Electrolyte Balance  Outcome: Progressing  Goal: Improved Oral Intake  Outcome: Progressing  Goal: Effective Renal Function  Outcome: Progressing

## 2025-04-23 NOTE — ASSESSMENT & PLAN NOTE
Vitals:    04/21/25 1149 04/21/25 1648 04/21/25 1950 04/22/25 0022   BP: 114/65 (!) 83/50 (!) 89/53 (!) 93/50    04/22/25 0400 04/22/25 0818 04/22/25 0828 04/22/25 1131   BP: (!) 96/52 (!) 96/52 119/68 116/70    04/22/25 1607 04/22/25 2018   BP: 109/63 112/60         Monitor BP while on Metoprolol; on IVF; off Losartan

## 2025-04-24 LAB
ANION GAP SERPL CALCULATED.3IONS-SCNC: 17 MMOL/L (ref 7–16)
BUN SERPL-MCNC: 106 MG/DL (ref 10–20)
BUN/CREAT SERPL: 25 (ref 6–20)
CALCIUM SERPL-MCNC: 8.5 MG/DL (ref 8.4–10.2)
CHLORIDE SERPL-SCNC: 109 MMOL/L (ref 98–107)
CO2 SERPL-SCNC: 18 MMOL/L (ref 23–31)
CREAT SERPL-MCNC: 4.31 MG/DL (ref 0.55–1.02)
EGFR (NO RACE VARIABLE) (RUSH/TITUS): 11 ML/MIN/1.73M2
GLUCOSE SERPL-MCNC: 100 MG/DL (ref 70–105)
GLUCOSE SERPL-MCNC: 88 MG/DL (ref 70–105)
GLUCOSE SERPL-MCNC: 89 MG/DL (ref 82–115)
GLUCOSE SERPL-MCNC: 90 MG/DL (ref 70–105)
GLUCOSE SERPL-MCNC: 90 MG/DL (ref 70–105)
POTASSIUM SERPL-SCNC: 5.5 MMOL/L (ref 3.5–5.1)
SODIUM SERPL-SCNC: 138 MMOL/L (ref 136–145)

## 2025-04-24 PROCEDURE — 80048 BASIC METABOLIC PNL TOTAL CA: CPT | Performed by: STUDENT IN AN ORGANIZED HEALTH CARE EDUCATION/TRAINING PROGRAM

## 2025-04-24 PROCEDURE — 27000207 HC ISOLATION

## 2025-04-24 PROCEDURE — 25000242 PHARM REV CODE 250 ALT 637 W/ HCPCS: Performed by: HOSPITALIST

## 2025-04-24 PROCEDURE — 27000221 HC OXYGEN, UP TO 24 HOURS

## 2025-04-24 PROCEDURE — 94761 N-INVAS EAR/PLS OXIMETRY MLT: CPT

## 2025-04-24 PROCEDURE — 36415 COLL VENOUS BLD VENIPUNCTURE: CPT | Performed by: STUDENT IN AN ORGANIZED HEALTH CARE EDUCATION/TRAINING PROGRAM

## 2025-04-24 PROCEDURE — 97530 THERAPEUTIC ACTIVITIES: CPT

## 2025-04-24 PROCEDURE — 97110 THERAPEUTIC EXERCISES: CPT

## 2025-04-24 PROCEDURE — 99900035 HC TECH TIME PER 15 MIN (STAT)

## 2025-04-24 PROCEDURE — 82962 GLUCOSE BLOOD TEST: CPT

## 2025-04-24 PROCEDURE — 99232 SBSQ HOSP IP/OBS MODERATE 35: CPT | Mod: ,,, | Performed by: STUDENT IN AN ORGANIZED HEALTH CARE EDUCATION/TRAINING PROGRAM

## 2025-04-24 PROCEDURE — 11000001 HC ACUTE MED/SURG PRIVATE ROOM

## 2025-04-24 PROCEDURE — 99233 SBSQ HOSP IP/OBS HIGH 50: CPT | Mod: ,,, | Performed by: HOSPITALIST

## 2025-04-24 PROCEDURE — 94640 AIRWAY INHALATION TREATMENT: CPT

## 2025-04-24 RX ORDER — SODIUM BICARBONATE 650 MG/1
1300 TABLET ORAL 2 TIMES DAILY
Status: DISCONTINUED | OUTPATIENT
Start: 2025-04-25 | End: 2025-04-26

## 2025-04-24 RX ADMIN — BUDESONIDE 0.5 MG: 0.5 SUSPENSION RESPIRATORY (INHALATION) at 07:04

## 2025-04-24 RX ADMIN — IPRATROPIUM BROMIDE AND ALBUTEROL SULFATE 3 ML: 2.5; .5 SOLUTION RESPIRATORY (INHALATION) at 05:04

## 2025-04-24 RX ADMIN — IPRATROPIUM BROMIDE AND ALBUTEROL SULFATE 3 ML: 2.5; .5 SOLUTION RESPIRATORY (INHALATION) at 12:04

## 2025-04-24 RX ADMIN — IPRATROPIUM BROMIDE AND ALBUTEROL SULFATE 3 ML: 2.5; .5 SOLUTION RESPIRATORY (INHALATION) at 07:04

## 2025-04-24 NOTE — ASSESSMENT & PLAN NOTE
Hyponatremia is likely due to renal insufficiency. The patient's most recent sodium results are listed below.  Recent Labs     04/21/25  0439 04/22/25  0524 04/23/25  0531   * 136 137     Plan  - Correct the sodium by 4-6mEq in 24 hours.   - Obtain the following studies:  In a.  - Will treat the hyponatremia with continue LR  - Monitor sodium Daily.   - Patient hyponatremia is stable  - follow  Follow

## 2025-04-24 NOTE — PROGRESS NOTES
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  Patient was initially admitted to the hospital on April 4 would dehydration gastroparesis UTI.  She has not been able to eat previously in his now admitted to the hospital with concerns over nutrition since she has been in the hospital she has had shortness of breath and has developed worsening right-sided infiltrates.      Hospital/ICU Course:  4/14/25-asked to see by General Pulmonary for evaluation of possible EBUS bronchoscopy.  Patient with prior EBUS bronchoscopy performed 10/21/24 with evidence of malignancy present in station 4 L and station 4R.  Recent cessation of her EGFR TKI.    Pulmonary re-engaged for lower respiratory infection and concern for pneumonitis.     Interval History/Significant Events: more lethargic, requesting minimal intervention, no Rx this am per patient preference, worsening renal function    Review of Systems  Objective:     Vital Signs (Most Recent):  Temp: 97.5 °F (36.4 °C) (04/24/25 1141)  Pulse: 96 (04/24/25 1141)  Resp: 18 (04/24/25 1141)  BP: 119/72 (04/24/25 1141)  SpO2: 98 % (04/24/25 1141) Vital Signs (24h Range):  Temp:  [97.5 °F (36.4 °C)-98.9 °F (37.2 °C)] 97.5 °F (36.4 °C)  Pulse:  [] 96  Resp:  [17-22] 18  SpO2:  [93 %-100 %] 98 %  BP: ()/(52-72) 119/72   Weight: (!) 136.1 kg (300 lb)  Body mass index is 49.92 kg/m².      Intake/Output Summary (Last 24 hours) at 4/24/2025 1141  Last data filed at 4/24/2025  0828  Gross per 24 hour   Intake 0 ml   Output 1000 ml   Net -1000 ml          Physical Exam  Constitutional:       General: She is not in acute distress.     Appearance: She is not toxic-appearing.   HENT:      Head: Normocephalic and atraumatic.   Eyes:      General: No scleral icterus.  Cardiovascular:      Rate and Rhythm: Normal rate and regular rhythm.      Heart sounds: Murmur heard.   Pulmonary:      Effort: Pulmonary effort is normal.      Breath sounds: Rhonchi present.   Skin:     General: Skin is warm.      Coloration: Skin is not jaundiced.   Neurological:      Mental Status: She is alert. Mental status is at baseline.            Vents:  Oxygen Concentration (%): 28 (04/24/25 0745)  Lines/Drains/Airways       Peripherally Inserted Central Catheter Line  Duration             PICC Double Lumen 04/17/25 1547 left brachial 6 days              Drain  Duration             Female External Urinary Catheter w/ Suction 04/21/25 1950 2 days                  Significant Labs:    CBC/Anemia Profile:  Recent Labs   Lab 04/23/25  0532   WBC 26.13*   HGB 8.8*   HCT 26.8*      MCV 90.2   RDW 21.2*        Chemistries:  Recent Labs   Lab 04/23/25  0531 04/24/25  0429    138   K 5.6* 5.5*   * 109*   CO2 16* 18*   BUN 99* 106*   CREATININE 3.81* 4.31*   CALCIUM 8.3* 8.5       All pertinent labs within the past 24 hours have been reviewed.    Significant Imaging:  I have reviewed all pertinent imaging results/findings within the past 24 hours.    ABG  Recent Labs   Lab 04/20/25  1146   PH 7.37   PO2 78*   PCO2 37   HCO3 21.4     Assessment/Plan:     Pulmonary  Pneumonitis  Initially concerned for ICI with pneumonitis. Concern for previous c/b pancreatitis as well. CXR with interval improvement. Now with infectious component as well. Given improvement and extrapulmonary organ involvement, plan on taper to 10 mg prednisone maintenance outpatient.   - cont Prednisone 40 mg; taper ordered for 20 mg and finally  10 mg maintenance   -- if recurrence of symptoms with step down in therapy, step up to previous steroid dose and maintain without taper  - lowest tolerable dose of steroid planned, choice of taper course has included consideration of PJP ppx Rx need, none at present  - will consider PJP ppx if remains of prednisone 20 mg >3 weeks; Atovaquone for ppx  - grade 2 pneumonitis given clinical course, will require steroid therapy with future ICI after resolution of symptoms, feasible for rechallenge   -- literature reviewed with 1% incidence of pneumonitis during study    Renal/  Acute on chronic kidney failure  JOHNATHON progressing with ongoing hyperkalemia. Medications reviewed. Stopping continuous IVF with clinical and lab evidence of progressive edema. Will monitor BMP daily and assess for SCr stability. Concern for cardiorenal syndrome.   - holding additional diuresis at this time  - daily BMP      ID  Infection due to Stenotrophomonas maltophilia  68 yo F with immunosuppression from ongoing therapy for metastatic lung cancer now found to have Stenotrophomonas lower respiratory infection in this patient with multifocal infiltrates and consolidative opacities on CT Chest. Ideally, treatment with Bactrim and given severity of illness and immunosuppression, dual therapy with Levaquin. This is limited by ongoing JOHNATHON with hyperkalemia. Will plan on treatment with Levaquin for a goal 14 day course with Pharmacy assisting with renal dosing. Erring on side of caution given degree of illness and will recommend against minocycline treatment at this time for side effect profile and will consider for recurrent infection after this treatment course.   - cont Levaquin, end date ordered    Goals of Care as per primary team and Oncology outpatient team. Pulmonology will sign off at this time. Recs as above for steroid Rx and Abx course.          Bea York MD  Critical Care Medicine  [facility]

## 2025-04-24 NOTE — ASSESSMENT & PLAN NOTE
Hyperkalemia is likely due to ESRD.The patients most recent potassium results are listed below.  Recent Labs     04/21/25  0439 04/22/25  0524 04/23/25  0531   K 5.6* 5.7* 5.6*     Plan  - Monitor for arrhythmias with EKG and/or continuous telemetry.   - Treat the hyperkalemia with Sodium Bicarbonate.   - Monitor potassium: Daily  - The patient's hyperkalemia is stable

## 2025-04-24 NOTE — PLAN OF CARE
Problem: Bariatric Environmental Safety  Goal: Safety Maintained with Care  Outcome: Progressing     Problem: Anxiety  Goal: Anxiety Reduction or Resolution  Outcome: Progressing     Problem: Sepsis/Septic Shock  Goal: Optimal Coping  Outcome: Progressing  Goal: Absence of Bleeding  Outcome: Progressing  Goal: Blood Glucose Level Within Targeted Range  Outcome: Progressing  Goal: Absence of Infection Signs and Symptoms  Outcome: Progressing  Goal: Optimal Nutrition Intake  Outcome: Progressing     Problem: Pneumonia  Goal: Fluid Balance  Outcome: Progressing  Goal: Resolution of Infection Signs and Symptoms  Outcome: Progressing  Goal: Effective Oxygenation and Ventilation  Outcome: Progressing      Photo Preface (Leave Blank If You Do Not Want): Photographs were obtained today Detail Level: Zone

## 2025-04-24 NOTE — PLAN OF CARE
Problem: Adult Inpatient Plan of Care  Goal: Plan of Care Review  Outcome: Not Progressing  Goal: Patient-Specific Goal (Individualized)  Outcome: Not Progressing  Goal: Absence of Hospital-Acquired Illness or Injury  Outcome: Not Progressing  Goal: Optimal Comfort and Wellbeing  Outcome: Not Progressing  Goal: Readiness for Transition of Care  Outcome: Not Progressing     Problem: Bariatric Environmental Safety  Goal: Safety Maintained with Care  Outcome: Not Progressing     Problem: Skin Injury Risk Increased  Goal: Skin Health and Integrity  Outcome: Not Progressing     Problem: Infection  Goal: Absence of Infection Signs and Symptoms  Outcome: Not Progressing     Problem: Fall Injury Risk  Goal: Absence of Fall and Fall-Related Injury  Outcome: Not Progressing     Problem: UTI (Urinary Tract Infection)  Goal: Improved Infection Symptoms  Outcome: Not Progressing     Problem: Anxiety  Goal: Anxiety Reduction or Resolution  Outcome: Not Progressing

## 2025-04-24 NOTE — ASSESSMENT & PLAN NOTE
Creatine stable for now. BMP reviewed- noted Estimated Creatinine Clearance: 19.8 mL/min (A) (based on SCr of 3.81 mg/dL (H)). according to latest data. Based on current GFR, CKD stage is stage 5 - GFR < 15.  Monitor UOP and serial BMP and adjust therapy as needed. Renally dose meds. Avoid nephrotoxic medications and procedures.

## 2025-04-24 NOTE — ASSESSMENT & PLAN NOTE
"On Reglan and PPI for erosive gastritis; will monitor    EGD by Dr. Lo during recent past admission at [facility]:  "EGD on 03/21/2025 shows LA class B erosive esophagitis but no pill esophagitis, nonerosive gastritis and retention of food and fluid suspicious for gastroparesis, due to narcotics and diabetes. "    04/ 08 try additional meds to help gastric emptying.  04/09 taking in some now orally with recent changes meds  04/10 better and ate half [place] for lunch   04/14 tolerating enteral feedings so far  4/15 we will get PEG tube if patient is agreeable.  Daughter is agreeable.  Discussed with her  4/16 patient wants PEG tube  4/17 not candidate for surgical PEG.  NGT out, start full liquids  04/18 taking some orally  "

## 2025-04-24 NOTE — ASSESSMENT & PLAN NOTE
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 Most recent hemoglobin and hematocrit are listed below.  Recent Labs     04/21/25  0439 04/22/25  0524 04/23/25  0532   HGB 9.6* 8.3* 8.8*   HCT 29.3* 25.9* 26.8*     Plan  - Monitor serial CBC: Daily  - Transfuse PRBC if patient becomes hemodynamically unstable, symptomatic or H/H drops below 7/21.  - Patient has not received any PRBC transfusions to date  - Patient's anemia is currently stable  - follow  No evidence of bleeding

## 2025-04-24 NOTE — PT/OT/SLP PROGRESS
Physical Therapy Treatment    Patient Name:  Magan Saleem   MRN:  [id number]    Recommendations:     Discharge Recommendations: Moderate Intensity Therapy  Discharge Equipment Recommendations: to be determined by next level of care  Barriers to discharge:  ongoing medical care, awaiting placement    Assessment:     Magan Saleem is a 69 y.o. female admitted with a medical diagnosis of Sepsis due to pneumonia.  She presents with the following impairments/functional limitations: weakness, impaired endurance, impaired self care skills, impaired functional mobility, decreased lower extremity function. Pt demo improved tolerance to EOB sitting (8 mins) with Max A of 1-2. Pt also able to tolerate BLE ex with AAROM while sitting.     Rehab Prognosis: Good and Fair; patient would benefit from acute skilled PT services to address these deficits and reach maximum level of function.    Recent Surgery: * No surgery found *      Plan:     During this hospitalization, patient to be seen 5 x/week to address the identified rehab impairments via gait training, therapeutic activities, therapeutic exercises, neuromuscular re-education and progress toward the following goals:    Plan of Care Expires:  05/04/25    Subjective     Chief Complaint: Sepsis due to pneumonia   Patient/Family Comments/goals: agreeable  Pain/Comfort: 0/10         Objective:     Communicated with RN prior to session.  Patient found HOB elevated with peripheral IV, PureWick, oxygen upon PT entry to room.     General Precautions: Standard, fall, contact  Orthopedic Precautions: N/A  Braces: N/A  Respiratory Status: Nasal cannula, flow 2 L/min     Functional Mobility:  Bed Mobility:     Scooting: maximal assistance and of 3 persons  Supine to Sit: maximal assistance and of 3 persons  Sit to Supine: maximal assistance and of 3 persons  Balance: EOB sitting x 8 mins with Mod to max x 2       AM-PAC 6 CLICK MOBILITY          Treatment & Education:  Bilateral lower  extremity exercise x 10-12 reps: ankle pumps, heel slides, hip abduction/adduction, Long arc quads, and Marching with active assist ROM, verbal cues for sequencing and safety, and tactile cues     Patient left HOB elevated with all lines intact, call button in reach, and CNA and friend present..    GOALS:   Multidisciplinary Problems       Physical Therapy Goals          Problem: Physical Therapy    Goal Priority Disciplines Outcome Interventions   Physical Therapy Goal     PT, PT/OT Progressing    Description: Short term goals:  1. Supine to sit with MInimal Assistance  2. Sit to stand transfer with Moderate Assistance  3. Bed to chair transfer with Moderate Assistance using Rolling Walker  4. Sitting at edge of bed x15 minutes with Contact Guard Assistance    Long term goals:  1. Supine to sit with Contact Guard Assistance  2. Sit to stand transfer with Contact Guard Assistance  3. Bed to chair transfer with Contact Guard Assistance using Rolling Walker  4. Gait  x 100 feet with Contact Guard Assistance using Rolling Walker.                          DME Justifications:  To be determined.    Time Tracking:     PT Received On: 04/24/25  PT Start Time: 1615     PT Stop Time: 1638  PT Total Time (min): 23 min     Billable Minutes: Therapeutic Activity 13 and Therapeutic Exercise 10    Treatment Type: Treatment  PT/PTA: PT     Number of PTA visits since last PT visit: 0     04/24/2025

## 2025-04-24 NOTE — ASSESSMENT & PLAN NOTE
Vitals:    04/22/25 1131 04/22/25 1607 04/22/25 2018 04/23/25 0029   BP: 116/70 109/63 112/60 (!) 99/56    04/23/25 0547 04/23/25 0715 04/23/25 0805 04/23/25 1118   BP: (!) 99/58 (!) 101/54 (!) 101/54 (!) 102/51    04/23/25 1609 04/23/25 1934   BP: (!) 101/52 112/64         Monitor BP while on Metoprolol; on IVF; off Losartan

## 2025-04-24 NOTE — SUBJECTIVE & OBJECTIVE
Interval History:     Review of Systems   Constitutional:  Negative for appetite change, fatigue and fever.   HENT:  Negative for congestion and hearing loss.    Respiratory:  Negative for chest tightness and wheezing.    Cardiovascular:  Negative for chest pain and palpitations.   Gastrointestinal:  Negative for abdominal pain, constipation and nausea.   Genitourinary:  Negative for difficulty urinating and dysuria.   Musculoskeletal:  Positive for gait problem. Negative for back pain and neck stiffness.   Skin:  Negative for pallor and rash.   Neurological:  Negative for dizziness, speech difficulty and headaches.   Psychiatric/Behavioral:  Positive for dysphoric mood and sleep disturbance. Negative for confusion and suicidal ideas.      Objective:     Vital Signs (Most Recent):  Temp: 98.9 °F (37.2 °C) (04/23/25 1934)  Pulse: 105 (04/23/25 1955)  Resp: 17 (04/23/25 2221)  BP: 112/64 (04/23/25 1934)  SpO2: 98 % (04/23/25 1955) Vital Signs (24h Range):  Temp:  [98.1 °F (36.7 °C)-98.9 °F (37.2 °C)] 98.9 °F (37.2 °C)  Pulse:  [101-113] 105  Resp:  [17-22] 17  SpO2:  [92 %-99 %] 98 %  BP: ()/(51-64) 112/64     Weight: (!) 136.1 kg (300 lb)  Body mass index is 49.92 kg/m².    Intake/Output Summary (Last 24 hours) at 4/23/2025 2240  Last data filed at 4/23/2025 1609  Gross per 24 hour   Intake --   Output 1500 ml   Net -1500 ml           Physical Exam  Vitals reviewed.   Constitutional:       General: She is awake. She is not in acute distress.     Appearance: She is well-developed. She is morbidly obese. She is not toxic-appearing.   HENT:      Head: Normocephalic.      Nose: Nose normal.      Mouth/Throat:      Pharynx: Oropharynx is clear.   Eyes:      Extraocular Movements: Extraocular movements intact.      Pupils: Pupils are equal, round, and reactive to light.   Neck:      Thyroid: No thyroid mass.      Vascular: No carotid bruit.   Cardiovascular:      Rate and Rhythm: Normal rate and regular rhythm.       Pulses: Normal pulses.      Heart sounds: Normal heart sounds. No murmur heard.  Pulmonary:      Effort: Pulmonary effort is normal.      Breath sounds: Normal breath sounds and air entry. No wheezing.   Abdominal:      General: Bowel sounds are normal. There is no distension.      Palpations: Abdomen is soft.      Tenderness: There is no abdominal tenderness.   Musculoskeletal:         General: Normal range of motion.      Cervical back: Neck supple. No rigidity.   Skin:     General: Skin is warm.      Coloration: Skin is not jaundiced.      Findings: No lesion.   Neurological:      General: No focal deficit present.      Mental Status: She is alert and oriented to person, place, and time.      Cranial Nerves: No cranial nerve deficit.   Psychiatric:         Attention and Perception: Attention normal.         Mood and Affect: Mood normal.         Behavior: Behavior normal. Behavior is cooperative.         Thought Content: Thought content normal.         Cognition and Memory: Cognition normal.               Significant Labs: All pertinent labs within the past 24 hours have been reviewed.  BMP:   Recent Labs   Lab 04/22/25  0524 04/23/25  0531   GLU 89 56*    137   K 5.7* 5.6*    108*   CO2 18* 16*   * 99*   CREATININE 3.85* 3.81*   CALCIUM 8.2* 8.3*   MG 2.3  --        CBC:   Recent Labs   Lab 04/22/25  0524 04/23/25  0532   WBC 28.13* 26.13*   HGB 8.3* 8.8*   HCT 25.9* 26.8*    160         CMP:   Recent Labs   Lab 04/22/25  0524 04/23/25  0531    137   K 5.7* 5.6*    108*   CO2 18* 16*   GLU 89 56*   * 99*   CREATININE 3.85* 3.81*   CALCIUM 8.2* 8.3*   PROT 4.0*  --    ALBUMIN 1.6*  --    BILITOT 0.7  --    ALKPHOS 145  --    AST 34  --    ALT 17  --    ANIONGAP 18* 19*         Significant Imaging: I have reviewed all pertinent imaging results/findings within the past 24 hours.      Intake/Output - Last 3 Shifts         04/22 0700 04/23 0659 04/23 0700 04/24 0659     P.O.      I.V. (mL/kg)      Total Intake(mL/kg)      Urine (mL/kg/hr) 500 (0.2) 1000 (0.5)    Total Output 500 1000    Net -500 -1000                Microbiology Results (last 7 days)       Procedure Component Value Units Date/Time    Culture, Fungus (Other) [[id number]]  (Abnormal) Collected: 04/15/25 0943    Order Status: Completed Specimen: Respiratory from BAL Updated: 04/22/25 1617     Culture, Fungus (Other) Yeast isolated.    Culture, Lower Respiratory [[id number]]  (Abnormal)  (Susceptibility) Collected: 04/15/25 0943    Order Status: Completed Specimen: Respiratory from BAL Updated: 04/21/25 1516     Culture, Lower Respiratory Light Growth Stenotrophomonas maltophilia

## 2025-04-24 NOTE — NURSING
"1250 spoke with family member to get some clarification on whether she was d/c back to [facility] or what the status of that was. Family member states that [facility] said she is too complex and they do not have bariatric beds, which she needs. V/u. Family member asked if she was supposed to d/c today. Informed her that I was not informed of that at all. Re-read through CW notes to make sure, and all I see is  "potential" d/c today but we are pending insurance approval, etc, but as of now, I do not have an order or packet or anything of that nature. Family member v/u. Informed her that I will call her with any changes r/t d/c. V/u. Secure chatted SW on this patient's case to make sure that [facility] does have a bariatric bed there because she is unable to use a regular bed properly. Awaiting response from SW at this time.     1305 spoke with OC who was at [facility] rounds and states that she can't go to [facility] on hospice since she does not have medicaid. And she doesn't qualify for SNF r/t not being active with PT. Pt does not have a caregiver at home, per family, and she has to d/c to a facility per family, so at this time, patient is awaiting acceptance to a facility.  "

## 2025-04-24 NOTE — PROGRESS NOTES
[facility]  [facility] Medicine  Progress Note    Patient Name: Magan Saleem  MRN: [id number]  Patient Class: IP- Inpatient   Admission Date: 4/3/2025  Length of Stay: 20 days  Attending Physician: Andriy Rizvi MD  Primary Care Provider: Sil Brown DO        Subjective     Principal Problem:Sepsis due to pneumonia        HPI:  70 yo F presents to [place] ED from [facility] for abnormal labs.  Patient was discharged from [facility] two days ago to skilled nursing facility for rehab.  She was diagnosed with dehydration due to gastroparesis with UTI.  Patient has metastatic lung cancer (to the brain) and follows with Dr. Swnason for IV chemotherapy every other week and takes lazertinib daily.  She has completed her course of radiation for the brain mets and follow had showed some improvement.  I thought she had either some decreased LOC due to encephalopathy or some aphasia from the brain mets but after a great effort to get her to talk, I realized she was just mad.  She wanted to know why she had been discharged two days ago when she could not eat.  She has no appetite and early satiety.  She is not nauseated and no vomiting.  She has decided on a DNR status previously (her caretaker and friend of 20 years) states that she had always said she did not want resuscitation if she experienced cardiopulmonary arrest and had told her children her wishes but she does want treatment and is not opposed to J tube if needed.  She has not had anything to eat or drink in two days and is dehydrated again.      Patient was transferred because of concern of sepsis.  She did have enterococcus faecalis UTI at [facility] and was treated.  I do not have the culture results but cultures were sent and patient does have some yeast noted.  (Will not treat a yeast colonization).  She is afebrile and hemodynamically stable and does not look septic clinically.  Her Lactic acid is stable at 2.5 dara 3.2.  Will check  another in am after volume resuscitation.  Her creat is at baseline but she has prerenal azotemia further confirming the dehydration.  Patient has an IO in place from [facility] due to dehydration and difficult stick but with some volume resuscitation, we have gotten an IV.  She is covid and flu negative.      Her WBC is 29 and I am not sure if she has received neupogen but could be a stress reaction.  Her BS is 245 and she does have some urine ketones but most likely due to starvation ketosis.  Will check a serum ketone.  Her platelets are 88K but this is most likely from her chemo.  She is not anemic.  CXR shows some patchy infiltrate but no obvious obstructive pneumonia from her lung cancer.  Her BNP about 3K but no clinical signs of CHF.  No recent echo but due to her BMI 50 most likely has some PHTN.  EKG had no ischemic changes but tachy at 114 which could also be from dehydration.  She was on ozempic for her DM and this could be the cause of her decreased appetite.  She is also on decadron for prevention of cerebral edema.      Remainder of ROS as below.  She mostly just nods and shakes her head and rolls her eyes.  You can get her to laugh if you try but she has been depressed since she has not walked since her hospitalization at [facility] on 3/19/25 and was discharged two days ago.  She says that at her last chemo she noticed she was getting weaker and her daughter had to help her in and out of the car and that was new for her.      See assessment and plan below for problem based evaluation       Overview/Hospital Course:  69 year old female presents with hypernatremia; she is not too talkative during rounds    4/5- patient seen examined today resting comfortably in bed and in no acute distress, with no acute events overnight.  Patient has a multitude of issues complicating her medical picture.  White blood cell count [id number] today, sodium 159, potassium 3.2 and BUN creatinine 59 and 1.8.  The patient is still not eating  even when assistance is provided.  We have already changed her fluids to half-normal saline with 20 of KCl at 1:25 a.m..  Have also put in a GI consult for possible feeding tube.  E coli in the urine has turned out to be ESBL and Rocephin has been changed Merrem.    4/6- the patient seen examined today resting comfortably in bed, in no acute distress, in no acute events overnight.  The patient is not very talkative today, but states she is doing okay.  She has no acute complaints.  Blood cultures remain negative.  The patient has not eaten since yesterday and is on light IV fluids.  GI has been consulted for feeding tube.  Continues on Merrem for positive urine culture.    04/07 Records reviewed. Not eating which not new, Similar during recent admit at [facility]. Dr Swanson there had question pancreatitis. No abd pain or tenderness reported now. Question of dysphagia to solids. Chart hx gastroparesis. Will discuss with GI. Ronnie says has responded so far well to treatment for lung CA.   04/08 had EGD at [facility] 03/21/25 with no obstruction and evidence gastroparesis. Still not ending. Try additional meds. Talked with GI. Increase activity  04/09 Some better with med  changes  04/10 Continues to do better. Ate 1/2 fish sandwich for lunch. Called by Dr Swanson and she wanting to keep feeding tube in consideration. Talked with Dr Reich and Dr Lemus. If something needed with gastroparesis would have to have post gastric position of tube.   04/11 eating some. Later staff requested some nystatin for sore mouth.   04/12 still not eating well.  Increased peripheral edema.  Albumin low at 1.5.  Will give some albumin and follow with some Lasix.  Placed Dobbhoff tube and start enteral feedings.  This will help with nutrition and if issue of placing surgical tube later make sure will tolerate enteral feedings.  Chest x-ray continues worse on left side.  Discuss with Pulmonary and ask Dr. Villasenor to see.   04/13 no new issues.   Enteral feedings to be started.  Pulmonary evaluation appreciated and plans noted.  04/14 Tolerating feedings Therapy working with her. Bronchoscopy planned.   4/15 BAL today  4/16 bronch yesterday looks like pneumonitis  4/17 not candidate for PEG    04/18 Eating some now. Pt says feels some better than last seen. Look at placement. High dose steroids by pulmonary. BS not as bad as would expect.  04/19 states continued eat some.  Less nausea.  Blood sugar not sure bad considering the steroids.  Pulmonary adjusted antibiotics based on cultures.  Look at it placement next week.   04/20 Looks the small. C/O SOB to nursing staff earlier but ABG OK. Poor oral intake ; encourage for pt to do more. Looking into placement.  04/21 Firm tender area in abd wall above umbilicus; ?hernia. Abnormal CT de santiago noted and will discuss with surgery.   04/22 CT shows evolving pancreatitis which pt treated for acouple weeks earlier at [facility]. Reviewed with Dr Tapia. No plan to operate on ventral hernia. Discussed later with Dr York. See how does off IVF and encourage oral intake. WBC and Cr both slight better.   04/23 Looks this same. Pt eating some. Pt getting discouraged and asking about home hospice. Talked by phone with her friend Shauna. In conversion doesn't sound like family would be able to care for pt at home. Encouraged pt to give some time and attempt SWB. She says she with consider tonight.     Interval History:     Review of Systems   Constitutional:  Negative for appetite change, fatigue and fever.   HENT:  Negative for congestion and hearing loss.    Respiratory:  Negative for chest tightness and wheezing.    Cardiovascular:  Negative for chest pain and palpitations.   Gastrointestinal:  Negative for abdominal pain, constipation and nausea.   Genitourinary:  Negative for difficulty urinating and dysuria.   Musculoskeletal:  Positive for gait problem. Negative for back pain and neck stiffness.   Skin:  Negative for pallor and  rash.   Neurological:  Negative for dizziness, speech difficulty and headaches.   Psychiatric/Behavioral:  Positive for dysphoric mood and sleep disturbance. Negative for confusion and suicidal ideas.      Objective:     Vital Signs (Most Recent):  Temp: 98.9 °F (37.2 °C) (04/23/25 1934)  Pulse: 105 (04/23/25 1955)  Resp: 17 (04/23/25 2221)  BP: 112/64 (04/23/25 1934)  SpO2: 98 % (04/23/25 1955) Vital Signs (24h Range):  Temp:  [98.1 °F (36.7 °C)-98.9 °F (37.2 °C)] 98.9 °F (37.2 °C)  Pulse:  [101-113] 105  Resp:  [17-22] 17  SpO2:  [92 %-99 %] 98 %  BP: ()/(51-64) 112/64     Weight: (!) 136.1 kg (300 lb)  Body mass index is 49.92 kg/m².    Intake/Output Summary (Last 24 hours) at 4/23/2025 2240  Last data filed at 4/23/2025 1609  Gross per 24 hour   Intake --   Output 1500 ml   Net -1500 ml           Physical Exam  Vitals reviewed.   Constitutional:       General: She is awake. She is not in acute distress.     Appearance: She is well-developed. She is morbidly obese. She is not toxic-appearing.   HENT:      Head: Normocephalic.      Nose: Nose normal.      Mouth/Throat:      Pharynx: Oropharynx is clear.   Eyes:      Extraocular Movements: Extraocular movements intact.      Pupils: Pupils are equal, round, and reactive to light.   Neck:      Thyroid: No thyroid mass.      Vascular: No carotid bruit.   Cardiovascular:      Rate and Rhythm: Normal rate and regular rhythm.      Pulses: Normal pulses.      Heart sounds: Normal heart sounds. No murmur heard.  Pulmonary:      Effort: Pulmonary effort is normal.      Breath sounds: Normal breath sounds and air entry. No wheezing.   Abdominal:      General: Bowel sounds are normal. There is no distension.      Palpations: Abdomen is soft.      Tenderness: There is no abdominal tenderness.   Musculoskeletal:         General: Normal range of motion.      Cervical back: Neck supple. No rigidity.   Skin:     General: Skin is warm.      Coloration: Skin is not jaundiced.       Findings: No lesion.   Neurological:      General: No focal deficit present.      Mental Status: She is alert and oriented to person, place, and time.      Cranial Nerves: No cranial nerve deficit.   Psychiatric:         Attention and Perception: Attention normal.         Mood and Affect: Mood normal.         Behavior: Behavior normal. Behavior is cooperative.         Thought Content: Thought content normal.         Cognition and Memory: Cognition normal.               Significant Labs: All pertinent labs within the past 24 hours have been reviewed.  BMP:   Recent Labs   Lab 04/22/25 0524 04/23/25  0531   GLU 89 56*    137   K 5.7* 5.6*    108*   CO2 18* 16*   * 99*   CREATININE 3.85* 3.81*   CALCIUM 8.2* 8.3*   MG 2.3  --        CBC:   Recent Labs   Lab 04/22/25 0524 04/23/25  0532   WBC 28.13* 26.13*   HGB 8.3* 8.8*   HCT 25.9* 26.8*    160         CMP:   Recent Labs   Lab 04/22/25 0524 04/23/25  0531    137   K 5.7* 5.6*    108*   CO2 18* 16*   GLU 89 56*   * 99*   CREATININE 3.85* 3.81*   CALCIUM 8.2* 8.3*   PROT 4.0*  --    ALBUMIN 1.6*  --    BILITOT 0.7  --    ALKPHOS 145  --    AST 34  --    ALT 17  --    ANIONGAP 18* 19*         Significant Imaging: I have reviewed all pertinent imaging results/findings within the past 24 hours.      Intake/Output - Last 3 Shifts         04/22 0700 04/23 0659 04/23 0700 04/24 0659    P.O.      I.V. (mL/kg)      Total Intake(mL/kg)      Urine (mL/kg/hr) 500 (0.2) 1000 (0.5)    Total Output 500 1000    Net -500 -1000                Microbiology Results (last 7 days)       Procedure Component Value Units Date/Time    Culture, Fungus (Other) [[id number]]  (Abnormal) Collected: 04/15/25 0943    Order Status: Completed Specimen: Respiratory from BAL Updated: 04/22/25 1617     Culture, Fungus (Other) Yeast isolated.    Culture, Lower Respiratory [[id number]]  (Abnormal)  (Susceptibility) Collected: 04/15/25 0943    Order  Status: Completed Specimen: Respiratory from BAL Updated: 04/21/25 1516     Culture, Lower Respiratory Light Growth Stenotrophomonas maltophilia                Assessment & Plan  Sepsis due to pneumonia  Appears to have UTI and pneumonia; has leukocytosis; has opacities on CXR; has G- bacilli on urine culture; blood cultures pending; will place on Vancomycin and Rocephin; BP on the low side; on IVF    04/12 worsening chest x-ray, ask Pulmonary to review  4/15 plan for BAL today.  Continue antibiotic  4/16 7 days antibiotic  completed  Hypernatremia  Due to dehydration; off diuretics; on IVF; will monitor Na levels  Lung cancer metastatic to brain  DNR but not hospice.  Receives chemo and has finished radiation.    04/07 reported stable / improved after treatment  04/09 more fuffy right side CXR  04/12 continue to abnormality on chest x-ray and ask Pulmonary to review  4/15 follows with Dr. Swanson.  No more chemotherapy treatments until patient is stronger.  Discussed with Dr. Swanson  4/16 hold treatments for now  04/20 brain lesion prior treated and I told been stable    Thrombocytopenia  On chemo; will monitor  04/19 platelet count 149 wishes improved    Essential hypertension  Vitals:    04/22/25 1131 04/22/25 1607 04/22/25 2018 04/23/25 0029   BP: 116/70 109/63 112/60 (!) 99/56    04/23/25 0547 04/23/25 0715 04/23/25 0805 04/23/25 1118   BP: (!) 99/58 (!) 101/54 (!) 101/54 (!) 102/51    04/23/25 1609 04/23/25 1934   BP: (!) 101/52 112/64         Monitor BP while on Metoprolol; on IVF; off Losartan    Moderate persistent asthma without complication  Continue home inhaled steroid/bronchodilator and singulair    Acute on chronic kidney failure  Creatine stable     04/20 Cr recently been climbing. May need some IVF. Check BNP in am  Chronic pulmonary embolism without acute cor pulmonale  Eliquis was discontinued due to risk of ICH with brain mets but they have improved so the anti-coagulation has been restarted;  "will monitor  04/07 apparently this of several years ago    Type 2 diabetes mellitus with hyperglycemia  BS elevated; will increase Lantus to 25 units; continue SSI;monitor BS     Gastroparesis  On Reglan and PPI for erosive gastritis; will monitor    EGD by Dr. Lo during recent past admission at [facility]:  "EGD on 03/21/2025 shows LA class B erosive esophagitis but no pill esophagitis, nonerosive gastritis and retention of food and fluid suspicious for gastroparesis, due to narcotics and diabetes. "    04/ 08 try additional meds to help gastric emptying.  04/09 taking in some now orally with recent changes meds  04/10 better and ate half [place] for lunch   04/14 tolerating enteral feedings so far  4/15 we will get PEG tube if patient is agreeable.  Daughter is agreeable.  Discussed with her  4/16 patient wants PEG tube  4/17 not candidate for surgical PEG.  NGT out, start full liquids  04/18 taking some orally  Morbid obesity  Body mass index is 49.92 kg/m². Morbid obesity complicates all aspects of disease management from diagnostic modalities to treatment. Weight loss encouraged and health benefits explained to patient.     Would benefit from sleep study and possible CPAP.  Does not wear oxygen at home.      Edema due to hypoalbuminemia    04/12 add IV albumin and some Lasix  Start enteral feedings  04/13 start enteral feedings  04/22 albumin 1.6  Anemia  Anemia is likely due to chronic disease due to Malignancy. Most recent hemoglobin and hematocrit are listed below.  Recent Labs     04/21/25  0439 04/22/25  0524 04/23/25  0532   HGB 9.6* 8.3* 8.8*   HCT 29.3* 25.9* 26.8*     Plan  - Monitor serial CBC: Daily  - Transfuse PRBC if patient becomes hemodynamically unstable, symptomatic or H/H drops below 7/21.  - Patient has not received any PRBC transfusions to date  - Patient's anemia is currently stable  - follow  No evidence of bleeding  Hyponatremia  Hyponatremia is likely due to renal " insufficiency. The patient's most recent sodium results are listed below.  Recent Labs     04/21/25  0439 04/22/25  0524 04/23/25  0531   * 136 137     Plan  - Correct the sodium by 4-6mEq in 24 hours.   - Obtain the following studies:  In a.  - Will treat the hyponatremia with continue LR  - Monitor sodium Daily.   - Patient hyponatremia is stable  - follow  Follow  Neoplastic (malignant) related fatigue    Progressive mobility protocol  Pneumonitis  IV steroids per pulmonology  04/19 antibiotic adjustment based on cultures by Pulmonary  Dehydration, severe  Encourage orals    Acute pancreatitis    04/21 Abd fluid collection in area pancreas noted on CT abd, discuss with surgery  04/22 likely evolving pancreatitis dx acouple weeks ago at [facility].  No severe epigastric pain.  Infection due to Stenotrophomonas maltophilia    Continue with levaquin  Chronic kidney disease, stage 3b  Creatine stable for now. BMP reviewed- noted Estimated Creatinine Clearance: 19.8 mL/min (A) (based on SCr of 3.81 mg/dL (H)). according to latest data. Based on current GFR, CKD stage is stage 5 - GFR < 15.  Monitor UOP and serial BMP and adjust therapy as needed. Renally dose meds. Avoid nephrotoxic medications and procedures.  Hyperkalemia  Hyperkalemia is likely due to ESRD.The patients most recent potassium results are listed below.  Recent Labs     04/21/25  0439 04/22/25  0524 04/23/25  0531   K 5.6* 5.7* 5.6*     Plan  - Monitor for arrhythmias with EKG and/or continuous telemetry.   - Treat the hyperkalemia with Sodium Bicarbonate.   - Monitor potassium: Daily  - The patient's hyperkalemia is stable          VTE Risk Mitigation (From admission, onward)      None            Discharge Planning   MITUL:      Code Status: DNR   Medical Readiness for Discharge Date:   Discharge Plan A: Home with family                Please place Justification for DME        Andriy Rizvi MD  Department of Hospital Medicine   [facility]

## 2025-04-24 NOTE — ASSESSMENT & PLAN NOTE
04/21 Abd fluid collection in area pancreas noted on CT abd, discuss with surgery  04/22 likely evolving pancreatitis dx acouple weeks ago at [facility]  No severe epigastric pain.

## 2025-04-25 PROBLEM — E87.1 HYPONATREMIA: Status: RESOLVED | Noted: 2025-04-15 | Resolved: 2025-04-25

## 2025-04-25 PROBLEM — E86.0 DEHYDRATION, SEVERE: Status: RESOLVED | Noted: 2025-04-16 | Resolved: 2025-04-25

## 2025-04-25 PROBLEM — J18.9 SEPSIS DUE TO PNEUMONIA: Status: RESOLVED | Noted: 2025-04-04 | Resolved: 2025-04-25

## 2025-04-25 PROBLEM — E87.0 HYPERNATREMIA: Status: RESOLVED | Noted: 2025-04-04 | Resolved: 2025-04-25

## 2025-04-25 PROBLEM — A41.9 SEPSIS DUE TO PNEUMONIA: Status: RESOLVED | Noted: 2025-04-04 | Resolved: 2025-04-25

## 2025-04-25 LAB
ANION GAP SERPL CALCULATED.3IONS-SCNC: 22 MMOL/L (ref 7–16)
ANISOCYTOSIS BLD QL SMEAR: ABNORMAL
BASOPHILS # BLD AUTO: 0.1 K/UL (ref 0–0.2)
BASOPHILS NFR BLD AUTO: 0.3 % (ref 0–1)
BUN SERPL-MCNC: 108 MG/DL (ref 10–20)
BUN/CREAT SERPL: 24 (ref 6–20)
CALCIUM SERPL-MCNC: 8.6 MG/DL (ref 8.4–10.2)
CHLORIDE SERPL-SCNC: 110 MMOL/L (ref 98–107)
CO2 SERPL-SCNC: 15 MMOL/L (ref 23–31)
CREAT SERPL-MCNC: 4.5 MG/DL (ref 0.55–1.02)
CRENATED CELLS: ABNORMAL
DIFFERENTIAL METHOD BLD: ABNORMAL
EGFR (NO RACE VARIABLE) (RUSH/TITUS): 10 ML/MIN/1.73M2
EOSINOPHIL # BLD AUTO: 0.08 K/UL (ref 0–0.5)
EOSINOPHIL NFR BLD AUTO: 0.2 % (ref 1–4)
ERYTHROCYTE [DISTWIDTH] IN BLOOD BY AUTOMATED COUNT: 22.2 % (ref 11.5–14.5)
GLUCOSE SERPL-MCNC: 105 MG/DL (ref 70–105)
GLUCOSE SERPL-MCNC: 112 MG/DL (ref 70–105)
GLUCOSE SERPL-MCNC: 77 MG/DL (ref 82–115)
HCT VFR BLD AUTO: 27.9 % (ref 38–47)
HGB BLD-MCNC: 9 G/DL (ref 12–16)
IMM GRANULOCYTES # BLD AUTO: 0.67 K/UL (ref 0–0.04)
IMM GRANULOCYTES NFR BLD: 1.8 % (ref 0–0.4)
LYMPHOCYTES # BLD AUTO: 0.34 K/UL (ref 1–4.8)
LYMPHOCYTES NFR BLD AUTO: 0.9 % (ref 27–41)
LYMPHOCYTES NFR BLD MANUAL: 2 % (ref 27–41)
MCH RBC QN AUTO: 29.8 PG (ref 27–31)
MCHC RBC AUTO-ENTMCNC: 32.3 G/DL (ref 32–36)
MCV RBC AUTO: 92.4 FL (ref 80–96)
MONOCYTES # BLD AUTO: 1.72 K/UL (ref 0–0.8)
MONOCYTES NFR BLD AUTO: 4.7 % (ref 2–6)
MONOCYTES NFR BLD MANUAL: 3 % (ref 2–6)
MPC BLD CALC-MCNC: 12.4 FL (ref 9.4–12.4)
NEUTROPHILS # BLD AUTO: 33.49 K/UL (ref 1.8–7.7)
NEUTROPHILS NFR BLD AUTO: 92.1 % (ref 53–65)
NEUTS BAND NFR BLD MANUAL: 1 % (ref 1–5)
NEUTS SEG NFR BLD MANUAL: 94 % (ref 50–62)
NRBC # BLD AUTO: 0.02 X10E3/UL
NRBC, AUTO (.00): 0.1 %
PLATELET # BLD AUTO: 183 K/UL (ref 150–400)
PLATELET MORPHOLOGY: ABNORMAL
POLYCHROMASIA BLD QL SMEAR: ABNORMAL
POTASSIUM SERPL-SCNC: 5.5 MMOL/L (ref 3.5–5.1)
RBC # BLD AUTO: 3.02 M/UL (ref 4.2–5.4)
SODIUM SERPL-SCNC: 141 MMOL/L (ref 136–145)
WBC # BLD AUTO: 36.4 K/UL (ref 4.5–11)

## 2025-04-25 PROCEDURE — 25000242 PHARM REV CODE 250 ALT 637 W/ HCPCS: Performed by: HOSPITALIST

## 2025-04-25 PROCEDURE — 27000207 HC ISOLATION

## 2025-04-25 PROCEDURE — 25000003 PHARM REV CODE 250: Performed by: STUDENT IN AN ORGANIZED HEALTH CARE EDUCATION/TRAINING PROGRAM

## 2025-04-25 PROCEDURE — 99900035 HC TECH TIME PER 15 MIN (STAT)

## 2025-04-25 PROCEDURE — 25000003 PHARM REV CODE 250: Performed by: HOSPITALIST

## 2025-04-25 PROCEDURE — 97530 THERAPEUTIC ACTIVITIES: CPT

## 2025-04-25 PROCEDURE — 82962 GLUCOSE BLOOD TEST: CPT

## 2025-04-25 PROCEDURE — 94761 N-INVAS EAR/PLS OXIMETRY MLT: CPT

## 2025-04-25 PROCEDURE — 80048 BASIC METABOLIC PNL TOTAL CA: CPT | Performed by: STUDENT IN AN ORGANIZED HEALTH CARE EDUCATION/TRAINING PROGRAM

## 2025-04-25 PROCEDURE — 27000221 HC OXYGEN, UP TO 24 HOURS

## 2025-04-25 PROCEDURE — 99239 HOSP IP/OBS DSCHRG MGMT >30: CPT | Mod: ,,, | Performed by: HOSPITALIST

## 2025-04-25 PROCEDURE — 97110 THERAPEUTIC EXERCISES: CPT | Mod: CO

## 2025-04-25 PROCEDURE — 94640 AIRWAY INHALATION TREATMENT: CPT

## 2025-04-25 PROCEDURE — 99233 SBSQ HOSP IP/OBS HIGH 50: CPT | Mod: ,,, | Performed by: STUDENT IN AN ORGANIZED HEALTH CARE EDUCATION/TRAINING PROGRAM

## 2025-04-25 PROCEDURE — 36415 COLL VENOUS BLD VENIPUNCTURE: CPT | Performed by: HOSPITALIST

## 2025-04-25 PROCEDURE — 11000001 HC ACUTE MED/SURG PRIVATE ROOM

## 2025-04-25 PROCEDURE — 85025 COMPLETE CBC W/AUTO DIFF WBC: CPT | Performed by: HOSPITALIST

## 2025-04-25 RX ORDER — SODIUM BICARBONATE 650 MG/1
1300 TABLET ORAL 2 TIMES DAILY
Status: ON HOLD
Start: 2025-04-25 | End: 2026-04-25

## 2025-04-25 RX ORDER — ALBUTEROL SULFATE 0.83 MG/ML
2.5 SOLUTION RESPIRATORY (INHALATION)
Status: ON HOLD
Start: 2025-04-25 | End: 2026-04-25

## 2025-04-25 RX ORDER — SODIUM CHLORIDE 9 MG/ML
INJECTION, SOLUTION INTRAVENOUS CONTINUOUS
Status: DISCONTINUED | OUTPATIENT
Start: 2025-04-25 | End: 2025-04-26 | Stop reason: HOSPADM

## 2025-04-25 RX ORDER — SELENIUM 50 MCG
2 TABLET ORAL
Status: ON HOLD
Start: 2025-04-26

## 2025-04-25 RX ORDER — METOPROLOL SUCCINATE 25 MG/1
25 TABLET, EXTENDED RELEASE ORAL DAILY
Status: ON HOLD
Start: 2025-04-26 | End: 2026-04-26

## 2025-04-25 RX ORDER — LEVOFLOXACIN 500 MG/1
500 TABLET, FILM COATED ORAL
Status: ON HOLD
Start: 2025-04-25

## 2025-04-25 RX ORDER — PREDNISONE 10 MG/1
TABLET ORAL
Status: ON HOLD
Start: 2025-04-26 | End: 2025-06-17

## 2025-04-25 RX ADMIN — ATORVASTATIN CALCIUM 20 MG: 20 TABLET, FILM COATED ORAL at 08:04

## 2025-04-25 RX ADMIN — METOCLOPRAMIDE HYDROCHLORIDE 10 MG: 5 TABLET ORAL at 04:04

## 2025-04-25 RX ADMIN — BUDESONIDE 0.5 MG: 0.5 SUSPENSION RESPIRATORY (INHALATION) at 07:04

## 2025-04-25 RX ADMIN — HYDROCODONE BITARTRATE AND ACETAMINOPHEN 1 TABLET: 7.5; 325 TABLET ORAL at 09:04

## 2025-04-25 RX ADMIN — METOCLOPRAMIDE HYDROCHLORIDE 10 MG: 5 TABLET ORAL at 08:04

## 2025-04-25 RX ADMIN — IPRATROPIUM BROMIDE AND ALBUTEROL SULFATE 3 ML: 2.5; .5 SOLUTION RESPIRATORY (INHALATION) at 12:04

## 2025-04-25 RX ADMIN — SODIUM CHLORIDE: 9 INJECTION, SOLUTION INTRAVENOUS at 12:04

## 2025-04-25 RX ADMIN — METOCLOPRAMIDE HYDROCHLORIDE 10 MG: 5 TABLET ORAL at 06:04

## 2025-04-25 RX ADMIN — LEVOFLOXACIN 500 MG: 500 TABLET, FILM COATED ORAL at 08:04

## 2025-04-25 RX ADMIN — Medication 2 CAPSULE: at 05:04

## 2025-04-25 RX ADMIN — SODIUM BICARBONATE 650 MG TABLET 1300 MG: at 08:04

## 2025-04-25 RX ADMIN — IPRATROPIUM BROMIDE AND ALBUTEROL SULFATE 3 ML: 2.5; .5 SOLUTION RESPIRATORY (INHALATION) at 07:04

## 2025-04-25 RX ADMIN — MONTELUKAST 10 MG: 10 TABLET, FILM COATED ORAL at 08:04

## 2025-04-25 RX ADMIN — METOCLOPRAMIDE HYDROCHLORIDE 10 MG: 5 TABLET ORAL at 12:04

## 2025-04-25 RX ADMIN — IPRATROPIUM BROMIDE AND ALBUTEROL SULFATE 3 ML: 2.5; .5 SOLUTION RESPIRATORY (INHALATION) at 04:04

## 2025-04-25 NOTE — PLAN OF CARE
Per consult, talked to patient regarding option of LTAC referral.  Ms. Saleem was agreeable and obtained her choice of Specialty here at [facility].  Will refer.  Will continue to follow for anticipated dc needs.      1005  Called and notified patient's daughter, Zhane Saleem, of plans for referral to Specialty.  She is agreeable.  Will f/u as needed.

## 2025-04-25 NOTE — ASSESSMENT & PLAN NOTE
04/12 add IV albumin and some Lasix  Start enteral feedings  04/13 start enteral feedings  04/22 albumin 1.6  04/24 start enteral feding

## 2025-04-25 NOTE — ASSESSMENT & PLAN NOTE
Hyponatremia is likely due to renal insufficiency. The patient's most recent sodium results are listed below.  Recent Labs     04/22/25  0524 04/23/25  0531 04/24/25  0429    137 138     Plan  - Correct the sodium by 4-6mEq in 24 hours.   - Obtain the following studies:  In a.  - Will treat the hyponatremia with continue LR  - Monitor sodium Daily.   - Patient hyponatremia is stable  - follow  Follow

## 2025-04-25 NOTE — ASSESSMENT & PLAN NOTE
Hyperkalemia is likely due to ESRD.The patients most recent potassium results are listed below.  Recent Labs     04/22/25  0524 04/23/25  0531 04/24/25  0429   K 5.7* 5.6* 5.5*     Plan  - Monitor for arrhythmias with EKG and/or continuous telemetry.   - Treat the hyperkalemia with Sodium Bicarbonate.   - Monitor potassium: Daily  - The patient's hyperkalemia is stable

## 2025-04-25 NOTE — NURSING
1240 initiated suplena 1.8 with carbsteady at 10mL per order. Advance to 20mL/hr at 2030 if pt tolerates current rate, will pass off to oncoming nurse so they are aware.

## 2025-04-25 NOTE — ASSESSMENT & PLAN NOTE
Volume resuscitate with LR and then with 125 cc hr 1/2 ns  Should improve off HCT and lasix.    Follow BMP and renal function daily.  Prerenal from dehydration  Will have to figure out a long term solution if patient unable to eat and drink due to gastroparesis but stopping the ozempic will help.    Recently treated for UTI.  Will stop the jardiance as well and this should help with polyuria as well.    Patient came from [place] with dominguez and will remove and monitor for any urinary retention.    Avoid adult diapers as much as possible, using pink pads at night.      Patient with condition that if not treated could result in loss of life or bodily function.  Due to co morbidities this patient has complex medical management.  Spent over 45 minutes with patient and caregiver and in forming plan of care.  DNR discussion noted above.

## 2025-04-25 NOTE — NURSING
0730 some confusion on who was supposed to be consulted for nephro on this patient. Rodriguez was consulted but jolene was on call. Unable to contact rodriguez. Secure chatted both MD's in a group to see who was supposed to be seeing the patient. Dr. Velásquez returned my phone call stating that he will come see the patient today.    0910 placed dobhoff nasogastric tube to R nare at 63. Xray ordered for placemement. Wound care in room seeing patient at this time.    1050 cxr read is back. Secure chatted Dr. Rizvi to get him to review it and let me know if we are clear to give meds through it. Awaiting response    1110 dr. Rizvi on floor at this time, asked him to read cxr, states he is headed to SIBR rounds and will read it shortly and let me know    1200 rec'd secure chat from Dr. Rizvi stating to advance 8 cm and then we can use the tube. Awaiting feeds from dietary. Also rec'd v/p from dr. Velásquez at this time for NS @70/hr continuous. Placed order and will administer shortly

## 2025-04-25 NOTE — PROGRESS NOTES
04/25/25 1226   Wound Care Follow Up   Wound Care Follow-up? Yes   Wound Care- Next Tentative Visit Date 05/01/25   Follow Up Plan Swati POC

## 2025-04-25 NOTE — ASSESSMENT & PLAN NOTE
Vitals:    04/23/25 0805 04/23/25 1118 04/23/25 1609 04/23/25 1934   BP: (!) 101/54 (!) 102/51 (!) 101/52 112/64    04/24/25 0008 04/24/25 0420 04/24/25 0759 04/24/25 1141   BP: 93/60 (!) 99/54 93/64 119/72    04/24/25 1641 04/24/25 2032   BP: 108/74 (!) 90/59         Monitor BP while on Metoprolol; on IVF; off Losartan

## 2025-04-25 NOTE — ASSESSMENT & PLAN NOTE
Hyponatremia is likely due to renal insufficiency. The patient's most recent sodium results are listed below.  Recent Labs     04/23/25  0531 04/24/25  0429 04/25/25  0422    138 141     Plan  - Correct the sodium by 4-6mEq in 24 hours.   - Obtain the following studies:  In a.  - Will treat the hyponatremia with continue LR  - Monitor sodium Daily.   - Patient hyponatremia is stable  - follow  Follow

## 2025-04-25 NOTE — PT/OT/SLP PROGRESS
Physical Therapy Treatment    Patient Name:  Magan Saleem   MRN:  [id number]    Recommendations:     Discharge Recommendations: Moderate Intensity Therapy  Discharge Equipment Recommendations: to be determined by next level of care  Barriers to discharge:  ongoing medical care.     Assessment:     Magan Saleem is a 69 y.o. female admitted with a medical diagnosis of Sepsis due to pneumonia.  She presents with the following impairments/functional limitations: weakness, impaired endurance, impaired self care skills, impaired functional mobility, decreased lower extremity function. NG tube has been placed back in nose today. Pt more cautious of movement with NG tube but more agreeable and in better spirits.     Rehab Prognosis: Good; patient would benefit from acute skilled PT services to address these deficits and reach maximum level of function.    Recent Surgery: * No surgery found *      Plan:     During this hospitalization, patient to be seen 5 x/week to address the identified rehab impairments via gait training, therapeutic activities, therapeutic exercises, neuromuscular re-education and progress toward the following goals:    Plan of Care Expires:  05/04/25    Subjective     Chief Complaint: Sepsis due to pneumonia   Patient/Family Comments/goals: agreeable  Pain/Comfort: none reported          Objective:     Communicated with RN prior to session.  Patient found HOB elevated with peripheral IV, PureWick, oxygen upon PT entry to room.     General Precautions: Standard, fall, contact  Orthopedic Precautions: N/A  Braces: N/A  Respiratory Status: Nasal cannula, flow 2 L/min     Functional Mobility:  Bed Mobility:     Rolling Left:  moderate assistance  Rolling Right: moderate assistance  Scooting: maximal assistance and of 3  persons  Supine to Sit: maximal assistance and of 2 persons  Sit to Supine: moderate assistance and of 2 persons  Balance: EOB sitting x 9 mins with Min A progressing to SBA      AM-PAC 6  CLICK MOBILITY          Treatment & Education:  Mobility as stated above.     Patient left HOB elevated with all lines intact, call button in reach, and CNA present..    GOALS:   Multidisciplinary Problems       Physical Therapy Goals          Problem: Physical Therapy    Goal Priority Disciplines Outcome Interventions   Physical Therapy Goal     PT, PT/OT Progressing    Description: Short term goals:  1. Supine to sit with MInimal Assistance  2. Sit to stand transfer with Moderate Assistance  3. Bed to chair transfer with Moderate Assistance using Rolling Walker  4. Sitting at edge of bed x15 minutes with Contact Guard Assistance    Long term goals:  1. Supine to sit with Contact Guard Assistance  2. Sit to stand transfer with Contact Guard Assistance  3. Bed to chair transfer with Contact Guard Assistance using Rolling Walker  4. Gait  x 100 feet with Contact Guard Assistance using Rolling Walker.                          DME Justifications:  To be determined    Time Tracking:     PT Received On: 04/25/25  PT Start Time: 1426     PT Stop Time: 1456  PT Total Time (min): 30 min     Billable Minutes: Therapeutic Activity 30    Treatment Type: Treatment  PT/PTA: PT     Number of PTA visits since last PT visit: 0     04/25/2025

## 2025-04-25 NOTE — PROGRESS NOTES
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  Patient was initially admitted to the hospital on April 4 would dehydration gastroparesis UTI.  She has not been able to eat previously in his now admitted to the hospital with concerns over nutrition since she has been in the hospital she has had shortness of breath and has developed worsening right-sided infiltrates.      Hospital/ICU Course:  4/14/25-asked to see by General Pulmonary for evaluation of possible EBUS bronchoscopy.  Patient with prior EBUS bronchoscopy performed 10/21/24 with evidence of malignancy present in station 4 L and station 4R.  Recent cessation of her EGFR TKI.    Pulmonary re-engaged for lower respiratory infection and concern for pneumonitis.     Interval History/Significant Events: planned enteral nutrition via doboff, worsening renal function    Review of Systems  Objective:     Vital Signs (Most Recent):  Temp: 97.4 °F (36.3 °C) (04/25/25 0756)  Pulse: 100 (04/25/25 1206)  Resp: 16 (04/25/25 1206)  BP: 114/68 (04/25/25 0756)  SpO2: 99 % (04/25/25 1206) Vital Signs (24h Range):  Temp:  [97.4 °F (36.3 °C)-97.6 °F (36.4 °C)] 97.4 °F (36.3 °C)  Pulse:  [] 100  Resp:  [16-20] 16  SpO2:  [95 %-100 %] 99 %  BP: ()/(58-74) 114/68   Weight: (!) 136.1 kg (300 lb)  Body mass index is 49.92 kg/m².    No intake or output data in the 24 hours ending 04/25/25 1430         Physical Exam  Constitutional:       General: She is not in acute  distress.     Appearance: She is not toxic-appearing.   HENT:      Head: Normocephalic and atraumatic.   Eyes:      General: No scleral icterus.  Cardiovascular:      Rate and Rhythm: Normal rate and regular rhythm.      Heart sounds: Murmur heard.   Pulmonary:      Effort: Pulmonary effort is normal.      Breath sounds: Rales present. No rhonchi.      Comments: Diminished lung sounds  Skin:     General: Skin is warm.      Coloration: Skin is not jaundiced.   Neurological:      Mental Status: She is alert. Mental status is at baseline.            Vents:  Oxygen Concentration (%): 28 (04/25/25 0754)  Lines/Drains/Airways       Peripherally Inserted Central Catheter Line  Duration             PICC Double Lumen 04/17/25 1547 left brachial 7 days              Drain  Duration             Female External Urinary Catheter w/ Suction 04/21/25 1950 3 days         NG/OG Tube 04/25/25 0913 Right nostril <1 day                  Significant Labs:    CBC/Anemia Profile:  Recent Labs   Lab 04/25/25  0422   WBC 36.40*   HGB 9.0*   HCT 27.9*      MCV 92.4   RDW 22.2*        Chemistries:  Recent Labs   Lab 04/24/25  0429 04/25/25  0422    141   K 5.5* 5.5*   * 110*   CO2 18* 15*   * 108*   CREATININE 4.31* 4.50*   CALCIUM 8.5 8.6       All pertinent labs within the past 24 hours have been reviewed.    Significant Imaging:  I have reviewed all pertinent imaging results/findings within the past 24 hours.    ABG  Recent Labs   Lab 04/20/25  1146   PH 7.37   PO2 78*   PCO2 37   HCO3 21.4     Assessment/Plan:     Pulmonary  Pneumonitis  Initially concerned for ICI with pneumonitis. Concern for previous c/b pancreatitis as well. CXR with interval improvement. Now with infectious component as well. Given improvement and extrapulmonary organ involvement, plan on taper to 10 mg prednisone maintenance outpatient.   - cont Prednisone 40 mg; taper ordered for 20 mg and finally 10 mg maintenance   -- if recurrence of  symptoms with step down in therapy, step up to previous steroid dose and maintain without taper  - lowest tolerable dose of steroid planned, choice of taper course has included consideration of PJP ppx Rx need, none at present  - will consider PJP ppx if remains of prednisone 20 mg >3 weeks; Atovaquone for ppx  - grade 2 pneumonitis given clinical course, will require steroid therapy with future ICI after resolution of symptoms, feasible for rechallenge   -- literature reviewed with 1% incidence of pneumonitis during study    Renal/  Acute on chronic kidney failure  JOHNATHON progressing with ongoing hyperkalemia. Medications reviewed. Stopping continuous IVF with clinical and lab evidence of progressive edema. Will monitor BMP daily and assess for SCr stability. Concern for cardiorenal syndrome.   - holding additional diuresis at this time  - daily BMP  - Nephrology on board, appreciate recs      ID  Infection due to Stenotrophomonas maltophilia  70 yo F with immunosuppression from ongoing therapy for metastatic lung cancer now found to have Stenotrophomonas lower respiratory infection in this patient with multifocal infiltrates and consolidative opacities on CT Chest. Ideally, treatment with Bactrim and given severity of illness and immunosuppression, dual therapy with Levaquin. This is limited by ongoing JOHNATHON with hyperkalemia. Will plan on treatment with Levaquin for a goal 14 day course with Pharmacy assisting with renal dosing. Erring on side of caution given degree of illness and will recommend against minocycline treatment at this time for side effect profile and will consider for recurrent infection after this treatment course.   - cont Levaquin, appreciate pharmacy for renal dosing, end date ordered  - serial CXR reviewed with improvement in pulmonary infiltrates including RUL consolidative opacity, no pleural effusions    Pulmonology will sign off at this time. Recs as above for steroid Rx and Abx course.           Bea York MD  Critical Care Medicine  [facility]

## 2025-04-25 NOTE — ASSESSMENT & PLAN NOTE
Hyperkalemia is likely due to ESRD.The patients most recent potassium results are listed below.  Recent Labs     04/23/25  0531 04/24/25  0429 04/25/25  0422   K 5.6* 5.5* 5.5*     Plan  - Monitor for arrhythmias with EKG and/or continuous telemetry.   - Treat the hyperkalemia with Sodium Bicarbonate.   - Monitor potassium: Daily  - The patient's hyperkalemia is stable

## 2025-04-25 NOTE — PLAN OF CARE
Problem: Adult Inpatient Plan of Care  Goal: Plan of Care Review  Outcome: Progressing     Problem: Bariatric Environmental Safety  Goal: Safety Maintained with Care  Outcome: Progressing     Problem: Skin Injury Risk Increased  Goal: Skin Health and Integrity  Outcome: Progressing     Problem: Fall Injury Risk  Goal: Absence of Fall and Fall-Related Injury  Outcome: Progressing

## 2025-04-25 NOTE — ASSESSMENT & PLAN NOTE
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 Most recent hemoglobin and hematocrit are listed below.  Recent Labs     04/22/25  0524 04/23/25  0532   HGB 8.3* 8.8*   HCT 25.9* 26.8*     Plan  - Monitor serial CBC: Daily  - Transfuse PRBC if patient becomes hemodynamically unstable, symptomatic or H/H drops below 7/21.  - Patient has not received any PRBC transfusions to date  - Patient's anemia is currently stable  - follow  No evidence of bleeding

## 2025-04-25 NOTE — ASSESSMENT & PLAN NOTE
Creatine stable for now. BMP reviewed- noted Estimated Creatinine Clearance: 15.5 mL/min (A) (based on SCr of 4.5 mg/dL (H)). according to latest data. Based on current GFR, CKD stage is stage 5 - GFR < 15.  Monitor UOP and serial BMP and adjust therapy as needed. Renally dose meds. Avoid nephrotoxic medications and procedures.

## 2025-04-25 NOTE — PROGRESS NOTES
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 Swanson    Melanocytic nevi of lower limb, including hip, left     Mixed hyperlipidemia     Moderate persistent asthma without complication 10/30/2024    Other pulmonary embolism without acute cor pulmonale     Vitamin D deficiency        Social History[1]    Precautions:     Allergies as of 04/03/2025 - Reviewed 04/03/2025   Allergen Reaction Noted    Penicillins Hives 06/29/2021    Sulfa (sulfonamide antibiotics) Hives 06/29/2021       WOC Assessment Details/Treatment        04/25/25 0850   WOCN Assessment   WOCN Total Time (mins) 60   Visit Date 04/25/25   Visit Time 0845   Consult Type Follow Up   WOCN Speciality Wound   Wound moisture   Number of Wounds 6   Continence Type Urinary;Fecal   Intervention chart review;applied;orders   Skin Interventions   Device Skin Pressure Protection absorbent pad utilized/changed;adhesive use limited   Pressure Reduction Devices pressure-redistributing mattress utilized   Pressure Reduction Techniques weight shift assistance provided;positioned off wounds   Positioning   Body Position supine   Head of Bed (HOB) Positioning HOB at 20-30 degrees   Positioning/Transfer Devices pillows;in use        Wound 04/05/25 1830 Moisture associated dermatitis Right anterior Pelvis #7    Date First Assessed/Time First Assessed: 04/05/25 1830   Present on Original Admission: Yes  Primary Wound Type: Moisture associated dermatitis  Side: Right  Orientation: anterior  Location: Pelvis  Wound Number: #7   Wound Image   (1.5 x 1.5 x 0.5)   Dressing Appearance Open to air   Drainage Amount None   Appearance [place];Moist   Periwound Area Moist   Wound Length (cm) 1.5 cm   Wound Width (cm) 1.5 cm   Wound Depth (cm) 0.5 cm   Wound Volume (cm^3) 0.589 cm^3   Wound Surface Area (cm^2) 1.77 cm^2   Care Cleansed with:;Antimicrobial agent;Wound cleanser   Dressing Applied;Silicone;[place]/border;Foam;Silver;Hydrofiber   Periwound Care Moisture barrier applied;Absorptive dressing applied;Cleansed with pH balanced cleanser     WOC Team consulted for      Narrative: Patient alert and oriented. Patient tolerated wound care well.    Active Wounds and Recommendations: Continue POC    Goals for Wound Healing: Moisture management, Apply antimicrobial, Reduce bioburden, and Educate on proper wound management post D/C     Barriers to Wound Healing: multiple co-morbidities edema large surface area large volume    Orders placed.    Thank you for the consult.     We will continue to follow. See additional note under Notes Tab for tentative f/u plan/dates.    04/25/2025       [1]   Social History  Socioeconomic History    Marital status:    Occupational History    Occupation: Retired   Tobacco Use    Smoking status: Never    Smokeless tobacco: Never   Substance and Sexual Activity    Alcohol use: Never    Drug use: Never    Sexual activity: Not Currently     Social Drivers of Health     Financial Resource Strain: Patient Declined (4/5/2025)    Overall Financial Resource Strain (CARDIA)     Difficulty of Paying Living Expenses: Patient declined   Food Insecurity: Patient Declined (4/5/2025)    Hunger Vital Sign     Worried About Running Out of Food in the Last Year: Patient declined     Ran Out of Food in the Last Year:  Patient declined   Transportation Needs: No Transportation Needs (4/4/2025)    PRAPARE - Transportation     Lack of Transportation (Medical): No     Lack of Transportation (Non-Medical): No   Physical Activity: Inactive (4/4/2025)    Exercise Vital Sign     Days of Exercise per Week: 0 days     Minutes of Exercise per Session: 0 min   Stress: Patient Declined (4/5/2025)    [employer] [employer] - Occupational Stress Questionnaire     Feeling of Stress : Patient declined   Housing Stability: Patient Declined (4/5/2025)    Housing Stability Vital Sign     Unable to Pay for Housing in the Last Year: Patient declined     Homeless in the Last Year: Patient declined

## 2025-04-25 NOTE — CONSULTS
[facility]  Adult Nutrition  Consult - New Tube Feeding         Reason for Assessment  Reason For Assessment: consult, new tube feeding        Assessment and Plan  4/25/2025: TUBE FEED CONSULT  Consult received and appreciated. Consult for new tube feeding. Patient still not eating well. May eat better as pancreatitis further resolves. Dr. Rizvi talked with patient and she was OK with NGT. It is often recommended to feed into the jejunum in patients with gastroparesis, though body habitus may complicate placement.    NOTE: Patient has complex medical history. Ordered speciality formula (Vivonex) which hopefully will arrive by Monday. Will start feeds with Suplena until then 2/2 poor renal function. Vivonex is good option for those with pancreatitis, but also for those with gastroparesis + CKD (non-dialysis) (lower in protein/fat/potassium/phos/sodium and contains no fiber)    Initiate continuous infusion of Suplena 1.8 at 10 mL/hr via NGT and advance 10 mL/hr q8h pending tolerance until goal rate of 45 mL/hr is achieved. FWF 30 mL/hr. Keep HOB at 30-45 degrees to reduce risk of aspiration. Monitor for tolerance: n/v/c/d. Hold feeding and notify RD if symptoms of intolerance develop.     *Once Vivonex comes in, will modify feeds*    Last Bowel Movement: 04/20/25. Recommend to consider bowel regimen.    Medications/labs reviewed. RD following.    Learning Needs/Social Determinants of Health    Learning Assessment       04/04/2025 0021 [facility] (4/3/2025 - Present)   Created by Deanna Rivas, RN - RN (Nurse) Status: Complete                 PRIMARY LEARNER     Primary Learner Name:  Magan Saleem SG - 04/04/2025 0021    Relationship:  Patient SG - 04/04/2025 0021    Does the primary learner have any barriers to learning?:  No Barriers SG - 04/04/2025 0021    What is the preferred language of the primary learner?:  English SG - 04/04/2025 0021    Is an   required?:  No SG - 04/04/2025 0021    How does the primary learner prefer to learn new concepts?:  Listening SG - 04/04/2025 0021    How often do you need to have someone help you read instructions, pamphlets, or written material from your doctor or pharmacy?:  Never SG - 04/04/2025 0021        CO-LEARNER #1     No question answered        CO-LEARNER #2     No question answered        SPECIAL TOPICS     No question answered        ANSWERED BY:     -:  Family SG - 04/04/2025 0021        Comments         Edit History       Deanna Rivas RN - RN (Nurse)   04/04/2025 0021                          Social Drivers of Health     Tobacco Use: Low Risk  (4/15/2025)    Patient History     Smoking Tobacco Use: Never     Smokeless Tobacco Use: Never     Passive Exposure: Not on file   Alcohol Use: Not At Risk (4/4/2025)    AUDIT-C     Frequency of Alcohol Consumption: Never     Average Number of Drinks: Patient does not drink     Frequency of Binge Drinking: Never   Financial Resource Strain: Patient Declined (4/5/2025)    Overall Financial Resource Strain (CARDIA)     Difficulty of Paying Living Expenses: Patient declined   Food Insecurity: Patient Declined (4/5/2025)    Hunger Vital Sign     Worried About Running Out of Food in the Last Year: Patient declined     Ran Out of Food in the Last Year: Patient declined   Transportation Needs: No Transportation Needs (4/4/2025)    PRAPARE - Transportation     Lack of Transportation (Medical): No     Lack of Transportation (Non-Medical): No   Physical Activity: Inactive (4/4/2025)    Exercise Vital Sign     Days of Exercise per Week: 0 days     Minutes of Exercise per Session: 0 min   Stress: Patient Declined (4/5/2025)    [employer] - Occupational Stress Questionnaire     Feeling of Stress : Patient declined   Housing Stability: Patient Declined (4/5/2025)    Housing Stability Vital Sign     Unable to Pay for Housing in the Last Year: Patient  declined     Number of Times Moved in the Last Year: Not on file     Homeless in the Last Year: Patient declined   Depression: Low Risk  (10/30/2024)    Depression     Last PHQ-4: Flowsheet Data: 0   Utilities: Patient Declined (4/5/2025)    [facility] Utilities     Threatened with loss of utilities: Patient declined   Health Literacy: Patient Declined (4/5/2025)     Health Literacy     Frequency of need for help with medical instructions: Patient declines to respond   Recent Concern: Health Literacy - Inadequate Health Literacy (4/4/2025)     Health Literacy     Frequency of need for help with medical instructions: Sometimes   Social Isolation: Not on file     Malnutrition  Is Patient Malnourished: No    Nutrition Diagnosis  Inadequate energy intake related to Appetite loss as evidenced by poor PO intakes  Comments: initiate enteral feeding     Recent Labs   Lab 04/24/25 2029 04/25/25  0422   GLU  --  77*   POCGLU 90  --      Comments on Glucose: Glucose low - poor oral intake    Nutrition Prescription / Recommendations  Recommendation/Intervention: Recommend to intiate enteral feeds with Suplena for now - Vivonex ordered  Goals: tolerate feeds at goal, weight mainenance during admission, improve BG, improve gastric emptying, improve renal function  Nutrition Goal Status: new  Current Diet Order: Full Liquids  Nutrition Order Comments: gastroparesis  Chewing or Swallowing Difficulty?: No Chewing or swallowing difficulty documented  Recommended Diet: Enteral Feedings   Is Nutrition Support Recommended: Yes    Needs Calculated    Energy Calorie Requirements (kcal): 1905-2722kcal (20-25kcal/kg)  Protein Requirements: 34-45g (0.6-0.8g/kg ideal body weight)  Enteral Nutrition   Enteral Nutrition Formula Provides:  1938 kcals Propofol Rate: No  49 g Protein  212 g Carbohydrates  104 g Fat Propofol Rate: No  797 ml Fluid without Flush    720 ml Fluid by flush   1517 ml Total Fluid (Na+ has been low - will monitor and  adjust as appropriate)  Enteral Nutrition Recommended Order:  Tube feeding via NG/ Dobhoff  Tube feeding formula: Suplena 1.8 NG/ Dobhoff at 45mL/hour  Free Water Flush: 30 ml hourly  Modular Supplements:No Modular Supplements needed  Enteral Nutrition meets needs?: yes  Enteral Nutrition Status: Initiate enteral nutrition   Is Nutrition Education Recommended: No    Monitor and Evaluation  % current intake: 0 - 50 % (inadequate, remains poor)  % intake to meet estimated needs: 50 - 75 %  Monitor and Evaluation: Enteral and parenteral nutrition administration, Diet order, Weight, Beliefs and attitudes, Electrolyte and renal panel, Gastrointestinal profile, Glucose/endocrine profile, Inflammatory profile, Lipid profile, Nutrition focused physical findings, Skin    Current Medical Diagnosis and Past Medical History  Diagnosis: infection/sepsis  Past Medical History:   Diagnosis Date    Chronic kidney disease, stage 3b     Chronic pulmonary embolism without acute cor pulmonale     Diabetes mellitus type 2 in obese     DNR (do not resuscitate)     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 Swanson    Melanocytic nevi of lower limb, including hip, left     Mixed hyperlipidemia     Moderate persistent asthma without complication 10/30/2024    Other pulmonary embolism without acute cor pulmonale     Vitamin D deficiency      Nutrition/Diet History  Spiritual, Cultural Beliefs, Voodoo Practices, Values that Affect Care: no  Food Allergies: NKFA  Factors Affecting Nutritional Intake: depression, altered gastrointestinal function, decreased appetite, early satiety, N/V    Lab/Procedures/Meds  Recent Labs   Lab  04/25/25  0422      K 5.5*   *   CREATININE 4.50*   CALCIUM 8.6   *   Note: K+, BUN, Cr, Cl- elevated (CKD5)    Last A1c:   Lab Results   Component Value Date    HGBA1C 6.7 04/02/2025    HGBA1C 6.4 (H) 02/05/2025   Note: < 7 % goal for patients with diabetes    Lab Results   Component Value Date    RBC 3.02 (L) 04/25/2025    HGB 9.0 (L) 04/25/2025    HCT 27.9 (L) 04/25/2025    MCV 92.4 04/25/2025    MCH 29.8 04/25/2025    MCHC 32.3 04/25/2025   Note: H&H low    Pertinent Labs Reviewed: reviewed  Pertinent Medications Reviewed: reviewed    Scheduled Meds:   albuterol-ipratropium  3 mL Nebulization QID    atorvastatin  20 mg Oral QHS    budesonide  0.5 mg Nebulization Q12H    Lactobacillus acidophilus  2 capsule Oral TID WM    levoFLOXacin  500 mg Oral Q48H    metoclopramide HCl  10 mg Oral QID (AC & HS)    metoprolol succinate  25 mg Oral Daily    montelukast  10 mg Oral QHS    pantoprazole  40 mg Oral Daily    predniSONE  40 mg Oral Daily    Followed by    [START ON 5/3/2025] predniSONE  20 mg Oral Daily    Followed by    [START ON 5/17/2025] predniSONE  10 mg Oral Daily    sertraline  100 mg Oral Daily    sodium bicarbonate  1,300 mg Oral BID   Note: METOCLOPRAMIDE (gastroparesis)    Continuous Infusions:    PRN Meds:.  Current Facility-Administered Medications:     acetaminophen, 650 mg, Rectal, Q6H PRN    acetaminophen, 1,000 mg, Oral, Q6H PRN    albuterol sulfate, 2.5 mg, Nebulization, Q2H PRN    aluminum & magnesium hydroxide-simethicone, 30 mL, Oral, Q6H PRN    bisacodyL, 10 mg, Oral, Daily PRN    dextromethorphan-guaiFENesin  mg/5 ml, 10 mL, Oral, Q6H PRN    dextrose 50%, 12.5 g, Intravenous, PRN    dextrose 50%, 25 g, Intravenous, PRN    diphenhydrAMINE, 50 mg, Oral, Q6H PRN    docusate sodium, 100 mg, Oral, BID PRN    glucagon (human recombinant), 1 mg, Intramuscular, PRN    glucose, 16 g, Oral, PRN    glucose, 24 g, Oral, PRN    HYDROcodone-acetaminophen, 1 tablet, Oral, Q6H PRN     "insulin aspart U-100, 0-10 Units, Subcutaneous, QID (AC + HS) PRN    LORazepam, 1 mg, Oral, Q6H PRN    melatonin, 6 mg, Oral, Nightly PRN    traZODone, 50 mg, Oral, Nightly PRN    Anthropometrics  Height: 5' 5" (165.1 cm)  Height (inches): 65 in  Height Method: Stated  Weight: (!) 136.1 kg (300 lb)  Weight (lb): 300 lb  Weight Method: Bed Scale  Ideal Body Weight (IBW), Female: 125 lb  % Ideal Body Weight, Female (lb): 240 %  BMI (Calculated): 49.9       Estimated/Assessed Needs  RMR ([place]-St. Jeor Equation): 1891.68     Temp: 97.4 °F (36.3 °C)Oral  Weight Used For Calorie Calculations: (!) 136.1 kg (300 lb 0.7 oz)     Energy Calorie Requirements (kcal): 1905-2722kcal (20-25kcal/kg)  Weight Used For Protein Calculations: 56.7 kg (125 lb)  Protein Requirements: 34-45g (0.6-0.8g/kg ideal body weight) (protein needs lower 2/2 non-dialysis dependent CKD)    Fluid Requirements (mL): 1 mL/kcal   RDA Method (mL): 1905  CHO Requirement: 45-55% (T2DM)    Nutrition by Nursing  Diet/Nutrition Received: full liquid  Intake (%): 0%  Diet/Feeding Assistance:  (Patinet Refused)  Diet/Feeding Tolerance: poor  [REMOVED]      NG/OG Tube 04/12/25 1620 nasogastric;Other (comments) Right nostril-Feeding Type: continuous, by pump  [REMOVED]      NG/OG Tube 04/12/25 1620 nasogastric;Other (comments) Right nostril-Current Rate (mL/hr): 45 mL/hr  [REMOVED]      NG/OG Tube 04/12/25 1620 nasogastric;Other (comments) Right nostril-Goal Rate (mL/hr): 45 mL/hr  [REMOVED]      NG/OG Tube 04/12/25 1620 nasogastric;Other (comments) Right nostril-Formula Name: Suplena    Nutrition Follow-Up  RD Follow-up?: Yes    Nutrition Discharge Planning: Too early to determine, pending clinical course       Maria C Lorenzo, MS, RD, LD  Available via Secure Chat  "

## 2025-04-25 NOTE — SUBJECTIVE & OBJECTIVE
Interval History:     Review of Systems   Constitutional:  Negative for appetite change, fatigue and fever.   HENT:  Negative for congestion and hearing loss.    Respiratory:  Negative for chest tightness and wheezing.    Cardiovascular:  Negative for chest pain and palpitations.   Gastrointestinal:  Negative for abdominal pain, constipation and nausea.   Genitourinary:  Negative for difficulty urinating and dysuria.   Musculoskeletal:  Positive for gait problem. Negative for back pain and neck stiffness.   Skin:  Negative for pallor and rash.   Neurological:  Negative for dizziness, speech difficulty and headaches.   Psychiatric/Behavioral:  Positive for dysphoric mood and sleep disturbance. Negative for confusion and suicidal ideas.      Objective:     Vital Signs (Most Recent):  Temp: 97.6 °F (36.4 °C) (04/24/25 2032)  Pulse: 91 (04/24/25 2032)  Resp: 18 (04/24/25 2032)  BP: (!) 90/59 (04/24/25 2032)  SpO2: 99 % (04/24/25 2032) Vital Signs (24h Range):  Temp:  [97.5 °F (36.4 °C)-98.5 °F (36.9 °C)] 97.6 °F (36.4 °C)  Pulse:  [] 91  Resp:  [17-18] 18  SpO2:  [95 %-100 %] 99 %  BP: ()/(54-74) 90/59     Weight: (!) 136.1 kg (300 lb)  Body mass index is 49.92 kg/m².    Intake/Output Summary (Last 24 hours) at 4/24/2025 2236  Last data filed at 4/24/2025 0828  Gross per 24 hour   Intake 0 ml   Output --   Net 0 ml           Physical Exam  Vitals reviewed.   Constitutional:       General: She is awake. She is not in acute distress.     Appearance: She is well-developed. She is morbidly obese. She is not toxic-appearing.   HENT:      Head: Normocephalic.      Nose: Nose normal.      Mouth/Throat:      Pharynx: Oropharynx is clear.   Eyes:      Extraocular Movements: Extraocular movements intact.      Pupils: Pupils are equal, round, and reactive to light.   Neck:      Thyroid: No thyroid mass.      Vascular: No carotid bruit.   Cardiovascular:      Rate and Rhythm: Normal rate and regular rhythm.      Pulses:  Normal pulses.      Heart sounds: Normal heart sounds. No murmur heard.  Pulmonary:      Effort: Pulmonary effort is normal.      Breath sounds: Normal breath sounds and air entry. No wheezing.   Abdominal:      General: Bowel sounds are normal. There is no distension.      Palpations: Abdomen is soft.      Tenderness: There is no abdominal tenderness.   Musculoskeletal:         General: Normal range of motion.      Cervical back: Neck supple. No rigidity.   Skin:     General: Skin is warm.      Coloration: Skin is not jaundiced.      Findings: No lesion.   Neurological:      General: No focal deficit present.      Mental Status: She is alert and oriented to person, place, and time.      Cranial Nerves: No cranial nerve deficit.   Psychiatric:         Attention and Perception: Attention normal.         Mood and Affect: Mood normal.         Behavior: Behavior normal. Behavior is cooperative.         Thought Content: Thought content normal.         Cognition and Memory: Cognition normal.               Significant Labs: All pertinent labs within the past 24 hours have been reviewed.  BMP:   Recent Labs   Lab 04/24/25  0429   GLU 89      K 5.5*   *   CO2 18*   *   CREATININE 4.31*   CALCIUM 8.5       CBC:   Recent Labs   Lab 04/23/25  0532   WBC 26.13*   HGB 8.8*   HCT 26.8*            CMP:   Recent Labs   Lab 04/23/25  0531 04/24/25  0429    138   K 5.6* 5.5*   * 109*   CO2 16* 18*   GLU 56* 89   BUN 99* 106*   CREATININE 3.81* 4.31*   CALCIUM 8.3* 8.5   ANIONGAP 19* 17*         Significant Imaging: I have reviewed all pertinent imaging results/findings within the past 24 hours.      Intake/Output - Last 3 Shifts         04/23 0700 04/24 0659 04/24 0700 04/25 0659    Other  0    Total Intake(mL/kg)  0 (0)    Urine (mL/kg/hr) 1000 (0.3)     Total Output 1000     Net -1000 0                Microbiology Results (last 7 days)       Procedure Component Value Units Date/Time     Culture, Fungus (Other) [[id number]]  (Abnormal) Collected: 04/15/25 0943    Order Status: Completed Specimen: Respiratory from BAL Updated: 04/22/25 1617     Culture, Fungus (Other) Yeast isolated.    Culture, Lower Respiratory [[id number]]  (Abnormal)  (Susceptibility) Collected: 04/15/25 0943    Order Status: Completed Specimen: Respiratory from BAL Updated: 04/21/25 1516     Culture, Lower Respiratory Light Growth Stenotrophomonas maltophilia

## 2025-04-25 NOTE — PROGRESS NOTES
[facility]  [facility] Medicine  Progress Note    Patient Name: Magan Saleem  MRN: [id number]  Patient Class: IP- Inpatient   Admission Date: 4/3/2025  Length of Stay: 21 days  Attending Physician: Andriy Rizvi MD  Primary Care Provider: Sil Brown DO        Subjective     Principal Problem:Sepsis due to pneumonia        HPI:  70 yo F presents to [place] ED from [facility] for abnormal labs.  [facility] to skilled nursing facility for rehab.  She was diagnosed with dehydration due to gastroparesis with UTI.  Patient has metastatic lung cancer (to the brain) and follows with Dr. Swanson for IV chemotherapy every other week and takes lazertinib daily.  She has completed her course of radiation for the brain mets and follow had showed some improvement.  I thought she had either some decreased LOC due to encephalopathy or some aphasia from the brain mets but after a great effort to get her to talk, I realized she was just mad.  She wanted to know why she had been discharged two days ago when she could not eat.  She has no appetite and early satiety.  She is not nauseated and no vomiting.  She has decided on a DNR status previously (her caretaker and friend of 20 years) states that she had always said she did not want resuscitation if she experienced cardiopulmonary arrest and had told her children her wishes but she does want treatment and is not opposed to J tube if needed.  She has not had anything to eat or drink in two days and is dehydrated again.      Patient was transferred because of concern of sepsis.  She did have enterococcus faecalis UTI at [facility] and was treated.  I do not have the culture results but cultures were sent and patient does have some yeast noted.  (Will not treat a yeast colonization).  She is afebrile and hemodynamically stable and does not look septic clinically.  Her Lactic acid is stable at 2.5 dara 3.2.  Will check  another in am after volume resuscitation.  Her creat is at baseline but she has prerenal azotemia further confirming the dehydration.  Patient has an IO in place from [facility] due to dehydration and difficult stick but with some volume resuscitation, we have gotten an IV.  She is covid and flu negative.      Her WBC is 29 and I am not sure if she has received neupogen but could be a stress reaction.  Her BS is 245 and she does have some urine ketones but most likely due to starvation ketosis.  Will check a serum ketone.  Her platelets are 88K but this is most likely from her chemo.  She is not anemic.  CXR shows some patchy infiltrate but no obvious obstructive pneumonia from her lung cancer.  Her BNP about 3K but no clinical signs of CHF.  No recent echo but due to her BMI 50 most likely has some PHTN.  EKG had no ischemic changes but tachy at 114 which could also be from dehydration.  She was on ozempic for her DM and this could be the cause of her decreased appetite.  She is also on decadron for prevention of cerebral edema.      Remainder of ROS as below.  She mostly just nods and shakes her head and rolls her eyes.  You can get her to laugh if you try but she has been depressed since she has not walked since her hospitalization at [facility] on 3/19/25 and was discharged two days ago.  She says that at her last chemo she noticed she was getting weaker and her daughter had to help her in and out of the car and that was new for her.      See assessment and plan below for problem based evaluation       Overview/Hospital Course:  69 year old female presents with hypernatremia; she is not too talkative during rounds    4/5- patient seen examined today resting comfortably in bed and in no acute distress, with no acute events overnight.  Patient has a multitude of issues complicating her medical picture.  White blood cell count 47770 today, sodium 159, potassium 3.2 and BUN creatinine 59 and 1.8.  The patient is still not eating  even when assistance is provided.  We have already changed her fluids to half-normal saline with 20 of KCl at 1:25 a.m..  Have also put in a GI consult for possible feeding tube.  E coli in the urine has turned out to be ESBL and Rocephin has been changed Merrem.    4/6- the patient seen examined today resting comfortably in bed, in no acute distress, in no acute events overnight.  The patient is not very talkative today, but states she is doing okay.  She has no acute complaints.  Blood cultures remain negative.  The patient has not eaten since yesterday and is on light IV fluids.  GI has been consulted for feeding tube.  Continues on Merrem for positive urine culture.    04/07 Records reviewed. Not eating which not new, Similar during recent admit at [facility]. Dr Swanson there had question pancreatitis. No abd pain or tenderness reported now. Question of dysphagia to solids. Chart hx gastroparesis. Will discuss with GI. Ronnie says has responded so far well to treatment for lung CA.   04/08 had EGD at [facility] 03/21/25 with no obstruction and evidence gastroparesis. Still not ending. Try additional meds. Talked with GI. Increase activity  04/09 Some better with med  changes  04/10 Continues to do better. Ate 1/2 fish sandwich for lunch. Called by Dr Swanson and she wanting to keep feeding tube in consideration. Talked with Dr Riech and Dr Lemus. If something needed with gastroparesis would have to have post gastric position of tube.   04/11 eating some. Later staff requested some nystatin for sore mouth.   04/12 still not eating well.  Increased peripheral edema.  Albumin low at 1.5.  Will give some albumin and follow with some Lasix.  Placed Dobbhoff tube and start enteral feedings.  This will help with nutrition and if issue of placing surgical tube later make sure will tolerate enteral feedings.  Chest x-ray continues worse on left side.  Discuss with Pulmonary and ask Dr. Villasenor to see.   04/13 no new issues.   Enteral feedings to be started.  Pulmonary evaluation appreciated and plans noted.  04/14 Tolerating feedings Therapy working with her. Bronchoscopy planned.   4/15 BAL today  4/16 bronch yesterday looks like pneumonitis  4/17 not candidate for PEG    04/18 Eating some now. Pt says feels some better than last seen. Look at placement. High dose steroids by pulmonary. BS not as bad as would expect.  04/19 states continued eat some.  Less nausea.  Blood sugar not sure bad considering the steroids.  Pulmonary adjusted antibiotics based on cultures.  Look at it placement next week.   04/20 Looks the small. C/O SOB to nursing staff earlier but ABG OK. Poor oral intake ; encourage for pt to do more. Looking into placement.  04/21 Firm tender area in abd wall above umbilicus; ?hernia. Abnormal CT de santiago noted and will discuss with surgery.   04/22 CT shows evolving pancreatitis which pt treated for acouple weeks earlier at [facility]. Reviewed with Dr Tapia. No plan to operate on ventral hernia. Discussed later with Dr York. See how does off IVF and encourage oral intake. WBC and Cr both slight better.   04/23 Looks this same. Pt eating some. Pt getting discouraged and asking about home hospice. Talked by phone with her friend Shauna. In conversion doesn't sound like family would be able to care for pt at home. Encouraged pt to give some time and attempt SWB. She says she with consider tonight.   04/24 Lab worse but pt looks some better. Still not taking in well. Maybe eat better as pancreatitis further resolves. Considering replacing dobbhoff feeding tube and look into move to [facility]. Ask Dr Frias to review renal situation. Maybe pancreatitis,pneumonia,renal failure, all these might make a big difference if resolved. Talked with pt and she was OK with NGT    Interval History:     Review of Systems   Constitutional:  Negative for appetite change, fatigue and fever.   HENT:  Negative for congestion and hearing loss.    Respiratory:   Negative for chest tightness and wheezing.    Cardiovascular:  Negative for chest pain and palpitations.   Gastrointestinal:  Negative for abdominal pain, constipation and nausea.   Genitourinary:  Negative for difficulty urinating and dysuria.   Musculoskeletal:  Positive for gait problem. Negative for back pain and neck stiffness.   Skin:  Negative for pallor and rash.   Neurological:  Negative for dizziness, speech difficulty and headaches.   Psychiatric/Behavioral:  Positive for dysphoric mood and sleep disturbance. Negative for confusion and suicidal ideas.      Objective:     Vital Signs (Most Recent):  Temp: 97.6 °F (36.4 °C) (04/24/25 2032)  Pulse: 91 (04/24/25 2032)  Resp: 18 (04/24/25 2032)  BP: (!) 90/59 (04/24/25 2032)  SpO2: 99 % (04/24/25 2032) Vital Signs (24h Range):  Temp:  [97.5 °F (36.4 °C)-98.5 °F (36.9 °C)] 97.6 °F (36.4 °C)  Pulse:  [] 91  Resp:  [17-18] 18  SpO2:  [95 %-100 %] 99 %  BP: ()/(54-74) 90/59     Weight: (!) 136.1 kg (300 lb)  Body mass index is 49.92 kg/m².    Intake/Output Summary (Last 24 hours) at 4/24/2025 2236  Last data filed at 4/24/2025 0828  Gross per 24 hour   Intake 0 ml   Output --   Net 0 ml           Physical Exam  Vitals reviewed.   Constitutional:       General: She is awake. She is not in acute distress.     Appearance: She is well-developed. She is morbidly obese. She is not toxic-appearing.   HENT:      Head: Normocephalic.      Nose: Nose normal.      Mouth/Throat:      Pharynx: Oropharynx is clear.   Eyes:      Extraocular Movements: Extraocular movements intact.      Pupils: Pupils are equal, round, and reactive to light.   Neck:      Thyroid: No thyroid mass.      Vascular: No carotid bruit.   Cardiovascular:      Rate and Rhythm: Normal rate and regular rhythm.      Pulses: Normal pulses.      Heart sounds: Normal heart sounds. No murmur heard.  Pulmonary:      Effort: Pulmonary effort is normal.      Breath sounds: Normal breath sounds and air  entry. No wheezing.   Abdominal:      General: Bowel sounds are normal. There is no distension.      Palpations: Abdomen is soft.      Tenderness: There is no abdominal tenderness.   Musculoskeletal:         General: Normal range of motion.      Cervical back: Neck supple. No rigidity.   Skin:     General: Skin is warm.      Coloration: Skin is not jaundiced.      Findings: No lesion.   Neurological:      General: No focal deficit present.      Mental Status: She is alert and oriented to person, place, and time.      Cranial Nerves: No cranial nerve deficit.   Psychiatric:         Attention and Perception: Attention normal.         Mood and Affect: Mood normal.         Behavior: Behavior normal. Behavior is cooperative.         Thought Content: Thought content normal.         Cognition and Memory: Cognition normal.               Significant Labs: All pertinent labs within the past 24 hours have been reviewed.  BMP:   Recent Labs   Lab 04/24/25  0429   GLU 89      K 5.5*   *   CO2 18*   *   CREATININE 4.31*   CALCIUM 8.5       CBC:   Recent Labs   Lab 04/23/25  0532   WBC 26.13*   HGB 8.8*   HCT 26.8*            CMP:   Recent Labs   Lab 04/23/25  0531 04/24/25  0429    138   K 5.6* 5.5*   * 109*   CO2 16* 18*   GLU 56* 89   BUN 99* 106*   CREATININE 3.81* 4.31*   CALCIUM 8.3* 8.5   ANIONGAP 19* 17*         Significant Imaging: I have reviewed all pertinent imaging results/findings within the past 24 hours.      Intake/Output - Last 3 Shifts         04/23 0700  04/24 0659 04/24 0700 04/25 0659    Other  0    Total Intake(mL/kg)  0 (0)    Urine (mL/kg/hr) 1000 (0.3)     Total Output 1000     Net -1000 0                Microbiology Results (last 7 days)       Procedure Component Value Units Date/Time    Culture, Fungus (Other) [[id number]]  (Abnormal) Collected: 04/15/25 0943    Order Status: Completed Specimen: Respiratory from BAL Updated: 04/22/25 1617     Culture, Fungus  (Other) Yeast isolated.    Culture, Lower Respiratory [[id number]]  (Abnormal)  (Susceptibility) Collected: 04/15/25 0941    Order Status: Completed Specimen: Respiratory from BAL Updated: 04/21/25 1516     Culture, Lower Respiratory Light Growth Stenotrophomonas maltophilia                Assessment & Plan  Sepsis due to pneumonia  Appears to have UTI and pneumonia; has leukocytosis; has opacities on CXR; has G- bacilli on urine culture; blood cultures pending; will place on Vancomycin and Rocephin; BP on the low side; on IVF    04/12 worsening chest x-ray, ask Pulmonary to review  4/15 plan for BAL today.  Continue antibiotic  4/16 7 days antibiotic  completed  Hypernatremia  Due to dehydration; off diuretics; on IVF; will monitor Na levels  Lung cancer metastatic to brain  DNR but not hospice.  Receives chemo and has finished radiation.    04/07 reported stable / improved after treatment  04/09 more fuffy right side CXR  04/12 continue to abnormality on chest x-ray and ask Pulmonary to review  4/15 follows with Dr. Swanson.  No more chemotherapy treatments until patient is stronger.  Discussed with Dr. Swanson  4/16 hold treatments for now  04/20 brain lesion prior treated and I told been stable    Thrombocytopenia  On chemo; will monitor  04/19 platelet count 149 wishes improved    Essential hypertension  Vitals:    04/23/25 0805 04/23/25 1118 04/23/25 1609 04/23/25 1934   BP: (!) 101/54 (!) 102/51 (!) 101/52 112/64    04/24/25 0008 04/24/25 0420 04/24/25 0759 04/24/25 1141   BP: 93/60 (!) 99/54 93/64 119/72    04/24/25 1641 04/24/25 2032   BP: 108/74 (!) 90/59         Monitor BP while on Metoprolol; on IVF; off Losartan    Moderate persistent asthma without complication  Continue home inhaled steroid/bronchodilator and singulair    Acute on chronic kidney failure  Creatine stable     04/20 Cr recently been climbing. May need some IVF. Check BNP in am  Chronic pulmonary embolism without acute cor  "pulmonale  Eliquis was discontinued due to risk of ICH with brain mets but they have improved so the anti-coagulation has been restarted; will monitor  04/07 apparently this of several years ago    Type 2 diabetes mellitus with hyperglycemia  BS elevated; will increase Lantus to 25 units; continue SSI;monitor BS     Gastroparesis  On Reglan and PPI for erosive gastritis; will monitor    EGD by Dr. Lo during recent past admission at [facility]:  "EGD on 03/21/2025 shows LA class B erosive esophagitis but no pill esophagitis, nonerosive gastritis and retention of food and fluid suspicious for gastroparesis, due to narcotics and diabetes. "    04/ 08 try additional meds to help gastric emptying.  04/09 taking in some now orally with recent changes meds  04/10 better and ate half [place] for lunch   04/14 tolerating enteral feedings so far  4/15 we will get PEG tube if patient is agreeable.  Daughter is agreeable.  Discussed with her  4/16 patient wants PEG tube  4/17 not candidate for surgical PEG.  NGT out, start full liquids  04/18 taking some orally  Morbid obesity  Body mass index is 49.92 kg/m². Morbid obesity complicates all aspects of disease management from diagnostic modalities to treatment. Weight loss encouraged and health benefits explained to patient.     Would benefit from sleep study and possible CPAP.  Does not wear oxygen at home.      Edema due to hypoalbuminemia    04/12 add IV albumin and some Lasix  Start enteral feedings  04/13 start enteral feedings  04/22 albumin 1.6  04/24 start enteral feding  Anemia  Anemia is likely due to chronic disease due to Malignancy. Most recent hemoglobin and hematocrit are listed below.  Recent Labs     04/22/25  0524 04/23/25  0532   HGB 8.3* 8.8*   HCT 25.9* 26.8*     Plan  - Monitor serial CBC: Daily  - Transfuse PRBC if patient becomes hemodynamically unstable, symptomatic or H/H drops below 7/21.  - Patient has not received any PRBC transfusions to " date  - Patient's anemia is currently stable  - follow  No evidence of bleeding  Hyponatremia  Hyponatremia is likely due to renal insufficiency. The patient's most recent sodium results are listed below.  Recent Labs     04/22/25  0524 04/23/25  0531 04/24/25  0429    137 138     Plan  - Correct the sodium by 4-6mEq in 24 hours.   - Obtain the following studies:  In a.  - Will treat the hyponatremia with continue LR  - Monitor sodium Daily.   - Patient hyponatremia is stable  - follow  Follow  Neoplastic (malignant) related fatigue    Progressive mobility protocol  Pneumonitis  IV steroids per pulmonology  04/19 antibiotic adjustment based on cultures by Pulmonary  Dehydration, severe  Encourage orals    Acute pancreatitis    04/21 Abd fluid collection in area pancreas noted on CT abd, discuss with surgery  04/22 likely evolving pancreatitis dx acouple weeks ago at [facility].  No severe epigastric pain.  04/24 as resolves might eat better  Infection due to Stenotrophomonas maltophilia    Continue with levaquin  Chronic kidney disease, stage 3b  Creatine stable for now. BMP reviewed- noted Estimated Creatinine Clearance: 17.5 mL/min (A) (based on SCr of 4.31 mg/dL (H)). according to latest data. Based on current GFR, CKD stage is stage 5 - GFR < 15.  Monitor UOP and serial BMP and adjust therapy as needed. Renally dose meds. Avoid nephrotoxic medications and procedures.  Hyperkalemia  Hyperkalemia is likely due to ESRD.The patients most recent potassium results are listed below.  Recent Labs     04/22/25  0524 04/23/25  0531 04/24/25  0429   K 5.7* 5.6* 5.5*     Plan  - Monitor for arrhythmias with EKG and/or continuous telemetry.   - Treat the hyperkalemia with Sodium Bicarbonate.   - Monitor potassium: Daily  - The patient's hyperkalemia is stable          VTE Risk Mitigation (From admission, onward)      None            Discharge Planning   MITUL:      Code Status: DNR   Medical Readiness for Discharge Date:    Discharge Plan A: Home with family                Please place Justification for DME        Andriy Rizvi MD  Department of Hospital Medicine   [facility]

## 2025-04-25 NOTE — ASSESSMENT & PLAN NOTE
Creatine stable for now. BMP reviewed- noted Estimated Creatinine Clearance: 17.5 mL/min (A) (based on SCr of 4.31 mg/dL (H)). according to latest data. Based on current GFR, CKD stage is stage 5 - GFR < 15.  Monitor UOP and serial BMP and adjust therapy as needed. Renally dose meds. Avoid nephrotoxic medications and procedures.

## 2025-04-25 NOTE — ASSESSMENT & PLAN NOTE
04/21 Abd fluid collection in area pancreas noted on CT abd, discuss with surgery  04/22 likely evolving pancreatitis dx acouple weeks ago at [facility].  No severe epigastric pain.  04/24 as resolves might eat better

## 2025-04-25 NOTE — DISCHARGE SUMMARY
[facility]  [facility] Medicine  Discharge Summary      Patient Name: Magan Saleem  MRN: [id number]  [facility]: [id number]  Patient Class: IP- Inpatient  Admission Date: 4/3/2025  Hospital Length of Stay: 22 days  Discharge Date and Time: 04/25/2025 6:52 PM  Attending Physician: Andriy Rizvi MD   Discharging Provider: Andriy Rizvi MD  Primary Care Provider: Sil Brown DO    Primary Care Team: Networked reference to record PCT     HPI:   70 yo F presents to [place] ED from [facility] for abnormal labs.  Patient was discharged from [facility] two days ago to skilled nursing facility for rehab.  She was diagnosed with dehydration due to gastroparesis with UTI.  Patient has metastatic lung cancer (to the brain) and follows with Dr. Swanson for IV chemotherapy every other week and takes lazertinib daily.  She has completed her course of radiation for the brain mets and follow had showed some improvement.  I thought she had either some decreased LOC due to encephalopathy or some aphasia from the brain mets but after a great effort to get her to talk, I realized she was just mad.  She wanted to know why she had been discharged two days ago when she could not eat.  She has no appetite and early satiety.  She is not nauseated and no vomiting.  She has decided on a DNR status previously (her caretaker and friend of 20 years) states that she had always said she did not want resuscitation if she experienced cardiopulmonary arrest and had told her children her wishes but she does want treatment and is not opposed to J tube if needed.  She has not had anything to eat or drink in two days and is dehydrated again.      Patient was transferred because of concern of sepsis.  She did have enterococcus faecalis UTI at [facility] and was treated.  I do not have the culture results but cultures were sent and patient does have some yeast noted.  (Will not treat a yeast colonization).  She is afebrile and  hemodynamically stable and does not look septic clinically.  Her Lactic acid is stable at 2.5 dara 3.2.  Will check another in am after volume resuscitation.  Her creat is at baseline but she has prerenal azotemia further confirming the dehydration.  Patient has an IO in place from [facility] due to dehydration and difficult stick but with some volume resuscitation, we have gotten an IV.  She is covid and flu negative.      Her WBC is 29 and I am not sure if she has received neupogen but could be a stress reaction.  Her BS is 245 and she does have some urine ketones but most likely due to starvation ketosis.  Will check a serum ketone.  Her platelets are 88K but this is most likely from her chemo.  She is not anemic.  CXR shows some patchy infiltrate but no obvious obstructive pneumonia from her lung cancer.  Her BNP about 3K but no clinical signs of CHF.  No recent echo but due to her BMI 50 most likely has some PHTN.  EKG had no ischemic changes but tachy at 114 which could also be from dehydration.  She was on ozempic for her DM and this could be the cause of her decreased appetite.  She is also on decadron for prevention of cerebral edema.      Remainder of ROS as below.  She mostly just nods and shakes her head and rolls her eyes.  You can get her to laugh if you try but she has been depressed since she has not walked since her hospitalization at [facility] on 3/19/25 and was discharged two days ago.  She says that at her last chemo she noticed she was getting weaker and her daughter had to help her in and out of the car and that was new for her.      See assessment and plan below for problem based evaluation       * No surgery found *      Hospital Course:   69 year old female presents with hypernatremia; she is not too talkative during rounds    4/5- patient seen examined today resting comfortably in bed and in no acute distress, with no acute events overnight.  Patient has a multitude of issues complicating her medical  picture.  White blood cell count 63145 today, sodium 159, potassium 3.2 and BUN creatinine 59 and 1.8.  The patient is still not eating even when assistance is provided.  We have already changed her fluids to half-normal saline with 20 of KCl at 1:25 a.m..  Have also put in a GI consult for possible feeding tube.  E coli in the urine has turned out to be ESBL and Rocephin has been changed Merrem.    4/6- the patient seen examined today resting comfortably in bed, in no acute distress, in no acute events overnight.  The patient is not very talkative today, but states she is doing okay.  She has no acute complaints.  Blood cultures remain negative.  The patient has not eaten since yesterday and is on light IV fluids.  GI has been consulted for feeding tube.  Continues on Merrem for positive urine culture.    04/07 Records reviewed. Not eating which not new, Similar during recent admit at [facility]. Dr Swanson there had question pancreatitis. No abd pain or tenderness reported now. Question of dysphagia to solids. Chart hx gastroparesis. Will discuss with GI. Ronnie says has responded so far well to treatment for lung CA.   04/08 had EGD at [facility] 03/21/25 with no obstruction and evidence gastroparesis. Still not ending. Try additional meds. Talked with GI. Increase activity  04/09 Some better with med  changes  04/10 Continues to do better. Ate 1/2 fish sandwich for lunch. Called by Dr Swanson and she wanting to keep feeding tube in consideration. Talked with Dr Reich and Dr Lemus. If something needed with gastroparesis would have to have post gastric position of tube.   04/11 eating some. Later staff requested some nystatin for sore mouth.   04/12 still not eating well.  Increased peripheral edema.  Albumin low at 1.5.  Will give some albumin and follow with some Lasix.  Placed Dobbhoff tube and start enteral feedings.  This will help with nutrition and if issue of placing surgical tube later make sure will tolerate  enteral feedings.  Chest x-ray continues worse on left side.  Discuss with Pulmonary and ask Dr. Villasenor to see.   04/13 no new issues.  Enteral feedings to be started.  Pulmonary evaluation appreciated and plans noted.  04/14 Tolerating feedings Therapy working with her. Bronchoscopy planned.   4/15 BAL today  4/16 bronch yesterday looks like pneumonitis  4/17 not candidate for PEG    04/18 Eating some now. Pt says feels some better than last seen. Look at placement. High dose steroids by pulmonary. BS not as bad as would expect.  04/19 states continued eat some.  Less nausea.  Blood sugar not sure bad considering the steroids.  Pulmonary adjusted antibiotics based on cultures.  Look at it placement next week.   04/20 Looks the small. C/O SOB to nursing staff earlier but ABG OK. Poor oral intake ; encourage for pt to do more. Looking into placement.  04/21 Firm tender area in abd wall above umbilicus; ?hernia. Abnormal CT de santiago noted and will discuss with surgery.   04/22 CT shows evolving pancreatitis which pt treated for acouple weeks earlier at [facility]. Reviewed with Dr Tapia. No plan to operate on ventral hernia. Discussed later with Dr York. See how does off IVF and encourage oral intake. WBC and Cr both slight better.   04/23 Looks this same. Pt eating some. Pt getting discouraged and asking about home hospice. Talked by phone with her friend Shauna. In conversion doesn't sound like family would be able to care for pt at home. Encouraged pt to give some time and attempt SWB. She says she with consider tonight.   04/24 Lab worse but pt looks some better. Still not taking in well. Maybe eat better as pancreatitis further resolves. Considering replacing dobbhoff feeding tube and look into move to [facility]. Ask Dr Frias to review renal situation. Maybe pancreatitis,pneumonia,renal failure, all these might make a big difference if resolved. Talked with pt and she was OK with NGT  04/25 patient's spirits seems to be doing some  better.  Dobbhoff tube placed in feedings to be started.  To be moved to LTAC.  No other new issue         Goals of Care Treatment Preferences:  Code Status: DNR          What is most important right now is to focus on extending life as long as possible, even it it means sacrificing quality.  Accordingly, we have decided that the best plan to meet the patient's goals includes continuing with treatment.      SDOH Screening:  The patient declined to be screened for utility difficulties, food insecurity, transport difficulties, housing insecurity, and interpersonal safety, so no concerns could be identified this admission.     Consults:   Consults (From admission, onward)          Status Ordering Provider     Inpatient consult to Nephrology  Once        Provider:  Zack Frias MD    Completed THO KRISHNAMURTHY     Inpatient consult to Social Work  Once        Provider:  (Not yet assigned)    Completed THO KRISHNAMURTHY     Inpatient consult to Registered Dietitian/Nutritionist  Once        Provider:  (Not yet assigned)    Completed THO KRISHNAMURTHY     Inpatient consult to Midline team  Once        Provider:  (Not yet assigned)    Acknowledged YEE GUERRA     Inpatient consult to General Surgery  Once        Provider:  Debra Pizarro FNP    Completed YEE GUERRA     Inpatient consult to Registered Dietitian/Nutritionist  Once        Provider:  (Not yet assigned)    Completed THO KRISHNAMURTHY     Inpatient consult to Pulmonology  Once        Provider:  Todd Villasenor MD    Completed THO KRISHNAMURTHY     Inpatient consult to Gastroenterology  Once        Provider:  Aurelio Reich MD    Completed TIFF DANIELLE     Inpatient consult to Midline team  Once        Provider:  (Not yet assigned)    Acknowledged ELIUD ISABEL     Inpatient consult to Registered Dietitian/Nutritionist  Once        Provider:  (Not yet assigned)    Completed KEN NERI            Assessment & Plan  Sepsis due to  pneumonia (Resolved: 4/25/2025)  Appears to have UTI and pneumonia; has leukocytosis; has opacities on CXR; has G- bacilli on urine culture; blood cultures pending; will place on Vancomycin and Rocephin; BP on the low side; on IVF    04/12 worsening chest x-ray, ask Pulmonary to review  4/15 plan for BAL today.  Continue antibiotic  4/16 7 days antibiotic  completed  Hypernatremia (Resolved: 4/25/2025)  Due to dehydration; off diuretics; on IVF; will monitor Na levels  Lung cancer metastatic to brain  DNR but not hospice.  Receives chemo and has finished radiation.    04/07 reported stable / improved after treatment  04/09 more fuffy right side CXR  04/12 continue to abnormality on chest x-ray and ask Pulmonary to review  4/15 follows with Dr. Swanson.  No more chemotherapy treatments until patient is stronger.  Discussed with Dr. Swanson  4/16 hold treatments for now  04/20 brain lesion prior treated and I told been stable    Thrombocytopenia  On chemo; will monitor  04/19 platelet count 149 wishes improved    Essential hypertension  Vitals:    04/23/25 1934 04/24/25 0008 04/24/25 0420 04/24/25 0759   BP: 112/64 93/60 (!) 99/54 93/64    04/24/25 1141 04/24/25 1641 04/24/25 2032 04/25/25 0026   BP: 119/72 108/74 (!) 90/59 (!) 98/59    04/25/25 0523 04/25/25 0756   BP: (!) 116/58 114/68         Monitor BP while on Metoprolol; on IVF; off Losartan    Moderate persistent asthma without complication  Continue home inhaled steroid/bronchodilator and singulair    Acute on chronic kidney failure  Creatine stable     04/20 Cr recently been climbing. May need some IVF. Check BNP in am  Chronic pulmonary embolism without acute cor pulmonale  Eliquis was discontinued due to risk of ICH with brain mets but they have improved so the anti-coagulation has been restarted; will monitor  04/07 apparently this of several years ago    Type 2 diabetes mellitus with hyperglycemia  BS elevated; will increase Lantus to 25 units; continue  "SSI;monitor BS     Gastroparesis  On Reglan and PPI for erosive gastritis; will monitor    EGD by Dr. Lo during recent past admission at [facility]:  "EGD on 03/21/2025 shows LA class B erosive esophagitis but no pill esophagitis, nonerosive gastritis and retention of food and fluid suspicious for gastroparesis, due to narcotics and diabetes. "    04/ 08 try additional meds to help gastric emptying.  04/09 taking in some now orally with recent changes meds  04/10 better and ate half [place] for lunch   04/14 tolerating enteral feedings so far  4/15 we will get PEG tube if patient is agreeable.  Daughter is agreeable.  Discussed with her  4/16 patient wants PEG tube  4/17 not candidate for surgical PEG.  NGT out, start full liquids  04/18 taking some orally  Morbid obesity  Body mass index is 43.83 kg/m². Morbid obesity complicates all aspects of disease management from diagnostic modalities to treatment. Weight loss encouraged and health benefits explained to patient.     Would benefit from sleep study and possible CPAP.  Does not wear oxygen at home.      Edema due to hypoalbuminemia    04/12 add IV albumin and some Lasix  Start enteral feedings  04/13 start enteral feedings  04/22 albumin 1.6  04/24 start enteral feding  Anemia  Anemia is likely due to chronic disease due to Malignancy. Most recent hemoglobin and hematocrit are listed below.  Recent Labs     04/23/25  0532 04/25/25  0422   HGB 8.8* 9.0*   HCT 26.8* 27.9*     Plan  - Monitor serial CBC: Daily  - Transfuse PRBC if patient becomes hemodynamically unstable, symptomatic or H/H drops below 7/21.  - Patient has not received any PRBC transfusions to date  - Patient's anemia is currently stable  - follow  No evidence of bleeding  Hyponatremia (Resolved: 4/25/2025)  Hyponatremia is likely due to renal insufficiency. The patient's most recent sodium results are listed below.  Recent Labs     04/23/25  0531 04/24/25  0429 04/25/25  0422    " 138 141     Plan  - Correct the sodium by 4-6mEq in 24 hours.   - Obtain the following studies:  In a.  - Will treat the hyponatremia with continue LR  - Monitor sodium Daily.   - Patient hyponatremia is stable  - follow  Follow  Neoplastic (malignant) related fatigue    Progressive mobility protocol  Pneumonitis  IV steroids per pulmonology  04/19 antibiotic adjustment based on cultures by Pulmonary  Dehydration, severe (Resolved: 4/25/2025)  Encourage orals    Acute pancreatitis    04/21 Abd fluid collection in area pancreas noted on CT abd, discuss with surgery  04/22 likely evolving pancreatitis dx acouple weeks ago at [facility].  No severe epigastric pain.  04/24 as resolves might eat better  Infection due to Stenotrophomonas maltophilia    Continue with levaquin  Chronic kidney disease, stage 3b  Creatine stable for now. BMP reviewed- noted Estimated Creatinine Clearance: 15.5 mL/min (A) (based on SCr of 4.5 mg/dL (H)). according to latest data. Based on current GFR, CKD stage is stage 5 - GFR < 15.  Monitor UOP and serial BMP and adjust therapy as needed. Renally dose meds. Avoid nephrotoxic medications and procedures.  Hyperkalemia  Hyperkalemia is likely due to ESRD.The patients most recent potassium results are listed below.  Recent Labs     04/23/25  0531 04/24/25  0429 04/25/25  0422   K 5.6* 5.5* 5.5*     Plan  - Monitor for arrhythmias with EKG and/or continuous telemetry.   - Treat the hyperkalemia with Sodium Bicarbonate.   - Monitor potassium: Daily  - The patient's hyperkalemia is stable          Vitamin D deficiency (Resolved: 4/4/2025)  Will check and supplement if needed.      Mixed hyperlipidemia  Continue statin    Generalized anxiety disorder (Resolved: 4/4/2025)  Patient well controlled.  No S/H plans or ideation.  No hallucinations at present.    Will not consult psychiatry.  Will continue his OP psychotropic medication regimen.  She is on zoloft and will continue with slightly increased dose.     Dehydration with hypernatremia (Resolved: 4/4/2025)  Volume resuscitate with LR and then with 125 cc hr 1/2 ns  Should improve off HCT and lasix.    Follow BMP and renal function daily.  Prerenal from dehydration  Will have to figure out a long term solution if patient unable to eat and drink due to gastroparesis but stopping the ozempic will help.    Recently treated for UTI.  Will stop the jardiance as well and this should help with polyuria as well.    Patient came from [place] with dominguez and will remove and monitor for any urinary retention.    Avoid adult diapers as much as possible, using pink pads at night.      Patient with condition that if not treated could result in loss of life or bodily function.  Due to co morbidities this patient has complex medical management.  Spent over 45 minutes with patient and caregiver and in forming plan of care.  DNR discussion noted above.         Abnormal chest x-ray (Resolved: 4/22/2025)    04/13 addressed by Pulmonary with further investigation plans  Final Active Diagnoses:    Diagnosis Date Noted POA    Hyperkalemia [E87.5] 04/23/2025 Yes    Infection due to Stenotrophomonas maltophilia [A49.8] 04/22/2025 Yes    Chronic kidney disease, stage 3b [N18.32] 04/22/2025 Yes    Acute pancreatitis [K85.90] 04/21/2025 Yes    Pneumonitis [J98.4] 04/16/2025 Yes    Anemia [D64.9] 04/15/2025 Yes    Neoplastic (malignant) related fatigue [R53.0] 04/15/2025 Yes    Edema due to hypoalbuminemia [E88.09] 04/12/2025 Yes    Type 2 diabetes mellitus with hyperglycemia [E11.65] 04/04/2025 Yes    Thrombocytopenia [D69.6] 04/04/2025 Yes    Lung cancer metastatic to brain [C34.90, C79.31]  Yes    Gastroparesis [K31.84]  Yes    Morbid obesity [E66.01] 04/03/2025 Yes    Acute on chronic kidney failure [N17.9, N18.9]  Yes    Chronic pulmonary embolism without acute cor pulmonale [I27.82]  Yes    Moderate persistent asthma without complication [J45.40] 10/30/2024 Yes    Essential hypertension  [I10] 06/30/2021 Yes    Mixed hyperlipidemia [E78.2] 06/30/2021 Yes      Problems Resolved During this Admission:    Diagnosis Date Noted Date Resolved POA    PRINCIPAL PROBLEM:  Sepsis due to pneumonia [J18.9, A41.9] 04/04/2025 04/25/2025 Yes    Dehydration, severe [E86.0] 04/16/2025 04/25/2025 Yes    Hyponatremia [E87.1] 04/15/2025 04/25/2025 Yes    Abnormal chest x-ray [R93.89] 04/13/2025 04/22/2025 No    Dehydration with hypernatremia [E86.0, E87.0] 04/04/2025 04/04/2025 Yes    Leukocytosis [D72.829] 04/04/2025 04/04/2025 Yes    Hypernatremia [E87.0] 04/04/2025 04/25/2025 Yes    Generalized anxiety disorder [F41.1] 09/29/2021 04/04/2025 Yes    Vitamin D deficiency [E55.9] 06/30/2021 04/04/2025 Yes       Discharged Condition: fair    Disposition: Another Health Care Inst*    Follow Up:    Patient Instructions:      Diet diabetic     Activity as tolerated       Significant Diagnostic Studies: Labs: BMP:   Recent Labs   Lab 04/24/25 0429 04/25/25 0422   GLU 89 77*    141   K 5.5* 5.5*   * 110*   CO2 18* 15*   * 108*   CREATININE 4.31* 4.50*   CALCIUM 8.5 8.6   , CMP   Recent Labs   Lab 04/24/25 0429 04/25/25 0422    141   K 5.5* 5.5*   * 110*   CO2 18* 15*   GLU 89 77*   * 108*   CREATININE 4.31* 4.50*   CALCIUM 8.5 8.6   ANIONGAP 17* 22*   , and CBC   Recent Labs   Lab 04/25/25 0422   WBC 36.40*   HGB 9.0*   HCT 27.9*          Pending Diagnostic Studies:       Procedure Component Value Units Date/Time    EXTRA TUBES [[id number]] Collected: 04/17/25 0421    Order Status: Sent Lab Status: In process Updated: 04/17/25 0421    Specimen: Blood, Venous     Narrative:      The following orders were created for panel order EXTRA TUBES.  Procedure                               Abnormality         Status                     ---------                               -----------         ------                     Michelle Top Hold[[id number]]                               In  process                   Please view results for these tests on the individual orders.    EXTRA TUBES [[id number]] Collected: 04/16/25 0503    Order Status: Sent Lab Status: In process Updated: 04/16/25 0542    Specimen: Blood, Venous     Narrative:      The following orders were created for panel order EXTRA TUBES.  Procedure                               Abnormality         Status                     ---------                               -----------         ------                     Light Green Top Hold[[id number]]                            In process                   Please view results for these tests on the individual orders.    EXTRA TUBES [[id number]] Collected: 04/14/25 0521    Order Status: Sent Lab Status: In process Updated: 04/14/25 0521    Specimen: Blood, Venous     Narrative:      The following orders were created for panel order EXTRA TUBES.  Procedure                               Abnormality         Status                     ---------                               -----------         ------                     Light Green Top Hold[[id number]]                            In process                   Please view results for these tests on the individual orders.    EXTRA TUBES [[id number]] Collected: 04/12/25 0430    Order Status: Sent Lab Status: In process Updated: 04/12/25 0430    Specimen: Blood, Venous     Narrative:      The following orders were created for panel order EXTRA TUBES.  Procedure                               Abnormality         Status                     ---------                               -----------         ------                     Light Green Top Hold[[id number]]                            In process                   Please view results for these tests on the individual orders.    EXTRA TUBES [[id number]] Collected: 04/12/25 0430    Order Status: Sent Lab Status: In process Updated: 04/12/25 0430    Specimen: Blood, Venous     Narrative:      The following orders  were created for panel order EXTRA TUBES.  Procedure                               Abnormality         Status                     ---------                               -----------         ------                     Gold Top Hold[[id number]]                                   In process                   Please view results for these tests on the individual orders.    EXTRA TUBES [[id number]] Collected: 04/04/25 1833    Order Status: Sent Lab Status: In process Updated: 04/04/25 1833    Specimen: Blood, Venous     Narrative:      The following orders were created for panel order EXTRA TUBES.  Procedure                               Abnormality         Status                     ---------                               -----------         ------                     Light Green Top Hold[[id number]]                            In process                   Please view results for these tests on the individual orders.    Non-Gyn Cytology [[id number]] Collected: 04/15/25 0943    Order Status: Sent Lab Status: In process Updated: 04/15/25 1207    Specimen: Respiratory from BAL            Medications:  Reconciled Home Medications:      Medication List        START taking these medications      albuterol 2.5 mg /3 mL (0.083 %) nebulizer solution  Commonly known as: PROVENTIL  Take 3 mLs (2.5 mg total) by nebulization every 2 (two) hours as needed (shortness of breath). Rescue     Lactobacillus acidophilus 500 million cell Cap  Take 2 capsules by mouth 3 (three) times daily with meals.  Start taking on: April 26, 2025     levoFLOXacin 500 MG tablet  Commonly known as: LEVAQUIN  Take 1 tablet (500 mg total) by mouth every 48 hours.     metoprolol succinate 25 MG 24 hr tablet  Commonly known as: TOPROL-XL  Take 1 tablet (25 mg total) by mouth once daily.  Start taking on: April 26, 2025     predniSONE 10 MG tablet  Commonly known as: DELTASONE  Take 4 tablets (40 mg total) by mouth once daily for 8 days, THEN 2 tablets (20 mg  total) once daily for 14 days, THEN 1 tablet (10 mg total) once daily.  Start taking on: April 26, 2025     sodium bicarbonate 650 MG tablet  Take 2 tablets (1,300 mg total) by mouth 2 (two) times daily.            CONTINUE taking these medications      albuterol-ipratropium 2.5 mg-0.5 mg/3 mL nebulizer solution  Commonly known as: DUO-NEB  Take 3 mLs by nebulization every 4 (four) hours as needed for Wheezing or Shortness of Breath. Rescue     atorvastatin 10 MG tablet  Commonly known as: LIPITOR  TAKE ONE TABLET BY MOUTH ONCE DAILY     ELIQUIS 5 mg Tab  Generic drug: apixaban  TAKE ONE TABLET BY MOUTH TWICE DAILY     metoclopramide HCl 5 MG tablet  Commonly known as: REGLAN  Take 5 mg by mouth 4 (four) times daily.     montelukast 10 mg tablet  Commonly known as: SINGULAIR  TAKE ONE TABLET BY MOUTH EVERY EVENING     NovoLOG Flexpen U-100 Insulin 100 unit/mL (3 mL) Inpn pen  Generic drug: insulin aspart U-100  Per sliding scale max daily dose 30 units; subcutaneously; three times a day with meals     pantoprazole 40 MG tablet  Commonly known as: PROTONIX  Take 40 mg by mouth once daily.     rOPINIRole 0.25 MG tablet  Commonly known as: REQUIP  Take 0.25 mg by mouth 3 (three) times daily.     sertraline 25 MG tablet  Commonly known as: ZOLOFT  Take 1 tablet (25 mg total) by mouth once daily.            STOP taking these medications      ALLERGY RELIEF (CETIRIZINE) 10 mg tablet  Generic drug: cetirizine     empagliflozin 10 mg tablet  Commonly known as: Jardiance     furosemide 40 MG tablet  Commonly known as: LASIX     losartan-hydrochlorothiazide 50-12.5 mg 50-12.5 mg per tablet  Commonly known as: HYZAAR     metoprolol tartrate 25 MG tablet  Commonly known as: LOPRESSOR     OZEMPIC 0.25 mg or 0.5 mg (2 mg/3 mL) pen injector  Generic drug: semaglutide     potassium chloride 10 MEQ Tbsr  Commonly known as: KLOR-CON     TRELEGY ELLIPTA 100-62.5-25 mcg Dsdv  Generic drug: fluticasone-umeclidin-vilanter               Indwelling Lines/Drains at time of discharge:   Lines/Drains/Airways       Peripherally Inserted Central Catheter Line  Duration             PICC Double Lumen 04/17/25 1547 left brachial 8 days              Drain  Duration             Female External Urinary Catheter w/ Suction 04/21/25 1950 3 days         NG/OG Tube 04/25/25 0913 Right nostril <1 day                    Time spent on the discharge of patient: 40 minutes         Andriy Rizvi MD  Department of Hospital Medicine  [facility]

## 2025-04-25 NOTE — ASSESSMENT & PLAN NOTE
"On Reglan and PPI for erosive gastritis; will monitor    EGD by Dr. Lo during recent past admission at [facility]:  "EGD on 03/21/2025 shows LA class B erosive esophagitis but no pill esophagitis, nonerosive gastritis and retention of food and fluid suspicious for gastroparesis, due to narcotics and diabetes. "    04/ 08 try additional meds to help gastric emptying.  04/09 taking in some now orally with recent changes meds  04/10 better and ate half [place] for lunch   04/14 tolerating enteral feedings so far  4/15 we will get PEG tube if patient is agreeable.  Daughter is agreeable.  Discussed with her  4/16 patient wants PEG tube  4/17 not candidate for surgical PEG.  NGT out, start full liquids  04/18 taking some orally  "

## 2025-04-25 NOTE — NURSING
"Patient refused bedtime medications despite encouragement.  Patient states doctor told her she could skip a couple of days of her medications. I reviewed notes and orders and did not see anything noted to hold medications. Asked patient which doctor told her that and she stated "Dr Rizvi". I informed her that usually if medications are to be held the doctor enters a note or an order to hold.  Patient then says she has only been taking her pain medication and no other medications.  Asked if she needed a pain pill and patient stated " not right now". Continues to refuse to take bedtime medications. Will report this to Dr Rizvi and julienhinatasha nurse in the AM.  Care is ongoing. Safety measures in place.  "

## 2025-04-25 NOTE — ASSESSMENT & PLAN NOTE
04/21 Abd fluid collection in area pancreas noted on CT abd, discuss with surgery  04/22 likely evolving pancreatitis dx acouple weeks ago at [facility].  No severe epigastric pain.  04/24 as resolves might eat better

## 2025-04-25 NOTE — ASSESSMENT & PLAN NOTE
Body mass index is 43.83 kg/m². Morbid obesity complicates all aspects of disease management from diagnostic modalities to treatment. Weight loss encouraged and health benefits explained to patient.     Would benefit from sleep study and possible CPAP.  Does not wear oxygen at home.

## 2025-04-25 NOTE — ASSESSMENT & PLAN NOTE
Vitals:    04/23/25 1934 04/24/25 0008 04/24/25 0420 04/24/25 0759   BP: 112/64 93/60 (!) 99/54 93/64    04/24/25 1141 04/24/25 1641 04/24/25 2032 04/25/25 0026   BP: 119/72 108/74 (!) 90/59 (!) 98/59    04/25/25 0523 04/25/25 0756   BP: (!) 116/58 114/68         Monitor BP while on Metoprolol; on IVF; off Losartan

## 2025-04-25 NOTE — PT/OT/SLP PROGRESS
Occupational Therapy   Treatment    Name: Magan Saleem  MRN: [id number]  Admitting Diagnosis:  Sepsis due to pneumonia       Recommendations:     Discharge Recommendations: Low Intensity Therapy  Discharge Equipment Recommendations:  to be determined by next level of care  Barriers to discharge:       Assessment:     Magan Saleem is a 69 y.o. female with a medical diagnosis of Sepsis due to pneumonia.  She presents with the following Performance deficits affecting function are weakness, decreased upper extremity function, impaired endurance, impaired balance, decreased safety awareness, impaired self care skills, impaired functional mobility, decreased coordination.   Patient was more talkative today. Patient continued to complete exercises with AAROM.  Rehab Prognosis:  Fair; patient would benefit from acute skilled OT services to address these deficits and reach maximum level of function.       Plan:     Patient to be seen 5 x/week to address the above listed problems via self-care/home management, therapeutic activities, therapeutic exercises  Plan of Care Expires:    Plan of Care Reviewed with: patient    Subjective   Patient was laying supine in bed upon ROSA arrival. Patient stated she was feeling fine with no complaints of pain and was agreeable to participate in todays session.   Chief Complaint: the tape on her nose holding the NG tube in place was not sticking. RN notified  Patient/Family Comments/goals: to return home.   Pain/Comfort:  Pain Rating 1: 0/10    Objective:     Communicated with: RN prior to session.  Patient found HOB elevated with NG tube, oxygen, PureWick, PICC line upon OT entry to room.    General Precautions: Standard, fall, contact    Orthopedic Precautions:N/A  Braces: N/A  Respiratory Status: Nasal cannula, flow 2 L/min    Therapeutic exercises:  Patient completed the following AAROM exercises on right and left upper extremities  to work on ROM/strengthening in preparation to  complete of bed mobility, ADLs and transfers:     -Elbow flexion/extension: 2 sets of 10   -Shoulder flexion: 2 sets of 10   -Chest press: 2 sets of 10   -Internal Rotation/External Rotation: 2 sets of 10   -Pronation/Supination: 2 sets of 10    Increased time/effort needed to complete each exercise.       Patient left HOB elevated with call button in reach and RN notified    GOALS:   Multidisciplinary Problems       Occupational Therapy Goals          Problem: Occupational Therapy    Goal Priority Disciplines Outcome Interventions   Occupational Therapy Goal     OT, PT/OT Progressing    Description: STG:  Pt will perform grooming with setup  Pt will bathe with Min A  Pt will perform UE dressing with Min A  Pt will perform LE dressing with Mod A  Pt will sit EOB x 10 min with CG assistance  Pt will transfer bed/chair/bsc with Mod A  Pt will perform standing task x 3 min with CG assistance  Pt will tolerate 30 minutes of tx without fatigue      LT.Restore to max I with self care and mobility.                         DME Justifications:    Time Tracking:     OT Date of Treatment: 25  OT Start Time: 1126  OT Stop Time: 1141  OT Total Time (min): 15 min    Billable Minutes:Therapeutic Exercise 15 minutes    OT/SAROJ: SAROJ     Number of SAROJ visits since last OT visit: 1    SAROJ Rodriguez  2025  12:27 PM

## 2025-04-25 NOTE — ASSESSMENT & PLAN NOTE
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 Most recent hemoglobin and hematocrit are listed below.  Recent Labs     04/23/25  0532 04/25/25  0422   HGB 8.8* 9.0*   HCT 26.8* 27.9*     Plan  - Monitor serial CBC: Daily  - Transfuse PRBC if patient becomes hemodynamically unstable, symptomatic or H/H drops below 7/21.  - Patient has not received any PRBC transfusions to date  - Patient's anemia is currently stable  - follow  No evidence of bleeding

## 2025-04-25 NOTE — ASSESSMENT & PLAN NOTE
"On Reglan and PPI for erosive gastritis; will monitor    EGD by Dr. Lo during recent past admission at [facility]:  "EGD on 03/21/2025 shows LA class B erosive esophagitis but no pill esophagitis, nonerosive gastritis and retention of food and fluid suspicious for gastroparesis, due to narcotics and diabetes. "    04/ 08 try additional meds to help gastric emptying.  04/09 taking in some now orally with recent changes meds  04/10 better and ate half [place] for lunch   04/14 tolerating enteral feedings so far  4/15 we will get PEG tube if patient is agreeable.  Daughter is agreeable.  Discussed with her  4/16 patient wants PEG tube  4/17 not candidate for surgical PEG.  NGT out, start full liquids  04/18 taking some orally  "

## 2025-04-25 NOTE — CONSULTS
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  She has a family history of CKD with her mother being on dialysis prior to her death.  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 Swanson    Melanocytic nevi of lower limb, including hip, left     Mixed hyperlipidemia     Moderate persistent asthma without complication 10/30/2024    Other pulmonary embolism without acute cor pulmonale     Vitamin D deficiency        Past Surgical History:   Procedure Laterality Date    CHOLECYSTECTOMY      CSF SHUNT      HYSTERECTOMY      TONSILLECTOMY         Review of patient's allergies indicates:   Allergen Reactions    Penicillins Hives    Sulfa (sulfonamide antibiotics) Hives     Current Facility-Administered Medications   Medication Frequency    acetaminophen suppository 650 mg Q6H PRN    acetaminophen tablet  1,000 mg Q6H PRN    albuterol nebulizer solution 2.5 mg Q2H PRN    albuterol-ipratropium 2.5 mg-0.5 mg/3 mL nebulizer solution 3 mL QID    aluminum & magnesium hydroxide-simethicone 400-400-40 mg/5 mL suspension 30 mL Q6H PRN    atorvastatin tablet 20 mg QHS    bisacodyL EC tablet 10 mg Daily PRN    budesonide nebulizer solution 0.5 mg Q12H    dextromethorphan-guaiFENesin  mg/5 ml liquid 10 mL Q6H PRN    dextrose 50% injection 12.5 g PRN    dextrose 50% injection 25 g PRN    diphenhydrAMINE capsule 50 mg Q6H PRN    docusate sodium capsule 100 mg BID PRN    glucagon (human recombinant) injection 1 mg PRN    glucose chewable tablet 16 g PRN    glucose chewable tablet 24 g PRN    HYDROcodone-acetaminophen 7.5-325 mg per tablet 1 tablet Q6H PRN    insulin aspart U-100 injection 0-10 Units QID (AC + HS) PRN    Lactobacillus acidophilus capsule 2 capsule TID WM    levoFLOXacin tablet 500 mg Q48H    LORazepam tablet 1 mg Q6H PRN    melatonin tablet 6 mg Nightly PRN    metoclopramide HCl tablet 10 mg QID (AC & HS)    metoprolol succinate (TOPROL-XL) 24 hr tablet 25 mg Daily    montelukast tablet 10 mg QHS    pantoprazole EC tablet 40 mg Daily    predniSONE tablet 40 mg Daily    Followed by    [START ON 5/3/2025] predniSONE tablet 20 mg Daily    Followed by    [START ON 5/17/2025] predniSONE tablet 10 mg Daily    sertraline tablet 100 mg Daily    sodium bicarbonate tablet 1,300 mg BID    traZODone tablet 50 mg Nightly PRN     Family History       Problem Relation (Age of Onset)    Diabetes Mother    Hypertension Mother    Lung cancer Father          Tobacco Use    Smoking status: Never    Smokeless tobacco: Never   Substance and Sexual Activity    Alcohol use: Never    Drug use: Never    Sexual activity: Not Currently     Review of Systems  Objective:     Vital Signs (Most Recent):  Temp: 97.4 °F (36.3 °C) (04/25/25 0756)  Pulse: 94 (04/25/25 0756)  Resp: 20 (04/25/25 0756)  BP: 114/68 (04/25/25 0756)  SpO2: 100 %  (25 0756) Vital Signs (24h Range):  Temp:  [97.4 °F (36.3 °C)-97.6 °F (36.4 °C)] 97.4 °F (36.3 °C)  Pulse:  [] 94  Resp:  [17-20] 20  SpO2:  [95 %-100 %] 100 %  BP: ()/(58-74) 114/68     Weight: (!) 136.1 kg (300 lb) (25 2100)  Body mass index is 49.92 kg/m².  Body surface area is 2.5 meters squared.    No intake/output data recorded.    Physical Exam  Constitutional:       General: She is not in acute distress.     Appearance: She is obese. She is ill-appearing. She is not toxic-appearing.   Cardiovascular:      Heart sounds: No murmur heard.     No friction rub. No gallop.   Pulmonary:      Effort: Prolonged expiration present.      Breath sounds: No wheezing.   Abdominal:      General: Abdomen is protuberant. Bowel sounds are normal.      Palpations: Abdomen is soft.      Tenderness: There is abdominal tenderness.   Musculoskeletal:      Right lower le+ Pitting Edema present.      Left lower le+ Pitting Edema present.   Neurological:      Mental Status: She is alert.         Significant Labs:  CBC:   Recent Labs   Lab 25  0422   WBC 36.40*   RBC 3.02*   HGB 9.0*   HCT 27.9*      MCV 92.4   MCH 29.8   MCHC 32.3     CMP:   Recent Labs   Lab 25  0524 25  0531 25  0422   GLU 89   < > 77*   CALCIUM 8.2*   < > 8.6   ALBUMIN 1.6*  --   --    PROT 4.0*  --   --       < > 141   K 5.7*   < > 5.5*   CO2 18*   < > 15*      < > 110*   *   < > 108*   CREATININE 3.85*   < > 4.50*   ALKPHOS 145  --   --    ALT 17  --   --    AST 34  --   --    BILITOT 0.7  --   --     < > = values in this interval not displayed.     All labs within the past 24 hours have been reviewed.    Significant Imaging:      Assessment/Plan:     Active Diagnoses:    Diagnosis Date Noted POA    PRINCIPAL PROBLEM:  Sepsis due to pneumonia [J18.9, A41.9] 2025 Yes    Hyperkalemia [E87.5] 2025 Yes    Infection due to Stenotrophomonas maltophilia [A49.8] 2025 Yes     Chronic kidney disease, stage 3b [N18.32] 04/22/2025 Yes    Acute pancreatitis [K85.90] 04/21/2025 Yes    Pneumonitis [J98.4] 04/16/2025 Yes    Dehydration, severe [E86.0] 04/16/2025 Yes    Anemia [D64.9] 04/15/2025 Yes    Hyponatremia [E87.1] 04/15/2025 Yes    Neoplastic (malignant) related fatigue [R53.0] 04/15/2025 Yes    Edema due to hypoalbuminemia [E88.09] 04/12/2025 Yes    Type 2 diabetes mellitus with hyperglycemia [E11.65] 04/04/2025 Yes    Thrombocytopenia [D69.6] 04/04/2025 Yes    Hypernatremia [E87.0] 04/04/2025 Yes    Lung cancer metastatic to brain [C34.90, C79.31]  Yes    Gastroparesis [K31.84]  Yes    Morbid obesity [E66.01] 04/03/2025 Yes    Acute on chronic kidney failure [N17.9, N18.9]  Yes    Chronic pulmonary embolism without acute cor pulmonale [I27.82]  Yes    Moderate persistent asthma without complication [J45.40] 10/30/2024 Yes    Essential hypertension [I10] 06/30/2021 Yes      Problems Resolved During this Admission:    Diagnosis Date Noted Date Resolved POA    Abnormal chest x-ray [R93.89] 04/13/2025 04/22/2025 No    Dehydration with hypernatremia [E86.0, E87.0] 04/04/2025 04/04/2025 Yes    Leukocytosis [D72.829] 04/04/2025 04/04/2025 Yes    Generalized anxiety disorder [F41.1] 09/29/2021 04/04/2025 Yes    Vitamin D deficiency [E55.9] 06/30/2021 04/04/2025 Yes    Mixed hyperlipidemia [E78.2] 06/30/2021 04/04/2025 Yes       Imp:  Assessment of this patient's volume is difficult due to her body habitus.The elevated BUN to creatinine ratio is consistent with prerenal etiology.  The patient appears to have an anion gap metabolic acidosis.Will obtain a ABG to assess her acid base status.  Plan:  Monitor the renal function  Gentle IV Fluid  hydration  Avoid nephrotoxic agents  ABG  PO4,Uric acid,SPEP    Thank you for your consult. I will follow-up with patient. Please contact us if you have any additional questions.    Mikey Velásquez MD  Nephrology  [facility]

## 2025-04-26 VITALS
BODY MASS INDEX: 43.88 KG/M2 | HEIGHT: 65 IN | OXYGEN SATURATION: 95 % | TEMPERATURE: 98 F | SYSTOLIC BLOOD PRESSURE: 100 MMHG | HEART RATE: 105 BPM | RESPIRATION RATE: 20 BRPM | DIASTOLIC BLOOD PRESSURE: 52 MMHG | WEIGHT: 263.38 LBS

## 2025-04-26 PROBLEM — E83.39 HYPERPHOSPHATEMIA: Status: ACTIVE | Noted: 2025-04-26

## 2025-04-26 PROBLEM — D69.6 THROMBOCYTOPENIA: Status: RESOLVED | Noted: 2025-04-04 | Resolved: 2025-04-26

## 2025-04-26 PROBLEM — E79.0 HYPERURICEMIA: Status: ACTIVE | Noted: 2025-04-26

## 2025-04-26 LAB
ANION GAP SERPL CALCULATED.3IONS-SCNC: 21 MMOL/L (ref 7–16)
BUN SERPL-MCNC: 118 MG/DL (ref 10–20)
BUN/CREAT SERPL: 28 (ref 6–20)
CALCIUM SERPL-MCNC: 8.5 MG/DL (ref 8.4–10.2)
CHLORIDE SERPL-SCNC: 111 MMOL/L (ref 98–107)
CO2 SERPL-SCNC: 15 MMOL/L (ref 23–31)
CREAT SERPL-MCNC: 4.25 MG/DL (ref 0.55–1.02)
CULTURE, FUNGUS (OTHER): ABNORMAL
EGFR (NO RACE VARIABLE) (RUSH/TITUS): 11 ML/MIN/1.73M2
GLUCOSE SERPL-MCNC: 113 MG/DL (ref 82–115)
GLUCOSE SERPL-MCNC: 129 MG/DL (ref 70–105)
GLUCOSE SERPL-MCNC: 201 MG/DL (ref 70–105)
PHOSPHATE SERPL-MCNC: 6.9 MG/DL (ref 2.3–4.7)
POTASSIUM SERPL-SCNC: 4.7 MMOL/L (ref 3.5–5.1)
SODIUM SERPL-SCNC: 142 MMOL/L (ref 136–145)
URATE SERPL-MCNC: 17.6 MG/DL (ref 2.6–6)

## 2025-04-26 PROCEDURE — 84100 ASSAY OF PHOSPHORUS: CPT | Performed by: INTERNAL MEDICINE

## 2025-04-26 PROCEDURE — 25000003 PHARM REV CODE 250: Performed by: STUDENT IN AN ORGANIZED HEALTH CARE EDUCATION/TRAINING PROGRAM

## 2025-04-26 PROCEDURE — 25000003 PHARM REV CODE 250: Performed by: HOSPITALIST

## 2025-04-26 PROCEDURE — 94761 N-INVAS EAR/PLS OXIMETRY MLT: CPT

## 2025-04-26 PROCEDURE — 84550 ASSAY OF BLOOD/URIC ACID: CPT | Performed by: INTERNAL MEDICINE

## 2025-04-26 PROCEDURE — 82962 GLUCOSE BLOOD TEST: CPT

## 2025-04-26 PROCEDURE — 36415 COLL VENOUS BLD VENIPUNCTURE: CPT | Performed by: STUDENT IN AN ORGANIZED HEALTH CARE EDUCATION/TRAINING PROGRAM

## 2025-04-26 PROCEDURE — 80048 BASIC METABOLIC PNL TOTAL CA: CPT | Performed by: STUDENT IN AN ORGANIZED HEALTH CARE EDUCATION/TRAINING PROGRAM

## 2025-04-26 PROCEDURE — 25000242 PHARM REV CODE 250 ALT 637 W/ HCPCS: Performed by: HOSPITALIST

## 2025-04-26 PROCEDURE — 94640 AIRWAY INHALATION TREATMENT: CPT

## 2025-04-26 PROCEDURE — 63600175 PHARM REV CODE 636 W HCPCS: Performed by: INTERNAL MEDICINE

## 2025-04-26 PROCEDURE — 63600175 PHARM REV CODE 636 W HCPCS: Performed by: STUDENT IN AN ORGANIZED HEALTH CARE EDUCATION/TRAINING PROGRAM

## 2025-04-26 PROCEDURE — 99232 SBSQ HOSP IP/OBS MODERATE 35: CPT | Mod: ,,, | Performed by: INTERNAL MEDICINE

## 2025-04-26 PROCEDURE — 99900035 HC TECH TIME PER 15 MIN (STAT)

## 2025-04-26 PROCEDURE — 27000221 HC OXYGEN, UP TO 24 HOURS

## 2025-04-26 PROCEDURE — 84165 PROTEIN E-PHORESIS SERUM: CPT | Performed by: INTERNAL MEDICINE

## 2025-04-26 PROCEDURE — 84165 PROTEIN E-PHORESIS SERUM: CPT | Mod: 26,,, | Performed by: PATHOLOGY

## 2025-04-26 RX ORDER — SODIUM BICARBONATE 650 MG/1
1300 TABLET ORAL 3 TIMES DAILY
Status: DISCONTINUED | OUTPATIENT
Start: 2025-04-26 | End: 2025-04-26 | Stop reason: HOSPADM

## 2025-04-26 RX ORDER — HEPARIN SODIUM 5000 [USP'U]/ML
5000 INJECTION, SOLUTION INTRAVENOUS; SUBCUTANEOUS EVERY 8 HOURS
Status: DISCONTINUED | OUTPATIENT
Start: 2025-04-26 | End: 2025-04-26 | Stop reason: HOSPADM

## 2025-04-26 RX ADMIN — Medication 2 CAPSULE: at 08:04

## 2025-04-26 RX ADMIN — IPRATROPIUM BROMIDE AND ALBUTEROL SULFATE 3 ML: 2.5; .5 SOLUTION RESPIRATORY (INHALATION) at 03:04

## 2025-04-26 RX ADMIN — SODIUM CHLORIDE: 9 INJECTION, SOLUTION INTRAVENOUS at 03:04

## 2025-04-26 RX ADMIN — METOCLOPRAMIDE HYDROCHLORIDE 10 MG: 5 TABLET ORAL at 03:04

## 2025-04-26 RX ADMIN — SERTRALINE HYDROCHLORIDE 100 MG: 50 TABLET ORAL at 08:04

## 2025-04-26 RX ADMIN — METOCLOPRAMIDE HYDROCHLORIDE 10 MG: 5 TABLET ORAL at 11:04

## 2025-04-26 RX ADMIN — SODIUM BICARBONATE 650 MG TABLET 1300 MG: at 08:04

## 2025-04-26 RX ADMIN — Medication 2 CAPSULE: at 11:04

## 2025-04-26 RX ADMIN — BUDESONIDE 0.5 MG: 0.5 SUSPENSION RESPIRATORY (INHALATION) at 07:04

## 2025-04-26 RX ADMIN — PANTOPRAZOLE SODIUM 40 MG: 40 TABLET, DELAYED RELEASE ORAL at 08:04

## 2025-04-26 RX ADMIN — METOCLOPRAMIDE HYDROCHLORIDE 10 MG: 5 TABLET ORAL at 05:04

## 2025-04-26 RX ADMIN — HEPARIN SODIUM 5000 UNITS: 5000 INJECTION, SOLUTION INTRAVENOUS; SUBCUTANEOUS at 03:04

## 2025-04-26 RX ADMIN — IPRATROPIUM BROMIDE AND ALBUTEROL SULFATE 3 ML: 2.5; .5 SOLUTION RESPIRATORY (INHALATION) at 07:04

## 2025-04-26 RX ADMIN — IPRATROPIUM BROMIDE AND ALBUTEROL SULFATE 3 ML: 2.5; .5 SOLUTION RESPIRATORY (INHALATION) at 11:04

## 2025-04-26 RX ADMIN — PREDNISONE 40 MG: 20 TABLET ORAL at 08:04

## 2025-04-26 NOTE — ASSESSMENT & PLAN NOTE
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 Most recent hemoglobin and hematocrit are listed below.  Recent Labs     04/25/25  0422   HGB 9.0*   HCT 27.9*     Plan  - Monitor serial CBC: Daily  - Transfuse PRBC if patient becomes hemodynamically unstable, symptomatic or H/H drops below 7/21.  - Patient has not received any PRBC transfusions to date  - Patient's anemia is currently stable  - No evidence of bleeding.

## 2025-04-26 NOTE — ASSESSMENT & PLAN NOTE
Hyperkalemia is likely due to ESRD.The patients most recent potassium results are listed below.  Recent Labs     04/24/25  0429 04/25/25  0422 04/26/25  0443   K 5.5* 5.5* 4.7     Plan  - Monitor for arrhythmias with EKG and/or continuous telemetry.   - Treat the hyperkalemia with Sodium Bicarbonate.   - Monitor potassium: Daily  - The patient's hyperkalemia is improved.

## 2025-04-26 NOTE — PROGRESS NOTES
[facility]  [facility] Medicine  Progress Note    Patient Name: Magan Saleem  MRN: [id number]  Patient Class: IP- Inpatient   Admission Date: 4/3/2025  Length of Stay: 23 days  Attending Physician: Carlos Alberto Nicole MD  Primary Care Provider: Sil Brown DO        Subjective     Principal Problem:Sepsis due to pneumonia        HPI:  68 yo F presents to [place] ED from [facility] for abnormal labs.  Patient was discharged from [facility] two days ago to skilled nursing facility for rehab.  She was diagnosed with dehydration due to gastroparesis with UTI.  Patient has metastatic lung cancer (to the brain) and follows with Dr. Swanson for IV chemotherapy every other week and takes lazertinib daily.  She has completed her course of radiation for the brain mets and follow had showed some improvement.  I thought she had either some decreased LOC due to encephalopathy or some aphasia from the brain mets but after a great effort to get her to talk, I realized she was just mad.  She wanted to know why she had been discharged two days ago when she could not eat.  She has no appetite and early satiety.  She is not nauseated and no vomiting.  She has decided on a DNR status previously (her caretaker and friend of 20 years) states that she had always said she did not want resuscitation if she experienced cardiopulmonary arrest and had told her children her wishes but she does want treatment and is not opposed to J tube if needed.  She has not had anything to eat or drink in two days and is dehydrated again.      Patient was transferred because of concern of sepsis.  She did have enterococcus faecalis UTI at [facility] and was treated.  I do not have the culture results but cultures were sent and patient does have some yeast noted.  (Will not treat a yeast colonization).  She is afebrile and hemodynamically stable and does not look septic clinically.  Her Lactic acid is stable at 2.5 dara 3.2.  Will  check another in am after volume resuscitation.  Her creat is at baseline but she has prerenal azotemia further confirming the dehydration.  Patient has an IO in place from [facility] due to dehydration and difficult stick but with some volume resuscitation, we have gotten an IV.  She is covid and flu negative.      Her WBC is 29 and I am not sure if she has received neupogen but could be a stress reaction.  Her BS is 245 and she does have some urine ketones but most likely due to starvation ketosis.  Will check a serum ketone.  Her platelets are 88K but this is most likely from her chemo.  She is not anemic.  CXR shows some patchy infiltrate but no obvious obstructive pneumonia from her lung cancer.  Her BNP about 3K but no clinical signs of CHF.  No recent echo but due to her BMI 50 most likely has some PHTN.  EKG had no ischemic changes but tachy at 114 which could also be from dehydration.  She was on ozempic for her DM and this could be the cause of her decreased appetite.  She is also on decadron for prevention of cerebral edema.      Remainder of ROS as below.  She mostly just nods and shakes her head and rolls her eyes.  You can get her to laugh if you try but she has been depressed since she has not walked since her hospitalization at [facility] on 3/19/25 and was discharged two days ago.  She says that at her last chemo she noticed she was getting weaker and her daughter had to help her in and out of the car and that was new for her.      See assessment and plan below for problem based evaluation       Overview/Hospital Course:  69 year old female presents with hypernatremia; she is not too talkative during rounds    4/5- patient seen examined today resting comfortably in bed and in no acute distress, with no acute events overnight.  Patient has a multitude of issues complicating her medical picture.  White blood cell count [id number] today, sodium 159, potassium 3.2 and BUN creatinine 59 and 1.8.  The patient is still not  eating even when assistance is provided.  We have already changed her fluids to half-normal saline with 20 of KCl at 1:25 a.m..  Have also put in a GI consult for possible feeding tube.  E coli in the urine has turned out to be ESBL and Rocephin has been changed Merrem.    4/6- the patient seen examined today resting comfortably in bed, in no acute distress, in no acute events overnight.  The patient is not very talkative today, but states she is doing okay.  She has no acute complaints.  Blood cultures remain negative.  The patient has not eaten since yesterday and is on light IV fluids.  GI has been consulted for feeding tube.  Continues on Merrem for positive urine culture.    04/07 Records reviewed. Not eating which not new, Similar during recent admit at [facility]. Dr Swanson there had question pancreatitis. No abd pain or tenderness reported now. Question of dysphagia to solids. Chart hx gastroparesis. Will discuss with GI. Ronnie says has responded so far well to treatment for lung CA.   04/08 had EGD at [facility] 03/21/25 with no obstruction and evidence gastroparesis. Still not ending. Try additional meds. Talked with GI. Increase activity  04/09 Some better with med  changes  04/10 Continues to do better. Ate 1/2 fish sandwich for lunch. Called by Dr Swanson and she wanting to keep feeding tube in consideration. Talked with Dr Reich and Dr Lemus. If something needed with gastroparesis would have to have post gastric position of tube.   04/11 eating some. Later staff requested some nystatin for sore mouth.   04/12 still not eating well.  Increased peripheral edema.  Albumin low at 1.5.  Will give some albumin and follow with some Lasix.  Placed Dobbhoff tube and start enteral feedings.  This will help with nutrition and if issue of placing surgical tube later make sure will tolerate enteral feedings.  Chest x-ray continues worse on left side.  Discuss with Pulmonary and ask Dr. Villasenor to see.   04/13 no new  issues.  Enteral feedings to be started.  Pulmonary evaluation appreciated and plans noted.  04/14 Tolerating feedings Therapy working with her. Bronchoscopy planned.   4/15 BAL today  4/16 bronch yesterday looks like pneumonitis  4/17 not candidate for PEG    04/18 Eating some now. Pt says feels some better than last seen. Look at placement. High dose steroids by pulmonary. BS not as bad as would expect.  04/19 states continued eat some.  Less nausea.  Blood sugar not sure bad considering the steroids.  Pulmonary adjusted antibiotics based on cultures.  Look at it placement next week.   04/20 Looks the small. C/O SOB to nursing staff earlier but ABG OK. Poor oral intake ; encourage for pt to do more. Looking into placement.  04/21 Firm tender area in abd wall above umbilicus; ?hernia. Abnormal CT de santiago noted and will discuss with surgery.   04/22 CT shows evolving pancreatitis which pt treated for acouple weeks earlier at [facility]. Reviewed with Dr Tapia. No plan to operate on ventral hernia. Discussed later with Dr York. See how does off IVF and encourage oral intake. WBC and Cr both slight better.   04/23 Looks this same. Pt eating some. Pt getting discouraged and asking about home hospice. Talked by phone with her friend Shauna. In conversion doesn't sound like family would be able to care for pt at home. Encouraged pt to give some time and attempt SWB. She says she with consider tonight.   04/24 Lab worse but pt looks some better. Still not taking in well. Maybe eat better as pancreatitis further resolves. Considering replacing dobbhoff feeding tube and look into move to [facility]. Ask Dr Frias to review renal situation. Maybe pancreatitis,pneumonia,renal failure, all these might make a big difference if resolved. Talked with pt and she was OK with NGT  04/25 patient's spirits seems to be doing some better.  Dobbhoff tube placed in feedings to be started.  To be moved to LTAC.  No other new issue  04/26 Awaiting bed  assignment at [facility]. No new complains.       Interval History: Patient seen and examined at the bedside, reports no new symptoms or complains.     Review of Systems   All other systems reviewed and are negative.    Objective:     Vital Signs (Most Recent):  Temp: 97.5 °F (36.4 °C) (04/26/25 1140)  Pulse: 103 (04/26/25 1140)  Resp: 18 (04/26/25 1140)  BP: (!) 100/52 (04/26/25 1140)  SpO2: 97 % (04/26/25 1140) Vital Signs (24h Range):  Temp:  [96.4 °F (35.8 °C)-97.5 °F (36.4 °C)] 97.5 °F (36.4 °C)  Pulse:  [100-109] 103  Resp:  [16-20] 18  SpO2:  [97 %-100 %] 97 %  BP: ()/(52-72) 100/52     Weight: 119.5 kg (263 lb 6.4 oz)  Body mass index is 43.83 kg/m².    Intake/Output Summary (Last 24 hours) at 4/26/2025 1507  Last data filed at 4/26/2025 1200  Gross per 24 hour   Intake 0 ml   Output --   Net 0 ml         Physical Exam  Constitutional:       Appearance: She is obese. She is ill-appearing.   HENT:      Head: Normocephalic.      Mouth/Throat:      Mouth: Mucous membranes are dry.   Cardiovascular:      Rate and Rhythm: Normal rate.      Heart sounds: Normal heart sounds. No murmur heard.  Pulmonary:      Effort: No respiratory distress.      Breath sounds: Rhonchi present.      Comments: On 2L NC  Abdominal:      General: There is no distension.      Tenderness: There is no abdominal tenderness.      Comments: Dobhoff in place   Skin:     Coloration: Skin is pale.   Neurological:      Mental Status: Mental status is at baseline. She is disoriented.               Significant Labs: All pertinent labs within the past 24 hours have been reviewed.    Significant Imaging: I have reviewed all pertinent imaging results/findings within the past 24 hours.      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  Pulmonology consulted.  Steroid taper plan per Pulmonology.   Respiratory status is stable.     Lung cancer metastatic to brain  DNR but not  hospice. Receives chemo and has finished radiation.    04/07 reported stable / improved after treatment  04/09 more fuffy right side CXR  04/12 continue to abnormality on chest x-ray and ask Pulmonary to review  4/15 follows with Dr. Swanson.  No more chemotherapy treatments until patient is stronger.  Discussed with Dr. Swanson  4/16 hold treatments for now  04/20 brain lesion prior treated and I told been stable    Infection due to Stenotrophomonas maltophilia  Stenotrophomonas lower respiratory infection in this patient with multifocal infiltrates and consolidative opacities on CT Chest.   Complete total 2 week course with Levaquin.     Essential hypertension  Patients blood pressure range in the last 24 hours was: BP  Min: 67/39  Max: 158/80.The patient's inpatient anti-hypertensive regimen is listed below:  Current Antihypertensives  metoprolol succinate (TOPROL-XL) 24 hr tablet 25 mg, Daily, Oral  metoprolol succinate (TOPROL-XL) 24 hr tablet, Daily, Oral    Plan  - BP is controlled, no changes needed to their regimen    Moderate persistent asthma without complication  Continue home inhaled steroid/bronchodilator and singulair    Acute pancreatitis  04/21 Abd fluid collection in area pancreas noted on CT abd, discussed with surgery  04/22 likely evolving pancreatitis dx acouple weeks ago at [facility].  No severe epigastric pain.  04/24 as resolves might eat better  04/26 Ongoing tube feeds and full liquid diet.     Acute kidney injury superimposed on stage 4 chronic kidney disease  JOHNATHON is likely due to pre-renal azotemia due to intravascular volume depletion. Baseline creatinine is  ~ 2.0 . Most recent creatinine and eGFR are listed below.  Recent Labs     04/24/25  0429 04/25/25  0422 04/26/25  0443   CREATININE 4.31* 4.50* 4.25*   EGFRNORACEVR 11* 10* 11*      Plan  - JOHNATHON is improving  - Avoid nephrotoxins and renally dose meds for GFR listed above  - Monitor urine output, serial BMP, and adjust therapy as  "needed  - Nephrology consulted, continue IV fluids.     Chronic pulmonary embolism without acute cor pulmonale  Eliquis was discontinued due to risk of ICH with brain mets but they have improved so the anti-coagulation has been restarted; will monitor  04/07 apparently this was several years ago    Gastroparesis  EGD by Dr. Lo during recent past admission at [facility]:  "EGD on 03/21/2025 shows LA class B erosive esophagitis but no pill esophagitis, nonerosive gastritis and retention of food and fluid suspicious for gastroparesis, due to narcotics and diabetes. "  GI consulted, unable to advance diet and poor candidate for PEG.  Dobhoff in place for feeds, continued on Reglan.   Continue PPI.     Type 2 diabetes mellitus with hyperglycemia  Patient's FSGs are controlled on current medication regimen.  Last A1c reviewed-   Lab Results   Component Value Date    HGBA1C 6.7 04/02/2025     Most recent fingerstick glucose reviewed- No results for input(s): "POCTGLUCOSE" in the last 24 hours.  Current correctional scale  Low  Maintain anti-hyperglycemic dose as follows-   Antihyperglycemics (From admission, onward)      Start     Stop Route Frequency Ordered    04/04/25 0443  insulin aspart U-100 injection 0-10 Units         -- SubQ Before meals & nightly PRN 04/04/25 0343          Hold Oral hypoglycemics while patient is in the hospital.       Morbid obesity  Body mass index is 43.83 kg/m². Morbid obesity complicates all aspects of disease management from diagnostic modalities to treatment. Weight loss encouraged and health benefits explained to patient.     Would benefit from sleep study and possible CPAP.  Does not wear oxygen at home.      Edema due to hypoalbuminemia  Required albumin infusion intermittently during the stay.     Normocytic anemia  Anemia is likely due to chronic disease due to Malignancy. Most recent hemoglobin and hematocrit are listed below.  Recent Labs     04/25/25  0422   HGB 9.0* "   HCT 27.9*     Plan  - Monitor serial CBC: Daily  - Transfuse PRBC if patient becomes hemodynamically unstable, symptomatic or H/H drops below 7/21.  - Patient has not received any PRBC transfusions to date  - Patient's anemia is currently stable  - No evidence of bleeding.    Neoplastic (malignant) related fatigue  Patient with Acute on chronic debility due to neoplastic/malignant related fatigue. Latest AMPAC and GEMS scores have been reviewed. Evaluation for etiology is complete. Plan includes - Progressive mobility protocol initated  - PT/OT consulted  - Fall precautions in place.    Hyperkalemia  Hyperkalemia is likely due to ESRD.The patients most recent potassium results are listed below.  Recent Labs     04/24/25  0429 04/25/25  0422 04/26/25  0443   K 5.5* 5.5* 4.7     Plan  - Monitor for arrhythmias with EKG and/or continuous telemetry.   - Treat the hyperkalemia with Sodium Bicarbonate.   - Monitor potassium: Daily  - The patient's hyperkalemia is improved.     Hyperuricemia  Hyperphosphatemia  Management per nephrology.       Mixed hyperlipidemia  Continue statin    Thrombocytopenia (Resolved: 4/26/2025)  On chemo; will monitor  04/19 platelet count 149 wishes improved    VTE Risk Mitigation (From admission, onward)           Ordered     heparin (porcine) injection 5,000 Units  Every 8 hours         04/26/25 0843                    Discharge Planning   MITUL: 4/25/2025     Code Status: DNR   Medical Readiness for Discharge Date: 4/25/2025  Discharge Plan A: Home with family                Please place Justification for DME        RAYMOND ZHAO MD  Department of Hospital Medicine   [facility]

## 2025-04-26 NOTE — ASSESSMENT & PLAN NOTE
JOHNATHON is likely due to pre-renal azotemia due to intravascular volume depletion. Baseline creatinine is ~ 2.0. Most recent creatinine and eGFR are listed below.  Recent Labs     04/24/25  0429 04/25/25  0422 04/26/25  0443   CREATININE 4.31* 4.50* 4.25*   EGFRNORACEVR 11* 10* 11*      Plan  - JOHNATHON is improving  - Avoid nephrotoxins and renally dose meds for GFR listed above  - Monitor urine output, serial BMP, and adjust therapy as needed  - Nephrology consulted, continue IV fluids.

## 2025-04-26 NOTE — ASSESSMENT & PLAN NOTE
Eliquis was discontinued due to risk of ICH with brain mets but they have improved so the anti-coagulation has been restarted; will monitor  04/07 apparently this was several years ago

## 2025-04-26 NOTE — PROGRESS NOTES
Patient denies shortness of breath.  Review of systems GI she denies nausea or vomiting     Physical exam general patient is chronically ill-appearing, she has 1 to 2+ pretibial edema     Assessment/plan 1.  Acute renal failure-this patient's creatinine is improved at 4.25 mg/dL from 4.5 yesterday, about 6 days ago the patient's creatinine was 3.3.  Continue to monitor   2. Lung cancer with brain metastasis   3.  Metabolic acidosis-patient's bicarb is 15, I will increase her sodium bicarbonate replacement  4.  Hyperkalemia-patient's potassium is improved 4.7 from 5.5  5. Anemia-patient's hematocrit is 28%

## 2025-04-26 NOTE — ASSESSMENT & PLAN NOTE
Stenotrophomonas lower respiratory infection in this patient with multifocal infiltrates and consolidative opacities on CT Chest.   Complete total 2 week course with Levaquin.

## 2025-04-26 NOTE — ASSESSMENT & PLAN NOTE
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  Pulmonology consulted.  Steroid taper plan per Pulmonology.   Respiratory status is stable.

## 2025-04-26 NOTE — PLAN OF CARE
Problem: Adult Inpatient Plan of Care  Goal: Plan of Care Review  Outcome: Progressing  Goal: Patient-Specific Goal (Individualized)  Outcome: Progressing  Goal: Absence of Hospital-Acquired Illness or Injury  Outcome: Progressing  Goal: Optimal Comfort and Wellbeing  Outcome: Progressing     Problem: Bariatric Environmental Safety  Goal: Safety Maintained with Care  Outcome: Progressing     Problem: Skin Injury Risk Increased  Goal: Skin Health and Integrity  Outcome: Progressing

## 2025-04-26 NOTE — ASSESSMENT & PLAN NOTE
04/21 Abd fluid collection in area pancreas noted on CT abd, discussed with surgery  04/22 likely evolving pancreatitis dx acouple weeks ago at [facility].  No severe epigastric pain.  04/24 as resolves might eat better  04/26 Ongoing tube feeds and full liquid diet.

## 2025-04-26 NOTE — ASSESSMENT & PLAN NOTE
Patients blood pressure range in the last 24 hours was: BP  Min: 67/39  Max: 158/80.The patient's inpatient anti-hypertensive regimen is listed below:  Current Antihypertensives  metoprolol succinate (TOPROL-XL) 24 hr tablet 25 mg, Daily, Oral  metoprolol succinate (TOPROL-XL) 24 hr tablet, Daily, Oral    Plan  - BP is controlled, no changes needed to their regimen

## 2025-04-26 NOTE — ASSESSMENT & PLAN NOTE
"Patient's FSGs are controlled on current medication regimen.  Last A1c reviewed-   Lab Results   Component Value Date    HGBA1C 6.7 04/02/2025     Most recent fingerstick glucose reviewed- No results for input(s): "POCTGLUCOSE" in the last 24 hours.  Current correctional scale  Low  Maintain anti-hyperglycemic dose as follows-   Antihyperglycemics (From admission, onward)      Start     Stop Route Frequency Ordered    04/04/25 0443  insulin aspart U-100 injection 0-10 Units         -- SubQ Before meals & nightly PRN 04/04/25 0343          Hold Oral hypoglycemics while patient is in the hospital.       "

## 2025-04-26 NOTE — ASSESSMENT & PLAN NOTE
"EGD by Dr. Lo during recent past admission at [facility]:  "EGD on 03/21/2025 shows LA class B erosive esophagitis but no pill esophagitis, nonerosive gastritis and retention of food and fluid suspicious for gastroparesis, due to narcotics and diabetes. "  GI consulted, unable to advance diet and poor candidate for PEG.  Dobhoff in place for feeds, continued on Reglan.   Continue PPI.     "

## 2025-04-26 NOTE — ASSESSMENT & PLAN NOTE
Patient with Acute on chronic debility due to neoplastic/malignant related fatigue. Latest AMPAC and GEMS scores have been reviewed. Evaluation for etiology is complete. Plan includes - Progressive mobility protocol initated  - PT/OT consulted  - Fall precautions in place.

## 2025-04-26 NOTE — SUBJECTIVE & OBJECTIVE
Interval History: Patient seen and examined at the bedside, reports no new symptoms or complains.     Review of Systems   All other systems reviewed and are negative.    Objective:     Vital Signs (Most Recent):  Temp: 97.5 °F (36.4 °C) (04/26/25 1140)  Pulse: 103 (04/26/25 1140)  Resp: 18 (04/26/25 1140)  BP: (!) 100/52 (04/26/25 1140)  SpO2: 97 % (04/26/25 1140) Vital Signs (24h Range):  Temp:  [96.4 °F (35.8 °C)-97.5 °F (36.4 °C)] 97.5 °F (36.4 °C)  Pulse:  [100-109] 103  Resp:  [16-20] 18  SpO2:  [97 %-100 %] 97 %  BP: ()/(52-72) 100/52     Weight: 119.5 kg (263 lb 6.4 oz)  Body mass index is 43.83 kg/m².    Intake/Output Summary (Last 24 hours) at 4/26/2025 1507  Last data filed at 4/26/2025 1200  Gross per 24 hour   Intake 0 ml   Output --   Net 0 ml         Physical Exam  Constitutional:       Appearance: She is obese. She is ill-appearing.   HENT:      Head: Normocephalic.      Mouth/Throat:      Mouth: Mucous membranes are dry.   Cardiovascular:      Rate and Rhythm: Normal rate.      Heart sounds: Normal heart sounds. No murmur heard.  Pulmonary:      Effort: No respiratory distress.      Breath sounds: Rhonchi present.      Comments: On 2L NC  Abdominal:      General: There is no distension.      Tenderness: There is no abdominal tenderness.      Comments: Dobhoff in place   Skin:     Coloration: Skin is pale.   Neurological:      Mental Status: Mental status is at baseline. She is disoriented.               Significant Labs: All pertinent labs within the past 24 hours have been reviewed.    Significant Imaging: I have reviewed all pertinent imaging results/findings within the past 24 hours.

## 2025-04-27 ENCOUNTER — APPOINTMENT (OUTPATIENT)
Dept: CARDIOLOGY | Facility: HOSPITAL | Age: 70
End: 2025-04-27
Attending: INTERNAL MEDICINE
Payer: MEDICARE

## 2025-04-27 PROBLEM — I47.10 SVT (SUPRAVENTRICULAR TACHYCARDIA): Status: ACTIVE | Noted: 2025-04-27

## 2025-04-27 PROBLEM — I5A NONISCHEMIC NONTRAUMATIC MYOCARDIAL INJURY: Status: ACTIVE | Noted: 2025-04-27

## 2025-04-27 PROBLEM — R79.89 ELEVATED TROPONIN I LEVEL: Status: ACTIVE | Noted: 2025-04-27

## 2025-04-27 PROCEDURE — 93306 TTE W/DOPPLER COMPLETE: CPT | Mod: 26,,, | Performed by: HOSPITALIST

## 2025-04-27 PROCEDURE — 93306 TTE W/DOPPLER COMPLETE: CPT

## 2025-04-27 NOTE — PLAN OF CARE
Problem: Diabetes Comorbidity  Goal: Blood Glucose Level Within Targeted Range  Outcome: Progressing     Problem: Diabetes Comorbidity  Goal: Blood Glucose Level Within Targeted Range  Outcome: Progressing     Problem: Acute Kidney Injury/Impairment  Goal: Fluid and Electrolyte Balance  Outcome: Progressing     Problem: Acute Kidney Injury/Impairment  Goal: Fluid and Electrolyte Balance  Outcome: Progressing     Problem: Bariatric Environmental Safety  Goal: Safety Maintained with Care  Outcome: Progressing     Problem: Bariatric Environmental Safety  Goal: Safety Maintained with Care  Outcome: Progressing     Problem: Wound  Goal: Optimal Coping  Outcome: Progressing  Goal: Optimal Functional Ability  Outcome: Progressing  Goal: Absence of Infection Signs and Symptoms  Outcome: Progressing  Goal: Improved Oral Intake  Outcome: Progressing     Problem: Wound  Goal: Optimal Coping  Outcome: Progressing     Problem: Wound  Goal: Optimal Functional Ability  Outcome: Progressing     Problem: Wound  Goal: Absence of Infection Signs and Symptoms  Outcome: Progressing     Problem: Wound  Goal: Improved Oral Intake  Outcome: Progressing

## 2025-04-27 NOTE — SUBJECTIVE & OBJECTIVE
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 Swanson    Melanocytic nevi of lower limb, including hip, left     Mixed hyperlipidemia     Moderate persistent asthma without complication 10/30/2024    Other pulmonary embolism without acute cor pulmonale     Vitamin D deficiency        Past Surgical History:   Procedure Laterality Date    CHOLECYSTECTOMY      CSF SHUNT      HYSTERECTOMY      TONSILLECTOMY         Review of patient's allergies indicates:   Allergen Reactions    Penicillins Hives    Sulfa (sulfonamide antibiotics) Hives       Current Facility-Administered Medications on File Prior to Encounter   Medication    [DISCONTINUED] 0.9% NaCl infusion    [DISCONTINUED] acetaminophen suppository 650 mg    [DISCONTINUED] acetaminophen tablet 1,000 mg    [DISCONTINUED] albuterol nebulizer solution 2.5 mg    [DISCONTINUED] albuterol-ipratropium 2.5 mg-0.5 mg/3 mL nebulizer solution 3 mL    [DISCONTINUED] aluminum & magnesium hydroxide-simethicone 400-400-40 mg/5 mL suspension 30 mL    [DISCONTINUED] atorvastatin tablet 20 mg    [DISCONTINUED] bisacodyL EC tablet 10 mg    [DISCONTINUED] budesonide nebulizer solution 0.5 mg    [DISCONTINUED] dextromethorphan-guaiFENesin  mg/5 ml liquid 10 mL    [DISCONTINUED] dextrose 50% injection 12.5 g    [DISCONTINUED] dextrose 50% injection 25 g    [DISCONTINUED] diphenhydrAMINE capsule 50 mg    [DISCONTINUED] docusate sodium capsule 100 mg    [DISCONTINUED] glucagon (human  recombinant) injection 1 mg    [DISCONTINUED] glucose chewable tablet 16 g    [DISCONTINUED] glucose chewable tablet 24 g    [DISCONTINUED] heparin (porcine) injection 5,000 Units    [DISCONTINUED] HYDROcodone-acetaminophen 7.5-325 mg per tablet 1 tablet    [DISCONTINUED] insulin aspart U-100 injection 0-10 Units    [DISCONTINUED] Lactobacillus acidophilus capsule 2 capsule    [DISCONTINUED] levoFLOXacin tablet 500 mg    [DISCONTINUED] LORazepam tablet 1 mg    [DISCONTINUED] melatonin tablet 6 mg    [DISCONTINUED] metoclopramide HCl tablet 10 mg    [DISCONTINUED] metoprolol succinate (TOPROL-XL) 24 hr tablet 25 mg    [DISCONTINUED] montelukast tablet 10 mg    [DISCONTINUED] pantoprazole EC tablet 40 mg    [DISCONTINUED] predniSONE tablet 10 mg    [DISCONTINUED] predniSONE tablet 20 mg    [DISCONTINUED] predniSONE tablet 40 mg    [DISCONTINUED] sertraline tablet 100 mg    [DISCONTINUED] sodium bicarbonate tablet 1,300 mg    [DISCONTINUED] sodium bicarbonate tablet 1,300 mg    [DISCONTINUED] traZODone tablet 50 mg     Current Outpatient Medications on File Prior to Encounter   Medication Sig    atorvastatin (LIPITOR) 10 MG tablet TAKE ONE TABLET BY MOUTH ONCE DAILY    ELIQUIS 5 mg Tab TAKE ONE TABLET BY MOUTH TWICE DAILY    Lactobacillus acidophilus 500 million cell Cap Take 2 capsules by mouth 3 (three) times daily with meals.    metoclopramide HCl (REGLAN) 5 MG tablet Take 5 mg by mouth 4 (four) times daily.    montelukast (SINGULAIR) 10 mg tablet TAKE ONE TABLET BY MOUTH EVERY EVENING    pantoprazole (PROTONIX) 40 MG tablet Take 40 mg by mouth once daily.    predniSONE (DELTASONE) 10 MG tablet Take 4 tablets (40 mg total) by mouth once daily for 8 days, THEN 2 tablets (20 mg total) once daily for 14 days, THEN 1 tablet (10 mg total) once daily.    rOPINIRole (REQUIP) 0.25 MG tablet Take 0.25 mg by mouth 3 (three) times daily.    sertraline (ZOLOFT) 25 MG tablet Take 1 tablet (25 mg total) by mouth once daily.     sodium bicarbonate 650 MG tablet Take 2 tablets (1,300 mg total) by mouth 2 (two) times daily.    albuterol (PROVENTIL) 2.5 mg /3 mL (0.083 %) nebulizer solution Take 3 mLs (2.5 mg total) by nebulization every 2 (two) hours as needed (shortness of breath). Rescue    albuterol-ipratropium (DUO-NEB) 2.5 mg-0.5 mg/3 mL nebulizer solution Take 3 mLs by nebulization every 4 (four) hours as needed for Wheezing or Shortness of Breath. Rescue    insulin aspart U-100 (NOVOLOG FLEXPEN U-100 INSULIN) 100 unit/mL (3 mL) InPn pen Per sliding scale max daily dose 30 units; subcutaneously; three times a day with meals    levoFLOXacin (LEVAQUIN) 500 MG tablet Take 1 tablet (500 mg total) by mouth every 48 hours.    metoprolol succinate (TOPROL-XL) 25 MG 24 hr tablet Take 1 tablet (25 mg total) by mouth once daily.     Family History       Problem Relation (Age of Onset)    Diabetes Mother    Hypertension Mother    Lung cancer Father          Tobacco Use    Smoking status: Never    Smokeless tobacco: Never   Substance and Sexual Activity    Alcohol use: Never    Drug use: Never    Sexual activity: Not Currently     Review of Systems   All other systems reviewed and are negative.    Objective:     Vital Signs (Most Recent):  Temp: 97 °F (36.1 °C) (04/27/25 0700)  Pulse: 88 (04/27/25 0839)  Resp: 11 (04/27/25 0800)  BP: (!) 114/57 (04/27/25 0839)  SpO2: 100 % (04/27/25 0800) Vital Signs (24h Range):  Temp:  [97 °F (36.1 °C)-99.1 °F (37.3 °C)] 97 °F (36.1 °C)  Pulse:  [] 88  Resp:  [11-32] 11  SpO2:  [95 %-100 %] 100 %  BP: ()/(44-61) 114/57     Weight: 116.5 kg (256 lb 13.4 oz)  Body mass index is 42.74 kg/m².     Physical Exam  Constitutional:       Appearance: She is obese. She is ill-appearing.   HENT:      Head: Normocephalic.      Mouth/Throat:      Mouth: Mucous membranes are dry.   Cardiovascular:      Rate and Rhythm: Normal rate.      Heart sounds: Normal heart sounds. No murmur heard.  Pulmonary:      Effort: No  respiratory distress.      Breath sounds: Rhonchi present.      Comments: On 2L NC  Abdominal:      General: There is no distension.      Tenderness: There is no abdominal tenderness.      Comments: Dobhoff in place   Skin:     Coloration: Skin is pale.   Neurological:      Mental Status: Mental status is at baseline. She is disoriented.                Significant Labs: All pertinent labs within the past 24 hours have been reviewed.    Significant Imaging: I have reviewed all pertinent imaging results/findings within the past 24 hours.

## 2025-04-27 NOTE — ASSESSMENT & PLAN NOTE
Body mass index is 42.74 kg/m². Morbid obesity complicates all aspects of disease management from diagnostic modalities to treatment. Weight loss encouraged and health benefits explained to patient.

## 2025-04-27 NOTE — ASSESSMENT & PLAN NOTE
"EGD by Dr. Lo during recent past admission at [facility]:  "EGD on 03/21/2025 shows LA class B erosive esophagitis but no pill esophagitis, nonerosive gastritis and retention of food and fluid suspicious for gastroparesis, due to narcotics and diabetes. "  GI consulted, unable to advance diet and poor candidate for PEG.  Dobhoff in place for feeds, continued on Reglan.   Continue PPI.     "

## 2025-04-27 NOTE — NURSING
Awake and alert HR decreased to 110. Notified Dr Mcginnis, stated to continue to monitor. Code cart remains at bedside.

## 2025-04-27 NOTE — ASSESSMENT & PLAN NOTE
04/21 Abd fluid collection in area pancreas noted on CT abd, discussed with surgery  04/22 likely evolving pancreatitis dx acouple weeks ago at [facility].  No severe epigastric pain.  04/24 as resolves might eat better  04/26 Ongoing tube feeds and full liquid diet.

## 2025-04-27 NOTE — ASSESSMENT & PLAN NOTE
Went into SVT on 4/27, not responsive to multiple rounds of adenosine.  Started on amiodarone infusion after bolus.  Cardiology consulted.  Monitor on tele.

## 2025-04-27 NOTE — ASSESSMENT & PLAN NOTE
In the setting of SVT episodes.  Trend is flat, no chest pain and no ischemic changes noted on EKG.  Workup per cardiology.

## 2025-04-27 NOTE — CONSULTS
[facility] Consult      Consultant Attending:Shankar Gray    Reason for Consult:     SVT refractory to adenosine earlier today, self-limited, now back in NSR    Subjective:      History of Present Illness:  Magan Saleem is a 69 y.o.  female who  has a past medical history of Chronic kidney disease, stage 3b, Chronic pulmonary embolism without acute cor pulmonale, Diabetes mellitus type 2 in obese, DNR (do not resuscitate), Erosive gastritis, Essential hypertension (06/30/2021), Gastroparesis, Generalized anxiety disorder (09/29/2021), Lung cancer metastatic to brain, Malignant neoplasm of right lung (02/15/2023), Melanocytic nevi of lower limb, including hip, left, Mixed hyperlipidemia, Moderate persistent asthma without complication (10/30/2024), Other pulmonary embolism without acute cor pulmonale, and Vitamin D deficiency.. The patient presented to [facility] on 4/26/2025 with a primary complaint of not  being able to eat or drink, dehydration, concern for sepsis. Her labs are concerning for leukocytosis, WBC count >39 (>90% neutrophils), Hgb 8.6, bicarb 15, Creatinine 4.07, anion gap 19.     Her troponins are moderately elevated - 93.6 ->106.4, though she has no complaints of angina or equivalent and is DNR.     Her CXR shows predominantly R sided infiltrates. Her micro results are significant for ESBL E coli in her urine, Stenotrophomonas maltophilia on BAL. She appears to be on levaquin for PNA.     She was being treated for metastatic lung ca w/ mets to brain with Lazertinib  240mg po daily, presume in combination w/ amivantamab, however this does not appear to have been continued inpatient.     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 Swanson    Melanocytic nevi of lower limb, including hip, left     Mixed hyperlipidemia     Moderate persistent asthma without complication 10/30/2024    Other pulmonary embolism without acute cor pulmonale     Vitamin D deficiency        Past Surgical History:  Past Surgical History:   Procedure Laterality Date    CHOLECYSTECTOMY      CSF SHUNT      HYSTERECTOMY      TONSILLECTOMY         Allergies:  Review of patient's allergies indicates:   Allergen Reactions    Penicillins Hives    Sulfa (sulfonamide antibiotics) Hives       Medications:   In-Hospital Scheduled Medications:   atorvastatin  10 mg Oral Daily    heparin (porcine)  5,000 Units Subcutaneous Q8H    Lactobacillus acidophilus  2 capsule Oral TID WM    levoFLOXacin  500 mg Oral Q48H    metoclopramide HCl  5 mg Oral QID    metoprolol succinate  25 mg Oral Daily    montelukast  10 mg Oral QHS    mupirocin   Nasal BID    pantoprazole  40 mg Oral Daily    predniSONE  40 mg Oral Daily    Followed by    [START ON 5/4/2025] predniSONE  20 mg Oral Daily    Followed by    [START ON 5/18/2025] predniSONE  10 mg Oral Daily    rOPINIRole  0.25 mg Oral TID    sertraline  25 mg Oral Daily    sodium bicarbonate  1,300 mg Oral TID      In-Hospital PRN Medications:    Current Facility-Administered Medications:     albuterol-ipratropium, 3 mL, Nebulization, Q4H PRN    dextrose 50%, 12.5 g, Intravenous, PRN    dextrose 50%, 25 g, Intravenous, PRN    glucagon (human recombinant), 1 mg, Intramuscular, PRN    glucose, 16 g, Oral, PRN    glucose, 24 g, Oral, PRN    insulin aspart U-100, 0-5 Units, Subcutaneous, QID (AC + HS) PRN   In-Hospital IV Infusion Medications:   amiodarone in  dextrose 5%  0.5 mg/min Intravenous Continuous 16.7 mL/hr at 04/27/25 0924 0.5 mg/min at 04/27/25 0924      Home Medications:  Prior to Admission medications    Medication Sig Start Date End Date Taking? Authorizing Provider   atorvastatin (LIPITOR) 10 MG tablet TAKE ONE TABLET BY MOUTH ONCE DAILY 8/7/23  Yes Maribell Dong FNP   ELIQUIS 5 mg Tab TAKE ONE TABLET BY MOUTH TWICE DAILY 8/7/23  Yes Maribell Dong FNP   Lactobacillus acidophilus 500 million cell Cap Take 2 capsules by mouth 3 (three) times daily with meals. 4/26/25  Yes Andriy Rizvi MD   metoclopramide HCl (REGLAN) 5 MG tablet Take 5 mg by mouth 4 (four) times daily.   Yes Provider, Historical   montelukast (SINGULAIR) 10 mg tablet TAKE ONE TABLET BY MOUTH EVERY EVENING 12/19/23  Yes Maribell Dong FNP   pantoprazole (PROTONIX) 40 MG tablet Take 40 mg by mouth once daily.   Yes Provider, Historical   predniSONE (DELTASONE) 10 MG tablet Take 4 tablets (40 mg total) by mouth once daily for 8 days, THEN 2 tablets (20 mg total) once daily for 14 days, THEN 1 tablet (10 mg total) once daily. 4/26/25 6/17/25 Yes Andriy Rizvi MD   rOPINIRole (REQUIP) 0.25 MG tablet Take 0.25 mg by mouth 3 (three) times daily.   Yes Provider, Historical   sertraline (ZOLOFT) 25 MG tablet Take 1 tablet (25 mg total) by mouth once daily. 5/15/23 5/27/25 Yes Maribell Dong FNP   sodium bicarbonate 650 MG tablet Take 2 tablets (1,300 mg total) by mouth 2 (two) times daily. 4/25/25 4/25/26 Yes Andriy Rizvi MD   albuterol (PROVENTIL) 2.5 mg /3 mL (0.083 %) nebulizer solution Take 3 mLs (2.5 mg total) by nebulization every 2 (two) hours as needed (shortness of breath). Rescue 4/25/25 4/25/26  Andriy Rizvi MD   albuterol-ipratropium (DUO-NEB) 2.5 mg-0.5 mg/3 mL nebulizer solution Take 3 mLs by nebulization every 4 (four) hours as needed for Wheezing or Shortness of Breath. Rescue 10/30/24 10/30/25  Bea York MD   insulin aspart U-100 (NOVOLOG FLEXPEN U-100 INSULIN) 100  "unit/mL (3 mL) InPn pen Per sliding scale max daily dose 30 units; subcutaneously; three times a day with meals 24   Provider, Historical   levoFLOXacin (LEVAQUIN) 500 MG tablet Take 1 tablet (500 mg total) by mouth every 48 hours. 25   Andriy Rizvi MD   metoprolol succinate (TOPROL-XL) 25 MG 24 hr tablet Take 1 tablet (25 mg total) by mouth once daily. 25  Andriy Rizvi MD       Family History:  Family History   Problem Relation Name Age of Onset    Diabetes Mother      Hypertension Mother      Lung cancer Father         Social History:  Social History[1]    Review of Systems:  All other systems are reviewed and are negative except for those mentioned in HPI and A/P.    Objective:   Last 24 Hour Vital Signs:  BP  Min: 86/44  Max: 118/61  Temp  Av.9 °F (36.6 °C)  Min: 97 °F (36.1 °C)  Max: 99.1 °F (37.3 °C)  Pulse  Av.1  Min: 81  Max: 160  Resp  Av.6  Min: 11  Max: 32  SpO2  Av %  Min: 99 %  Max: 100 %  Height  Av' 5" (165.1 cm)  Min: 5' 5" (165.1 cm)  Max: 5' 5" (165.1 cm)  Weight  Av.5 kg (256 lb 13.4 oz)  Min: 116.5 kg (256 lb 13.4 oz)  Max: 116.5 kg (256 lb 13.4 oz)  I/O last 3 completed shifts:  In: 65.6 [IV Piggyback:65.6]  Out: -   Body mass index is 42.74 kg/m².    Physical Examination:  Gen: NAD; ill appearing  HEENT: NC/AT; ngt in place  CV: mildly tachycardic, reg rhythm  Lungs: no overt increased wob lying flat  Abd: obese, mild-moderately distended  Ext: atraumatic  Neuro: CNGI  Psych: AMAA  Skin: no overt/visible rash    Laboratory Results:  Most Recent Data:  CBC:   Lab Results   Component Value Date    WBC 39.27 (HH) 2025    HGB 8.6 (L) 2025    HCT 26.6 (L) 2025     2025    MCV 91.1 2025    RDW 22.2 (H) 2025    DIFFTYPE Manual 2025     BMP:   Lab Results   Component Value Date     2025    K 4.7 2025     (H) 2025    CO2 15 (L) 2025     (H) 2025 " "    (H) 04/27/2025    CALCIUM 8.3 (L) 04/27/2025    MG 2.4 04/27/2025    PHOS 6.9 (H) 04/26/2025     LFTs:   Lab Results   Component Value Date    PROT 4.0 (L) 04/22/2025    ALBUMIN 1.6 (L) 04/22/2025    BILITOT 0.7 04/22/2025    AST 34 04/22/2025    ALKPHOS 145 04/22/2025    ALT 17 04/22/2025     Coags:   Lab Results   Component Value Date    INR 1.25 04/03/2025     FLP:   Lab Results   Component Value Date    CHOL 88 04/08/2025    HDL 25 (L) 04/08/2025    LDLCALC 42 04/08/2025    TRIG 107 04/08/2025    CHOLHDL 3.5 04/08/2025     DM:   Lab Results   Component Value Date    HGBA1C 6.7 04/02/2025    HGBA1C 6.4 (H) 02/05/2025    LDLCALC 42 04/08/2025    CREATININE 4.07 (H) 04/27/2025     Thyroid:   Lab Results   Component Value Date    TSH 1.529 04/27/2025    W2HTPQY 3.7 (L) 04/08/2025     Anemia:   Lab Results   Component Value Date    VGWAOHYO36 >2,000 (H) 04/08/2025    FOLATE 3.5 (L) 04/08/2025     Cardiac: No results found for: "TROPONINI", "CKTOTAL", "CKMB", "BNP"  Urinalysis:   Lab Results   Component Value Date    LABURIN >100,000 Escherichia coli ESBL (A) 04/03/2025    COLORU Yellow 04/03/2025    SPECGRAV 1.015 04/03/2025    NITRITE Negative 04/03/2025    KETONESU 15 (A) 04/03/2025    UROBILINOGEN 0.2 04/03/2025    WBCUA 15-25 (A) 04/03/2025       Trended Lab Data:  Recent Labs   Lab 04/22/25  0524 04/23/25  0531 04/23/25  0532 04/24/25  0429 04/25/25  0422 04/26/25  0443 04/27/25  0220 04/27/25  0643   WBC 28.13*  --  26.13*  --  36.40*  --   --  39.27*   HGB 8.3*  --  8.8*  --  9.0*  --   --  8.6*   HCT 25.9*  --  26.8*  --  27.9*  --   --  26.6*     --  160  --  183  --   --  194   MCV 91.2  --  90.2  --  92.4  --   --  91.1   RDW 20.9*  --  21.2*  --  22.2*  --   --  22.2*      < >  --    < > 141 142 142  --    K 5.7*   < >  --    < > 5.5* 4.7 4.7  --       < >  --    < > 110* 111* 113*  --    CO2 18*   < >  --    < > 15* 15* 15*  --    *   < >  --    < > 108* 118* 118*  -- " "   GLU 89   < >  --    < > 77* 113 220*  --    CALCIUM 8.2*   < >  --    < > 8.6 8.5 8.3*  --    PROT 4.0*  --   --   --   --   --   --   --    ALBUMIN 1.6*  --   --   --   --   --   --   --    BILITOT 0.7  --   --   --   --   --   --   --    AST 34  --   --   --   --   --   --   --    ALKPHOS 145  --   --   --   --   --   --   --    ALT 17  --   --   --   --   --   --   --     < > = values in this interval not displayed.         Trended Cardiac Data:  No results for input(s): "TROPONINI", "CKTOTAL", "CKMB", "BNP" in the last 168 hours.    Radiology:  Echo  Result Date: 4/27/2025    Left Ventricle: The left ventricle is normal in size. Normal wall thickness. There is normal systolic function. Ejection fraction is approximately 60%. There is normal diastolic function.   Right Ventricle: The right ventricle has mild enlargement. Systolic function is normal.   Right Atrium: Right atrium is mildly dilated.   Aortic Valve: The aortic valve is a trileaflet valve. Mildly calcified cusps.   Tricuspid Valve: There is mild to moderate regurgitation with an eccentrically directed jet.   Pulmonary Artery: The estimated pulmonary artery systolic pressure is 52 mmHg.   IVC/SVC: Normal venous pressure at 3 mmHg.   Pericardium: Left pleural effusion.     US Retroperitoneal Complete  Result Date: 4/25/2025  EXAMINATION: US RETROPERITONEAL COMPLETE CLINICAL HISTORY: elevated BUN and CR; TECHNIQUE: Ultrasound of kidneys and urinary bladder. COMPARISON: 04/21/2025. FINDINGS: Right: Measures 9.1 cm.  Resistive index measures 0.67.  Normal cortical thickness and echogenicity.  No focal lesions.  No hydronephrosis. Left: Poor acoustic window, not visualized. Urinary bladder: Unremarkable.     1. Unremarkable appearance of right kidney. 2. Left kidney not visualized secondary to poor acoustic window. Electronically signed by: Pato Kwok MD Date:    04/25/2025 Time:    12:28    XR NG/OG tube placement check, non-radiologist " performed  Result Date: 4/25/2025  EXAMINATION: XR NG/OG TUBE PLACEMENT CHECK, NON-RADIOLOGIST PERFORMED CLINICAL HISTORY: NGT placement; COMPARISON: 04/12/2025, CT chest 04/21/2025 FINDINGS: The cardiomediastinal silhouette is stable noting calcification of the aorta.  Left PICC catheter tip projects over the distal SVC.  Enteric tube tip projects over the expected location of the gastric lumen..  There is no pleural effusion.  The trachea is midline.  The lungs are symmetrically expanded bilaterally with coarse interstitial attenuation bilaterally, similar in appearance to the previous exam noting the process remains more focal projected over the right upper lung zone.  There is improved aeration of the right upper lung zone suggesting improving edema or infection.  Continued follow-up advised..  There is no pneumothorax.  The osseous structures are unchanged..     As above Electronically signed by: Joe Dempsey MD Date:    04/25/2025 Time:    10:35    X-Ray Chest 1 View  Result Date: 4/22/2025  EXAMINATION: XR CHEST 1 VIEW CLINICAL HISTORY: SOB; TECHNIQUE: Single frontal view of the chest was performed. COMPARISON: 04/19/2025 FINDINGS: A left upper extremity PICC line has its tip at the cavoatrial junction.  The cardiomediastinal silhouette is with normal limits.  There is a moderate patchy airspace disease at the right lung apex, accompanied by a small right pleural effusion.  No significant change.     Stable appearance of the chest. Electronically signed by: Angel Patel MD Date:    04/22/2025 Time:    11:11    CT Abdomen Pelvis  Without Contrast  Result Date: 4/21/2025  EXAMINATION: CT ABDOMEN PELVIS WITHOUT CONTRAST CLINICAL HISTORY: Epigastric pain; TECHNIQUE: Low dose axial images, sagittal and coronal reformations were obtained from the lung bases to the pubic symphysis, Oral contrast was not administered. COMPARISON: 04/16/2025, 02/20/2021 FINDINGS: Heart: Normal in size. No pericardial effusion.  Lung Bases: 2.5 cm focal airspace disease near the right lung base in the right middle lobe abutting the mediastinum on axial 3 of series 2 is similar to the recent prior study 04/16/2025.  Metastatic disease is not excluded. Small bibasilar pleural effusions with associated atelectasis or mild consolidation.  Recommend follow-up. Liver: Normal in size and attenuation, with no focal hepatic lesions. Gallbladder: Status post cholecystectomy. Bile Ducts: No evidence of dilated ducts. Pancreas: Limited visualization the pancreas.  Possible small fluid collection anterior to the pancreas measuring approximately 3.6 cm on axial 41.  Probable complex collection slightly inferior to the pancreatic head extending to the right pelvis and right iliac fossa.  6.8 x 3.6 cm component on axial 51.  Mild acute pancreatitis is a consideration. Large body habitus and significant artifact obscure visualization.  The lack of IV contrast diminishes the sensitivity also. Spleen: Unremarkable. Adrenals: Unremarkable. Kidneys/ Ureters: Small probable cyst at the upper pole the left kidney.  There are 2 nonobstructing stones at the lower pole the left kidney.  The largest measures 9 mm.  No stones on the right.  No hydronephrosis or hydroureter is identified. Bladder: No evidence of wall thickening. Reproductive organs: Status post hysterectomy. GI Tract/Mesentery: No evidence of bowel obstruction or inflammation. The appendix is within normal limits. Peritoneal Space: No ascites. No free air. Retroperitoneum: No significant adenopathy. Abdominal wall: Unremarkable. Vasculature: No significant atherosclerosis or aneurysm. Bones: No acute fracture.     1. Limited visualization the pancreas. Possible small fluid collection anterior to the pancreas measuring approximately 3.6 cm on axial 41. Probable complex collection slightly inferior to the pancreatic head extending to the right pelvis and right iliac fossa. 6.8 x 3.6 cm component on axial  51.  Mild acute pancreatitis is a consideration.  Follow-up recommended. 2. Large body habitus and significant artifact obscure visualization. The lack of IV contrast diminishes the sensitivity also. 3. 2.5 cm focal airspace disease near the right lung base in the right middle lobe abutting the mediastinum on axial 3 of series 2 is similar to the recent prior study 04/16/2025.  Metastatic disease is not excluded. Small bibasilar pleural effusions with associated atelectasis or mild consolidation. Recommend follow-up. 4. Nonobstructing nephrolithiasis of the left kidney. 5. This report was flagged in Epic as abnormal. Electronically signed by: Denver Medina Date:    04/21/2025 Time:    18:58    X-Ray Chest 1 View  Result Date: 4/20/2025  EXAMINATION: XR CHEST 1 VIEW CLINICAL HISTORY: pneum; TECHNIQUE: Single frontal view of the chest was performed. COMPARISON: 04/16/2025 FINDINGS: The nasogastric tube has been removed, and a left PICC line has been placed with tip at the cavoatrial junction.  Cardiomediastinal silhouette is stable.  Interstitial opacities throughout the lungs with more dense consolidation in the right apex unchanged.  No pleural effusion.     Satisfactory PICC line placement.  Otherwise, unchanged. Electronically signed by: Maribell Jerez Date:    04/20/2025 Time:    08:53    IR PICC Line Placement w/o Port w/ Img > 4 Y/O  Result Date: 4/17/2025  EXAMINATION: IR PICC LINE PLACEMENT W/O PORT, W/IMG > 4 Y/O CLINICAL HISTORY: difficult iv access; TECHNIQUE: A formal timeout was performed. Sonographic evaluation of the left upper extremity demonstrates patent and compressible brachial vein. The upper arm was prepped and draped in sterile fashion. Maximum sterile barrier technique was utilized. 3 cc 1% lidocaine was administered subcutaneously. Under sonographic guidance, a micropuncture needle was advanced into the vein by LAVON Montaon under the supervision of this radiologist. A captured  sonographic image documents the position of the needle. Needle was exchanged over a wire for a peel-away sheath. A dual lumen power PICC, cut to 15 cm, was advanced over the wire until the tip was at the RA-SVC junction. The position of the catheter was confirmed with fluoroscopic guidance and an image stored in PACS. The wire and sheath were removed. Both ports of the PICC were aspirated and flushed with heparinized saline. The device was secured with a StatLock. Fluoroscopy: 12 seconds.  Single fluoroscopic image of the chest archived. FINDINGS: Spot film of the chest demonstrates tip of PICC line at the atrial caval junction from left upper extremity approach.     PICC line ready for immediate use. Routine catheter care. Electronically signed by: Cyril Antunez Date:    04/17/2025 Time:    16:18    US Guided Vascular Access  Result Date: 4/17/2025  EXAMINATION: US GUIDED VASCULAR ACCESS CLINICAL HISTORY: PICC line placement for vascular access TECHNIQUE: Ultrasound was utilized for vascular access by ELIAS Montano.  Real-time ultrasound images were captured and archived. FINDINGS: Archived images document patency of the left brachial vein.  Archived ultrasound images document needle tip entering the brachial vein during vascular access by RRA Milling     Images obtained are presumed satisfactory for the purposes intended Electronically signed by: Cyril Antunez Date:    04/17/2025 Time:    16:11    CT Chest Without Contrast  Result Date: 4/16/2025  EXAMINATION: CT CHEST WITHOUT CONTRAST CLINICAL HISTORY: Interstitial lung disease;Concern for pneumonia versus pneumonitis from immunotherapy; TECHNIQUE: Low dose axial images, sagittal and coronal reformations were obtained from the thoracic inlet to the lung bases. Contrast was not administered. COMPARISON: Chest radiograph dated earlier same day, CT chest 10/08/2024 FINDINGS: Motion degraded exam. Heart size is normal.  The thoracic aorta and main pulmonary  artery are normal in caliber.  No pericardial effusion.  No enlarged axillary lymph nodes.  Enlarged pretracheal lymph measures 16 mm in short axis (series 3, image 19). Enteric tube is in place within the stomach.  Otherwise imaged upper abdomen is unremarkable.  Right gluteal subcutaneous ill-defined areas of high attenuation, likely areas of injection sites or hematomas.  No definite acute osseous abnormality. The central airways are clear.  Medium right greater than left pleural effusions.  Multifocal, bilateral bronchocentric consolidation and ground-glass opacities worse in the right upper lobe.  Ill-defined right upper lobe 2.4 cm nodule (series 4, image 77), previously 1.6 cm.  Right lower lobe 2.1 cm pulmonary nodule (series 4, image 93), previously 0.9 cm.     Right greater than left pleural effusions and multifocal consolidation and airspace opacities concerning for pneumonia. Question increased size of pulmonary nodules and a mediastinal lymph node, however the size could be accentuated by patient motion.  Recommend attention on follow-up. This report was flagged in Epic as abnormal. Electronically signed by: Kamilah Tabares Date:    04/16/2025 Time:    16:40    X-Ray Chest 1 View  Result Date: 4/16/2025  EXAMINATION: XR CHEST 1 VIEW CLINICAL HISTORY: arf; pneumonia FINDINGS: Portable chest radiograph at 06:35 hours compared to multiple prior exams shows interval insertion of a nasogastric or feeding tube, coursing through the mediastinum with the caudal aspect beyond the inferior margin of the radiograph.  The cardiomediastinal silhouette and pulmonary vasculature are stable. The lungs are symmetrically expanded, with scattered interstitial opacities in both lungs, and interval worsening in patchy right apical and upper lobe airspace opacities.  There is adjacent apical pleural thickening or fluid.  There is no pneumothorax.     Interval worsening in right upper lobe pneumonia, with adjacent apical pleural  thickening and or fluid.  Parapneumonic effusion or empyema are not excluded.  Consider further evaluation with contrast enhanced chest CT. Electronically signed by: Morgan Hatch Date:    04/16/2025 Time:    08:56    Bronchoscopy w BAL  Result Date: 4/15/2025  Table formatting from the original result was not included. Procedure Date 4/15/25 Impression Overall      All observed locations appeared normal, including the larynx, left vocal cord, right vocal cord, upper trachea, middle trachea, lower trachea, main juan, left main stem, FRANCIS, lingula, LLL, right main stem, RUL, bronchus intermedius, RML and RLL. Recommendation Await pathology results Indication None Post Procedure Diagnosis None Staff present during procedure Richie Sotelo, [place] Sedation Nurse Todd Villasenor MD Proceduralist Afsaneh Sharp, [place] Technician Medications Sedation meperidine (PF) injection - 25 mg midazolam (PF) (VERSED) 1 mg/mL injection - 1 mg (Totals for administrations occurring from 0923 to 0952 on 04/15/25) ASA Score: ASA 3 - Patient with moderate systemic disease with functional limitations Mallampati Airway Score: I (soft palate, uvula, fauces, and tonsillar pillars visible) Preprocedure A history and physical has been performed, and patient medication allergies have been reviewed. The patient's tolerance of previous anesthesia has been reviewed. The risks and benefits of the procedure and the sedation options and risks were discussed with the patient. All questions were answered and informed consent obtained. Details of the Procedure The patient underwent moderate sedation, which was administered by a sedation nurse. The patient's blood pressure, ETCO2, heart rate, level of consciousness, oxygen and respirations were monitored throughout the procedure. The patient experienced no blood loss. The scope was introduced through the left naris. The procedure was not difficult. The patient tolerated the procedure well.  There were no apparent adverse events. Broncho alveolar lavage done. Scope: Bronchoscope Scope Serial: [id number] Events Procedure Events Event Event Time Sedation Start 4/15/2025  9:34 AM Sedation End 4/15/2025  9:45 AM Procedure Events Event Event Time SCOPE IN 4/15/2025  9:35 AM SCOPE OUT 4/15/2025  9:40 AM Findings All observed locations appeared normal, including the larynx, left vocal cord, right vocal cord, upper trachea, middle trachea, lower trachea, main juan, left main stem, FRANCIS, lingula, LLL, right main stem, RUL, bronchus intermedius, RML and RLL. Blood clot noted in the posterior pharynx broncho alveolar lavage done of the right lower lobe with 90 cc with 25 cc return patient tolerated procedure well.     Echo  Result Date: 4/13/2025    Left Ventricle: The left ventricle is smaller than normal. Normal wall thickness. There is concentric remodeling. There is normal systolic function with a visually estimated ejection fraction of 65 - 70%. Ejection fraction is approximately 60%.   Right Ventricle: The right ventricle is normal in size. Systolic function is normal.   Right Atrium: Right atrium is mildly dilated.   Aortic Valve: The aortic valve is a trileaflet valve. Mildly calcified cusps.   Tricuspid Valve: There is mild regurgitation.   Pulmonary Artery: The estimated pulmonary artery systolic pressure is 44 mmHg.   IVC/SVC: Normal venous pressure at 3 mmHg.     XR NG/OG tube placement check, non-radiologist performed  Result Date: 4/12/2025  EXAMINATION: XR NG/OG TUBE PLACEMENT CHECK, NON-RADIOLOGIST PERFORMED CLINICAL HISTORY: Start enteral feedings; COMPARISON: 04/07/2025, CT chest 04/12/2025 FINDINGS: Enteric tube tip courses below the diaphragm, beyond the field of view.  There is patchy consolidation involving the right upper and right lower lung zones noting bilateral perihilar interstitial attenuation suggesting superimposed edema.  These findings appear somewhat more conspicuous than on exam  04/09/2025, worsening edema is a consideration.  Correlation and continued follow-up advised.  No pneumothorax.     As above Electronically signed by: Joe Dempsey MD Date:    04/12/2025 Time:    17:37    X-Ray Chest 1 View  Result Date: 4/12/2025  EXAMINATION: XR CHEST 1 VIEW CLINICAL HISTORY: SOB; TECHNIQUE: A single portable AP chest radiograph was acquired. COMPARISON: Chest x-ray-04/09/2025 FINDINGS: The cardiac silhouette is not enlarged.  There is continued demonstration of right upper lobe airspace consolidation as well as patchy segmental airspace opacity in the right mid, right lower, and left upper lung zones, stable when compared to the previous exam.  A small volume of pleural fluid at the right lung apex cannot be excluded.  No definite pneumothorax.     No appreciable interval detrimental change in the radiographic appearance of the chest when compared to the prior exam.  Differential considerations include aspiration and multifocal pneumonia.  Pleural fluid at the right lung apex cannot be excluded. Electronically signed by: Felipe Hennessy MD Date:    04/12/2025 Time:    10:06    X-Ray Chest AP Portable  Result Date: 4/9/2025  EXAMINATION: XR CHEST AP PORTABLE CLINICAL HISTORY: Short of breath; TECHNIQUE: Single frontal view of the chest was performed. COMPARISON: 04/03/2025 FINDINGS: Consolidative change in the RIGHT upper lobe and RIGHT perihilar region likely that of multifocal pneumonia as to a lesser extent, similar changes are identified LEFT upper lobe.  Asymmetric pulmonary edema could have a similar appearance.  No pleural effusion. No evident pneumothorax. The cardiac silhouette is normal in size. The hilar and mediastinal contours are unremarkable. Bones are intact.     Patchy consolidative changes throughout the RIGHT lung and LEFT upper lobe, more consolidative in the RIGHT upper lobe since prior examination 04/03/2025. Electronically signed by: Angel Rowe MD Date:    04/09/2025  Time:    12:34    X-Ray Abdomen AP 1 View  Result Date: 4/7/2025  EXAMINATION: XR ABDOMEN AP 1 VIEW CLINICAL HISTORY: Constipation; TECHNIQUE: AP View(s) of the abdomen was performed. COMPARISON: February 19, 2021 FINDINGS: Motion and positioning limits the exam.  Patchy infiltrates within the lower lungs atelectasis versus pneumonia.  There is a catheter overlying the right flank with the tip at the level of the T12-L1 interspace.  Catheter tubing overlies the right lower abdomen as well with the tip overlying the sacrum.  No acute bone abnormality.  No abnormal calcifications seen.  No abnormally distended loop of bowel to suggest bowel obstruction.     No obvious high-grade bowel obstruction.  Bilateral lower lung infiltrates without lobar consolidation atelectasis versus pneumonia.  Catheter tubing overlying the right side of the abdomen as above. Electronically signed by: Shadi Ramirez Date:    04/07/2025 Time:    20:46    X-Ray Chest AP Portable  Result Date: 4/3/2025  EXAMINATION: XR CHEST AP PORTABLE CLINICAL HISTORY: Sepsis; TECHNIQUE: Single frontal view of the chest was performed. COMPARISON: Chest radiograph performed 12/15/2022. FINDINGS: Monitoring leads over the chest. Grossly unchanged cardiac contours, again noting enlargement the cardiac silhouette, prominence of central pulmonary vasculature, and atherosclerosis of the aorta. Patchy opacities are noted in both lungs. No definite pneumothorax.  Trace pleural effusions are not excluded. No acute findings in the visualized abdomen. Osseous and soft tissue structures appear without definite acute change.     Nonspecific patchy opacities both lungs may relate to atelectasis, edema, infection, and/or noninfectious inflammatory process. Electronically signed by: Kyle Charles Date:    04/03/2025 Time:    14:18    X-Ray Tibia Fibula 2 View Left  Result Date: 4/3/2025  EXAMINATION: XR TIBIA FIBULA 2 VIEW LEFT CLINICAL HISTORY: Pain in left leg COMPARISON:  None available FINDINGS: Intra osseous needle is in place within the cortex of the anterior tibial.  It penetrates the depth of about 3 mm.  No acute fracture or focal lesion of the tibia or fibula is evident.  Left lower extremity subcutaneous edema.     Intraosseous needle is in place within the tibia. Electronically signed by: Galen Solis Date:    04/03/2025 Time:    13:10    X-Ray Chest AP Portable  Result Date: 3/31/2025  History: shortness of breath Date: 3/31/2025 Study: XR CHEST PORTABLE Comparison exam: March 21, 2025 There is stable borderline to mild cardiomegaly. Mediastinal contours are similar. Pulmonary vasculature is upper normal. There is some continued left greater than right bibasilar atelectasis/infiltrate, mildly improved. There is mild to moderate pleural effusion the left, the same or mildly increased. Osseous structures are similar    Impression: Continued left greater than right bibasilar atelectasis/infiltrate, at least mildly improved Continued left pleural effusion, the same or mildly increased      Reviewed all available cardiac studies pertinent to this case personally.      Assessment:     Magan Saleem is a 69 y.o. female with:  Present on Admission:   Acute kidney injury superimposed on stage 4 chronic kidney disease   Acute pancreatitis   Chronic pulmonary embolism without acute cor pulmonale   Edema due to hypoalbuminemia   Essential hypertension   Gastroparesis   Hyperphosphatemia   Hyperuricemia   Infection due to Stenotrophomonas maltophilia   Lung cancer metastatic to brain   Mixed hyperlipidemia   Moderate persistent asthma without complication   Morbid obesity   Neoplastic (malignant) related fatigue   Normocytic anemia   Pneumonitis   Type 2 diabetes mellitus with hyperglycemia       Recommendations:     SVT   -reviewed rhythm strips; discussed DCCV vs medical management only with patient - she essentially said she wanted to try just medicine, but if it came to it, she  wasn't opposed to synchronized DCCV if SVT recurs, refractory to adenosine;   -amiodarone is generally not used in this context, but given the patient's goals of care, it is not unreasonable   -generally speaking SVT is secondary to underlying disease process - in this case dehydration, infection, metastatic lung Ca is certainly sufficient ; primarily recommend aggressively treating underlying cause(s) for presentation as able given patients goals of care  -if recurs, and is refractory to vagal maneuvers, adenosine 6mg via PIV (1-2 second push followed immediately by NS flush), if does not terminate in 1-2min, a second dose of 12mg in the same fashion is then given, if does not terminate in 1-2 minutes, a 3rd dose of 18mg in the same fashion is appropriate.   -note, for CVC administration, the dosing is 3mg, 6mg, 9mg in the same fashion (1-2 sec push, then immediately follow w/ NS flush.   -if recurs, preparation for synchronized DCCV should be made in case adenosine is ineffective (unless patient changes her mind regarding this - we discussed it is not unreasonable perhaps to try once, but she does not want to be repeatedly cardioverted).    NSTEMI  -patient denies chest pain; endorsed anxiety when SVT spell occurred  -troponin consistent w/ NSTEMI, however there is a relatively strict contraindication to PCI given risk if intracerebral bleed  -given symptoms not suggestive of ACS, JOHNATHON on CKD 4, goals of care, patient comorbidities, do not plan ischemic evaluation at this juncture per patient preference and given risk seems to far outweigh benefit    Plan to sign off. Please do not hesitate to call if question or concern.         Thank you for allowing us to participate in the care of this patient. Please contact me via secure chat if you have any questions regarding this consult.    Shankar Gray  [facility]  Interventional Cardiology               [1]   Social History  Tobacco Use    Smoking  status: Never    Smokeless tobacco: Never   Substance Use Topics    Alcohol use: Never    Drug use: Never

## 2025-04-27 NOTE — NURSING
Awake and alert in bed no c/o chest pain. .Notified Dr Mcginnis of status. Order for EKG. Will continue to monitor

## 2025-04-27 NOTE — ASSESSMENT & PLAN NOTE
JOHNATHON is likely due to pre-renal azotemia due to intravascular volume depletion. Baseline creatinine is ~ 2.0. Most recent creatinine and eGFR are listed below.  Recent Labs     04/25/25  0422 04/26/25  0443 04/27/25  0220   CREATININE 4.50* 4.25* 4.07*   EGFRNORACEVR 10* 11* 11*      Plan  - JOHNATHON is improving  - Avoid nephrotoxins and renally dose meds for GFR listed above  - Monitor urine output, serial BMP, and adjust therapy as needed  - s/p IV fluids.   - Nephrology consulted. Increased sodium bicarbonate.

## 2025-04-27 NOTE — ASSESSMENT & PLAN NOTE
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  Pulmonology consulted, started on steroids and s/p bronchoscopy with normal findings, BAL culture with stenotrophomonas.   Steroid taper plan per Pulmonology, on antibiotics.   Respiratory status is stable.

## 2025-04-27 NOTE — NURSING
Awake and alert in bed senies chect pain. Noted HR elevated at 160. Notified Dr white. Will continue to monitor.

## 2025-04-27 NOTE — ASSESSMENT & PLAN NOTE
"Patient's FSGs are controlled on current medication regimen.  Last A1c reviewed-   Lab Results   Component Value Date    HGBA1C 6.7 04/02/2025     Most recent fingerstick glucose reviewed- No results for input(s): "POCTGLUCOSE" in the last 24 hours.  Current correctional scale  Low  Maintain anti-hyperglycemic dose as follows-   Antihyperglycemics (From admission, onward)      Start     Stop Route Frequency Ordered    04/26/25 2236  insulin aspart U-100 injection 0-5 Units         -- SubQ Before meals & nightly PRN 04/26/25 2136          Hold Oral hypoglycemics while patient is in the hospital.    "

## 2025-04-27 NOTE — NURSING
Awake and alert  code cart at bedside. AED  pads placed on patient. Dr Mcginnis at bedside IV Adenosine 12mg given, Return to sinus rhythm. Remains on bedside monitor.

## 2025-04-27 NOTE — PROGRESS NOTES
Patient denies shortness of breath.  Review of systems GI she denies nausea or vomiting     Physical exam general patient is chronically ill-appearing, she has 1 to 2+ pretibial edema     Assessment/plan 1.  Acute renal failure-this patient's creatinine is improved at 4.07 mg/dL from 4.25 yesterday, about 6 days ago the patient's creatinine was 3.3.  Continue to monitor   2. Lung cancer with brain metastasis   3.  Metabolic acidosis-patient's bicarb is 15, I will increase her sodium bicarbonate to 1300 mg p.o. t.i.d.  4.  Hyperkalemia-patient's potassium is stable at 4.7   5. Anemia-patient's hematocrit is 27 % from 28% yesterday continue to monitor

## 2025-04-27 NOTE — NURSING
Awake and alert denies any chest pain. Notified per lab of elevated Troponin 93.6. Notified Dr Mcginnis of Troponin level. EKG ordered. Will continue to monitor

## 2025-04-27 NOTE — ASSESSMENT & PLAN NOTE
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 Most recent hemoglobin and hematocrit are listed below.  Recent Labs     04/25/25  0422 04/27/25  0643   HGB 9.0* 8.6*   HCT 27.9* 26.6*     Plan  - Monitor serial CBC: Daily  - Transfuse PRBC if patient becomes hemodynamically unstable, symptomatic or H/H drops below 7/21.  - Patient has not received any PRBC transfusions to date  - Patient's anemia is currently stable  - No evidence of bleeding.

## 2025-04-27 NOTE — HPI
70 yo F presents to [place] ED from [facility] for abnormal labs.  Patient was discharged from [facility] two days ago to skilled nursing facility for rehab.  She was diagnosed with dehydration due to gastroparesis with UTI.  Patient has metastatic lung cancer (to the brain) and follows with Dr. Swanson for IV chemotherapy every other week and takes lazertinib daily.  She has completed her course of radiation for the brain mets and follow had showed some improvement.  I thought she had either some decreased LOC due to encephalopathy or some aphasia from the brain mets but after a great effort to get her to talk, I realized she was just mad.  She wanted to know why she had been discharged two days ago when she could not eat.  She has no appetite and early satiety.  She is not nauseated and no vomiting.  She has decided on a DNR status previously (her caretaker and friend of 20 years) states that she had always said she did not want resuscitation if she experienced cardiopulmonary arrest and had told her children her wishes but she does want treatment and is not opposed to J tube if needed.  She has not had anything to eat or drink in two days and is dehydrated again.       Patient was transferred because of concern of sepsis.  She did have enterococcus faecalis UTI at [facility] and was treated.  I do not have the culture results but cultures were sent and patient does have some yeast noted.  (Will not treat a yeast colonization).  She is afebrile and hemodynamically stable and does not look septic clinically.  Her Lactic acid is stable at 2.5 dara 3.2.  Will check another in am after volume resuscitation.  Her creat is at baseline but she has prerenal azotemia further confirming the dehydration.  Patient has an IO in place from [facility] due to dehydration and difficult stick but with some volume resuscitation, we have gotten an IV.  She is covid and flu negative.       Her WBC is 29 and I am not sure if she has received  neupogen but could be a stress reaction.  Her BS is 245 and she does have some urine ketones but most likely due to starvation ketosis.  Will check a serum ketone.  Her platelets are 88K but this is most likely from her chemo.  She is not anemic.  CXR shows some patchy infiltrate but no obvious obstructive pneumonia from her lung cancer.  Her BNP about 3K but no clinical signs of CHF.  No recent echo but due to her BMI 50 most likely has some PHTN.  EKG had no ischemic changes but tachy at 114 which could also be from dehydration.  She was on ozempic for her DM and this could be the cause of her decreased appetite.  She is also on decadron for prevention of cerebral edema.       Remainder of ROS as below.  She mostly just nods and shakes her head and rolls her eyes.  You can get her to laugh if you try but she has been depressed since she has not walked since her hospitalization at [facility] on 3/19/25 and was discharged two days ago.  She says that at her last chemo she noticed she was getting weaker and her daughter had to help her in and out of the car and that was new for her.      4/5- patient seen examined today resting comfortably in bed and in no acute distress, with no acute events overnight.  Patient has a multitude of issues complicating her medical picture.  White blood cell count 72181 today, sodium 159, potassium 3.2 and BUN creatinine 59 and 1.8.  The patient is still not eating even when assistance is provided.  We have already changed her fluids to half-normal saline with 20 of KCl at 1:25 a.m..  Have also put in a GI consult for possible feeding tube.  E coli in the urine has turned out to be ESBL and Rocephin has been changed Merrem.     Hospital Course at [facility]:    4/6- the patient seen examined today resting comfortably in bed, in no acute distress, in no acute events overnight.  The patient is not very talkative today, but states she is doing okay.  She has no acute complaints.  Blood cultures remain  negative.  The patient has not eaten since yesterday and is on light IV fluids.  GI has been consulted for feeding tube.  Continues on Merrem for positive urine culture.  04/07 Records reviewed. Not eating which not new, Similar during recent admit at [facility]. Dr Swanson there had question pancreatitis. No abd pain or tenderness reported now. Question of dysphagia to solids. Chart hx gastroparesis. Will discuss with GI. [place] says has responded so far well to treatment for lung CA.   04/08 had EGD at [facility] 03/21/25 with no obstruction and evidence gastroparesis. Still not ending. Try additional meds. Talked with GI. Increase activity  04/09 Some better with med  changes  04/10 Continues to do better. Ate 1/2 fish sandwich for lunch. Called by Dr Swanson and she wanting to keep feeding tube in consideration. Talked with Dr Reich and Dr Lemus. If something needed with gastroparesis would have to have post gastric position of tube.   04/11 eating some. Later staff requested some nystatin for sore mouth.   04/12 still not eating well.  Increased peripheral edema.  Albumin low at 1.5.  Will give some albumin and follow with some Lasix.  Placed Dobbhoff tube and start enteral feedings.  This will help with nutrition and if issue of placing surgical tube later make sure will tolerate enteral feedings.  Chest x-ray continues worse on left side.  Discuss with Pulmonary and ask Dr. Villasenor to see.   04/13 no new issues.  Enteral feedings to be started.  Pulmonary evaluation appreciated and plans noted.  04/14 Tolerating feedings Therapy working with her. Bronchoscopy planned.   4/15 BAL today  4/16 bronch yesterday looks like pneumonitis  4/17 not candidate for PEG  04/18 Eating some now. Pt says feels some better than last seen. Look at placement. High dose steroids by pulmonary. BS not as bad as would expect.  04/19 states continued eat some.  Less nausea.  Blood sugar not sure bad considering the steroids.  Pulmonary  adjusted antibiotics based on cultures.  Look at it placement next week.   04/20 Looks the small. C/O SOB to nursing staff earlier but ABG OK. Poor oral intake ; encourage for pt to do more. Looking into placement.  04/21 Firm tender area in abd wall above umbilicus; ?hernia. Abnormal CT de santiago noted and will discuss with surgery.   04/22 CT shows evolving pancreatitis which pt treated for acouple weeks earlier at [facility]. Reviewed with Dr Tapia. No plan to operate on ventral hernia. Discussed later with Dr York. See how does off IVF and encourage oral intake. WBC and Cr both slight better.   04/23 Looks this same. Pt eating some. Pt getting discouraged and asking about home hospice. Talked by phone with her friend Shauna. In conversion doesn't sound like family would be able to care for pt at home. Encouraged pt to give some time and attempt SWB. She says she with consider tonight.   04/24 Lab worse but pt looks some better. Still not taking in well. Maybe eat better as pancreatitis further resolves. Considering replacing dobbhoff feeding tube and look into move to [facility]. Ask Dr Frias to review renal situation. Maybe pancreatitis,pneumonia,renal failure, all these might make a big difference if resolved. Talked with pt and she was OK with NGT  04/25 patient's spirits seems to be doing some better.  Dobbhoff tube placed in feedings to be started.  To be moved to LTAC.  No other new issue  04/26 Awaiting bed assignment at [facility]. No new complains.

## 2025-04-27 NOTE — NURSING
Dr Mcginnis at bedside Adenosine 12mg IVP given. Return to sinus rhythm.  on the monitor. Awake and alert oriented X3. Will continue to monitor.

## 2025-04-27 NOTE — ASSESSMENT & PLAN NOTE
Patient's blood pressure range in the last 24 hours was: BP  Min: 86/44  Max: 118/61.The patient's inpatient anti-hypertensive regimen is listed below:  Current Antihypertensives  metoprolol succinate (TOPROL-XL) 24 hr split tablet 25 mg, Daily, Oral    Plan  - BP is controlled, no changes needed to their regimen

## 2025-04-27 NOTE — CARE UPDATE
Pt developed SVT and was cardioverted with adenosine. Unfortunately this occurred again and pt was converted again with adenosine. If pt were to develop SVT again, will start pt on amiodarone infusion.    Minimally increased troponin is likely due to type 2 MI. Will continue to trend troponin levels

## 2025-04-28 PROBLEM — R79.89 ELEVATED TROPONIN I LEVEL: Status: RESOLVED | Noted: 2025-04-27 | Resolved: 2025-04-28

## 2025-04-28 LAB
ALBUMIN PE, BLOOD: 1.7 G/DL (ref 3.5–5.2)
ALPHA1 GLOB SERPL ELPH-MCNC: 0.3 G/DL (ref 0.1–0.4)
ALPHA2 GLOB SERPL ELPH-MCNC: 0.7 G/DL (ref 0.4–1.3)
B-GLOBULIN SERPL ELPH-MCNC: 0.6 G/DL (ref 0.5–1.5)
GAMMA GLOB SERPL ELPH-MCNC: 0.6 G/DL (ref 0.5–1.8)
PATH INTERP BLD-IMP: ABNORMAL
PROT SERPL-MCNC: 3.9 G/DL (ref 5.8–7.6)

## 2025-04-28 NOTE — ASSESSMENT & PLAN NOTE
04/21 Abd fluid collection in area pancreas noted on CT abd, discussed with surgery  04/22 likely evolving pancreatitis dx acouple weeks ago at [facility].  No severe epigastric pain.  04/24 as resolves might eat better  04/26 Ongoing tube feeds and full liquid diet.

## 2025-04-28 NOTE — PLAN OF CARE
[facility]  Initial Discharge Assessment       Primary Care Provider: Sil Brown DO    Admission Diagnosis: Sepsis due to pneumonia [J18.9, A41.9]    Admission Date: 4/26/2025  Expected Discharge Date: 5/9/2025         Payor: MEDICARE / Plan: MEDICARE PART A & B / Product Type: Government /     Extended Emergency Contact Information  Primary Emergency Contact: Shauna Yeung  Address: [address] [place] [address]           [place] [address] [place]  Work Phone: [phone]  Mobile Phone: [phone]  Relation: Friend  Secondary Emergency Contact: DestinyKrystle  Home Phone: [phone]  Relation: Relative  Preferred language: English   needed? No    Discharge Plan A: Skilled Nursing Facility  Discharge Plan B: Home with family, Home Health, Rehab      [facility] - [place], [place] - [address] [place] [address]  Phone: [phone] Fax: [phone]    [facility]  [address] [place] [address]  Phone: [phone] Fax: [phone]    [facility] - [place], [place] - [address]  [address]  [place] [address]  Phone: [phone] Fax: [phone]      Initial Assessment (most recent)       Adult Discharge Assessment - 04/28/25 1212          Discharge Assessment    Assessment Type Discharge Planning Assessment     Source of Information patient     Communicated MITUL with patient/caregiver Date not available/Unable to determine     People in Home child(jeana), adult;grandchild(jeana)     Do you expect to return to your current living situation? Other (see comments)   undecided. she is agreeable to skilled care at [place]    Do you have help at home or someone to help you manage your care at home? Yes     Who are your caregiver(s) and their phone number(s)? daughter dilia [phone]     Current cognitive status: Alert/Oriented     Walking or Climbing Stairs ambulation difficulty,  requires equipment;stair climbing difficulty, requires equipment     Mobility Management at home used cane or rollator     Dressing/Bathing Difficulty no     Dressing/Bathing --   at home she was able to bathe and dress self    Equipment Currently Used at Home cane, straight;rollator;CPAP;oxygen     Patient currently being followed by outpatient case management? No     Do you currently have service(s) that help you manage your care at home? No     Do you take prescription medications? Yes     Do you have prescription coverage? Yes     Coverage medicare     Do you have any problems affording any of your prescribed medications? No     Is the patient taking medications as prescribed? yes     Who is going to help you get home at discharge? family or transport     How do you get to doctors appointments? family or friend will provide     Are you on dialysis? No     Do you take coumadin? No     Discharge Plan A Skilled Nursing Facility     Discharge Plan B Home with family;Home Health;Rehab     DME Needed Upon Discharge  none     Discharge Plan discussed with: Patient                   Spoke with patient in her room. Prior to illness patient was at home with daughter and adult grandchildren. Patient states that she was able to care for self. She had no home services. Used a rollator to walk around. She has home oxygen and cpap. Dc plan is to skilled care. Verbal choice has been given for [facility]. Cm will follow for dc planning. SDOH has been completed in the last 6 months.

## 2025-04-28 NOTE — ASSESSMENT & PLAN NOTE
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 Most recent hemoglobin and hematocrit are listed below.  Recent Labs     04/27/25  0643 04/28/25  0317   HGB 8.6* 8.3*   HCT 26.6* 26.3*     Plan  - Monitor serial CBC: Daily  - Transfuse PRBC if patient becomes hemodynamically unstable, symptomatic or H/H drops below 7/21.  - Patient has not received any PRBC transfusions to date  - Patient's anemia is currently stable  - No evidence of bleeding.

## 2025-04-28 NOTE — PLAN OF CARE
[facility]  Discharge Final Note    Primary Care Provider: Sil Brown DO    Expected Discharge Date: 4/25/2025    Final Discharge Note (most recent)       Final Note - 04/28/25 0857          Final Note    Assessment Type Final Discharge Note     Anticipated Discharge Disposition Long Term Acute Care        Post-Acute Status    Post-Acute Authorization Placement     Post-Acute Placement Status Set-up Complete/Auth obtained     Coverage Medicare     Patient choice form signed by patient/caregiver List with quality metrics by geographic area provided     Discharge Delays None known at this time                     Important Message from Medicare  Important Message from Medicare regarding Discharge Appeal Rights: Given to patient/caregiver, Explained to patient/caregiver, Signed/date by patient/caregiver     Date IMM was signed: 04/25/25  Time IMM was signed: 0850      [facility]  Daughter notified.  IM updated.  No further needs noted.

## 2025-04-28 NOTE — ASSESSMENT & PLAN NOTE
In the setting of SVT episodes.  Trend is flat, no chest pain and no ischemic changes noted on EKG.  No ischemic workup recommended per cardiology.

## 2025-04-28 NOTE — PROGRESS NOTES
[facility]  Nephrology  Progress Note    Patient Name: Magan Saleem  MRN: [id number]  Admission Date: 4/26/2025  Hospital Length of Stay: 2 days  Attending Provider: Carlos Alberto Nicole MD   Primary Care Physician: Sil Brown DO  Principal Problem:Pneumonitis    Consults  Subjective:     Interval History: The patient has no complaints today    Review of patient's allergies indicates:   Allergen Reactions    Penicillins Hives    Sulfa (sulfonamide antibiotics) Hives     Current Facility-Administered Medications   Medication Frequency    albuterol-ipratropium 2.5 mg-0.5 mg/3 mL nebulizer solution 3 mL Q4H PRN    amiodarone 360 mg/200 mL (1.8 mg/mL) infusion Continuous    atorvastatin tablet 10 mg Daily    dextrose 50% injection 12.5 g PRN    dextrose 50% injection 25 g PRN    glucagon (human recombinant) injection 1 mg PRN    glucose chewable tablet 16 g PRN    glucose chewable tablet 24 g PRN    heparin (porcine) injection 5,000 Units Q8H    insulin aspart U-100 injection 0-5 Units 4 times per day    insulin glargine U-100 (Lantus) injection 20 Units BID    Lactobacillus acidophilus capsule 2 capsule TID WM    levoFLOXacin tablet 500 mg Q48H    metoclopramide HCl tablet 5 mg QID    metoprolol succinate (TOPROL-XL) 24 hr split tablet 25 mg Daily    montelukast tablet 10 mg QHS    mupirocin 2 % ointment BID    pantoprazole EC tablet 40 mg Daily    predniSONE tablet 40 mg Daily    Followed by    [START ON 5/4/2025] predniSONE tablet 20 mg Daily    Followed by    [START ON 5/18/2025] predniSONE tablet 10 mg Daily    rOPINIRole tablet 0.25 mg TID    sertraline tablet 25 mg Daily    sodium bicarbonate tablet 1,300 mg TID       Objective:     Vital Signs (Most Recent):  Temp: 97.4 °F (36.3 °C) (04/28/25 1100)  Pulse: 84 (04/28/25 1100)  Resp: 13 (04/28/25 1100)  BP: (!) 112/58 (04/28/25 1100)  SpO2: 100 % (04/28/25 1100) Vital Signs (24h Range):  Temp:  [97.4 °F (36.3 °C)-98 °F (36.7  °C)] 97.4 °F (36.3 °C)  Pulse:  [84-99] 84  Resp:  [13-23] 13  SpO2:  [100 %] 100 %  BP: ()/(45-61) 112/58     Weight: 118.5 kg (261 lb 3.9 oz) (25 0400)  Body mass index is 43.47 kg/m².  Body surface area is 2.33 meters squared.    I/O last 3 completed shifts:  In: 1159.6 [NG/GT:1094; IV Piggyback:65.6]  Out: 300 [Urine:300]    Physical Exam  Constitutional:       Appearance: She is obese. She is ill-appearing.   Cardiovascular:      Heart sounds: No murmur heard.     No friction rub. No gallop.   Pulmonary:      Effort: Pulmonary effort is normal.      Breath sounds: Normal breath sounds.   Abdominal:      General: There is no distension.      Palpations: There is no mass.      Tenderness: There is no abdominal tenderness. There is no rebound.   Musculoskeletal:      Right lower le+ Pitting Edema present.      Left lower le+ Pitting Edema present.         Significant Labs:sureCBC:   Recent Labs   Lab 25   WBC 37.22*   RBC 2.86*   HGB 8.3*   HCT 26.3*      MCV 92.0   MCH 29.0   MCHC 31.6*     CMP:   Recent Labs   Lab 25  0524 25  0531 25  0442 25  0443 257   GLU 89   < >  --    < > 342*   CALCIUM 8.2*   < >  --    < > 8.3*   ALBUMIN 1.6*  --   --   --   --    PROT 4.0*  --  3.9*  --   --       < >  --    < > 142   K 5.7*   < >  --    < > 4.5   CO2 18*   < >  --    < > 16*      < >  --    < > 112*   *   < >  --    < > 124*   CREATININE 3.85*   < >  --    < > 4.03*   ALKPHOS 145  --   --   --   --    ALT 17  --   --   --   --    AST 34  --   --   --   --    BILITOT 0.7  --   --   --   --     < > = values in this interval not displayed.     All labs within the past 24 hours have been reviewed.    Significant Imaging:      Assessment/Plan:     Active Diagnoses:    Diagnosis Date Noted POA    PRINCIPAL PROBLEM:  Pneumonitis [J98.4] 2025 Yes    SVT (supraventricular tachycardia) [I47.10] 2025 No    Nonischemic  nontraumatic myocardial injury [I5A] 04/27/2025 No    Hyperphosphatemia [E83.39] 04/26/2025 Yes    Hyperuricemia [E79.0] 04/26/2025 Yes    Infection due to Stenotrophomonas maltophilia [A49.8] 04/22/2025 Yes    Acute pancreatitis [K85.90] 04/21/2025 Yes    Neoplastic (malignant) related fatigue [R53.0] 04/15/2025 Yes    Normocytic anemia [D64.9] 04/15/2025 Yes    Edema due to hypoalbuminemia [E88.09] 04/12/2025 Yes    Type 2 diabetes mellitus with hyperglycemia [E11.65] 04/04/2025 Yes    Sepsis due to pneumonia [J18.9, A41.9] 04/04/2025 Yes    Gastroparesis [K31.84]  Yes    Lung cancer metastatic to brain [C34.90, C79.31]  Yes    Morbid obesity [E66.01] 04/03/2025 Yes    Acute kidney injury superimposed on stage 4 chronic kidney disease [N17.9, N18.4]  Yes    Chronic pulmonary embolism without acute cor pulmonale [I27.82]  Yes    Moderate persistent asthma without complication [J45.40] 10/30/2024 Yes    Essential hypertension [I10] 06/30/2021 Yes    Mixed hyperlipidemia [E78.2] 06/30/2021 Yes      Problems Resolved During this Admission:       Imp:  The creatinine is improving but the BUN IS TRENDING UPWARD  pLAN:  CONTINUE to monitor the renal function studies    Thank you for your consult. I will follow-up with patient. Please contact us if you have any additional questions.    Mikey Velásquez MD  Nephrology  [facility]

## 2025-04-28 NOTE — ASSESSMENT & PLAN NOTE
Went into SVT on 4/27, not responsive to multiple rounds of adenosine.  Started on amiodarone infusion after bolus.  Cardiology consulted, recommend to continue amiodarone for now but long term it is not a good option.  Monitor on tele.

## 2025-04-28 NOTE — ASSESSMENT & PLAN NOTE
Body mass index is 43.47 kg/m². Morbid obesity complicates all aspects of disease management from diagnostic modalities to treatment. Weight loss encouraged and health benefits explained to patient.

## 2025-04-28 NOTE — PROGRESS NOTES
[facility]  Wound Care    Patient Name:  Magan Saleem   MRN:  [id number]  Date: 4/28/2025  Diagnosis: Pneumonitis    History:     Past Medical History:   Diagnosis Date    Chronic kidney disease, stage 3b     Chronic pulmonary embolism without acute cor pulmonale     Diabetes mellitus type 2 in obese     DNR (do not resuscitate)     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 Swanson    Melanocytic nevi of lower limb, including hip, left     Mixed hyperlipidemia     Moderate persistent asthma without complication 10/30/2024    Other pulmonary embolism without acute cor pulmonale     Vitamin D deficiency        Social History[1]    Precautions:     Allergies as of 04/25/2025 - Reviewed 04/17/2025   Allergen Reaction Noted    Penicillins Hives 06/29/2021    Sulfa (sulfonamide antibiotics) Hives 06/29/2021       WOC Assessment Details/Treatment     Narrative:  Skin evaluation    Patient in bed, Alert. Has oxygen in use, no redness over ears. Has Ez wraps in use.  Has multi ruptured blisters to abdominal folds , right breast area and left knee, no weeping noted. Partial skin loss noted to all areas. Left knee , Left elbow and mid abdominal fold with Mepliex foam borders in use. Heels and buttocks are okay. Skin integrity maybe compromised due to age, limited mobility, skin moisture, decrease Martin score 14, co morbilities etc. Cont turn protocol, on low air loss mattress, pillows and alvaro carolyn boots to offload heels.     Wound care team to follow weekly prn    04/28/2025         [1]   Social History  Socioeconomic History    Marital status:    Occupational History    Occupation:  Retired   Tobacco Use    Smoking status: Never    Smokeless tobacco: Never   Substance and Sexual Activity    Alcohol use: Never    Drug use: Never    Sexual activity: Not Currently     Social Drivers of Health     Financial Resource Strain: Medium Risk (4/27/2025)    Overall Financial Resource Strain (CARDIA)     Difficulty of Paying Living Expenses: Somewhat hard   Food Insecurity: No Food Insecurity (4/27/2025)    Hunger Vital Sign     Worried About Running Out of Food in the Last Year: Never true     Ran Out of Food in the Last Year: Never true   Transportation Needs: No Transportation Needs (4/27/2025)    PRAPARE - Transportation     Lack of Transportation (Medical): No     Lack of Transportation (Non-Medical): No   Physical Activity: Inactive (4/4/2025)    Exercise Vital Sign     Days of Exercise per Week: 0 days     Minutes of Exercise per Session: 0 min   Stress: No Stress Concern Present (4/27/2025)    [employer] - Occupational Stress Questionnaire     Feeling of Stress : Only a little   Housing Stability: Low Risk  (4/27/2025)    Housing Stability Vital Sign     Unable to Pay for Housing in the Last Year: No     Number of Times Moved in the Last Year: 0     Homeless in the Last Year: No

## 2025-04-28 NOTE — PT/OT/SLP EVAL
Speech Language Pathology Evaluation  Bedside Swallow    Patient Name:  Magan Saleem   MRN:  [id number]  Admitting Diagnosis: Pneumonitis    Recommendations:                 General Recommendations:  Follow-up not indicated  Diet recommendations:   , Full liquids, Other (Comment) (Advance as tolerated.)   Aspiration Precautions:  Patient states she has no appetite and will most likely need an alternate route of feeding, 1 bite/sip at a time, HOB to 90 degrees, Remain upright 30 minutes post meal, Small bites/sips, and Standard aspiration precautions   General Precautions: Standard   Communication strategies:  none    Assessment:     Magan Saleem is a 69 y.o. female with an SLP diagnosis of  possible dysphagia .  She presents with passing her BEDSIDE SWALLOW EVALUATION with thin liquids.    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 Swanson    Melanocytic nevi of lower limb, including hip, left     Mixed hyperlipidemia     Moderate persistent asthma without complication 10/30/2024    Other pulmonary embolism without acute cor pulmonale     Vitamin D deficiency        Past Surgical History:   Procedure Laterality Date    CHOLECYSTECTOMY      CSF SHUNT      HYSTERECTOMY      TONSILLECTOMY         Social History: Patient planning to d/c to nursing home.    Prior Intubation HX:  n/a    Modified Barium Swallow: n/a    Chest X-Rays: see chart    Prior diet: full liquid  diet.    Occupation/hobbies/homemaking: not stated.    Subjective     Patient lying in bed awake. Patient agreeable to BEDSIDE SWALLOW EVALUATION.  Patient goals: not stated     Pain/Comfort:  Pain Rating 1: 0/10 (No pain reported by pt during BSE; however, pt states she has some irritation/soreness in her mouth.)    Respiratory Status:  see chart    Objective:     Oral Musculature Evaluation  Oral Musculature: WFL  Dentition: edentulous  Secretion Management: adequate  Mucosal Quality: adequate  Oral Labial Strength and Mobility: WFL  Lingual Strength and Mobility: WF    Bedside Swallow Eval:   Consistencies Assessed:  Thin liquids No difficulties noted via patient's sippy cup or a straw. Patient did report that her mouth and throat felt sore/irritated and asked if her doctor could give her something for that.      Patient refused all other consistencies. Patient stated she was not hungry and had no appetite.   Oral Phase:   WFL    Pharyngeal Phase:   no overt clinical signs/symptoms of aspiration  no overt clinical signs/symptoms of pharyngeal dysphagia    Compensatory Strategies  None    Treatment: Recommend continue a full liquid diet as tolerated. May advance as tolerated. Patient may continue to require an alternate route of feeding if her appetite does not improve.    Goals:   Multidisciplinary Problems       SLP Goals       Not on file                    Plan:     Patient to be seen:      Plan of Care expires:     Plan of Care reviewed with:      SLP Follow-Up:  No       Discharge recommendations:      Barriers to Discharge:  Decreased Care Giver Support    Time Tracking:     SLP Treatment Date:      Speech Start Time:  1210  Speech Stop Time:  1226     Speech Total Time (min):  16 min    Billable Minutes: Eval Swallow and Oral Function 16 04/28/2025

## 2025-04-28 NOTE — HOSPITAL COURSE
4/28- BG trending up, added Lantus and adjusted dose. D/W nutritionist about changing feeds to allow for more PO intake.     4/29- BG better now. Off IV amiodarone infusion, remains in NSR. Encourage PO intake.     4/30- Continue to adjust insulin to get BG under 200, ideally 140-180. Checking CT chest, abdomen and pelvis today given persistent leukocytosis.     5/1- Discussing with surgery about fluid collections in the abdomen noted on CT (slightly increased in size), also asking if PEG is an option as previously deemed not a candidate. Nephrology has added bicarb infusion. Continue current management otherwise.     5/2- IV fluids started per nephro. WBC count remains elevated.     5/3- Started GOC conversations with the patient who is alert, asked her if her family can be with her or her daughter who she is closest to. She said she will try to talk to them.     5/4- Discussed poor prognosis with the patient. Labs are essentially unchanged. Repeat blood cultures ordered.     5/5   - hypotensive overnight, given fluids, minimal response  - discussed with pulm no additional recs  - renal function still elevated, nephro following  - going to try and reach out to Dr. Swanson    5/6  - refusing feeds and not eating  - stopped lactic acid drawing  -- will discuss goals of care again with family    5/7  - discussed with oncology who will come see patient tomorrow  - reviewed labs and decided to consult tele-ID as continues with leukocytosis with bandemia that likely isnt reactive response to steroids, elevated lactic acid, continues on fluids  - nephrology following, renal function noted  - overall goals of care pending patient's discussion with oncology      5/8  - all consult notes reviewed  - gi recommendation noted  - will discuss with family and patient about goals of care    5/9  - discussed with all consults, will call a family meeting to discuss goals of care    5/10  - new wounds noted, severe weeping of current  wounds as well  - bp low today, fluid flushes at max, will give albumin for secondary resuscitation   - attempted to call family 3 times, no answer, need to setup family meeting for hospice talk      - patient declining, hypotensive however asymptomatic, will add midodrine  - planning for family meeting Tuesday at 1 pm      - planning for family meeting tomorrow at 1  - patient with hypoglycemia and hypotension/hypothermia today, overall condition poor, stopped lantus, amy hugger applied  - hgb low with hypotension, treating as symptomatic, transfuse one unit      - long discussion with family and patient, moving to full comfort care  - assessed overall condition and agree that patient qualifies for GIP and likely will pass in the immediate future, moving to comfort care here  - planning to pull NG tube tomorrow once daughter is able to be at bedside  - vitals q shift, comfort meds added   comfort care  5/15 comfort measures only.  Patient with no complaints    8:02a this morning

## 2025-04-28 NOTE — PROGRESS NOTES
[facility]  Adult Nutrition  Follow-Up Note         Reason for Assessment  Reason For Assessment: RD follow-up        Assessment and Plan  4/28/2025: NOCTURNAL TUBE FEED RECS  RD follow-up. Patient remains on tube feeds with Full Liquids - Vivonex still not in yet. Tolerating feeds well per MD. D/W MD who wants to attempt to stimulate appetite to improve oral intake given NGT isn't a long-term solution and PEG/PEJ is not feasible given body habitus. Recommend to change continuous feeds to nocturnal feeds providing patient with 50% estimated energy needs. Energy needs updated to reflect current weight.This will hopefully help to improve her appetite and encourage more oral intake during the day. If patient doesn't start eating >50%, will adjust feeds as appropriate.    Recommend to change continuous feeds to nocturnal feeds. Initiate continuous infusion of Suplena 1.8 at 55 mL/hr x 12 hours from 6 PM - 6 AM. FWF 60 mL/hr x 12 hours. Keep HOB at 30-45 degrees to reduce risk of aspiration. Monitor for tolerance: n/v/c/d. Hold feeding and notify RD if symptoms of intolerance develop.     4/25/2025: TUBE FEED CONSULT  Consult received and appreciated. Consult for new tube feeding. Patient still not eating well. May eat better as pancreatitis further resolves. Dr. Rizvi talked with patient and she was OK with NGT. It is often recommended to feed into the jejunum in patients with gastroparesis, though body habitus may complicate placement.    NOTE: Patient has complex medical history. Ordered speciality formula (Vivonex). Will start feeds with Suplena until then 2/2 poor renal function. Vivonex is good option for those with pancreatitis, but also for those with gastroparesis + CKD (non-dialysis) (lower in protein/fat/potassium/phos/sodium and contains no fiber)    Initiate continuous infusion of Suplena 1.8 at 10 mL/hr via NGT and advance 10 mL/hr q8h pending tolerance until goal rate of 45 mL/hr  is achieved. FWF 30 mL/hr. Keep HOB at 30-45 degrees to reduce risk of aspiration. Monitor for tolerance: n/v/c/d. Hold feeding and notify RD if symptoms of intolerance develop.     Last Bowel Movement: 04/27/25.     Medications/labs reviewed. RD following.    Learning Needs/Social Determinants of Health    Learning Assessment       04/04/2025 0021 [facility] (4/3/2025 - Present)   Created by Deanna Rivas, RN - RN (Nurse) Status: Complete                 PRIMARY LEARNER     Primary Learner Name:  Magan Saleem  - 04/04/2025 0021    Relationship:  Patient  - 04/04/2025 0021    Does the primary learner have any barriers to learning?:  No Barriers  - 04/04/2025 0021    What is the preferred language of the primary learner?:  English SG - 04/04/2025 0021    Is an  required?:  No SG - 04/04/2025 0021    How does the primary learner prefer to learn new concepts?:  Listening SG - 04/04/2025 0021    How often do you need to have someone help you read instructions, pamphlets, or written material from your doctor or pharmacy?:  Never SG - 04/04/2025 0021        CO-LEARNER #1     No question answered        CO-LEARNER #2     No question answered        SPECIAL TOPICS     No question answered        ANSWERED BY:     -:  Family SG - 04/04/2025 0021        Comments         Edit History       Deanna Rivas, RN - RN (Nurse)   04/04/2025 0021                          Social Drivers of Health     Tobacco Use: Low Risk  (4/15/2025)    Patient History     Smoking Tobacco Use: Never     Smokeless Tobacco Use: Never     Passive Exposure: Not on file   Alcohol Use: Not At Risk (4/4/2025)    AUDIT-C     Frequency of Alcohol Consumption: Never     Average Number of Drinks: Patient does not drink     Frequency of Binge Drinking: Never   Financial Resource Strain: Medium Risk (4/27/2025)    Overall Financial Resource Strain (CARDIA)     Difficulty of Paying Living Expenses: Somewhat hard    Food Insecurity: No Food Insecurity (4/27/2025)    Hunger Vital Sign     Worried About Running Out of Food in the Last Year: Never true     Ran Out of Food in the Last Year: Never true   Transportation Needs: No Transportation Needs (4/27/2025)    PRAPARE - Transportation     Lack of Transportation (Medical): No     Lack of Transportation (Non-Medical): No   Physical Activity: Inactive (4/4/2025)    Exercise Vital Sign     Days of Exercise per Week: 0 days     Minutes of Exercise per Session: 0 min   Stress: No Stress Concern Present (4/27/2025)    [employer] [place] - Occupational Stress Questionnaire     Feeling of Stress : Only a little   Housing Stability: Low Risk  (4/27/2025)    Housing Stability Vital Sign     Unable to Pay for Housing in the Last Year: No     Number of Times Moved in the Last Year: 0     Homeless in the Last Year: No   Depression: Low Risk  (10/30/2024)    Depression     Last PHQ-4: Flowsheet Data: 0   Utilities: Not At Risk (4/27/2025)    [facility] Utilities     Threatened with loss of utilities: No   Health Literacy: Adequate Health Literacy (4/27/2025)     Health Literacy     Frequency of need for help with medical instructions: Never   Recent Concern: Health Literacy - Inadequate Health Literacy (4/4/2025)     Health Literacy     Frequency of need for help with medical instructions: Sometimes   Social Isolation: Not on file     Malnutrition  Is Patient Malnourished: No    Nutrition Diagnosis  Inadequate energy intake related to Appetite loss as evidenced by poor PO intakes  Comments: initiate enteral feeding     Recent Labs   Lab 04/28/25  0317 04/28/25  0541 04/28/25  1101   *  --   --    POCGLU  --    < > 397*    < > = values in this interval not displayed.     Comments on Glucose: Glucose high (T2DM, metabolic stress, tube feeds, steroids)    Nutrition Prescription / Recommendations  Recommendation/Intervention: Recommend to change continuous feeds  to nocturnal feeds. Recommend Suplena 1.8 at 55 mL/hr x 12 hours overnight (6 PM - 6 AM). FWF 60 mL/hr x 12 hours. Feeds to be shut off during day to stimulate appetite.  Goals: Improve oral intake  Nutrition Goal Status: new  Communication of RD Recs: reviewed with physician  Current Diet Order: Full Liquids with Tube Feeds  Chewing or Swallowing Issues: Yes - seen by SLP  Recommended Diet: Nocturnal Feeds + Full Liquids  Is Nutrition Support Recommended: Yes    Needs Calculated    Energy Calorie Requirements (kcal): 2,370 - 2,963 kcal (20 - 25 kcal/kg ABW)   Protein Requirements: 34 - 45 g (0.6 - 0.8 g/kg IBW)  Enteral Nutrition   Enteral Nutrition Formula Provides:  1,184 kcals Propofol Rate: No  30 g Protein  129 g Carbohydrates  63 g Fat Propofol Rate: No  487 ml Fluid without Flush    720 ml Fluid by flush   1,207 ml Total Fluid   Enteral Nutrition Recommended Order:  Tube feeding via NG/ Dobhoff  Tube feeding formula: Suplena 1.8 NG/ Dobhoff at 55mL/hour x 12 hours  Free Water Flush: 60 ml hourly x 12 hours  Modular Supplements:No Modular Supplements needed  Enteral Nutrition meets needs?: no - 50%  Enteral Nutrition Status: Initiate nocturnal feeds  Is Nutrition Education Recommended: No    Monitor and Evaluation  % current intake: 0 - 50 % (inadequate, remains poor)  % intake to meet estimated needs: 50 - 75 %  Monitor and Evaluation: Food and beverage intake, Protein intake, Carbohydrate intake, Diet order, Enteral and parenteral nutrition administration, Food and nutrition knowledge, Weight, Beliefs and attitudes, Electrolyte and renal panel, Gastrointestinal profile, Glucose/endocrine profile, Inflammatory profile, Lipid profile, Nutrition focused physical findings, Skin    Current Medical Diagnosis and Past Medical History     Past Medical History:   Diagnosis Date    Chronic kidney disease, stage 3b     Chronic pulmonary embolism without acute cor pulmonale     Diabetes mellitus type 2 in obese     DNR  (do not resuscitate)     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 Swanson    Melanocytic nevi of lower limb, including hip, left     Mixed hyperlipidemia     Moderate persistent asthma without complication 10/30/2024    Other pulmonary embolism without acute cor pulmonale     Vitamin D deficiency      Nutrition/Diet History  Spiritual, Cultural Beliefs, Mu-ism Practices, Values that Affect Care: no  NKFA    Lab/Procedures/Meds  Recent Labs   Lab 04/28/25  0317      K 4.5   *   CREATININE 4.03*   CALCIUM 8.3*   *   PHOS 5.2*   Note: BUN, Cr, Cl-, Phos elevated (CKD5). Ca++ low (likely iso low alb secondary to metabolic stress)    Last A1c:   Lab Results   Component Value Date    HGBA1C 6.7 04/02/2025    HGBA1C 6.4 (H) 02/05/2025   Note: < 7 % goal for patients with diabetes    Lab Results   Component Value Date    RBC 2.86 (L) 04/28/2025    HGB 8.3 (L) 04/28/2025    HCT 26.3 (L) 04/28/2025    MCV 92.0 04/28/2025    MCH 29.0 04/28/2025    MCHC 31.6 (L) 04/28/2025   Note: H&H low    Pertinent Labs Reviewed: reviewed  Pertinent Medications Reviewed: reviewed    Scheduled Meds:   atorvastatin  10 mg Oral Daily    heparin (porcine)  5,000 Units Subcutaneous Q8H    insulin aspart U-100  0-5 Units Subcutaneous 4 times per day    insulin glargine U-100  10 Units Subcutaneous BID    Lactobacillus acidophilus  2 capsule Oral TID WM    levoFLOXacin  500 mg Oral Q48H    metoclopramide HCl  5 mg Oral QID    metoprolol succinate  25 mg Oral Daily    montelukast  10 mg Oral QHS    mupirocin   Nasal BID    pantoprazole  40 mg Oral Daily    predniSONE  40 mg Oral Daily    Followed by    [START ON 5/4/2025]  "predniSONE  20 mg Oral Daily    Followed by    [START ON 5/18/2025] predniSONE  10 mg Oral Daily    rOPINIRole  0.25 mg Oral TID    sertraline  25 mg Oral Daily    sodium bicarbonate  1,300 mg Oral TID   Note: METOCLOPRAMIDE (gastroparesis)    Continuous Infusions:   amiodarone in dextrose 5%  0.5 mg/min Intravenous Continuous 16.7 mL/hr at 04/28/25 0831 0.5 mg/min at 04/28/25 0831     PRN Meds:.  Current Facility-Administered Medications:     albuterol-ipratropium, 3 mL, Nebulization, Q4H PRN    dextrose 50%, 12.5 g, Intravenous, PRN    dextrose 50%, 25 g, Intravenous, PRN    glucagon (human recombinant), 1 mg, Intramuscular, PRN    glucose, 16 g, Oral, PRN    glucose, 24 g, Oral, PRN    Anthropometrics  Height: 5' 5" (165.1 cm)  Height (inches): 65 in  Weight: 118.5 kg (261 lb 3.9 oz)  Weight (lb): 261.25 lb  Weight Method: Bed Scale  Ideal Body Weight (IBW), Female: 125 lb  BMI (Calculated): 43.5       Estimated/Assessed Needs  RMR (Archer-St. Jeor Equation): 1715.88     Temp: 97.4 °F (36.3 °C)Axillary  Weight Used For Calorie Calculations: 118.5 kg (261 lb 3.9 oz)     Energy Calorie Requirements (kcal): 2,370 - 2,963 kcal (20 - 25 kcal/kg ABW)  Weight Used For Protein Calculations: 56.7 kg (125 lb)  Protein Requirements: 34 - 45 g (0.6 - 0.8 g/kg IBW) (protein needs lower 2/2 non-dialysis dependent CKD)    Fluid Requirements (mL): 1 mL/kcal   CHO Requirement: 45 - 55% total kcal (T2DM)    Nutrition by Nursing  Diet/Nutrition Received: full liquid, tube feeding        Diet/Feeding Tolerance: fair       NG/OG Tube 04/25/25 0913 Right nostril-Feeding Type: continuous, by pump       NG/OG Tube 04/25/25 0913 Right nostril-Current Rate (mL/hr): 10 mL/hr       NG/OG Tube 04/25/25 0913 Right nostril-Goal Rate (mL/hr): 45 mL/hr       NG/OG Tube 04/25/25 0913 Right nostril-Formula Name: Suplena    Nutrition Follow-Up  RD Follow-up?: Yes    Nutrition Discharge Planning: Too early to determine, pending clinical course   "     Maria C Lorenzo, MS, RD, LD  Available via Secure Chat

## 2025-04-28 NOTE — ASSESSMENT & PLAN NOTE
"Patient's FSGs are controlled on current medication regimen.  Last A1c reviewed-   Lab Results   Component Value Date    HGBA1C 6.7 04/02/2025     Most recent fingerstick glucose reviewed- No results for input(s): "POCTGLUCOSE" in the last 24 hours.  Current correctional scale  Low  Maintain anti-hyperglycemic dose as follows-   Antihyperglycemics (From admission, onward)      Start     Stop Route Frequency Ordered    04/28/25 2100  insulin glargine U-100 (Lantus) injection 10 Units         -- SubQ 2 times daily 04/28/25 1141    04/28/25 0000  insulin aspart U-100 injection 0-5 Units         -- SubQ 4 times per day 04/27/25 2352          Hold Oral hypoglycemics while patient is in the hospital.  BG ranging up as tube feeds now to goal, added Lantus and dose adjusted for tighter glycemic control.     "

## 2025-04-28 NOTE — SUBJECTIVE & OBJECTIVE
Interval History: Patient seen and examined at the bedside, reports no new symptoms or complains.     Review of Systems   All other systems reviewed and are negative.    Objective:     Vital Signs (Most Recent):  Temp: 97.4 °F (36.3 °C) (04/28/25 1100)  Pulse: 84 (04/28/25 1100)  Resp: 13 (04/28/25 1100)  BP: (!) 112/58 (04/28/25 1100)  SpO2: 100 % (04/28/25 1100) Vital Signs (24h Range):  Temp:  [97.3 °F (36.3 °C)-98 °F (36.7 °C)] 97.4 °F (36.3 °C)  Pulse:  [81-99] 84  Resp:  [12-23] 13  SpO2:  [100 %] 100 %  BP: ()/(43-61) 112/58     Weight: 118.5 kg (261 lb 3.9 oz)  Body mass index is 43.47 kg/m².    Intake/Output Summary (Last 24 hours) at 4/28/2025 1142  Last data filed at 4/28/2025 0546  Gross per 24 hour   Intake 1094 ml   Output 300 ml   Net 794 ml         Physical Exam  Constitutional:       Appearance: She is obese. She is ill-appearing.   HENT:      Head: Normocephalic.      Mouth/Throat:      Mouth: Mucous membranes are dry.   Cardiovascular:      Rate and Rhythm: Normal rate.      Heart sounds: Normal heart sounds. No murmur heard.  Pulmonary:      Effort: No respiratory distress.      Breath sounds: Rhonchi present.      Comments: On 2L NC  Abdominal:      General: There is no distension.      Tenderness: There is no abdominal tenderness.      Comments: Dobhoff in place   Skin:     Coloration: Skin is pale.   Neurological:      Mental Status: Mental status is at baseline. She is disoriented.               Significant Labs: All pertinent labs within the past 24 hours have been reviewed.    Significant Imaging: I have reviewed all pertinent imaging results/findings within the past 24 hours.

## 2025-04-28 NOTE — ASSESSMENT & PLAN NOTE
Patient's blood pressure range in the last 24 hours was: BP  Min: 92/60  Max: 123/48.The patient's inpatient anti-hypertensive regimen is listed below:  Current Antihypertensives  metoprolol succinate (TOPROL-XL) 24 hr split tablet 25 mg, Daily, Oral    Plan  - BP is controlled, no changes needed to their regimen

## 2025-04-28 NOTE — PROGRESS NOTES
[facility] Medicine  Progress Note    Patient Name: Magan Saleem  MRN: [id number]  Patient Class: IP- Inpatient   Admission Date: 4/26/2025  Length of Stay: 2 days  Attending Physician: Carlos Alberto Nicole MD  Primary Care Provider: Sil Brown DO        Subjective     Principal Problem:Pneumonitis        HPI:  68 yo F presents to [place] ED from [facility] for abnormal labs.  Patient was discharged from [facility] two days ago to skilled nursing facility for rehab.  She was diagnosed with dehydration due to gastroparesis with UTI.  Patient has metastatic lung cancer (to the brain) and follows with Dr. Swanson for IV chemotherapy every other week and takes lazertinib daily.  She has completed her course of radiation for the brain mets and follow had showed some improvement.  I thought she had either some decreased LOC due to encephalopathy or some aphasia from the brain mets but after a great effort to get her to talk, I realized she was just mad.  She wanted to know why she had been discharged two days ago when she could not eat.  She has no appetite and early satiety.  She is not nauseated and no vomiting.  She has decided on a DNR status previously (her caretaker and friend of 20 years) states that she had always said she did not want resuscitation if she experienced cardiopulmonary arrest and had told her children her wishes but she does want treatment and is not opposed to J tube if needed.  She has not had anything to eat or drink in two days and is dehydrated again.       Patient was transferred because of concern of sepsis.  She did have enterococcus faecalis UTI at [facility] and was treated.  I do not have the culture results but cultures were sent and patient does have some yeast noted.  (Will not treat a yeast colonization).  She is afebrile and hemodynamically stable and does not look septic clinically.  Her Lactic acid is stable at 2.5 dara 3.2.  Will  check another in am after volume resuscitation.  Her creat is at baseline but she has prerenal azotemia further confirming the dehydration.  Patient has an IO in place from [facility] due to dehydration and difficult stick but with some volume resuscitation, we have gotten an IV.  She is covid and flu negative.       Her WBC is 29 and I am not sure if she has received neupogen but could be a stress reaction.  Her BS is 245 and she does have some urine ketones but most likely due to starvation ketosis.  Will check a serum ketone.  Her platelets are 88K but this is most likely from her chemo.  She is not anemic.  CXR shows some patchy infiltrate but no obvious obstructive pneumonia from her lung cancer.  Her BNP about 3K but no clinical signs of CHF.  No recent echo but due to her BMI 50 most likely has some PHTN.  EKG had no ischemic changes but tachy at 114 which could also be from dehydration.  She was on ozempic for her DM and this could be the cause of her decreased appetite.  She is also on decadron for prevention of cerebral edema.       Remainder of ROS as below.  She mostly just nods and shakes her head and rolls her eyes.  You can get her to laugh if you try but she has been depressed since she has not walked since her hospitalization at [facility] on 3/19/25 and was discharged two days ago.  She says that at her last chemo she noticed she was getting weaker and her daughter had to help her in and out of the car and that was new for her.      4/5- patient seen examined today resting comfortably in bed and in no acute distress, with no acute events overnight.  Patient has a multitude of issues complicating her medical picture.  White blood cell count [id number] today, sodium 159, potassium 3.2 and BUN creatinine 59 and 1.8.  The patient is still not eating even when assistance is provided.  We have already changed her fluids to half-normal saline with 20 of KCl at 1:25 a.m..  Have also put in a GI consult for possible feeding  tube.  E coli in the urine has turned out to be ESBL and Rocephin has been changed Merrem.     Hospital Course at [facility]:    4/6- the patient seen examined today resting comfortably in bed, in no acute distress, in no acute events overnight.  The patient is not very talkative today, but states she is doing okay.  She has no acute complaints.  Blood cultures remain negative.  The patient has not eaten since yesterday and is on light IV fluids.  GI has been consulted for feeding tube.  Continues on Merrem for positive urine culture.  04/07 Records reviewed. Not eating which not new, Similar during recent admit at [facility] [place]. Dr Swanson there had question pancreatitis. No abd pain or tenderness reported now. Question of dysphagia to solids. Chart hx gastroparesis. Will discuss with GI. Ronnie says has responded so far well to treatment for lung CA.   04/08 had EGD at [facility] [place] 03/21/25 with no obstruction and evidence gastroparesis. Still not ending. Try additional meds. Talked with GI. Increase activity  04/09 Some better with med  changes  04/10 Continues to do better. Ate 1/2 fish sandwich for lunch. Called by Dr Swanson and she wanting to keep feeding tube in consideration. Talked with Dr Reich and Dr Lemus. If something needed with gastroparesis would have to have post gastric position of tube.   04/11 eating some. Later staff requested some nystatin for sore mouth.   04/12 still not eating well.  Increased peripheral edema.  Albumin low at 1.5.  Will give some albumin and follow with some Lasix.  Placed Dobbhoff tube and start enteral feedings.  This will help with nutrition and if issue of placing surgical tube later make sure will tolerate enteral feedings.  Chest x-ray continues worse on left side.  Discuss with Pulmonary and ask Dr. Villasenor to see.   04/13 no new issues.  Enteral feedings to be started.  Pulmonary evaluation appreciated and plans noted.  04/14 Tolerating feedings Therapy working with her.  Bronchoscopy planned.   4/15 BAL today  4/16 bronch yesterday looks like pneumonitis  4/17 not candidate for PEG  04/18 Eating some now. Pt says feels some better than last seen. Look at placement. High dose steroids by pulmonary. BS not as bad as would expect.  04/19 states continued eat some.  Less nausea.  Blood sugar not sure bad considering the steroids.  Pulmonary adjusted antibiotics based on cultures.  Look at it placement next week.   04/20 Looks the small. C/O SOB to nursing staff earlier but ABG OK. Poor oral intake ; encourage for pt to do more. Looking into placement.  04/21 Firm tender area in abd wall above umbilicus; ?hernia. Abnormal CT de santiago noted and will discuss with surgery.   04/22 CT shows evolving pancreatitis which pt treated for acouple weeks earlier at [facility]. Reviewed with Dr Tapia. No plan to operate on ventral hernia. Discussed later with Dr York. See how does off IVF and encourage oral intake. WBC and Cr both slight better.   04/23 Looks this same. Pt eating some. Pt getting discouraged and asking about home hospice. Talked by phone with her friend Shauna. In conversion doesn't sound like family would be able to care for pt at home. Encouraged pt to give some time and attempt SWB. She says she with consider tonight.   04/24 Lab worse but pt looks some better. Still not taking in well. Maybe eat better as pancreatitis further resolves. Considering replacing dobbhoff feeding tube and look into move to [facility]. Ask Dr Frias to review renal situation. Maybe pancreatitis,pneumonia,renal failure, all these might make a big difference if resolved. Talked with pt and she was OK with NGT  04/25 patient's spirits seems to be doing some better.  Dobbhoff tube placed in feedings to be started.  To be moved to LTAC.  No other new issue  04/26 Awaiting bed assignment at [facility]. No new complains.     Overview/Hospital Course:  4/28- BG trending up, added Lantus and adjusted dose. D/W nutritionist about  changing feeds to allow for more PO intake.     Interval History: Patient seen and examined at the bedside, reports no new symptoms or complains.     Review of Systems   All other systems reviewed and are negative.    Objective:     Vital Signs (Most Recent):  Temp: 97.4 °F (36.3 °C) (04/28/25 1100)  Pulse: 84 (04/28/25 1100)  Resp: 13 (04/28/25 1100)  BP: (!) 112/58 (04/28/25 1100)  SpO2: 100 % (04/28/25 1100) Vital Signs (24h Range):  Temp:  [97.3 °F (36.3 °C)-98 °F (36.7 °C)] 97.4 °F (36.3 °C)  Pulse:  [81-99] 84  Resp:  [12-23] 13  SpO2:  [100 %] 100 %  BP: ()/(43-61) 112/58     Weight: 118.5 kg (261 lb 3.9 oz)  Body mass index is 43.47 kg/m².    Intake/Output Summary (Last 24 hours) at 4/28/2025 1142  Last data filed at 4/28/2025 0546  Gross per 24 hour   Intake 1094 ml   Output 300 ml   Net 794 ml         Physical Exam  Constitutional:       Appearance: She is obese. She is ill-appearing.   HENT:      Head: Normocephalic.      Mouth/Throat:      Mouth: Mucous membranes are dry.   Cardiovascular:      Rate and Rhythm: Normal rate.      Heart sounds: Normal heart sounds. No murmur heard.  Pulmonary:      Effort: No respiratory distress.      Breath sounds: Rhonchi present.      Comments: On 2L NC  Abdominal:      General: There is no distension.      Tenderness: There is no abdominal tenderness.      Comments: Dobhoff in place   Skin:     Coloration: Skin is pale.   Neurological:      Mental Status: Mental status is at baseline. She is disoriented.               Significant Labs: All pertinent labs within the past 24 hours have been reviewed.    Significant Imaging: I have reviewed all pertinent imaging results/findings within the past 24 hours.      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  Pulmonology consulted, started on steroids and s/p bronchoscopy with normal findings, BAL culture with stenotrophomonas.   Steroid taper plan per  "Pulmonology, on antibiotics.   Respiratory status is stable.     Infection due to Stenotrophomonas maltophilia  Sepsis due to pneumonia  Stenotrophomonas lower respiratory infection in this patient with multifocal infiltrates and consolidative opacities on CT Chest.   Complete total 2 week course with Levaquin.     Acute kidney injury superimposed on stage 4 chronic kidney disease  JOHNATHON is likely due to pre-renal azotemia due to intravascular volume depletion. Baseline creatinine is  ~ 2.0 . Most recent creatinine and eGFR are listed below.  Recent Labs     04/26/25  0443 04/27/25  0220 04/28/25  0317   CREATININE 4.25* 4.07* 4.03*   EGFRNORACEVR 11* 11* 12*      Plan  - JOHNATHON is improving  - Avoid nephrotoxins and renally dose meds for GFR listed above  - Monitor urine output, serial BMP, and adjust therapy as needed  - s/p IV fluids.   - Nephrology consulted. Increased sodium bicarbonate.     SVT (supraventricular tachycardia)  Went into SVT on 4/27, not responsive to multiple rounds of adenosine.  Started on amiodarone infusion after bolus.  Cardiology consulted, recommend to continue amiodarone for now but long term it is not a good option.  Monitor on tele.     Gastroparesis  EGD by Dr. Lo during recent past admission at [facility]:  "EGD on 03/21/2025 shows LA class B erosive esophagitis but no pill esophagitis, nonerosive gastritis and retention of food and fluid suspicious for gastroparesis, due to narcotics and diabetes. "  GI consulted, unable to advance diet and poor candidate for PEG.  Dobhoff in place for feeds, continued on Reglan.   Will have SLP re evaluate and see if pureed/dysphagia soft could be tried.   Continue PPI.     Acute pancreatitis  04/21 Abd fluid collection in area pancreas noted on CT abd, discussed with surgery  04/22 likely evolving pancreatitis dx acouple weeks ago at [facility].  No severe epigastric pain.  04/24 as resolves might eat better  04/26 Ongoing tube feeds and full " "liquid diet.     Nonischemic nontraumatic myocardial injury  In the setting of SVT episodes.  Trend is flat, no chest pain and no ischemic changes noted on EKG.  No ischemic workup recommended per cardiology.     Lung cancer metastatic to brain  DNR but not hospice.   Receives chemo and has finished radiation.    Follows with Dr. Swanson.    Hyperuricemia  Hyperphosphatemia  Defer management to nephrology.     Essential hypertension  Patient's blood pressure range in the last 24 hours was: BP  Min: 92/60  Max: 123/48.The patient's inpatient anti-hypertensive regimen is listed below:  Current Antihypertensives  metoprolol succinate (TOPROL-XL) 24 hr split tablet 25 mg, Daily, Oral    Plan  - BP is controlled, no changes needed to their regimen    Type 2 diabetes mellitus with hyperglycemia  Patient's FSGs are controlled on current medication regimen.  Last A1c reviewed-   Lab Results   Component Value Date    HGBA1C 6.7 04/02/2025     Most recent fingerstick glucose reviewed- No results for input(s): "POCTGLUCOSE" in the last 24 hours.  Current correctional scale  Low  Maintain anti-hyperglycemic dose as follows-   Antihyperglycemics (From admission, onward)      Start     Stop Route Frequency Ordered    04/28/25 2100  insulin glargine U-100 (Lantus) injection 10 Units         -- SubQ 2 times daily 04/28/25 1141    04/28/25 0000  insulin aspart U-100 injection 0-5 Units         -- SubQ 4 times per day 04/27/25 2352          Hold Oral hypoglycemics while patient is in the hospital.  BG ranging up as tube feeds now to goal, added Lantus and dose adjusted for tighter glycemic control.     Normocytic anemia  Anemia is likely due to chronic disease due to Malignancy. Most recent hemoglobin and hematocrit are listed below.  Recent Labs     04/27/25  0643 04/28/25  0317   HGB 8.6* 8.3*   HCT 26.6* 26.3*     Plan  - Monitor serial CBC: Daily  - Transfuse PRBC if patient becomes hemodynamically unstable, symptomatic or H/H " drops below 7/21.  - Patient has not received any PRBC transfusions to date  - Patient's anemia is currently stable  - No evidence of bleeding.    Neoplastic (malignant) related fatigue  Patient with Acute on chronic debility due to neoplastic/malignant related fatigue. Latest AMPAC and GEMS scores have been reviewed. Evaluation for etiology is complete. Plan includes - Progressive mobility protocol initated  - PT/OT consulted  - Fall precautions in place.    Chronic pulmonary embolism without acute cor pulmonale  Eliquis was discontinued due to risk of ICH with brain mets.    Morbid obesity  Body mass index is 43.47 kg/m². Morbid obesity complicates all aspects of disease management from diagnostic modalities to treatment. Weight loss encouraged and health benefits explained to patient.     Moderate persistent asthma without complication  Stable without exacerbation.  Continue PRN nebs.     Edema due to hypoalbuminemia  Required albumin infusion intermittently during the stay.     Mixed hyperlipidemia  Resume statin.     VTE Risk Mitigation (From admission, onward)           Ordered     heparin (porcine) injection 5,000 Units  Every 8 hours         04/27/25 1055                    Discharge Planning   MITUL: 5/9/2025     Code Status: DNR   Medical Readiness for Discharge Date:                            RAYMOND ZHAO MD  Department of Hospital Medicine   [facility]

## 2025-04-28 NOTE — ASSESSMENT & PLAN NOTE
"EGD by Dr. Lo during recent past admission at [facility]:  "EGD on 03/21/2025 shows LA class B erosive esophagitis but no pill esophagitis, nonerosive gastritis and retention of food and fluid suspicious for gastroparesis, due to narcotics and diabetes. "  GI consulted, unable to advance diet and poor candidate for PEG.  Dobhoff in place for feeds, continued on Reglan.   Will have SLP re evaluate and see if pureed/dysphagia soft could be tried.   Continue PPI.     "

## 2025-04-28 NOTE — PLAN OF CARE
Problem: Diabetes Comorbidity  Goal: Blood Glucose Level Within Targeted Range  Outcome: Progressing     Problem: Diabetes Comorbidity  Goal: Blood Glucose Level Within Targeted Range  Outcome: Progressing     Problem: Bariatric Environmental Safety  Goal: Safety Maintained with Care  Outcome: Progressing     Problem: Bariatric Environmental Safety  Goal: Safety Maintained with Care  Outcome: Progressing     Problem: Infection  Goal: Absence of Infection Signs and Symptoms  Outcome: Progressing     Problem: Infection  Goal: Absence of Infection Signs and Symptoms  Outcome: Progressing     Problem: Sepsis/Septic Shock  Goal: Optimal Coping  Outcome: Progressing  Goal: Absence of Bleeding  Outcome: Progressing  Goal: Blood Glucose Level Within Targeted Range  Outcome: Progressing     Problem: Sepsis/Septic Shock  Goal: Optimal Coping  Outcome: Progressing     Problem: Sepsis/Septic Shock  Goal: Absence of Bleeding  Outcome: Progressing     Problem: Sepsis/Septic Shock  Goal: Blood Glucose Level Within Targeted Range  Outcome: Progressing     Problem: Pneumonia  Goal: Fluid Balance  Outcome: Progressing  Goal: Resolution of Infection Signs and Symptoms  Outcome: Progressing  Goal: Effective Oxygenation and Ventilation  Outcome: Progressing     Problem: Pneumonia  Goal: Fluid Balance  Outcome: Progressing     Problem: Pneumonia  Goal: Resolution of Infection Signs and Symptoms  Outcome: Progressing     Problem: Pneumonia  Goal: Effective Oxygenation and Ventilation  Outcome: Progressing

## 2025-04-28 NOTE — ASSESSMENT & PLAN NOTE
JOHNATHON is likely due to pre-renal azotemia due to intravascular volume depletion. Baseline creatinine is ~ 2.0. Most recent creatinine and eGFR are listed below.  Recent Labs     04/26/25  0443 04/27/25  0220 04/28/25  0317   CREATININE 4.25* 4.07* 4.03*   EGFRNORACEVR 11* 11* 12*      Plan  - JOHNATHON is improving  - Avoid nephrotoxins and renally dose meds for GFR listed above  - Monitor urine output, serial BMP, and adjust therapy as needed  - s/p IV fluids.   - Nephrology consulted. Increased sodium bicarbonate.

## 2025-04-28 NOTE — PLAN OF CARE
Problem: Adult Inpatient Plan of Care  Goal: Plan of Care Review  Outcome: Progressing  Goal: Patient-Specific Goal (Individualized)  Outcome: Progressing  Goal: Absence of Hospital-Acquired Illness or Injury  Outcome: Progressing  Goal: Optimal Comfort and Wellbeing  Outcome: Progressing  Goal: Readiness for Transition of Care  Outcome: Progressing     Problem: Diabetes Comorbidity  Goal: Blood Glucose Level Within Targeted Range  Outcome: Progressing     Problem: Acute Kidney Injury/Impairment  Goal: Fluid and Electrolyte Balance  Outcome: Progressing  Goal: Improved Oral Intake  Outcome: Progressing  Goal: Effective Renal Function  Outcome: Progressing     Problem: Bariatric Environmental Safety  Goal: Safety Maintained with Care  Outcome: Progressing     Problem: Infection  Goal: Absence of Infection Signs and Symptoms  Outcome: Progressing     Problem: Wound  Goal: Optimal Coping  Outcome: Progressing  Goal: Optimal Functional Ability  Outcome: Progressing  Goal: Absence of Infection Signs and Symptoms  Outcome: Progressing  Goal: Improved Oral Intake  Outcome: Progressing  Goal: Optimal Pain Control and Function  Outcome: Progressing  Goal: Skin Health and Integrity  Outcome: Progressing  Goal: Optimal Wound Healing  Outcome: Progressing     Problem: Skin Injury Risk Increased  Goal: Skin Health and Integrity  Outcome: Progressing     Problem: Sepsis/Septic Shock  Goal: Optimal Coping  Outcome: Progressing  Goal: Absence of Bleeding  Outcome: Progressing  Goal: Blood Glucose Level Within Targeted Range  Outcome: Progressing  Goal: Absence of Infection Signs and Symptoms  Outcome: Progressing  Goal: Optimal Nutrition Intake  Outcome: Progressing     Problem: Pneumonia  Goal: Fluid Balance  Outcome: Progressing  Goal: Resolution of Infection Signs and Symptoms  Outcome: Progressing  Goal: Effective Oxygenation and Ventilation  Outcome: Progressing

## 2025-04-29 NOTE — ASSESSMENT & PLAN NOTE
CT abdomen/pelvis showed- Possible small fluid collection anterior to the pancreas measuring approximately 3.6 cm on axial 41. Probable complex collection slightly inferior to the pancreatic head extending to the right pelvis and right iliac fossa.     04/21 Abd fluid collection in area pancreas noted on CT abd, discussed with surgery  04/22 likely evolving pancreatitis dx a couple weeks ago at [facility], now with pseudocyst.  No severe epigastric pain.  04/26 Ongoing tube feeds and full liquid diet. If tolerates full liquid better, plan to advanced to soft foods.

## 2025-04-29 NOTE — ASSESSMENT & PLAN NOTE
JOHNATHON is likely due to pre-renal azotemia due to intravascular volume depletion. Baseline creatinine is ~ 2.0. Most recent creatinine and eGFR are listed below.  Recent Labs     04/27/25  0220 04/28/25 0317 04/29/25 0220   CREATININE 4.07* 4.03* 3.89*   EGFRNORACEVR 11* 12* 12*      Plan  - JOHNATHON is improving  - Avoid nephrotoxins and renally dose meds for GFR listed above  - Monitor urine output, serial BMP, and adjust therapy as needed  - s/p IV fluids.   - Nephrology consulted. Increased sodium bicarbonate

## 2025-04-29 NOTE — SUBJECTIVE & OBJECTIVE
Interval History: Patient seen and examined at the bedside, reports no new symptoms or complains.     Review of Systems   All other systems reviewed and are negative.    Objective:     Vital Signs (Most Recent):  Temp: 97.4 °F (36.3 °C) (04/29/25 1100)  Pulse: 77 (04/29/25 1130)  Resp: 11 (04/29/25 1130)  BP: (!) 104/56 (04/29/25 1100)  SpO2: 100 % (04/29/25 1130) Vital Signs (24h Range):  Temp:  [97.4 °F (36.3 °C)-97.9 °F (36.6 °C)] 97.4 °F (36.3 °C)  Pulse:  [75-99] 77  Resp:  [11-24] 11  SpO2:  [98 %-100 %] 100 %  BP: ()/(47-71) 104/56     Weight: 121.7 kg (268 lb 4.8 oz)  Body mass index is 44.65 kg/m².    Intake/Output Summary (Last 24 hours) at 4/29/2025 1355  Last data filed at 4/29/2025 0601  Gross per 24 hour   Intake 2025.72 ml   Output --   Net 2025.72 ml         Physical Exam  Constitutional:       Appearance: She is obese. She is ill-appearing.   HENT:      Head: Normocephalic.      Mouth/Throat:      Mouth: Mucous membranes are dry.   Cardiovascular:      Rate and Rhythm: Normal rate.      Heart sounds: Normal heart sounds. No murmur heard.  Pulmonary:      Effort: No respiratory distress.      Breath sounds: Rhonchi present.      Comments: On 2L NC  Abdominal:      General: There is no distension.      Tenderness: There is no abdominal tenderness.      Comments: Dobhoff in place   Skin:     Coloration: Skin is pale.   Neurological:      Mental Status: Mental status is at baseline. She is disoriented.               Significant Labs: All pertinent labs within the past 24 hours have been reviewed.    Significant Imaging: I have reviewed all pertinent imaging results/findings within the past 24 hours.

## 2025-04-29 NOTE — PT/OT/SLP EVAL
Occupational Therapy   Evaluation    Name: Magan Saleem  MRN: [id number]  Admitting Diagnosis: Pneumonitis  Recent Surgery: * No surgery found *      Recommendations:     Discharge Recommendations: Moderate Intensity Therapy  Discharge Equipment Recommendations:  to be determined by next level of care  Barriers to discharge:  None    Assessment:     Magan Saleem is a 69 y.o. female with a medical diagnosis of Pneumonitis.  She presents with no complaints. Pt agreed to OT evaluation. Performance deficits affecting function: weakness, impaired endurance, impaired self care skills, impaired functional mobility, decreased upper extremity function, decreased lower extremity function, decreased coordination, edema, impaired cardiopulmonary response to activity, impaired balance.      Rehab Prognosis: Good; patient would benefit from acute skilled OT services to address these deficits and reach maximum level of function.       Plan:     Patient to be seen   to address the above listed problems via self-care/home management, therapeutic activities, therapeutic exercises  Plan of Care Expires: 06/03/25  Plan of Care Reviewed with: patient    Subjective     Chief Complaint: Pneumonitis  Patient/Family Comments/goals: Swing bed at d/c    Occupational Profile:  Living Environment: Pt lives at home with daughter and granddaughter  Previous level of function: Pt reports being (I) with self care prior  Roles and Routines: I with daily activities  Equipment Used at Home: CPAP, rollator, oxygen, shower chair  Assistance upon Discharge: Family, swing bed staff    Pain/Comfort:  Pain Rating 1: 0/10  Pain Rating Post-Intervention 1: 0/10    Patients cultural, spiritual, [employer] conflicts given the current situation: no    Objective:     Communicated with: LONDON Greer prior to session.  Patient found HOB elevated with NG tube, oxygen, PureWick, peripheral IV, pulse ox (continuous), telemetry upon OT entry to room.    General  Precautions: Standard, contact, fall  Orthopedic Precautions: N/A  Braces: N/A  Respiratory Status: Nasal cannula, flow 2 L/min    Occupational Performance:    Bed Mobility:    Patient completed Rolling/Turning to Left with  maximal assistance and x 2 persons  Patient completed Rolling/Turning to Right with maximal assistance and x 2  Patient completed Supine to Sit with maximal assistance and x 2 persons  Patient completed Sit to Supine with dependent    Functional Mobility/Transfers:    Functional Mobility:     Activities of Daily Living:  Upper Body Dressing: maximal assistance with gown    Cognitive/Visual Perceptual:  Cognitive/Psychosocial Skills:     -       Oriented to: Person, Place, and Situation   -       Follows Commands/attention:Follows one-step commands  -       Communication: clear/fluent  Visual/Perceptual:      -wears glasses, hearing wfl      Physical Exam:  Balance:    -       CGA with EOB sitting, Standing NT  Skin integrity: Bruising of arms  Edema:  Moderate    Sensation:    -       Intact  Motor Planning:    -       impaired  Dominant hand:    -       Right  Upper Extremity Range of Motion:     -       Right Upper Extremity: AAROM WFL  -       Left Upper Extremity: AAROM WFL  Upper Extremity Strength:    -       Right Upper Extremity: shoulder 1/5 elbow -2/5, wrist -3/5  -       Left Upper Extremity: shoulder 1/5 elbow 2/5 wrist -3/5   Strength:    -       Right Upper Extremity: impaired  -       Left Upper Extremity: impaired    AMPAC 6 Click ADL:  AMPAC Total Score: 10    Treatment & Education:  OT evaluation completed. See eval for details. Pt presents with decline in status due to Pneumonitis. Pt reports being (I) with self care prior. Tx plan to focus on increasing (I) with ADL and mobility.  Pt educated on OT role/POC.   Importance of OOB activity with staff assistance.  Importance of sitting up in the chair throughout the day as tolerated, especially for meals   Safety during  functional t/f and mobility with use of RW  Importance of assisting with self-care activities   All questions/concerns answered within OT scope of practice     Patient left HOB elevated with all lines intact, call button in reach, and nurse notified    GOALS:   Multidisciplinary Problems       Occupational Therapy Goals          Problem: Occupational Therapy    Goal Priority Disciplines Outcome Interventions   Occupational Therapy Goal     OT, PT/OT Progressing    Description: STG:  Pt will perform grooming with setup and min a  Pt will bathe with mod a  Pt will perform UE dressing with mod a  Pt will perform LE dressing with mod a with AD as needed  Pt will sit EOB x 10 min with SBA   Pt will transfer bed/chair/bsc with mod a  Pt will perform standing task x 30 sec with mod a   Pt will tolerate 20 minutes of tx without fatigue      LT.Restore to max I with self care and mobility.                          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 Swanson    Melanocytic nevi of lower limb, including hip, left     Mixed hyperlipidemia     Moderate persistent asthma without complication 10/30/2024    Other pulmonary embolism without acute cor pulmonale     Vitamin D deficiency          Past Surgical History:   Procedure Laterality Date    CHOLECYSTECTOMY      CSF SHUNT      HYSTERECTOMY      TONSILLECTOMY         Time Tracking:     OT Date of Treatment: 25  OT Start  Time: 0908  OT Stop Time: 0942  OT Total Time (min): 34 min    Billable Minutes:Evaluation 34    4/29/2025

## 2025-04-29 NOTE — PLAN OF CARE
Problem: Physical Therapy  Goal: Physical Therapy Goal  Description: Short Term Goals to be met by: 25    Patient will increase functional independence with mobility by performin. Supine to sit with Moderate Assist  2. Sit to stand transfer with Maximal Assist using Rolling walker  3. Bed to chair transfer with Maximal Assist using lowest level of assistive device  4. Rolling side to side with Min A  5. Lower extremity exercise program x30 reps per handout, with assistance as needed    Long Term Goals to be met by: 25    Pt will regain full independent functional mobility with lowest level of assistive device to return to home situation and prior activities of daily living.   Outcome: Progressing     PT eval completed. Please see eval note for details.

## 2025-04-29 NOTE — ASSESSMENT & PLAN NOTE
"EGD by Dr. Lo during recent past admission at [facility]:  "EGD on 03/21/2025 shows LA class B erosive esophagitis but no pill esophagitis, nonerosive gastritis and retention of food and fluid suspicious for gastroparesis, due to narcotics and diabetes. "  GI consulted, unable to advance diet and poor candidate for PEG.  Dobhoff in place for feeds, continued on Reglan.   Will have SLP re evaluate and see if pureed/dysphagia soft could be tried.   Continue PPI.     "

## 2025-04-29 NOTE — ASSESSMENT & PLAN NOTE
Body mass index is 44.65 kg/m². Morbid obesity complicates all aspects of disease management from diagnostic modalities to treatment. Weight loss encouraged and health benefits explained to patient.

## 2025-04-29 NOTE — ASSESSMENT & PLAN NOTE
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 Most recent hemoglobin and hematocrit are listed below.  Recent Labs     04/27/25  0643 04/28/25  0317 04/29/25  0220   HGB 8.6* 8.3* 8.1*   HCT 26.6* 26.3* 25.8*     Plan  - Monitor serial CBC: Daily  - Transfuse PRBC if patient becomes hemodynamically unstable, symptomatic or H/H drops below 7/21.  - Patient has not received any PRBC transfusions to date  - Patient's anemia is currently stable  - No evidence of bleeding.

## 2025-04-29 NOTE — ASSESSMENT & PLAN NOTE
Patient's blood pressure range in the last 24 hours was: BP  Min: 82/49  Max: 126/69.The patient's inpatient anti-hypertensive regimen is listed below:  Current Antihypertensives  metoprolol succinate (TOPROL-XL) 24 hr split tablet 25 mg, Daily, Oral    Plan  - BP is controlled, no changes needed to their regimen

## 2025-04-29 NOTE — PLAN OF CARE
Problem: Occupational Therapy  Goal: Occupational Therapy Goal  Description: STG:  Pt will perform grooming with setup and min a  Pt will bathe with mod a  Pt will perform UE dressing with mod a  Pt will perform LE dressing with mod a with AD as needed  Pt will sit EOB x 10 min with SBA   Pt will transfer bed/chair/bsc with mod a  Pt will perform standing task x 30 sec with mod a   Pt will tolerate 20 minutes of tx without fatigue      LT.Restore to max I with self care and mobility.     Outcome: Progressing

## 2025-04-29 NOTE — ASSESSMENT & PLAN NOTE
Went into SVT on 4/27, not responsive to multiple rounds of adenosine.  Started on amiodarone infusion after bolus, turned off on 4/29.  Cardiology consulted, recommend to continue amiodarone for now but long term it is not a good option.  Monitor on tele.

## 2025-04-29 NOTE — ASSESSMENT & PLAN NOTE
Stenotrophomonas lower respiratory infection in this patient with multifocal infiltrates and consolidative opacities on CT Chest.   Complete total 2 week course with Levaquin.   Leukocytosis persists, will follow procalcitonin levels Q48H, if trending upwards then will obtain repeat CT chest, abdomen and pelvis.

## 2025-04-29 NOTE — PROGRESS NOTES
[facility] Medicine  Progress Note    Patient Name: Magan Saleem  MRN: [id number]  Patient Class: IP- Inpatient   Admission Date: 4/26/2025  Length of Stay: 3 days  Attending Physician: Carlos Alberto Nicole MD  Primary Care Provider: Sil Brown DO        Subjective     Principal Problem:Pneumonitis        HPI:  70 yo F presents to [facility] ED from [facility] for abnormal labs.  Patient was discharged from [facility] two days ago to skilled nursing facility for rehab.  She was diagnosed with dehydration due to gastroparesis with UTI.  Patient has metastatic lung cancer (to the brain) and follows with Dr. Swanson for IV chemotherapy every other week and takes lazertinib daily.  She has completed her course of radiation for the brain mets and follow had showed some improvement.  I thought she had either some decreased LOC due to encephalopathy or some aphasia from the brain mets but after a great effort to get her to talk, I realized she was just mad.  She wanted to know why she had been discharged two days ago when she could not eat.  She has no appetite and early satiety.  She is not nauseated and no vomiting.  She has decided on a DNR status previously (her caretaker and friend of 20 years) states that she had always said she did not want resuscitation if she experienced cardiopulmonary arrest and had told her children her wishes but she does want treatment and is not opposed to J tube if needed.  She has not had anything to eat or drink in two days and is dehydrated again.       Patient was transferred because of concern of sepsis.  She did have enterococcus faecalis UTI at [facility] and was treated.  I do not have the culture results but cultures were sent and patient does have some yeast noted.  (Will not treat a yeast colonization).  She is afebrile and hemodynamically stable and does not look septic clinically.  Her Lactic acid is stable at 2.5 dara 3.2.  Will  check another in am after volume resuscitation.  Her creat is at baseline but she has prerenal azotemia further confirming the dehydration.  Patient has an IO in place from [facility] due to dehydration and difficult stick but with some volume resuscitation, we have gotten an IV.  She is covid and flu negative.       Her WBC is 29 and I am not sure if she has received neupogen but could be a stress reaction.  Her BS is 245 and she does have some urine ketones but most likely due to starvation ketosis.  Will check a serum ketone.  Her platelets are 88K but this is most likely from her chemo.  She is not anemic.  CXR shows some patchy infiltrate but no obvious obstructive pneumonia from her lung cancer.  Her BNP about 3K but no clinical signs of CHF.  No recent echo but due to her BMI 50 most likely has some PHTN.  EKG had no ischemic changes but tachy at 114 which could also be from dehydration.  She was on ozempic for her DM and this could be the cause of her decreased appetite.  She is also on decadron for prevention of cerebral edema.       Remainder of ROS as below.  She mostly just nods and shakes her head and rolls her eyes.  You can get her to laugh if you try but she has been depressed since she has not walked since her hospitalization at [facility] on 3/19/25 and was discharged two days ago.  She says that at her last chemo she noticed she was getting weaker and her daughter had to help her in and out of the car and that was new for her.      4/5- patient seen examined today resting comfortably in bed and in no acute distress, with no acute events overnight.  Patient has a multitude of issues complicating her medical picture.  White blood cell count 39421 today, sodium 159, potassium 3.2 and BUN creatinine 59 and 1.8.  The patient is still not eating even when assistance is provided.  We have already changed her fluids to half-normal saline with 20 of KCl at 1:25 a.m..  Have also put in a GI consult for possible feeding  tube.  E coli in the urine has turned out to be ESBL and Rocephin has been changed Merrem.     Hospital Course at [facility]:    4/6- the patient seen examined today resting comfortably in bed, in no acute distress, in no acute events overnight.  The patient is not very talkative today, but states she is doing okay.  She has no acute complaints.  Blood cultures remain negative.  The patient has not eaten since yesterday and is on light IV fluids.  GI has been consulted for feeding tube.  Continues on Merrem for positive urine culture.  04/07 Records reviewed. Not eating which not new, Similar during recent admit at [facility]. Dr Swanson there had question pancreatitis. No abd pain or tenderness reported now. Question of dysphagia to solids. Chart hx gastroparesis. Will discuss with GI. Ronnie says has responded so far well to treatment for lung CA.   04/08 had EGD at [facility] 03/21/25 with no obstruction and evidence gastroparesis. Still not ending. Try additional meds. Talked with GI. Increase activity  04/09 Some better with med  changes  04/10 Continues to do better. Ate 1/2 fish sandwich for lunch. Called by Dr Swanson and she wanting to keep feeding tube in consideration. Talked with Dr Reich and Dr Lemus. If something needed with gastroparesis would have to have post gastric position of tube.   04/11 eating some. Later staff requested some nystatin for sore mouth.   04/12 still not eating well.  Increased peripheral edema.  Albumin low at 1.5.  Will give some albumin and follow with some Lasix.  Placed Dobbhoff tube and start enteral feedings.  This will help with nutrition and if issue of placing surgical tube later make sure will tolerate enteral feedings.  Chest x-ray continues worse on left side.  Discuss with Pulmonary and ask Dr. Villasenor to see.   04/13 no new issues.  Enteral feedings to be started.  Pulmonary evaluation appreciated and plans noted.  04/14 Tolerating feedings Therapy working with her.  Bronchoscopy planned.   4/15 BAL today  4/16 bronch yesterday looks like pneumonitis  4/17 not candidate for PEG  04/18 Eating some now. Pt says feels some better than last seen. Look at placement. High dose steroids by pulmonary. BS not as bad as would expect.  04/19 states continued eat some.  Less nausea.  Blood sugar not sure bad considering the steroids.  Pulmonary adjusted antibiotics based on cultures.  Look at it placement next week.   04/20 Looks the small. C/O SOB to nursing staff earlier but ABG OK. Poor oral intake ; encourage for pt to do more. Looking into placement.  04/21 Firm tender area in abd wall above umbilicus; ?hernia. Abnormal CT de santiago noted and will discuss with surgery.   04/22 CT shows evolving pancreatitis which pt treated for acouple weeks earlier at [facility]. Reviewed with Dr Tapia. No plan to operate on ventral hernia. Discussed later with Dr York. See how does off IVF and encourage oral intake. WBC and Cr both slight better.   04/23 Looks this same. Pt eating some. Pt getting discouraged and asking about home hospice. Talked by phone with her friend Shauna. In conversion doesn't sound like family would be able to care for pt at home. Encouraged pt to give some time and attempt SWB. She says she with consider tonight.   04/24 Lab worse but pt looks some better. Still not taking in well. Maybe eat better as pancreatitis further resolves. Considering replacing dobbhoff feeding tube and look into move to [facility]. Ask Dr Frias to review renal situation. Maybe pancreatitis,pneumonia,renal failure, all these might make a big difference if resolved. Talked with pt and she was OK with NGT  04/25 patient's spirits seems to be doing some better.  Dobbhoff tube placed in feedings to be started.  To be moved to LTAC.  No other new issue  04/26 Awaiting bed assignment at [facility]. No new complains.     Overview/Hospital Course:  4/28- BG trending up, added Lantus and adjusted dose. D/W nutritionist about  changing feeds to allow for more PO intake.     4/29- BG better now. Off IV amiodarone infusion, remains in NSR. Encourage PO intake.     Interval History: Patient seen and examined at the bedside, reports no new symptoms or complains.     Review of Systems   All other systems reviewed and are negative.    Objective:     Vital Signs (Most Recent):  Temp: 97.4 °F (36.3 °C) (04/29/25 1100)  Pulse: 77 (04/29/25 1130)  Resp: 11 (04/29/25 1130)  BP: (!) 104/56 (04/29/25 1100)  SpO2: 100 % (04/29/25 1130) Vital Signs (24h Range):  Temp:  [97.4 °F (36.3 °C)-97.9 °F (36.6 °C)] 97.4 °F (36.3 °C)  Pulse:  [75-99] 77  Resp:  [11-24] 11  SpO2:  [98 %-100 %] 100 %  BP: ()/(47-71) 104/56     Weight: 121.7 kg (268 lb 4.8 oz)  Body mass index is 44.65 kg/m².    Intake/Output Summary (Last 24 hours) at 4/29/2025 1355  Last data filed at 4/29/2025 0601  Gross per 24 hour   Intake 2025.72 ml   Output --   Net 2025.72 ml         Physical Exam  Constitutional:       Appearance: She is obese. She is ill-appearing.   HENT:      Head: Normocephalic.      Mouth/Throat:      Mouth: Mucous membranes are dry.   Cardiovascular:      Rate and Rhythm: Normal rate.      Heart sounds: Normal heart sounds. No murmur heard.  Pulmonary:      Effort: No respiratory distress.      Breath sounds: Rhonchi present.      Comments: On 2L NC  Abdominal:      General: There is no distension.      Tenderness: There is no abdominal tenderness.      Comments: Dobhoff in place   Skin:     Coloration: Skin is pale.   Neurological:      Mental Status: Mental status is at baseline. She is disoriented.               Significant Labs: All pertinent labs within the past 24 hours have been reviewed.    Significant Imaging: I have reviewed all pertinent imaging results/findings within the past 24 hours.      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  Pulmonology consulted, started on steroids and s/p  bronchoscopy with normal findings, BAL culture with stenotrophomonas.   Steroid taper plan per Pulmonology, on antibiotics.   Respiratory status is stable.     Infection due to Stenotrophomonas maltophilia  Sepsis due to pneumonia  Stenotrophomonas lower respiratory infection in this patient with multifocal infiltrates and consolidative opacities on CT Chest.   Complete total 2 week course with Levaquin.   Leukocytosis persists, will follow procalcitonin levels Q48H, if trending upwards then will obtain repeat CT chest, abdomen and pelvis.     Acute kidney injury superimposed on stage 4 chronic kidney disease  JOHNATHON is likely due to pre-renal azotemia due to intravascular volume depletion. Baseline creatinine is  ~ 2.0 . Most recent creatinine and eGFR are listed below.  Recent Labs     04/27/25  0220 04/28/25  0317 04/29/25  0220   CREATININE 4.07* 4.03* 3.89*   EGFRNORACEVR 11* 12* 12*      Plan  - JOHNATHON is improving  - Avoid nephrotoxins and renally dose meds for GFR listed above  - Monitor urine output, serial BMP, and adjust therapy as needed  - s/p IV fluids.   - Nephrology consulted. Increased sodium bicarbonate    SVT (supraventricular tachycardia)  Went into SVT on 4/27, not responsive to multiple rounds of adenosine.  Started on amiodarone infusion after bolus, turned off on 4/29.  Cardiology consulted, recommend to continue amiodarone for now but long term it is not a good option.  Monitor on tele.     Acute pancreatitis  CT abdomen/pelvis showed- Possible small fluid collection anterior to the pancreas measuring approximately 3.6 cm on axial 41. Probable complex collection slightly inferior to the pancreatic head extending to the right pelvis and right iliac fossa.     04/21 Abd fluid collection in area pancreas noted on CT abd, discussed with surgery  04/22 likely evolving pancreatitis dx a couple weeks ago at [facility], now with pseudocyst.  No severe epigastric pain.  04/26 Ongoing tube feeds and full liquid  "diet. If tolerates full liquid better, plan to advanced to soft foods.     Gastroparesis  EGD by Dr. Lo during recent past admission at [facility]:  "EGD on 03/21/2025 shows LA class B erosive esophagitis but no pill esophagitis, nonerosive gastritis and retention of food and fluid suspicious for gastroparesis, due to narcotics and diabetes. "  GI consulted, unable to advance diet and poor candidate for PEG.  Dobhoff in place for feeds, continued on Reglan.   Will have SLP re evaluate and see if pureed/dysphagia soft could be tried.   Continue PPI.     Nonischemic nontraumatic myocardial injury  In the setting of SVT episodes.  Trend is flat, no chest pain and no ischemic changes noted on EKG.  No ischemic workup recommended per cardiology.     Lung cancer metastatic to brain  DNR but not hospice.   Receives chemo and has finished radiation.    Follows with Dr. Swanson.    Hyperuricemia  Hyperphosphatemia  Defer management to nephrology.     Essential hypertension  Patient's blood pressure range in the last 24 hours was: BP  Min: 82/49  Max: 126/69.The patient's inpatient anti-hypertensive regimen is listed below:  Current Antihypertensives  metoprolol succinate (TOPROL-XL) 24 hr split tablet 25 mg, Daily, Oral    Plan  - BP is controlled, no changes needed to their regimen    Type 2 diabetes mellitus with hyperglycemia  Patient's FSGs are controlled on current medication regimen.  Last A1c reviewed-   Lab Results   Component Value Date    HGBA1C 6.7 04/02/2025     Most recent fingerstick glucose reviewed- No results for input(s): "POCTGLUCOSE" in the last 24 hours.  Current correctional scale  Low  Maintain anti-hyperglycemic dose as follows-   Antihyperglycemics (From admission, onward)      Start     Stop Route Frequency Ordered    04/28/25 1645  insulin glargine U-100 (Lantus) injection 20 Units         -- SubQ 2 times daily 04/28/25 1636    04/28/25 0000  insulin aspart U-100 injection 0-5 Units      "    -- SubQ 4 times per day 04/27/25 2352          Hold Oral hypoglycemics while patient is in the hospital.  BG ranging up as tube feeds now to goal, added Lantus and dose adjusted for tighter glycemic control.     Normocytic anemia  Anemia is likely due to chronic disease due to Malignancy. Most recent hemoglobin and hematocrit are listed below.  Recent Labs     04/27/25  0643 04/28/25  0317 04/29/25  0220   HGB 8.6* 8.3* 8.1*   HCT 26.6* 26.3* 25.8*     Plan  - Monitor serial CBC: Daily  - Transfuse PRBC if patient becomes hemodynamically unstable, symptomatic or H/H drops below 7/21.  - Patient has not received any PRBC transfusions to date  - Patient's anemia is currently stable  - No evidence of bleeding.    Neoplastic (malignant) related fatigue  Patient with Acute on chronic debility due to neoplastic/malignant related fatigue. Latest AMPAC and GEMS scores have been reviewed. Evaluation for etiology is complete. Plan includes - Progressive mobility protocol initated  - PT/OT consulted  - Fall precautions in place.    Chronic pulmonary embolism without acute cor pulmonale  Eliquis was discontinued due to risk of ICH with brain mets.    Morbid obesity  Body mass index is 44.65 kg/m². Morbid obesity complicates all aspects of disease management from diagnostic modalities to treatment. Weight loss encouraged and health benefits explained to patient.     Moderate persistent asthma without complication  Stable without exacerbation.  Continue PRN nebs.     Edema due to hypoalbuminemia  Required albumin infusion intermittently during the stay.     Mixed hyperlipidemia  Resume statin.     VTE Risk Mitigation (From admission, onward)           Ordered     heparin (porcine) injection 5,000 Units  Every 8 hours         04/27/25 1055                    Discharge Planning   MITUL: 5/9/2025     Code Status: DNR   Medical Readiness for Discharge Date:   Discharge Plan A: Skilled Nursing Facility                Please place  Justification for DME        RAYMOND ZHAO MD  Department of Hospital Medicine   [facility]

## 2025-04-29 NOTE — PT/OT/SLP EVAL
Physical Therapy Evaluation and Treatment    Patient Name: Magan Saleem   MRN: [id number]  Recent Surgery: * No surgery found *      Recommendations:     Discharge Recommendations: Moderate Intensity Therapy   Discharge Equipment Recommendations: to be determined by next level of care   Barriers to discharge: Increased level of assist, Decreased caregiver support, and Ongoing medical treatment    Assessment:     Magan Saleem is a 69 y.o. female admitted with a medical diagnosis of Pneumonitis. She presents with the following impairments/functional limitations: weakness, impaired endurance, impaired functional mobility, gait instability, impaired balance, decreased lower extremity function, decreased upper extremity function, decreased coordination, edema, impaired cardiopulmonary response to activity. Pt currently has active dx of lung cancer with brain mets and was receiving chemo prior to hospitalization. Pt was also ambulatory 3 wks prior to hospitalization on 4/4/25. Pt currently has NG for nutrition d/t not eating well.     Rehab Prognosis: Good and Fair; patient would benefit from acute PT services to address these deficits and reach maximum level of function.    Plan:     During this hospitalization, patient to be seen 5 x/week to address the above listed problems via gait training, therapeutic activities, therapeutic exercises, neuromuscular re-education    Plan of Care Expires: 05/29/25    Subjective     Chief Complaint: Pneumonitis   Patient Comments/Goals: agreeable  Pain/Comfort:  Pain Rating 1: 0/10    Social History:  Living Environment: Patient lives with their daughter in a single story home with number of outside stair(s): 0  Prior Level of Function: Prior to admission, patient was modified independent at home but was at swingbed just prior to hospital admission  Equipment Used at Home: cane, straight, rollator, CPAP  DME owned (not currently used): none  Assistance Upon Discharge: facility  staff    Objective:     Communicated with RN prior to session. Patient found HOB elevated with NG tube, oxygen, pulse ox (continuous), telemetry, PureWick upon PT entry to room.    General Precautions: Standard, fall, contact   Orthopedic Precautions: N/A   Braces: N/A    Respiratory Status: Nasal cannula, flow 2 L/min    Exams:  Cognition: Patient is oriented to Person, Place, Time, Situation  RLE ROM: WFL  RLE Strength: Deficits: 2/5  LLE ROM: WFL  LLE Strength: Deficits: 2/5  Sensation:    -       Intact  Skin Integrity/Edema:     -       Edema: some weeping noted from extremities and abdomen    Functional Mobility:  Gait belt applied - N/A  Bed Mobility  Rolling Left: maximal assistance  Rolling Right: maximal assistance  Scooting: total assistance and 4 persons  Supine to Sit: maximal assistance and of 2 persons for LE management and trunk management  Sit to Supine: maximal assistance and of 2 persons for LE management and trunk management  Transfers  unable  Gait  unable  Balance  Sitting: contact guard assistance  Standing:  unable      Therapeutic Activities and Exercises:   Patient educated on role of acute care PT and PT POC, safety while in hospital including calling nurse for mobility, and call light usage  Patient educated about importance of OOB mobility and remaining up in chair most of the day.  Patient educated about pursed lip breathing technique and cued for use with mobility.  Pt tolerated EOB sitting with little to no support for approx 8 mins    AM-PAC 6 CLICK MOBILITY  Total Score:8    Patient left HOB elevated with all lines intact, call button in reach, and RN present.    GOALS:   Multidisciplinary Problems       Physical Therapy Goals          Problem: Physical Therapy    Goal Priority Disciplines Outcome Interventions   Physical Therapy Goal     PT, PT/OT Progressing    Description: Short Term Goals to be met by: 5/13/25    Patient will increase functional independence with mobility by  performin. Supine to sit with Moderate Assist  2. Sit to stand transfer with Maximal Assist using Rolling walker  3. Bed to chair transfer with Maximal Assist using lowest level of assistive device  4. Rolling side to side with Min A  5. Lower extremity exercise program x30 reps per handout, with assistance as needed    Long Term Goals to be met by: 25    Pt will regain full independent functional mobility with lowest level of assistive device to return to home situation and prior activities of daily living.                        DME Justifications:  To be determined     History:     Past Medical History:   Diagnosis Date    Chronic kidney disease, stage 3b     Chronic pulmonary embolism without acute cor pulmonale     Diabetes mellitus type 2 in obese     DNR (do not resuscitate)     caretaker of 20 years present and says this was always her wish and had stated she did not wish to be resuscitated if she had cardiac arrest    Erosive gastritis     Essential hypertension 2021    Gastroparesis     Generalized anxiety disorder 2021    Lung cancer metastatic to brain     Malignant neoplasm of right lung 02/15/2023    Dr. Swanson    Melanocytic nevi of lower limb, including hip, left     Mixed hyperlipidemia     Moderate persistent asthma without complication 10/30/2024    Other pulmonary embolism without acute cor pulmonale     Vitamin D deficiency        Past Surgical History:   Procedure Laterality Date    CHOLECYSTECTOMY      CSF SHUNT      HYSTERECTOMY      TONSILLECTOMY         Time Tracking:     PT Received On: 25  PT Start Time: 915  PT Stop Time: 946  PT Total Time (min): 31 min     Billable Minutes: Evaluation moderate complexity 15 and Therapeutic Activity 15    2025

## 2025-04-29 NOTE — ASSESSMENT & PLAN NOTE
"Patient's FSGs are controlled on current medication regimen.  Last A1c reviewed-   Lab Results   Component Value Date    HGBA1C 6.7 04/02/2025     Most recent fingerstick glucose reviewed- No results for input(s): "POCTGLUCOSE" in the last 24 hours.  Current correctional scale  Low  Maintain anti-hyperglycemic dose as follows-   Antihyperglycemics (From admission, onward)      Start     Stop Route Frequency Ordered    04/28/25 1645  insulin glargine U-100 (Lantus) injection 20 Units         -- SubQ 2 times daily 04/28/25 1636    04/28/25 0000  insulin aspart U-100 injection 0-5 Units         -- SubQ 4 times per day 04/27/25 2352          Hold Oral hypoglycemics while patient is in the hospital.  BG ranging up as tube feeds now to goal, added Lantus and dose adjusted for tighter glycemic control.     "

## 2025-04-29 NOTE — NURSING
0740  Patient awake and alert in bed. Resp even and unlabored. Call bell in reach. Safety measures intact. Left picc intact. Patient has amiodarone infusing at the time. Dobhoff to the left nare. Patient gets nocturnal feeding from 6p-6a. Purewick in place. Will continue to monitor.    0830  Amiodarone infusion discontinued.     1230  Patients bed was changed due to malfunction of current bed.

## 2025-04-30 ENCOUNTER — HOSPITAL ENCOUNTER (OUTPATIENT)
Dept: RADIOLOGY | Facility: HOSPITAL | Age: 70
Discharge: HOME | End: 2025-04-30
Attending: INTERNAL MEDICINE
Payer: MEDICARE

## 2025-04-30 PROCEDURE — 74176 CT ABD & PELVIS W/O CONTRAST: CPT | Mod: TC

## 2025-04-30 PROCEDURE — 71250 CT THORAX DX C-: CPT | Mod: 26,,, | Performed by: RADIOLOGY

## 2025-04-30 PROCEDURE — 74176 CT ABD & PELVIS W/O CONTRAST: CPT | Mod: 26,,, | Performed by: RADIOLOGY

## 2025-04-30 RX ORDER — ALLOPURINOL 300 MG/1
300 TABLET ORAL DAILY
Status: DISCONTINUED | OUTPATIENT
Start: 2025-04-30 | End: 2025-04-30

## 2025-04-30 NOTE — PLAN OF CARE
Problem: Adult Inpatient Plan of Care  Goal: Plan of Care Review  Outcome: Progressing  Goal: Patient-Specific Goal (Individualized)  Outcome: Progressing  Goal: Absence of Hospital-Acquired Illness or Injury  Outcome: Progressing  Goal: Optimal Comfort and Wellbeing  Outcome: Progressing     Problem: Adult Inpatient Plan of Care  Goal: Plan of Care Review  Outcome: Progressing     Problem: Adult Inpatient Plan of Care  Goal: Patient-Specific Goal (Individualized)  Outcome: Progressing     Problem: Adult Inpatient Plan of Care  Goal: Absence of Hospital-Acquired Illness or Injury  Outcome: Progressing     Problem: Diabetes Comorbidity  Goal: Blood Glucose Level Within Targeted Range  Outcome: Progressing     Problem: Diabetes Comorbidity  Goal: Blood Glucose Level Within Targeted Range  Outcome: Progressing     Problem: Acute Kidney Injury/Impairment  Goal: Fluid and Electrolyte Balance  Outcome: Progressing  Goal: Improved Oral Intake  Outcome: Progressing  Goal: Effective Renal Function  Outcome: Progressing     Problem: Acute Kidney Injury/Impairment  Goal: Fluid and Electrolyte Balance  Outcome: Progressing     Problem: Acute Kidney Injury/Impairment  Goal: Improved Oral Intake  Outcome: Progressing     Problem: Acute Kidney Injury/Impairment  Goal: Effective Renal Function  Outcome: Progressing     Problem: Bariatric Environmental Safety  Goal: Safety Maintained with Care  Outcome: Progressing     Problem: Pneumonia  Goal: Fluid Balance  Outcome: Progressing  Goal: Resolution of Infection Signs and Symptoms  Outcome: Progressing  Goal: Effective Oxygenation and Ventilation  Outcome: Progressing     Problem: Pneumonia  Goal: Fluid Balance  Outcome: Progressing     Problem: Pneumonia  Goal: Resolution of Infection Signs and Symptoms  Outcome: Progressing     Problem: Pneumonia  Goal: Effective Oxygenation and Ventilation  Outcome: Progressing

## 2025-04-30 NOTE — ASSESSMENT & PLAN NOTE
"EGD by Dr. Lo during recent past admission at [facility]:  "EGD on 03/21/2025 shows LA class B erosive esophagitis but no pill esophagitis, nonerosive gastritis and retention of food and fluid suspicious for gastroparesis, due to narcotics and diabetes. "  GI consulted, unable to advance diet and poor candidate for PEG.  Dobhoff in place for feeds, continued on Reglan.   Will have SLP re evaluate and see if pureed/dysphagia soft could be tried.   Continue PPI.     "

## 2025-04-30 NOTE — ASSESSMENT & PLAN NOTE
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 Most recent hemoglobin and hematocrit are listed below.  Recent Labs     04/28/25  0317 04/29/25  0220 04/30/25  0250   HGB 8.3* 8.1* 8.3*   HCT 26.3* 25.8* 25.9*     Plan  - Monitor serial CBC: Daily  - Transfuse PRBC if patient becomes hemodynamically unstable, symptomatic or H/H drops below 7/21.  - Patient has not received any PRBC transfusions to date  - Patient's anemia is currently stable  - No evidence of bleeding.

## 2025-04-30 NOTE — PROGRESS NOTES
[facility]  Progress Note    Patient Name: Magan Saleem  MRN: [id number]  Patient Class: IP- Inpatient   Admission Date: 4/26/2025  Length of Stay: 4 days  Attending Physician: Carlos Alberto Nicole MD  Primary Care Provider: Sil Brown DO        Subjective     Principal Problem:Pneumonitis        HPI:  70 yo F presents to [facility] ED from [facility] for abnormal labs.  Patient was discharged from [facility] two days ago to skilled nursing facility for rehab.  She was diagnosed with dehydration due to gastroparesis with UTI.  Patient has metastatic lung cancer (to the brain) and follows with Dr. Swanson for IV chemotherapy every other week and takes lazertinib daily.  She has completed her course of radiation for the brain mets and follow had showed some improvement.  I thought she had either some decreased LOC due to encephalopathy or some aphasia from the brain mets but after a great effort to get her to talk, I realized she was just mad.  She wanted to know why she had been discharged two days ago when she could not eat.  She has no appetite and early satiety.  She is not nauseated and no vomiting.  She has decided on a DNR status previously (her caretaker and friend of 20 years) states that she had always said she did not want resuscitation if she experienced cardiopulmonary arrest and had told her children her wishes but she does want treatment and is not opposed to J tube if needed.  She has not had anything to eat or drink in two days and is dehydrated again.       Patient was transferred because of concern of sepsis.  She did have enterococcus faecalis UTI at [facility] and was treated.  I do not have the culture results but cultures were sent and patient does have some yeast noted.  (Will not treat a yeast colonization).  She is afebrile and hemodynamically stable and does not look septic clinically.  Her Lactic acid is stable at 2.5 dara 3.2.  Will  check another in am after volume resuscitation.  Her creat is at baseline but she has prerenal azotemia further confirming the dehydration.  Patient has an IO in place from [facility] due to dehydration and difficult stick but with some volume resuscitation, we have gotten an IV.  She is covid and flu negative.       Her WBC is 29 and I am not sure if she has received neupogen but could be a stress reaction.  Her BS is 245 and she does have some urine ketones but most likely due to starvation ketosis.  Will check a serum ketone.  Her platelets are 88K but this is most likely from her chemo.  She is not anemic.  CXR shows some patchy infiltrate but no obvious obstructive pneumonia from her lung cancer.  Her BNP about 3K but no clinical signs of CHF.  No recent echo but due to her BMI 50 most likely has some PHTN.  EKG had no ischemic changes but tachy at 114 which could also be from dehydration.  She was on ozempic for her DM and this could be the cause of her decreased appetite.  She is also on decadron for prevention of cerebral edema.       Remainder of ROS as below.  She mostly just nods and shakes her head and rolls her eyes.  You can get her to laugh if you try but she has been depressed since she has not walked since her hospitalization at [facility] on 3/19/25 and was discharged two days ago.  She says that at her last chemo she noticed she was getting weaker and her daughter had to help her in and out of the car and that was new for her.      4/5- patient seen examined today resting comfortably in bed and in no acute distress, with no acute events overnight.  Patient has a multitude of issues complicating her medical picture.  White blood cell count 75073 today, sodium 159, potassium 3.2 and BUN creatinine 59 and 1.8.  The patient is still not eating even when assistance is provided.  We have already changed her fluids to half-normal saline with 20 of KCl at 1:25 a.m..  Have also put in a GI consult for possible feeding  tube.  E coli in the urine has turned out to be ESBL and Rocephin has been changed Merrem.     Hospital Course at [facility]:    4/6- the patient seen examined today resting comfortably in bed, in no acute distress, in no acute events overnight.  The patient is not very talkative today, but states she is doing okay.  She has no acute complaints.  Blood cultures remain negative.  The patient has not eaten since yesterday and is on light IV fluids.  GI has been consulted for feeding tube.  Continues on Merrem for positive urine culture.  04/07 Records reviewed. Not eating which not new, Similar during recent admit at [facility]. Dr Swanson there had question pancreatitis. No abd pain or tenderness reported now. Question of dysphagia to solids. Chart hx gastroparesis. Will discuss with GI. Ronnie says has responded so far well to treatment for lung CA.   04/08 had EGD at [facility] 03/21/25 with no obstruction and evidence gastroparesis. Still not ending. Try additional meds. Talked with GI. Increase activity  04/09 Some better with med  changes  04/10 Continues to do better. Ate 1/2 fish sandwich for lunch. Called by Dr Swanson and she wanting to keep feeding tube in consideration. Talked with Dr Reich and Dr Lemus. If something needed with gastroparesis would have to have post gastric position of tube.   04/11 eating some. Later staff requested some nystatin for sore mouth.   04/12 still not eating well.  Increased peripheral edema.  Albumin low at 1.5.  Will give some albumin and follow with some Lasix.  Placed Dobbhoff tube and start enteral feedings.  This will help with nutrition and if issue of placing surgical tube later make sure will tolerate enteral feedings.  Chest x-ray continues worse on left side.  Discuss with Pulmonary and ask Dr. Villasenor to see.   04/13 no new issues.  Enteral feedings to be started.  Pulmonary evaluation appreciated and plans noted.  04/14 Tolerating feedings Therapy working with her.  Bronchoscopy planned.   4/15 BAL today  4/16 bronch yesterday looks like pneumonitis  4/17 not candidate for PEG  04/18 Eating some now. Pt says feels some better than last seen. Look at placement. High dose steroids by pulmonary. BS not as bad as would expect.  04/19 states continued eat some.  Less nausea.  Blood sugar not sure bad considering the steroids.  Pulmonary adjusted antibiotics based on cultures.  Look at it placement next week.   04/20 Looks the small. C/O SOB to nursing staff earlier but ABG OK. Poor oral intake ; encourage for pt to do more. Looking into placement.  04/21 Firm tender area in abd wall above umbilicus; ?hernia. Abnormal CT de santiago noted and will discuss with surgery.   04/22 CT shows evolving pancreatitis which pt treated for acouple weeks earlier at [facility]. Reviewed with Dr Tapia. No plan to operate on ventral hernia. Discussed later with Dr York. See how does off IVF and encourage oral intake. WBC and Cr both slight better.   04/23 Looks this same. Pt eating some. Pt getting discouraged and asking about home hospice. Talked by phone with her friend Shauna. In conversion doesn't sound like family would be able to care for pt at home. Encouraged pt to give some time and attempt SWB. She says she with consider tonight.   04/24 Lab worse but pt looks some better. Still not taking in well. Maybe eat better as pancreatitis further resolves. Considering replacing dobbhoff feeding tube and look into move to [facility]. Ask Dr Frias to review renal situation. Maybe pancreatitis,pneumonia,renal failure, all these might make a big difference if resolved. Talked with pt and she was OK with NGT  04/25 patient's spirits seems to be doing some better.  Dobbhoff tube placed in feedings to be started.  To be moved to LTAC.  No other new issue  04/26 Awaiting bed assignment at [facility]. No new complains.     Overview/Hospital Course:  4/28- BG trending up, added Lantus and adjusted dose. D/W nutritionist about  changing feeds to allow for more PO intake.     4/29- BG better now. Off IV amiodarone infusion, remains in NSR. Encourage PO intake.     4/30- Continue to adjust insulin to get BG under 200, ideally 140-180. Checking CT chest, abdomen and pelvis today given persistent leukocytosis.     Interval History: Patient seen and examined at the bedside, reports no new symptoms or complains. Encouraged PO intake.     Review of Systems   All other systems reviewed and are negative.    Objective:     Vital Signs (Most Recent):  Temp: 96.4 °F (35.8 °C) (04/30/25 1145)  Pulse: 99 (04/30/25 1145)  Resp: (!) 23 (04/30/25 1145)  BP: (!) 94/43 (04/30/25 1100)  SpO2: 100 % (04/30/25 1145) Vital Signs (24h Range):  Temp:  [96.4 °F (35.8 °C)-97.5 °F (36.4 °C)] 96.4 °F (35.8 °C)  Pulse:  [] 99  Resp:  [11-26] 23  SpO2:  [98 %-100 %] 100 %  BP: ()/(43-62) 94/43     Weight: 118.1 kg (260 lb 5.8 oz)  Body mass index is 43.33 kg/m².    Intake/Output Summary (Last 24 hours) at 4/30/2025 1311  Last data filed at 4/30/2025 0504  Gross per 24 hour   Intake 405 ml   Output 270 ml   Net 135 ml         Physical Exam  Constitutional:       Appearance: She is obese. She is ill-appearing.   HENT:      Head: Normocephalic.      Mouth/Throat:      Mouth: Mucous membranes are dry.   Cardiovascular:      Rate and Rhythm: Normal rate.      Heart sounds: Normal heart sounds. No murmur heard.  Pulmonary:      Effort: No respiratory distress.      Breath sounds: Rhonchi present.      Comments: On 2L NC  Abdominal:      General: There is no distension.      Tenderness: There is no abdominal tenderness.      Comments: Dobhoff in place   Skin:     Coloration: Skin is pale.   Neurological:      Mental Status: Mental status is at baseline. She is disoriented.               Significant Labs: All pertinent labs within the past 24 hours have been reviewed.    Significant Imaging: I have reviewed all pertinent imaging results/findings within the past 24  hours.      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  Pulmonology consulted, started on steroids and s/p bronchoscopy with normal findings, BAL culture with stenotrophomonas.   Steroid taper plan per Pulmonology, on antibiotics.   Respiratory status is stable.     Infection due to Stenotrophomonas maltophilia  Sepsis due to pneumonia  Stenotrophomonas lower respiratory infection in this patient with multifocal infiltrates and consolidative opacities on CT Chest.   Complete total 2 week course with Levaquin.   Leukocytosis persists, will follow procalcitonin levels Q48H.  Checking CT chest, abdomen and pelvis for further evaluation.     Acute kidney injury superimposed on stage 4 chronic kidney disease  JOHNATHON is likely due to pre-renal azotemia due to intravascular volume depletion. Baseline creatinine is  ~ 2.0 . Most recent creatinine and eGFR are listed below.  Recent Labs     04/28/25  0317 04/29/25  0220 04/30/25  0250   CREATININE 4.03* 3.89* 4.44*   EGFRNORACEVR 12* 12* 10*      Plan  - JOHNATHON is worsening now.   - Avoid nephrotoxins and renally dose meds for GFR listed above  - Monitor urine output, serial BMP, and adjust therapy as needed  - s/p IV fluids.   - Nephrology consulted, will follow recommendations.     SVT (supraventricular tachycardia)  Went into SVT on 4/27, not responsive to multiple rounds of adenosine.  Started on amiodarone infusion after bolus, turned off on 4/29.  Cardiology consulted, recommend to continue amiodarone for now but long term it is not a good option.  Monitor on tele.     Acute pancreatitis  CT abdomen/pelvis showed- Possible small fluid collection anterior to the pancreas measuring approximately 3.6 cm on axial 41. Probable complex collection slightly inferior to the pancreatic head extending to the right pelvis and right iliac fossa.     04/21 Abd fluid collection in area pancreas noted on CT abd, discussed  "with surgery  04/22 likely evolving pancreatitis dx a couple weeks ago at [facility], now with pseudocyst.  No severe epigastric pain.  04/26 Ongoing tube feeds and full liquid diet. If tolerates full liquid better, plan to advanced to soft foods.   04/30 Repeat CT abdomen ordered.     Gastroparesis  EGD by Dr. Lo during recent past admission at [facility]:  "EGD on 03/21/2025 shows LA class B erosive esophagitis but no pill esophagitis, nonerosive gastritis and retention of food and fluid suspicious for gastroparesis, due to narcotics and diabetes. "  GI consulted, unable to advance diet and poor candidate for PEG.  Dobhoff in place for feeds, continued on Reglan.   Will have SLP re evaluate and see if pureed/dysphagia soft could be tried.   Continue PPI.     Nonischemic nontraumatic myocardial injury  In the setting of SVT episodes.  Trend is flat, no chest pain and no ischemic changes noted on EKG.  No ischemic workup recommended per cardiology.     Lung cancer metastatic to brain  DNR but not hospice.   Receives chemo and has finished radiation.    Follows with Dr. Swanson.    Hyperuricemia  Hyperphosphatemia  Defer management to nephrology.     Essential hypertension  Patient's blood pressure range in the last 24 hours was: BP  Min: 85/58  Max: 108/62.The patient's inpatient anti-hypertensive regimen is listed below:  Current Antihypertensives  metoprolol succinate (TOPROL-XL) 24 hr split tablet 25 mg, Daily, Oral    Plan  - BP is controlled, no changes needed to their regimen    Type 2 diabetes mellitus with hyperglycemia  Patient's FSGs are controlled on current medication regimen.  Last A1c reviewed-   Lab Results   Component Value Date    HGBA1C 6.7 04/02/2025     Most recent fingerstick glucose reviewed- No results for input(s): "POCTGLUCOSE" in the last 24 hours.  Current correctional scale  Low  Maintain anti-hyperglycemic dose as follows-   Antihyperglycemics (From admission, onward)      Start     " Stop Route Frequency Ordered    04/30/25 0900  insulin glargine U-100 (Lantus) injection 25 Units         -- SubQ 2 times daily 04/30/25 0829    04/28/25 0000  insulin aspart U-100 injection 0-5 Units         -- SubQ 4 times per day 04/27/25 2352          Hold Oral hypoglycemics while patient is in the hospital.  BG ranging up as tube feeds now to goal, added Lantus and dose adjusted for tighter glycemic control.     Normocytic anemia  Anemia is likely due to chronic disease due to Malignancy. Most recent hemoglobin and hematocrit are listed below.  Recent Labs     04/28/25  0317 04/29/25  0220 04/30/25  0250   HGB 8.3* 8.1* 8.3*   HCT 26.3* 25.8* 25.9*     Plan  - Monitor serial CBC: Daily  - Transfuse PRBC if patient becomes hemodynamically unstable, symptomatic or H/H drops below 7/21.  - Patient has not received any PRBC transfusions to date  - Patient's anemia is currently stable  - No evidence of bleeding.    Neoplastic (malignant) related fatigue  Patient with Acute on chronic debility due to neoplastic/malignant related fatigue. Latest AMPAC and GEMS scores have been reviewed. Evaluation for etiology is complete. Plan includes - Progressive mobility protocol initated  - PT/OT consulted  - Fall precautions in place.    Chronic pulmonary embolism without acute cor pulmonale  Eliquis was discontinued due to risk of ICH with brain mets.    Morbid obesity  Body mass index is 43.33 kg/m². Morbid obesity complicates all aspects of disease management from diagnostic modalities to treatment. Weight loss encouraged and health benefits explained to patient.     Moderate persistent asthma without complication  Stable without exacerbation.  Continue PRN nebs.     Edema due to hypoalbuminemia  Required albumin infusion intermittently during the stay.     Mixed hyperlipidemia  Resume statin.     VTE Risk Mitigation (From admission, onward)           Ordered     heparin (porcine) injection 5,000 Units  Every 8 hours          04/27/25 1055                    Discharge Planning   MITUL: 5/9/2025     Code Status: Full Code   Medical Readiness for Discharge Date:   Discharge Plan A: Skilled Nursing Facility                Please place Justification for DME        RAYMOND ZHAO MD  Department of Hospital Medicine   [facility]

## 2025-04-30 NOTE — ASSESSMENT & PLAN NOTE
JOHNATHON is likely due to pre-renal azotemia due to intravascular volume depletion. Baseline creatinine is ~ 2.0. Most recent creatinine and eGFR are listed below.  Recent Labs     04/28/25  0317 04/29/25  0220 04/30/25  0250   CREATININE 4.03* 3.89* 4.44*   EGFRNORACEVR 12* 12* 10*      Plan  - JOHNATHON is worsening now.   - Avoid nephrotoxins and renally dose meds for GFR listed above  - Monitor urine output, serial BMP, and adjust therapy as needed  - s/p IV fluids.   - Nephrology consulted, will follow recommendations.

## 2025-04-30 NOTE — PT/OT/SLP PROGRESS
Physical Therapy Treatment    Patient Name:  Magan Saleem   MRN:  [id number]    Recommendations:     Discharge Recommendations: Moderate Intensity Therapy  Discharge Equipment Recommendations: to be determined by next level of care  Barriers to discharge:  ongoing medical care    Assessment:     Magan Saleem is a 69 y.o. female admitted with a medical diagnosis of Pneumonitis.  She presents with the following impairments/functional limitations: weakness, impaired endurance, impaired functional mobility, gait instability, impaired balance, decreased lower extremity function, decreased upper extremity function, decreased coordination, edema, impaired cardiopulmonary response to activity. Pt toerated sitting upright in chair for approx 3 hours today.     Rehab Prognosis: Good and Fair; patient would benefit from acute skilled PT services to address these deficits and reach maximum level of function.    Recent Surgery: * No surgery found *      Plan:     During this hospitalization, patient to be seen 5 x/week to address the identified rehab impairments via gait training, therapeutic activities, therapeutic exercises, neuromuscular re-education and progress toward the following goals:    Plan of Care Expires:  05/29/25    Subjective     Chief Complaint: Pneumonitis   Patient/Family Comments/goals: agreeable  Pain/Comfort:  Pain Rating 1: 0/10      Objective:     Communicated with FABIAN Greer RN prior to session.  Patient found HOB elevated with NG tube, oxygen, pulse ox (continuous), telemetry, PureWick upon PT entry to room.     General Precautions: Standard, fall, contact  Orthopedic Precautions: N/A  Braces: N/A  Respiratory Status: Nasal cannula, flow 2 L/min     Functional Mobility:  Bed Mobility:     Rolling Left:  maximal assistance  Rolling Right: maximal assistance  Scooting: maximal assistance and of 2 persons  Balance: unsupported dynamic sitting with CGA to SBA      AM-PAC 6 CLICK MOBILITY           Treatment & Education:  Mobility as stated above. Pt participated in limited BLE ex during supine and sitting, but not consistently this session. Pt req freq cues and redirection to reduce anxious behavior and emotions. Pt tolerated well.     Patient left up in chair with all lines intact, call button in reach, and RN present..    GOALS:   Multidisciplinary Problems       Physical Therapy Goals          Problem: Physical Therapy    Goal Priority Disciplines Outcome Interventions   Physical Therapy Goal     PT, PT/OT Progressing    Description: Short Term Goals to be met by: 25    Patient will increase functional independence with mobility by performin. Supine to sit with Moderate Assist  2. Sit to stand transfer with Maximal Assist using Rolling walker  3. Bed to chair transfer with Maximal Assist using lowest level of assistive device  4. Rolling side to side with Min A  5. Lower extremity exercise program x30 reps per handout, with assistance as needed    Long Term Goals to be met by: 25    Pt will regain full independent functional mobility with lowest level of assistive device to return to home situation and prior activities of daily living.                        DME Justifications:  To be determined.     Time Tracking:     PT Received On: 25  PT Start Time: 1115     PT Stop Time: 1147  PT Total Time (min): 32 min     Billable Minutes: Therapeutic Activity 25    Treatment Type: Evaluation  PT/PTA: PT           2025

## 2025-04-30 NOTE — ASSESSMENT & PLAN NOTE
Body mass index is 43.33 kg/m². Morbid obesity complicates all aspects of disease management from diagnostic modalities to treatment. Weight loss encouraged and health benefits explained to patient.

## 2025-04-30 NOTE — ASSESSMENT & PLAN NOTE
"Patient's FSGs are controlled on current medication regimen.  Last A1c reviewed-   Lab Results   Component Value Date    HGBA1C 6.7 04/02/2025     Most recent fingerstick glucose reviewed- No results for input(s): "POCTGLUCOSE" in the last 24 hours.  Current correctional scale  Low  Maintain anti-hyperglycemic dose as follows-   Antihyperglycemics (From admission, onward)      Start     Stop Route Frequency Ordered    04/30/25 0900  insulin glargine U-100 (Lantus) injection 25 Units         -- SubQ 2 times daily 04/30/25 0829    04/28/25 0000  insulin aspart U-100 injection 0-5 Units         -- SubQ 4 times per day 04/27/25 2352          Hold Oral hypoglycemics while patient is in the hospital.  BG ranging up as tube feeds now to goal, added Lantus and dose adjusted for tighter glycemic control.     "

## 2025-04-30 NOTE — ASSESSMENT & PLAN NOTE
Stenotrophomonas lower respiratory infection in this patient with multifocal infiltrates and consolidative opacities on CT Chest.   Complete total 2 week course with Levaquin.   Leukocytosis persists, will follow procalcitonin levels Q48H.  Checking CT chest, abdomen and pelvis for further evaluation.

## 2025-04-30 NOTE — ASSESSMENT & PLAN NOTE
CT abdomen/pelvis showed- Possible small fluid collection anterior to the pancreas measuring approximately 3.6 cm on axial 41. Probable complex collection slightly inferior to the pancreatic head extending to the right pelvis and right iliac fossa.     04/21 Abd fluid collection in area pancreas noted on CT abd, discussed with surgery  04/22 likely evolving pancreatitis dx a couple weeks ago at [facility], now with pseudocyst.  No severe epigastric pain.  04/26 Ongoing tube feeds and full liquid diet. If tolerates full liquid better, plan to advanced to soft foods.   04/30 Repeat CT abdomen ordered.

## 2025-04-30 NOTE — PROGRESS NOTES
[facility]  Nephrology  Progress Note    Patient Name: Magan Saleem  MRN: [id number]  Admission Date: 4/26/2025  Hospital Length of Stay: 3 days  Attending Provider: Carlos Alberto Nicole MD   Primary Care Physician: Sil Brown DO  Principal Problem:Pneumonitis    Consults  Subjective:     Interval History: The patient has no complaints today    Review of patient's allergies indicates:   Allergen Reactions    Penicillins Hives    Sulfa (sulfonamide antibiotics) Hives     Current Facility-Administered Medications   Medication Frequency    albuterol-ipratropium 2.5 mg-0.5 mg/3 mL nebulizer solution 3 mL Q4H PRN    atorvastatin tablet 10 mg Daily    dextrose 50% injection 12.5 g PRN    dextrose 50% injection 25 g PRN    glucagon (human recombinant) injection 1 mg PRN    glucose chewable tablet 16 g PRN    glucose chewable tablet 24 g PRN    heparin (porcine) injection 5,000 Units Q8H    insulin aspart U-100 injection 0-5 Units 4 times per day    insulin glargine U-100 (Lantus) injection 20 Units BID    Lactobacillus acidophilus capsule 2 capsule TID WM    levoFLOXacin tablet 500 mg Q48H    metoclopramide HCl tablet 5 mg QID    metoprolol succinate (TOPROL-XL) 24 hr split tablet 25 mg Daily    montelukast tablet 10 mg QHS    mupirocin 2 % ointment BID    pantoprazole EC tablet 40 mg Daily    predniSONE tablet 40 mg Daily    Followed by    [START ON 5/4/2025] predniSONE tablet 20 mg Daily    Followed by    [START ON 5/18/2025] predniSONE tablet 10 mg Daily    rOPINIRole tablet 0.25 mg TID    sertraline tablet 25 mg Daily    sodium bicarbonate tablet 1,300 mg TID       Objective:     Vital Signs (Most Recent):  Temp: 97.2 °F (36.2 °C) (04/29/25 1900)  Pulse: 84 (04/29/25 1900)  Resp: 18 (04/29/25 1900)  BP: (!) 93/51 (04/29/25 1900)  SpO2: 100 % (04/29/25 1900) Vital Signs (24h Range):  Temp:  [97.2 °F (36.2 °C)-97.9 °F (36.6 °C)] 97.2 °F (36.2 °C)  Pulse:  [75-99] 84  Resp:  [11-23]  18  SpO2:  [99 %-100 %] 100 %  BP: ()/(47-71) 93/51     Weight: 121.7 kg (268 lb 4.8 oz) (04/29/25 0448)  Body mass index is 44.65 kg/m².  Body surface area is 2.36 meters squared.    I/O last 3 completed shifts:  In: 2025.7 [I.V.:935.7; NG/GT:1090]  Out: -     Physical Exam  Vitals reviewed.   Cardiovascular:      Heart sounds: No murmur heard.     No friction rub. No gallop.   Pulmonary:      Effort: No respiratory distress.      Breath sounds: No wheezing or rales.   Abdominal:      General: Abdomen is protuberant. Bowel sounds are normal. There is no distension.      Palpations: Abdomen is soft.      Tenderness: There is no abdominal tenderness. There is no guarding.   Neurological:      Mental Status: She is alert.         Significant Labs:sureCBC:   Recent Labs   Lab 04/29/25 0220   WBC 34.48*   RBC 2.76*   HGB 8.1*   HCT 25.8*      MCV 93.5   MCH 29.3   MCHC 31.4*     CMP:   Recent Labs   Lab 04/26/25 0442 04/26/25 0443 04/29/25 0220   GLU  --    < > 415*   CALCIUM  --    < > 8.0*   PROT 3.9*  --   --    NA  --    < > 140   K  --    < > 4.1   CO2  --    < > 15*   CL  --    < > 109*   BUN  --    < > 123*   CREATININE  --    < > 3.89*    < > = values in this interval not displayed.     All labs within the past 24 hours have been reviewed.    Significant Imaging:      Assessment/Plan:     Active Diagnoses:    Diagnosis Date Noted POA    PRINCIPAL PROBLEM:  Pneumonitis [J98.4] 04/16/2025 Yes    SVT (supraventricular tachycardia) [I47.10] 04/27/2025 No    Nonischemic nontraumatic myocardial injury [I5A] 04/27/2025 No    Hyperphosphatemia [E83.39] 04/26/2025 Yes    Hyperuricemia [E79.0] 04/26/2025 Yes    Infection due to Stenotrophomonas maltophilia [A49.8] 04/22/2025 Yes    Acute pancreatitis [K85.90] 04/21/2025 Yes    Neoplastic (malignant) related fatigue [R53.0] 04/15/2025 Yes    Normocytic anemia [D64.9] 04/15/2025 Yes    Edema due to hypoalbuminemia [E88.09] 04/12/2025 Yes    Type 2 diabetes  mellitus with hyperglycemia [E11.65] 04/04/2025 Yes    Sepsis due to pneumonia [J18.9, A41.9] 04/04/2025 Yes    Gastroparesis [K31.84]  Yes    Lung cancer metastatic to brain [C34.90, C79.31]  Yes    Morbid obesity [E66.01] 04/03/2025 Yes    Acute kidney injury superimposed on stage 4 chronic kidney disease [N17.9, N18.4]  Yes    Chronic pulmonary embolism without acute cor pulmonale [I27.82]  Yes    Moderate persistent asthma without complication [J45.40] 10/30/2024 Yes    Essential hypertension [I10] 06/30/2021 Yes    Mixed hyperlipidemia [E78.2] 06/30/2021 Yes      Problems Resolved During this Admission:       IMP:  The BUN and creatinine are trending downward     Plan:  Continue to monitor the renal function    Thank you for your consult. I will follow-up with patient. Please contact us if you have any additional questions.    Mikey Velásquez MD  Nephrology  [facility]

## 2025-04-30 NOTE — ASSESSMENT & PLAN NOTE
Patient's blood pressure range in the last 24 hours was: BP  Min: 85/58  Max: 108/62.The patient's inpatient anti-hypertensive regimen is listed below:  Current Antihypertensives  metoprolol succinate (TOPROL-XL) 24 hr split tablet 25 mg, Daily, Oral    Plan  - BP is controlled, no changes needed to their regimen

## 2025-04-30 NOTE — PROGRESS NOTES
[facility]  Nephrology  Progress Note    Patient Name: Magan Saleem  MRN: [id number]  Admission Date: 4/26/2025  Hospital Length of Stay: 4 days  Attending Provider: Carlos Alberto Nicole MD   Primary Care Physician: Sil Brown DO  Principal Problem:Pneumonitis    Consults  Subjective:     Interval History: The patient reports having a history of  gout in the past.    Review of patient's allergies indicates:   Allergen Reactions    Penicillins Hives    Sulfa (sulfonamide antibiotics) Hives     Current Facility-Administered Medications   Medication Frequency    acetaminophen tablet 650 mg Q4H PRN    albuterol-ipratropium 2.5 mg-0.5 mg/3 mL nebulizer solution 3 mL Q4H PRN    atorvastatin tablet 10 mg Daily    dextrose 50% injection 12.5 g PRN    dextrose 50% injection 25 g PRN    glucagon (human recombinant) injection 1 mg PRN    glucose chewable tablet 16 g PRN    glucose chewable tablet 24 g PRN    heparin (porcine) injection 5,000 Units Q8H    insulin aspart U-100 injection 0-5 Units 4 times per day    insulin glargine U-100 (Lantus) injection 25 Units BID    Lactobacillus acidophilus capsule 2 capsule TID WM    levoFLOXacin tablet 500 mg Q48H    metoclopramide HCl tablet 5 mg QID    metoprolol succinate (TOPROL-XL) 24 hr split tablet 25 mg Daily    montelukast tablet 10 mg QHS    mupirocin 2 % ointment BID    pantoprazole EC tablet 40 mg Daily    predniSONE tablet 40 mg Daily    Followed by    [START ON 5/4/2025] predniSONE tablet 20 mg Daily    Followed by    [START ON 5/18/2025] predniSONE tablet 10 mg Daily    rOPINIRole tablet 0.25 mg TID    sertraline tablet 25 mg Daily    sodium bicarbonate tablet 1,300 mg TID     Facility-Administered Medications Ordered in Other Encounters   Medication Frequency    allopurinoL tablet 300 mg Daily       Objective:     Vital Signs (Most Recent):  Temp: 97.2 °F (36.2 °C) (04/30/25 1600)  Pulse: 90 (04/30/25 1600)  Resp: 13 (04/30/25  1600)  BP: (!) 92/44 (25 1600)  SpO2: 100 % (25 1600) Vital Signs (24h Range):  Temp:  [96.4 °F (35.8 °C)-97.5 °F (36.4 °C)] 97.2 °F (36.2 °C)  Pulse:  [] 90  Resp:  [11-26] 13  SpO2:  [98 %-100 %] 100 %  BP: ()/(43-62) 92/44     Weight: 118.1 kg (260 lb 5.8 oz) (25 0504)  Body mass index is 43.33 kg/m².  Body surface area is 2.33 meters squared.    I/O last 3 completed shifts:  In: 2430.7 [I.V.:935.7; NG/GT:1495]  Out: 270 [Drains:270]    Physical Exam  Constitutional:       Appearance: She is obese. She is ill-appearing.   Cardiovascular:      Heart sounds: No murmur heard.     No friction rub. No gallop.   Pulmonary:      Effort: Pulmonary effort is normal.      Breath sounds: Normal breath sounds. No wheezing, rhonchi or rales.   Abdominal:      General: Abdomen is protuberant. Bowel sounds are normal.      Palpations: Abdomen is soft.      Tenderness: There is no abdominal tenderness.   Musculoskeletal:      Right lower le+ Pitting Edema present.      Left lower le+ Pitting Edema present.         Significant Labs:sureCBC:   Recent Labs   Lab 25  0250   WBC 36.55*   RBC 2.86*   HGB 8.3*   HCT 25.9*      MCV 90.6   MCH 29.0   MCHC 32.0     CMP:   Recent Labs   Lab 25  0442 25  0443 25  0250   GLU  --    < > 241*   CALCIUM  --    < > 7.9*   PROT 3.9*  --   --    NA  --    < > 143   K  --    < > 4.4   CO2  --    < > 17*   CL  --    < > 111*   BUN  --    < > >125*   CREATININE  --    < > 4.44*    < > = values in this interval not displayed.     All labs within the past 24 hours have been reviewed.    Significant Imaging:      Assessment/Plan:     Active Diagnoses:    Diagnosis Date Noted POA    PRINCIPAL PROBLEM:  Pneumonitis [J98.4] 2025 Yes    SVT (supraventricular tachycardia) [I47.10] 2025 No    Nonischemic nontraumatic myocardial injury [I5A] 2025 No    Hyperphosphatemia [E83.39] 2025 Yes    Hyperuricemia [E79.0]  04/26/2025 Yes    Infection due to Stenotrophomonas maltophilia [A49.8] 04/22/2025 Yes    Acute pancreatitis [K85.90] 04/21/2025 Yes    Neoplastic (malignant) related fatigue [R53.0] 04/15/2025 Yes    Normocytic anemia [D64.9] 04/15/2025 Yes    Edema due to hypoalbuminemia [E88.09] 04/12/2025 Yes    Type 2 diabetes mellitus with hyperglycemia [E11.65] 04/04/2025 Yes    Sepsis due to pneumonia [J18.9, A41.9] 04/04/2025 Yes    Gastroparesis [K31.84]  Yes    Lung cancer metastatic to brain [C34.90, C79.31]  Yes    Morbid obesity [E66.01] 04/03/2025 Yes    Acute kidney injury superimposed on stage 4 chronic kidney disease [N17.9, N18.4]  Yes    Chronic pulmonary embolism without acute cor pulmonale [I27.82]  Yes    Moderate persistent asthma without complication [J45.40] 10/30/2024 Yes    Essential hypertension [I10] 06/30/2021 Yes    Mixed hyperlipidemia [E78.2] 06/30/2021 Yes      Problems Resolved During this Admission:       IMP:  Patient with a history of gout with marked hyperuricemia    Plan:  Start allopurinol  Monitor the uric acid levels and PO4 LEVELS    Thank you for your consult. I will follow-up with patient. Please contact us if you have any additional questions.    Mikey Velásquez MD  Nephrology  [facility]

## 2025-04-30 NOTE — SUBJECTIVE & OBJECTIVE
Interval History: Patient seen and examined at the bedside, reports no new symptoms or complains. Encouraged PO intake.     Review of Systems   All other systems reviewed and are negative.    Objective:     Vital Signs (Most Recent):  Temp: 96.4 °F (35.8 °C) (04/30/25 1145)  Pulse: 99 (04/30/25 1145)  Resp: (!) 23 (04/30/25 1145)  BP: (!) 94/43 (04/30/25 1100)  SpO2: 100 % (04/30/25 1145) Vital Signs (24h Range):  Temp:  [96.4 °F (35.8 °C)-97.5 °F (36.4 °C)] 96.4 °F (35.8 °C)  Pulse:  [] 99  Resp:  [11-26] 23  SpO2:  [98 %-100 %] 100 %  BP: ()/(43-62) 94/43     Weight: 118.1 kg (260 lb 5.8 oz)  Body mass index is 43.33 kg/m².    Intake/Output Summary (Last 24 hours) at 4/30/2025 1311  Last data filed at 4/30/2025 0504  Gross per 24 hour   Intake 405 ml   Output 270 ml   Net 135 ml         Physical Exam  Constitutional:       Appearance: She is obese. She is ill-appearing.   HENT:      Head: Normocephalic.      Mouth/Throat:      Mouth: Mucous membranes are dry.   Cardiovascular:      Rate and Rhythm: Normal rate.      Heart sounds: Normal heart sounds. No murmur heard.  Pulmonary:      Effort: No respiratory distress.      Breath sounds: Rhonchi present.      Comments: On 2L NC  Abdominal:      General: There is no distension.      Tenderness: There is no abdominal tenderness.      Comments: Dobhoff in place   Skin:     Coloration: Skin is pale.   Neurological:      Mental Status: Mental status is at baseline. She is disoriented.               Significant Labs: All pertinent labs within the past 24 hours have been reviewed.    Significant Imaging: I have reviewed all pertinent imaging results/findings within the past 24 hours.

## 2025-04-30 NOTE — PLAN OF CARE
Problem: Adult Inpatient Plan of Care  Goal: Plan of Care Review  Outcome: Progressing  Goal: Patient-Specific Goal (Individualized)  Outcome: Progressing  Goal: Absence of Hospital-Acquired Illness or Injury  Outcome: Progressing  Goal: Optimal Comfort and Wellbeing  Outcome: Progressing  Goal: Readiness for Transition of Care  Outcome: Progressing     Problem: Diabetes Comorbidity  Goal: Blood Glucose Level Within Targeted Range  Outcome: Progressing     Problem: Acute Kidney Injury/Impairment  Goal: Fluid and Electrolyte Balance  Outcome: Progressing  Goal: Improved Oral Intake  Outcome: Progressing  Goal: Effective Renal Function  Outcome: Progressing     Problem: Bariatric Environmental Safety  Goal: Safety Maintained with Care  Outcome: Progressing     Problem: Infection  Goal: Absence of Infection Signs and Symptoms  Outcome: Progressing     Problem: Wound  Goal: Optimal Coping  Outcome: Progressing  Goal: Optimal Functional Ability  Outcome: Progressing  Goal: Absence of Infection Signs and Symptoms  Outcome: Progressing  Goal: Improved Oral Intake  Outcome: Progressing  Goal: Optimal Pain Control and Function  Outcome: Progressing  Goal: Skin Health and Integrity  Outcome: Progressing  Goal: Optimal Wound Healing  Outcome: Progressing     Problem: Skin Injury Risk Increased  Goal: Skin Health and Integrity  Outcome: Progressing     Problem: Sepsis/Septic Shock  Goal: Optimal Coping  Outcome: Progressing  Goal: Absence of Bleeding  Outcome: Progressing  Goal: Blood Glucose Level Within Targeted Range  Outcome: Progressing  Goal: Absence of Infection Signs and Symptoms  Outcome: Progressing  Goal: Optimal Nutrition Intake  Outcome: Progressing     Problem: Pneumonia  Goal: Fluid Balance  Outcome: Progressing  Goal: Resolution of Infection Signs and Symptoms  Outcome: Progressing  Goal: Effective Oxygenation and Ventilation  Outcome: Progressing     Problem: Gas Exchange Impaired  Goal: Optimal Gas  Exchange  Outcome: Progressing     Problem: Airway Clearance Ineffective  Goal: Effective Airway Clearance  Outcome: Progressing

## 2025-05-01 PROBLEM — R18.8 INTRA-ABDOMINAL FLUID COLLECTION: Status: ACTIVE | Noted: 2025-05-01

## 2025-05-01 LAB — CK SERPL-CCNC: 38 U/L (ref 29–168)

## 2025-05-01 NOTE — ASSESSMENT & PLAN NOTE
JOHNATHON is likely due to pre-renal azotemia due to intravascular volume depletion. Baseline creatinine is ~ 2.0. Most recent creatinine and eGFR are listed below.  Recent Labs     04/29/25  0220 04/30/25  0250 05/01/25  0409   CREATININE 3.89* 4.44* 4.12*   EGFRNORACEVR 12* 10* 11*      Plan  - JOHNATHON is worsening now.   - Avoid nephrotoxins and renally dose meds for GFR listed above  - Monitor urine output, serial BMP, and adjust therapy as needed  - s/p IV fluids.   - Nephrology consulted, will follow recommendations.

## 2025-05-01 NOTE — PLAN OF CARE
05/01/25 0919   Rounds   Attendance Nurse ;Charge nurse;Occupational therapist;Pharmacist  (Dietician, Resp therapist)   Discharge Plan A Skilled Nursing Facility   Why the patient remains in the hospital Requires continued medical care   Transition of Care Barriers None     Per IDTM. Patient is on oxygen at 2 liters per nc. She is on oral levaquin and oral prednisone is being tapered. Nephrology is consulting and she is on a sodium bicarb infusion. She gets ng tube feedings from 6p to 6a. Poor oral intake of a full liquid diet. Insulin is being adjusted. She received a amiodarone bolus infusion. Rehab evals completed on 4/29. She is max assist for rolling. Supine to sit is max assist x2. Max assist to dress upper extremities. Continue with plan of care in [facility]. Current dc plan is skilled care when medically stable. Cm will follow.

## 2025-05-01 NOTE — PT/OT/SLP PROGRESS
Physical Therapy Treatment    Patient Name:  Magan Saleem   MRN:  [id number]    Recommendations:     Discharge Recommendations: Moderate Intensity Therapy  Discharge Equipment Recommendations: to be determined by next level of care  Barriers to discharge:  ongoing medical treatment    Assessment:     Magan Saleem is a 69 y.o. female admitted with a medical diagnosis of Pneumonitis.  She presents with the following impairments/functional limitations: weakness, impaired endurance, impaired self care skills, impaired functional mobility, impaired skin, edema, impaired cardiopulmonary response to activity.    Pt able to maintain balance sitting EOB much better than anticipated, however, she require increased assist with bed mobs. Pt transferred bed<>chair via maxim lift, tolerating sitting up in recliner chair >2 hours without complaints    Rehab Prognosis: Fair; patient would benefit from acute skilled PT services to address these deficits and reach maximum level of function.    Recent Surgery: * No surgery found *      Plan:     During this hospitalization, patient to be seen 5 x/week to address the identified rehab impairments via gait training, therapeutic exercises, therapeutic activities, neuromuscular re-education and progress toward the following goals:    Plan of Care Expires:  05/29/25    Subjective     Chief Complaint: Pneumonitis   Patient/Family Comments/goals: pt alert and nodding agreement to therapy  Pain/Comfort:         Objective:     Communicated with Digna Greer RN prior to session.  Patient found HOB elevated with blood pressure cuff, pulse ox (continuous), oxygen, telemetry, PureWick, NG tube upon PT entry to room.     General Precautions: Standard, fall  Orthopedic Precautions: N/A  Braces: N/A  Respiratory Status: Nasal cannula, flow 2 L/min     Functional Mobility:  Bed Mobility:     Rolling Left:  maximal assistance  Rolling Right: maximal assistance  Supine to Sit: maximal assistance, of  2 persons, and assist with trunk and B LE management  Sit to Supine: dependence, of 2 persons, and assist with trunk and B LE management  Transfers:     Bed to Chair: dependence with maxim  Balance: minimum to contact guard assist sitting EOB x 8 minutes      AM-PAC 6 CLICK MOBILITY  Turning over in bed (including adjusting bedclothes, sheets and blankets)?: 2  Sitting down on and standing up from a chair with arms (e.g., wheelchair, bedside commode, etc.): 1  Moving from lying on back to sitting on the side of the bed?: 2  Moving to and from a bed to a chair (including a wheelchair)?: 2 (with maxim lift)  Need to walk in hospital room?: 1  Climbing 3-5 steps with a railing?: 1  Basic Mobility Total Score: 9       Treatment & Education:  Pt performed 2 x 15 reps (B) LE exercises: ap, quad set, glut set, straight leg raise, hip ab/adduction, long arc quad, heel slide with active assist       Patient left up in chair; HOB elevate after being return to bed via maxim lift all lines intact and call button in reach..    GOALS:   Multidisciplinary Problems       Physical Therapy Goals          Problem: Physical Therapy    Goal Priority Disciplines Outcome Interventions   Physical Therapy Goal     PT, PT/OT Progressing    Description: Short Term Goals to be met by: 25    Patient will increase functional independence with mobility by performin. Supine to sit with Moderate Assist  2. Sit to stand transfer with Maximal Assist using Rolling walker  3. Bed to chair transfer with Maximal Assist using lowest level of assistive device  4. Rolling side to side with Min A  5. Lower extremity exercise program x30 reps per handout, with assistance as needed    Long Term Goals to be met by: 25    Pt will regain full independent functional mobility with lowest level of assistive device to return to home situation and prior activities of daily living.                        DME Justifications:      Time Tracking:     PT  Received On: 05/01/25  PT Start Time: 0924     PT Stop Time: 1013  PT Total Time (min): 49 min     Billable Minutes: Therapeutic Activity 24 and Therapeutic Exercise 15    Treatment Type: Treatment  PT/PTA: PTA     Number of PTA visits since last PT visit: 1     05/01/2025

## 2025-05-01 NOTE — ASSESSMENT & PLAN NOTE
"EGD by Dr. Lo during recent past admission at [facility]:  "EGD on 03/21/2025 shows LA class B erosive esophagitis but no pill esophagitis, nonerosive gastritis and retention of food and fluid suspicious for gastroparesis, due to narcotics and diabetes. "  GI consulted, unable to advance diet and poor candidate for PEG.  Dobhoff in place for feeds, continued on Reglan.   GI recommended surgical PEG due to body habitus, GS stated that patient is not a candidate for PEG based on co morbidities.   Continue PPI.     "

## 2025-05-01 NOTE — PROGRESS NOTES
[facility]  Nephrology  Progress Note    Patient Name: Magan Saleem  MRN: [id number]  Admission Date: 4/26/2025  Hospital Length of Stay: 5 days  Attending Provider: Carlos Alberto Nicole MD   Primary Care Physician: Sil Brown DO  Principal Problem:Pneumonitis    Consults  Subjective:     Interval History: The patient denies having dyspnea today    Review of patient's allergies indicates:   Allergen Reactions    Penicillins Hives    Sulfa (sulfonamide antibiotics) Hives     Current Facility-Administered Medications   Medication Frequency    acetaminophen tablet 650 mg Q4H PRN    albuterol-ipratropium 2.5 mg-0.5 mg/3 mL nebulizer solution 3 mL Q4H PRN    allopurinoL tablet 300 mg Daily    atorvastatin tablet 10 mg Daily    dextrose 50% injection 12.5 g PRN    dextrose 50% injection 25 g PRN    glucagon (human recombinant) injection 1 mg PRN    glucose chewable tablet 16 g PRN    glucose chewable tablet 24 g PRN    heparin (porcine) injection 5,000 Units Q8H    insulin aspart U-100 injection 0-10 Units Q6H PRN    insulin glargine U-100 (Lantus) injection 30 Units BID    Lactobacillus acidophilus capsule 2 capsule TID WM    levoFLOXacin tablet 500 mg Q48H    metoclopramide HCl tablet 5 mg QID    metoprolol succinate (TOPROL-XL) 24 hr split tablet 25 mg Daily    montelukast tablet 10 mg QHS    mupirocin 2 % ointment BID    pantoprazole EC tablet 40 mg Daily    predniSONE tablet 40 mg Daily    Followed by    [START ON 5/4/2025] predniSONE tablet 20 mg Daily    Followed by    [START ON 5/18/2025] predniSONE tablet 10 mg Daily    rOPINIRole tablet 0.25 mg TID    sertraline tablet 25 mg Daily    sodium bicarbonate 100 mEq in D5W 1,000 mL infusion Continuous    sodium bicarbonate tablet 1,300 mg TID       Objective:     Vital Signs (Most Recent):  Temp: 97.2 °F (36.2 °C) (05/01/25 1530)  Pulse: 86 (05/01/25 1530)  Resp: 12 (05/01/25 1530)  BP: (!) 103/58 (05/01/25 1530)  SpO2: 100 %  (05/01/25 1530) Vital Signs (24h Range):  Temp:  [96.7 °F (35.9 °C)-98.2 °F (36.8 °C)] 97.2 °F (36.2 °C)  Pulse:  [] 86  Resp:  [11-15] 12  SpO2:  [100 %] 100 %  BP: ()/(38-66) 103/58     Weight: 120.1 kg (264 lb 12.4 oz) (05/01/25 0424)  Body mass index is 44.06 kg/m².  Body surface area is 2.35 meters squared.    I/O last 3 completed shifts:  In: 1451 [NG/GT:1451]  Out: 570 [Urine:300; Drains:270]    Physical Exam  Constitutional:       Appearance: She is obese. She is ill-appearing.   Cardiovascular:      Heart sounds: No murmur heard.     No friction rub. No gallop.   Pulmonary:      Effort: No respiratory distress.      Breath sounds: Normal breath sounds. No wheezing, rhonchi or rales.   Abdominal:      General: Abdomen is protuberant. Bowel sounds are normal.      Palpations: Abdomen is soft.      Tenderness: There is no abdominal tenderness. There is no guarding or rebound.   Neurological:      Mental Status: She is alert.         Significant Labs:sureCBC:   Recent Labs   Lab 05/01/25  0409   WBC 37.70*   RBC 2.84*   HGB 8.5*   HCT 25.5*      MCV 89.8   MCH 29.9   MCHC 33.3     CMP:   Recent Labs   Lab 05/01/25  0409   *   CALCIUM 8.5   ALBUMIN 1.3*   PROT 4.6*      K 4.6   CO2 19*      *   CREATININE 4.12*   ALKPHOS 237*   ALT 26   AST 25   BILITOT 0.3     All labs within the past 24 hours have been reviewed.    Significant Imaging:      Assessment/Plan:     Active Diagnoses:    Diagnosis Date Noted POA    PRINCIPAL PROBLEM:  Pneumonitis [J98.4] 04/16/2025 Yes    Intra-abdominal fluid collection [R18.8] 05/01/2025 Yes    SVT (supraventricular tachycardia) [I47.10] 04/27/2025 No    Nonischemic nontraumatic myocardial injury [I5A] 04/27/2025 No    Hyperphosphatemia [E83.39] 04/26/2025 Yes    Hyperuricemia [E79.0] 04/26/2025 Yes    Infection due to Stenotrophomonas maltophilia [A49.8] 04/22/2025 Yes    Acute pancreatitis [K85.90] 04/21/2025 Yes    Neoplastic  (malignant) related fatigue [R53.0] 04/15/2025 Yes    Normocytic anemia [D64.9] 04/15/2025 Yes    Edema due to hypoalbuminemia [E88.09] 04/12/2025 Yes    Type 2 diabetes mellitus with hyperglycemia [E11.65] 04/04/2025 Yes    Sepsis due to pneumonia [J18.9, A41.9] 04/04/2025 Yes    Gastroparesis [K31.84]  Yes    Lung cancer metastatic to brain [C34.90, C79.31]  Yes    Morbid obesity [E66.01] 04/03/2025 Yes    Acute kidney injury superimposed on stage 4 chronic kidney disease [N17.9, N18.4]  Yes    Chronic pulmonary embolism without acute cor pulmonale [I27.82]  Yes    Moderate persistent asthma without complication [J45.40] 10/30/2024 Yes    Essential hypertension [I10] 06/30/2021 Yes    Mixed hyperlipidemia [E78.2] 06/30/2021 Yes      Problems Resolved During this Admission:       IMP:  Hyperuricemia persists  BUN and creatinine trending downward today    Plan:  Increase the iv fluids rate   Monitor the uric acid levels  Continue NaHCO3 TO alkalinize the urine to increase the solubility of the uric acid .    Thank you for your consult. I will follow-up with patient. Please contact us if you have any additional questions.    Mikey Velásquez MD  Nephrology  [facility]

## 2025-05-01 NOTE — SUBJECTIVE & OBJECTIVE
Interval History: Patient seen and examined at the bedside, reports no new symptoms or complains. Encouraged PO intake.     Review of Systems   All other systems reviewed and are negative.    Objective:     Vital Signs (Most Recent):  Temp: 96.7 °F (35.9 °C) (05/01/25 0800)  Pulse: 95 (05/01/25 0800)  Resp: 14 (05/01/25 0800)  BP: (!) 96/48 (05/01/25 0800)  SpO2: 100 % (05/01/25 0800) Vital Signs (24h Range):  Temp:  [96.4 °F (35.8 °C)-97.7 °F (36.5 °C)] 96.7 °F (35.9 °C)  Pulse:  [] 95  Resp:  [11-26] 14  SpO2:  [100 %] 100 %  BP: ()/(38-66) 96/48     Weight: 120.1 kg (264 lb 12.4 oz)  Body mass index is 44.06 kg/m².    Intake/Output Summary (Last 24 hours) at 5/1/2025 1009  Last data filed at 5/1/2025 0424  Gross per 24 hour   Intake 1046 ml   Output 300 ml   Net 746 ml         Physical Exam  Constitutional:       Appearance: She is obese. She is ill-appearing.   HENT:      Head: Normocephalic.      Mouth/Throat:      Mouth: Mucous membranes are dry.   Cardiovascular:      Rate and Rhythm: Normal rate.      Heart sounds: Normal heart sounds. No murmur heard.  Pulmonary:      Effort: No respiratory distress.      Breath sounds: Rhonchi present.      Comments: On 2L NC  Abdominal:      General: There is no distension.      Tenderness: There is no abdominal tenderness.      Comments: Dobhoff in place   Skin:     Coloration: Skin is pale.   Neurological:      Mental Status: Mental status is at baseline. She is disoriented.               Significant Labs: All pertinent labs within the past 24 hours have been reviewed.    Significant Imaging: I have reviewed all pertinent imaging results/findings within the past 24 hours.

## 2025-05-01 NOTE — NURSING
0715  Patient awake and alert in bed. Resp even and unlabored. Call bell in reach. Safety measures intact. No complaints. Left picc in place with iv fluids. O2 at 2L NC. Dobhoff in place. Tube feeding from 6p-6a. Will continue to monitor.

## 2025-05-01 NOTE — ASSESSMENT & PLAN NOTE
Eliquis was discontinued due to risk of ICH with brain mets.  Currently on hold in case surgical procedure is required.

## 2025-05-01 NOTE — PT/OT/SLP PROGRESS
"Occupational Therapy   Treatment    Name: Magan Saleem  MRN: [id number]  Admitting Diagnosis:  Pneumonitis       Recommendations:     Discharge Recommendations: Moderate Intensity Therapy  Discharge Equipment Recommendations:  to be determined by next level of care  Barriers to discharge:  None    Assessment:     Magan Saleem is a 69 y.o. female with a medical diagnosis of Pneumonitis.  She presents with alert and sitting up in the chair. Performance deficits affecting function are weakness, impaired endurance, impaired self care skills, impaired functional mobility, impaired balance, decreased upper extremity function, decreased lower extremity function, impaired cardiopulmonary response to activity.     Rehab Prognosis:  Good; patient would benefit from acute skilled OT services to address these deficits and reach maximum level of function.       Plan:     Patient to be seen 5 x/week to address the above listed problems via self-care/home management, therapeutic activities, therapeutic exercises  Plan of Care Expires: 06/03/25  Plan of Care Reviewed with: patient    Subjective     Chief Complaint: Pneumonitis    Patient/Family Comments/goals: "This is the most I have eaten in about 3 weeks."  Pain/Comfort:  Pain Rating 1: 3/10  Location - Orientation 1: generalized  Pain Rating Post-Intervention 1: 0/10    Objective:     Communicated with: LONDON Greer prior to session.  Patient found up in chair with NG tube, oxygen, pulse ox (continuous), PureWick, telemetry, blood pressure cuff upon OT entry to room.    General Precautions: Standard, fall    Orthopedic Precautions:N/A  Braces: N/A  Respiratory Status: Nasal cannula, flow 2 L/min     Occupational Performance:     Bed Mobility:    Patient completed Rolling/Turning to Left with  maximal assistance  Patient completed Rolling/Turning to Right with maximal assistance     Functional Mobility/Transfers:  Patient completed Bed <> Chair Transfer using mechanical " lift technique with total assistance with mechanical lift  Functional Mobility: Pt unable to stand or to take steps    Activities of Daily Living:  Feeding:  total assistance as pt is not able to reach from her cup of ice cream to her mouth to feed herself      [facility] 6 Click ADL: 10    Treatment & Education:  Pt performed AAROM for (B) UE for 10 reps 15 reps of each:   Shoulder flexion   Shoulder horizontal abd/adduction   Elbow flexion AAROM/ AROM on the (R) and AAROM on the (L)   Supination/ pronation AROM   Hand helper with 2 bands of light resistance x 20 reps    Patient left HOB elevated with all lines intact, call button in reach, and RN notified    GOALS:   Multidisciplinary Problems       Occupational Therapy Goals          Problem: Occupational Therapy    Goal Priority Disciplines Outcome Interventions   Occupational Therapy Goal     OT, PT/OT Progressing    Description: STG:  Pt will perform grooming with setup and min a  Pt will bathe with mod a  Pt will perform UE dressing with mod a  Pt will perform LE dressing with mod a with AD as needed  Pt will sit EOB x 10 min with SBA   Pt will transfer bed/chair/bsc with mod a  Pt will perform standing task x 30 sec with mod a   Pt will tolerate 20 minutes of tx without fatigue      LT.Restore to max I with self care and mobility.                              Time Tracking:     OT Date of Treatment: 25  OT Start Time: 1343  OT Stop Time: 1411  OT Total Time (min): 28 min    Billable Minutes:Therapeutic Activity 23 min    OT/SAROJ: OT          2025

## 2025-05-01 NOTE — ASSESSMENT & PLAN NOTE
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 Most recent hemoglobin and hematocrit are listed below.  Recent Labs     04/29/25  0220 04/30/25  0250 05/01/25  0409   HGB 8.1* 8.3* 8.5*   HCT 25.8* 25.9* 25.5*     Plan  - Monitor serial CBC: Daily  - Transfuse PRBC if patient becomes hemodynamically unstable, symptomatic or H/H drops below 7/21.  - Patient has not received any PRBC transfusions to date  - Patient's anemia is currently stable  - No evidence of bleeding.

## 2025-05-01 NOTE — PROGRESS NOTES
[facility] Medicine  Progress Note    Patient Name: Magan Saleem  MRN: [id number]  Patient Class: IP- Inpatient   Admission Date: 4/26/2025  Length of Stay: 5 days  Attending Physician: Carlos Alberto Nicole MD  Primary Care Provider: Sil Brown DO        Subjective     Principal Problem:Pneumonitis        HPI:  70 yo F presents to [facility] ED from [facility] for abnormal labs.  Patient was discharged from [facility] two days ago to skilled nursing facility for rehab.  She was diagnosed with dehydration due to gastroparesis with UTI.  Patient has metastatic lung cancer (to the brain) and follows with Dr. Swanson for IV chemotherapy every other week and takes lazertinib daily.  She has completed her course of radiation for the brain mets and follow had showed some improvement.  I thought she had either some decreased LOC due to encephalopathy or some aphasia from the brain mets but after a great effort to get her to talk, I realized she was just mad.  She wanted to know why she had been discharged two days ago when she could not eat.  She has no appetite and early satiety.  She is not nauseated and no vomiting.  She has decided on a DNR status previously (her caretaker and friend of 20 years) states that she had always said she did not want resuscitation if she experienced cardiopulmonary arrest and had told her children her wishes but she does want treatment and is not opposed to J tube if needed.  She has not had anything to eat or drink in two days and is dehydrated again.       Patient was transferred because of concern of sepsis.  She did have enterococcus faecalis UTI at [facility] and was treated.  I do not have the culture results but cultures were sent and patient does have some yeast noted.  (Will not treat a yeast colonization).  She is afebrile and hemodynamically stable and does not look septic clinically.  Her Lactic acid is stable at 2.5 dara 3.2.  Will  check another in am after volume resuscitation.  Her creat is at baseline but she has prerenal azotemia further confirming the dehydration.  Patient has an IO in place from [facility] due to dehydration and difficult stick but with some volume resuscitation, we have gotten an IV.  She is covid and flu negative.       Her WBC is 29 and I am not sure if she has received neupogen but could be a stress reaction.  Her BS is 245 and she does have some urine ketones but most likely due to starvation ketosis.  Will check a serum ketone.  Her platelets are 88K but this is most likely from her chemo.  She is not anemic.  CXR shows some patchy infiltrate but no obvious obstructive pneumonia from her lung cancer.  Her BNP about 3K but no clinical signs of CHF.  No recent echo but due to her BMI 50 most likely has some PHTN.  EKG had no ischemic changes but tachy at 114 which could also be from dehydration.  She was on ozempic for her DM and this could be the cause of her decreased appetite.  She is also on decadron for prevention of cerebral edema.       Remainder of ROS as below.  She mostly just nods and shakes her head and rolls her eyes.  You can get her to laugh if you try but she has been depressed since she has not walked since her hospitalization at [facility] on 3/19/25 and was discharged two days ago.  She says that at her last chemo she noticed she was getting weaker and her daughter had to help her in and out of the car and that was new for her.      4/5- patient seen examined today resting comfortably in bed and in no acute distress, with no acute events overnight.  Patient has a multitude of issues complicating her medical picture.  White blood cell count 55664 today, sodium 159, potassium 3.2 and BUN creatinine 59 and 1.8.  The patient is still not eating even when assistance is provided.  We have already changed her fluids to half-normal saline with 20 of KCl at 1:25 a.m..  Have also put in a GI consult for possible feeding  tube.  E coli in the urine has turned out to be ESBL and Rocephin has been changed Merrem.     Hospital Course at [facility]:    4/6- the patient seen examined today resting comfortably in bed, in no acute distress, in no acute events overnight.  The patient is not very talkative today, but states she is doing okay.  She has no acute complaints.  Blood cultures remain negative.  The patient has not eaten since yesterday and is on light IV fluids.  GI has been consulted for feeding tube.  Continues on Merrem for positive urine culture.  04/07 Records reviewed. Not eating which not new, Similar during recent admit at [facility]. Dr Swanson there had question pancreatitis. No abd pain or tenderness reported now. Question of dysphagia to solids. Chart hx gastroparesis. Will discuss with GI. Ronnie says has responded so far well to treatment for lung CA.   04/08 had EGD at [facility] 03/21/25 with no obstruction and evidence gastroparesis. Still not ending. Try additional meds. Talked with GI. Increase activity  04/09 Some better with med  changes  04/10 Continues to do better. Ate 1/2 fish sandwich for lunch. Called by Dr Swanson and she wanting to keep feeding tube in consideration. Talked with Dr Reich and Dr Lemus. If something needed with gastroparesis would have to have post gastric position of tube.   04/11 eating some. Later staff requested some nystatin for sore mouth.   04/12 still not eating well.  Increased peripheral edema.  Albumin low at 1.5.  Will give some albumin and follow with some Lasix.  Placed Dobbhoff tube and start enteral feedings.  This will help with nutrition and if issue of placing surgical tube later make sure will tolerate enteral feedings.  Chest x-ray continues worse on left side.  Discuss with Pulmonary and ask Dr. Villasenor to see.   04/13 no new issues.  Enteral feedings to be started.  Pulmonary evaluation appreciated and plans noted.  04/14 Tolerating feedings Therapy working with her.  Bronchoscopy planned.   4/15 BAL today  4/16 bronch yesterday looks like pneumonitis  4/17 not candidate for PEG  04/18 Eating some now. Pt says feels some better than last seen. Look at placement. High dose steroids by pulmonary. BS not as bad as would expect.  04/19 states continued eat some.  Less nausea.  Blood sugar not sure bad considering the steroids.  Pulmonary adjusted antibiotics based on cultures.  Look at it placement next week.   04/20 Looks the small. C/O SOB to nursing staff earlier but ABG OK. Poor oral intake ; encourage for pt to do more. Looking into placement.  04/21 Firm tender area in abd wall above umbilicus; ?hernia. Abnormal CT de santiago noted and will discuss with surgery.   04/22 CT shows evolving pancreatitis which pt treated for acouple weeks earlier at [facility]. Reviewed with Dr Tapia. No plan to operate on ventral hernia. Discussed later with Dr York. See how does off IVF and encourage oral intake. WBC and Cr both slight better.   04/23 Looks this same. Pt eating some. Pt getting discouraged and asking about home hospice. Talked by phone with her friend Shauna. In conversion doesn't sound like family would be able to care for pt at home. Encouraged pt to give some time and attempt SWB. She says she with consider tonight.   04/24 Lab worse but pt looks some better. Still not taking in well. Maybe eat better as pancreatitis further resolves. Considering replacing dobbhoff feeding tube and look into move to [facility]. Ask Dr Frias to review renal situation. Maybe pancreatitis,pneumonia,renal failure, all these might make a big difference if resolved. Talked with pt and she was OK with NGT  04/25 patient's spirits seems to be doing some better.  Dobbhoff tube placed in feedings to be started.  To be moved to LTAC.  No other new issue  04/26 Awaiting bed assignment at [facility]. No new complains.     Overview/Hospital Course:  4/28- BG trending up, added Lantus and adjusted dose. D/W nutritionist about  changing feeds to allow for more PO intake.     4/29- BG better now. Off IV amiodarone infusion, remains in NSR. Encourage PO intake.     4/30- Continue to adjust insulin to get BG under 200, ideally 140-180. Checking CT chest, abdomen and pelvis today given persistent leukocytosis.     5/1- Discussing with surgery about fluid collections in the abdomen noted on CT (slightly increased in size), also asking if PEG is an option as previously deemed not a candidate. Nephrology has added bicarb infusion. Continue current management otherwise.     Interval History: Patient seen and examined at the bedside, reports no new symptoms or complains. Encouraged PO intake.     Review of Systems   All other systems reviewed and are negative.    Objective:     Vital Signs (Most Recent):  Temp: 96.7 °F (35.9 °C) (05/01/25 0800)  Pulse: 95 (05/01/25 0800)  Resp: 14 (05/01/25 0800)  BP: (!) 96/48 (05/01/25 0800)  SpO2: 100 % (05/01/25 0800) Vital Signs (24h Range):  Temp:  [96.4 °F (35.8 °C)-97.7 °F (36.5 °C)] 96.7 °F (35.9 °C)  Pulse:  [] 95  Resp:  [11-26] 14  SpO2:  [100 %] 100 %  BP: ()/(38-66) 96/48     Weight: 120.1 kg (264 lb 12.4 oz)  Body mass index is 44.06 kg/m².    Intake/Output Summary (Last 24 hours) at 5/1/2025 1009  Last data filed at 5/1/2025 0424  Gross per 24 hour   Intake 1046 ml   Output 300 ml   Net 746 ml         Physical Exam  Constitutional:       Appearance: She is obese. She is ill-appearing.   HENT:      Head: Normocephalic.      Mouth/Throat:      Mouth: Mucous membranes are dry.   Cardiovascular:      Rate and Rhythm: Normal rate.      Heart sounds: Normal heart sounds. No murmur heard.  Pulmonary:      Effort: No respiratory distress.      Breath sounds: Rhonchi present.      Comments: On 2L NC  Abdominal:      General: There is no distension.      Tenderness: There is no abdominal tenderness.      Comments: Dobhoff in place   Skin:     Coloration: Skin is pale.   Neurological:      Mental  Status: Mental status is at baseline. She is disoriented.               Significant Labs: All pertinent labs within the past 24 hours have been reviewed.    Significant Imaging: I have reviewed all pertinent imaging results/findings within the past 24 hours.      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  Pulmonology consulted, started on steroids and s/p bronchoscopy with normal findings, BAL culture with stenotrophomonas.   Steroid taper plan per Pulmonology, on antibiotics.   Respiratory status is stable.     Infection due to Stenotrophomonas maltophilia  Sepsis due to pneumonia  Stenotrophomonas lower respiratory infection in this patient with multifocal infiltrates and consolidative opacities on CT Chest.   Complete total 2 week course with Levaquin.   Leukocytosis persists, will follow procalcitonin levels Q48H.  Checking CT chest, abdomen and pelvis for further evaluation.     Acute kidney injury superimposed on stage 4 chronic kidney disease  JOHNATHON is likely due to pre-renal azotemia due to intravascular volume depletion. Baseline creatinine is  ~ 2.0 . Most recent creatinine and eGFR are listed below.  Recent Labs     04/29/25  0220 04/30/25  0250 05/01/25  0409   CREATININE 3.89* 4.44* 4.12*   EGFRNORACEVR 12* 10* 11*      Plan  - JOHNATHON is worsening now.   - Avoid nephrotoxins and renally dose meds for GFR listed above  - Monitor urine output, serial BMP, and adjust therapy as needed  - s/p IV fluids.   - Nephrology consulted, will follow recommendations.     SVT (supraventricular tachycardia)  Went into SVT on 4/27, not responsive to multiple rounds of adenosine.  Started on amiodarone infusion after bolus, turned off on 4/29.  Cardiology consulted, recommend to continue amiodarone for now but long term it is not a good option.  Monitor on tele.     Acute pancreatitis  Intra-abdominal fluid collection  CT abdomen/pelvis showed- Possible small  "fluid collection anterior to the pancreas measuring approximately 3.6 cm on axial 41. Probable complex collection slightly inferior to the pancreatic head extending to the right pelvis and right iliac fossa.     04/21 Abd fluid collection in area pancreas noted on CT abd, discussed with surgery  04/22 likely evolving pancreatitis dx a couple weeks ago at [facility], now with pseudocyst.  No severe epigastric pain.  04/26 Ongoing tube feeds and full liquid diet. If tolerates full liquid better, plan to advanced to soft foods.   04/30 Repeat CT abdomen ordered, showed the pancreas with multiple fluid collections anterior to the pancreas and within the root of the mesentery and extending into the anterior right pararenal space and right paracolic gutter.     5/1 Asking GS input regarding fluid collection.     Gastroparesis  EGD by Dr. Lo during recent past admission at [facility]:  "EGD on 03/21/2025 shows LA class B erosive esophagitis but no pill esophagitis, nonerosive gastritis and retention of food and fluid suspicious for gastroparesis, due to narcotics and diabetes. "  GI consulted, unable to advance diet and poor candidate for PEG.  Dobhoff in place for feeds, continued on Reglan.   GI recommended surgical PEG due to body habitus, GS stated that patient is not a candidate for PEG based on co morbidities.   Continue PPI.     Nonischemic nontraumatic myocardial injury  In the setting of SVT episodes.  Trend is flat, no chest pain and no ischemic changes noted on EKG.  No ischemic workup recommended per cardiology.     Lung cancer metastatic to brain  DNR but not hospice.   Receives chemo and has finished radiation.    Follows with Dr. Swanson.    Hyperuricemia  Hyperphosphatemia  Defer management to nephrology- started on allopurinol.     Essential hypertension  Patient's blood pressure range in the last 24 hours was: BP  Min: 85/47  Max: 121/38.The patient's inpatient anti-hypertensive regimen is listed " "below:  Current Antihypertensives  metoprolol succinate (TOPROL-XL) 24 hr split tablet 25 mg, Daily, Oral    Plan  - BP is controlled, no changes needed to their regimen    Type 2 diabetes mellitus with hyperglycemia  Patient's FSGs are controlled on current medication regimen.  Last A1c reviewed-   Lab Results   Component Value Date    HGBA1C 6.7 04/02/2025     Most recent fingerstick glucose reviewed- No results for input(s): "POCTGLUCOSE" in the last 24 hours.  Current correctional scale  Low  Maintain anti-hyperglycemic dose as follows-   Antihyperglycemics (From admission, onward)      Start     Stop Route Frequency Ordered    05/01/25 1051  insulin aspart U-100 injection 0-10 Units         -- SubQ Every 6 hours PRN 05/01/25 0952    05/01/25 0900  insulin glargine U-100 (Lantus) injection 30 Units         -- SubQ 2 times daily 05/01/25 0624          Hold Oral hypoglycemics while patient is in the hospital.  BG ranging up as tube feeds now to goal, added Lantus and dose adjusted for tighter glycemic control.     Normocytic anemia  Anemia is likely due to chronic disease due to Malignancy. Most recent hemoglobin and hematocrit are listed below.  Recent Labs     04/29/25  0220 04/30/25  0250 05/01/25  0409   HGB 8.1* 8.3* 8.5*   HCT 25.8* 25.9* 25.5*     Plan  - Monitor serial CBC: Daily  - Transfuse PRBC if patient becomes hemodynamically unstable, symptomatic or H/H drops below 7/21.  - Patient has not received any PRBC transfusions to date  - Patient's anemia is currently stable  - No evidence of bleeding.    Neoplastic (malignant) related fatigue  Patient with Acute on chronic debility due to neoplastic/malignant related fatigue. Latest AMPAC and GEMS scores have been reviewed. Evaluation for etiology is complete. Plan includes - Progressive mobility protocol initated  - PT/OT consulted  - Fall precautions in place.    Chronic pulmonary embolism without acute cor pulmonale  Eliquis was discontinued due to risk " of ICH with brain mets.  Currently on hold in case surgical procedure is required.    Morbid obesity  Body mass index is 44.06 kg/m². Morbid obesity complicates all aspects of disease management from diagnostic modalities to treatment. Weight loss encouraged and health benefits explained to patient.     Moderate persistent asthma without complication  Stable without exacerbation.  Continue PRN nebs.     Edema due to hypoalbuminemia  Required albumin infusion intermittently during the stay.     Mixed hyperlipidemia  Resume statin.     VTE Risk Mitigation (From admission, onward)           Ordered     heparin (porcine) injection 5,000 Units  Every 8 hours         04/27/25 1055                    Discharge Planning   MITUL: 5/9/2025     Code Status: Full Code   Medical Readiness for Discharge Date:   Discharge Plan A: Skilled Nursing Facility                Please place Justification for DME        RAYMOND ZHAO MD  Department of Hospital Medicine   [facility]

## 2025-05-01 NOTE — ASSESSMENT & PLAN NOTE
Patient's blood pressure range in the last 24 hours was: BP  Min: 85/47  Max: 121/38.The patient's inpatient anti-hypertensive regimen is listed below:  Current Antihypertensives  metoprolol succinate (TOPROL-XL) 24 hr split tablet 25 mg, Daily, Oral    Plan  - BP is controlled, no changes needed to their regimen

## 2025-05-01 NOTE — ASSESSMENT & PLAN NOTE
Body mass index is 44.06 kg/m². Morbid obesity complicates all aspects of disease management from diagnostic modalities to treatment. Weight loss encouraged and health benefits explained to patient.

## 2025-05-01 NOTE — PT/OT/SLP PROGRESS
Occupational Therapy   Treatment    Name: Magan Saleem  MRN: [id number]  Admitting Diagnosis:  Pneumonitis       Recommendations:     Discharge Recommendations: Moderate Intensity Therapy  Discharge Equipment Recommendations:  to be determined by next level of care  Barriers to discharge:       Assessment:     Magan Saleem is a 69 y.o. female with a medical diagnosis of Pneumonitis.  Performance deficits affecting function are weakness, impaired endurance, impaired self care skills, impaired cardiopulmonary response to activity, impaired functional mobility.     Rehab Prognosis:  Good; patient would benefit from acute skilled OT services to address these deficits and reach maximum level of function.       Plan:     Patient to be seen 5 x/week to address the above listed problems via self-care/home management, therapeutic activities, therapeutic exercises  Plan of Care Expires: 06/03/25  Plan of Care Reviewed with: patient    Subjective     Chief Complaint:   Patient/Family Comments/goals:   Pain/Comfort:  Pain Rating 1: 0/10    Objective:     Communicated with: Digna Greer RN prior to session.  Patient found HOB elevated with blood pressure cuff, pulse ox (continuous), telemetry, PureWick, peripheral IV, NG tube upon OT entry to room.    General Precautions: Standard, fall    Orthopedic Precautions:N/A  Braces: N/A  Respiratory Status: Nasal cannula, flow 2 L/min     Occupational Performance:     Bed Mobility:    Supine to sit max a x 2,   Rolling max a   Sit to supine dep x 2    Functional Mobility/Transfers:  Bed to chair dep via maxim lift  Functional Mobility:     Activities of Daily Living:  Min a to wash her face  Min a oral care with oral swab  Sat eob 8 jeannette sba/cga      AMPAC 6 Click ADL:      Treatment & Education:  Pt performed hand helper with 2 rubber band resistances 20 reps, aarom 15 reps x 2 B shld flex,abd/add,elbow flex/ext     Patient left up in chair with all lines intact and call button in  reach    GOALS:   Multidisciplinary Problems       Occupational Therapy Goals          Problem: Occupational Therapy    Goal Priority Disciplines Outcome Interventions   Occupational Therapy Goal     OT, PT/OT Progressing    Description: STG:  Pt will perform grooming with setup and min a  Pt will bathe with mod a  Pt will perform UE dressing with mod a  Pt will perform LE dressing with mod a with AD as needed  Pt will sit EOB x 10 min with SBA   Pt will transfer bed/chair/bsc with mod a  Pt will perform standing task x 30 sec with mod a   Pt will tolerate 20 minutes of tx without fatigue      LT.Restore to max I with self care and mobility.                          DME Justifications:      Time Tracking:     OT Date of Treatment: 25  OT Start Time: 0920 (1015)  OT Stop Time: 1000 (1028)  OT Total Time (min): 40 min,13 mins    Billable Minutes:Therapeutic Activity 35  Therapeutic Exercise 12    OT/SAROJ: SAROJ          2025

## 2025-05-01 NOTE — ASSESSMENT & PLAN NOTE
CT abdomen/pelvis showed- Possible small fluid collection anterior to the pancreas measuring approximately 3.6 cm on axial 41. Probable complex collection slightly inferior to the pancreatic head extending to the right pelvis and right iliac fossa.     04/21 Abd fluid collection in area pancreas noted on CT abd, discussed with surgery  04/22 likely evolving pancreatitis dx a couple weeks ago at [facility], now with pseudocyst.  No severe epigastric pain.  04/26 Ongoing tube feeds and full liquid diet. If tolerates full liquid better, plan to advanced to soft foods.   04/30 Repeat CT abdomen ordered, showed the pancreas with multiple fluid collections anterior to the pancreas and within the root of the mesentery and extending into the anterior right pararenal space and right paracolic gutter.     5/1 Asking GS input regarding fluid collection.

## 2025-05-01 NOTE — ASSESSMENT & PLAN NOTE
CT abdomen/pelvis showed- Possible small fluid collection anterior to the pancreas measuring approximately 3.6 cm on axial 41. Probable complex collection slightly inferior to the pancreatic head extending to the right pelvis and right iliac fossa.     04/21 Abd fluid collection in area pancreas noted on CT abd, discussed with surgery  04/22 likely evolving pancreatitis dx a couple weeks ago at [facility], now with pseudocyst.  No severe epigastric pain.  04/26 Ongoing tube feeds and full liquid diet. If tolerates full liquid better, plan to advanced to soft foods.   04/30 Repeat CT abdomen ordered, showed the pancreas with multiple fluid collections anterior to the pancreas and within the root of the mesentery and extending into the anterior right pararenal space and right paracolic gutter.     5/1 Asking GS input regarding fluid collection.

## 2025-05-01 NOTE — PROGRESS NOTES
[facility]  Adult Nutrition  Follow-Up Note         Reason for Assessment  Reason For Assessment: RD follow-up        Assessment and Plan    5/1/2025: RD follow up. Patient remains on nocturnal feeding of Suplena 1.8 at 55mL/hour with 60mL/hour free water flush x 12 hours, 6p-6a, with full liquid PO diet. PO intakes very poor, generally 0%. May need to recalculate nocturnal feeds if poor PO intakes continue. Current weight 120.1kg. RD following.    4/28/2025: NOCTURNAL TUBE FEED RECS  RD follow-up. Patient remains on tube feeds with Full Liquids - Vivonex still not in yet. Tolerating feeds well per MD. D/W MD who wants to attempt to stimulate appetite to improve oral intake given NGT isn't a long-term solution and PEG/PEJ is not feasible given body habitus. Recommend to change continuous feeds to nocturnal feeds providing patient with 50% estimated energy needs. Energy needs updated to reflect current weight.This will hopefully help to improve her appetite and encourage more oral intake during the day. If patient doesn't start eating >50%, will adjust feeds as appropriate.    Recommend to change continuous feeds to nocturnal feeds. Initiate continuous infusion of Suplena 1.8 at 55 mL/hr x 12 hours from 6 PM - 6 AM. FWF 60 mL/hr x 12 hours. Keep HOB at 30-45 degrees to reduce risk of aspiration. Monitor for tolerance: n/v/c/d. Hold feeding and notify RD if symptoms of intolerance develop.     4/25/2025: TUBE FEED CONSULT  Consult received and appreciated. Consult for new tube feeding. Patient still not eating well. May eat better as pancreatitis further resolves. Dr. Rizvi talked with patient and she was OK with NGT. It is often recommended to feed into the jejunum in patients with gastroparesis, though body habitus may complicate placement.    NOTE: Patient has complex medical history. Ordered speciality formula (Vivonex). Will start feeds with Suplena until then 2/2 poor renal function.  Vivonex is good option for those with pancreatitis, but also for those with gastroparesis + CKD (non-dialysis) (lower in protein/fat/potassium/phos/sodium and contains no fiber)    Initiate continuous infusion of Suplena 1.8 at 10 mL/hr via NGT and advance 10 mL/hr q8h pending tolerance until goal rate of 45 mL/hr is achieved. FWF 30 mL/hr. Keep HOB at 30-45 degrees to reduce risk of aspiration. Monitor for tolerance: n/v/c/d. Hold feeding and notify RD if symptoms of intolerance develop.     Last Bowel Movement: 04/29/25.     Medications/labs reviewed. RD following.    Learning Needs/Social Determinants of Health    Learning Assessment       04/04/2025 0021 [facility] (4/3/2025 - Present)   Created by Deanna Rivas, RN - RN (Nurse) Status: Complete                 PRIMARY LEARNER     Primary Learner Name:  Magan Saleem  - 04/04/2025 0021    Relationship:  Patient  - 04/04/2025 0021    Does the primary learner have any barriers to learning?:  No Barriers  - 04/04/2025 0021    What is the preferred language of the primary learner?:  English SG - 04/04/2025 0021    Is an  required?:  No SG - 04/04/2025 0021    How does the primary learner prefer to learn new concepts?:  Listening SG - 04/04/2025 0021    How often do you need to have someone help you read instructions, pamphlets, or written material from your doctor or pharmacy?:  Never SG - 04/04/2025 0021        CO-LEARNER #1     No question answered        CO-LEARNER #2     No question answered        SPECIAL TOPICS     No question answered        ANSWERED BY:     -:  Family SG - 04/04/2025 0021        Comments         Edit History       Deanna Rivas, RN - RN (Nurse)   04/04/2025 0021                          Social Drivers of Health     Tobacco Use: Low Risk  (4/15/2025)    Patient History     Smoking Tobacco Use: Never     Smokeless Tobacco Use: Never     Passive Exposure: Not on file   Alcohol Use: Not At Risk  (4/4/2025)    AUDIT-C     Frequency of Alcohol Consumption: Never     Average Number of Drinks: Patient does not drink     Frequency of Binge Drinking: Never   Financial Resource Strain: Medium Risk (4/27/2025)    Overall Financial Resource Strain (CARDIA)     Difficulty of Paying Living Expenses: Somewhat hard   Food Insecurity: No Food Insecurity (4/27/2025)    Hunger Vital Sign     Worried About Running Out of Food in the Last Year: Never true     Ran Out of Food in the Last Year: Never true   Transportation Needs: No Transportation Needs (4/27/2025)    PRAPARE - Transportation     Lack of Transportation (Medical): No     Lack of Transportation (Non-Medical): No   Physical Activity: Inactive (4/4/2025)    Exercise Vital Sign     Days of Exercise per Week: 0 days     Minutes of Exercise per Session: 0 min   Stress: No Stress Concern Present (4/27/2025)    [employer] - Occupational Stress Questionnaire     Feeling of Stress : Only a little   Housing Stability: Low Risk  (4/27/2025)    Housing Stability Vital Sign     Unable to Pay for Housing in the Last Year: No     Number of Times Moved in the Last Year: 0     Homeless in the Last Year: No   Depression: Low Risk  (10/30/2024)    Depression     Last PHQ-4: Flowsheet Data: 0   Utilities: Not At Risk (4/27/2025)    [facility] Utilities     Threatened with loss of utilities: No   Health Literacy: Adequate Health Literacy (4/27/2025)     Health Literacy     Frequency of need for help with medical instructions: Never   Recent Concern: Health Literacy - Inadequate Health Literacy (4/4/2025)     Health Literacy     Frequency of need for help with medical instructions: Sometimes   Social Isolation: Not on file     Malnutrition  Is Patient Malnourished: No    Nutrition Diagnosis  Inadequate energy intake related to Appetite loss as evidenced by poor PO intakes  Comments: initiate enteral feeding     Recent Labs   Lab 05/01/25  5954  05/01/25  0531 05/01/25  1147   *  --   --    POCGLU  --    < > 356*    < > = values in this interval not displayed.     Comments on Glucose: Glucose high (T2DM, metabolic stress, tube feeds, steroids)    Nutrition Prescription / Recommendations  Recommendation/Intervention: Recommend to change continuous feeds to nocturnal feeds. Recommend Suplena 1.8 at 55 mL/hr x 12 hours overnight (6 PM - 6 AM). FWF 60 mL/hr x 12 hours. Feeds to be shut off during day to stimulate appetite.  Goals: Improve oral intake  Nutrition Goal Status: new  Communication of RD Recs: reviewed with physician  Current Diet Order: Full Liquids with Tube Feeds  Chewing or Swallowing Issues: Yes - seen by SLP  Recommended Diet: Nocturnal Feeds + Full Liquids  Is Nutrition Support Recommended: Yes    Needs Calculated    Energy Calorie Requirements (kcal): 2,370 - 2,963 kcal (20 - 25 kcal/kg ABW)   Protein Requirements: 34 - 45 g (0.6 - 0.8 g/kg IBW)  Enteral Nutrition   Enteral Nutrition Formula Provides:  1,184 kcals Propofol Rate: No  30 g Protein  129 g Carbohydrates  63 g Fat Propofol Rate: No  487 ml Fluid without Flush    720 ml Fluid by flush   1,207 ml Total Fluid   Enteral Nutrition Recommended Order:  Tube feeding via NG/ Dobhoff  Tube feeding formula: Suplena 1.8 NG/ Dobhoff at 55mL/hour x 12 hours  Free Water Flush: 60 ml hourly x 12 hours  Modular Supplements:No Modular Supplements needed  Enteral Nutrition meets needs?: no - 50%  Enteral Nutrition Status: Initiate nocturnal feeds  Is Nutrition Education Recommended: No    Monitor and Evaluation  % current intake: 0 - 10 % (inadequate, remains poor)  % intake to meet estimated needs: 50 - 75 %  Monitor and Evaluation: Food and beverage intake, Protein intake, Carbohydrate intake, Diet order, Enteral and parenteral nutrition administration, Food and nutrition knowledge, Weight, Beliefs and attitudes, Electrolyte and renal panel, Gastrointestinal profile, Glucose/endocrine  profile, Inflammatory profile, Lipid profile, Nutrition focused physical findings, Skin    Current Medical Diagnosis and Past Medical History     Past Medical History:   Diagnosis Date    Chronic kidney disease, stage 3b     Chronic pulmonary embolism without acute cor pulmonale     Diabetes mellitus type 2 in obese     DNR (do not resuscitate)     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 Swanson    Melanocytic nevi of lower limb, including hip, left     Mixed hyperlipidemia     Moderate persistent asthma without complication 10/30/2024    Other pulmonary embolism without acute cor pulmonale     Vitamin D deficiency      Nutrition/Diet History  Spiritual, Cultural Beliefs, Yazdanism Practices, Values that Affect Care: no  NKFA    Lab/Procedures/Meds  Recent Labs   Lab 05/01/25  0409      K 4.6   *   CREATININE 4.12*   CALCIUM 8.5   ALBUMIN 1.3*      ALT 26   AST 25   PHOS 5.5*   Note: BUN, Cr, Phos elevated (CKD5). Alb low. Continued poor PO intakes.    Last A1c:   Lab Results   Component Value Date    HGBA1C 6.7 04/02/2025    HGBA1C 6.4 (H) 02/05/2025   Note: < 7 % goal for patients with diabetes    Lab Results   Component Value Date    RBC 2.84 (L) 05/01/2025    HGB 8.5 (L) 05/01/2025    HCT 25.5 (L) 05/01/2025    MCV 89.8 05/01/2025    MCH 29.9 05/01/2025    MCHC 33.3 05/01/2025   Note: H&H low    Pertinent Labs Reviewed: reviewed  Pertinent Medications Reviewed: reviewed    Scheduled Meds:   allopurinoL  300 mg Oral Daily    atorvastatin  10 mg Oral Daily    heparin (porcine)  5,000 Units Subcutaneous Q8H    insulin glargine U-100  30 Units Subcutaneous BID    Lactobacillus acidophilus  2  "capsule Oral TID WM    levoFLOXacin  500 mg Oral Q48H    metoclopramide HCl  5 mg Oral QID    metoprolol succinate  25 mg Oral Daily    montelukast  10 mg Oral QHS    mupirocin   Nasal BID    pantoprazole  40 mg Oral Daily    predniSONE  40 mg Oral Daily    Followed by    [START ON 5/4/2025] predniSONE  20 mg Oral Daily    Followed by    [START ON 5/18/2025] predniSONE  10 mg Oral Daily    rOPINIRole  0.25 mg Oral TID    sertraline  25 mg Oral Daily    sodium bicarbonate  1,300 mg Oral TID   Note: METOCLOPRAMIDE (gastroparesis)    Continuous Infusions:   sodium bicarbonate 100 mEq in D5W 1,000 mL infusion   Intravenous Continuous 60 mL/hr at 05/01/25 1110 New Bag at 05/01/25 1110     PRN Meds:.  Current Facility-Administered Medications:     acetaminophen, 650 mg, Oral, Q4H PRN    albuterol-ipratropium, 3 mL, Nebulization, Q4H PRN    dextrose 50%, 12.5 g, Intravenous, PRN    dextrose 50%, 25 g, Intravenous, PRN    glucagon (human recombinant), 1 mg, Intramuscular, PRN    glucose, 16 g, Oral, PRN    glucose, 24 g, Oral, PRN    insulin aspart U-100, 0-10 Units, Subcutaneous, Q6H PRN    Anthropometrics  Height: 5' 5" (165.1 cm)  Height (inches): 65 in  Weight: 120.1 kg (264 lb 12.4 oz)  Weight (lb): 264.78 lb  Weight Method: Bed Scale  Ideal Body Weight (IBW), Female: 125 lb  BMI (Calculated): 44.1       Estimated/Assessed Needs  RMR ([place]-St. Jeor Equation): 1731.88     Temp: 98.2 °F (36.8 °C)Axillary  Weight Used For Calorie Calculations: 118.5 kg (261 lb 3.9 oz)     Energy Calorie Requirements (kcal): 2,370 - 2,963 kcal (20 - 25 kcal/kg ABW)  Weight Used For Protein Calculations: 56.7 kg (125 lb)  Protein Requirements: 34 - 45 g (0.6 - 0.8 g/kg IBW) (protein needs lower 2/2 non-dialysis dependent CKD)    Fluid Requirements (mL): 1 mL/kcal   CHO Requirement: 45 - 55% total kcal (T2DM)    Nutrition by Nursing  Diet/Nutrition Received: full liquid  Intake (%): 0%     Diet/Feeding Tolerance: fair       NG/OG Tube " 04/25/25 0913 Right nostril-Feeding Type: intermittent, by pump (6p-6a)       NG/OG Tube 04/25/25 0913 Right nostril-Current Rate (mL/hr): 55 mL/hr       NG/OG Tube 04/25/25 0913 Right nostril-Goal Rate (mL/hr): 55 mL/hr       NG/OG Tube 04/25/25 0913 Right nostril-Formula Name: aura    Nutrition Follow-Up  RD Follow-up?: Yes    Nutrition Discharge Planning: Too early to determine, pending clinical course       Dorita Palma, MS, RD, LD  Available via Secure Chat

## 2025-05-01 NOTE — ASSESSMENT & PLAN NOTE
"Patient's FSGs are controlled on current medication regimen.  Last A1c reviewed-   Lab Results   Component Value Date    HGBA1C 6.7 04/02/2025     Most recent fingerstick glucose reviewed- No results for input(s): "POCTGLUCOSE" in the last 24 hours.  Current correctional scale  Low  Maintain anti-hyperglycemic dose as follows-   Antihyperglycemics (From admission, onward)      Start     Stop Route Frequency Ordered    05/01/25 1051  insulin aspart U-100 injection 0-10 Units         -- SubQ Every 6 hours PRN 05/01/25 0952    05/01/25 0900  insulin glargine U-100 (Lantus) injection 30 Units         -- SubQ 2 times daily 05/01/25 0624          Hold Oral hypoglycemics while patient is in the hospital.  BG ranging up as tube feeds now to goal, added Lantus and dose adjusted for tighter glycemic control.     "

## 2025-05-02 NOTE — SUBJECTIVE & OBJECTIVE
Interval History: Patient seen and examined at the bedside, reports no new symptoms or complains. Encouraged PO intake.     Review of Systems   All other systems reviewed and are negative.    Objective:     Vital Signs (Most Recent):  Temp: 98.6 °F (37 °C) (05/02/25 0800)  Pulse: 94 (05/02/25 0901)  Resp: 13 (05/02/25 0600)  BP: (!) 113/51 (05/02/25 0901)  SpO2: 100 % (05/02/25 0600) Vital Signs (24h Range):  Temp:  [97.2 °F (36.2 °C)-98.6 °F (37 °C)] 98.6 °F (37 °C)  Pulse:  [] 94  Resp:  [11-24] 13  SpO2:  [100 %] 100 %  BP: ()/(33-70) 113/51     Weight: 126.8 kg (279 lb 8.7 oz)  Body mass index is 46.52 kg/m².    Intake/Output Summary (Last 24 hours) at 5/2/2025 1508  Last data filed at 5/2/2025 0602  Gross per 24 hour   Intake 3254.05 ml   Output 250 ml   Net 3004.05 ml         Physical Exam  Constitutional:       Appearance: She is obese. She is ill-appearing.   HENT:      Head: Normocephalic.      Mouth/Throat:      Mouth: Mucous membranes are dry.   Cardiovascular:      Rate and Rhythm: Normal rate.      Heart sounds: Normal heart sounds. No murmur heard.  Pulmonary:      Effort: No respiratory distress.      Breath sounds: Rhonchi present.      Comments: On 2L NC  Abdominal:      General: There is no distension.      Tenderness: There is no abdominal tenderness.      Comments: Dobhoff in place   Skin:     Coloration: Skin is pale.   Neurological:      Mental Status: Mental status is at baseline. She is disoriented.               Significant Labs: All pertinent labs within the past 24 hours have been reviewed.    Significant Imaging: I have reviewed all pertinent imaging results/findings within the past 24 hours.

## 2025-05-02 NOTE — ASSESSMENT & PLAN NOTE
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 Most recent hemoglobin and hematocrit are listed below.  Recent Labs     04/30/25  0250 05/01/25  0409 05/02/25  0323   HGB 8.3* 8.5* 8.2*   HCT 25.9* 25.5* 25.2*     Plan  - Monitor serial CBC: Daily  - Transfuse PRBC if patient becomes hemodynamically unstable, symptomatic or H/H drops below 7/21.  - Patient has not received any PRBC transfusions to date  - Patient's anemia is currently stable  - No evidence of bleeding.

## 2025-05-02 NOTE — ASSESSMENT & PLAN NOTE
"EGD by Dr. Lo during recent past admission at [facility]:  "EGD on 03/21/2025 shows LA class B erosive esophagitis but no pill esophagitis, nonerosive gastritis and retention of food and fluid suspicious for gastroparesis, due to narcotics and diabetes. "  GI consulted, unable to advance diet and poor candidate for PEG.  Dobhoff in place for feeds, continued on Reglan.   GI recommended surgical PEG due to body habitus, GS stated that patient is not a candidate for PEG based on co morbidities and poor prognosis.   Continue PPI.     "

## 2025-05-02 NOTE — ASSESSMENT & PLAN NOTE
Went into SVT on 4/27, not responsive to multiple rounds of adenosine.  Started on amiodarone infusion after bolus, turned off on 4/29.  Cardiology consulted, no additional recommendations but could consider cardioversion if this becomes a recurrent and a refractory issue.   Monitor on tele.

## 2025-05-02 NOTE — ASSESSMENT & PLAN NOTE
"Patient's FSGs are controlled on current medication regimen.  Last A1c reviewed-   Lab Results   Component Value Date    HGBA1C 6.7 04/02/2025     Most recent fingerstick glucose reviewed- No results for input(s): "POCTGLUCOSE" in the last 24 hours.  Current correctional scale  Low  Maintain anti-hyperglycemic dose as follows-   Antihyperglycemics (From admission, onward)      Start     Stop Route Frequency Ordered    05/02/25 0900  insulin glargine U-100 (Lantus) injection 40 Units         -- SubQ 2 times daily 05/02/25 0821    05/01/25 1051  insulin aspart U-100 injection 0-10 Units         -- SubQ Every 6 hours PRN 05/01/25 0952          Hold Oral hypoglycemics while patient is in the hospital.  BG ranging up as tube feeds now to goal and D5 in bicarb drip, added Lantus and dose adjusted for tighter glycemic control.     "

## 2025-05-02 NOTE — PT/OT/SLP PROGRESS
Physical Therapy Treatment    Patient Name:  Magan Saleem   MRN:  [id number]    Recommendations:     Discharge Recommendations: Moderate Intensity Therapy  Discharge Equipment Recommendations: to be determined by next level of care  Barriers to discharge: ongoing medical treatment    Assessment:     Magan Saleem is a 69 y.o. female admitted with a medical diagnosis of Pneumonitis.  She presents with the following impairments/functional limitations: weakness, impaired endurance, impaired self care skills, impaired functional mobility.      Rehab Prognosis: Fair; patient would benefit from acute skilled PT services to address these deficits and reach maximum level of function.    Recent Surgery: * No surgery found *      Plan:     During this hospitalization, patient to be seen 5 x/week to address the identified rehab impairments via gait training, therapeutic exercises, therapeutic activities, neuromuscular re-education and progress toward the following goals:    Plan of Care Expires:  05/29/25    Subjective     Chief Complaint: Pneumonitis   Patient/Family Comments/goals: agreeable to treatment  Pain/Comfort:  Pain Rating 1: 0/10      Objective:     Communicated with Digna Greer RN prior to session.  Patient found HOB elevated with blood pressure cuff, pulse ox (continuous), PureWick, peripheral IV, telemetry, NG tube upon PT entry to room.     General Precautions: Standard, fall  Orthopedic Precautions: N/A  Braces: N/A  Respiratory Status: Nasal cannula, flow 2 L/min     Functional Mobility:  Bed Mobility:     Rolling Left:  maximal assistance  Rolling Right: maximal assistance  Supine to Sit: maximal assistance, of 2 persons, and assist with trunk and B LE management  Sit to Supine: dependence, of 2 persons, and assist with trunk and B LE management  Transfers:     Bed to Chair: dependence with maxim  Balance: minimum to contact guard assist sitting EOB x 9 minutes; max encouragement needed      AM-PAC 6  CLICK MOBILITY  Turning over in bed (including adjusting bedclothes, sheets and blankets)?: 2  Sitting down on and standing up from a chair with arms (e.g., wheelchair, bedside commode, etc.): 1  Moving from lying on back to sitting on the side of the bed?: 2  Moving to and from a bed to a chair (including a wheelchair)?: 2  Need to walk in hospital room?: 1  Climbing 3-5 steps with a railing?: 1  Basic Mobility Total Score: 9       Treatment & Education:  Mobility as noted      Patient left up in chair; HOB elevate after being return to bed via maxim lift all lines intact and call button in reach..    GOALS:   Multidisciplinary Problems       Physical Therapy Goals          Problem: Physical Therapy    Goal Priority Disciplines Outcome Interventions   Physical Therapy Goal     PT, PT/OT Progressing    Description: Short Term Goals to be met by: 25    Patient will increase functional independence with mobility by performin. Supine to sit with Moderate Assist  2. Sit to stand transfer with Maximal Assist using Rolling walker  3. Bed to chair transfer with Maximal Assist using lowest level of assistive device  4. Rolling side to side with Min A  5. Lower extremity exercise program x30 reps per handout, with assistance as needed    Long Term Goals to be met by: 25    Pt will regain full independent functional mobility with lowest level of assistive device to return to home situation and prior activities of daily living.                        DME Justifications:      Time Tracking:     PT Received On: 25  PT Start Time: 902     PT Stop Time: 945  PT Total Time (min): 43 min     Billable Minutes: Therapeutic activity 43    Treatment Type: Treatment  PT/PTA: PT     Number of PTA visits since last PT visit: 0     2025

## 2025-05-02 NOTE — PLAN OF CARE
Problem: Adult Inpatient Plan of Care  Goal: Plan of Care Review  Outcome: Progressing  Goal: Patient-Specific Goal (Individualized)  Outcome: Progressing  Goal: Absence of Hospital-Acquired Illness or Injury  Outcome: Progressing  Goal: Optimal Comfort and Wellbeing  Outcome: Progressing     Problem: Diabetes Comorbidity  Goal: Blood Glucose Level Within Targeted Range  Outcome: Progressing     Problem: Infection  Goal: Absence of Infection Signs and Symptoms  Outcome: Progressing

## 2025-05-02 NOTE — NURSING
5/2/2025 0630 Pt have not voided this shift and performed bladder scan and the greatest shown was 16 ml.    0700 Reported to LONDON Matamoros about not voiding and bladder scan

## 2025-05-02 NOTE — ASSESSMENT & PLAN NOTE
Body mass index is 46.52 kg/m². Morbid obesity complicates all aspects of disease management from diagnostic modalities to treatment. Weight loss encouraged and health benefits explained to patient.

## 2025-05-02 NOTE — ASSESSMENT & PLAN NOTE
Stenotrophomonas lower respiratory infection in this patient with multifocal infiltrates and consolidative opacities on CT Chest.   Complete total 2 week course with Levaquin.   Leukocytosis persists, will follow procalcitonin levels Q48H.  Repeat CT chest stable.

## 2025-05-02 NOTE — PT/OT/SLP PROGRESS
Occupational Therapy   Treatment    Name: Magan Saleem  MRN: [id number]  Admitting Diagnosis:  Pneumonitis       Recommendations:     Discharge Recommendations: Moderate Intensity Therapy  Discharge Equipment Recommendations:  to be determined by next level of care  Barriers to discharge:       Assessment:     Magan Saleem is a 69 y.o. female with a medical diagnosis of Pneumonitis.  Performance deficits affecting function are weakness, impaired endurance, impaired self care skills, impaired functional mobility, impaired cardiopulmonary response to activity.     Rehab Prognosis:  Good; patient would benefit from acute skilled OT services to address these deficits and reach maximum level of function.       Plan:     Patient to be seen 5 x/week to address the above listed problems via self-care/home management, therapeutic activities, therapeutic exercises  Plan of Care Expires: 06/03/25  Plan of Care Reviewed with: patient    Subjective     Chief Complaint:   Patient/Family Comments/goals:   Pain/Comfort:  Pain Rating 1: 0/10    Objective:     Communicated with: Ruby Hogan RN prior to session.  Patient found HOB elevated with blood pressure cuff, pulse ox (continuous), PureWick, peripheral IV, telemetry, NG tube upon OT entry to room.    General Precautions: Standard, fall    Orthopedic Precautions:N/A  Braces: N/A  Respiratory Status: Nasal cannula, flow 2 L/min     Occupational Performance:     Bed Mobility:    Supine to sit max a x 2  Sit to supine max a x 2  Rolling max a   Moving up to hob dep     Functional Mobility/Transfers:  Bed to chair dep via maxim lift   Functional Mobility:     Activities of Daily Living:  Dep to doff/evangelina gown  Sat eob 8 mins min /cga      AMPAC 6 Click ADL:      Treatment & Education:  Pt performed aarom 15 reps x 2 B shld flex,abd/add,elbow flex/ext wrist flex/ext    Patient left up in chair with all lines intact and call button in reach    GOALS:   Multidisciplinary Problems        Occupational Therapy Goals          Problem: Occupational Therapy    Goal Priority Disciplines Outcome Interventions   Occupational Therapy Goal     OT, PT/OT Progressing    Description: STG:  Pt will perform grooming with setup and min a  Pt will bathe with mod a  Pt will perform UE dressing with mod a  Pt will perform LE dressing with mod a with AD as needed  Pt will sit EOB x 10 min with SBA   Pt will transfer bed/chair/bsc with mod a  Pt will perform standing task x 30 sec with mod a   Pt will tolerate 20 minutes of tx without fatigue      LT.Restore to max I with self care and mobility.                          DME Justifications:      Time Tracking:     OT Date of Treatment: 25  OT Start Time: 902  OT Stop Time: 955  OT Total Time (min): 53 min    Billable Minutes:Therapeutic Activity 50    OT/SAROJ: SAROJ          2025

## 2025-05-02 NOTE — PROGRESS NOTES
[facility] Medicine  Progress Note    Patient Name: Magan Saleem  MRN: [id number]  Patient Class: IP- Inpatient   Admission Date: 4/26/2025  Length of Stay: 6 days  Attending Physician: Carlos Alberto Nicole MD  Primary Care Provider: Sil Brown DO        Subjective     Principal Problem:Pneumonitis        HPI:  68 yo F presents to [facility] ED from [facility] for abnormal labs.  Patient was discharged from [facility] two days ago to skilled nursing facility for rehab.  She was diagnosed with dehydration due to gastroparesis with UTI.  Patient has metastatic lung cancer (to the brain) and follows with Dr. Swanson for IV chemotherapy every other week and takes lazertinib daily.  She has completed her course of radiation for the brain mets and follow had showed some improvement.  I thought she had either some decreased LOC due to encephalopathy or some aphasia from the brain mets but after a great effort to get her to talk, I realized she was just mad.  She wanted to know why she had been discharged two days ago when she could not eat.  She has no appetite and early satiety.  She is not nauseated and no vomiting.  She has decided on a DNR status previously (her caretaker and friend of 20 years) states that she had always said she did not want resuscitation if she experienced cardiopulmonary arrest and had told her children her wishes but she does want treatment and is not opposed to J tube if needed.  She has not had anything to eat or drink in two days and is dehydrated again.       Patient was transferred because of concern of sepsis.  She did have enterococcus faecalis UTI at [facility] and was treated.  I do not have the culture results but cultures were sent and patient does have some yeast noted.  (Will not treat a yeast colonization).  She is afebrile and hemodynamically stable and does not look septic clinically.  Her Lactic acid is stable at 2.5 dara 3.2.  Will  check another in am after volume resuscitation.  Her creat is at baseline but she has prerenal azotemia further confirming the dehydration.  Patient has an IO in place from [facility] due to dehydration and difficult stick but with some volume resuscitation, we have gotten an IV.  She is covid and flu negative.       Her WBC is 29 and I am not sure if she has received neupogen but could be a stress reaction.  Her BS is 245 and she does have some urine ketones but most likely due to starvation ketosis.  Will check a serum ketone.  Her platelets are 88K but this is most likely from her chemo.  She is not anemic.  CXR shows some patchy infiltrate but no obvious obstructive pneumonia from her lung cancer.  Her BNP about 3K but no clinical signs of CHF.  No recent echo but due to her BMI 50 most likely has some PHTN.  EKG had no ischemic changes but tachy at 114 which could also be from dehydration.  She was on ozempic for her DM and this could be the cause of her decreased appetite.  She is also on decadron for prevention of cerebral edema.       Remainder of ROS as below.  She mostly just nods and shakes her head and rolls her eyes.  You can get her to laugh if you try but she has been depressed since she has not walked since her hospitalization at [facility] on 3/19/25 and was discharged two days ago.  She says that at her last chemo she noticed she was getting weaker and her daughter had to help her in and out of the car and that was new for her.      4/5- patient seen examined today resting comfortably in bed and in no acute distress, with no acute events overnight.  Patient has a multitude of issues complicating her medical picture.  White blood cell count 40834 today, sodium 159, potassium 3.2 and BUN creatinine 59 and 1.8.  The patient is still not eating even when assistance is provided.  We have already changed her fluids to half-normal saline with 20 of KCl at 1:25 a.m..  Have also put in a GI consult for possible feeding  tube.  E coli in the urine has turned out to be ESBL and Rocephin has been changed Merrem.     Hospital Course at [facility]:    4/6- the patient seen examined today resting comfortably in bed, in no acute distress, in no acute events overnight.  The patient is not very talkative today, but states she is doing okay.  She has no acute complaints.  Blood cultures remain negative.  The patient has not eaten since yesterday and is on light IV fluids.  GI has been consulted for feeding tube.  Continues on Merrem for positive urine culture.  04/07 Records reviewed. Not eating which not new, Similar during recent admit at [facility]. Dr Swanson there had question pancreatitis. No abd pain or tenderness reported now. Question of dysphagia to solids. Chart hx gastroparesis. Will discuss with GI. Ronnie says has responded so far well to treatment for lung CA.   04/08 had EGD at [facility] 03/21/25 with no obstruction and evidence gastroparesis. Still not ending. Try additional meds. Talked with GI. Increase activity  04/09 Some better with med  changes  04/10 Continues to do better. Ate 1/2 fish sandwich for lunch. Called by Dr Swanson and she wanting to keep feeding tube in consideration. Talked with Dr Reich and Dr Lemus. If something needed with gastroparesis would have to have post gastric position of tube.   04/11 eating some. Later staff requested some nystatin for sore mouth.   04/12 still not eating well.  Increased peripheral edema.  Albumin low at 1.5.  Will give some albumin and follow with some Lasix.  Placed Dobbhoff tube and start enteral feedings.  This will help with nutrition and if issue of placing surgical tube later make sure will tolerate enteral feedings.  Chest x-ray continues worse on left side.  Discuss with Pulmonary and ask Dr. Villasenor to see.   04/13 no new issues.  Enteral feedings to be started.  Pulmonary evaluation appreciated and plans noted.  04/14 Tolerating feedings Therapy working with her.  Bronchoscopy planned.   4/15 BAL today  4/16 bronch yesterday looks like pneumonitis  4/17 not candidate for PEG  04/18 Eating some now. Pt says feels some better than last seen. Look at placement. High dose steroids by pulmonary. BS not as bad as would expect.  04/19 states continued eat some.  Less nausea.  Blood sugar not sure bad considering the steroids.  Pulmonary adjusted antibiotics based on cultures.  Look at it placement next week.   04/20 Looks the small. C/O SOB to nursing staff earlier but ABG OK. Poor oral intake ; encourage for pt to do more. Looking into placement.  04/21 Firm tender area in abd wall above umbilicus; ?hernia. Abnormal CT de santiago noted and will discuss with surgery.   04/22 CT shows evolving pancreatitis which pt treated for acouple weeks earlier at [facility]. Reviewed with Dr Tapia. No plan to operate on ventral hernia. Discussed later with Dr York. See how does off IVF and encourage oral intake. WBC and Cr both slight better.   04/23 Looks this same. Pt eating some. Pt getting discouraged and asking about home hospice. Talked by phone with her friend Shauna. In conversion doesn't sound like family would be able to care for pt at home. Encouraged pt to give some time and attempt SWB. She says she with consider tonight.   04/24 Lab worse but pt looks some better. Still not taking in well. Maybe eat better as pancreatitis further resolves. Considering replacing dobbhoff feeding tube and look into move to [facility]. Ask Dr Frias to review renal situation. Maybe pancreatitis,pneumonia,renal failure, all these might make a big difference if resolved. Talked with pt and she was OK with NGT  04/25 patient's spirits seems to be doing some better.  Dobbhoff tube placed in feedings to be started.  To be moved to LTAC.  No other new issue  04/26 Awaiting bed assignment at [facility]. No new complains.     Overview/Hospital Course:  4/28- BG trending up, added Lantus and adjusted dose. D/W nutritionist about  changing feeds to allow for more PO intake.     4/29- BG better now. Off IV amiodarone infusion, remains in NSR. Encourage PO intake.     4/30- Continue to adjust insulin to get BG under 200, ideally 140-180. Checking CT chest, abdomen and pelvis today given persistent leukocytosis.     5/1- Discussing with surgery about fluid collections in the abdomen noted on CT (slightly increased in size), also asking if PEG is an option as previously deemed not a candidate. Nephrology has added bicarb infusion. Continue current management otherwise.     5/2- IV fluids started per nephro. WBC count remains elevated.     Interval History: Patient seen and examined at the bedside, reports no new symptoms or complains. Encouraged PO intake.     Review of Systems   All other systems reviewed and are negative.    Objective:     Vital Signs (Most Recent):  Temp: 98.6 °F (37 °C) (05/02/25 0800)  Pulse: 94 (05/02/25 0901)  Resp: 13 (05/02/25 0600)  BP: (!) 113/51 (05/02/25 0901)  SpO2: 100 % (05/02/25 0600) Vital Signs (24h Range):  Temp:  [97.2 °F (36.2 °C)-98.6 °F (37 °C)] 98.6 °F (37 °C)  Pulse:  [] 94  Resp:  [11-24] 13  SpO2:  [100 %] 100 %  BP: ()/(33-70) 113/51     Weight: 126.8 kg (279 lb 8.7 oz)  Body mass index is 46.52 kg/m².    Intake/Output Summary (Last 24 hours) at 5/2/2025 1508  Last data filed at 5/2/2025 0602  Gross per 24 hour   Intake 3254.05 ml   Output 250 ml   Net 3004.05 ml         Physical Exam  Constitutional:       Appearance: She is obese. She is ill-appearing.   HENT:      Head: Normocephalic.      Mouth/Throat:      Mouth: Mucous membranes are dry.   Cardiovascular:      Rate and Rhythm: Normal rate.      Heart sounds: Normal heart sounds. No murmur heard.  Pulmonary:      Effort: No respiratory distress.      Breath sounds: Rhonchi present.      Comments: On 2L NC  Abdominal:      General: There is no distension.      Tenderness: There is no abdominal tenderness.      Comments: Domaxineoff in  place   Skin:     Coloration: Skin is pale.   Neurological:      Mental Status: Mental status is at baseline. She is disoriented.               Significant Labs: All pertinent labs within the past 24 hours have been reviewed.    Significant Imaging: I have reviewed all pertinent imaging results/findings within the past 24 hours.      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  Pulmonology consulted, started on steroids and s/p bronchoscopy with normal findings, BAL culture with stenotrophomonas.   Steroid taper plan per Pulmonology, on antibiotics.   Respiratory status is stable.     Infection due to Stenotrophomonas maltophilia  Sepsis due to pneumonia  Stenotrophomonas lower respiratory infection in this patient with multifocal infiltrates and consolidative opacities on CT Chest.   Complete total 2 week course with Levaquin.   Leukocytosis persists, will follow procalcitonin levels Q48H.  Repeat CT chest stable.    Acute kidney injury superimposed on stage 4 chronic kidney disease  JOHNATHON is likely due to pre-renal azotemia due to intravascular volume depletion. Baseline creatinine is ~ 2.0. Most recent creatinine and eGFR are listed below.  Recent Labs     04/30/25  0250 05/01/25  0409 05/02/25  0323   CREATININE 4.44* 4.12* 4.28*   EGFRNORACEVR 10* 11* 11*      Plan  - JOHNATHON is worsening now.   - Avoid nephrotoxins and renally dose meds for GFR listed above  - Monitor urine output, serial BMP, and adjust therapy as needed  - Nephrology consulted, restarted IV fluids with bicarb    SVT (supraventricular tachycardia)  Went into SVT on 4/27, not responsive to multiple rounds of adenosine.  Started on amiodarone infusion after bolus, turned off on 4/29.  Cardiology consulted, no additional recommendations but could consider cardioversion if this becomes a recurrent and a refractory issue.   Monitor on tele.     Acute pancreatitis  Intra-abdominal fluid  "collection  CT abdomen/pelvis showed- Possible small fluid collection anterior to the pancreas measuring approximately 3.6 cm on axial 41. Probable complex collection slightly inferior to the pancreatic head extending to the right pelvis and right iliac fossa.     04/21 Abd fluid collection in area pancreas noted on CT abd, discussed with surgery  04/22 likely evolving pancreatitis dx a couple weeks ago at [facility], now with pseudocyst.  No severe epigastric pain.  04/26 Ongoing tube feeds and full liquid diet. If tolerates full liquid better, plan to advanced to soft foods.   04/30 Repeat CT abdomen ordered, showed the pancreas with multiple fluid collections anterior to the pancreas and within the root of the mesentery and extending into the anterior right pararenal space and right paracolic gutter.     5/1 Asking GS input regarding fluid collection. They believe this is sec to pancreatitis and nothing to do.     Gastroparesis  EGD by Dr. Lo during recent past admission at [facility]:  "EGD on 03/21/2025 shows LA class B erosive esophagitis but no pill esophagitis, nonerosive gastritis and retention of food and fluid suspicious for gastroparesis, due to narcotics and diabetes. "  GI consulted, unable to advance diet and poor candidate for PEG.  Dobhoff in place for feeds, continued on Reglan.   GI recommended surgical PEG due to body habitus, GS stated that patient is not a candidate for PEG based on co morbidities and poor prognosis.   Continue PPI.     Nonischemic nontraumatic myocardial injury  In the setting of SVT episodes.  Trend is flat, no chest pain and no ischemic changes noted on EKG.  No ischemic workup recommended per cardiology.     Lung cancer metastatic to brain  DNR but not hospice.   Receives chemo and has finished radiation.    Follows with Dr. Swanson.    Hyperuricemia  Hyperphosphatemia  Defer management to nephrology- started on allopurinol.     Essential hypertension  Patient's blood " "pressure range in the last 24 hours was: BP  Min: 93/41  Max: 115/55.The patient's inpatient anti-hypertensive regimen is listed below:  Current Antihypertensives  metoprolol succinate (TOPROL-XL) 24 hr split tablet 25 mg, Daily, Oral    Plan  - BP is controlled, no changes needed to their regimen    Type 2 diabetes mellitus with hyperglycemia  Patient's FSGs are controlled on current medication regimen.  Last A1c reviewed-   Lab Results   Component Value Date    HGBA1C 6.7 04/02/2025     Most recent fingerstick glucose reviewed- No results for input(s): "POCTGLUCOSE" in the last 24 hours.  Current correctional scale  Low  Maintain anti-hyperglycemic dose as follows-   Antihyperglycemics (From admission, onward)      Start     Stop Route Frequency Ordered    05/02/25 0900  insulin glargine U-100 (Lantus) injection 40 Units         -- SubQ 2 times daily 05/02/25 0821    05/01/25 1051  insulin aspart U-100 injection 0-10 Units         -- SubQ Every 6 hours PRN 05/01/25 0952          Hold Oral hypoglycemics while patient is in the hospital.  BG ranging up as tube feeds now to goal and D5 in bicarb drip, added Lantus and dose adjusted for tighter glycemic control.     Normocytic anemia  Anemia is likely due to chronic disease due to Malignancy. Most recent hemoglobin and hematocrit are listed below.  Recent Labs     04/30/25  0250 05/01/25  0409 05/02/25  0323   HGB 8.3* 8.5* 8.2*   HCT 25.9* 25.5* 25.2*     Plan  - Monitor serial CBC: Daily  - Transfuse PRBC if patient becomes hemodynamically unstable, symptomatic or H/H drops below 7/21.  - Patient has not received any PRBC transfusions to date  - Patient's anemia is currently stable  - No evidence of bleeding.    Neoplastic (malignant) related fatigue  Patient with Acute on chronic debility due to neoplastic/malignant related fatigue. Latest AMPAC and GEMS scores have been reviewed. Evaluation for etiology is complete. Plan includes - Progressive mobility protocol " initated  - PT/OT consulted  - Fall precautions in place.    Chronic pulmonary embolism without acute cor pulmonale  Eliquis was discontinued due to risk of ICH with brain mets.  Currently on hold in case surgical procedure is required.    Morbid obesity  Body mass index is 46.52 kg/m². Morbid obesity complicates all aspects of disease management from diagnostic modalities to treatment. Weight loss encouraged and health benefits explained to patient.     Moderate persistent asthma without complication  Stable without exacerbation.  Continue PRN nebs.     Edema due to hypoalbuminemia  Required albumin infusion intermittently during the stay.     Mixed hyperlipidemia  Resume statin.     VTE Risk Mitigation (From admission, onward)           Ordered     heparin (porcine) injection 5,000 Units  Every 8 hours         04/27/25 1055                    Discharge Planning   MITUL: 5/9/2025     Code Status: DNR   Medical Readiness for Discharge Date:   Discharge Plan A: Skilled Nursing Facility                Please place Justification for DME        RAYMOND ZHAO MD  Department of Hospital Medicine   [facility]

## 2025-05-02 NOTE — ASSESSMENT & PLAN NOTE
JOHNATHON is likely due to pre-renal azotemia due to intravascular volume depletion. Baseline creatinine is ~ 2.0. Most recent creatinine and eGFR are listed below.  Recent Labs     04/30/25  0250 05/01/25  0409 05/02/25  0323   CREATININE 4.44* 4.12* 4.28*   EGFRNORACEVR 10* 11* 11*      Plan  - JOHNATHON is worsening now.   - Avoid nephrotoxins and renally dose meds for GFR listed above  - Monitor urine output, serial BMP, and adjust therapy as needed  - Nephrology consulted, restarted IV fluids with bicarb

## 2025-05-02 NOTE — ASSESSMENT & PLAN NOTE
CT abdomen/pelvis showed- Possible small fluid collection anterior to the pancreas measuring approximately 3.6 cm on axial 41. Probable complex collection slightly inferior to the pancreatic head extending to the right pelvis and right iliac fossa.     04/21 Abd fluid collection in area pancreas noted on CT abd, discussed with surgery  04/22 likely evolving pancreatitis dx a couple weeks ago at [facility], now with pseudocyst.  No severe epigastric pain.  04/26 Ongoing tube feeds and full liquid diet. If tolerates full liquid better, plan to advanced to soft foods.   04/30 Repeat CT abdomen ordered, showed the pancreas with multiple fluid collections anterior to the pancreas and within the root of the mesentery and extending into the anterior right pararenal space and right paracolic gutter.     5/1 Asking GS input regarding fluid collection. They believe this is sec to pancreatitis and nothing to do.

## 2025-05-02 NOTE — ASSESSMENT & PLAN NOTE
Patient's blood pressure range in the last 24 hours was: BP  Min: 93/41  Max: 115/55.The patient's inpatient anti-hypertensive regimen is listed below:  Current Antihypertensives  metoprolol succinate (TOPROL-XL) 24 hr split tablet 25 mg, Daily, Oral    Plan  - BP is controlled, no changes needed to their regimen

## 2025-05-02 NOTE — PROGRESS NOTES
[facility]  Nephrology  Progress Note    Patient Name: Magan Saleem  MRN: [id number]  Admission Date: 4/26/2025  Hospital Length of Stay: 6 days  Attending Provider: Carlos Alberto Nicole MD   Primary Care Physician: Sil Brown DO  Principal Problem:Pneumonitis    Consults  Subjective:     Interval History: The patient is sitting up in a chair today.She denies having dyspnea.    Review of patient's allergies indicates:   Allergen Reactions    Penicillins Hives    Sulfa (sulfonamide antibiotics) Hives     Current Facility-Administered Medications   Medication Frequency    acetaminophen tablet 650 mg Q4H PRN    albuterol-ipratropium 2.5 mg-0.5 mg/3 mL nebulizer solution 3 mL Q4H PRN    allopurinoL tablet 300 mg Daily    atorvastatin tablet 10 mg Daily    dextrose 50% injection 12.5 g PRN    dextrose 50% injection 25 g PRN    glucagon (human recombinant) injection 1 mg PRN    glucose chewable tablet 16 g PRN    glucose chewable tablet 24 g PRN    heparin (porcine) injection 5,000 Units Q8H    insulin aspart U-100 injection 0-10 Units Q6H PRN    insulin glargine U-100 (Lantus) injection 40 Units BID    Lactobacillus acidophilus capsule 2 capsule TID WM    levoFLOXacin tablet 500 mg Q48H    metoclopramide HCl tablet 5 mg QID    metoprolol succinate (TOPROL-XL) 24 hr split tablet 25 mg Daily    montelukast tablet 10 mg QHS    pantoprazole EC tablet 40 mg Daily    predniSONE tablet 40 mg Daily    Followed by    [START ON 5/4/2025] predniSONE tablet 20 mg Daily    Followed by    [START ON 5/18/2025] predniSONE tablet 10 mg Daily    rOPINIRole tablet 0.25 mg TID    sertraline tablet 25 mg Daily    sodium bicarbonate 100 mEq in D5W 1,000 mL infusion Continuous    sodium bicarbonate tablet 1,300 mg TID       Objective:     Vital Signs (Most Recent):  Temp: 98.5 °F (36.9 °C) (05/02/25 0400)  Pulse: 94 (05/02/25 0901)  Resp: 13 (05/02/25 0600)  BP: (!) 113/51 (05/02/25 0901)  SpO2: 100 %  (25 0600) Vital Signs (24h Range):  Temp:  [97.2 °F (36.2 °C)-98.5 °F (36.9 °C)] 98.5 °F (36.9 °C)  Pulse:  [] 94  Resp:  [11-24] 13  SpO2:  [100 %] 100 %  BP: ()/(33-70) 113/51     Weight: 126.8 kg (279 lb 8.7 oz) (25 0602)  Body mass index is 46.52 kg/m².  Body surface area is 2.41 meters squared.    I/O last 3 completed shifts:  In: 4300.1 [I.V.:1989.1; NG/GT:2311]  Out: 550 [Urine:550]    Physical Exam  Constitutional:       Appearance: She is obese. She is ill-appearing.   Cardiovascular:      Heart sounds: Heart sounds are distant. No murmur heard.     No friction rub. No gallop.   Pulmonary:      Effort: No respiratory distress.      Breath sounds: No wheezing, rhonchi or rales.   Abdominal:      Palpations: Abdomen is soft. There is no mass.      Tenderness: There is generalized abdominal tenderness.   Musculoskeletal:      Right lower le+ Pitting Edema present.      Left lower le+ Pitting Edema present.   Neurological:      Mental Status: She is alert.         Significant Labs:sureCBC:   Recent Labs   Lab 25  0323   WBC 37.16*   RBC 2.78*   HGB 8.2*   HCT 25.2*      MCV 90.6   MCH 29.5   MCHC 32.5     CMP:   Recent Labs   Lab 25  0409 25  0323   * 337*   CALCIUM 8.5 7.7*   ALBUMIN 1.3*  --    PROT 4.6*  --     139   K 4.6 4.6   CO2 19* 22*    101   * 86*   CREATININE 4.12* 4.28*   ALKPHOS 237*  --    ALT 26  --    AST 25  --    BILITOT 0.3  --      All labs within the past 24 hours have been reviewed.    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.0] 04/15/2025 Yes    Normocytic anemia [D64.9] 04/15/2025 Yes    Edema due to hypoalbuminemia [E88.09] 04/12/2025 Yes    Type 2 diabetes mellitus with hyperglycemia [E11.65] 04/04/2025 Yes    Sepsis due to pneumonia [J18.9, A41.9] 04/04/2025 Yes    Gastroparesis [K31.84]  Yes    Lung cancer metastatic to brain [C34.90, C79.31]  Yes    Morbid obesity [E66.01] 04/03/2025 Yes    Acute kidney injury superimposed on stage 4 chronic kidney disease [N17.9, N18.4]  Yes    Chronic pulmonary embolism without acute cor pulmonale [I27.82]  Yes    Moderate persistent asthma without complication [J45.40] 10/30/2024 Yes    Essential hypertension [I10] 06/30/2021 Yes    Mixed hyperlipidemia [E78.2] 06/30/2021 Yes      Problems Resolved During this Admission:       IMP:  JOHNATHON superimposed on CKD  Uric acid trending down as is the BUN     Plan:  Increase iv fluids for now and follow the renal function studies and the uric acid and Calcium levels    Thank you for your consult. I will follow-up with patient. Please contact us if you have any additional questions.    Mikey Velásquez MD  Nephrology  [facility]

## 2025-05-02 NOTE — ASSESSMENT & PLAN NOTE
CT abdomen/pelvis showed- Possible small fluid collection anterior to the pancreas measuring approximately 3.6 cm on axial 41. Probable complex collection slightly inferior to the pancreatic head extending to the right pelvis and right iliac fossa.     04/21 Abd fluid collection in area pancreas noted on CT abd, discussed with surgery  04/22 likely evolving pancreatitis dx a couple weeks ago at [facility], now with pseudocyst.  No severe epigastric pain.  04/26 Ongoing tube feeds and full liquid diet. If tolerates full liquid better, plan to advanced to soft foods.   04/30 Repeat CT abdomen ordered, showed the pancreas with multiple fluid collections anterior to the pancreas and within the root of the mesentery and extending into the anterior right pararenal space and right paracolic gutter.     5/1 Asking GS input regarding fluid collection. They believe this is sec to pancreatitis and nothing to do.

## 2025-05-03 PROBLEM — Z71.89 GOALS OF CARE, COUNSELING/DISCUSSION: Status: ACTIVE | Noted: 2025-05-03

## 2025-05-03 NOTE — ASSESSMENT & PLAN NOTE
Body mass index is 47.36 kg/m². Morbid obesity complicates all aspects of disease management from diagnostic modalities to treatment. Weight loss encouraged and health benefits explained to patient.

## 2025-05-03 NOTE — ACP (ADVANCE CARE PLANNING)
Advance Care Planning     Date: 05/03/2025    Today a voluntary meeting took place: bedside    Patient Participation: Patient is able to participate      Staff attendees (Name and  Role): Carlos Alberto Nicole MD    ACP Conversation (General): Understanding of current condition cancer with nutritional issues    Code Status: DNR; status confirmed/order placed in chart     ACP Documents: None    Goals of care: The patient endorses that what is most important right now is to focus on symptom/pain control, quality of life, even if it means sacrificing a little time, and improvement in condition but with limits to invasive therapies    Accordingly, we have decided that the best plan to meet the patient's goals includes continuing with treatment      Recommendations/  Follow-up tasks: Follow up family meeting planned: recommend involving daughter in the discussions      Length of ACP   conversation in minutes: 30 minutes

## 2025-05-03 NOTE — PLAN OF CARE
Problem: Adult Inpatient Plan of Care  Goal: Plan of Care Review  Outcome: Progressing  Goal: Patient-Specific Goal (Individualized)  Outcome: Progressing  Goal: Optimal Comfort and Wellbeing  Outcome: Progressing     Problem: Acute Kidney Injury/Impairment  Goal: Improved Oral Intake  Outcome: Progressing     Problem: Wound  Goal: Optimal Coping  Outcome: Progressing     Problem: Skin Injury Risk Increased  Goal: Skin Health and Integrity  Outcome: Progressing     Problem: Sepsis/Septic Shock  Goal: Optimal Coping  Outcome: Progressing  Goal: Absence of Bleeding  Outcome: Progressing     Problem: Pneumonia  Goal: Resolution of Infection Signs and Symptoms  Outcome: Progressing     Problem: Airway Clearance Ineffective  Goal: Effective Airway Clearance  Outcome: Progressing

## 2025-05-03 NOTE — ASSESSMENT & PLAN NOTE
"EGD by Dr. Lo during recent past admission at [facility]:  "EGD on 03/21/2025 shows LA class B erosive esophagitis but no pill esophagitis, nonerosive gastritis and retention of food and fluid suspicious for gastroparesis, due to narcotics and diabetes. "  GI consulted, unable to advance diet and poor candidate for PEG.  Dobhoff in place for feeds, continued on Reglan.   GI recommended surgical PEG due to body habitus, GS stated that patient is not a candidate for PEG based on co morbidities and poor prognosis.   Continue PPI.     "

## 2025-05-03 NOTE — ASSESSMENT & PLAN NOTE
JOHNATHON is likely due to pre-renal azotemia due to intravascular volume depletion. Baseline creatinine is ~ 2.0. Most recent creatinine and eGFR are listed below.  Recent Labs     05/01/25  0409 05/02/25  0323 05/03/25  0203   CREATININE 4.12* 4.28* 4.16*   EGFRNORACEVR 11* 11* 11*      Plan  - JOHNATHON is worsening now.   - Avoid nephrotoxins and renally dose meds for GFR listed above  - Monitor urine output, serial BMP, and adjust therapy as needed  - Nephrology consulted, restarted IV fluids with bicarb

## 2025-05-03 NOTE — PROGRESS NOTES
[facility] Medicine  Progress Note    Patient Name: Magan Saleem  MRN: [id number]  Patient Class: IP- Inpatient   Admission Date: 4/26/2025  Length of Stay: 7 days  Attending Physician: Carlos Alberto Nicole MD  Primary Care Provider: Sil Brown DO        Subjective     Principal Problem:Pneumonitis        HPI:  68 yo F presents to [facility] ED from [facility] for abnormal labs.  Patient was discharged from [facility] two days ago to skilled nursing facility for rehab.  She was diagnosed with dehydration due to gastroparesis with UTI.  Patient has metastatic lung cancer (to the brain) and follows with Dr. Swanson for IV chemotherapy every other week and takes lazertinib daily.  She has completed her course of radiation for the brain mets and follow had showed some improvement.  I thought she had either some decreased LOC due to encephalopathy or some aphasia from the brain mets but after a great effort to get her to talk, I realized she was just mad.  She wanted to know why she had been discharged two days ago when she could not eat.  She has no appetite and early satiety.  She is not nauseated and no vomiting.  She has decided on a DNR status previously (her caretaker and friend of 20 years) states that she had always said she did not want resuscitation if she experienced cardiopulmonary arrest and had told her children her wishes but she does want treatment and is not opposed to J tube if needed.  She has not had anything to eat or drink in two days and is dehydrated again.       Patient was transferred because of concern of sepsis.  She did have enterococcus faecalis UTI at [facility] and was treated.  I do not have the culture results but cultures were sent and patient does have some yeast noted.  (Will not treat a yeast colonization).  She is afebrile and hemodynamically stable and does not look septic clinically.  Her Lactic acid is stable at 2.5 dara 3.2.  Will  check another in am after volume resuscitation.  Her creat is at baseline but she has prerenal azotemia further confirming the dehydration.  Patient has an IO in place from [facility] due to dehydration and difficult stick but with some volume resuscitation, we have gotten an IV.  She is covid and flu negative.       Her WBC is 29 and I am not sure if she has received neupogen but could be a stress reaction.  Her BS is 245 and she does have some urine ketones but most likely due to starvation ketosis.  Will check a serum ketone.  Her platelets are 88K but this is most likely from her chemo.  She is not anemic.  CXR shows some patchy infiltrate but no obvious obstructive pneumonia from her lung cancer.  Her BNP about 3K but no clinical signs of CHF.  No recent echo but due to her BMI 50 most likely has some PHTN.  EKG had no ischemic changes but tachy at 114 which could also be from dehydration.  She was on ozempic for her DM and this could be the cause of her decreased appetite.  She is also on decadron for prevention of cerebral edema.       Remainder of ROS as below.  She mostly just nods and shakes her head and rolls her eyes.  You can get her to laugh if you try but she has been depressed since she has not walked since her hospitalization at [facility] on 3/19/25 and was discharged two days ago.  She says that at her last chemo she noticed she was getting weaker and her daughter had to help her in and out of the car and that was new for her.      4/5- patient seen examined today resting comfortably in bed and in no acute distress, with no acute events overnight.  Patient has a multitude of issues complicating her medical picture.  White blood cell count 24648 today, sodium 159, potassium 3.2 and BUN creatinine 59 and 1.8.  The patient is still not eating even when assistance is provided.  We have already changed her fluids to half-normal saline with 20 of KCl at 1:25 a.m..  Have also put in a GI consult for possible feeding  tube.  E coli in the urine has turned out to be ESBL and Rocephin has been changed Merrem.     Hospital Course at [facility]:    4/6- the patient seen examined today resting comfortably in bed, in no acute distress, in no acute events overnight.  The patient is not very talkative today, but states she is doing okay.  She has no acute complaints.  Blood cultures remain negative.  The patient has not eaten since yesterday and is on light IV fluids.  GI has been consulted for feeding tube.  Continues on Merrem for positive urine culture.  04/07 Records reviewed. Not eating which not new, Similar during recent admit at [facility]. Dr Swanson there had question pancreatitis. No abd pain or tenderness reported now. Question of dysphagia to solids. Chart hx gastroparesis. Will discuss with GI. Ronnie says has responded so far well to treatment for lung CA.   04/08 had EGD at [facility] 03/21/25 with no obstruction and evidence gastroparesis. Still not ending. Try additional meds. Talked with GI. Increase activity  04/09 Some better with med  changes  04/10 Continues to do better. Ate 1/2 fish sandwich for lunch. Called by Dr Swanson and she wanting to keep feeding tube in consideration. Talked with Dr Reich and Dr Lemus. If something needed with gastroparesis would have to have post gastric position of tube.   04/11 eating some. Later staff requested some nystatin for sore mouth.   04/12 still not eating well.  Increased peripheral edema.  Albumin low at 1.5.  Will give some albumin and follow with some Lasix.  Placed Dobbhoff tube and start enteral feedings.  This will help with nutrition and if issue of placing surgical tube later make sure will tolerate enteral feedings.  Chest x-ray continues worse on left side.  Discuss with Pulmonary and ask Dr. Villasenor to see.   04/13 no new issues.  Enteral feedings to be started.  Pulmonary evaluation appreciated and plans noted.  04/14 Tolerating feedings Therapy working with her.  Bronchoscopy planned.   4/15 BAL today  4/16 bronch yesterday looks like pneumonitis  4/17 not candidate for PEG  04/18 Eating some now. Pt says feels some better than last seen. Look at placement. High dose steroids by pulmonary. BS not as bad as would expect.  04/19 states continued eat some.  Less nausea.  Blood sugar not sure bad considering the steroids.  Pulmonary adjusted antibiotics based on cultures.  Look at it placement next week.   04/20 Looks the small. C/O SOB to nursing staff earlier but ABG OK. Poor oral intake ; encourage for pt to do more. Looking into placement.  04/21 Firm tender area in abd wall above umbilicus; ?hernia. Abnormal CT de santiago noted and will discuss with surgery.   04/22 CT shows evolving pancreatitis which pt treated for acouple weeks earlier at [facility]. Reviewed with Dr Tapia. No plan to operate on ventral hernia. Discussed later with Dr York. See how does off IVF and encourage oral intake. WBC and Cr both slight better.   04/23 Looks this same. Pt eating some. Pt getting discouraged and asking about home hospice. Talked by phone with her friend Shauna. In conversion doesn't sound like family would be able to care for pt at home. Encouraged pt to give some time and attempt SWB. She says she with consider tonight.   04/24 Lab worse but pt looks some better. Still not taking in well. Maybe eat better as pancreatitis further resolves. Considering replacing dobbhoff feeding tube [facility] Frias to review renal situation. Maybe pancreatitis,pneumonia,renal failure, all these might make a big difference if resolved. Talked with pt and she was OK with NGT  04/25 patient's spirits seems to be doing some better.  Dobbhoff tube placed in feedings to be started.  To be moved to LTAC.  No other new issue  04/26 Awaiting bed assignment at [facility]. No new complains.     Overview/Hospital Course:  4/28- BG trending up, added Lantus and adjusted dose. D/W nutritionist about  changing feeds to allow for more PO intake.     4/29- BG better now. Off IV amiodarone infusion, remains in NSR. Encourage PO intake.     4/30- Continue to adjust insulin to get BG under 200, ideally 140-180. Checking CT chest, abdomen and pelvis today given persistent leukocytosis.     5/1- Discussing with surgery about fluid collections in the abdomen noted on CT (slightly increased in size), also asking if PEG is an option as previously deemed not a candidate. Nephrology has added bicarb infusion. Continue current management otherwise.     5/2- IV fluids started per nephro. WBC count remains elevated.     5/3- Started GOC conversations with the patient who is alert, asked her if her family can be with her or her daughter who she is closest to. She said she will try to talk to them.     Interval History: Patient seen and examined at the bedside, reports no new symptoms or complains. Encouraged PO intake.     Review of Systems   All other systems reviewed and are negative.    Objective:     Vital Signs (Most Recent):  Temp: 97.8 °F (36.6 °C) (05/03/25 1100)  Pulse: 81 (05/03/25 1130)  Resp: 12 (05/03/25 1130)  BP: (!) 93/48 (05/03/25 1100)  SpO2: 100 % (05/03/25 1130) Vital Signs (24h Range):  Temp:  [96.5 °F (35.8 °C)-97.8 °F (36.6 °C)] 97.8 °F (36.6 °C)  Pulse:  [77-98] 81  Resp:  [11-27] 12  SpO2:  [93 %-100 %] 100 %  BP: ()/(34-58) 93/48     Weight: 129.1 kg (284 lb 9.8 oz)  Body mass index is 47.36 kg/m².    Intake/Output Summary (Last 24 hours) at 5/3/2025 1243  Last data filed at 5/3/2025 0606  Gross per 24 hour   Intake 2589.92 ml   Output 720 ml   Net 1869.92 ml         Physical Exam  Constitutional:       Appearance: She is obese. She is ill-appearing.   HENT:      Head: Normocephalic.      Mouth/Throat:      Mouth: Mucous membranes are dry.   Cardiovascular:      Rate and Rhythm: Normal rate.      Heart sounds: Normal heart sounds. No murmur heard.  Pulmonary:      Effort: No respiratory distress.       Breath sounds: Rhonchi present.      Comments: On 2L NC  Abdominal:      General: There is no distension.      Tenderness: There is no abdominal tenderness.      Comments: Dobhoff in place   Skin:     Coloration: Skin is pale.   Neurological:      Mental Status: Mental status is at baseline. She is disoriented.               Significant Labs: All pertinent labs within the past 24 hours have been reviewed.    Significant Imaging: I have reviewed all pertinent imaging results/findings within the past 24 hours.      Assessment & Plan  Acute kidney injury superimposed on stage 4 chronic kidney disease  JOHNATHON is likely due to pre-renal azotemia due to intravascular volume depletion. Baseline creatinine is ~ 2.0. Most recent creatinine and eGFR are listed below.  Recent Labs     05/01/25  0409 05/02/25  0323 05/03/25  0203   CREATININE 4.12* 4.28* 4.16*   EGFRNORACEVR 11* 11* 11*      Plan  - JOHNATHON is worsening now.   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  Pulmonology consulted, started on steroids and s/p bronchoscopy with normal findings, BAL culture with stenotrophomonas.   Steroid taper plan per Pulmonology, on antibiotics.   Respiratory status is stable.     Infection due to Stenotrophomonas maltophilia  Sepsis due to pneumonia  Stenotrophomonas lower respiratory infection in this patient with multifocal infiltrates and consolidative opacities on CT Chest.   Complete total 2 week course with Levaquin.   Leukocytosis persists, will follow procalcitonin levels Q48H.  Repeat CT chest stable.    SVT (supraventricular tachycardia)  Went into SVT on 4/27, not responsive to multiple rounds of adenosine.  Started on amiodarone infusion after bolus, turned off on 4/29.  Cardiology  "consulted, no additional recommendations but could consider cardioversion if this becomes a recurrent and a refractory issue.   Monitor on tele.     Acute pancreatitis  Intra-abdominal fluid collection  CT abdomen/pelvis showed- Possible small fluid collection anterior to the pancreas measuring approximately 3.6 cm on axial 41. Probable complex collection slightly inferior to the pancreatic head extending to the right pelvis and right iliac fossa.     04/21 Abd fluid collection in area pancreas noted on CT abd, discussed with surgery  04/22 likely evolving pancreatitis dx a couple weeks ago at [facility], now with pseudocyst.  No severe epigastric pain.  04/26 Ongoing tube feeds and full liquid diet. If tolerates full liquid better, plan to advanced to soft foods.   04/30 Repeat CT abdomen ordered, showed the pancreas with multiple fluid collections anterior to the pancreas and within the root of the mesentery and extending into the anterior right pararenal space and right paracolic gutter.     5/1 Asking GS input regarding fluid collection. They believe this is sec to pancreatitis and nothing to do.     Gastroparesis  EGD by Dr. Lo during recent past admission at [facility]:  "EGD on 03/21/2025 shows LA class B erosive esophagitis but no pill esophagitis, nonerosive gastritis and retention of food and fluid suspicious for gastroparesis, due to narcotics and diabetes. "  GI consulted, unable to advance diet and poor candidate for PEG.  Dobhoff in place for feeds, continued on Reglan.   GI recommended surgical PEG due to body habitus, GS stated that patient is not a candidate for PEG based on co morbidities and poor prognosis.   Continue PPI.     Lung cancer metastatic to brain  DNR but not hospice.   Receives chemo and has finished radiation.    Follows with Dr. Swanson.    Goals of care, counseling/discussion  DNR confirmed.  Encouraged patient to talk to her family about her prognosis.  Consider primary " "oncologist- Dr. Mata to get an idea of her prognosis related to cancer as patient is relying on that to make further decisions.   Currently PEG is not option as mentioned above so nutrition could be a major factor in driving the hospice discussion on top of her underlying metastatic cancer.      Nonischemic nontraumatic myocardial injury  In the setting of SVT episodes.  Trend is flat, no chest pain and no ischemic changes noted on EKG.  No ischemic workup recommended per cardiology.     Hyperuricemia  Hyperphosphatemia  Defer management to nephrology- started on allopurinol.     Essential hypertension  Patient's blood pressure range in the last 24 hours was: BP  Min: 73/35  Max: 114/53.The patient's inpatient anti-hypertensive regimen is listed below:  Current Antihypertensives  metoprolol succinate (TOPROL-XL) 24 hr split tablet 12.5 mg, Daily, Oral    Plan  - BP is controlled, no changes needed to their regimen    Type 2 diabetes mellitus with hyperglycemia  Patient's FSGs are controlled on current medication regimen.  Last A1c reviewed-   Lab Results   Component Value Date    HGBA1C 6.7 04/02/2025     Most recent fingerstick glucose reviewed- No results for input(s): "POCTGLUCOSE" in the last 24 hours.  Current correctional scale  Low  Maintain anti-hyperglycemic dose as follows-   Antihyperglycemics (From admission, onward)      Start     Stop Route Frequency Ordered    05/03/25 0900  insulin glargine U-100 (Lantus) injection 50 Units         -- SubQ 2 times daily 05/03/25 0822    05/01/25 1051  insulin aspart U-100 injection 0-10 Units         -- SubQ Every 6 hours PRN 05/01/25 0952          Hold Oral hypoglycemics while patient is in the hospital.  BG ranging up as tube feeds now to goal and D5 in bicarb drip, added Lantus and dose adjusted for tighter glycemic control.     Normocytic anemia  Anemia is likely due to chronic disease due to Malignancy. Most recent hemoglobin and hematocrit are listed " below.  Recent Labs     05/01/25  0409 05/02/25  0323 05/03/25  0203   HGB 8.5* 8.2* 7.5*   HCT 25.5* 25.2* 23.3*     Plan  - Monitor serial CBC: Daily  - Transfuse PRBC if patient becomes hemodynamically unstable, symptomatic or H/H drops below 7/21.  - Patient has not received any PRBC transfusions to date  - Patient's anemia is currently stable  - No evidence of bleeding.    Neoplastic (malignant) related fatigue  Patient with Acute on chronic debility due to neoplastic/malignant related fatigue. Latest AMPAC and GEMS scores have been reviewed. Evaluation for etiology is complete. Plan includes - Progressive mobility protocol initated  - PT/OT consulted  - Fall precautions in place.    Chronic pulmonary embolism without acute cor pulmonale  Eliquis was discontinued due to risk of ICH with brain mets.  Currently on hold in case surgical procedure is required.    Morbid obesity  Body mass index is 47.36 kg/m². Morbid obesity complicates all aspects of disease management from diagnostic modalities to treatment. Weight loss encouraged and health benefits explained to patient.     Moderate persistent asthma without complication  Stable without exacerbation.  Continue PRN nebs.     Edema due to hypoalbuminemia  Required albumin infusion intermittently during the stay.     Mixed hyperlipidemia  Resume statin.     VTE Risk Mitigation (From admission, onward)           Ordered     heparin (porcine) injection 5,000 Units  Every 8 hours         04/27/25 1055                    Discharge Planning   MITUL: 5/9/2025     Code Status: DNR   Medical Readiness for Discharge Date:   Discharge Plan A: Skilled Nursing Facility                Please place Justification for DME        RAYMOND ZHAO MD  Department of Hospital Medicine   [facility]

## 2025-05-03 NOTE — ASSESSMENT & PLAN NOTE
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 Most recent hemoglobin and hematocrit are listed below.  Recent Labs     05/01/25  0409 05/02/25  0323 05/03/25  0203   HGB 8.5* 8.2* 7.5*   HCT 25.5* 25.2* 23.3*     Plan  - Monitor serial CBC: Daily  - Transfuse PRBC if patient becomes hemodynamically unstable, symptomatic or H/H drops below 7/21.  - Patient has not received any PRBC transfusions to date  - Patient's anemia is currently stable  - No evidence of bleeding.

## 2025-05-03 NOTE — ASSESSMENT & PLAN NOTE
DNR confirmed.  Encouraged patient to talk to her family about her prognosis.  Consider primary oncologist- Dr. Mata to get an idea of her prognosis related to cancer as patient is relying on that to make further decisions.   Currently PEG is not option as mentioned above so nutrition could be a major factor in driving the hospice discussion on top of her underlying metastatic cancer.

## 2025-05-03 NOTE — ASSESSMENT & PLAN NOTE
"Patient's FSGs are controlled on current medication regimen.  Last A1c reviewed-   Lab Results   Component Value Date    HGBA1C 6.7 04/02/2025     Most recent fingerstick glucose reviewed- No results for input(s): "POCTGLUCOSE" in the last 24 hours.  Current correctional scale  Low  Maintain anti-hyperglycemic dose as follows-   Antihyperglycemics (From admission, onward)      Start     Stop Route Frequency Ordered    05/03/25 0900  insulin glargine U-100 (Lantus) injection 50 Units         -- SubQ 2 times daily 05/03/25 0822    05/01/25 1051  insulin aspart U-100 injection 0-10 Units         -- SubQ Every 6 hours PRN 05/01/25 0952          Hold Oral hypoglycemics while patient is in the hospital.  BG ranging up as tube feeds now to goal and D5 in bicarb drip, added Lantus and dose adjusted for tighter glycemic control.     "

## 2025-05-03 NOTE — ASSESSMENT & PLAN NOTE
CT abdomen/pelvis showed- Possible small fluid collection anterior to the pancreas measuring approximately 3.6 cm on axial 41. Probable complex collection slightly inferior to the pancreatic head extending to the right pelvis and right iliac fossa.     04/21 Abd fluid collection in area pancreas noted on CT abd, discussed with surgery  04/22 likely evolving pancreatitis dx a couple weeks ago at [facility], now with pseudocyst.  No severe epigastric pain.  04/26 Ongoing tube feeds and full liquid diet. If tolerates full liquid better, plan to advanced to soft foods.   04/30 Repeat CT abdomen ordered, showed the pancreas with multiple fluid collections anterior to the pancreas and within the root of the mesentery and extending into the anterior right pararenal space and right paracolic gutter.     5/1 Asking GS input regarding fluid collection. They believe this is sec to pancreatitis and nothing to do.

## 2025-05-03 NOTE — SUBJECTIVE & OBJECTIVE
Interval History: Patient seen and examined at the bedside, reports no new symptoms or complains. Encouraged PO intake.     Review of Systems   All other systems reviewed and are negative.    Objective:     Vital Signs (Most Recent):  Temp: 97.8 °F (36.6 °C) (05/03/25 1100)  Pulse: 81 (05/03/25 1130)  Resp: 12 (05/03/25 1130)  BP: (!) 93/48 (05/03/25 1100)  SpO2: 100 % (05/03/25 1130) Vital Signs (24h Range):  Temp:  [96.5 °F (35.8 °C)-97.8 °F (36.6 °C)] 97.8 °F (36.6 °C)  Pulse:  [77-98] 81  Resp:  [11-27] 12  SpO2:  [93 %-100 %] 100 %  BP: ()/(34-58) 93/48     Weight: 129.1 kg (284 lb 9.8 oz)  Body mass index is 47.36 kg/m².    Intake/Output Summary (Last 24 hours) at 5/3/2025 1243  Last data filed at 5/3/2025 0606  Gross per 24 hour   Intake 2589.92 ml   Output 720 ml   Net 1869.92 ml         Physical Exam  Constitutional:       Appearance: She is obese. She is ill-appearing.   HENT:      Head: Normocephalic.      Mouth/Throat:      Mouth: Mucous membranes are dry.   Cardiovascular:      Rate and Rhythm: Normal rate.      Heart sounds: Normal heart sounds. No murmur heard.  Pulmonary:      Effort: No respiratory distress.      Breath sounds: Rhonchi present.      Comments: On 2L NC  Abdominal:      General: There is no distension.      Tenderness: There is no abdominal tenderness.      Comments: Dobhoff in place   Skin:     Coloration: Skin is pale.   Neurological:      Mental Status: Mental status is at baseline. She is disoriented.               Significant Labs: All pertinent labs within the past 24 hours have been reviewed.    Significant Imaging: I have reviewed all pertinent imaging results/findings within the past 24 hours.

## 2025-05-03 NOTE — ASSESSMENT & PLAN NOTE
Patient's blood pressure range in the last 24 hours was: BP  Min: 73/35  Max: 114/53.The patient's inpatient anti-hypertensive regimen is listed below:  Current Antihypertensives  metoprolol succinate (TOPROL-XL) 24 hr split tablet 12.5 mg, Daily, Oral    Plan  - BP is controlled, no changes needed to their regimen

## 2025-05-03 NOTE — PROGRESS NOTES
[facility]  Nephrology  Progress Note    Patient Name: Magan Saleem  MRN: [id number]  Admission Date: 4/26/2025  Hospital Length of Stay: 7 days  Attending Provider: Carlos Alberto Nicole MD   Primary Care Physician: Sil Brown DO  Principal Problem:Pneumonitis    Consults  Subjective:     Interval History:  Ms. Saleem is seen in follow-up of her renal impairment.  She has significant hyperuricemia is being managed with allopurinol.  Apparently rasburicase is not an option.  Urine is being alkalinize with IV bicarb.  Since we are using the intravenous form I think we can decrease her pill burden by stopping the oral bicarb.      She is quite edematous.  Blood pressure is about 100 systolic.  We will begin Lasix 40 mg daily.    I expect her uric acid to fall hopefully as her renal function improves.  We will also fall with the use of allopurinol.  She is currently having no gouty symptoms.          Review of patient's allergies indicates:   Allergen Reactions    Penicillins Hives    Sulfa (sulfonamide antibiotics) Hives     Current Facility-Administered Medications   Medication Frequency    acetaminophen tablet 650 mg Q4H PRN    albuterol-ipratropium 2.5 mg-0.5 mg/3 mL nebulizer solution 3 mL Q4H PRN    allopurinoL tablet 300 mg Daily    atorvastatin tablet 10 mg Daily    dextrose 50% injection 12.5 g PRN    dextrose 50% injection 25 g PRN    furosemide tablet 40 mg Daily    glucagon (human recombinant) injection 1 mg PRN    glucose chewable tablet 16 g PRN    glucose chewable tablet 24 g PRN    heparin (porcine) injection 5,000 Units Q8H    insulin aspart U-100 injection 0-10 Units Q6H PRN    insulin glargine U-100 (Lantus) injection 50 Units BID    Lactobacillus acidophilus capsule 2 capsule TID WM    levoFLOXacin tablet 500 mg Q48H    metoclopramide HCl tablet 5 mg QID    metoprolol succinate (TOPROL-XL) 24 hr split tablet 12.5 mg Daily    montelukast tablet 10 mg QHS     pantoprazole EC tablet 40 mg Daily    [START ON 5/4/2025] predniSONE tablet 20 mg Daily    Followed by    [START ON 5/18/2025] predniSONE tablet 10 mg Daily    rOPINIRole tablet 0.25 mg TID    sertraline tablet 25 mg Daily    sodium bicarbonate 100 mEq in D5W 1,000 mL infusion Continuous       Objective:     Vital Signs (Most Recent):  Temp: 97.8 °F (36.6 °C) (05/03/25 1100)  Pulse: 81 (05/03/25 1130)  Resp: 12 (05/03/25 1130)  BP: (!) 93/48 (05/03/25 1100)  SpO2: 100 % (05/03/25 1130) Vital Signs (24h Range):  Temp:  [96.5 °F (35.8 °C)-97.8 °F (36.6 °C)] 97.8 °F (36.6 °C)  Pulse:  [77-98] 81  Resp:  [11-27] 12  SpO2:  [93 %-100 %] 100 %  BP: ()/(34-58) 93/48     Weight: 129.1 kg (284 lb 9.8 oz) (05/03/25 0545)  Body mass index is 47.36 kg/m².  Body surface area is 2.43 meters squared.    I/O last 3 completed shifts:  In: 5844 [I.V.:3548; NG/GT:2296]  Out: 720 [Urine:600; Drains:120]    Physical Exam clear chest.  She is obese and quite edematous.    Significant Labs:sureBMP:   Recent Labs   Lab 05/03/25  0203   *      K 4.5   CL 96*   CO2 27   *   CREATININE 4.16*   CALCIUM 7.8*   MG 2.3     CBC:   Recent Labs   Lab 05/03/25  0203   WBC 35.56*   RBC 2.55*   HGB 7.5*   HCT 23.3*   *   MCV 91.4   MCH 29.4   MCHC 32.2     All labs within the past 24 hours have been reviewed.    Significant Imaging:  Labs: Reviewed    Assessment/Plan:     Active Diagnoses:    Diagnosis Date Noted POA    PRINCIPAL PROBLEM:  Pneumonitis [J98.4] 04/16/2025 Yes    Goals of care, counseling/discussion [Z71.89] 05/03/2025 Not Applicable    Intra-abdominal fluid collection [R18.8] 05/01/2025 Yes    SVT (supraventricular tachycardia) [I47.10] 04/27/2025 No    Nonischemic nontraumatic myocardial injury [I5A] 04/27/2025 No    Hyperphosphatemia [E83.39] 04/26/2025 Yes    Hyperuricemia [E79.0] 04/26/2025 Yes    Infection due to Stenotrophomonas maltophilia [A49.8] 04/22/2025 Yes    Acute pancreatitis [K85.90]  04/21/2025 Yes    Neoplastic (malignant) related fatigue [R53.0] 04/15/2025 Yes    Normocytic anemia [D64.9] 04/15/2025 Yes    Edema due to hypoalbuminemia [E88.09] 04/12/2025 Yes    Type 2 diabetes mellitus with hyperglycemia [E11.65] 04/04/2025 Yes    Sepsis due to pneumonia [J18.9, A41.9] 04/04/2025 Yes    Gastroparesis [K31.84]  Yes    Lung cancer metastatic to brain [C34.90, C79.31]  Yes    Morbid obesity [E66.01] 04/03/2025 Yes    Acute kidney injury superimposed on stage 4 chronic kidney disease [N17.9, N18.4]  Yes    Chronic pulmonary embolism without acute cor pulmonale [I27.82]  Yes    Moderate persistent asthma without complication [J45.40] 10/30/2024 Yes    Essential hypertension [I10] 06/30/2021 Yes    Mixed hyperlipidemia [E78.2] 06/30/2021 Yes      Problems Resolved During this Admission:       We will slow her IV from 100 cc an hour to 75 cc.  We will hold oral bicarb and continue with IV bicarb to alkalinize urine and we will begin Lasix 40 mg a day trying to minimize the edema and minimize any worsening of that due to IV fluid administration.    Thank you for your consult. I will follow-up with patient. Please contact us if you have any additional questions.    Shahbaz Aquino MD  Nephrology  [facility]

## 2025-05-04 NOTE — SUBJECTIVE & OBJECTIVE
Interval History: Patient seen and examined at the bedside, reports no new symptoms or complains. Encouraged PO intake.     Review of Systems   Constitutional:  Positive for appetite change.   Gastrointestinal:  Positive for abdominal pain.   All other systems reviewed and are negative.    Objective:     Vital Signs (Most Recent):  Temp: 97.4 °F (36.3 °C) (05/04/25 0800)  Pulse: 86 (05/04/25 0900)  Resp: 14 (05/04/25 0900)  BP: (!) 95/48 (05/04/25 0900)  SpO2: 100 % (05/04/25 0900) Vital Signs (24h Range):  Temp:  [97.4 °F (36.3 °C)-98.7 °F (37.1 °C)] 97.4 °F (36.3 °C)  Pulse:  [77-93] 86  Resp:  [11-33] 14  SpO2:  [99 %-100 %] 100 %  BP: ()/(41-55) 95/48     Weight: 135.6 kg (298 lb 15.1 oz)  Body mass index is 49.75 kg/m².    Intake/Output Summary (Last 24 hours) at 5/4/2025 1018  Last data filed at 5/4/2025 0621  Gross per 24 hour   Intake 4120.59 ml   Output 200 ml   Net 3920.59 ml         Physical Exam  Constitutional:       Appearance: She is obese. She is ill-appearing.   HENT:      Head: Normocephalic.      Mouth/Throat:      Mouth: Mucous membranes are dry.   Cardiovascular:      Rate and Rhythm: Normal rate.      Heart sounds: Normal heart sounds. No murmur heard.  Pulmonary:      Effort: No respiratory distress.      Breath sounds: Rhonchi present.      Comments: On 2L NC  Abdominal:      General: There is no distension.      Tenderness: There is abdominal tenderness.      Comments: Dobhoff in place   Skin:     Coloration: Skin is pale.   Neurological:      Mental Status: Mental status is at baseline. She is disoriented.               Significant Labs: All pertinent labs within the past 24 hours have been reviewed.    Significant Imaging: I have reviewed all pertinent imaging results/findings within the past 24 hours.

## 2025-05-04 NOTE — PT/OT/SLP PROGRESS
Physical Therapy Treatment    Patient Name:  Magan Saleem   MRN:  [id number]    Recommendations:     Discharge Recommendations: Moderate Intensity Therapy  Discharge Equipment Recommendations: to be determined by next level of care  Barriers to discharge: ongoing treatment    Assessment:     Magan Saleem is a 69 y.o. female admitted with a medical diagnosis of Pneumonitis.  She presents with the following impairments/functional limitations: weakness, impaired self care skills, impaired functional mobility .    Rehab Prognosis: Fair; patient would benefit from acute skilled PT services to address these deficits and reach maximum level of function.    Recent Surgery: * No surgery found *      Plan:     During this hospitalization, patient to be seen 5 x/week to address the identified rehab impairments via gait training, therapeutic exercises, therapeutic activities, neuromuscular re-education and progress toward the following goals:    Plan of Care Expires:  05/29/25    Subjective     Chief Complaint: NA  Patient/Family Comments/goals: agreeable  Pain/Comfort:  Pain Rating 1: 0/10      Objective:     Communicated with nurse prior to session.  Patient found up in chair with blood pressure cuff, pulse ox (continuous), peripheral IV, telemetry, NG tube upon PT entry to room.     General Precautions: Standard, fall  Orthopedic Precautions: N/A  Braces: N/A  Respiratory Status: Nasal cannula, flow 2 L/min     Functional Mobility:  Bed Mobility:     Rolling Left:  maximal assistance and of 2 persons  Rolling Right: maximal assistance and of 2 persons  Dependent maxim lift transfer from chair to bed      AM-PAC 6 CLICK MOBILITY  Turning over in bed (including adjusting bedclothes, sheets and blankets)?: 2  Sitting down on and standing up from a chair with arms (e.g., wheelchair, bedside commode, etc.): 1  Moving from lying on back to sitting on the side of the bed?: 2  Moving to and from a bed to a chair (including a  wheelchair)?: 2  Need to walk in hospital room?: 1  Climbing 3-5 steps with a railing?: 1  Basic Mobility Total Score: 9       Treatment & Education:  Mobility as noted (Extensive rolling in bed due to BM; dependent for hygiene)    Patient left HOB elevated with all lines intact, call button in reach, and nurse notified..    GOALS:   Multidisciplinary Problems       Physical Therapy Goals          Problem: Physical Therapy    Goal Priority Disciplines Outcome Interventions   Physical Therapy Goal     PT, PT/OT Progressing    Description: Short Term Goals to be met by: 25    Patient will increase functional independence with mobility by performin. Supine to sit with Moderate Assist  2. Sit to stand transfer with Maximal Assist using Rolling walker  3. Bed to chair transfer with Maximal Assist using lowest level of assistive device  4. Rolling side to side with Min A  5. Lower extremity exercise program x30 reps per handout, with assistance as needed    Long Term Goals to be met by: 25    Pt will regain full independent functional mobility with lowest level of assistive device to return to home situation and prior activities of daily living.                        DME Justifications:      Time Tracking:     PT Received On: 25  PT Start Time: 1355     PT Stop Time: 1432  PT Total Time (min): 37 min     Billable Minutes: Therapeutic Activity 37    Treatment Type: Treatment  PT/PTA: PT     Number of PTA visits since last PT visit: 0     2025

## 2025-05-04 NOTE — ASSESSMENT & PLAN NOTE
JOHNATHON is likely due to pre-renal azotemia due to intravascular volume depletion. Baseline creatinine is ~ 2.0. Most recent creatinine and eGFR are listed below.  Recent Labs     05/02/25  0323 05/03/25  0203 05/04/25  0407   CREATININE 4.28* 4.16* 4.46*   EGFRNORACEVR 11* 11* 10*      Plan  - JOHNATHON is worsening now.   - Avoid nephrotoxins and renally dose meds for GFR listed above  - Monitor urine output, serial BMP, and adjust therapy as needed  - Nephrology consulted, started on Lasix, bicarb is to alkalinize the urine for hyperuricemia.   - At risk to require dialysis, discussed with the patient and she wants dialysis if indicated.

## 2025-05-04 NOTE — ASSESSMENT & PLAN NOTE
Stenotrophomonas lower respiratory infection in this patient with multifocal infiltrates and consolidative opacities on CT Chest.   S/p 2 week course with Levaquin.   Leukocytosis persists, will follow procalcitonin levels Q48H.  Repeat CT chest stable.  Consider tele-ID consultation if leukocytosis does not improve.

## 2025-05-04 NOTE — ASSESSMENT & PLAN NOTE
Went into SVT on 4/27, not responsive to multiple rounds of adenosine.  Started on amiodarone infusion after bolus, turned off on 4/29.  Cardiology consulted, no additional recommendations but could consider cardioversion if this becomes a recurrent and a refractory issue.   Continue beta blocker.   Monitor on tele.

## 2025-05-04 NOTE — ASSESSMENT & PLAN NOTE
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 Most recent hemoglobin and hematocrit are listed below.  Recent Labs     05/02/25  0323 05/03/25  0203   HGB 8.2* 7.5*   HCT 25.2* 23.3*     Plan  - Monitor serial CBC: Daily  - Transfuse PRBC if patient becomes hemodynamically unstable, symptomatic or H/H drops below 7/21.  - Patient has not received any PRBC transfusions to date  - Patient's anemia is currently stable  - No evidence of bleeding.

## 2025-05-04 NOTE — ASSESSMENT & PLAN NOTE
CT abdomen/pelvis showed- Possible small fluid collection anterior to the pancreas measuring approximately 3.6 cm on axial 41. Probable complex collection slightly inferior to the pancreatic head extending to the right pelvis and right iliac fossa.     04/21 Abd fluid collection in area pancreas noted on CT abd, discussed with surgery  04/22 likely evolving pancreatitis dx a couple weeks ago at [facility], now with pseudocyst.  No severe epigastric pain.  04/26 Ongoing tube feeds and full liquid diet. If tolerates full liquid better, plan to advanced to soft foods.   04/30 Repeat CT abdomen ordered, showed the pancreas with multiple fluid collections anterior to the pancreas and within the root of the mesentery and extending into the anterior right pararenal space and right paracolic gutter.     5/1 D?W with [facility] (Dr. Tapia) regarding fluid collection. They believe this is sec to pancreatitis and nothing to do for now.

## 2025-05-04 NOTE — ASSESSMENT & PLAN NOTE
Body mass index is 49.75 kg/m². Morbid obesity complicates all aspects of disease management from diagnostic modalities to treatment. Weight loss encouraged and health benefits explained to patient.

## 2025-05-04 NOTE — ASSESSMENT & PLAN NOTE
DNR confirmed.  Patient wants permanent feeding tube and dialysis if needed.   Encouraged patient to talk to her family about her prognosis.  Currently PEG is not option as mentioned above so nutrition could be a major factor in driving the hospice discussion on top of her underlying metastatic cancer.   Consider primary oncologist- Dr. Mata consultation to get an idea of her prognosis related to cancer as patient is relying on that to make further decisions.

## 2025-05-04 NOTE — PROGRESS NOTES
[facility]  Nephrology  Progress Note    Patient Name: Magan Saleem  MRN: [id number]  Admission Date: 4/26/2025  Hospital Length of Stay: 8 days  Attending Provider: Carlos Alberto Nicole MD   Primary Care Physician: Sil Brown DO  Principal Problem:Pneumonitis    Consults  Subjective:     Interval History:  Patient is seen in follow-up of her chronic renal impairment with significant hyperuricemia.      Measured bicarb today is 30.  She is off oral bicarb supplements and is receiving some bicarb in her IV fluids.  We are going to decrease that rate from 75 cc per hour to 50 cc/hour.      She is quite edematous and we will increase her Lasix to 80 mg daily.    She is working with physical therapy today with passive exercise.  Her chest is clear and she is without complaint    Review of patient's allergies indicates:   Allergen Reactions    Penicillins Hives    Sulfa (sulfonamide antibiotics) Hives     Current Facility-Administered Medications   Medication Frequency    acetaminophen tablet 650 mg Q4H PRN    albuterol-ipratropium 2.5 mg-0.5 mg/3 mL nebulizer solution 3 mL Q4H PRN    allopurinoL tablet 300 mg Daily    atorvastatin tablet 10 mg Daily    dextrose 50% injection 12.5 g PRN    dextrose 50% injection 25 g PRN    furosemide tablet 40 mg Daily    glucagon (human recombinant) injection 1 mg PRN    glucose chewable tablet 16 g PRN    glucose chewable tablet 24 g PRN    heparin (porcine) injection 5,000 Units Q8H    insulin aspart U-100 injection 0-10 Units Q6H PRN    insulin glargine U-100 (Lantus) injection 50 Units BID    Lactobacillus acidophilus capsule 2 capsule TID WM    metoclopramide HCl tablet 5 mg QID    metoprolol succinate (TOPROL-XL) 24 hr split tablet 12.5 mg QHS    montelukast tablet 10 mg QHS    pantoprazole EC tablet 40 mg Daily    predniSONE tablet 20 mg Daily    Followed by    [START ON 5/18/2025] predniSONE tablet 10 mg Daily    rOPINIRole tablet 0.25 mg  TID    sertraline tablet 25 mg Daily    sodium bicarbonate 100 mEq in D5W 1,000 mL infusion Continuous       Objective:     Vital Signs (Most Recent):  Temp: 97.4 °F (36.3 °C) (05/04/25 0800)  Pulse: 89 (05/04/25 1015)  Resp: 18 (05/04/25 1015)  BP: (!) 93/44 (05/04/25 1000)  SpO2: 100 % (05/04/25 1015) Vital Signs (24h Range):  Temp:  [97.4 °F (36.3 °C)-98.7 °F (37.1 °C)] 97.4 °F (36.3 °C)  Pulse:  [77-93] 89  Resp:  [11-33] 18  SpO2:  [94 %-100 %] 100 %  BP: ()/(41-55) 93/44     Weight: 135.6 kg (298 lb 15.1 oz) (05/04/25 0615)  Body mass index is 49.75 kg/m².  Body surface area is 2.49 meters squared.    I/O last 3 completed shifts:  In: 6710.5 [I.V.:4547.5; NG/GT:2163]  Out: 920 [Urine:800; Drains:120]    Physical Exam anasarca    Significant Labs:sureBMP:   Recent Labs   Lab 05/03/25  0203 05/04/25  0407   * 184*    137   K 4.5 3.9   CL 96* 92*   CO2 27 30   * 124*   CREATININE 4.16* 4.46*   CALCIUM 7.8* 8.1*   MG 2.3  --      CBC:   Recent Labs   Lab 05/03/25  0203   WBC 35.56*   RBC 2.55*   HGB 7.5*   HCT 23.3*   *   MCV 91.4   MCH 29.4   MCHC 32.2     All labs within the past 24 hours have been reviewed.    Significant Imaging:  Labs: Reviewed    Assessment/Plan:     Active Diagnoses:    Diagnosis Date Noted POA    PRINCIPAL PROBLEM:  Pneumonitis [J98.4] 04/16/2025 Yes    Goals of care, counseling/discussion [Z71.89] 05/03/2025 Not Applicable    Intra-abdominal fluid collection [R18.8] 05/01/2025 Yes    SVT (supraventricular tachycardia) [I47.10] 04/27/2025 No    Nonischemic nontraumatic myocardial injury [I5A] 04/27/2025 No    Hyperphosphatemia [E83.39] 04/26/2025 Yes    Hyperuricemia [E79.0] 04/26/2025 Yes    Infection due to Stenotrophomonas maltophilia [A49.8] 04/22/2025 Yes    Acute pancreatitis [K85.90] 04/21/2025 Yes    Neoplastic (malignant) related fatigue [R53.0] 04/15/2025 Yes    Normocytic anemia [D64.9] 04/15/2025 Yes    Edema due to hypoalbuminemia [E88.09]  04/12/2025 Yes    Type 2 diabetes mellitus with hyperglycemia [E11.65] 04/04/2025 Yes    Sepsis due to pneumonia [J18.9, A41.9] 04/04/2025 Yes    Gastroparesis [K31.84]  Yes    Lung cancer metastatic to brain [C34.90, C79.31]  Yes    Morbid obesity [E66.01] 04/03/2025 Yes    Acute kidney injury superimposed on stage 4 chronic kidney disease [N17.9, N18.4]  Yes    Chronic pulmonary embolism without acute cor pulmonale [I27.82]  Yes    Moderate persistent asthma without complication [J45.40] 10/30/2024 Yes    Essential hypertension [I10] 06/30/2021 Yes    Mixed hyperlipidemia [E78.2] 06/30/2021 Yes      Problems Resolved During this Admission:       Acute on chronic renal impairment.  History of lung cancer with brain Mets.  Hyperuricemia, currently on allopurinol.    We are decreasing IV fluid and increasing Lasix.    Thank you for your consult. I will follow-up with patient. Please contact us if you have any additional questions.    Shahbaz Aquino MD  Nephrology  [facility]

## 2025-05-04 NOTE — ASSESSMENT & PLAN NOTE
CT abdomen/pelvis showed- Possible small fluid collection anterior to the pancreas measuring approximately 3.6 cm on axial 41. Probable complex collection slightly inferior to the pancreatic head extending to the right pelvis and right iliac fossa.     04/21 Abd fluid collection in area pancreas noted on CT abd, discussed with surgery  04/22 likely evolving pancreatitis dx a couple weeks ago at [facility], [place], now with pseudocyst.  No severe epigastric pain.  04/26 Ongoing tube feeds and full liquid diet. If tolerates full liquid better, plan to advanced to soft foods.   04/30 Repeat CT abdomen ordered, showed the pancreas with multiple fluid collections anterior to the pancreas and within the root of the mesentery and extending into the anterior right pararenal space and right paracolic gutter.     5/1 D?W with [facility] (Dr. Tapia) regarding fluid collection. They believe this is sec to pancreatitis and nothing to do for now.

## 2025-05-04 NOTE — PROGRESS NOTES
[facility] Medicine  Progress Note    Patient Name: Magan Saleem  MRN: [id number]  Patient Class: IP- Inpatient   Admission Date: 4/26/2025  Length of Stay: 8 days  Attending Physician: Carlos Alberto Nicole MD  Primary Care Provider: Sil Brown DO        Subjective     Principal Problem:Pneumonitis        HPI:  70 yo F presents to [facility] ED from [facility] for abnormal labs.  Patient was discharged from [facility] two days ago to skilled nursing facility for rehab.  She was diagnosed with dehydration due to gastroparesis with UTI.  Patient has metastatic lung cancer (to the brain) and follows with Dr. Swanson for IV chemotherapy every other week and takes lazertinib daily.  She has completed her course of radiation for the brain mets and follow had showed some improvement.  I thought she had either some decreased LOC due to encephalopathy or some aphasia from the brain mets but after a great effort to get her to talk, I realized she was just mad.  She wanted to know why she had been discharged two days ago when she could not eat.  She has no appetite and early satiety.  She is not nauseated and no vomiting.  She has decided on a DNR status previously (her caretaker and friend of 20 years) states that she had always said she did not want resuscitation if she experienced cardiopulmonary arrest and had told her children her wishes but she does want treatment and is not opposed to J tube if needed.  She has not had anything to eat or drink in two days and is dehydrated again.       Patient was transferred because of concern of sepsis.  She did have enterococcus faecalis UTI at [facility] and was treated.  I do not have the culture results but cultures were sent and patient does have some yeast noted.  (Will not treat a yeast colonization).  She is afebrile and hemodynamically stable and does not look septic clinically.  Her Lactic acid is stable at 2.5 dara 3.2.  Will  check another in am after volume resuscitation.  Her creat is at baseline but she has prerenal azotemia further confirming the dehydration.  Patient has an IO in place from [facility] due to dehydration and difficult stick but with some volume resuscitation, we have gotten an IV.  She is covid and flu negative.       Her WBC is 29 and I am not sure if she has received neupogen but could be a stress reaction.  Her BS is 245 and she does have some urine ketones but most likely due to starvation ketosis.  Will check a serum ketone.  Her platelets are 88K but this is most likely from her chemo.  She is not anemic.  CXR shows some patchy infiltrate but no obvious obstructive pneumonia from her lung cancer.  Her BNP about 3K but no clinical signs of CHF.  No recent echo but due to her BMI 50 most likely has some PHTN.  EKG had no ischemic changes but tachy at 114 which could also be from dehydration.  She was on ozempic for her DM and this could be the cause of her decreased appetite.  She is also on decadron for prevention of cerebral edema.       Remainder of ROS as below.  She mostly just nods and shakes her head and rolls her eyes.  You can get her to laugh if you try but she has been depressed since she has not walked since her hospitalization at [facility] on 3/19/25 and was discharged two days ago.  She says that at her last chemo she noticed she was getting weaker and her daughter had to help her in and out of the car and that was new for her.      4/5- patient seen examined today resting comfortably in bed and in no acute distress, with no acute events overnight.  Patient has a multitude of issues complicating her medical picture.  White blood cell count [id number] today, sodium 159, potassium 3.2 and BUN creatinine 59 and 1.8.  The patient is still not eating even when assistance is provided.  We have already changed her fluids to half-normal saline with 20 of KCl at 1:25 a.m..  Have also put in a GI consult for possible feeding  tube.  E coli in the urine has turned out to be ESBL and Rocephin has been changed Merrem.     Hospital Course at [facility]:    4/6- the patient seen examined today resting comfortably in bed, in no acute distress, in no acute events overnight.  The patient is not very talkative today, but states she is doing okay.  She has no acute complaints.  Blood cultures remain negative.  The patient has not eaten since yesterday and is on light IV fluids.  GI has been consulted for feeding tube.  Continues on Merrem for positive urine culture.  04/07 Records reviewed. Not eating which not new, Similar during recent admit at [facility]. Dr Swanson there had question pancreatitis. No abd pain or tenderness reported now. Question of dysphagia to solids. Chart hx gastroparesis. Will discuss with GI. Ronnie says has responded so far well to treatment for lung CA.   04/08 had EGD at [facility] 03/21/25 with no obstruction and evidence gastroparesis. Still not ending. Try additional meds. Talked with GI. Increase activity  04/09 Some better with med  changes  04/10 Continues to do better. Ate 1/2 fish sandwich for lunch. Called by Dr Swanson and she wanting to keep feeding tube in consideration. Talked with Dr Reich and Dr Lemus. If something needed with gastroparesis would have to have post gastric position of tube.   04/11 eating some. Later staff requested some nystatin for sore mouth.   04/12 still not eating well.  Increased peripheral edema.  Albumin low at 1.5.  Will give some albumin and follow with some Lasix.  Placed Dobbhoff tube and start enteral feedings.  This will help with nutrition and if issue of placing surgical tube later make sure will tolerate enteral feedings.  Chest x-ray continues worse on left side.  Discuss with Pulmonary and ask Dr. Villasenor to see.   04/13 no new issues.  Enteral feedings to be started.  Pulmonary evaluation appreciated and plans noted.  04/14 Tolerating feedings Therapy working with her.  Bronchoscopy planned.   4/15 BAL today  4/16 bronch yesterday looks like pneumonitis  4/17 not candidate for PEG  04/18 Eating some now. Pt says feels some better than last seen. Look at placement. High dose steroids by pulmonary. BS not as bad as would expect.  04/19 states continued eat some.  Less nausea.  Blood sugar not sure bad considering the steroids.  Pulmonary adjusted antibiotics based on cultures.  Look at it placement next week.   04/20 Looks the small. C/O SOB to nursing staff earlier but ABG OK. Poor oral intake ; encourage for pt to do more. Looking into placement.  04/21 Firm tender area in abd wall above umbilicus; ?hernia. Abnormal CT de santiago noted and will discuss with surgery.   04/22 CT shows evolving pancreatitis which pt treated for acouple weeks earlier at [facility]. Reviewed with Dr Tapia. No plan to operate on ventral hernia. Discussed later with Dr York. See how does off IVF and encourage oral intake. WBC and Cr both slight better.   04/23 Looks this same. Pt eating some. Pt getting discouraged and asking about home hospice. Talked by phone with her friend Shauna. In conversion doesn't sound like family would be able to care for pt at home. Encouraged pt to give some time and attempt SWB. She says she with consider tonight.   04/24 Lab worse but pt looks some better. Still not taking in well. Maybe eat better as pancreatitis further resolves. Considering replacing dobbhoff feeding tube and look into move to [facility]. Ask Dr Frias to review renal situation. Maybe pancreatitis,pneumonia,renal failure, all these might make a big difference if resolved. Talked with pt and she was OK with NGT  04/25 patient's spirits seems to be doing some better.  Dobbhoff tube placed in feedings to be started.  To be moved to LTAC.  No other new issue  04/26 Awaiting bed assignment at [facility]. No new complains.     Overview/Hospital Course:  4/28- BG trending up, added Lantus and adjusted dose. D/W nutritionist about  changing feeds to allow for more PO intake.     4/29- BG better now. Off IV amiodarone infusion, remains in NSR. Encourage PO intake.     4/30- Continue to adjust insulin to get BG under 200, ideally 140-180. Checking CT chest, abdomen and pelvis today given persistent leukocytosis.     5/1- Discussing with surgery about fluid collections in the abdomen noted on CT (slightly increased in size), also asking if PEG is an option as previously deemed not a candidate. Nephrology has added bicarb infusion. Continue current management otherwise.     5/2- IV fluids started per nephro. WBC count remains elevated.     5/3- Started GOC conversations with the patient who is alert, asked her if her family can be with her or her daughter who she is closest to. She said she will try to talk to them.     5/4- Discussed poor prognosis with the patient. Labs are essentially unchanged. Repeat blood cultures ordered.     Interval History: Patient seen and examined at the bedside, reports no new symptoms or complains. Encouraged PO intake.     Review of Systems   Constitutional:  Positive for appetite change.   Gastrointestinal:  Positive for abdominal pain.   All other systems reviewed and are negative.    Objective:     Vital Signs (Most Recent):  Temp: 97.4 °F (36.3 °C) (05/04/25 0800)  Pulse: 86 (05/04/25 0900)  Resp: 14 (05/04/25 0900)  BP: (!) 95/48 (05/04/25 0900)  SpO2: 100 % (05/04/25 0900) Vital Signs (24h Range):  Temp:  [97.4 °F (36.3 °C)-98.7 °F (37.1 °C)] 97.4 °F (36.3 °C)  Pulse:  [77-93] 86  Resp:  [11-33] 14  SpO2:  [99 %-100 %] 100 %  BP: ()/(41-55) 95/48     Weight: 135.6 kg (298 lb 15.1 oz)  Body mass index is 49.75 kg/m².    Intake/Output Summary (Last 24 hours) at 5/4/2025 1018  Last data filed at 5/4/2025 0621  Gross per 24 hour   Intake 4120.59 ml   Output 200 ml   Net 3920.59 ml         Physical Exam  Constitutional:       Appearance: She is obese. She is ill-appearing.   HENT:      Head: Normocephalic.       Mouth/Throat:      Mouth: Mucous membranes are dry.   Cardiovascular:      Rate and Rhythm: Normal rate.      Heart sounds: Normal heart sounds. No murmur heard.  Pulmonary:      Effort: No respiratory distress.      Breath sounds: Rhonchi present.      Comments: On 2L NC  Abdominal:      General: There is no distension.      Tenderness: There is abdominal tenderness.      Comments: Dobhoff in place   Skin:     Coloration: Skin is pale.   Neurological:      Mental Status: Mental status is at baseline. She is disoriented.               Significant Labs: All pertinent labs within the past 24 hours have been reviewed.    Significant Imaging: I have reviewed all pertinent imaging results/findings within the past 24 hours.      Assessment & Plan  Acute kidney injury superimposed on stage 4 chronic kidney disease  JOHNATHON is likely due to pre-renal azotemia due to intravascular volume depletion. Baseline creatinine is ~ 2.0. Most recent creatinine and eGFR are listed below.  Recent Labs     05/02/25  0323 05/03/25  0203 05/04/25  0407   CREATININE 4.28* 4.16* 4.46*   EGFRNORACEVR 11* 11* 10*      Plan  - JOHNATHON is worsening now.   - Avoid nephrotoxins and renally dose meds for GFR listed above  - Monitor urine output, serial BMP, and adjust therapy as needed  - Nephrology consulted, started on Lasix, bicarb is to alkalinize the urine for hyperuricemia.   - At risk to require dialysis, discussed with the patient and she wants dialysis if indicated.     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  Pulmonology consulted, started on steroids and s/p bronchoscopy with normal findings, BAL culture with stenotrophomonas.   Steroid taper plan per Pulmonology, completed course of antibiotics.   Respiratory status is stable.     Infection due to Stenotrophomonas maltophilia  Sepsis due to pneumonia  Stenotrophomonas lower respiratory infection in this patient with multifocal infiltrates and  "consolidative opacities on CT Chest.   S/p 2 week course with Levaquin.   Leukocytosis persists, will follow procalcitonin levels Q48H.  Repeat CT chest stable.  Consider tele-ID consultation if leukocytosis does not improve.     SVT (supraventricular tachycardia)  Went into SVT on 4/27, not responsive to multiple rounds of adenosine.  Started on amiodarone infusion after bolus, turned off on 4/29.  Cardiology consulted, no additional recommendations but could consider cardioversion if this becomes a recurrent and a refractory issue.   Continue beta blocker.   Monitor on tele.     Acute pancreatitis  Intra-abdominal fluid collection  CT abdomen/pelvis showed- Possible small fluid collection anterior to the pancreas measuring approximately 3.6 cm on axial 41. Probable complex collection slightly inferior to the pancreatic head extending to the right pelvis and right iliac fossa.     04/21 Abd fluid collection in area pancreas noted on CT abd, discussed with surgery  04/22 likely evolving pancreatitis dx a couple weeks ago at [facility] [place], now with pseudocyst.  No severe epigastric pain.  04/26 Ongoing tube feeds and full liquid diet. If tolerates full liquid better, plan to advanced to soft foods.   04/30 Repeat CT abdomen ordered, showed the pancreas with multiple fluid collections anterior to the pancreas and within the root of the mesentery and extending into the anterior right pararenal space and right paracolic gutter.     5/1 D?W with GS (Dr. Tapia) regarding fluid collection. They believe this is sec to pancreatitis and nothing to do for now.     Gastroparesis  EGD by Dr. Lo during recent past admission at [facility]:  "EGD on 03/21/2025 shows LA class B erosive esophagitis but no pill esophagitis, nonerosive gastritis and retention of food and fluid suspicious for gastroparesis, due to narcotics and diabetes. "  GI consulted, unable to advance diet and poor candidate for PEG.  Dobhoff in place for feeds, " "continued on Reglan.   GI recommended surgical PEG due to body habitus, GS stated that patient is not a candidate for PEG based on co morbidities and poor prognosis.   Continue PPI.     Lung cancer metastatic to brain  DNR but not hospice.   Receives chemo and has finished radiation.    Follows with Dr. Swanson.    Goals of care, counseling/discussion  DNR confirmed.  Patient wants permanent feeding tube and dialysis if needed.   Encouraged patient to talk to her family about her prognosis.  Currently PEG is not option as mentioned above so nutrition could be a major factor in driving the hospice discussion on top of her underlying metastatic cancer.   Consider primary oncologist- Dr. Mata consultation to get an idea of her prognosis related to cancer as patient is relying on that to make further decisions.      Nonischemic nontraumatic myocardial injury  In the setting of SVT episodes.  Trend is flat, no chest pain and no ischemic changes noted on EKG.  No ischemic workup recommended per cardiology.     Hyperuricemia  Hyperphosphatemia  Defer management to nephrology- started on allopurinol and IV bicarb to alkalinize the urine.   Follow uric acid levels.     Essential hypertension  Patient's blood pressure range in the last 24 hours was: BP  Min: 84/49  Max: 118/45.The patient's inpatient anti-hypertensive regimen is listed below:  Current Antihypertensives  furosemide tablet 40 mg, Daily, Oral  metoprolol succinate (TOPROL-XL) 24 hr split tablet 12.5 mg, Nightly, Oral    Plan  - BP is controlled, no changes needed to their regimen    Type 2 diabetes mellitus with hyperglycemia  Patient's FSGs are controlled on current medication regimen.  Last A1c reviewed-   Lab Results   Component Value Date    HGBA1C 6.7 04/02/2025     Most recent fingerstick glucose reviewed- No results for input(s): "POCTGLUCOSE" in the last 24 hours.  Current correctional scale  Low  Maintain anti-hyperglycemic dose as follows- "   Antihyperglycemics (From admission, onward)      Start     Stop Route Frequency Ordered    05/03/25 0900  insulin glargine U-100 (Lantus) injection 50 Units         -- SubQ 2 times daily 05/03/25 0822    05/01/25 1051  insulin aspart U-100 injection 0-10 Units         -- SubQ Every 6 hours PRN 05/01/25 0952          Hold Oral hypoglycemics while patient is in the hospital.  BG ranging up as tube feeds now to goal and D5 in bicarb drip, added Lantus and dose adjusted for tighter glycemic control.     Normocytic anemia  Anemia is likely due to chronic disease due to Malignancy. Most recent hemoglobin and hematocrit are listed below.  Recent Labs     05/02/25  0323 05/03/25  0203   HGB 8.2* 7.5*   HCT 25.2* 23.3*     Plan  - Monitor serial CBC: Daily  - Transfuse PRBC if patient becomes hemodynamically unstable, symptomatic or H/H drops below 7/21.  - Patient has not received any PRBC transfusions to date  - Patient's anemia is currently stable  - No evidence of bleeding.    Neoplastic (malignant) related fatigue  Patient with Acute on chronic debility due to neoplastic/malignant related fatigue. Latest AMPAC and GEMS scores have been reviewed. Evaluation for etiology is complete. Plan includes - Progressive mobility protocol initated  - PT/OT consulted  - Fall precautions in place.    Chronic pulmonary embolism without acute cor pulmonale  Eliquis was discontinued due to risk of ICH with brain mets.  Currently on hold in case surgical procedure is required.    Morbid obesity  Body mass index is 49.75 kg/m². Morbid obesity complicates all aspects of disease management from diagnostic modalities to treatment. Weight loss encouraged and health benefits explained to patient.     Moderate persistent asthma without complication  Stable without exacerbation.  Continue PRN nebs.     Edema due to hypoalbuminemia  Required albumin infusion intermittently during the stay.     Mixed hyperlipidemia  Resume statin.     VTE Risk  Mitigation (From admission, onward)           Ordered     heparin (porcine) injection 5,000 Units  Every 8 hours         04/27/25 1055                    Discharge Planning   MITUL: 5/9/2025     Code Status: DNR   Medical Readiness for Discharge Date:   Discharge Plan A: Skilled Nursing Facility                Please place Justification for DME        RAYMOND ZHAO MD  Department of Hospital Medicine   [facility]

## 2025-05-04 NOTE — PLAN OF CARE
Problem: Adult Inpatient Plan of Care  Goal: Plan of Care Review  Outcome: Progressing  Goal: Absence of Hospital-Acquired Illness or Injury  Outcome: Progressing  Goal: Optimal Comfort and Wellbeing  Outcome: Progressing     Problem: Infection  Goal: Absence of Infection Signs and Symptoms  Outcome: Progressing     Problem: Wound  Goal: Optimal Coping  Outcome: Progressing  Goal: Optimal Functional Ability  Outcome: Progressing  Goal: Optimal Pain Control and Function  Outcome: Progressing

## 2025-05-04 NOTE — ASSESSMENT & PLAN NOTE
Patient's blood pressure range in the last 24 hours was: BP  Min: 84/49  Max: 118/45.The patient's inpatient anti-hypertensive regimen is listed below:  Current Antihypertensives  furosemide tablet 40 mg, Daily, Oral  metoprolol succinate (TOPROL-XL) 24 hr split tablet 12.5 mg, Nightly, Oral    Plan  - BP is controlled, no changes needed to their regimen

## 2025-05-04 NOTE — ASSESSMENT & PLAN NOTE
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  Pulmonology consulted, started on steroids and s/p bronchoscopy with normal findings, BAL culture with stenotrophomonas.   Steroid taper plan per Pulmonology, completed course of antibiotics.   Respiratory status is stable.

## 2025-05-04 NOTE — ASSESSMENT & PLAN NOTE
"EGD by Dr. Lo during recent past admission at [facility]:  "EGD on 03/21/2025 shows LA class B erosive esophagitis but no pill esophagitis, nonerosive gastritis and retention of food and fluid suspicious for gastroparesis, due to narcotics and diabetes. "  GI consulted, unable to advance diet and poor candidate for PEG.  Dobhoff in place for feeds, continued on Reglan.   GI recommended surgical PEG due to body habitus, GS stated that patient is not a candidate for PEG based on co morbidities and poor prognosis.   Continue PPI.     "

## 2025-05-04 NOTE — NURSING
"Notified Stanislav LYNNE of [facility] alert to order Lactic Panel.  She replied "Do not order Lactic Panel"  "

## 2025-05-04 NOTE — ASSESSMENT & PLAN NOTE
Defer management to nephrology- started on allopurinol and IV bicarb to alkalinize the urine.   Follow uric acid levels.

## 2025-05-05 NOTE — ASSESSMENT & PLAN NOTE
Last seen by Pulmonary in April.  Patient continues to be worse no new recommendations continue went wean steroids as tolerated there was no change in the x-ray secondary to immunosuppression.

## 2025-05-05 NOTE — PROGRESS NOTES
[facility]  Critical Care Medicine  Progress Note    Patient Name: Magan Saleem  MRN: [id number]  Admission Date: 4/26/2025  Hospital Length of Stay: 9 days  Code Status: DNR  Attending Provider: Maria Teresa Conway MD  Primary Care Provider: Sil Brown DO   Principal Problem: Pneumonitis    Subjective:     HPI:  No notes on file    Hospital/ICU Course:  No notes on file    Interval History/Significant Events:  Patient without complaints    Review of Systems  Objective:     Vital Signs (Most Recent):  Temp: 97.7 °F (36.5 °C) (05/05/25 0000)  Pulse: 99 (05/05/25 0500)  Resp: (!) 22 (05/05/25 0500)  BP: (!) 74/44 (05/05/25 0500)  SpO2: 100 % (05/05/25 0500) Vital Signs (24h Range):  Temp:  [95 °F (35 °C)-97.7 °F (36.5 °C)] 97.7 °F (36.5 °C)  Pulse:  [] 99  Resp:  [12-35] 22  SpO2:  [94 %-100 %] 100 %  BP: ()/(33-56) 74/44   Weight: 135.4 kg (298 lb 8.1 oz)  Body mass index is 49.67 kg/m².      Intake/Output Summary (Last 24 hours) at 5/5/2025 0539  Last data filed at 5/5/2025 0516  Gross per 24 hour   Intake 2864.78 ml   Output 100 ml   Net 2764.78 ml          Physical Exam  Vitals reviewed.   Constitutional:       Appearance: Normal appearance.      Interventions: She is not intubated.  HENT:      Head: Normocephalic and atraumatic.      Nose: Nose normal.      Mouth/Throat:      Mouth: Mucous membranes are dry.      Pharynx: Oropharynx is clear.   Eyes:      Extraocular Movements: Extraocular movements intact.      Conjunctiva/sclera: Conjunctivae normal.      Pupils: Pupils are equal, round, and reactive to light.   Cardiovascular:      Rate and Rhythm: Normal rate.      Heart sounds: Normal heart sounds. No murmur heard.  Pulmonary:      Effort: Pulmonary effort is normal. She is not intubated.      Breath sounds: Normal breath sounds.   Abdominal:      General: Abdomen is flat. Bowel sounds are normal.      Palpations: Abdomen is soft.   Musculoskeletal:          General: Normal range of motion.      Cervical back: Normal range of motion and neck supple.      Right lower leg: No edema.      Left lower leg: No edema.   Skin:     General: Skin is warm and dry.      Capillary Refill: Capillary refill takes less than 2 seconds.   Neurological:      General: No focal deficit present.      Mental Status: She is alert and oriented to person, place, and time.   Psychiatric:         Mood and Affect: Mood normal.         Behavior: Behavior normal.            Vents:  Oxygen Concentration (%): 28 (05/01/25 2018)  Lines/Drains/Airways       Peripherally Inserted Central Catheter Line  Duration             PICC Double Lumen 04/17/25 1547 left brachial 17 days              Drain  Duration             Female External Urinary Catheter w/ Suction 04/21/25 1950 13 days         NG/OG Tube 04/25/25 0913 Right nostril 9 days                  Significant Labs:    CBC/Anemia Profile:  Recent Labs   Lab 05/05/25  0335   WBC 30.10*   HGB 9.1*   HCT 27.2*   *   MCV 88.3   RDW 23.2*        Chemistries:  Recent Labs   Lab 05/04/25  0407 05/05/25  0335    135*   K 3.9 4.1   CL 92* 90*   CO2 30 31   * >125*   CREATININE 4.46* 3.95*   CALCIUM 8.1* 7.7*   ALBUMIN 1.2* 1.2*   PROT 4.1* 3.6*   BILITOT 0.3 0.4   ALKPHOS 212* 148   ALT 30 34   AST 39 36   PHOS  --  5.9*       Recent Lab Results  (Last 5 results in the past 24 hours)        05/05/25  0503   05/05/25  0335   05/05/25  0152   05/04/25  2311   05/04/25  1719        Procalcitonin   0.75  Comment:   A concentration < 0.25 ng/mL represents a low risk of bacterial infection. Procalcitonin may not be accurate among patients with localized infection, recent trauma or major surgery, immunosuppressed state, invasive fungal infection, renal dysfunction. Decisions regarding initiation or continuation of antibiotic therapy should not be based solely on procalcitonin levels.             Albumin/Globulin Ratio   0.5             Albumin    1.2             ALP   148             ALT   34             Anion Gap   18             Aniso   2+             AST   36             Bands   5             Baso #   0.14             Basophil %   0.5             BILIRUBIN TOTAL   0.4             BUN   >125             Calcium   7.7             Chloride   90             CO2   31             Creatinine   3.95             Differential Method   Manual             eGFR   12  Comment: Estimated GFR calculated using the CKD-EPI creatinine (2021) equation.             Eos #   0.01             Eos %   0.0             Globulin, Total   2.4             Glucose   48  Comment: Critical Result called and verified by verbal readback to: Called results to Robert/LONDON on 05/05/2025 at 05:23. Reported by [id number].             Hematocrit   27.2             Hemoglobin   9.1             Hypo   Few             Immature Grans (Abs)   1.69             Immature Granulocytes   5.6             Lymph #   0.81             Lymph %   2.7                3             MCH   29.5             MCHC   33.5             MCV   88.3             Mono #   0.89             Mono %   3.0                3             MPV   12.8             Neutrophils, Abs   26.56             Neutrophils Relative   88.2             nRBC   4                0.6             NUCLEATED RBC ABSOLUTE   0.19             Ovalocytes   Few             Phosphorus Level   5.9             PLATELET MORPHOLOGY   Few Large Platelets             Platelet Count   138             POC Glucose 57     76   93   138       Poly   Few             Potassium   4.1             PROTEIN TOTAL   3.6             RBC   3.08             RDW   23.2             Segmented Neutrophils, Man %   89             Sodium   135             Target Cells   Few             Teardrop Cells   Few             Uric Acid   14.2             WBC   30.10                                    Significant Imaging:  I have reviewed all pertinent imaging results/findings within the past 24  "hours.    ABG  No results for input(s): "PH", "PO2", "PCO2", "HCO3", "BE" in the last 168 hours.  Assessment/Plan:     Pulmonary  * Pneumonitis  Last seen by Pulmonary in April.  Patient continues to be worse no new recommendations continue went wean steroids as tolerated there was no change in the x-ray secondary to immunosuppression.    Oncology  Lung cancer metastatic to brain  Noted    Other  Goals of care, counseling/discussion  DNR, no new recs call if needed          Todd Villasenor MD  Critical Care Medicine  [facility]  "

## 2025-05-05 NOTE — ASSESSMENT & PLAN NOTE
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 Most recent hemoglobin and hematocrit are listed below.  Recent Labs     05/03/25  0203 05/05/25  0335   HGB 7.5* 9.1*   HCT 23.3* 27.2*     Plan  - Monitor serial CBC: Daily  - Transfuse PRBC if patient becomes hemodynamically unstable, symptomatic or H/H drops below 7/21.  - Patient has not received any PRBC transfusions to date  - Patient's anemia is currently stable  - No evidence of bleeding.

## 2025-05-05 NOTE — SUBJECTIVE & OBJECTIVE
Interval History:     Review of Systems   Constitutional:  Positive for appetite change.   Gastrointestinal:  Positive for abdominal pain.   All other systems reviewed and are negative.    Objective:     Vital Signs (Most Recent):  Temp: 97 °F (36.1 °C) (05/05/25 1156)  Pulse: 91 (05/05/25 1200)  Resp: 13 (05/05/25 1200)  BP: (!) 98/57 (05/05/25 1300)  SpO2: 100 % (05/05/25 1200) Vital Signs (24h Range):  Temp:  [95 °F (35 °C)-97.7 °F (36.5 °C)] 97 °F (36.1 °C)  Pulse:  [] 91  Resp:  [12-35] 13  SpO2:  [97 %-100 %] 100 %  BP: ()/(33-57) 98/57     Weight: 135.4 kg (298 lb 8.1 oz)  Body mass index is 49.67 kg/m².    Intake/Output Summary (Last 24 hours) at 5/5/2025 1402  Last data filed at 5/5/2025 0516  Gross per 24 hour   Intake 803.92 ml   Output 100 ml   Net 703.92 ml         Physical Exam  Constitutional:       Appearance: She is obese. She is ill-appearing.   HENT:      Head: Normocephalic.      Mouth/Throat:      Mouth: Mucous membranes are dry.   Cardiovascular:      Rate and Rhythm: Normal rate.      Heart sounds: Normal heart sounds. No murmur heard.  Pulmonary:      Effort: No respiratory distress.      Breath sounds: Rhonchi present.      Comments: On 2L NC  Abdominal:      General: There is no distension.      Tenderness: There is abdominal tenderness.      Comments: Dobhoff in place   Skin:     Coloration: Skin is pale.   Neurological:      Mental Status: Mental status is at baseline.               Significant Labs: All pertinent labs within the past 24 hours have been reviewed.    Significant Imaging: I have reviewed all pertinent imaging results/findings within the past 24 hours.

## 2025-05-05 NOTE — ASSESSMENT & PLAN NOTE
CT abdomen/pelvis showed- Possible small fluid collection anterior to the pancreas measuring approximately 3.6 cm on axial 41. Probable complex collection slightly inferior to the pancreatic head extending to the right pelvis and right iliac fossa.     04/21 Abd fluid collection in area pancreas noted on CT abd, discussed with surgery  04/22 likely evolving pancreatitis dx a couple weeks ago at [facility], now with pseudocyst.  No severe epigastric pain.  04/26 Ongoing tube feeds and full liquid diet. If tolerates full liquid better, plan to advanced to soft foods.   04/30 Repeat CT abdomen ordered, showed the pancreas with multiple fluid collections anterior to the pancreas and within the root of the mesentery and extending into the anterior right pararenal space and right paracolic gutter.     5/1 D?W with [facility] (Dr. Tapia) regarding fluid collection. They believe this is sec to pancreatitis and nothing to do for now.

## 2025-05-05 NOTE — PT/OT/SLP PROGRESS
"Occupational Therapy   Treatment    Name: Magan Saleem  MRN: [id number]  Admitting Diagnosis:  Pneumonitis       Recommendations:     Discharge Recommendations: Moderate Intensity Therapy  Discharge Equipment Recommendations:  to be determined by next level of care  Barriers to discharge:  None    Assessment:     Magan Saleem is a 69 y.o. female with a medical diagnosis of Pneumonitis.  She presents with alert and agreeable to treatment. Performance deficits affecting function are weakness, impaired endurance, impaired self care skills, impaired functional mobility, impaired cardiopulmonary response to activity.     Rehab Prognosis:  Good; patient would benefit from acute skilled OT services to address these deficits and reach maximum level of function.       Plan:     Patient to be seen 5 x/week to address the above listed problems via self-care/home management, therapeutic activities, therapeutic exercises  Plan of Care Expires: 06/03/25  Plan of Care Reviewed with: patient    Subjective     Chief Complaint: Pneumonitis    Patient/Family Comments/goals: "It doesn't matter"- pt seemed a little down this morning, but did smile some over treatment session. Pt was encouraged that she is getting better and her edema is much improved from her admission to [facility], as is her arm movement and strength.  Pain/Comfort:  Pain Rating 1: 3/10  Location 1: abdomen  Pain Addressed 1: Reposition, Cessation of Activity  Pain Rating Post-Intervention 1: 3/10    Objective:     Communicated with: LONDON Hogan prior to session.  Patient found HOB elevated with blood pressure cuff, pulse ox (continuous), peripheral IV, telemetry, NG tube upon OT entry to room.    General Precautions: Standard, fall    Orthopedic Precautions:N/A  Braces: N/A  Respiratory Status: Nasal cannula, flow 2 L/min     Occupational Performance:     Bed Mobility:    Patient completed Rolling/Turning to Left with  maximal assistance  Patient completed " Rolling/Turning to Right with maximal assistance     Functional Mobility/Transfers:  Transferred to chair via mechanical lift    Activities of Daily Living:  NT      [facility] 6 Click ADL: 9    Treatment & Education:  Pt performed (B) UE ex for 2 x 10 reps of each:   Shoulder flexion AAROM   Elbow extension AROM    Shoulder horizontal abduction/adduction AAROM   Elbow extension with light manual resistance    Shoulder extension with light manual resistance   Handhelper with 3 bands on (R) and one band on (L)    Patient left up in chair with all lines intact, call button in reach, and RN notified    GOALS:   Multidisciplinary Problems       Occupational Therapy Goals          Problem: Occupational Therapy    Goal Priority Disciplines Outcome Interventions   Occupational Therapy Goal     OT, PT/OT Progressing    Description: STG:  Pt will perform grooming with setup and min a  Pt will bathe with mod a  Pt will perform UE dressing with mod a  Pt will perform LE dressing with mod a with AD as needed  Pt will sit EOB x 10 min with SBA   Pt will transfer bed/chair/bsc with mod a  Pt will perform standing task x 30 sec with mod a   Pt will tolerate 20 minutes of tx without fatigue      LT.Restore to max I with self care and mobility.                              Time Tracking:     OT Date of Treatment: 25  OT Start Time: 1052  OT Stop Time: 1132  OT Total Time (min): 40 min    Billable Minutes:Therapeutic Activity 15 min  Therapeutic Exercise 20 min    OT/SAROJ: OT          2025

## 2025-05-05 NOTE — PLAN OF CARE
Chart review. Patient continues on a na bicarb infusion. Nephrology is following. Pulmonary is also consulting. Rehab is seeing patient but patient is max to dependent with all care. Continue to monitor labs. Patient is aware of her poor prognosis. Cm will continue to follow.

## 2025-05-05 NOTE — ASSESSMENT & PLAN NOTE
Body mass index is 49.67 kg/m². Morbid obesity complicates all aspects of disease management from diagnostic modalities to treatment. Weight loss encouraged and health benefits explained to patient.

## 2025-05-05 NOTE — ASSESSMENT & PLAN NOTE
Patient's blood pressure range in the last 24 hours was: BP  Min: 65/42  Max: 115/50.The patient's inpatient anti-hypertensive regimen is listed below:  Current Antihypertensives       Plan  - BP is controlled, no changes needed to their regimen

## 2025-05-05 NOTE — PT/OT/SLP PROGRESS
Occupational Therapy   Treatment    Name: Magan Saleem  MRN: [id number]  Admitting Diagnosis:  Pneumonitis       Recommendations:     Discharge Recommendations: Moderate Intensity Therapy  Discharge Equipment Recommendations:  to be determined by next level of care  Barriers to discharge:       Assessment:     Magan Saleem is a 69 y.o. female with a medical diagnosis of Pneumonitis. Performance deficits affecting function are weakness, impaired endurance, impaired self care skills, edema, impaired functional mobility.     Rehab Prognosis:  Fair; patient would benefit from acute skilled OT services to address these deficits and reach maximum level of function.       Plan:     Patient to be seen 5 x/week to address the above listed problems via self-care/home management, therapeutic activities, therapeutic exercises  Plan of Care Expires: 06/03/25  Plan of Care Reviewed with: patient    Subjective     Chief Complaint:   Patient/Family Comments/goals:   Pain/Comfort:  Pain Rating 1: 0/10    Objective:     Communicated with: Eneida Carter RN prior to session.  Patient found HOB Elevate with pulse ox (continuous), telemetry, peripheral IV, oxygen, PureWick, blood pressure cuff, pressure relief boots, NG tube  and warming blanket upon OT entry to room.    General Precautions: Standard, fall    Orthopedic Precautions:N/A  Braces: N/A  Respiratory Status: Nasal cannula, flow 2 L/min     Occupational Performance:     Bed Mobility:    Supine to sit max a 2  Sit to supine  max a x 2  Rolling max a    Functional Mobility/Transfers:    Functional Mobility:     Activities of Daily Living:  Min a after set up to wash her face  Min a after set up for oral care with oral swab      [facility] 6 Click ADL:      Treatment & Education:  Pt performed hand helper with 2 rubber band resistances 20 reps, green t ball ex's 20 reps, aarom with 1 lb wt 15 reps x 2 B shld flex,abd/add,elbow flex/ext   HOB elevated    Patient left OB elevated  and warming blanket with all lines intact, call button in reach, and Brandi Tam PTA present    GOALS:   Multidisciplinary Problems       Occupational Therapy Goals          Problem: Occupational Therapy    Goal Priority Disciplines Outcome Interventions   Occupational Therapy Goal     OT, PT/OT Progressing    Description: STG:  Pt will perform grooming with setup and min a  Pt will bathe with mod a  Pt will perform UE dressing with mod a  Pt will perform LE dressing with mod a with AD as needed  Pt will sit EOB x 10 min with SBA   Pt will transfer bed/chair/bsc with mod a  Pt will perform standing task x 30 sec with mod a   Pt will tolerate 20 minutes of tx without fatigue      LT.Restore to max I with self care and mobility.                          DME Justifications:      Time Tracking:     OT Date of Treatment: 25  OT Start Time: 918  OT Stop Time:   OT Total Time (min): 51 min    Billable Minutes:Therapeutic Activity 45    OT/SAROJ: SAROJ          2025

## 2025-05-05 NOTE — SUBJECTIVE & OBJECTIVE
Interval History/Significant Events:  Patient without complaints    Review of Systems  Objective:     Vital Signs (Most Recent):  Temp: 97.7 °F (36.5 °C) (05/05/25 0000)  Pulse: 99 (05/05/25 0500)  Resp: (!) 22 (05/05/25 0500)  BP: (!) 74/44 (05/05/25 0500)  SpO2: 100 % (05/05/25 0500) Vital Signs (24h Range):  Temp:  [95 °F (35 °C)-97.7 °F (36.5 °C)] 97.7 °F (36.5 °C)  Pulse:  [] 99  Resp:  [12-35] 22  SpO2:  [94 %-100 %] 100 %  BP: ()/(33-56) 74/44   Weight: 135.4 kg (298 lb 8.1 oz)  Body mass index is 49.67 kg/m².      Intake/Output Summary (Last 24 hours) at 5/5/2025 0539  Last data filed at 5/5/2025 0516  Gross per 24 hour   Intake 2864.78 ml   Output 100 ml   Net 2764.78 ml          Physical Exam  Vitals reviewed.   Constitutional:       Appearance: Normal appearance.      Interventions: She is not intubated.  HENT:      Head: Normocephalic and atraumatic.      Nose: Nose normal.      Mouth/Throat:      Mouth: Mucous membranes are dry.      Pharynx: Oropharynx is clear.   Eyes:      Extraocular Movements: Extraocular movements intact.      Conjunctiva/sclera: Conjunctivae normal.      Pupils: Pupils are equal, round, and reactive to light.   Cardiovascular:      Rate and Rhythm: Normal rate.      Heart sounds: Normal heart sounds. No murmur heard.  Pulmonary:      Effort: Pulmonary effort is normal. She is not intubated.      Breath sounds: Normal breath sounds.   Abdominal:      General: Abdomen is flat. Bowel sounds are normal.      Palpations: Abdomen is soft.   Musculoskeletal:         General: Normal range of motion.      Cervical back: Normal range of motion and neck supple.      Right lower leg: No edema.      Left lower leg: No edema.   Skin:     General: Skin is warm and dry.      Capillary Refill: Capillary refill takes less than 2 seconds.   Neurological:      General: No focal deficit present.      Mental Status: She is alert and oriented to person, place, and time.   Psychiatric:          Mood and Affect: Mood normal.         Behavior: Behavior normal.            Vents:  Oxygen Concentration (%): 28 (05/01/25 2018)  Lines/Drains/Airways       Peripherally Inserted Central Catheter Line  Duration             PICC Double Lumen 04/17/25 1547 left brachial 17 days              Drain  Duration             Female External Urinary Catheter w/ Suction 04/21/25 1950 13 days         NG/OG Tube 04/25/25 0913 Right nostril 9 days                  Significant Labs:    CBC/Anemia Profile:  Recent Labs   Lab 05/05/25  0335   WBC 30.10*   HGB 9.1*   HCT 27.2*   *   MCV 88.3   RDW 23.2*        Chemistries:  Recent Labs   Lab 05/04/25  0407 05/05/25  0335    135*   K 3.9 4.1   CL 92* 90*   CO2 30 31   * >125*   CREATININE 4.46* 3.95*   CALCIUM 8.1* 7.7*   ALBUMIN 1.2* 1.2*   PROT 4.1* 3.6*   BILITOT 0.3 0.4   ALKPHOS 212* 148   ALT 30 34   AST 39 36   PHOS  --  5.9*       Recent Lab Results  (Last 5 results in the past 24 hours)        05/05/25  0503   05/05/25  0335   05/05/25  0152   05/04/25  2311   05/04/25  1719        Procalcitonin   0.75  Comment:   A concentration < 0.25 ng/mL represents a low risk of bacterial infection. Procalcitonin may not be accurate among patients with localized infection, recent trauma or major surgery, immunosuppressed state, invasive fungal infection, renal dysfunction. Decisions regarding initiation or continuation of antibiotic therapy should not be based solely on procalcitonin levels.             Albumin/Globulin Ratio   0.5             Albumin   1.2             ALP   148             ALT   34             Anion Gap   18             Aniso   2+             AST   36             Bands   5             Baso #   0.14             Basophil %   0.5             BILIRUBIN TOTAL   0.4             BUN   >125             Calcium   7.7             Chloride   90             CO2   31             Creatinine   3.95             Differential Method   Manual             eGFR    12  Comment: Estimated GFR calculated using the CKD-EPI creatinine (2021) equation.             Eos #   0.01             Eos %   0.0             Globulin, Total   2.4             Glucose   48  Comment: Critical Result called and verified by verbal readback to: Called results to Robert/LONDON on 05/05/2025 at 05:23. Reported by [id number].             Hematocrit   27.2             Hemoglobin   9.1             Hypo   Few             Immature Grans (Abs)   1.69             Immature Granulocytes   5.6             Lymph #   0.81             Lymph %   2.7                3             MCH   29.5             MCHC   33.5             MCV   88.3             Mono #   0.89             Mono %   3.0                3             MPV   12.8             Neutrophils, Abs   26.56             Neutrophils Relative   88.2             nRBC   4                0.6             NUCLEATED RBC ABSOLUTE   0.19             Ovalocytes   Few             Phosphorus Level   5.9             PLATELET MORPHOLOGY   Few Large Platelets             Platelet Count   138             POC Glucose 57     76   93   138       Poly   Few             Potassium   4.1             PROTEIN TOTAL   3.6             RBC   3.08             RDW   23.2             Segmented Neutrophils, Man %   89             Sodium   135             Target Cells   Few             Teardrop Cells   Few             Uric Acid   14.2             WBC   30.10                                    Significant Imaging:  I have reviewed all pertinent imaging results/findings within the past 24 hours.

## 2025-05-05 NOTE — NURSING
2038 sent message to Stanislav LYNNE notifiy BP 85/44, low urine output, very edematous and tube feed remained on hold d/t nausea and earlier vomiting.      2330 notify Stanislav LYNNE tube feedin remained off and Pt receives Lantus 50 BID and was no longer nauseated but refused to have feeding resumed.  Blood Sugar 93. Order written    0130 notified Stanislav LNYNE BP 79/38.  Order written    0433 notified Stanislav LYNNE NS bolus still infusing, BP 71/41    0456 Notified Stanislav LYNNE panic WBC 30.10

## 2025-05-05 NOTE — PROGRESS NOTES
[facility]  Nephrology  Progress Note    Patient Name: Magan Saleem  MRN: [id number]  Admission Date: 4/26/2025  Hospital Length of Stay: 9 days  Attending Provider: Maria Teresa Conway MD   Primary Care Physician: Sil Brown DO  Principal Problem:Pneumonitis    Consults  Subjective:     Interval History: The patient reports having abdominal discomfort.    Review of patient's allergies indicates:   Allergen Reactions    Penicillins Hives    Sulfa (sulfonamide antibiotics) Hives     Current Facility-Administered Medications   Medication Frequency    acetaminophen suppository 650 mg Q6H PRN    acetaminophen tablet 1,000 mg Q6H PRN    albuterol-ipratropium 2.5 mg-0.5 mg/3 mL nebulizer solution 3 mL Q4H PRN    allopurinoL tablet 300 mg Daily    aluminum & magnesium hydroxide-simethicone 400-400-40 mg/5 mL suspension 30 mL Q6H PRN    atorvastatin tablet 10 mg Daily    bisacodyL EC tablet 10 mg Daily PRN    dextromethorphan-guaiFENesin  mg/5 ml liquid 10 mL Q6H PRN    dextrose 50% injection 12.5 g PRN    dextrose 50% injection 25 g PRN    diphenhydrAMINE capsule 50 mg Q6H PRN    docusate sodium capsule 100 mg BID PRN    glucagon (human recombinant) injection 1 mg PRN    glucose chewable tablet 16 g PRN    glucose chewable tablet 24 g PRN    heparin (porcine) injection 5,000 Units Q8H    insulin aspart U-100 injection 0-10 Units Q6H PRN    insulin glargine U-100 (Lantus) injection 50 Units QHS    Lactobacillus acidophilus capsule 2 capsule TID WM    melatonin tablet 6 mg Nightly PRN    metoclopramide HCl tablet 5 mg QID    montelukast tablet 10 mg QHS    ondansetron injection 8 mg Q6H PRN    oxyCODONE immediate release tablet 5 mg Q6H PRN    pantoprazole EC tablet 40 mg Daily    predniSONE tablet 20 mg Daily    Followed by    [START ON 5/18/2025] predniSONE tablet 10 mg Daily    rOPINIRole tablet 0.25 mg TID    sertraline tablet 25 mg Daily    sodium bicarbonate 100 mEq in D5W  1,000 mL infusion Continuous    traZODone tablet 50 mg Nightly PRN       Objective:     Vital Signs (Most Recent):  Temp: 96.5 °F (35.8 °C) (25 1637)  Pulse: 91 (25 1700)  Resp: 14 (25 170)  BP: (!) 97/40 (25 1700)  SpO2: 100 % (25 170) Vital Signs (24h Range):  Temp:  [96.3 °F (35.7 °C)-97.7 °F (36.5 °C)] 96.5 °F (35.8 °C)  Pulse:  [] 91  Resp:  [11-35] 14  SpO2:  [94 %-100 %] 100 %  BP: ()/(33-57) 97/40     Weight: 135.4 kg (298 lb 8.1 oz) (25 0515)  Body mass index is 49.67 kg/m².  Body surface area is 2.49 meters squared.    I/O last 3 completed shifts:  In: 3376.8 [I.V.:2133.3; NG/GT:1132; IV Piggyback:111.5]  Out: 100 [Urine:100]    Physical Exam  Constitutional:       Appearance: She is obese. She is ill-appearing.   Cardiovascular:      Heart sounds: No murmur heard.     No friction rub. No gallop.   Pulmonary:      Breath sounds: No rales.   Abdominal:      Palpations: Abdomen is soft.      Tenderness: There is abdominal tenderness.   Musculoskeletal:      Right lower le+ Pitting Edema present.      Left lower le+ Pitting Edema present.   Neurological:      Mental Status: She is alert.         Significant Labs:sureCBC:   Recent Labs   Lab 25  033   WBC 30.10*   RBC 3.08*   HGB 9.1*   HCT 27.2*   *   MCV 88.3   MCH 29.5   MCHC 33.5     CMP:   Recent Labs   Lab 25  033   GLU 48*   CALCIUM 7.7*   ALBUMIN 1.2*   PROT 3.6*   *   K 4.1   CO2 31   CL 90*   BUN >125*   CREATININE 3.95*   ALKPHOS 148   ALT 34   AST 36   BILITOT 0.4     All labs within the past 24 hours have been reviewed.    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] 04/15/2025 Yes    Normocytic anemia [D64.9] 04/15/2025 Yes    Edema due to hypoalbuminemia [E88.09] 04/12/2025 Yes    Type 2 diabetes mellitus with hyperglycemia [E11.65] 04/04/2025 Yes    Sepsis due to pneumonia [J18.9, A41.9] 04/04/2025 Yes    Gastroparesis [K31.84]  Yes    Lung cancer metastatic to brain [C34.90, C79.31]  Yes    Morbid obesity [E66.01] 04/03/2025 Yes    Acute kidney injury superimposed on stage 4 chronic kidney disease [N17.9, N18.4]  Yes    Chronic pulmonary embolism without acute cor pulmonale [I27.82]  Yes    Moderate persistent asthma without complication [J45.40] 10/30/2024 Yes    Essential hypertension [I10] 06/30/2021 Yes    Mixed hyperlipidemia [E78.2] 06/30/2021 Yes      Problems Resolved During this Admission:       IMP:  Hyperuricemia persists  The BUN and creatinine remain elevated    Plan:  Continue the present care  Thank you for your consult. I will follow-up with patient. Please contact us if you have any additional questions.    Mikey Velásquez MD  Nephrology  [facility]

## 2025-05-05 NOTE — ASSESSMENT & PLAN NOTE
"EGD by Dr. Lo during recent past admission at [facility]:  "EGD on 03/21/2025 shows LA class B erosive esophagitis but no pill esophagitis, nonerosive gastritis and retention of food and fluid suspicious for gastroparesis, due to narcotics and diabetes. "  GI consulted, unable to advance diet and poor candidate for PEG.  Dobhoff in place for feeds, continued on Reglan.   GI recommended surgical PEG due to body habitus, GS stated that patient is not a candidate for PEG based on co morbidities and poor prognosis.   Continue PPI.     "

## 2025-05-05 NOTE — ASSESSMENT & PLAN NOTE
CT abdomen/pelvis showed- Possible small fluid collection anterior to the pancreas measuring approximately 3.6 cm on axial 41. Probable complex collection slightly inferior to the pancreatic head extending to the right pelvis and right iliac fossa.     04/21 Abd fluid collection in area pancreas noted on CT abd, discussed with surgery  04/22 likely evolving pancreatitis dx a couple weeks ago at [facility], now with pseudocyst.  No severe epigastric pain.  04/26 Ongoing tube feeds and full liquid diet. If tolerates full liquid better, plan to advanced to soft foods.   04/30 Repeat CT abdomen ordered, showed the pancreas with multiple fluid collections anterior to the pancreas and within the root of the mesentery and extending into the anterior right pararenal space and right paracolic gutter.     5/1 D?W with [facility] (Dr. Tapia) regarding fluid collection. They believe this is sec to pancreatitis and nothing to do for now.

## 2025-05-05 NOTE — PT/OT/SLP PROGRESS
Physical Therapy Treatment    Patient Name:  Magan Saleem   MRN:  [id number]    Recommendations:     Discharge Recommendations: Moderate Intensity Therapy  Discharge Equipment Recommendations: to be determined by next level of care  Barriers to discharge: ongoing medical treatment    Assessment:     Magan Saleem is a 69 y.o. female admitted with a medical diagnosis of Pneumonitis.  She presents with the following impairments/functional limitations: weakness, impaired endurance, impaired self care skills, impaired functional mobility, impaired skin, edema.    Pt with increased sitting tolerance as compared to last seen by this PTA, however, she cont to require increased assist with bed mobs.  Up to recliner chair deferred per Zahida Tillman RN due to low BP 66/34    Rehab Prognosis: Fair; patient would benefit from acute skilled PT services to address these deficits and reach maximum level of function.    Recent Surgery: * No surgery found *      Plan:     During this hospitalization, patient to be seen 5 x/week to address the identified rehab impairments via gait training, therapeutic activities, therapeutic exercises, neuromuscular re-education and progress toward the following goals:    Plan of Care Expires:  05/29/25    Subjective     Chief Complaint: Pneumonitis   Patient/Family Comments/goals: pt agreeable  Pain/Comfort:         Objective:     Communicated with Eneida Carter RN prior to session.  Patient found HOB elevated with blood pressure cuff, pulse ox (continuous), telemetry, peripheral IV, PureWick, NG tube upon PT entry to room.     General Precautions: Standard, fall  Orthopedic Precautions: N/A  Braces: N/A  Respiratory Status: Nasal cannula, flow 2 L/min     Functional Mobility:  Bed Mobility:     Rolling Left:  maximal assistance  Rolling Right: maximal assistance  Supine to Sit: maximal assistance, of 2 persons, and assist with trunk and B LE management  Sit to Supine: maximal assistance,  dependence, of 2 persons, and assist with trunk and B LE management  Balance: minimum assist decreasing to standby assist sitting EOB x 10 minutes to improve strength/endurance to maximize ability to perform mobility      AM-PAC 6 CLICK MOBILITY  Turning over in bed (including adjusting bedclothes, sheets and blankets)?: 2  Sitting down on and standing up from a chair with arms (e.g., wheelchair, bedside commode, etc.): 1  Moving from lying on back to sitting on the side of the bed?: 2  Moving to and from a bed to a chair (including a wheelchair)?: 2  Need to walk in hospital room?: 1  Climbing 3-5 steps with a railing?: 1  Basic Mobility Total Score: 9       Treatment & Education:  Pt performed 2 x 15 reps (B) LE exercises: ap, quad set, glut set, straight leg raise, hip ab/adduction,  heel slide with active assist and verbal and tactile stimuli to assist with movement      Patient left HOB elevated with all lines intact and call button in reach..    GOALS:   Multidisciplinary Problems       Physical Therapy Goals          Problem: Physical Therapy    Goal Priority Disciplines Outcome Interventions   Physical Therapy Goal     PT, PT/OT Progressing    Description: Short Term Goals to be met by: 25    Patient will increase functional independence with mobility by performin. Supine to sit with Moderate Assist  2. Sit to stand transfer with Maximal Assist using Rolling walker  3. Bed to chair transfer with Maximal Assist using lowest level of assistive device  4. Rolling side to side with Min A  5. Lower extremity exercise program x30 reps per handout, with assistance as needed    Long Term Goals to be met by: 25    Pt will regain full independent functional mobility with lowest level of assistive device to return to home situation and prior activities of daily living.                        DME Justifications:      Time Tracking:     PT Received On: 25  PT Start Time: 941     PT Stop Time:  1018  PT Total Time (min): 37 min     Billable Minutes: Therapeutic Activity 15 and Therapeutic Exercise 15    Treatment Type: Treatment  PT/PTA: PTA     Number of PTA visits since last PT visit: 1 05/05/2025

## 2025-05-05 NOTE — ASSESSMENT & PLAN NOTE
JOHNATHON is likely due to pre-renal azotemia due to intravascular volume depletion. Baseline creatinine is ~ 2.0. Most recent creatinine and eGFR are listed below.  Recent Labs     05/03/25  0203 05/04/25  0407 05/05/25  0335   CREATININE 4.16* 4.46* 3.95*   EGFRNORACEVR 11* 10* 12*      Plan  - JOHNATHON is worsening now.   - Avoid nephrotoxins and renally dose meds for GFR listed above  - Monitor urine output, serial BMP, and adjust therapy as needed  - Nephrology consulted, started on Lasix, bicarb is to alkalinize the urine for hyperuricemia.   - At risk to require dialysis, discussed with the patient and she wants dialysis if indicated.

## 2025-05-05 NOTE — NURSING
Notified Dr Conway that pt blood sugars have been dropping as low as 50 and pt refusing meals and tube feeding. Received order to hold all insulin. VORB.

## 2025-05-05 NOTE — PROGRESS NOTES
[facility] Medicine  Progress Note    Patient Name: Magan Saleem  MRN: [id number]  Patient Class: IP- Inpatient   Admission Date: 4/26/2025  Length of Stay: 9 days  Attending Physician: Maria Teresa Conway MD  Primary Care Provider: Sil Brown DO        Subjective     Principal Problem:Pneumonitis        HPI:  70 yo F presents to [place] ED from [facility] for abnormal labs.  Patient was discharged from [facility] two days ago to skilled nursing facility for rehab.  She was diagnosed with dehydration due to gastroparesis with UTI.  Patient has metastatic lung cancer (to the brain) and follows with Dr. Swanson for IV chemotherapy every other week and takes lazertinib daily.  She has completed her course of radiation for the brain mets and follow had showed some improvement.  I thought she had either some decreased LOC due to encephalopathy or some aphasia from the brain mets but after a great effort to get her to talk, I realized she was just mad.  She wanted to know why she had been discharged two days ago when she could not eat.  She has no appetite and early satiety.  She is not nauseated and no vomiting.  She has decided on a DNR status previously (her caretaker and friend of 20 years) states that she had always said she did not want resuscitation if she experienced cardiopulmonary arrest and had told her children her wishes but she does want treatment and is not opposed to J tube if needed.  She has not had anything to eat or drink in two days and is dehydrated again.       Patient was transferred because of concern of sepsis.  She did have enterococcus faecalis UTI at [facility] and was treated.  I do not have the culture results but cultures were sent and patient does have some yeast noted.  (Will not treat a yeast colonization).  She is afebrile and hemodynamically stable and does not look septic clinically.  Her Lactic acid is stable at 2.5 dara 3.2.  Will check  another in am after volume resuscitation.  Her creat is at baseline but she has prerenal azotemia further confirming the dehydration.  Patient has an IO in place from [facility] due to dehydration and difficult stick but with some volume resuscitation, we have gotten an IV.  She is covid and flu negative.       Her WBC is 29 and I am not sure if she has received neupogen but could be a stress reaction.  Her BS is 245 and she does have some urine ketones but most likely due to starvation ketosis.  Will check a serum ketone.  Her platelets are 88K but this is most likely from her chemo.  She is not anemic.  CXR shows some patchy infiltrate but no obvious obstructive pneumonia from her lung cancer.  Her BNP about 3K but no clinical signs of CHF.  No recent echo but due to her BMI 50 most likely has some PHTN.  EKG had no ischemic changes but tachy at 114 which could also be from dehydration.  She was on ozempic for her DM and this could be the cause of her decreased appetite.  She is also on decadron for prevention of cerebral edema.       Remainder of ROS as below.  She mostly just nods and shakes her head and rolls her eyes.  You can get her to laugh if you try but she has been depressed since she has not walked since her hospitalization at [facility] on 3/19/25 and was discharged two days ago.  She says that at her last chemo she noticed she was getting weaker and her daughter had to help her in and out of the car and that was new for her.      4/5- patient seen examined today resting comfortably in bed and in no acute distress, with no acute events overnight.  Patient has a multitude of issues complicating her medical picture.  White blood cell count 01429 today, sodium 159, potassium 3.2 and BUN creatinine 59 and 1.8.  The patient is still not eating even when assistance is provided.  We have already changed her fluids to half-normal saline with 20 of KCl at 1:25 a.m..  Have also put in a GI consult for possible feeding tube.   E coli in the urine has turned out to be ESBL and Rocephin has been changed Merrem.     Hospital Course at [facility]:    4/6- the patient seen examined today resting comfortably in bed, in no acute distress, in no acute events overnight.  The patient is not very talkative today, but states she is doing okay.  She has no acute complaints.  Blood cultures remain negative.  The patient has not eaten since yesterday and is on light IV fluids.  GI has been consulted for feeding tube.  Continues on Merrem for positive urine culture.  04/07 Records reviewed. Not eating which not new, Similar during recent admit at [facility]. Dr Swanson there had question pancreatitis. No abd pain or tenderness reported now. Question of dysphagia to solids. Chart hx gastroparesis. Will discuss with GI. Ronnie says has responded so far well to treatment for lung CA.   04/08 had EGD at [facility] 03/21/25 with no obstruction and evidence gastroparesis. Still not ending. Try additional meds. Talked with GI. Increase activity  04/09 Some better with med  changes  04/10 Continues to do better. Ate 1/2 fish sandwich for lunch. Called by Dr Swanson and she wanting to keep feeding tube in consideration. Talked with Dr Reich and Dr Lemus. If something needed with gastroparesis would have to have post gastric position of tube.   04/11 eating some. Later staff requested some nystatin for sore mouth.   04/12 still not eating well.  Increased peripheral edema.  Albumin low at 1.5.  Will give some albumin and follow with some Lasix.  Placed Dobbhoff tube and start enteral feedings.  This will help with nutrition and if issue of placing surgical tube later make sure will tolerate enteral feedings.  Chest x-ray continues worse on left side.  Discuss with Pulmonary and ask Dr. Villasenor to see.   04/13 no new issues.  Enteral feedings to be started.  Pulmonary evaluation appreciated and plans noted.  04/14 Tolerating feedings Therapy working with her. Bronchoscopy  planned.   4/15 BAL today  4/16 bronch yesterday looks like pneumonitis  4/17 not candidate for PEG  04/18 Eating some now. Pt says feels some better than last seen. Look at placement. High dose steroids by pulmonary. BS not as bad as would expect.  04/19 states continued eat some.  Less nausea.  Blood sugar not sure bad considering the steroids.  Pulmonary adjusted antibiotics based on cultures.  Look at it placement next week.   04/20 Looks the small. C/O SOB to nursing staff earlier but ABG OK. Poor oral intake ; encourage for pt to do more. Looking into placement.  04/21 Firm tender area in abd wall above umbilicus; ?hernia. Abnormal CT de santiago noted and will discuss with surgery.   04/22 CT shows evolving pancreatitis which pt treated for acouple weeks earlier at [facility]. Reviewed with Dr Tapia. No plan to operate on ventral hernia. Discussed later with Dr York. See how does off IVF and encourage oral intake. WBC and Cr both slight better.   04/23 Looks this same. Pt eating some. Pt getting discouraged and asking about home hospice. Talked by phone with her friend Shauna. In conversion doesn't sound like family would be able to care for pt at home. Encouraged pt to give some time and attempt SWB. She says she with consider tonight.   04/24 Lab worse but pt looks some better. Still not taking in well. Maybe eat better as pancreatitis further resolves. Considering replacing dobbhoff feeding tube and look into move to [facility]. Ask Dr Frias to review renal situation. Maybe pancreatitis,pneumonia,renal failure, all these might make a big difference if resolved. Talked with pt and she was OK with NGT  04/25 patient's spirits seems to be doing some better.  Dobbhoff tube placed in feedings to be started.  To be moved to LTAC.  No other new issue  04/26 Awaiting bed assignment at [facility]. No new complains.     Overview/Hospital Course:  4/28- BG trending up, added Lantus and adjusted dose. D/W nutritionist about changing  feeds to allow for more PO intake.     4/29- BG better now. Off IV amiodarone infusion, remains in NSR. Encourage PO intake.     4/30- Continue to adjust insulin to get BG under 200, ideally 140-180. Checking CT chest, abdomen and pelvis today given persistent leukocytosis.     5/1- Discussing with surgery about fluid collections in the abdomen noted on CT (slightly increased in size), also asking if PEG is an option as previously deemed not a candidate. Nephrology has added bicarb infusion. Continue current management otherwise.     5/2- IV fluids started per nephro. WBC count remains elevated.     5/3- Started GOC conversations with the patient who is alert, asked her if her family can be with her or her daughter who she is closest to. She said she will try to talk to them.     5/4- Discussed poor prognosis with the patient. Labs are essentially unchanged. Repeat blood cultures ordered.     5/5   - hypotensive overnight, given fluids, minimal response  - discussed with pulm no additional recs  - renal function still elevated, nephro following  - going to try and reach out to Dr. Swanson    Interval History:     Review of Systems   Constitutional:  Positive for appetite change.   Gastrointestinal:  Positive for abdominal pain.   All other systems reviewed and are negative.    Objective:     Vital Signs (Most Recent):  Temp: 97 °F (36.1 °C) (05/05/25 1156)  Pulse: 91 (05/05/25 1200)  Resp: 13 (05/05/25 1200)  BP: (!) 98/57 (05/05/25 1300)  SpO2: 100 % (05/05/25 1200) Vital Signs (24h Range):  Temp:  [95 °F (35 °C)-97.7 °F (36.5 °C)] 97 °F (36.1 °C)  Pulse:  [] 91  Resp:  [12-35] 13  SpO2:  [97 %-100 %] 100 %  BP: ()/(33-57) 98/57     Weight: 135.4 kg (298 lb 8.1 oz)  Body mass index is 49.67 kg/m².    Intake/Output Summary (Last 24 hours) at 5/5/2025 1402  Last data filed at 5/5/2025 0516  Gross per 24 hour   Intake 803.92 ml   Output 100 ml   Net 703.92 ml         Physical Exam  Constitutional:        Appearance: She is obese. She is ill-appearing.   HENT:      Head: Normocephalic.      Mouth/Throat:      Mouth: Mucous membranes are dry.   Cardiovascular:      Rate and Rhythm: Normal rate.      Heart sounds: Normal heart sounds. No murmur heard.  Pulmonary:      Effort: No respiratory distress.      Breath sounds: Rhonchi present.      Comments: On 2L NC  Abdominal:      General: There is no distension.      Tenderness: There is abdominal tenderness.      Comments: Dobhoff in place   Skin:     Coloration: Skin is pale.   Neurological:      Mental Status: Mental status is at baseline.               Significant Labs: All pertinent labs within the past 24 hours have been reviewed.    Significant Imaging: I have reviewed all pertinent imaging results/findings within the past 24 hours.      Assessment & Plan  Acute kidney injury superimposed on stage 4 chronic kidney disease  JOHNATHON is likely due to pre-renal azotemia due to intravascular volume depletion. Baseline creatinine is ~ 2.0. Most recent creatinine and eGFR are listed below.  Recent Labs     05/03/25  0203 05/04/25  0407 05/05/25  0335   CREATININE 4.16* 4.46* 3.95*   EGFRNORACEVR 11* 10* 12*      Plan  - JOHNATHON is worsening now.   - Avoid nephrotoxins and renally dose meds for GFR listed above  - Monitor urine output, serial BMP, and adjust therapy as needed  - Nephrology consulted, started on Lasix, bicarb is to alkalinize the urine for hyperuricemia.   - At risk to require dialysis, discussed with the patient and she wants dialysis if indicated.     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  Pulmonology consulted, started on steroids and s/p bronchoscopy with normal findings, BAL culture with stenotrophomonas.   Steroid taper plan per Pulmonology, completed course of antibiotics.   Respiratory status is stable.     Infection due to Stenotrophomonas maltophilia  Sepsis due to pneumonia  Stenotrophomonas lower  "respiratory infection in this patient with multifocal infiltrates and consolidative opacities on CT Chest.   S/p 2 week course with Levaquin.   Leukocytosis persists, will follow procalcitonin levels Q48H.  Repeat CT chest stable.  Consider tele-ID consultation if leukocytosis does not improve.     SVT (supraventricular tachycardia)  Went into SVT on 4/27, not responsive to multiple rounds of adenosine.  Started on amiodarone infusion after bolus, turned off on 4/29.  Cardiology consulted, no additional recommendations but could consider cardioversion if this becomes a recurrent and a refractory issue.   Continue beta blocker.   Monitor on tele.     Acute pancreatitis  Intra-abdominal fluid collection  CT abdomen/pelvis showed- Possible small fluid collection anterior to the pancreas measuring approximately 3.6 cm on axial 41. Probable complex collection slightly inferior to the pancreatic head extending to the right pelvis and right iliac fossa.     04/21 Abd fluid collection in area pancreas noted on CT abd, discussed with surgery  04/22 likely evolving pancreatitis dx a couple weeks ago at [facility] [place], now with pseudocyst.  No severe epigastric pain.  04/26 Ongoing tube feeds and full liquid diet. If tolerates full liquid better, plan to advanced to soft foods.   04/30 Repeat CT abdomen ordered, showed the pancreas with multiple fluid collections anterior to the pancreas and within the root of the mesentery and extending into the anterior right pararenal space and right paracolic gutter.     5/1 D?W with [facility] (Dr. Tapia) regarding fluid collection. They believe this is sec to pancreatitis and nothing to do for now.     Gastroparesis  EGD by Dr. Lo during recent past admission at [facility]:  "EGD on 03/21/2025 shows LA class B erosive esophagitis but no pill esophagitis, nonerosive gastritis and retention of food and fluid suspicious for gastroparesis, due to narcotics and diabetes. "  GI consulted, unable to " "advance diet and poor candidate for PEG.  Dobhoff in place for feeds, continued on Reglan.   GI recommended surgical PEG due to body habitus, GS stated that patient is not a candidate for PEG based on co morbidities and poor prognosis.   Continue PPI.     Lung cancer metastatic to brain  DNR but not hospice.   Receives chemo and has finished radiation.    Follows with Dr. Swanson.    5/5 will attempt to contact Dr. Swanson  Goals of care, counseling/discussion  DNR confirmed.  Patient wants permanent feeding tube and dialysis if needed.   Encouraged patient to talk to her family about her prognosis.  Currently PEG is not option as mentioned above so nutrition could be a major factor in driving the hospice discussion on top of her underlying metastatic cancer.   Consider primary oncologist- Dr. Mata consultation to get an idea of her prognosis related to cancer as patient is relying on that to make further decisions.      Nonischemic nontraumatic myocardial injury  In the setting of SVT episodes.  Trend is flat, no chest pain and no ischemic changes noted on EKG.  No ischemic workup recommended per cardiology.     Hyperuricemia  Hyperphosphatemia  Defer management to nephrology- started on allopurinol and IV bicarb to alkalinize the urine.   Follow uric acid levels.     Essential hypertension  Patient's blood pressure range in the last 24 hours was: BP  Min: 65/42  Max: 115/50.The patient's inpatient anti-hypertensive regimen is listed below:  Current Antihypertensives       Plan  - BP is controlled, no changes needed to their regimen    Type 2 diabetes mellitus with hyperglycemia  Patient's FSGs are controlled on current medication regimen.  Last A1c reviewed-   Lab Results   Component Value Date    HGBA1C 6.7 04/02/2025     Most recent fingerstick glucose reviewed- No results for input(s): "POCTGLUCOSE" in the last 24 hours.  Current correctional scale  Low  Maintain anti-hyperglycemic dose as follows- "   Antihyperglycemics (From admission, onward)      Start     Stop Route Frequency Ordered    05/05/25 2100  insulin glargine U-100 (Lantus) injection 50 Units         -- SubQ Nightly 05/04/25 2332    05/01/25 1051  insulin aspart U-100 injection 0-10 Units         -- SubQ Every 6 hours PRN 05/01/25 0952          Hold Oral hypoglycemics while patient is in the hospital.  BG ranging up as tube feeds now to goal and D5 in bicarb drip, added Lantus and dose adjusted for tighter glycemic control.     Normocytic anemia  Anemia is likely due to chronic disease due to Malignancy. Most recent hemoglobin and hematocrit are listed below.  Recent Labs     05/03/25  0203 05/05/25  0335   HGB 7.5* 9.1*   HCT 23.3* 27.2*     Plan  - Monitor serial CBC: Daily  - Transfuse PRBC if patient becomes hemodynamically unstable, symptomatic or H/H drops below 7/21.  - Patient has not received any PRBC transfusions to date  - Patient's anemia is currently stable  - No evidence of bleeding.    Neoplastic (malignant) related fatigue  Patient with Acute on chronic debility due to neoplastic/malignant related fatigue. Latest AMPAC and GEMS scores have been reviewed. Evaluation for etiology is complete. Plan includes - Progressive mobility protocol initated  - PT/OT consulted  - Fall precautions in place.    Chronic pulmonary embolism without acute cor pulmonale  Eliquis was discontinued due to risk of ICH with brain mets.  Currently on hold in case surgical procedure is required.    Morbid obesity  Body mass index is 49.67 kg/m². Morbid obesity complicates all aspects of disease management from diagnostic modalities to treatment. Weight loss encouraged and health benefits explained to patient.     Moderate persistent asthma without complication  Stable without exacerbation.  Continue PRN nebs.     Edema due to hypoalbuminemia  Required albumin infusion intermittently during the stay.     Mixed hyperlipidemia  Resume statin.     VTE Risk  Mitigation (From admission, onward)           Ordered     heparin (porcine) injection 5,000 Units  Every 8 hours         04/27/25 1055                    Discharge Planning   MITUL: 5/9/2025     Code Status: DNR   Medical Readiness for Discharge Date:   Discharge Plan A: Skilled Nursing Facility                Please place Justification for DME        Maria Teresa Conway MD  Department of Hospital Medicine   [facility]

## 2025-05-05 NOTE — ASSESSMENT & PLAN NOTE
"Patient's FSGs are controlled on current medication regimen.  Last A1c reviewed-   Lab Results   Component Value Date    HGBA1C 6.7 04/02/2025     Most recent fingerstick glucose reviewed- No results for input(s): "POCTGLUCOSE" in the last 24 hours.  Current correctional scale  Low  Maintain anti-hyperglycemic dose as follows-   Antihyperglycemics (From admission, onward)      Start     Stop Route Frequency Ordered    05/05/25 2100  insulin glargine U-100 (Lantus) injection 50 Units         -- SubQ Nightly 05/04/25 2332    05/01/25 1051  insulin aspart U-100 injection 0-10 Units         -- SubQ Every 6 hours PRN 05/01/25 0952          Hold Oral hypoglycemics while patient is in the hospital.  BG ranging up as tube feeds now to goal and D5 in bicarb drip, added Lantus and dose adjusted for tighter glycemic control.     "

## 2025-05-05 NOTE — PLAN OF CARE
Problem: Skin Injury Risk Increased  Goal: Skin Health and Integrity  Outcome: Progressing  Intervention: Optimize Skin Protection  Flowsheets (Taken 5/5/2025 1305)  Pressure Reduction Techniques:   weight shift assistance provided   heels elevated off bed  Pressure Reduction Devices: (pillow)   pressure-redistributing mattress utilized   heel offloading device utilized  Skin Protection:   skin sealant/moisture barrier applied   incontinence pads utilized  Activity Management: Arm raise - L1  Head of Bed (HOB) Positioning: HOB at 30-45 degrees  Intervention: Promote and Optimize Oral Intake  Flowsheets (Taken 5/5/2025 1305)  Oral Nutrition Promotion: adaptive equipment use encouraged

## 2025-05-06 NOTE — PROGRESS NOTES
[facility]  Progress Note    Patient Name: Magan Saleem  MRN: [id number]  Patient Class: IP- Inpatient   Admission Date: 4/26/2025  Length of Stay: 10 days  Attending Physician: Maria Teresa Conway MD  Primary Care Provider: Sil Brown DO        Subjective     Principal Problem:Pneumonitis        HPI:  70 yo F presents to [place] ED from [facility] for abnormal labs.  Patient was discharged from [facility] two days ago to skilled nursing facility for rehab.  She was diagnosed with dehydration due to gastroparesis with UTI.  Patient has metastatic lung cancer (to the brain) and follows with Dr. Swanson for IV chemotherapy every other week and takes lazertinib daily.  She has completed her course of radiation for the brain mets and follow had showed some improvement.  I thought she had either some decreased LOC due to encephalopathy or some aphasia from the brain mets but after a great effort to get her to talk, I realized she was just mad.  She wanted to know why she had been discharged two days ago when she could not eat.  She has no appetite and early satiety.  She is not nauseated and no vomiting.  She has decided on a DNR status previously (her caretaker and friend of 20 years) states that she had always said she did not want resuscitation if she experienced cardiopulmonary arrest and had told her children her wishes but she does want treatment and is not opposed to J tube if needed.  She has not had anything to eat or drink in two days and is dehydrated again.       Patient was transferred because of concern of sepsis.  She did have enterococcus faecalis UTI at [facility] and was treated.  I do not have the culture results but cultures were sent and patient does have some yeast noted.  (Will not treat a yeast colonization).  She is afebrile and hemodynamically stable and does not look septic clinically.  Her Lactic acid is stable at 2.5 dara 3.2.  Will check  another in am after volume resuscitation.  Her creat is at baseline but she has prerenal azotemia further confirming the dehydration.  Patient has an IO in place from [facility] due to dehydration and difficult stick but with some volume resuscitation, we have gotten an IV.  She is covid and flu negative.       Her WBC is 29 and I am not sure if she has received neupogen but could be a stress reaction.  Her BS is 245 and she does have some urine ketones but most likely due to starvation ketosis.  Will check a serum ketone.  Her platelets are 88K but this is most likely from her chemo.  She is not anemic.  CXR shows some patchy infiltrate but no obvious obstructive pneumonia from her lung cancer.  Her BNP about 3K but no clinical signs of CHF.  No recent echo but due to her BMI 50 most likely has some PHTN.  EKG had no ischemic changes but tachy at 114 which could also be from dehydration.  She was on ozempic for her DM and this could be the cause of her decreased appetite.  She is also on decadron for prevention of cerebral edema.       Remainder of ROS as below.  She mostly just nods and shakes her head and rolls her eyes.  You can get her to laugh if you try but she has been depressed since she has not walked since her hospitalization at [facility] on 3/19/25 and was discharged two days ago.  She says that at her last chemo she noticed she was getting weaker and her daughter had to help her in and out of the car and that was new for her.      4/5- patient seen examined today resting comfortably in bed and in no acute distress, with no acute events overnight.  Patient has a multitude of issues complicating her medical picture.  White blood cell count 45471 today, sodium 159, potassium 3.2 and BUN creatinine 59 and 1.8.  The patient is still not eating even when assistance is provided.  We have already changed her fluids to half-normal saline with 20 of KCl at 1:25 a.m..  Have also put in a GI consult for possible feeding tube.   E coli in the urine has turned out to be ESBL and Rocephin has been changed Merrem.     Hospital Course at [facility]:    4/6- the patient seen examined today resting comfortably in bed, in no acute distress, in no acute events overnight.  The patient is not very talkative today, but states she is doing okay.  She has no acute complaints.  Blood cultures remain negative.  The patient has not eaten since yesterday and is on light IV fluids.  GI has been consulted for feeding tube.  Continues on Merrem for positive urine culture.  04/07 Records reviewed. Not eating which not new, Similar during recent admit at [facility]. Dr Swanson there had question pancreatitis. No abd pain or tenderness reported now. Question of dysphagia to solids. Chart hx gastroparesis. Will discuss with GI. Ronnie says has responded so far well to treatment for lung CA.   04/08 had EGD at [facility] 03/21/25 with no obstruction and evidence gastroparesis. Still not ending. Try additional meds. Talked with GI. Increase activity  04/09 Some better with med  changes  04/10 Continues to do better. Ate 1/2 fish sandwich for lunch. Called by Dr Swanson and she wanting to keep feeding tube in consideration. Talked with Dr Reich and Dr Lemus. If something needed with gastroparesis would have to have post gastric position of tube.   04/11 eating some. Later staff requested some nystatin for sore mouth.   04/12 still not eating well.  Increased peripheral edema.  Albumin low at 1.5.  Will give some albumin and follow with some Lasix.  Placed Dobbhoff tube and start enteral feedings.  This will help with nutrition and if issue of placing surgical tube later make sure will tolerate enteral feedings.  Chest x-ray continues worse on left side.  Discuss with Pulmonary and ask Dr. Villasenor to see.   04/13 no new issues.  Enteral feedings to be started.  Pulmonary evaluation appreciated and plans noted.  04/14 Tolerating feedings Therapy working with her. Bronchoscopy  planned.   4/15 BAL today  4/16 bronch yesterday looks like pneumonitis  4/17 not candidate for PEG  04/18 Eating some now. Pt says feels some better than last seen. Look at placement. High dose steroids by pulmonary. BS not as bad as would expect.  04/19 states continued eat some.  Less nausea.  Blood sugar not sure bad considering the steroids.  Pulmonary adjusted antibiotics based on cultures.  Look at it placement next week.   04/20 Looks the small. C/O SOB to nursing staff earlier but ABG OK. Poor oral intake ; encourage for pt to do more. Looking into placement.  04/21 Firm tender area in abd wall above umbilicus; ?hernia. Abnormal CT de santiago noted and will discuss with surgery.   04/22 CT shows evolving pancreatitis which pt treated for acouple weeks earlier at [facility]. Reviewed with Dr Tapia. No plan to operate on ventral hernia. Discussed later with Dr York. See how does off IVF and encourage oral intake. WBC and Cr both slight better.   04/23 Looks this same. Pt eating some. Pt getting discouraged and asking about home hospice. Talked by phone with her friend Shauna. In conversion doesn't sound like family would be able to care for pt at home. Encouraged pt to give some time and attempt SWB. She says she with consider tonight.   04/24 Lab worse but pt looks some better. Still not taking in well. Maybe eat better as pancreatitis further resolves. Considering replacing dobbhoff feeding tube and look into move to [facility]. Ask Dr Frias to review renal situation. Maybe pancreatitis,pneumonia,renal failure, all these might make a big difference if resolved. Talked with pt and she was OK with NGT  04/25 patient's spirits seems to be doing some better.  Dobbhoff tube placed in feedings to be started.  To be moved to LTAC.  No other new issue  04/26 Awaiting bed assignment at [facility]. No new complains.     Overview/Hospital Course:  4/28- BG trending up, added Lantus and adjusted dose. D/W nutritionist about changing  feeds to allow for more PO intake.     4/29- BG better now. Off IV amiodarone infusion, remains in NSR. Encourage PO intake.     4/30- Continue to adjust insulin to get BG under 200, ideally 140-180. Checking CT chest, abdomen and pelvis today given persistent leukocytosis.     5/1- Discussing with surgery about fluid collections in the abdomen noted on CT (slightly increased in size), also asking if PEG is an option as previously deemed not a candidate. Nephrology has added bicarb infusion. Continue current management otherwise.     5/2- IV fluids started per nephro. WBC count remains elevated.     5/3- Started GOC conversations with the patient who is alert, asked her if her family can be with her or her daughter who she is closest to. She said she will try to talk to them.     5/4- Discussed poor prognosis with the patient. Labs are essentially unchanged. Repeat blood cultures ordered.     5/5   - hypotensive overnight, given fluids, minimal response  - discussed with pulm no additional recs  - renal function still elevated, nephro following  - going to try and reach out to Dr. Swanson      5/6  - refusing feeds and not eating  - stopped lactic acid drawing  -- will discuss goals of care again with family    Interval History:     Review of Systems   Constitutional:  Positive for appetite change.   Gastrointestinal:  Positive for abdominal pain.   All other systems reviewed and are negative.    Objective:     Vital Signs (Most Recent):  Temp: 96.2 °F (35.7 °C) (05/06/25 1645)  Pulse: 91 (05/06/25 1645)  Resp: 12 (05/06/25 1645)  BP: (!) 91/40 (05/06/25 1600)  SpO2: 100 % (05/06/25 1645) Vital Signs (24h Range):  Temp:  [96.2 °F (35.7 °C)-97.9 °F (36.6 °C)] 96.2 °F (35.7 °C)  Pulse:  [] 91  Resp:  [11-26] 12  SpO2:  [90 %-100 %] 100 %  BP: ()/(32-88) 91/40     Weight: 135.3 kg (298 lb 4.5 oz)  Body mass index is 49.64 kg/m².    Intake/Output Summary (Last 24 hours) at 5/6/2025 3619  Last data filed at  5/6/2025 0618  Gross per 24 hour   Intake 1975.4 ml   Output 400 ml   Net 1575.4 ml         Physical Exam  Constitutional:       Appearance: She is obese. She is ill-appearing.   HENT:      Head: Normocephalic.      Mouth/Throat:      Mouth: Mucous membranes are dry.   Cardiovascular:      Rate and Rhythm: Normal rate.      Heart sounds: Normal heart sounds. No murmur heard.  Pulmonary:      Effort: No respiratory distress.      Breath sounds: Rhonchi present.      Comments: On 2L NC  Abdominal:      General: There is no distension.      Tenderness: There is abdominal tenderness.      Comments: Dobhoff in place   Skin:     Coloration: Skin is pale.   Neurological:      Mental Status: Mental status is at baseline.               Significant Labs: All pertinent labs within the past 24 hours have been reviewed.    Significant Imaging: I have reviewed all pertinent imaging results/findings within the past 24 hours.      Assessment & Plan  Acute kidney injury superimposed on stage 4 chronic kidney disease  JOHNATHON is likely due to pre-renal azotemia due to intravascular volume depletion. Baseline creatinine is ~ 2.0. Most recent creatinine and eGFR are listed below.  Recent Labs     05/04/25  0407 05/05/25  0335 05/06/25  0343   CREATININE 4.46* 3.95* 4.00*   EGFRNORACEVR 10* 12* 12*      Plan  - JOHNATHON is worsening now.   - Avoid nephrotoxins and renally dose meds for GFR listed above  - Monitor urine output, serial BMP, and adjust therapy as needed  - Nephrology consulted, started on Lasix, bicarb is to alkalinize the urine for hyperuricemia.   - At risk to require dialysis, discussed with the patient and she wants dialysis if indicated.     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  Pulmonology consulted, started on steroids and s/p bronchoscopy with normal findings, BAL culture with stenotrophomonas.   Steroid taper plan per Pulmonology, completed course of antibiotics.  "  Respiratory status is stable.     Infection due to Stenotrophomonas maltophilia  Sepsis due to pneumonia  Stenotrophomonas lower respiratory infection in this patient with multifocal infiltrates and consolidative opacities on CT Chest.   S/p 2 week course with Levaquin.   Leukocytosis persists, will follow procalcitonin levels Q48H.  Repeat CT chest stable.  Consider tele-ID consultation if leukocytosis does not improve.     SVT (supraventricular tachycardia)  Went into SVT on 4/27, not responsive to multiple rounds of adenosine.  Started on amiodarone infusion after bolus, turned off on 4/29.  Cardiology consulted, no additional recommendations but could consider cardioversion if this becomes a recurrent and a refractory issue.   Continue beta blocker.   Monitor on tele.     Acute pancreatitis  Intra-abdominal fluid collection  CT abdomen/pelvis showed- Possible small fluid collection anterior to the pancreas measuring approximately 3.6 cm on axial 41. Probable complex collection slightly inferior to the pancreatic head extending to the right pelvis and right iliac fossa.     04/21 Abd fluid collection in area pancreas noted on CT abd, discussed with surgery  04/22 likely evolving pancreatitis dx a couple weeks ago at [facility], now with pseudocyst.  No severe epigastric pain.  04/26 Ongoing tube feeds and full liquid diet. If tolerates full liquid better, plan to advanced to soft foods.   04/30 Repeat CT abdomen ordered, showed the pancreas with multiple fluid collections anterior to the pancreas and within the root of the mesentery and extending into the anterior right pararenal space and right paracolic gutter.     5/1 D?W with [facility] (Dr. Tapia) regarding fluid collection. They believe this is sec to pancreatitis and nothing to do for now.     Gastroparesis  EGD by Dr. Lo during recent past admission at [facility]:  "EGD on 03/21/2025 shows LA class B erosive esophagitis but no pill esophagitis, nonerosive " "gastritis and retention of food and fluid suspicious for gastroparesis, due to narcotics and diabetes. "  GI consulted, unable to advance diet and poor candidate for PEG.  Dobhoff in place for feeds, continued on Reglan.   GI recommended surgical PEG due to body habitus, GS stated that patient is not a candidate for PEG based on co morbidities and poor prognosis.   Continue PPI.     Lung cancer metastatic to brain  DNR but not hospice.   Receives chemo and has finished radiation.    Follows with Dr. Swanson.    5/5 will attempt to contact Dr. Swanson  Goals of care, counseling/discussion  DNR confirmed.  Patient wants permanent feeding tube and dialysis if needed.   Encouraged patient to talk to her family about her prognosis.  Currently PEG is not option as mentioned above so nutrition could be a major factor in driving the hospice discussion on top of her underlying metastatic cancer.   Consider primary oncologist- Dr. Mata consultation to get an idea of her prognosis related to cancer as patient is relying on that to make further decisions.      Nonischemic nontraumatic myocardial injury  In the setting of SVT episodes.  Trend is flat, no chest pain and no ischemic changes noted on EKG.  No ischemic workup recommended per cardiology.     Hyperuricemia  Hyperphosphatemia  Defer management to nephrology- started on allopurinol and IV bicarb to alkalinize the urine.   Follow uric acid levels.     Essential hypertension  Patient's blood pressure range in the last 24 hours was: BP  Min: 76/52  Max: 111/88.The patient's inpatient anti-hypertensive regimen is listed below:  Current Antihypertensives       Plan  - BP is controlled, no changes needed to their regimen    Type 2 diabetes mellitus with hyperglycemia  Patient's FSGs are controlled on current medication regimen.  Last A1c reviewed-   Lab Results   Component Value Date    HGBA1C 6.7 04/02/2025     Most recent fingerstick glucose reviewed- No results for " "input(s): "POCTGLUCOSE" in the last 24 hours.  Current correctional scale  Low  Maintain anti-hyperglycemic dose as follows-   Antihyperglycemics (From admission, onward)      Start     Stop Route Frequency Ordered    05/05/25 2100  insulin glargine U-100 (Lantus) injection 50 Units         -- SubQ Nightly 05/04/25 2332    05/01/25 1051  insulin aspart U-100 injection 0-10 Units         -- SubQ Every 6 hours PRN 05/01/25 0952          Hold Oral hypoglycemics while patient is in the hospital.  BG ranging up as tube feeds now to goal and D5 in bicarb drip, added Lantus and dose adjusted for tighter glycemic control.     Normocytic anemia  Anemia is likely due to chronic disease due to Malignancy. Most recent hemoglobin and hematocrit are listed below.  Recent Labs     05/05/25  0335   HGB 9.1*   HCT 27.2*     Plan  - Monitor serial CBC: Daily  - Transfuse PRBC if patient becomes hemodynamically unstable, symptomatic or H/H drops below 7/21.  - Patient has not received any PRBC transfusions to date  - Patient's anemia is currently stable  - No evidence of bleeding.    Neoplastic (malignant) related fatigue  Patient with Acute on chronic debility due to neoplastic/malignant related fatigue. Latest AMPAC and GEMS scores have been reviewed. Evaluation for etiology is complete. Plan includes - Progressive mobility protocol initated  - PT/OT consulted  - Fall precautions in place.    Chronic pulmonary embolism without acute cor pulmonale  Eliquis was discontinued due to risk of ICH with brain mets.  Currently on hold in case surgical procedure is required.    Morbid obesity  Body mass index is 49.64 kg/m². Morbid obesity complicates all aspects of disease management from diagnostic modalities to treatment. Weight loss encouraged and health benefits explained to patient.     Moderate persistent asthma without complication  Stable without exacerbation.  Continue PRN nebs.     Edema due to hypoalbuminemia  Required albumin " infusion intermittently during the stay.     Mixed hyperlipidemia  Resume statin.     VTE Risk Mitigation (From admission, onward)           Ordered     heparin (porcine) injection 5,000 Units  Every 8 hours         04/27/25 1055                    Discharge Planning   MITUL: 5/9/2025     Code Status: DNR   Medical Readiness for Discharge Date:   Discharge Plan A: Skilled Nursing Facility                Please place Justification for DME        Maria Teresa Conway MD  Department of Hospital Medicine   [facility]

## 2025-05-06 NOTE — NURSING
Addendum Note for Skin Care/Assessment performed at 22:05, with the following areas photographed and skin management performed for each site, with this nurse and 2 other nurses to assist, at this time, are as follows:    **Left Breast, Anterior/Superior Aspect--Photograph Taken, Cleaned with CHG Wipe and applied Foam Dressing.    **Right Breast, Anterior/Superior Aspect--Photograph Taken, Cleaned with CHG Wipe and applied Foam Dressing.    **Left Lower Leg, Lateral Aspect--Photograph Taken after Removal of Foam Dressing, Cleaned with CHG Wipe and applied Foam Dressing.    **Left Upper Leg, Lateral Aspect--Photograph Taken after Removal of Foam Dressing, Cleaned with CHG Wipe and applied [place] Dressing.    **Right Upper Inner Thigh to Right Perineum Region--Photograph Taken, Cleaned with CHG Wipe and applied Cream to perineal region.    **Right Lateral Lower Leg--Photograph Taken of Bruising noted, Skin Intact.    **Anterior Abdomen--Photograph Taken of Bruising Noted, Skin Intact.    **Right Anterior Superior Chest Region near Axilla Region--Photograph Taken of Bruising, Skin Intact.    **Right Anterior Upper Arm--Photograph Taken, Cleaned with CHG Wipe and applied Foam Dressing.

## 2025-05-06 NOTE — ASSESSMENT & PLAN NOTE
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 Most recent hemoglobin and hematocrit are listed below.  Recent Labs     05/05/25  0335   HGB 9.1*   HCT 27.2*     Plan  - Monitor serial CBC: Daily  - Transfuse PRBC if patient becomes hemodynamically unstable, symptomatic or H/H drops below 7/21.  - Patient has not received any PRBC transfusions to date  - Patient's anemia is currently stable  - No evidence of bleeding.

## 2025-05-06 NOTE — PROGRESS NOTES
[facility]  Adult Nutrition  Follow-Up Note         Reason for Assessment  Reason For Assessment: RD follow-up        Assessment and Plan\    5/6/2025: RD follow up. Patient remains on nocturnal feeding of Suplena 1.8 at 55mL/hour with 30mL/hour free water flush x 12 hours, 6p-6a, with full liquid PO diet. PO intakes very poor, generally 0%. May need to recalculate nocturnal feeds if poor PO intakes continue. Current weight 135.3kg. BUN, Cr elevated. Hyperuricemia continues per nephrology. RD following    5/1/2025: RD follow up. Patient remains on nocturnal feeding of Suplena 1.8 at 55mL/hour with 60mL/hour free water flush x 12 hours, 6p-6a, with full liquid PO diet. PO intakes very poor, generally 0%. May need to recalculate nocturnal feeds if poor PO intakes continue. Current weight 120.1kg. RD following.    4/28/2025: NOCTURNAL TUBE FEED RECS  RD follow-up. Patient remains on tube feeds with Full Liquids - Vivonex still not in yet. Tolerating feeds well per MD. D/W MD who wants to attempt to stimulate appetite to improve oral intake given NGT isn't a long-term solution and PEG/PEJ is not feasible given body habitus. Recommend to change continuous feeds to nocturnal feeds providing patient with 50% estimated energy needs. Energy needs updated to reflect current weight.This will hopefully help to improve her appetite and encourage more oral intake during the day. If patient doesn't start eating >50%, will adjust feeds as appropriate.    Recommend to change continuous feeds to nocturnal feeds. Initiate continuous infusion of Suplena 1.8 at 55 mL/hr x 12 hours from 6 PM - 6 AM. FWF 60 mL/hr x 12 hours. Keep HOB at 30-45 degrees to reduce risk of aspiration. Monitor for tolerance: n/v/c/d. Hold feeding and notify RD if symptoms of intolerance develop.     4/25/2025: TUBE FEED CONSULT  Consult received and appreciated. Consult for new tube feeding. Patient still not eating well. May eat better  as pancreatitis further resolves. Dr. Rizvi talked with patient and she was OK with NGT. It is often recommended to feed into the jejunum in patients with gastroparesis, though body habitus may complicate placement.    NOTE: Patient has complex medical history. Ordered speciality formula (Vivonex). Will start feeds with Suplena until then 2/2 poor renal function. Vivonex is good option for those with pancreatitis, but also for those with gastroparesis + CKD (non-dialysis) (lower in protein/fat/potassium/phos/sodium and contains no fiber)    Initiate continuous infusion of Suplena 1.8 at 10 mL/hr via NGT and advance 10 mL/hr q8h pending tolerance until goal rate of 45 mL/hr is achieved. FWF 30 mL/hr. Keep HOB at 30-45 degrees to reduce risk of aspiration. Monitor for tolerance: n/v/c/d. Hold feeding and notify RD if symptoms of intolerance develop.     Last Bowel Movement: 05/06/25.     Medications/labs reviewed. RD following.    Learning Needs/Social Determinants of Health    Learning Assessment       04/04/2025 0021 [facility] (4/3/2025 - Present)   Created by Deanna Rivas, RN - RN (Nurse) Status: Complete                 PRIMARY LEARNER     Primary Learner Name:  Magan Saleem SG - 04/04/2025 0021    Relationship:  Patient SG - 04/04/2025 0021    Does the primary learner have any barriers to learning?:  No Barriers SG - 04/04/2025 0021    What is the preferred language of the primary learner?:  English SG - 04/04/2025 0021    Is an  required?:  No SG - 04/04/2025 0021    How does the primary learner prefer to learn new concepts?:  Listening SG - 04/04/2025 0021    How often do you need to have someone help you read instructions, pamphlets, or written material from your doctor or pharmacy?:  Never SG - 04/04/2025 0021        CO-LEARNER #1     No question answered        CO-LEARNER #2     No question answered        SPECIAL TOPICS     No question answered        ANSWERED  BY:     -:  Family SG - 04/04/2025 0021        Comments         Edit History       Deanna Rivas RN - RN (Nurse)   04/04/2025 0021                          Social Drivers of Health     Tobacco Use: Low Risk  (4/15/2025)    Patient History     Smoking Tobacco Use: Never     Smokeless Tobacco Use: Never     Passive Exposure: Not on file   Alcohol Use: Not At Risk (4/4/2025)    AUDIT-C     Frequency of Alcohol Consumption: Never     Average Number of Drinks: Patient does not drink     Frequency of Binge Drinking: Never   Financial Resource Strain: Medium Risk (4/27/2025)    Overall Financial Resource Strain (CARDIA)     Difficulty of Paying Living Expenses: Somewhat hard   Food Insecurity: No Food Insecurity (4/27/2025)    Hunger Vital Sign     Worried About Running Out of Food in the Last Year: Never true     Ran Out of Food in the Last Year: Never true   Transportation Needs: No Transportation Needs (4/27/2025)    PRAPARE - Transportation     Lack of Transportation (Medical): No     Lack of Transportation (Non-Medical): No   Physical Activity: Inactive (4/4/2025)    Exercise Vital Sign     Days of Exercise per Week: 0 days     Minutes of Exercise per Session: 0 min   Stress: No Stress Concern Present (4/27/2025)    [employer] - Occupational Stress Questionnaire     Feeling of Stress : Only a little   Housing Stability: Low Risk  (4/27/2025)    Housing Stability Vital Sign     Unable to Pay for Housing in the Last Year: No     Number of Times Moved in the Last Year: 0     Homeless in the Last Year: No   Depression: Low Risk  (10/30/2024)    Depression     Last PHQ-4: Flowsheet Data: 0   Utilities: Not At Risk (4/27/2025)    [facility] Utilities     Threatened with loss of utilities: No   Health Literacy: Adequate Health Literacy (4/27/2025)     Health Literacy     Frequency of need for help with medical instructions: Never   Recent Concern: Health Literacy - Inadequate Health Literacy  (4/4/2025)     Health Literacy     Frequency of need for help with medical instructions: Sometimes   Social Isolation: Not on file     Malnutrition  Is Patient Malnourished: No    Nutrition Diagnosis  Inadequate energy intake related to Appetite loss as evidenced by poor PO intakes  Comments: initiate enteral feeding     Recent Labs   Lab 05/06/25  0343 05/06/25  0506 05/06/25  1123   *  --   --    POCGLU  --    < > 92    < > = values in this interval not displayed.       Nutrition Prescription / Recommendations  Recommendation/Intervention: Recommend continue current POC as tolerated  Goals: Improve oral intake  Nutrition Goal Status: goal not met  Communication of RD Recs: reviewed with physician  Current Diet Order: Full Liquids with Tube Feeds  Chewing or Swallowing Issues: Yes - seen by SLP  Recommended Diet: Nocturnal Feeds + Full Liquids  Is Nutrition Support Recommended: Yes    Needs Calculated    Energy Calorie Requirements (kcal): 2,370 - 2,963 kcal (20 - 25 kcal/kg ABW)   Protein Requirements: 34 - 45 g (0.6 - 0.8 g/kg IBW)  Enteral Nutrition   Enteral Nutrition Formula Provides:  1,184 kcals Propofol Rate: No  30 g Protein  129 g Carbohydrates  63 g Fat Propofol Rate: No  487 ml Fluid without Flush    720 ml Fluid by flush   1,207 ml Total Fluid   Enteral Nutrition Recommended Order:  Tube feeding via NG/ Dobhoff  Tube feeding formula: Suplena 1.8 NG/ Dobhoff at 55mL/hour x 12 hours  Free Water Flush: 30 ml hourly x 12 hours  Modular Supplements:No Modular Supplements needed  Enteral Nutrition meets needs?: no - 50%  Enteral Nutrition Status: continue nocturnal feeds  Is Nutrition Education Recommended: No    Monitor and Evaluation  % current intake: 0 - 10 % (inadequate, remains poor), enteral feeding at goal  % intake to meet estimated needs: 50 - 75 %  Monitor and Evaluation: Food and beverage intake, Protein intake, Carbohydrate intake, Diet order, Enteral and parenteral nutrition  administration, Food and nutrition knowledge, Weight, Beliefs and attitudes, Electrolyte and renal panel, Gastrointestinal profile, Glucose/endocrine profile, Inflammatory profile, Lipid profile, Nutrition focused physical findings, Skin    Current Medical Diagnosis and Past Medical History     Past Medical History:   Diagnosis Date    Chronic kidney disease, stage 3b     Chronic pulmonary embolism without acute cor pulmonale     Diabetes mellitus type 2 in obese     DNR (do not resuscitate)     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 Swanson    Melanocytic nevi of lower limb, including hip, left     Mixed hyperlipidemia     Moderate persistent asthma without complication 10/30/2024    Vitamin D deficiency      Nutrition/Diet History  Spiritual, Cultural Beliefs, Confucianist Practices, Values that Affect Care: no  NKFA    Lab/Procedures/Meds  Recent Labs   Lab 05/05/25  0335 05/06/25  0343   * 132*   K 4.1 4.2   BUN >125* 124*   CREATININE 3.95* 4.00*   CALCIUM 7.7* 7.6*   ALBUMIN 1.2* 1.0*   CL 90* 86*   ALT 34 28   AST 36 35   PHOS 5.9*  --    Note: BUN, Cr, Phos elevated (CKD5). Alb low. Na+, Cl- low. Continued poor PO intakes.    Last A1c:   Lab Results   Component Value Date    HGBA1C 6.7 04/02/2025    HGBA1C 6.4 (H) 02/05/2025   Note: < 7 % goal for patients with diabetes    Lab Results   Component Value Date    RBC 3.08 (L) 05/05/2025    HGB 9.1 (L) 05/05/2025    HCT 27.2 (L) 05/05/2025    MCV 88.3 05/05/2025    MCH 29.5 05/05/2025    MCHC 33.5 05/05/2025   Note: H&H low    Pertinent Labs Reviewed: reviewed  Pertinent Medications Reviewed: reviewed    Scheduled Meds:   allopurinoL  300 mg Oral Daily  "   atorvastatin  10 mg Oral Daily    heparin (porcine)  5,000 Units Subcutaneous Q8H    insulin glargine U-100  50 Units Subcutaneous QHS    Lactobacillus acidophilus  2 capsule Oral TID WM    metoclopramide HCl  5 mg Oral QID    montelukast  10 mg Oral QHS    pantoprazole  40 mg Oral Daily    predniSONE  20 mg Oral Daily    Followed by    [START ON 5/18/2025] predniSONE  10 mg Oral Daily    rOPINIRole  0.25 mg Oral TID    sertraline  25 mg Oral Daily   Note: METOCLOPRAMIDE (gastroparesis)    Continuous Infusions:   sodium bicarbonate 100 mEq in D5W 1,000 mL infusion   Intravenous Continuous 50 mL/hr at 05/06/25 0618 Rate Verify at 05/06/25 0618     PRN Meds:.  Current Facility-Administered Medications:     acetaminophen, 650 mg, Rectal, Q6H PRN    acetaminophen, 1,000 mg, Oral, Q6H PRN    albuterol-ipratropium, 3 mL, Nebulization, Q4H PRN    aluminum & magnesium hydroxide-simethicone, 30 mL, Oral, Q6H PRN    bisacodyL, 10 mg, Oral, Daily PRN    dextromethorphan-guaiFENesin  mg/5 ml, 10 mL, Oral, Q6H PRN    dextrose 50%, 12.5 g, Intravenous, PRN    dextrose 50%, 25 g, Intravenous, PRN    diphenhydrAMINE, 50 mg, Oral, Q6H PRN    docusate sodium, 100 mg, Oral, BID PRN    glucagon (human recombinant), 1 mg, Intramuscular, PRN    glucose, 16 g, Oral, PRN    glucose, 24 g, Oral, PRN    insulin aspart U-100, 0-10 Units, Subcutaneous, Q6H PRN    melatonin, 6 mg, Oral, Nightly PRN    ondansetron, 8 mg, Intravenous, Q6H PRN    oxyCODONE, 5 mg, Oral, Q6H PRN    traZODone, 50 mg, Oral, Nightly PRN    Anthropometrics  Height: 5' 5" (165.1 cm)  Height (inches): 65 in  Weight: 135.3 kg (298 lb 4.5 oz)  Weight (lb): 298.29 lb  Weight Method: Bed Scale  Ideal Body Weight (IBW), Female: 125 lb  BMI (Calculated): 49.6       Estimated/Assessed Needs  RMR ([place]-St. Roxy Equation): 1883.88     Temp: 96.4 °F (35.8 °C)Axillary  Weight Used For Calorie Calculations: 118.5 kg (261 lb 3.9 oz)     Energy Calorie Requirements (kcal): " 2,370 - 2,963 kcal (20 - 25 kcal/kg ABW)  Weight Used For Protein Calculations: 56.7 kg (125 lb)  Protein Requirements: 34 - 45 g (0.6 - 0.8 g/kg IBW) (protein needs lower 2/2 non-dialysis dependent CKD)    Fluid Requirements (mL): 1 mL/kcal   CHO Requirement: 45 - 55% total kcal (T2DM)    Nutrition by Nursing  Diet/Nutrition Received: full liquid  Intake (%): 0% (pt refused all items on tray and items offered by staff)  Diet/Feeding Assistance: total feed  Diet/Feeding Tolerance: poor       NG/OG Tube 04/25/25 0913 Right nostril-Feeding Type: continuous, by pump       NG/OG Tube 04/25/25 0913 Right nostril-Current Rate (mL/hr): 55 mL/hr       NG/OG Tube 04/25/25 0913 Right nostril-Goal Rate (mL/hr): 55 mL/hr       NG/OG Tube 04/25/25 0913 Right nostril-Formula Name: Joo    Nutrition Follow-Up  RD Follow-up?: Yes    Nutrition Discharge Planning: Too early to determine, pending clinical course       Dorita Palma, MS, RD, LD  Available via Secure Chat

## 2025-05-06 NOTE — ASSESSMENT & PLAN NOTE
Body mass index is 49.64 kg/m². Morbid obesity complicates all aspects of disease management from diagnostic modalities to treatment. Weight loss encouraged and health benefits explained to patient.

## 2025-05-06 NOTE — ASSESSMENT & PLAN NOTE
DNR but not hospice.   Receives chemo and has finished radiation.    Follows with Dr. Swanson.    5/5 will attempt to contact Dr. Swanson

## 2025-05-06 NOTE — PLAN OF CARE
Problem: Physical Therapy  Goal: Physical Therapy Goal  Description: Short Term Goals to be met by: 25    Patient will increase functional independence with mobility by performin. Supine to sit with Moderate Assist  2. Sit to stand transfer with Maximal Assist using Rolling walker  3. Bed to chair transfer with Maximal Assist using lowest level of assistive device  4. Rolling side to side with Min A  5. Lower extremity exercise program x30 reps per handout, with assistance as needed    Long Term Goals to be met by: 25    Pt will regain full independent functional mobility with lowest level of assistive device to return to home situation and prior activities of daily living.   Outcome: Not Progressing       Bed Mobility:     Rolling Left:  maximal assistance  Rolling Right: maximal assistance  Supine to Sit: maximal assistance, of 2 persons, and assist with trunk and B LE management  Sit to Supine: maximal assistance, dependence, of 2 persons, and assist with trunk and B LE management  Bed to Chair: dependent with maxim lift  Balance: minimum assist decreasing to standby assist sitting EOB      Pt improving with sitting tolerance, however, cont to require increased assist with bed mobs and total assist via maxim lift transfer bed<>chair

## 2025-05-06 NOTE — PLAN OF CARE
Problem: Gas Exchange Impaired  Goal: Optimal Gas Exchange  Outcome: Progressing      <-------- Click here to INCLUDE CoVID-19 Discharge Instructions

## 2025-05-06 NOTE — SUBJECTIVE & OBJECTIVE
Interval History:     Review of Systems   Constitutional:  Positive for appetite change.   Gastrointestinal:  Positive for abdominal pain.   All other systems reviewed and are negative.    Objective:     Vital Signs (Most Recent):  Temp: 96.2 °F (35.7 °C) (05/06/25 1645)  Pulse: 91 (05/06/25 1645)  Resp: 12 (05/06/25 1645)  BP: (!) 91/40 (05/06/25 1600)  SpO2: 100 % (05/06/25 1645) Vital Signs (24h Range):  Temp:  [96.2 °F (35.7 °C)-97.9 °F (36.6 °C)] 96.2 °F (35.7 °C)  Pulse:  [] 91  Resp:  [11-26] 12  SpO2:  [90 %-100 %] 100 %  BP: ()/(32-88) 91/40     Weight: 135.3 kg (298 lb 4.5 oz)  Body mass index is 49.64 kg/m².    Intake/Output Summary (Last 24 hours) at 5/6/2025 1754  Last data filed at 5/6/2025 0618  Gross per 24 hour   Intake 1975.4 ml   Output 400 ml   Net 1575.4 ml         Physical Exam  Constitutional:       Appearance: She is obese. She is ill-appearing.   HENT:      Head: Normocephalic.      Mouth/Throat:      Mouth: Mucous membranes are dry.   Cardiovascular:      Rate and Rhythm: Normal rate.      Heart sounds: Normal heart sounds. No murmur heard.  Pulmonary:      Effort: No respiratory distress.      Breath sounds: Rhonchi present.      Comments: On 2L NC  Abdominal:      General: There is no distension.      Tenderness: There is abdominal tenderness.      Comments: Dobhoff in place   Skin:     Coloration: Skin is pale.   Neurological:      Mental Status: Mental status is at baseline.               Significant Labs: All pertinent labs within the past 24 hours have been reviewed.    Significant Imaging: I have reviewed all pertinent imaging results/findings within the past 24 hours.

## 2025-05-06 NOTE — ASSESSMENT & PLAN NOTE
CT abdomen/pelvis showed- Possible small fluid collection anterior to the pancreas measuring approximately 3.6 cm on axial 41. Probable complex collection slightly inferior to the pancreatic head extending to the right pelvis and right iliac fossa.     04/21 Abd fluid collection in area pancreas noted on CT abd, discussed with surgery  04/22 likely evolving pancreatitis dx a couple weeks ago at [facility], now with pseudocyst.  No severe epigastric pain.  04/26 Ongoing tube feeds and full liquid diet. If tolerates full liquid better, plan to advanced to soft foods.   04/30 Repeat CT abdomen ordered, showed the pancreas with multiple fluid collections anterior to the pancreas and within the root of the mesentery and extending into the anterior right pararenal space and right paracolic gutter.     5/1 D?W with [facility] (Dr. Tapia) regarding fluid collection. They believe this is sec to pancreatitis and nothing to do for now.

## 2025-05-06 NOTE — PLAN OF CARE
Problem: Adult Inpatient Plan of Care  Goal: Plan of Care Review  Outcome: Progressing     Problem: Adult Inpatient Plan of Care  Goal: Patient-Specific Goal (Individualized)  Outcome: Progressing     Problem: Adult Inpatient Plan of Care  Goal: Optimal Comfort and Wellbeing  Outcome: Progressing     Problem: Diabetes Comorbidity  Goal: Blood Glucose Level Within Targeted Range  Outcome: Progressing     Problem: Acute Kidney Injury/Impairment  Goal: Fluid and Electrolyte Balance  Outcome: Progressing     Problem: Acute Kidney Injury/Impairment  Goal: Improved Oral Intake  Outcome: Progressing     Problem: Wound  Goal: Optimal Functional Ability  Outcome: Progressing     Problem: Wound  Goal: Improved Oral Intake  Outcome: Progressing     Problem: Wound  Goal: Optimal Pain Control and Function  Outcome: Progressing     Problem: Wound  Goal: Skin Health and Integrity  Outcome: Progressing     Problem: Pneumonia  Goal: Effective Oxygenation and Ventilation  Outcome: Progressing     Problem: Gas Exchange Impaired  Goal: Optimal Gas Exchange  Outcome: Progressing

## 2025-05-06 NOTE — PLAN OF CARE
Problem: Occupational Therapy  Goal: Occupational Therapy Goal  Description: STG:  Pt will perform grooming with setup and min a progressing   Pt will bathe with mod a nt  Pt will perform UE dressing with mod a no significant progress  Pt will perform LE dressing with mod a with AD as needed nt  Pt will sit EOB x 10 min with SBA progressing  Pt will transfer bed/chair/bsc with mod a ni significant progress  Pt will perform standing task x 30 sec with mod a  nt   Pt will tolerate 20 minutes of tx without fatigue progressing       LT.Restore to max I with self care and mobility.     Outcome: Progressing 1,5,8   Feeding:  total assistance   Min a to wash her face  Min a oral care with oral swab  Sat eob 8 jeannette sba/cga  Dep to doff/evangelina gown     Pt cont to require much assistance with bed mobs and cont to be maxim lifted to chair. Pt making progress with her grooming and with eob sitting. Plan is to cont with OT poc

## 2025-05-06 NOTE — ASSESSMENT & PLAN NOTE
Patient's blood pressure range in the last 24 hours was: BP  Min: 76/52  Max: 111/88.The patient's inpatient anti-hypertensive regimen is listed below:  Current Antihypertensives       Plan  - BP is controlled, no changes needed to their regimen

## 2025-05-06 NOTE — ASSESSMENT & PLAN NOTE
JOHNATHON is likely due to pre-renal azotemia due to intravascular volume depletion. Baseline creatinine is ~ 2.0. Most recent creatinine and eGFR are listed below.  Recent Labs     05/04/25  0407 05/05/25  0335 05/06/25  0343   CREATININE 4.46* 3.95* 4.00*   EGFRNORACEVR 10* 12* 12*      Plan  - JOHNATHON is worsening now.   - Avoid nephrotoxins and renally dose meds for GFR listed above  - Monitor urine output, serial BMP, and adjust therapy as needed  - Nephrology consulted, started on Lasix, bicarb is to alkalinize the urine for hyperuricemia.   - At risk to require dialysis, discussed with the patient and she wants dialysis if indicated.

## 2025-05-06 NOTE — PT/OT/SLP PROGRESS
Occupational Therapy   Treatment    Name: Magan Saleem  MRN: [id number]  Admitting Diagnosis:  Pneumonitis       Recommendations:     Discharge Recommendations: Moderate Intensity Therapy  Discharge Equipment Recommendations:  to be determined by next level of care  Barriers to discharge:       Assessment:     Magan Saleem is a 69 y.o. female with a medical diagnosis of Pneumonitis. . Performance deficits affecting function are weakness, impaired endurance, impaired self care skills, impaired functional mobility.     Rehab Prognosis:  Fair; patient would benefit from acute skilled OT services to address these deficits and reach maximum level of function.       Plan:     Patient to be seen 5 x/week to address the above listed problems via self-care/home management, therapeutic activities, therapeutic exercises  Plan of Care Expires: 06/03/25  Plan of Care Reviewed with: patient    Subjective     Chief Complaint:   Patient/Family Comments/goals:   Pain/Comfort:  Pain Rating 1: 0/10    Objective:     Communicated with: Libby Dong RN prior to session.  Patient found HOB elevated with pulse ox (continuous), peripheral IV, telemetry, PureWick, NG tube, support boots upon OT entry to room.    General Precautions: Standard, fall    Orthopedic Precautions:N/A  Braces: N/A  Respiratory Status: Nasal cannula, flow 2 L/min     Occupational Performance:     Bed Mobility:    Rolling max a       Functional Mobility/Transfers:  Bed <--> chair dep via maxim lift  Functional Mobility:     Activities of Daily Living:  Min a to taye her face\  S oral care with oral swab           AMPAC 6 Click ADL:      Treatment & Education:  Pt performed hand helper with 2 rubber band resistances 20 reps x 2, green t ball ex's 20 reps x 2, aarom with 1 lb wt 15 reps x 2 B shld flex,abd/add,elbow flex/ext  Patient left up in chair with all lines intact, call button in reach, and chair alarm on    GOALS:   Multidisciplinary Problems        Occupational Therapy Goals          Problem: Occupational Therapy    Goal Priority Disciplines Outcome Interventions   Occupational Therapy Goal     OT, PT/OT Progressing    Description: STG:  Pt will perform grooming with setup and min a  Pt will bathe with mod a  Pt will perform UE dressing with mod a  Pt will perform LE dressing with mod a with AD as needed  Pt will sit EOB x 10 min with SBA   Pt will transfer bed/chair/bsc with mod a  Pt will perform standing task x 30 sec with mod a   Pt will tolerate 20 minutes of tx without fatigue      LT.Restore to max I with self care and mobility.                          DME Justifications:      Time Tracking:     OT Date of Treatment: 25  OT Start Time: 904  OT Stop Time: 1001  OT Total Time (min): 57 min    Billable Minutes:Therapeutic Activity 50    OT/SAROJ: SAROJ          2025

## 2025-05-06 NOTE — PROGRESS NOTES
[facility]  Nephrology  Progress Note    Patient Name: Magan Saleem  MRN: [id number]  Admission Date: 4/26/2025  Hospital Length of Stay: 10 days  Attending Provider: Maria Teresa Conway MD   Primary Care Physician: Sil Brown DO  Principal Problem:Pneumonitis    Consults  Subjective:     Interval History: The patient denies having dyspnea today    Review of patient's allergies indicates:   Allergen Reactions    Penicillins Hives    Sulfa (sulfonamide antibiotics) Hives     Current Facility-Administered Medications   Medication Frequency    acetaminophen suppository 650 mg Q6H PRN    acetaminophen tablet 1,000 mg Q6H PRN    albuterol-ipratropium 2.5 mg-0.5 mg/3 mL nebulizer solution 3 mL Q4H PRN    allopurinoL tablet 300 mg Daily    aluminum & magnesium hydroxide-simethicone 400-400-40 mg/5 mL suspension 30 mL Q6H PRN    atorvastatin tablet 10 mg Daily    bisacodyL EC tablet 10 mg Daily PRN    dextromethorphan-guaiFENesin  mg/5 ml liquid 10 mL Q6H PRN    dextrose 50% injection 12.5 g PRN    dextrose 50% injection 25 g PRN    diphenhydrAMINE capsule 50 mg Q6H PRN    docusate sodium capsule 100 mg BID PRN    glucagon (human recombinant) injection 1 mg PRN    glucose chewable tablet 16 g PRN    glucose chewable tablet 24 g PRN    heparin (porcine) injection 5,000 Units Q8H    insulin aspart U-100 injection 0-10 Units Q6H PRN    insulin glargine U-100 (Lantus) injection 50 Units QHS    Lactobacillus acidophilus capsule 2 capsule TID WM    melatonin tablet 6 mg Nightly PRN    metoclopramide HCl tablet 5 mg QID    montelukast tablet 10 mg QHS    ondansetron injection 8 mg Q6H PRN    oxyCODONE immediate release tablet 5 mg Q6H PRN    pantoprazole EC tablet 40 mg Daily    predniSONE tablet 20 mg Daily    Followed by    [START ON 5/18/2025] predniSONE tablet 10 mg Daily    rOPINIRole tablet 0.25 mg TID    sertraline tablet 25 mg Daily    sodium bicarbonate 100 mEq in D5W 1,000 mL  infusion Continuous    traZODone tablet 50 mg Nightly PRN       Objective:     Vital Signs (Most Recent):  Temp: 96.2 °F (35.7 °C) (05/06/25 1645)  Pulse: 91 (05/06/25 1645)  Resp: 12 (05/06/25 1645)  BP: (!) 91/40 (05/06/25 1600)  SpO2: 100 % (05/06/25 1645) Vital Signs (24h Range):  Temp:  [96.2 °F (35.7 °C)-97.9 °F (36.6 °C)] 96.2 °F (35.7 °C)  Pulse:  [] 91  Resp:  [11-26] 12  SpO2:  [90 %-100 %] 100 %  BP: ()/(32-88) 91/40     Weight: 135.3 kg (298 lb 4.5 oz) (05/06/25 0403)  Body mass index is 49.64 kg/m².  Body surface area is 2.49 meters squared.    I/O last 3 completed shifts:  In: 2839.3 [P.O.:180; I.V.:1766.9; NG/GT:781; IV Piggyback:111.5]  Out: 600 [Urine:600]    Physical Exam  Constitutional:       Appearance: She is obese. She is ill-appearing.   Cardiovascular:      Heart sounds: No murmur heard.     No friction rub. No gallop.   Pulmonary:      Effort: No respiratory distress.      Breath sounds: No wheezing, rhonchi or rales.   Abdominal:      General: Abdomen is protuberant. Bowel sounds are decreased.      Palpations: Abdomen is soft. There is no mass.      Tenderness: There is abdominal tenderness in the right upper quadrant and epigastric area. There is guarding. There is no rebound.   Musculoskeletal:      Right lower leg: 3+ Edema present.      Left lower leg: 3+ Edema present.   Neurological:      Mental Status: She is alert.         Significant Labs:sureCBC:   Recent Labs   Lab 05/05/25  0335   WBC 30.10*   RBC 3.08*   HGB 9.1*   HCT 27.2*   *   MCV 88.3   MCH 29.5   MCHC 33.5     CMP:   Recent Labs   Lab 05/06/25  0343   *   CALCIUM 7.6*   ALBUMIN 1.0*   PROT 3.7*   *   K 4.2   CO2 31   CL 86*   *   CREATININE 4.00*   ALKPHOS 131   ALT 28   AST 35   BILITOT 0.4     All labs within the past 24 hours have been reviewed.    Significant Imaging:      Assessment/Plan:     Active Diagnoses:    Diagnosis Date Noted POA    PRINCIPAL PROBLEM:  Pneumonitis  [J98.4] 04/16/2025 Yes    Goals of care, counseling/discussion [Z71.89] 05/03/2025 Not Applicable    Intra-abdominal fluid collection [R18.8] 05/01/2025 Yes    SVT (supraventricular tachycardia) [I47.10] 04/27/2025 No    Nonischemic nontraumatic myocardial injury [I5A] 04/27/2025 No    Hyperphosphatemia [E83.39] 04/26/2025 Yes    Hyperuricemia [E79.0] 04/26/2025 Yes    Infection due to Stenotrophomonas maltophilia [A49.8] 04/22/2025 Yes    Acute pancreatitis [K85.90] 04/21/2025 Yes    Neoplastic (malignant) related fatigue [R53.0] 04/15/2025 Yes    Normocytic anemia [D64.9] 04/15/2025 Yes    Edema due to hypoalbuminemia [E88.09] 04/12/2025 Yes    Type 2 diabetes mellitus with hyperglycemia [E11.65] 04/04/2025 Yes    Sepsis due to pneumonia [J18.9, A41.9] 04/04/2025 Yes    Gastroparesis [K31.84]  Yes    Lung cancer metastatic to brain [C34.90, C79.31]  Yes    Morbid obesity [E66.01] 04/03/2025 Yes    Acute kidney injury superimposed on stage 4 chronic kidney disease [N17.9, N18.4]  Yes    Chronic pulmonary embolism without acute cor pulmonale [I27.82]  Yes    Moderate persistent asthma without complication [J45.40] 10/30/2024 Yes    Essential hypertension [I10] 06/30/2021 Yes    Mixed hyperlipidemia [E78.2] 06/30/2021 Yes      Problems Resolved During this Admission:       IMP:  The serum sodium is declining  JOHNATHON superimposed on CKD  Hyperuricemia  Elevated lactic acid level   Anemia  Leukocytosis  Plan:  Continue to monitor the renal function and uric acid      Thank you for your consult. I will follow-up with patient. Please contact us if you have any additional questions.    Mikey Velásquez MD  Nephrology  [facility]

## 2025-05-06 NOTE — ASSESSMENT & PLAN NOTE
"EGD by Dr. Lo during recent past admission at [facility]:  "EGD on 03/21/2025 shows LA class B erosive esophagitis but no pill esophagitis, nonerosive gastritis and retention of food and fluid suspicious for gastroparesis, due to narcotics and diabetes. "  GI consulted, unable to advance diet and poor candidate for PEG.  Dobhoff in place for feeds, continued on Reglan.   GI recommended surgical PEG due to body habitus, GS stated that patient is not a candidate for PEG based on co morbidities and poor prognosis.   Continue PPI.     "

## 2025-05-06 NOTE — ASSESSMENT & PLAN NOTE
CT abdomen/pelvis showed- Possible small fluid collection anterior to the pancreas measuring approximately 3.6 cm on axial 41. Probable complex collection slightly inferior to the pancreatic head extending to the right pelvis and right iliac fossa.     04/21 Abd fluid collection in area pancreas noted on CT abd, discussed with surgery  04/22 likely evolving pancreatitis dx a couple weeks ago at [facility], now with pseudocyst.  No severe epigastric pain.  04/26 Ongoing tube feeds and full liquid diet. If tolerates full liquid better, plan to advanced to soft foods.   04/30 Repeat CT abdomen ordered, showed the pancreas with multiple fluid collections anterior to the pancreas and within the root of the mesentery and extending into the anterior right pararenal space and right paracolic gutter.     5/1 D?W with [facility] (Dr. Tapia) regarding fluid collection. They believe this is sec to pancreatitis and nothing to do for now.

## 2025-05-06 NOTE — PT/OT/SLP PROGRESS
Physical Therapy Treatment    Patient Name:  Magan Saleem   MRN:  [id number]    Recommendations:     Discharge Recommendations: Moderate Intensity Therapy  Discharge Equipment Recommendations: to be determined by next level of care  Barriers to discharge: ongoing medical treatment    Assessment:     Magan Saleem is a 69 y.o. female admitted with a medical diagnosis of Pneumonitis.  She presents with the following impairments/functional limitations: weakness, impaired endurance, impaired self care skills, impaired functional mobility, impaired skin, edema.    Pt slowly improving with sitting endurance, however, cont to require increased assist with bed mobs and transfers    Rehab Prognosis: Fair; patient would benefit from acute skilled PT services to address these deficits and reach maximum level of function.    Recent Surgery: * No surgery found *      Plan:     During this hospitalization, patient to be seen 5 x/week to address the identified rehab impairments via gait training, therapeutic activities, therapeutic exercises, neuromuscular re-education and progress toward the following goals:    Plan of Care Expires:  05/29/25    Subjective     Chief Complaint: Pneumonitis   Patient/Family Comments/goals: pt agreeable  Pain/Comfort:         Objective:     Communicated with Libby Dong RN prior to session.  Patient found HOB elevated with blood pressure cuff, pulse ox (continuous), telemetry, peripheral IV, PureWick, NG tube upon PT entry to room.     General Precautions: Standard, fall  Orthopedic Precautions: N/A  Braces: N/A  Respiratory Status: Nasal cannula, flow 2 L/min     Functional Mobility:  Bed Mobility:     Rolling Left:  maximal assistance and dependent with hygiene  Rolling Right: maximal assistance and dependent with hygiene  Supine to Sit: maximal assistance, of 2 persons, and assist with trunk and B LE management  Sit to Supine: maximal assistance, dependence, and of 2 persons and assist with  Risk Factor yes no   Age >75  X   BMI <20 >40  X   Patient History     Chronic Pain (2 or more meds/Pain Management)  X   ETOH (more than 3 drinks Daily)  X   Uncontrolled Depression/Bipolar/Schizoaffective Disorder  X   Arrhythmias  X   Stent placement/MI X    DVT/PE  X   Pacemaker  X   HTN (uncontrolled or requiring more than 2 medications)  X   CHF/Retained fluids/Edema  X   Stroke with Residual   X   COPD/Asthma  X   PALLAVI--Non-compliant with CPAP  X   Diabetes (on insulin or more than 2 meds)         A1C:6.40 X    BPH/Urinary retention (on medication)  X   CKD  X   Home environment and support     Current ambulation status (use of cane, walker, W/C, Multiple falls/weakness) X    Stairs to enter and throughout home X    Lives Alone  X   Doesn’t have support at home  X     Outpatient Screening Assessment    Home needs: (Walker/BSC):  Needs Walker  ? Steps 4 steps to get in   Caregiver 24-48hrs post-discharge: Home with wife    Discharge Plan:       Prescriptions: Meds to bed    Home medications:   ? Blood thinner/anti-coag therapy--ASA  ? BPH or diuretic  ? BP meds--Lisinopril, Metoprolol  ? Pain/Anti-inflammatories--  Pre-op Education:  Educate patient on spinal anesthesia/pain control:  ? patient verbalize understanding    Educate patient on hospital course/timeline:  ?  patient verbalize understanding    Joint Care Class:  ?  yes ? no      Notes:   DM Medications: Metformin, Glimepiride   Hgb 10.1     trunk and B LE management  Transfers:     Bed to Chair: dependence with  maxim lift      AM-PAC 6 CLICK MOBILITY  Turning over in bed (including adjusting bedclothes, sheets and blankets)?: 2  Sitting down on and standing up from a chair with arms (e.g., wheelchair, bedside commode, etc.): 1  Moving from lying on back to sitting on the side of the bed?: 2  Moving to and from a bed to a chair (including a wheelchair)?: 2  Need to walk in hospital room?: 1  Climbing 3-5 steps with a railing?: 1  Basic Mobility Total Score: 9       Treatment & Education:  Pt performed 2 x 15 reps (B) LE exercises: ap, quad set, glut set, straight leg raise, hip ab/adduction,  heel slide with active assist     1630-3145 - pt assisted back to bed with maxim lift, maximum assist rolling R<>L    Patient left up in chair; 1205 - HOB with all lines intact and call button in reach; 1205 - Libby Dong RN present.    GOALS:   Multidisciplinary Problems       Physical Therapy Goals          Problem: Physical Therapy    Goal Priority Disciplines Outcome Interventions   Physical Therapy Goal     PT, PT/OT Progressing    Description: Short Term Goals to be met by: 25    Patient will increase functional independence with mobility by performin. Supine to sit with Moderate Assist  2. Sit to stand transfer with Maximal Assist using Rolling walker  3. Bed to chair transfer with Maximal Assist using lowest level of assistive device  4. Rolling side to side with Min A  5. Lower extremity exercise program x30 reps per handout, with assistance as needed    Long Term Goals to be met by: 25    Pt will regain full independent functional mobility with lowest level of assistive device to return to home situation and prior activities of daily living.                        DME Justifications:      Time Tracking:     PT Received On: 25  PT Start Time: 0845 (1150)     PT Stop Time: 0921 (1205)  PT Total Time (min): 36 min     Billable  Minutes: Therapeutic Activity 30 and Therapeutic Exercise 12    Treatment Type: Treatment  PT/PTA: PTA     Number of PTA visits since last PT visit: 2     05/06/2025

## 2025-05-07 NOTE — ASSESSMENT & PLAN NOTE
"Patient's FSGs are controlled on current medication regimen.  Last A1c reviewed-   Lab Results   Component Value Date    HGBA1C 6.7 04/02/2025     Most recent fingerstick glucose reviewed- No results for input(s): "POCTGLUCOSE" in the last 24 hours.  Current correctional scale  Low  Maintain anti-hyperglycemic dose as follows-   Antihyperglycemics (From admission, onward)      Start     Stop Route Frequency Ordered    05/05/25 2100  insulin glargine U-100 (Lantus) injection 50 Units         -- SubQ Nightly 05/04/25 2332    05/01/25 1051  insulin aspart U-100 injection 0-10 Units         -- SubQ Every 6 hours PRN 05/01/25 0952          Hold Oral hypoglycemics while patient is in the hospital.  BG ranging up as tube feeds now to goal and D5 in bicarb drip, added Lantus and dose adjusted for tighter glycemic control.     " Tissue Cultured Epidermal Autograft Text: The defect edges were debeveled with a #15 scalpel blade.  Given the location of the defect, shape of the defect and the proximity to free margins a tissue cultured epidermal autograft was deemed most appropriate.  The graft was then trimmed to fit the size of the defect.  The graft was then placed in the primary defect and oriented appropriately.

## 2025-05-07 NOTE — ASSESSMENT & PLAN NOTE
JOHNATHON is likely due to pre-renal azotemia due to intravascular volume depletion. Baseline creatinine is ~ 2.0. Most recent creatinine and eGFR are listed below.  Recent Labs     05/05/25  0335 05/06/25  0343 05/07/25  0401   CREATININE 3.95* 4.00* 4.57*   EGFRNORACEVR 12* 12* 10*      Plan  - JOHNATHON is worsening now.   - Avoid nephrotoxins and renally dose meds for GFR listed above  - Monitor urine output, serial BMP, and adjust therapy as needed  - Nephrology consulted, started on Lasix, bicarb is to alkalinize the urine for hyperuricemia.   - At risk to require dialysis, discussed with the patient and she wants dialysis if indicated.

## 2025-05-07 NOTE — ASSESSMENT & PLAN NOTE
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 Most recent hemoglobin and hematocrit are listed below.  Recent Labs     05/05/25  0335 05/07/25  0401   HGB 9.1* 7.4*   HCT 27.2* 22.4*     Plan  - Monitor serial CBC: Daily  - Transfuse PRBC if patient becomes hemodynamically unstable, symptomatic or H/H drops below 7/21.  - Patient has not received any PRBC transfusions to date  - Patient's anemia is currently stable  - No evidence of bleeding.     Statement Selected

## 2025-05-07 NOTE — ASSESSMENT & PLAN NOTE
Patient's blood pressure range in the last 24 hours was: BP  Min: 82/28  Max: 131/49.The patient's inpatient anti-hypertensive regimen is listed below:  Current Antihypertensives       Plan  - BP is controlled, no changes needed to their regimen

## 2025-05-07 NOTE — PROGRESS NOTES
[facility] Medicine  Progress Note    Patient Name: Magan Saleem  MRN: [id number]  Patient Class: IP- Inpatient   Admission Date: 4/26/2025  Length of Stay: 11 days  Attending Physician: Maria Teresa Conway MD  Primary Care Provider: Sil Brown DO        Subjective     Principal Problem:Pneumonitis        HPI:  68 yo F presents to [facility] ED from [facility] for abnormal labs.  Patient was discharged from [facility] two days ago to skilled nursing facility for rehab.  She was diagnosed with dehydration due to gastroparesis with UTI.  Patient has metastatic lung cancer (to the brain) and follows with Dr. Swanson for IV chemotherapy every other week and takes lazertinib daily.  She has completed her course of radiation for the brain mets and follow had showed some improvement.  I thought she had either some decreased LOC due to encephalopathy or some aphasia from the brain mets but after a great effort to get her to talk, I realized she was just mad.  She wanted to know why she had been discharged two days ago when she could not eat.  She has no appetite and early satiety.  She is not nauseated and no vomiting.  She has decided on a DNR status previously (her caretaker and friend of 20 years) states that she had always said she did not want resuscitation if she experienced cardiopulmonary arrest and had told her children her wishes but she does want treatment and is not opposed to J tube if needed.  She has not had anything to eat or drink in two days and is dehydrated again.       Patient was transferred because of concern of sepsis.  She did have enterococcus faecalis UTI at [facility] and was treated.  I do not have the culture results but cultures were sent and patient does have some yeast noted.  (Will not treat a yeast colonization).  She is afebrile and hemodynamically stable and does not look septic clinically.  Her Lactic acid is stable at 2.5 dara 3.2.  Will check  another in am after volume resuscitation.  Her creat is at baseline but she has prerenal azotemia further confirming the dehydration.  Patient has an IO in place from [facility] due to dehydration and difficult stick but with some volume resuscitation, we have gotten an IV.  She is covid and flu negative.       Her WBC is 29 and I am not sure if she has received neupogen but could be a stress reaction.  Her BS is 245 and she does have some urine ketones but most likely due to starvation ketosis.  Will check a serum ketone.  Her platelets are 88K but this is most likely from her chemo.  She is not anemic.  CXR shows some patchy infiltrate but no obvious obstructive pneumonia from her lung cancer.  Her BNP about 3K but no clinical signs of CHF.  No recent echo but due to her BMI 50 most likely has some PHTN.  EKG had no ischemic changes but tachy at 114 which could also be from dehydration.  She was on ozempic for her DM and this could be the cause of her decreased appetite.  She is also on decadron for prevention of cerebral edema.       Remainder of ROS as below.  She mostly just nods and shakes her head and rolls her eyes.  You can get her to laugh if you try but she has been depressed since she has not walked since her hospitalization at [facility] on 3/19/25 and was discharged two days ago.  She says that at her last chemo she noticed she was getting weaker and her daughter had to help her in and out of the car and that was new for her.      4/5- patient seen examined today resting comfortably in bed and in no acute distress, with no acute events overnight.  Patient has a multitude of issues complicating her medical picture.  White blood cell count 65547 today, sodium 159, potassium 3.2 and BUN creatinine 59 and 1.8.  The patient is still not eating even when assistance is provided.  We have already changed her fluids to half-normal saline with 20 of KCl at 1:25 a.m..  Have also put in a GI consult for possible feeding tube.   E coli in the urine has turned out to be ESBL and Rocephin has been changed Merrem.     Hospital Course at [facility]:    4/6- the patient seen examined today resting comfortably in bed, in no acute distress, in no acute events overnight.  The patient is not very talkative today, but states she is doing okay.  She has no acute complaints.  Blood cultures remain negative.  The patient has not eaten since yesterday and is on light IV fluids.  GI has been consulted for feeding tube.  Continues on Merrem for positive urine culture.  04/07 Records reviewed. Not eating which not new, Similar during recent admit at [facility]. Dr Swanson there had question pancreatitis. No abd pain or tenderness reported now. Question of dysphagia to solids. Chart hx gastroparesis. Will discuss with GI. Ronnie says has responded so far well to treatment for lung CA.   04/08 had EGD at [facility] 03/21/25 with no obstruction and evidence gastroparesis. Still not ending. Try additional meds. Talked with GI. Increase activity  04/09 Some better with med  changes  04/10 Continues to do better. Ate 1/2 fish sandwich for lunch. Called by Dr Swanson and she wanting to keep feeding tube in consideration. Talked with Dr Reich and Dr Lemus. If something needed with gastroparesis would have to have post gastric position of tube.   04/11 eating some. Later staff requested some nystatin for sore mouth.   04/12 still not eating well.  Increased peripheral edema.  Albumin low at 1.5.  Will give some albumin and follow with some Lasix.  Placed Dobbhoff tube and start enteral feedings.  This will help with nutrition and if issue of placing surgical tube later make sure will tolerate enteral feedings.  Chest x-ray continues worse on left side.  Discuss with Pulmonary and ask Dr. Villasenor to see.   04/13 no new issues.  Enteral feedings to be started.  Pulmonary evaluation appreciated and plans noted.  04/14 Tolerating feedings Therapy working with her. Bronchoscopy  planned.   4/15 BAL today  4/16 bronch yesterday looks like pneumonitis  4/17 not candidate for PEG  04/18 Eating some now. Pt says feels some better than last seen. Look at placement. High dose steroids by pulmonary. BS not as bad as would expect.  04/19 states continued eat some.  Less nausea.  Blood sugar not sure bad considering the steroids.  Pulmonary adjusted antibiotics based on cultures.  Look at it placement next week.   04/20 Looks the small. C/O SOB to nursing staff earlier but ABG OK. Poor oral intake ; encourage for pt to do more. Looking into placement.  04/21 Firm tender area in abd wall above umbilicus; ?hernia. Abnormal CT de santiago noted and will discuss with surgery.   04/22 CT shows evolving pancreatitis which pt treated for acouple weeks earlier at [facility]. Reviewed with Dr Tapia. No plan to operate on ventral hernia. Discussed later with Dr York. See how does off IVF and encourage oral intake. WBC and Cr both slight better.   04/23 Looks this same. Pt eating some. Pt getting discouraged and asking about home hospice. Talked by phone with her friend Shauna. In conversion doesn't sound like family would be able to care for pt at home. Encouraged pt to give some time and attempt SWB. She says she with consider tonight.   04/24 Lab worse but pt looks some better. Still not taking in well. Maybe eat better as pancreatitis further resolves. Considering replacing dobbhoff feeding tube and look into move to [facility]. Ask Dr Frias to review renal situation. Maybe pancreatitis,pneumonia,renal failure, all these might make a big difference if resolved. Talked with pt and she was OK with NGT  04/25 patient's spirits seems to be doing some better.  Dobbhoff tube placed in feedings to be started.  To be moved to LTAC.  No other new issue  04/26 Awaiting bed assignment at [facility]. No new complains.     Overview/Hospital Course:  4/28- BG trending up, added Lantus and adjusted dose. D/W nutritionist about changing  feeds to allow for more PO intake.     4/29- BG better now. Off IV amiodarone infusion, remains in NSR. Encourage PO intake.     4/30- Continue to adjust insulin to get BG under 200, ideally 140-180. Checking CT chest, abdomen and pelvis today given persistent leukocytosis.     5/1- Discussing with surgery about fluid collections in the abdomen noted on CT (slightly increased in size), also asking if PEG is an option as previously deemed not a candidate. Nephrology has added bicarb infusion. Continue current management otherwise.     5/2- IV fluids started per nephro. WBC count remains elevated.     5/3- Started GOC conversations with the patient who is alert, asked her if her family can be with her or her daughter who she is closest to. She said she will try to talk to them.     5/4- Discussed poor prognosis with the patient. Labs are essentially unchanged. Repeat blood cultures ordered.     5/5   - hypotensive overnight, given fluids, minimal response  - discussed with pulm no additional recs  - renal function still elevated, nephro following  - going to try and reach out to Dr. Swanson    5/6  - refusing feeds and not eating  - stopped lactic acid drawing  -- will discuss goals of care again with family    5/7  - discussed with oncology who will come see patient tomorrow  - reviewed labs and decided to consult tele-ID as continues with leukocytosis with bandemia that likely isnt reactive response to steroids, elevated lactic acid, continues on fluids  - nephrology following, renal function noted  - overall goals of care pending patient's discussion with oncology      Interval History:     Review of Systems   Constitutional:  Positive for appetite change.   Gastrointestinal:  Positive for abdominal pain.   All other systems reviewed and are negative.    Objective:     Vital Signs (Most Recent):  Temp: 96 °F (35.6 °C) (05/07/25 1100)  Pulse: 90 (05/07/25 0800)  Resp: 11 (05/07/25 0800)  BP: (!) 131/49 (05/07/25  0800)  SpO2: 100 % (05/07/25 0800) Vital Signs (24h Range):  Temp:  [96 °F (35.6 °C)-97.6 °F (36.4 °C)] 96 °F (35.6 °C)  Pulse:  [84-93] 90  Resp:  [11-16] 11  SpO2:  [99 %-100 %] 100 %  BP: ()/(28-49) 131/49     Weight: 131.8 kg (290 lb 9.1 oz)  Body mass index is 48.35 kg/m².    Intake/Output Summary (Last 24 hours) at 5/7/2025 1441  Last data filed at 5/7/2025 1341  Gross per 24 hour   Intake 2360.79 ml   Output 800 ml   Net 1560.79 ml         Physical Exam  Constitutional:       Appearance: She is obese. She is ill-appearing.   HENT:      Head: Normocephalic.      Mouth/Throat:      Mouth: Mucous membranes are dry.   Cardiovascular:      Rate and Rhythm: Normal rate.      Heart sounds: Normal heart sounds. No murmur heard.  Pulmonary:      Effort: No respiratory distress.      Breath sounds: Rhonchi present.      Comments: On 2L NC  Abdominal:      General: There is no distension.      Tenderness: There is abdominal tenderness.      Comments: Dobhoff in place   Skin:     Coloration: Skin is pale.   Neurological:      Mental Status: Mental status is at baseline.               Significant Labs: All pertinent labs within the past 24 hours have been reviewed.    Significant Imaging: I have reviewed all pertinent imaging results/findings within the past 24 hours.      Assessment & Plan  Acute kidney injury superimposed on stage 4 chronic kidney disease  JOHNATHON is likely due to pre-renal azotemia due to intravascular volume depletion. Baseline creatinine is ~ 2.0. Most recent creatinine and eGFR are listed below.  Recent Labs     05/05/25  0335 05/06/25  0343 05/07/25  0401   CREATININE 3.95* 4.00* 4.57*   EGFRNORACEVR 12* 12* 10*      Plan  - JOHNATHON is worsening now.   - Avoid nephrotoxins and renally dose meds for GFR listed above  - Monitor urine output, serial BMP, and adjust therapy as needed  - Nephrology consulted, started on Lasix, bicarb is to alkalinize the urine for hyperuricemia.   - At risk to require  dialysis, discussed with the patient and she wants dialysis if indicated.     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  Pulmonology consulted, started on steroids and s/p bronchoscopy with normal findings, BAL culture with stenotrophomonas.   Steroid taper plan per Pulmonology, completed course of antibiotics.   Respiratory status is stable.     Infection due to Stenotrophomonas maltophilia  Sepsis due to pneumonia  Stenotrophomonas lower respiratory infection in this patient with multifocal infiltrates and consolidative opacities on CT Chest.   S/p 2 week course with Levaquin.   Leukocytosis persists, will follow procalcitonin levels Q48H.  Repeat CT chest stable.  Consider tele-ID consultation if leukocytosis does not improve.     Stenotrophomonas lower respiratory infection in this patient with multifocal infiltrates and consolidative opacities on CT Chest.   S/p 2 week course with Levaquin.   Leukocytosis persists, will follow procalcitonin levels Q48H.  Repeat CT chest stable.  Consider tele-ID consultation if leukocytosis does not improve.     5/7 consulted tele ID today  SVT (supraventricular tachycardia)  Went into SVT on 4/27, not responsive to multiple rounds of adenosine.  Started on amiodarone infusion after bolus, turned off on 4/29.  Cardiology consulted, no additional recommendations but could consider cardioversion if this becomes a recurrent and a refractory issue.   Continue beta blocker.   Monitor on tele.     Acute pancreatitis  Intra-abdominal fluid collection  CT abdomen/pelvis showed- Possible small fluid collection anterior to the pancreas measuring approximately 3.6 cm on axial 41. Probable complex collection slightly inferior to the pancreatic head extending to the right pelvis and right iliac fossa.     04/21 Abd fluid collection in area pancreas noted on CT abd, discussed with surgery  04/22 likely evolving pancreatitis dx a couple weeks  "ago at [facility], now with pseudocyst.  No severe epigastric pain.  04/26 Ongoing tube feeds and full liquid diet. If tolerates full liquid better, plan to advanced to soft foods.   04/30 Repeat CT abdomen ordered, showed the pancreas with multiple fluid collections anterior to the pancreas and within the root of the mesentery and extending into the anterior right pararenal space and right paracolic gutter.     5/1 D?W with [facility] (Dr. Tapia) regarding fluid collection. They believe this is sec to pancreatitis and nothing to do for now.     Gastroparesis  EGD by Dr. Lo during recent past admission at [facility]:  "EGD on 03/21/2025 shows LA class B erosive esophagitis but no pill esophagitis, nonerosive gastritis and retention of food and fluid suspicious for gastroparesis, due to narcotics and diabetes. "  GI consulted, unable to advance diet and poor candidate for PEG.  Dobhoff in place for feeds, continued on Reglan.   GI recommended surgical PEG due to body habitus, GS stated that patient is not a candidate for PEG based on co morbidities and poor prognosis.   Continue PPI.     Lung cancer metastatic to brain  DNR but not hospice.   Receives chemo and has finished radiation.    Follows with Dr. Swanson.    5/5 will attempt to contact Dr. Swanson  Goals of care, counseling/discussion  DNR confirmed.  Patient wants permanent feeding tube and dialysis if needed.   Encouraged patient to talk to her family about her prognosis.  Currently PEG is not option as mentioned above so nutrition could be a major factor in driving the hospice discussion on top of her underlying metastatic cancer.   Consider primary oncologist- Dr. Mata consultation to get an idea of her prognosis related to cancer as patient is relying on that to make further decisions.      Nonischemic nontraumatic myocardial injury  In the setting of SVT episodes.  Trend is flat, no chest pain and no ischemic changes noted on EKG.  No ischemic workup " "recommended per cardiology.     Hyperuricemia  Hyperphosphatemia  Defer management to nephrology- started on allopurinol and IV bicarb to alkalinize the urine.   Follow uric acid levels.     Essential hypertension  Patient's blood pressure range in the last 24 hours was: BP  Min: 82/28  Max: 131/49.The patient's inpatient anti-hypertensive regimen is listed below:  Current Antihypertensives       Plan  - BP is controlled, no changes needed to their regimen    Type 2 diabetes mellitus with hyperglycemia  Patient's FSGs are controlled on current medication regimen.  Last A1c reviewed-   Lab Results   Component Value Date    HGBA1C 6.7 04/02/2025     Most recent fingerstick glucose reviewed- No results for input(s): "POCTGLUCOSE" in the last 24 hours.  Current correctional scale  Low  Maintain anti-hyperglycemic dose as follows-   Antihyperglycemics (From admission, onward)      Start     Stop Route Frequency Ordered    05/05/25 2100  insulin glargine U-100 (Lantus) injection 50 Units         -- SubQ Nightly 05/04/25 2332    05/01/25 1051  insulin aspart U-100 injection 0-10 Units         -- SubQ Every 6 hours PRN 05/01/25 0952          Hold Oral hypoglycemics while patient is in the hospital.  BG ranging up as tube feeds now to goal and D5 in bicarb drip, added Lantus and dose adjusted for tighter glycemic control.     Normocytic anemia  Anemia is likely due to chronic disease due to Malignancy. Most recent hemoglobin and hematocrit are listed below.  Recent Labs     05/05/25  0335 05/07/25  0401   HGB 9.1* 7.4*   HCT 27.2* 22.4*     Plan  - Monitor serial CBC: Daily  - Transfuse PRBC if patient becomes hemodynamically unstable, symptomatic or H/H drops below 7/21.  - Patient has not received any PRBC transfusions to date  - Patient's anemia is currently stable  - No evidence of bleeding.    Neoplastic (malignant) related fatigue  Patient with Acute on chronic debility due to neoplastic/malignant related fatigue. " Latest AMPAC and GEMS scores have been reviewed. Evaluation for etiology is complete. Plan includes - Progressive mobility protocol initated  - PT/OT consulted  - Fall precautions in place.    Chronic pulmonary embolism without acute cor pulmonale  Eliquis was discontinued due to risk of ICH with brain mets.  Currently on hold in case surgical procedure is required.    Morbid obesity  Body mass index is 48.35 kg/m². Morbid obesity complicates all aspects of disease management from diagnostic modalities to treatment. Weight loss encouraged and health benefits explained to patient.     Moderate persistent asthma without complication  Stable without exacerbation.  Continue PRN nebs.     Edema due to hypoalbuminemia  Required albumin infusion intermittently during the stay.     Mixed hyperlipidemia  Resume statin.     VTE Risk Mitigation (From admission, onward)           Ordered     heparin (porcine) injection 5,000 Units  Every 8 hours         04/27/25 1055                    Discharge Planning   MITUL: 5/12/2025     Code Status: DNR   Medical Readiness for Discharge Date:   Discharge Plan A: Skilled Nursing Facility                Please place Justification for DME        Maria Teresa Conway MD  Department of Hospital Medicine   [facility]

## 2025-05-07 NOTE — ASSESSMENT & PLAN NOTE
"EGD by Dr. Lo during recent past admission at [facility]:  "EGD on 03/21/2025 shows LA class B erosive esophagitis but no pill esophagitis, nonerosive gastritis and retention of food and fluid suspicious for gastroparesis, due to narcotics and diabetes. "  GI consulted, unable to advance diet and poor candidate for PEG.  Dobhoff in place for feeds, continued on Reglan.   GI recommended surgical PEG due to body habitus, GS stated that patient is not a candidate for PEG based on co morbidities and poor prognosis.   Continue PPI.     "

## 2025-05-07 NOTE — ASSESSMENT & PLAN NOTE
CT abdomen/pelvis showed- Possible small fluid collection anterior to the pancreas measuring approximately 3.6 cm on axial 41. Probable complex collection slightly inferior to the pancreatic head extending to the right pelvis and right iliac fossa.     04/21 Abd fluid collection in area pancreas noted on CT abd, discussed with surgery  04/22 likely evolving pancreatitis dx a couple weeks ago at [facility], now with pseudocyst.  No severe epigastric pain.  04/26 Ongoing tube feeds and full liquid diet. If tolerates full liquid better, plan to advanced to soft foods.   04/30 Repeat CT abdomen ordered, showed the pancreas with multiple fluid collections anterior to the pancreas and within the root of the mesentery and extending into the anterior right pararenal space and right paracolic gutter.     5/1 D?W with [facility] (Dr. Tapia) regarding fluid collection. They believe this is sec to pancreatitis and nothing to do for now.

## 2025-05-07 NOTE — ASSESSMENT & PLAN NOTE
CT abdomen/pelvis showed- Possible small fluid collection anterior to the pancreas measuring approximately 3.6 cm on axial 41. Probable complex collection slightly inferior to the pancreatic head extending to the right pelvis and right iliac fossa.     04/21 Abd fluid collection in area pancreas noted on CT abd, discussed with surgery  04/22 likely evolving pancreatitis dx a couple weeks ago at [facility], now with pseudocyst.  No severe epigastric pain.  04/26 Ongoing tube feeds and full liquid diet. If tolerates full liquid better, plan to advanced to soft foods.   04/30 Repeat CT abdomen ordered, showed the pancreas with multiple fluid collections anterior to the pancreas and within the root of the mesentery and extending into the anterior right pararenal space and right paracolic gutter.     5/1 D?W with [facility] (Dr. Tapia) regarding fluid collection. They believe this is sec to pancreatitis and nothing to do for now.

## 2025-05-07 NOTE — PLAN OF CARE
Problem: Bariatric Environmental Safety  Goal: Safety Maintained with Care  Outcome: Progressing     Problem: Bariatric Environmental Safety  Goal: Safety Maintained with Care  Outcome: Progressing     Problem: Infection  Goal: Absence of Infection Signs and Symptoms  Outcome: Progressing     Problem: Infection  Goal: Absence of Infection Signs and Symptoms  Outcome: Progressing     Problem: Skin Injury Risk Increased  Goal: Skin Health and Integrity  Outcome: Progressing     Problem: Skin Injury Risk Increased  Goal: Skin Health and Integrity  Outcome: Progressing

## 2025-05-07 NOTE — PT/OT/SLP PROGRESS
Physical Therapy Treatment    Patient Name:  Magan Saleem   MRN:  [id number]    Recommendations:     Discharge Recommendations: Moderate Intensity Therapy  Discharge Equipment Recommendations: to be determined by next level of care  Barriers to discharge: ongoing medical care    Assessment:     Magan Saleem is a 69 y.o. female admitted with a medical diagnosis of Pneumonitis.  She presents with the following impairments/functional limitations: weakness, impaired endurance, impaired self care skills, impaired functional mobility, impaired skin, edema. New chair acquired for pt to allow safe OOB act and positioning. Pt in agreement to attempt tomorrow.     Rehab Prognosis: Good and Fair; patient would benefit from acute skilled PT services to address these deficits and reach maximum level of function.    Recent Surgery: * No surgery found *      Plan:     During this hospitalization, patient to be seen 5 x/week to address the identified rehab impairments via gait training, therapeutic activities, therapeutic exercises, neuromuscular re-education and progress toward the following goals:    Plan of Care Expires:  05/29/25    Subjective     Chief Complaint: Pneumonitis   Patient/Family Comments/goals: agreeable  Pain/Comfort:  Pain Rating 1: 0/10      Objective:     Communicated with MARISA Kumar RN prior to session.  Patient found HOB elevated with NG tube, oxygen, pulse ox (continuous), telemetry, PureWick upon PT entry to room.     General Precautions: Standard, fall  Orthopedic Precautions: N/A  Braces: N/A  Respiratory Status: Nasal cannula, flow 2 L/min     Functional Mobility:  Bed Mobility:     Rolling Left:  maximal assistance and of 1-2 persons  Rolling Right: maximal assistance and of 1-2 persons  Scooting: maximal assistance and of 2 persons      AM-PAC 6 CLICK MOBILITY  Turning over in bed (including adjusting bedclothes, sheets and blankets)?: 2  Sitting down on and standing up from a chair with arms  (e.g., wheelchair, bedside commode, etc.): 1  Moving from lying on back to sitting on the side of the bed?: 2  Moving to and from a bed to a chair (including a wheelchair)?: 1  Need to walk in hospital room?: 1  Climbing 3-5 steps with a railing?: 1  Basic Mobility Total Score: 8       Treatment & Education:  Bilateral lower extremity exercise x 20 reps: ankle pumps, Quad sets, glut sets, heel slides, hip abduction/adduction, and straight leg raises with active assist ROM, verbal cues for sequencing and safety, and tactile cues       Multiple trials of rolling side to side and scooting to HOB.    Patient left HOB elevated with all lines intact, call button in reach, and RN notified..    GOALS:   Multidisciplinary Problems       Physical Therapy Goals          Problem: Physical Therapy    Goal Priority Disciplines Outcome Interventions   Physical Therapy Goal     PT, PT/OT Not Progressing    Description: Short Term Goals to be met by: 25    Patient will increase functional independence with mobility by performin. Supine to sit with Moderate Assist  2. Sit to stand transfer with Maximal Assist using Rolling walker  3. Bed to chair transfer with Maximal Assist using lowest level of assistive device  4. Rolling side to side with Min A  5. Lower extremity exercise program x30 reps per handout, with assistance as needed    Long Term Goals to be met by: 25    Pt will regain full independent functional mobility with lowest level of assistive device to return to home situation and prior activities of daily living.                        DME Justifications:  To be determined.     Time Tracking:     PT Received On: 25  PT Start Time: 918     PT Stop Time: 1008  PT Total Time (min): 50 min     Billable Minutes: Therapeutic Activity 25 and Therapeutic Exercise 15    Treatment Type: Treatment  PT/PTA: PT     Number of PTA visits since last PT visit: 0     2025

## 2025-05-07 NOTE — ASSESSMENT & PLAN NOTE
Stenotrophomonas lower respiratory infection in this patient with multifocal infiltrates and consolidative opacities on CT Chest.   S/p 2 week course with Levaquin.   Leukocytosis persists, will follow procalcitonin levels Q48H.  Repeat CT chest stable.  Consider tele-ID consultation if leukocytosis does not improve.     Stenotrophomonas lower respiratory infection in this patient with multifocal infiltrates and consolidative opacities on CT Chest.   S/p 2 week course with Levaquin.   Leukocytosis persists, will follow procalcitonin levels Q48H.  Repeat CT chest stable.  Consider tele-ID consultation if leukocytosis does not improve.     5/7 consulted tele ID today

## 2025-05-07 NOTE — ASSESSMENT & PLAN NOTE
Body mass index is 48.35 kg/m². Morbid obesity complicates all aspects of disease management from diagnostic modalities to treatment. Weight loss encouraged and health benefits explained to patient.

## 2025-05-07 NOTE — SUBJECTIVE & OBJECTIVE
Interval History:     Review of Systems   Constitutional:  Positive for appetite change.   Gastrointestinal:  Positive for abdominal pain.   All other systems reviewed and are negative.    Objective:     Vital Signs (Most Recent):  Temp: 96 °F (35.6 °C) (05/07/25 1100)  Pulse: 90 (05/07/25 0800)  Resp: 11 (05/07/25 0800)  BP: (!) 131/49 (05/07/25 0800)  SpO2: 100 % (05/07/25 0800) Vital Signs (24h Range):  Temp:  [96 °F (35.6 °C)-97.6 °F (36.4 °C)] 96 °F (35.6 °C)  Pulse:  [84-93] 90  Resp:  [11-16] 11  SpO2:  [99 %-100 %] 100 %  BP: ()/(28-49) 131/49     Weight: 131.8 kg (290 lb 9.1 oz)  Body mass index is 48.35 kg/m².    Intake/Output Summary (Last 24 hours) at 5/7/2025 1441  Last data filed at 5/7/2025 1341  Gross per 24 hour   Intake 2360.79 ml   Output 800 ml   Net 1560.79 ml         Physical Exam  Constitutional:       Appearance: She is obese. She is ill-appearing.   HENT:      Head: Normocephalic.      Mouth/Throat:      Mouth: Mucous membranes are dry.   Cardiovascular:      Rate and Rhythm: Normal rate.      Heart sounds: Normal heart sounds. No murmur heard.  Pulmonary:      Effort: No respiratory distress.      Breath sounds: Rhonchi present.      Comments: On 2L NC  Abdominal:      General: There is no distension.      Tenderness: There is abdominal tenderness.      Comments: Dobhoff in place   Skin:     Coloration: Skin is pale.   Neurological:      Mental Status: Mental status is at baseline.               Significant Labs: All pertinent labs within the past 24 hours have been reviewed.    Significant Imaging: I have reviewed all pertinent imaging results/findings within the past 24 hours.

## 2025-05-07 NOTE — PROGRESS NOTES
[facility]  Nephrology  Consult Note    Patient Name: Magan Saleem  MRN: [id number]  Admission Date: 4/26/2025  Hospital Length of Stay: 11 days  Attending Provider: Maria Teresa Conway MD   Primary Care Physician: Sil Brown DO  Principal Problem:Pneumonitis    Consults  Subjective:     HPI: The patient has no complaints today      Past Medical History:   Diagnosis Date    Chronic kidney disease, stage 3b     Chronic pulmonary embolism without acute cor pulmonale     Diabetes mellitus type 2 in obese     DNR (do not resuscitate)     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 Swanson    Melanocytic nevi of lower limb, including hip, left     Mixed hyperlipidemia     Moderate persistent asthma without complication 10/30/2024    Vitamin D deficiency        Past Surgical History:   Procedure Laterality Date    CHOLECYSTECTOMY      CSF SHUNT      HYSTERECTOMY      TONSILLECTOMY         Review of patient's allergies indicates:   Allergen Reactions    Penicillins Hives    Sulfa (sulfonamide antibiotics) Hives     Current Facility-Administered Medications   Medication Frequency    acetaminophen suppository 650 mg Q6H PRN    acetaminophen tablet 1,000 mg Q6H PRN    albuterol-ipratropium 2.5 mg-0.5 mg/3 mL nebulizer solution 3 mL Q4H PRN    allopurinoL tablet 300 mg Daily    aluminum & magnesium hydroxide-simethicone 400-400-40 mg/5 mL suspension 30 mL Q6H PRN    atorvastatin tablet 10 mg Daily    bisacodyL EC tablet 10 mg Daily PRN    dextromethorphan-guaiFENesin  mg/5 ml liquid 10 mL Q6H PRN    dextrose 50% injection 12.5 g PRN    dextrose 50% injection 25 g PRN     diphenhydrAMINE capsule 50 mg Q6H PRN    docusate sodium capsule 100 mg BID PRN    glucagon (human recombinant) injection 1 mg PRN    glucose chewable tablet 16 g PRN    glucose chewable tablet 24 g PRN    heparin (porcine) injection 5,000 Units Q8H    insulin aspart U-100 injection 0-10 Units Q6H PRN    insulin glargine U-100 (Lantus) injection 50 Units QHS    Lactobacillus acidophilus capsule 2 capsule TID WM    melatonin tablet 6 mg Nightly PRN    metoclopramide HCl tablet 5 mg QID    montelukast tablet 10 mg QHS    ondansetron injection 8 mg Q6H PRN    oxyCODONE immediate release tablet 5 mg Q6H PRN    pantoprazole EC tablet 40 mg Daily    predniSONE tablet 20 mg Daily    Followed by    [START ON 5/18/2025] predniSONE tablet 10 mg Daily    rOPINIRole tablet 0.25 mg TID    sertraline tablet 25 mg Daily    sodium bicarbonate 100 mEq in D5W 1,000 mL infusion Continuous    traZODone tablet 50 mg Nightly PRN     Family History       Problem Relation (Age of Onset)    Diabetes Mother    Hypertension Mother    Lung cancer Father          Tobacco Use    Smoking status: Never    Smokeless tobacco: Never   Substance and Sexual Activity    Alcohol use: Never    Drug use: Never    Sexual activity: Not Currently       Objective:     Vital Signs (Most Recent):  Temp: 97.5 °F (36.4 °C) (05/07/25 1600)  Pulse: 90 (05/07/25 0800)  Resp: 11 (05/07/25 0800)  BP: (!) 131/49 (05/07/25 0800)  SpO2: 100 % (05/07/25 0800) Vital Signs (24h Range):  Temp:  [96 °F (35.6 °C)-97.6 °F (36.4 °C)] 97.5 °F (36.4 °C)  Pulse:  [84-93] 90  Resp:  [11-16] 11  SpO2:  [99 %-100 %] 100 %  BP: ()/(28-49) 131/49     Weight: 131.8 kg (290 lb 9.1 oz) (05/07/25 0430)  Body mass index is 48.35 kg/m².  Body surface area is 2.46 meters squared.    I/O last 3 completed shifts:  In: 3308.4 [P.O.:120; I.V.:1555.4; NG/GT:1633]  Out: 1200 [Urine:1200]    Physical Exam  Constitutional:       Appearance: Normal appearance. She is obese. She is not  ill-appearing or toxic-appearing.   Cardiovascular:      Heart sounds: Heart sounds are distant. No murmur heard.     No friction rub. No gallop.   Pulmonary:      Breath sounds: Rales present. No wheezing or rhonchi.   Abdominal:      General: Abdomen is flat and protuberant. Bowel sounds are decreased. There is distension.      Palpations: Abdomen is soft.      Tenderness: There is abdominal tenderness in the right upper quadrant and epigastric area. There is no guarding or rebound.   Musculoskeletal:      Right lower leg: 3+ Edema present.      Left lower leg: 3+ Edema present.   Neurological:      Mental Status: She is alert and oriented to person, place, and time.         Significant Labs:  CBC:   Recent Labs   Lab 05/07/25  0401   WBC 24.97*   RBC 2.48*   HGB 7.4*   HCT 22.4*   *   MCV 90.3   MCH 29.8   MCHC 33.0     CMP:   Recent Labs   Lab 05/07/25  0401   *   CALCIUM 7.8*   ALBUMIN 1.1*   PROT 4.0*   *   K 4.4   CO2 27   CL 86*   BUN >125*   CREATININE 4.57*   ALKPHOS 129   ALT 30   AST 38   BILITOT 0.4     All labs within the past 24 hours have been reviewed.    Significant Imaging:      Assessment/Plan:     Active Diagnoses:    Diagnosis Date Noted POA    PRINCIPAL PROBLEM:  Pneumonitis [J98.4] 04/16/2025 Yes    Goals of care, counseling/discussion [Z71.89] 05/03/2025 Not Applicable    Intra-abdominal fluid collection [R18.8] 05/01/2025 Yes    SVT (supraventricular tachycardia) [I47.10] 04/27/2025 No    Nonischemic nontraumatic myocardial injury [I5A] 04/27/2025 No    Hyperphosphatemia [E83.39] 04/26/2025 Yes    Hyperuricemia [E79.0] 04/26/2025 Yes    Infection due to Stenotrophomonas maltophilia [A49.8] 04/22/2025 Yes    Acute pancreatitis [K85.90] 04/21/2025 Yes    Neoplastic (malignant) related fatigue [R53.0] 04/15/2025 Yes    Normocytic anemia [D64.9] 04/15/2025 Yes    Edema due to hypoalbuminemia [E88.09] 04/12/2025 Yes    Type 2 diabetes mellitus with hyperglycemia [E11.65]  04/04/2025 Yes    Sepsis due to pneumonia [J18.9, A41.9] 04/04/2025 Yes    Gastroparesis [K31.84]  Yes    Lung cancer metastatic to brain [C34.90, C79.31]  Yes    Morbid obesity [E66.01] 04/03/2025 Yes    Acute kidney injury superimposed on stage 4 chronic kidney disease [N17.9, N18.4]  Yes    Chronic pulmonary embolism without acute cor pulmonale [I27.82]  Yes    Moderate persistent asthma without complication [J45.40] 10/30/2024 Yes    Essential hypertension [I10] 06/30/2021 Yes    Mixed hyperlipidemia [E78.2] 06/30/2021 Yes      Problems Resolved During this Admission:       IMP:  The uric acid continue to decline   The BUN and creatinine continue to rise  Lactic acid level remains elevated   Plan:  Continue to monitor the renal function and uric acid levels    Thank you for your consult. I will follow-up with patient. Please contact us if you have any additional questions.    Mikey Velásquez MD  Nephrology  [facility]

## 2025-05-08 PROBLEM — Z87.19 HISTORY OF PANCREATITIS: Status: ACTIVE | Noted: 2025-01-01

## 2025-05-08 PROBLEM — K86.3 PANCREATIC PSEUDOCYST: Status: ACTIVE | Noted: 2025-01-01

## 2025-05-08 PROBLEM — K86.89 ACUTE PANCREATIC FLUID COLLECTION: Status: ACTIVE | Noted: 2025-01-01

## 2025-05-08 NOTE — PLAN OF CARE
05/08/25 0911   Rounds   Attendance Nurse ;Charge nurse;Occupational therapist;Pharmacist  (Dietician, Resp therapist)   Discharge Plan A Skilled Nursing Facility   Why the patient remains in the hospital Requires continued medical care   Transition of Care Barriers None     Per IDTM. Patient is on a NA Bicarb infusion. Nephrology is following. Continue to monitor labs. She gets a full liquid diet but oral intake is 0%. She has ng tube feeding at 55 cc/hr. With rehab she is max assist for rolling. Supine to sit is max assist x2. Dependent bed to chair with a maxim lift. Dependent for dressing and min assist for grooming. Dr wetzel plans to speak with patient and family about goals of care. Cm will follow.

## 2025-05-08 NOTE — PROGRESS NOTES
[facility]  Wound Care    Patient Name:  Magan Saleem   MRN:  [id number]  Date: 5/8/2025  Diagnosis: Pneumonitis    History:     Past Medical History:   Diagnosis Date    Chronic kidney disease, stage 3b     Chronic pulmonary embolism without acute cor pulmonale     Diabetes mellitus type 2 in obese     DNR (do not resuscitate)     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 Swanson    Melanocytic nevi of lower limb, including hip, left     Mixed hyperlipidemia     Moderate persistent asthma without complication 10/30/2024    Vitamin D deficiency        Social History[1]    Precautions:     Allergies as of 04/25/2025 - Reviewed 04/17/2025   Allergen Reaction Noted    Penicillins Hives 06/29/2021    Sulfa (sulfonamide antibiotics) Hives 06/29/2021       WOC Assessment Details/Treatment     Narrative: Skin evaluation    Patient in bed, Alert.  Has oxygen in use, Ez wraps noted. No redness over ears. Has multiple ruptured blisters to abdominal folds, arms,lower legs and under breast noted. Has Mepliex foam borders to areas. Will ask WCC to assess areas.  Skin integrity is compromised dure to limited mobility, age, skin moisture ,co morbilities, decrease Martin score, etc.  Talked with FABIAN Oviedo FNP for blistered areas. Will start miconazole powder to abdominal folds and sacral BID.  Cont turn protocol, alvaro carolyn boots to help off load heels, On low air mattress. Cont foam borders to all areas. Start Miconazole powder     Wound care team to follow weekly prn      05/08/2025         [1]   Social History  Socioeconomic History    Marital status:    Occupational History     Occupation: Retired   Tobacco Use    Smoking status: Never    Smokeless tobacco: Never   Substance and Sexual Activity    Alcohol use: Never    Drug use: Never    Sexual activity: Not Currently     Social Drivers of Health     Financial Resource Strain: Medium Risk (4/27/2025)    Overall Financial Resource Strain (CARDIA)     Difficulty of Paying Living Expenses: Somewhat hard   Food Insecurity: No Food Insecurity (4/27/2025)    Hunger Vital Sign     Worried About Running Out of Food in the Last Year: Never true     Ran Out of Food in the Last Year: Never true   Transportation Needs: No Transportation Needs (4/27/2025)    PRAPARE - Transportation     Lack of Transportation (Medical): No     Lack of Transportation (Non-Medical): No   Physical Activity: Inactive (4/4/2025)    Exercise Vital Sign     Days of Exercise per Week: 0 days     Minutes of Exercise per Session: 0 min   Stress: No Stress Concern Present (4/27/2025)    [employer] - Occupational Stress Questionnaire     Feeling of Stress : Only a little   Housing Stability: Low Risk  (4/27/2025)    Housing Stability Vital Sign     Unable to Pay for Housing in the Last Year: No     Number of Times Moved in the Last Year: 0     Homeless in the Last Year: No

## 2025-05-08 NOTE — CONSULTS
"  CC: PEG tube placement    HPI 69 y.o. female with history of morbid obesity (BMI 49), metastatic lung cancer (to the brain) who follows with Dr. Swanson for chemotherapy. She has completed her course of radiation for the brain mets. She has poor PO intake with malnutrition. She has dobhoff tube and has been receiving tube feeds. CT shows multiple intraabdominal multiple fluid collections including a 7 cm pseudocyst that abuts the body of the stomach and 4 cm collection at root of mesentery. Fluid collections appear stable compared to CT A/P from 4/21 which was concerning for initial mild acute pancreatitis episode. Lipase 46 then increased to 75.      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 Swanson    Melanocytic nevi of lower limb, including hip, left     Mixed hyperlipidemia     Moderate persistent asthma without complication 10/30/2024    Vitamin D deficiency      Past Surgical History:   Procedure Laterality Date    CHOLECYSTECTOMY      CSF SHUNT      HYSTERECTOMY      TONSILLECTOMY       Social History  Social History     Tobacco Use    Smoking status: Never    Smokeless tobacco: Never   Substance Use Topics    Alcohol use: Never    Drug use: Never     Family History   Problem Relation Name Age of Onset    Diabetes Mother      Hypertension Mother      Lung cancer Father       Physical Examination  BP (!) 80/40   Pulse 89   Temp 96.5 °F (35.8 °C) (Axillary)   Resp 13   Ht 5' 5" (1.651 m)   Wt 131.8 " kg (290 lb 9.1 oz)   SpO2 100%   BMI 48.35 kg/m²   General appearance: morbid obesity, alert, cooperative, no distress  HENT: dobhoff tube in place, normocephalic, atraumatic, neck symmetrical, no nasal discharge   Eyes: conjunctivae/corneas clear, PERRL, EOM's intact  Lungs: symmetric chest wall expansion bilaterally  Heart: regular rate and rhythm without rub; no displacement of the PMI   Abdomen: tender to palpation in epigastrium  Extremities: extremities symmetric; no clubbing, cyanosis, or edema  Integument: Skin color, texture, turgor normal; no rashes; hair distrubution normal  Neurologic: Alert, did not test strength  Psychiatric: no pressured speech; flat affect    Labs:  Lab Results   Component Value Date    WBC 24.97 (H) 05/07/2025    HGB 7.4 (L) 05/07/2025    HCT 22.4 (L) 05/07/2025    MCV 90.3 05/07/2025     (L) 05/07/2025     CMP  Sodium   Date Value Ref Range Status   05/08/2025 132 (L) 136 - 145 mmol/L Final     Potassium   Date Value Ref Range Status   05/08/2025 4.3 3.5 - 5.1 mmol/L Final     Chloride   Date Value Ref Range Status   05/08/2025 82 (L) 98 - 107 mmol/L Final     CO2   Date Value Ref Range Status   05/08/2025 29 23 - 31 mmol/L Final     Glucose   Date Value Ref Range Status   05/08/2025 321 (H) 82 - 115 mg/dL Final     BUN   Date Value Ref Range Status   05/08/2025 127 (H) 10 - 20 mg/dL Final     Creatinine   Date Value Ref Range Status   05/08/2025 4.37 (H) 0.55 - 1.02 mg/dL Final     Calcium   Date Value Ref Range Status   05/08/2025 7.2 (L) 8.4 - 10.2 mg/dL Final     Total Protein   Date Value Ref Range Status   05/08/2025 3.9 (L) 5.8 - 7.6 g/dL Final     Albumin   Date Value Ref Range Status   05/08/2025 1.0 (L) 3.4 - 4.8 g/dL Final     Bilirubin, Total   Date Value Ref Range Status   05/08/2025 0.4 <=1.5 mg/dL Final     Alk Phos   Date Value Ref Range Status   05/08/2025 143 40 - 150 U/L Final     AST   Date Value Ref Range Status   05/08/2025 40 11 - 45 U/L Final      ALT   Date Value Ref Range Status   05/08/2025 29 <=55 U/L Final     Anion Gap   Date Value Ref Range Status   05/08/2025 25 (H) 7 - 16 mmol/L Final     eGFR    Date Value Ref Range Status   06/30/2021 57 (L) >=60 mL/min/1.73m² Final     eGFR   Date Value Ref Range Status   12/23/2021 50 (L) >=60 mL/min/1.73m² Final       Imaging:    CT A/P: 4/30/25      Pancreas: Stable fluid collection anterior to the pancreas extending into the lesser sac measuring 7.6 x 4.0 cm findings are suggestive of pseudocyst.  This abuts the body of the stomach.     There is a 4.2 x 4.0 cm 2nd fluid collection within the root of the mesentery medial and inferior to the larger collection.  This was seen on the prior study and has increased in size.  There is fluid within the right anterior pararenal space and paracolic gutter.  Findings most likely are secondary to pancreatitis.  Correlation with labs is recommended.     Impression:  Stable right greater than left pleural effusions with multifocal alveolar and ground-glass opacities predominantly in the upper lobes.     Stable right pulmonary nodules and mediastinal adenopathy consistent with patient's known lung cancer     Limited visualization the pancreas with multiple fluid collections anterior to the pancreas and within the root of the mesentery and extending into the anterior right pararenal space and right paracolic gutter.  Findings most likely are secondary to pancreatitis     Stable nonobstructing left renal stone     Prior cholecystectomy and hysterectomy       CT A?P: 4/21/25  1. Limited visualization the pancreas. Possible small fluid collection anterior to the pancreas measuring approximately 3.6 cm on axial 41. Probable complex collection slightly inferior to the pancreatic head extending to the right pelvis and right iliac fossa. 6.8 x 3.6 cm component on axial 51.  Mild acute pancreatitis is a consideration.  Follow-up recommended.  2. Large body habitus  and significant artifact obscure visualization. The lack of IV contrast diminishes the sensitivity also.  3. 2.5 cm focal airspace disease near the right lung base in the right middle lobe abutting the mediastinum on axial 3 of series 2 is similar to the recent prior study 04/16/2025.  Metastatic disease is not excluded.  Small bibasilar pleural effusions with associated atelectasis or mild consolidation. Recommend follow-up.  4. Nonobstructing nephrolithiasis of the left kidney    Assessment:   History of acute pancreatitis  Pancreatic pseudocysts as a complication of the acute pancreatitis  Metastatic lung cancer to brain  Malnutrition  Poor PO intake      Plan:   -Patient may have had an episode of pancreatitis leading to pancreatic pseudocysts which could be causing a component of delayed gastric emptying and abdominal pain  -Pseudocysts are large in size - one remains around 7 cm near gastric body but this will eventually resolve on its own with nutritional supplementation  -Will need repeat imaging in 3-6 months  -If concern for complications of pseudocysts like delayed gastric emptying or walled off pancreatic necrosis then cystgastrostomy may be of benefit at that time  -Would consider contrasted MRI/MRCP as next imaging study when repeat imaging due      Aurelio Reich MD  [facility]

## 2025-05-08 NOTE — PT/OT/SLP PROGRESS
Physical Therapy Treatment    Patient Name:  Magan Saleem   MRN:  [id number]    Recommendations:     Discharge Recommendations: Moderate Intensity Therapy  Discharge Equipment Recommendations: to be determined by next level of care  Barriers to discharge: ongoing medical care    Assessment:     Magan Saleem is a 69 y.o. female admitted with a medical diagnosis of Pneumonitis.  She presents with the following impairments/functional limitations: weakness, impaired endurance, impaired self care skills, impaired functional mobility, impaired skin, edema. Pt tolerated being out of bed in reclining chair today for over 2hrs.     Rehab Prognosis: Good and Fair; patient would benefit from acute skilled PT services to address these deficits and reach maximum level of function.    Recent Surgery: * No surgery found *      Plan:     During this hospitalization, patient to be seen 5 x/week to address the identified rehab impairments via gait training, therapeutic activities, therapeutic exercises, neuromuscular re-education and progress toward the following goals:    Plan of Care Expires:  05/29/25    Subjective     Chief Complaint: Pneumonitis   Patient/Family Comments/goals: agreeable  Pain/Comfort:  Pain Rating 1: 0/10      Objective:     Communicated with LPN prior to session.  Patient found HOB elevated with NG tube, oxygen, pulse ox (continuous), telemetry, PureWick upon PT entry to room.     General Precautions: Standard, fall  Orthopedic Precautions: N/A  Braces: N/A  Respiratory Status: Nasal cannula, flow 2 L/min     Functional Mobility:  Bed Mobility:     Scooting: maximal assistance, total assistance, and of 3-4 persons  Supine to Sit: maximal assistance, total assistance, and of 3 persons  Transfers:     Bed to Chair: total assistance and of 3-4 persons with  tap system  using  Drawsheet  Balance: supported sitting in reclining chair for >2hrs with back support      AM-PAC 6 CLICK MOBILITY  Turning over in bed  (including adjusting bedclothes, sheets and blankets)?: 2  Sitting down on and standing up from a chair with arms (e.g., wheelchair, bedside commode, etc.): 1  Moving from lying on back to sitting on the side of the bed?: 2  Moving to and from a bed to a chair (including a wheelchair)?: 2  Need to walk in hospital room?: 1  Climbing 3-5 steps with a railing?: 1  Basic Mobility Total Score: 9       Treatment & Education:  Bilateral lower extremity exercise x 15-20 reps: ankle pumps, Quad sets, glut sets, short arc quads, heel slides, and hip abduction/adduction with verbal cues for sequencing and safety     Patient left up in chair with all lines intact, call button in reach, and nursing notified.    [id number]: pt instructed and assisted in transfer to reclining chair with the abovementioned level of assistance required.     GOALS:   Multidisciplinary Problems       Physical Therapy Goals          Problem: Physical Therapy    Goal Priority Disciplines Outcome Interventions   Physical Therapy Goal     PT, PT/OT Not Progressing    Description: Short Term Goals to be met by: 25    Patient will increase functional independence with mobility by performin. Supine to sit with Moderate Assist  2. Sit to stand transfer with Maximal Assist using Rolling walker  3. Bed to chair transfer with Maximal Assist using lowest level of assistive device  4. Rolling side to side with Min A  5. Lower extremity exercise program x30 reps per handout, with assistance as needed    Long Term Goals to be met by: 25    Pt will regain full independent functional mobility with lowest level of assistive device to return to home situation and prior activities of daily living.                        DME Justifications:  To be determined.    Time Tracking:     PT Received On: 25  PT Start Time: 1033 (1115)     PT Stop Time: 1045 (1133)  PT Total Time (min): 12 min  18 mins    Billable Minutes: Therapeutic Activity 18 and  Therapeutic Exercise 12    Treatment Type: Treatment  PT/PTA: PT     Number of PTA visits since last PT visit: 0     05/08/2025

## 2025-05-08 NOTE — CONSULTS
Inpatient Consult to [facility]  Consult performed by: Barbara Virk DO  Consult ordered by: Maria Teresa Conway MD        Infectious Disease [facility]  Live consult Initial - 5/8/25     Patient Name: Magan Saleem    Attending Physician: Maria Teresa Conway MD   Primary Care Physician: Sil Brown DO     Consultation Information  Consultation was provided via telemedicine from the location in the above note header using two-way real-time interactive telecommunication including between the patient and telemedicine provider. This includes use of bluetooth stethoscope for auscultation performed by the telepresenter that the telemedicine provider can hear if described in the physical exam. Time spent 60 minutes    Consultant Contact Information: Please call [facility] Call Center [phone]      Assessment & Plan      Impression:   This is a 69 year old female with a past medical history of Type II DM, depression, PE, asthma, and metastatic lung adenocarcinoma to brain s/p radiation and currently on chemotherapy (outpatient) who was admitted from nursing home to [facility] on 4/3/25 due to abnormal labs.    She had a recent admission from 3/19-4/1/25 for chest pain associated with SOB and nausea, vomiting, and diarrhea. She was found to be in sepsis. She has been taking Doxycycline for a rash due to chemotherapy. She was found to have E faecalis UTI and was treated with Vancomycin. She also had an EGD which showed chronic gastritis. She was maintained on Doxycycline on discharge.    While at her nursing home, she reported SOB and productive cough and labs showed a leukocytosis of 27.8k and was sent to ER for evaluation. CXR showed patchy opacities in bilateral lungs. CT chest wo con on 4/16 showed right greater than left pleural effusions an multifocal consolidation and airspace opacities c/f pneumonia; possible increased size of pulmonary nodule and mediastinal lymph node. Underwent  bronchoscopy with BAL on 4/15 which showed no abnormalities. R lung BAL showed atypical cells on pathology. BAL fungal culture with C glabrata and Stenotrophomonas maltophilia (S Levo, TMP/SMX); BAL AFB smear neg. Bronchoscopy raised concerns for ICI with pneumonitis and she was treated with high dose steroids from 4/16-4/19 and then started on taper. She was treated for Stenotrophomonas with levofloxacin for 2 weeks. She has had neg blood cx x2 on 4/3 and 5/5. Urine cx on 4/3 with ESBL E coli for which she was treated with Meropenem. Hospital course c/b SVT on 4/27 for which she required adenosine. Nephrology also following for JOHNATHON with renal US on 4/25 unremarkable. CT A/P wo con on 4/21 showed possible small fluid collection anterior to pancrease (3.6cm) and complex collection inferior to pancreatic head extending to right pelvis (6.8x3.6cm) c/f acute pancreatitis; nonobstructing nephrolothiasis; focal airspace disease of lungs. Repeat CT chest/abd/pel wo on 4/30 with stable R>L pleural effusions with multifocal alveolar and GGOs; stable pulmonary nodules and mediastinal adenopathy c/w lung cancer; multiple pancreatic fluid collections c/w pancreatitis. General surgery was consulted regarding the collections/pseudocysts and recommended no intervention as due to pancreatitis. She has remained afebrile during the hospitalization though has been hypotensive. She has had a persistent leukocytosis peaking at 37k. However, she appears to have had a leukocytosis since at least 3/19/25 while at OSH. Differential shows neutrophils predominantly with occasional bandemia. Has also had persistently elevated lactate in 2s. PCT 0.75. LFTs stable. Appetite has been poor and is refusing tube feeds. Has had a PICC line since 4/17. No indwelling hardware.     Given that she has had a leukocytosis with intermittent bandemia since 3/19 while on varying courses of antibiotics for Stenotrophomonas pneumonia and ESBL E coli UTI,  suspicion for true infection is low. She also hasn't mounted any fevers albeit they could be masked by steroids (but didn't have fevers before steroids either). The leukocytosis precedes steroids thus unlikely to be reactive to those. Her main complaint is abdominal pain with other GI symptoms and given the radiographical evidence of pancreatitis with suspected (non-infected) pseudocysts and elevated amylase/lipase, I suspect her leukocytosis is likely due to this. Other possibility is due to her malignancy though appears her lung and brain findings are stable. Given her immunosuppression, could consider fungal etiology though imaging doesn't support this and her BAL fungal cx only grew C glabrata which is likely colonization. Reviewed skin images from wound care as well - appear to be excoriations or bruising and dermatitis related to obesity.      Antimicrobial Courses  Ceftriaxone 4/4  PO Erythromycin 4/8-4/13  Levofloxacin 4/19-5/3  Meropenem 4/3-4/15  IV Vancomycin 4/3-4/7  IV Solumedrol 4/15-4/19  PO Prednisone 4/20-current     ID related problem list  # Leukocytosis with bandemia  # Metastatic lung cancer to brain  # JOHNATHON  # Pancreatitis with suspected pseudocysts  # AHRF 2/2 pneumonitis and pneumonia and lung cancer   # Asymptomatic ESBL E coli UTI     Recommendations  - Recommend continuing to monitor off abx  - Based on prednisone taper, does not need PJP ppx but if plans to do 20mg or more daily for >1 month, would need PJP ppx  - Consider repeat CT A/P to evaluate pancreatitis and pseudocysts. If enlarging, consider if IR is able to drain given she is symptomatic   - Follow up 4/15 BAL AFB cx    Thank you for involving us in the care of this patient. Infectious diseases will follow with you. Please contact us via the [facility] call center at [phone] should any questions arise. D/w primary team.     APARNA CAST, DO  Infectious Diseases Attending  [facility]         Subjective   History of Present  Illness   Magan Saleem is a 69 y.o. patient for whom ID is consulted for leukocytosis.    This is a 69 year old female with a past medical history of Type II DM, depression, PE, asthma, and metastatic lung adenocarcinoma to brain s/p radiation and currently on chemotherapy (outpatient) who was admitted from nursing home to [facility] on 4/3/25 due to abnormal labs.    She had a recent admission from 3/19-4/1/25 for chest pain associated with SOB and nausea, vomiting, and diarrhea. She was found to be in sepsis. She has been taking Doxycycline for a rash due to chemotherapy. She was found to have E faecalis UTI and was treated with Vancomycin. She also had an EGD which showed chronic gastritis. She was maintained on Doxycycline on discharge.    While at her nursing home, she reported SOB and productive cough and labs showed a leukocytosis of 27.8k and was sent to ER for evaluation. CXR showed patchy opacities in bilateral lungs. CT chest wo con on 4/16 showed right greater than left pleural effusions an multifocal consolidation and airspace opacities c/f pneumonia; possible increased size of pulmonary nodule and mediastinal lymph node. Underwent bronchoscopy with BAL on 4/15 which showed no abnormalities. R lung BAL showed atypical cells on pathology. BAL fungal culture with C glabrata and Stenotrophomonas maltophilia (S Levo, TMP/SMX); BAL AFB smear neg. Bronchoscopy raised concerns for ICI with pneumonitis and she was treated with high dose steroids from 4/16-4/19 and then started on taper. She was treated for Stenotrophomonas with levofloxacin for 2 weeks. She has had neg blood cx x2 on 4/3 and 5/5. Urine cx on 4/3 with ESBL E coli for which she was treated with Meropenem. Hospital course c/b SVT on 4/27 for which she required adenosine. Nephrology also following for JOHNATHON with renal US on 4/25 unremarkable. CT A/P wo con on 4/21 showed possible small fluid collection anterior to pancrease (3.6cm) and complex  "collection inferior to pancreatic head extending to right pelvis (6.8x3.6cm) c/f acute pancreatitis; nonobstructing nephrolothiasis; focal airspace disease of lungs. Repeat CT chest/abd/pel wo on 4/30 with stable R>L pleural effusions with multifocal alveolar and GGOs; stable pulmonary nodules and mediastinal adenopathy c/w lung cancer; multiple pancreatic fluid collections c/w pancreatitis. General surgery was consulted regarding the collections/pseudocysts and recommended no intervention as due to pancreatitis. She has remained afebrile during the hospitalization though has been hypotensive. She has had a persistent leukocytosis peaking at 37k. However, she appears to have had a leukocytosis since at least 3/19/25 while at OSH. Differential shows neutrophils predominantly with occasional bandemia. Has also had persistently elevated lactate in 2s. PCT 0.75. LFTs stable. Appetite has been poor and is refusing tube feeds. Has had a PICC line since 4/17.     Today, she reports pain in her "pancreas." Reports nausea and non-bloody vomiting. She feels her abdominal pain is worse. Reports 2-3 loose bowel movements daily but none today. She feels weakness in her arms as well.     Review of Systems: -   Comprehensive ROS was done and is negative except as per HPI    Histories:   Past Medical History:  Past Medical History:   Diagnosis Date    Chronic kidney disease, stage 3b     Chronic pulmonary embolism without acute cor pulmonale     Diabetes mellitus type 2 in obese     DNR (do not resuscitate)     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 Swanson    Melanocytic nevi of lower limb, including hip, left     Mixed " "hyperlipidemia     Moderate persistent asthma without complication 10/30/2024    Vitamin D deficiency        Past Social History:  Past Surgical History:   Procedure Laterality Date    CHOLECYSTECTOMY      CSF SHUNT      HYSTERECTOMY      TONSILLECTOMY         Family History:  Family History   Problem Relation Name Age of Onset    Diabetes Mother      Hypertension Mother      Lung cancer Father         Social History:  Social History[1]    Inpatient Medications  Current Medications[2]    Allergies:   Review of patient's allergies indicates:   Allergen Reactions    Penicillins Hives    Sulfa (sulfonamide antibiotics) Hives              Objective   Vitals: BP (!) 106/46   Pulse 94   Temp 97.2 °F (36.2 °C) (Axillary)   Resp 11   Ht 5' 5" (1.651 m)   Wt 131.8 kg (290 lb 9.1 oz)   SpO2 100%   BMI 48.35 kg/m²  FiO2-O2 (L/m): , Dosing Wt:  Wt Readings from Last 1 Encounters:   05/07/25 131.8 kg (290 lb 9.1 oz)   , BMI:Body mass index is 48.35 kg/m².    Physical Exam:   Gen: no acute distress, sitting comfortably in chair  HEENT: anicteric, oral mucosa moist, edentulous  Lungs: breathing comfortably on 2L NC, CTAB  Heart: RRR  Abd: soft, tender to palpation in LUQ and LLQ  Ext: no LE edema or deformities  Skin: no rashes or lesions, LUE PICC c/d/I  Psych: pleasant, cooperative  Neuro: AAOx3        Results Review    Labs:      CBC  Recent Labs     05/07/25  0401   WBC 24.97*   HGB 7.4*   HCT 22.4*   *     Renal function  Recent Labs     05/06/25  0343 05/07/25  0401 05/08/25  0345   * 131* 132*   K 4.2 4.4 4.3   CREATININE 4.00* 4.57* 4.37*     Liver function  Recent Labs     05/06/25  0343 05/07/25  0401 05/08/25  0345   AST 35 38 40   ALT 28 30 29   BILITOT 0.4 0.4 0.4     Coagulation  No results for input(s): "PT", "INR", "APTT" in the last 72 hours.   Procalcitonin  No results for input(s): "PROCALCIT" in the last 72 hours.    Microbiology:      Susceptibility data from last 90 days.  Collected " Specimen Info Organism Amp/Sulbactam Ampicillin AZTREONAM ETEST Cefazolin CEFEPIME ETEST Cefotaxime Ceftazidime CEFTAZIDIME/AVIBACTAM SUSCEPTIBILITY Ceftriaxone Ciprofloxacin Ertapenem Gentamicin LEVOFLOXACIN ETEST   04/15/25 Respiratory from BAL Candida glabrata                04/15/25 Respiratory from BAL Stenotrophomonas maltophilia              S   04/03/25 Urine Escherichia coli ESBL  R  R  I  R  R  R  R  S  R  R  S  S  R     Collected Specimen Info Organism Meropenem Nitrofurantoin Pip/Tazo Tetracycline Tobramycin Trimeth/Sulfa   04/15/25 Respiratory from BAL Candida glabrata         04/15/25 Respiratory from BAL Stenotrophomonas maltophilia     I   S   04/03/25 Urine Escherichia coli ESBL  S  S  S  R  S  R       Imaging:     No results found.                           [1]   Social History  Tobacco Use    Smoking status: Never    Smokeless tobacco: Never   Substance Use Topics    Alcohol use: Never    Drug use: Never   [2]   Current Facility-Administered Medications:     acetaminophen suppository 650 mg, 650 mg, Rectal, Q6H PRN, Maria Elena Colorado MD    acetaminophen tablet 1,000 mg, 1,000 mg, Oral, Q6H PRN, Maria Elena Colorado MD    albuterol-ipratropium 2.5 mg-0.5 mg/3 mL nebulizer solution 3 mL, 3 mL, Nebulization, Q4H PRN, Dameon Mcginnis MD    allopurinoL tablet 300 mg, 300 mg, Oral, Daily, Mikey Velásquez MD, 300 mg at 05/08/25 0826    aluminum & magnesium hydroxide-simethicone 400-400-40 mg/5 mL suspension 30 mL, 30 mL, Oral, Q6H PRN, Maria Elena Colorado MD    atorvastatin tablet 10 mg, 10 mg, Oral, Daily, Dameon Mcginnis MD, 10 mg at 05/08/25 0825    bisacodyL EC tablet 10 mg, 10 mg, Oral, Daily PRN, Maria Elena Colorado MD    dextromethorphan-guaiFENesin  mg/5 ml liquid 10 mL, 10 mL, Oral, Q6H PRN, Maria Elena Colorado MD    dextrose 50% injection 12.5 g, 12.5 g, Intravenous, PRN, Dameon Mcginnis MD, 12.5 g at 05/05/25 1809    dextrose 50% injection 25 g, 25 g, Intravenous, PRN, Dameon Mcginnis MD, 25 g at  05/05/25 1151    diphenhydrAMINE capsule 50 mg, 50 mg, Oral, Q6H PRN, Maria Elena Colorado MD    docusate sodium capsule 100 mg, 100 mg, Oral, BID PRN, Maria Elena Colorado MD    glucagon (human recombinant) injection 1 mg, 1 mg, Intramuscular, PRN, Dameon Mcginnis MD    glucose chewable tablet 16 g, 16 g, Oral, PRN, Dameon Mcginnis MD    glucose chewable tablet 24 g, 24 g, Oral, PRN, Dameon Mcginnis MD    heparin (porcine) injection 5,000 Units, 5,000 Units, Subcutaneous, Q8H, Carlos Alberto Nicole MD, 5,000 Units at 05/08/25 0601    insulin aspart U-100 injection 0-10 Units, 0-10 Units, Subcutaneous, Q6H PRN, Carlos Alberto Nicole MD, 4 Units at 05/08/25 0601    insulin glargine U-100 (Lantus) injection 50 Units, 50 Units, Subcutaneous, QHS, Maria Elena Colorado MD, 50 Units at 05/07/25 2138    Lactobacillus acidophilus capsule 2 capsule, 2 capsule, Oral, TID WM, Dameon Mcginnis MD, 2 capsule at 05/08/25 0825    melatonin tablet 6 mg, 6 mg, Oral, Nightly PRN, Maria Elena Colorado MD    metoclopramide HCl tablet 5 mg, 5 mg, Oral, QID, Dameon Mcginnis MD, 5 mg at 05/08/25 0825    montelukast tablet 10 mg, 10 mg, Oral, QHS, Dameon Mcginnis MD, 10 mg at 05/07/25 2135    ondansetron injection 8 mg, 8 mg, Intravenous, Q6H PRN, Maria Elena Colorado MD, 8 mg at 05/05/25 0906    oxyCODONE immediate release tablet 5 mg, 5 mg, Oral, Q6H PRN, Carlos Alberto Nicole MD, 5 mg at 05/04/25 1243    pantoprazole EC tablet 40 mg, 40 mg, Oral, Daily, Dameon Mcginnis MD, 40 mg at 05/08/25 0825    [COMPLETED] predniSONE tablet 40 mg, 40 mg, Oral, Daily, 40 mg at 05/03/25 0955 **FOLLOWED BY** predniSONE tablet 20 mg, 20 mg, Oral, Daily, 20 mg at 05/08/25 0826 **FOLLOWED BY** [START ON 5/18/2025] predniSONE tablet 10 mg, 10 mg, Oral, Daily, Dameon Mcginnis MD    rOPINIRole tablet 0.25 mg, 0.25 mg, Oral, TID, Dameon Mcginnis MD, 0.25 mg at 05/08/25 0825    sertraline tablet 25 mg, 25 mg, Oral, Daily, Dameon Mcginnis MD, 25 mg at 05/08/25 0826    sodium bicarbonate 100 mEq in D5W 1,000 mL  infusion, , Intravenous, Continuous, Shahbaz Aquino MD, Last Rate: 50 mL/hr at 05/08/25 0609, Rate Verify at 05/08/25 0609    traZODone tablet 50 mg, 50 mg, Oral, Nightly PRN, Maria Elena Colorado MD

## 2025-05-08 NOTE — PT/OT/SLP PROGRESS
Occupational Therapy   Treatment    Name: Magan Saleem  MRN: [id number]  Admitting Diagnosis:  Pneumonitis       Recommendations:     Discharge Recommendations: Moderate Intensity Therapy  Discharge Equipment Recommendations:  to be determined by next level of care  Barriers to discharge:       Assessment:     Magan Saleem is a 69 y.o. female with a medical diagnosis of Pneumonitis.  Performance deficits affecting function are weakness, impaired endurance, impaired self care skills, impaired functional mobility.     Rehab Prognosis:  Fair; patient would benefit from acute skilled OT services to address these deficits and reach maximum level of function.       Plan:     Patient to be seen 5 x/week to address the above listed problems via self-care/home management, therapeutic activities, therapeutic exercises  Plan of Care Expires: 06/03/25  Plan of Care Reviewed with: patient    Subjective     Chief Complaint:   Patient/Family Comments/goals:   Pain/Comfort:  Pain Rating 1: 0/10    Objective:     Communicated with: Libby Dong RN prior to session.  Patient found up in chair with peripheral IV, pulse ox (continuous), telemetry, PureWick, NG tube upon OT entry to room.    General Precautions: Standard, fall    Orthopedic Precautions:N/A  Braces: N/A  Respiratory Status: Nasal cannula, flow 2 L/min     Occupational Performance:     Bed Mobility:    Rolling max a   Bed <--> chair dep , t/f via maxim lift     Functional Mobility/Transfers:    Functional Mobility:     Activities of Daily Living:  Dep to have her buttocks cleaned  Dep to doff/evangelina her gown      [facility] 6 Click ADL:      Treatment & Education:  Pt performed hand helper with 1 rubber band resistances 20 reps on R     Patient left HOB elevated with all lines intact and call button in reach    GOALS:   Multidisciplinary Problems       Occupational Therapy Goals          Problem: Occupational Therapy    Goal Priority Disciplines Outcome Interventions    Occupational Therapy Goal     OT, PT/OT Progressing    Description: STG:  Pt will perform grooming with setup and min a  Pt will bathe with mod a  Pt will perform UE dressing with mod a  Pt will perform LE dressing with mod a with AD as needed  Pt will sit EOB x 10 min with SBA   Pt will transfer bed/chair/bsc with mod a  Pt will perform standing task x 30 sec with mod a   Pt will tolerate 20 minutes of tx without fatigue      LT.Restore to max I with self care and mobility.                          DME Justifications:      Time Tracking:     OT Date of Treatment: 25  OT Start Time: 1409  OT Stop Time: 1445  OT Total Time (min): 36 min    Billable Minutes:Therapeutic Activity 30    OT/SAROJ: SAROJ          2025

## 2025-05-08 NOTE — CONSULTS
[facility]  Hematology/Oncology  Consult Note    Patient Name: Magan Saleem  MRN: [id number]  Admission Date: 4/26/2025  Hospital Length of Stay: 12 days  Code Status: DNR   Attending Provider: Maria Teresa Conway MD  Consulting Provider: Ca Swanson MD  Primary Care Physician: Sil Brown DO  Principal Problem:Pneumonitis    Inpatient consult to Hematology/Oncology  Consult performed by: Ca Swanson MD  Consult ordered by: Maria Teresa Conway MD        Subjective:       Medications:  Continuous Infusions:   sodium bicarbonate 100 mEq in D5W 1,000 mL infusion   Intravenous Continuous 50 mL/hr at 05/08/25 0609 Rate Verify at 05/08/25 0609     Scheduled Meds:   allopurinoL  300 mg Oral Daily    atorvastatin  10 mg Oral Daily    heparin (porcine)  5,000 Units Subcutaneous Q8H    insulin glargine U-100  50 Units Subcutaneous QHS    Lactobacillus acidophilus  2 capsule Oral TID WM    metoclopramide HCl  5 mg Oral QID    montelukast  10 mg Oral QHS    pantoprazole  40 mg Oral Daily    predniSONE  20 mg Oral Daily    Followed by    [START ON 5/18/2025] predniSONE  10 mg Oral Daily    rOPINIRole  0.25 mg Oral TID    sertraline  25 mg Oral Daily     PRN Meds:  Current Facility-Administered Medications:     acetaminophen, 650 mg, Rectal, Q6H PRN    acetaminophen, 1,000 mg, Oral, Q6H PRN    albuterol-ipratropium, 3 mL, Nebulization, Q4H PRN    aluminum & magnesium hydroxide-simethicone, 30 mL, Oral, Q6H PRN    bisacodyL, 10 mg, Oral, Daily PRN    dextromethorphan-guaiFENesin  mg/5 ml, 10 mL, Oral, Q6H PRN    dextrose 50%, 12.5 g, Intravenous, PRN    dextrose 50%, 25 g, Intravenous, PRN    diphenhydrAMINE, 50 mg, Oral, Q6H PRN    docusate sodium, 100 mg, Oral, BID PRN    glucagon (human recombinant), 1 mg, Intramuscular, PRN    glucose, 16 g, Oral, PRN    glucose, 24 g, Oral, PRN    insulin aspart U-100, 0-10 Units, Subcutaneous, Q6H PRN    melatonin, 6 mg, Oral, Nightly  PRN    ondansetron, 8 mg, Intravenous, Q6H PRN    oxyCODONE, 5 mg, Oral, Q6H PRN    traZODone, 50 mg, Oral, Nightly PRN     Review of patient's allergies indicates:   Allergen Reactions    Penicillins Hives    Sulfa (sulfonamide antibiotics) Hives        Past Medical History:   Diagnosis Date    Chronic kidney disease, stage 3b     Chronic pulmonary embolism without acute cor pulmonale     Diabetes mellitus type 2 in obese     DNR (do not resuscitate)     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 Swanson    Melanocytic nevi of lower limb, including hip, left     Mixed hyperlipidemia     Moderate persistent asthma without complication 10/30/2024    Vitamin D deficiency      Past Surgical History:   Procedure Laterality Date    CHOLECYSTECTOMY      CSF SHUNT      HYSTERECTOMY      TONSILLECTOMY       Family History       Problem Relation (Age of Onset)    Diabetes Mother    Hypertension Mother    Lung cancer Father          Tobacco Use    Smoking status: Never    Smokeless tobacco: Never   Substance and Sexual Activity    Alcohol use: Never    Drug use: Never    Sexual activity: Not Currently       Review of Systems   Constitutional:  Positive for activity change and fatigue.   Respiratory:  Negative for shortness of breath.      Objective:     Vital Signs (Most Recent):  Temp: 96 °F (35.6 °C) (05/08/25 0400)  Pulse: 84 (05/07/25 2014)  Resp: 12 (05/07/25 2014)  BP: (!) 95/52 (05/07/25 2014)  SpO2: 100 % (05/07/25 2014) Vital Signs (24h Range):  Temp:  [96 °F (35.6 °C)-97.5 °F (36.4 °C)] 96 °F (35.6 °C)  Pulse:  [84-92] 84  Resp:  [11-13] 12  SpO2:  [100 %] 100 %  BP: ()/(40-52) 95/52     Weight: 131.8 kg  (290 lb 9.1 oz)  Body mass index is 48.35 kg/m².  Body surface area is 2.46 meters squared.      Intake/Output Summary (Last 24 hours) at 5/8/2025 0645  Last data filed at 5/8/2025 0609  Gross per 24 hour   Intake 2186.55 ml   Output --   Net 2186.55 ml       Physical Exam  Constitutional:       General: She is not in acute distress.     Appearance: She is ill-appearing. She is not diaphoretic.   Eyes:      Extraocular Movements: Extraocular movements intact.   Cardiovascular:      Rate and Rhythm: Normal rate and regular rhythm.   Pulmonary:      Effort: No respiratory distress.      Breath sounds: No wheezing.      Comments: Globally decreased  Abdominal:      Palpations: Abdomen is soft.      Comments: Obese, mild epigastric tenderness   Neurological:      Mental Status: She is oriented to person, place, and time.      Comments: Sedate with flat affect         Assessment/Plan:     Active Diagnoses:    Diagnosis Date Noted POA    PRINCIPAL PROBLEM:  Pneumonitis [J98.4] 04/16/2025 Yes    Goals of care, counseling/discussion [Z71.89] 05/03/2025 Not Applicable    Intra-abdominal fluid collection [R18.8] 05/01/2025 Yes    SVT (supraventricular tachycardia) [I47.10] 04/27/2025 No    Nonischemic nontraumatic myocardial injury [I5A] 04/27/2025 No    Hyperphosphatemia [E83.39] 04/26/2025 Yes    Hyperuricemia [E79.0] 04/26/2025 Yes    Infection due to Stenotrophomonas maltophilia [A49.8] 04/22/2025 Yes    Acute pancreatitis [K85.90] 04/21/2025 Yes    Neoplastic (malignant) related fatigue [R53.0] 04/15/2025 Yes    Normocytic anemia [D64.9] 04/15/2025 Yes    Edema due to hypoalbuminemia [E88.09] 04/12/2025 Yes    Type 2 diabetes mellitus with hyperglycemia [E11.65] 04/04/2025 Yes    Sepsis due to pneumonia [J18.9, A41.9] 04/04/2025 Yes    Gastroparesis [K31.84]  Yes    Lung cancer metastatic to brain [C34.90, C79.31]  Yes    Morbid obesity [E66.01] 04/03/2025 Yes    Acute kidney injury superimposed on stage 4 chronic kidney  disease [N17.9, N18.4]  Yes    Chronic pulmonary embolism without acute cor pulmonale [I27.82]  Yes    Moderate persistent asthma without complication [J45.40] 10/30/2024 Yes    Essential hypertension [I10] 06/30/2021 Yes    Mixed hyperlipidemia [E78.2] 06/30/2021 Yes      Problems Resolved During this Admission:     Patient is known to me with advanced EGFR mutated lung cancer. She was treated with Tagrisso initially which was well tolerated, until she progressed and transitioned to Retevmo/Lazcluze. She was treated for several months but recently has had prolonged hospitalizations. She also has been treated remotely for brain metastasis with no intracranial recurrent/progression recently. She was initially managed at [facility] and then discharged briefly to swing bed. She was then rehospitalized at [facility] and most recently has been at [facility].     Her course has been quite complex with multiple ongoing problems.   I have reviewed her most recent imaging, completed 4.30.25.   The lung cancer burden remains limited to the chest with a mass in the medial right upper lobe and right hilum. She has fairly extensive inflammatory pulmonary changes and pleural effusion that are slightly improved as compared to prior exam. She was re-evalauted by Dr. Villasenor this week with stable respiratory status and weaning steroids. She has been treated for Stenotrophomonas in BAL. She is comfortable at rest on small amount of supplemental oxygen.     More problematic has been very poor po intake with resultant relative malnutrition and profound decline in performance status. Prior to recent events over the last couple months, she was fully functional. She is now bedbound. She is able to be transferred to bedside chair but requires max assistance. She has been found to have pancreatitis and recent CT suggest progression of intraabdominal multiple fluid collections including a 7 cm pseudocyst and 4 cm collection at root of mesentery. Most  recent amylase and lipase were rising. She is offered food but does not eat. She has been receiving Dobhoff tube feeds at night. I suspect the pancreatitis/pancreatic pseudocysts are contributing significantly to her lack of po intake. I would recommend asking Gastroenterology and General Surgery if there are any options that may improve her situation, though fear there will be little to offer.     She also has developed renal failure with a Scr that has been about 4 over past couple weeks, rising from prior 1.5-2 range.   Most recent microbiology studies with negative blood cultures on 5.5.25.     I talked with her today regarding plans moving forward. Without adequate nutrition, I don't see a path to significant improvement. And even with nutrition, recovery may still be limited.  did broach subject of hospice with her and she is considering this. Her family was not present during my visit and, ideally, would be included in any end of life discussions.     Thank you for your consult.     Ca Swanson MD  Hematology/Oncology  [facility]

## 2025-05-09 NOTE — SUBJECTIVE & OBJECTIVE
Interval History:     Review of Systems   Constitutional:  Positive for appetite change.   Gastrointestinal:  Positive for abdominal pain.   All other systems reviewed and are negative.    Objective:     Vital Signs (Most Recent):  Temp: 97.5 °F (36.4 °C) (05/09/25 0315)  Pulse: 98 (05/09/25 0515)  Resp: 15 (05/09/25 0515)  BP: (!) 100/46 (05/09/25 0400)  SpO2: 100 % (05/09/25 0515) Vital Signs (24h Range):  Temp:  [96 °F (35.6 °C)-97.5 °F (36.4 °C)] 97.5 °F (36.4 °C)  Pulse:  [83-98] 98  Resp:  [11-21] 15  SpO2:  [96 %-100 %] 100 %  BP: ()/(40-57) 100/46     Weight: 131.8 kg (290 lb 9.1 oz)  Body mass index is 48.35 kg/m².    Intake/Output Summary (Last 24 hours) at 5/9/2025 0634  Last data filed at 5/8/2025 1835  Gross per 24 hour   Intake 120 ml   Output --   Net 120 ml         Physical Exam  Constitutional:       Appearance: She is obese. She is ill-appearing.   HENT:      Head: Normocephalic.      Mouth/Throat:      Mouth: Mucous membranes are dry.   Cardiovascular:      Rate and Rhythm: Normal rate.      Heart sounds: Normal heart sounds. No murmur heard.  Pulmonary:      Effort: No respiratory distress.      Breath sounds: Rhonchi present.      Comments: On 2L NC  Abdominal:      General: There is no distension.      Tenderness: There is abdominal tenderness.      Comments: Dobhoff in place   Skin:     Coloration: Skin is pale.   Neurological:      Mental Status: Mental status is at baseline.               Significant Labs: All pertinent labs within the past 24 hours have been reviewed.    Significant Imaging: I have reviewed all pertinent imaging results/findings within the past 24 hours.

## 2025-05-09 NOTE — ASSESSMENT & PLAN NOTE
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 Most recent hemoglobin and hematocrit are listed below.  Recent Labs     05/07/25  0401 05/09/25  0321   HGB 7.4* 7.2*   HCT 22.4* 21.9*     Plan  - Monitor serial CBC: Daily  - Transfuse PRBC if patient becomes hemodynamically unstable, symptomatic or H/H drops below 7/21.  - Patient has not received any PRBC transfusions to date  - Patient's anemia is currently stable  - No evidence of bleeding.

## 2025-05-09 NOTE — PROGRESS NOTES
[facility]  Nephrology  Progress Note    Patient Name: Magan Saleem  MRN: [id number]  Admission Date: 4/26/2025  Hospital Length of Stay: 12 days  Attending Provider: Maria Teresa Conway MD   Primary Care Physician: Sil Brown DO  Principal Problem:Pneumonitis    Consults  Subjective:     Interval History: The patient denies having dyspnea today    Review of patient's allergies indicates:   Allergen Reactions    Penicillins Hives    Sulfa (sulfonamide antibiotics) Hives     Current Facility-Administered Medications   Medication Frequency    acetaminophen suppository 650 mg Q6H PRN    acetaminophen tablet 1,000 mg Q6H PRN    albuterol-ipratropium 2.5 mg-0.5 mg/3 mL nebulizer solution 3 mL Q4H PRN    allopurinoL tablet 300 mg Daily    aluminum & magnesium hydroxide-simethicone 400-400-40 mg/5 mL suspension 30 mL Q6H PRN    atorvastatin tablet 10 mg Daily    bisacodyL EC tablet 10 mg Daily PRN    dextromethorphan-guaiFENesin  mg/5 ml liquid 10 mL Q6H PRN    dextrose 50% injection 12.5 g PRN    dextrose 50% injection 25 g PRN    diphenhydrAMINE capsule 50 mg Q6H PRN    docusate sodium capsule 100 mg BID PRN    glucagon (human recombinant) injection 1 mg PRN    glucose chewable tablet 16 g PRN    glucose chewable tablet 24 g PRN    heparin (porcine) injection 5,000 Units Q8H    insulin aspart U-100 injection 0-10 Units Q6H PRN    insulin glargine U-100 (Lantus) injection 50 Units QHS    Lactobacillus acidophilus capsule 2 capsule TID WM    melatonin tablet 6 mg Nightly PRN    metoclopramide HCl tablet 5 mg QID    miconazole NITRATE 2 % top powder BID    montelukast tablet 10 mg QHS    ondansetron injection 8 mg Q6H PRN    oxyCODONE immediate release tablet 5 mg Q6H PRN    pantoprazole EC tablet 40 mg Daily    predniSONE tablet 20 mg Daily    Followed by    [START ON 5/18/2025] predniSONE tablet 10 mg Daily    rOPINIRole tablet 0.25 mg TID    sertraline tablet 25 mg Daily     traZODone tablet 50 mg Nightly PRN       Objective:     Vital Signs (Most Recent):  Temp: 97.4 °F (36.3 °C) (05/08/25 1915)  Pulse: 89 (05/08/25 1915)  Resp: 14 (05/08/25 1915)  BP: (!) 91/52 (05/08/25 1915)  SpO2: 100 % (05/08/25 1915) Vital Signs (24h Range):  Temp:  [96 °F (35.6 °C)-97.4 °F (36.3 °C)] 97.4 °F (36.3 °C)  Pulse:  [84-97] 89  Resp:  [11-20] 14  SpO2:  [98 %-100 %] 100 %  BP: ()/(40-52) 91/52     Weight: 131.8 kg (290 lb 9.1 oz) (05/07/25 0430)  Body mass index is 48.35 kg/m².  Body surface area is 2.46 meters squared.    I/O last 3 completed shifts:  In: 2306.6 [P.O.:120; I.V.:1337.6; NG/GT:849]  Out: -     Physical Exam  Constitutional:       Appearance: She is obese. She is ill-appearing.   Cardiovascular:      Heart sounds: Heart sounds are distant. No murmur heard.     No friction rub. No gallop.   Pulmonary:      Breath sounds: Rales present.   Abdominal:      General: Abdomen is protuberant. Bowel sounds are decreased.      Tenderness: There is abdominal tenderness in the right upper quadrant and epigastric area. There is guarding. There is no rebound.   Musculoskeletal:      Right lower leg: 3+ Edema present.      Left lower leg: 3+ Edema present.         Significant Labs:sureCBC:   Recent Labs   Lab 05/07/25  0401   WBC 24.97*   RBC 2.48*   HGB 7.4*   HCT 22.4*   *   MCV 90.3   MCH 29.8   MCHC 33.0     CMP:   Recent Labs   Lab 05/08/25  0345   *   CALCIUM 7.2*   ALBUMIN 1.0*   PROT 3.9*   *   K 4.3   CO2 29   CL 82*   *   CREATININE 4.37*   ALKPHOS 143   ALT 29   AST 40   BILITOT 0.4     All labs within the past 24 hours have been reviewed.    Significant Imaging:      Assessment/Plan:     Active Diagnoses:    Diagnosis Date Noted POA    PRINCIPAL PROBLEM:  Pneumonitis [J98.4] 04/16/2025 Yes    Pancreatic pseudocyst [K86.3] 05/08/2025 Yes    History of pancreatitis [Z87.19] 05/08/2025 Not Applicable    Acute pancreatic fluid collection [K86.89] 05/08/2025 Yes     Goals of care, counseling/discussion [Z71.89] 05/03/2025 Not Applicable    Intra-abdominal fluid collection [R18.8] 05/01/2025 Yes    SVT (supraventricular tachycardia) [I47.10] 04/27/2025 No    Nonischemic nontraumatic myocardial injury [I5A] 04/27/2025 No    Hyperphosphatemia [E83.39] 04/26/2025 Yes    Hyperuricemia [E79.0] 04/26/2025 Yes    Infection due to Stenotrophomonas maltophilia [A49.8] 04/22/2025 Yes    Acute pancreatitis [K85.90] 04/21/2025 Yes    Neoplastic (malignant) related fatigue [R53.0] 04/15/2025 Yes    Normocytic anemia [D64.9] 04/15/2025 Yes    Edema due to hypoalbuminemia [E88.09] 04/12/2025 Yes    Type 2 diabetes mellitus with hyperglycemia [E11.65] 04/04/2025 Yes    Sepsis due to pneumonia [J18.9, A41.9] 04/04/2025 Yes    Gastroparesis [K31.84]  Yes    Lung cancer metastatic to brain [C34.90, C79.31]  Yes    Morbid obesity [E66.01] 04/03/2025 Yes    Acute kidney injury superimposed on stage 4 chronic kidney disease [N17.9, N18.4]  Yes    Chronic pulmonary embolism without acute cor pulmonale [I27.82]  Yes    Moderate persistent asthma without complication [J45.40] 10/30/2024 Yes    Essential hypertension [I10] 06/30/2021 Yes    Mixed hyperlipidemia [E78.2] 06/30/2021 Yes      Problems Resolved During this Admission:       IMP:  The uric acid continues to improve  However the BUN and creatinine have remained unchanged    Plan:  D/c the iv fluids of D5W and NaHCO3  Chest xray  Thank you for your consult. I will follow-up with patient. Please contact us if you have any additional questions.    Mikey Velásquez MD  Nephrology  [facility]

## 2025-05-09 NOTE — ASSESSMENT & PLAN NOTE
CT abdomen/pelvis showed- Possible small fluid collection anterior to the pancreas measuring approximately 3.6 cm on axial 41. Probable complex collection slightly inferior to the pancreatic head extending to the right pelvis and right iliac fossa.     04/21 Abd fluid collection in area pancreas noted on CT abd, discussed with surgery  04/22 likely evolving pancreatitis dx a couple weeks ago at [facility], now with pseudocyst.  No severe epigastric pain.  04/26 Ongoing tube feeds and full liquid diet. If tolerates full liquid better, plan to advanced to soft foods.   04/30 Repeat CT abdomen ordered, showed the pancreas with multiple fluid collections anterior to the pancreas and within the root of the mesentery and extending into the anterior right pararenal space and right paracolic gutter.     5/1 D?W with [facility] (Dr. Tapia) regarding fluid collection. They believe this is sec to pancreatitis and nothing to do for now.

## 2025-05-09 NOTE — ASSESSMENT & PLAN NOTE
CT abdomen/pelvis showed- Possible small fluid collection anterior to the pancreas measuring approximately 3.6 cm on axial 41. Probable complex collection slightly inferior to the pancreatic head extending to the right pelvis and right iliac fossa.     04/21 Abd fluid collection in area pancreas noted on CT abd, discussed with surgery  04/22 likely evolving pancreatitis dx a couple weeks ago at [facility], now with pseudocyst.  No severe epigastric pain.  04/26 Ongoing tube feeds and full liquid diet. If tolerates full liquid better, plan to advanced to soft foods.   04/30 Repeat CT abdomen ordered, showed the pancreas with multiple fluid collections anterior to the pancreas and within the root of the mesentery and extending into the anterior right pararenal space and right paracolic gutter.     5/1 D?W with [facility] (Dr. Tapia) regarding fluid collection. They believe this is sec to pancreatitis and nothing to do for now.

## 2025-05-09 NOTE — PROGRESS NOTES
[facility]  Nephrology  Progress Note    Patient Name: Magan Saleem  MRN: [id number]  Admission Date: 4/26/2025  Hospital Length of Stay: 13 days  Attending Provider: Maria Teresa Conway MD   Primary Care Physician: Sil Brown DO  Principal Problem:Pneumonitis    Consults  Subjective:     Interval History: The patient denies having dyspnea today    Review of patient's allergies indicates:   Allergen Reactions    Penicillins Hives    Sulfa (sulfonamide antibiotics) Hives     Current Facility-Administered Medications   Medication Frequency    acetaminophen suppository 650 mg Q6H PRN    acetaminophen tablet 1,000 mg Q6H PRN    albuterol-ipratropium 2.5 mg-0.5 mg/3 mL nebulizer solution 3 mL Q4H PRN    allopurinoL tablet 300 mg Daily    aluminum & magnesium hydroxide-simethicone 400-400-40 mg/5 mL suspension 30 mL Q6H PRN    atorvastatin tablet 10 mg Daily    bisacodyL EC tablet 10 mg Daily PRN    dextromethorphan-guaiFENesin  mg/5 ml liquid 10 mL Q6H PRN    dextrose 50% injection 12.5 g PRN    dextrose 50% injection 25 g PRN    diphenhydrAMINE capsule 50 mg Q6H PRN    docusate sodium capsule 100 mg BID PRN    glucagon (human recombinant) injection 1 mg PRN    glucose chewable tablet 16 g PRN    glucose chewable tablet 24 g PRN    heparin (porcine) injection 5,000 Units Q8H    insulin aspart U-100 injection 0-10 Units Q6H PRN    insulin glargine U-100 (Lantus) injection 50 Units QHS    Lactobacillus acidophilus capsule 2 capsule TID WM    melatonin tablet 6 mg Nightly PRN    metoclopramide HCl tablet 5 mg QID    miconazole NITRATE 2 % top powder BID    montelukast tablet 10 mg QHS    ondansetron injection 8 mg Q6H PRN    oxyCODONE immediate release tablet 5 mg Q6H PRN    pantoprazole EC tablet 40 mg Daily    predniSONE tablet 20 mg Daily    Followed by    [START ON 5/18/2025] predniSONE tablet 10 mg Daily    rOPINIRole tablet 0.25 mg TID    sertraline tablet 25 mg Daily     traZODone tablet 50 mg Nightly PRN       Objective:     Vital Signs (Most Recent):  Temp: 97.5 °F (36.4 °C) (05/09/25 0315)  Pulse: 98 (05/09/25 0515)  Resp: 15 (05/09/25 0515)  BP: (!) 100/46 (05/09/25 0400)  SpO2: 100 % (05/09/25 0515) Vital Signs (24h Range):  Temp:  [96 °F (35.6 °C)-97.5 °F (36.4 °C)] 97.5 °F (36.4 °C)  Pulse:  [83-98] 98  Resp:  [12-21] 15  SpO2:  [96 %-100 %] 100 %  BP: ()/(40-57) 100/46     Weight: 131.8 kg (290 lb 9.1 oz) (05/07/25 0430)  Body mass index is 48.35 kg/m².  Body surface area is 2.46 meters squared.    I/O last 3 completed shifts:  In: 1791.8 [P.O.:120; I.V.:822.8; NG/GT:849]  Out: -     Physical Exam  Constitutional:       Appearance: She is obese. She is ill-appearing.   Cardiovascular:      Heart sounds: No murmur heard.     No friction rub. No gallop.   Pulmonary:      Breath sounds: Rales present.   Abdominal:      General: Abdomen is protuberant. Bowel sounds are decreased.      Palpations: Abdomen is soft.      Tenderness: There is abdominal tenderness in the right upper quadrant and epigastric area. There is guarding. There is no rebound.   Musculoskeletal:      Right lower leg: 3+ Edema present.      Left lower leg: 3+ Edema present.   Neurological:      Mental Status: She is alert.         Significant Labs:sureCBC:   Recent Labs   Lab 05/09/25  0321   WBC 30.95*   RBC 2.43*   HGB 7.2*   HCT 21.9*   *   MCV 90.1   MCH 29.6   MCHC 32.9     CMP:   Recent Labs   Lab 05/09/25 0321      CALCIUM 7.1*   ALBUMIN 1.1*   PROT 4.0*   *   K 4.1   CO2 27   CL 84*   *   CREATININE 4.53*   ALKPHOS 162*   ALT 31   AST 38   BILITOT 0.4     All labs within the past 24 hours have been reviewed.    Significant Imaging:      Assessment/Plan:     Active Diagnoses:    Diagnosis Date Noted POA    PRINCIPAL PROBLEM:  Pneumonitis [J98.4] 04/16/2025 Yes    Pancreatic pseudocyst [K86.3] 05/08/2025 Yes    History of pancreatitis [Z87.19] 05/08/2025 Not Applicable     Acute pancreatic fluid collection [K86.89] 05/08/2025 Yes    Goals of care, counseling/discussion [Z71.89] 05/03/2025 Not Applicable    Intra-abdominal fluid collection [R18.8] 05/01/2025 Yes    SVT (supraventricular tachycardia) [I47.10] 04/27/2025 No    Nonischemic nontraumatic myocardial injury [I5A] 04/27/2025 No    Hyperphosphatemia [E83.39] 04/26/2025 Yes    Hyperuricemia [E79.0] 04/26/2025 Yes    Infection due to Stenotrophomonas maltophilia [A49.8] 04/22/2025 Yes    Acute pancreatitis [K85.90] 04/21/2025 Yes    Neoplastic (malignant) related fatigue [R53.0] 04/15/2025 Yes    Normocytic anemia [D64.9] 04/15/2025 Yes    Edema due to hypoalbuminemia [E88.09] 04/12/2025 Yes    Type 2 diabetes mellitus with hyperglycemia [E11.65] 04/04/2025 Yes    Sepsis due to pneumonia [J18.9, A41.9] 04/04/2025 Yes    Gastroparesis [K31.84]  Yes    Lung cancer metastatic to brain [C34.90, C79.31]  Yes    Morbid obesity [E66.01] 04/03/2025 Yes    Acute kidney injury superimposed on stage 4 chronic kidney disease [N17.9, N18.4]  Yes    Chronic pulmonary embolism without acute cor pulmonale [I27.82]  Yes    Moderate persistent asthma without complication [J45.40] 10/30/2024 Yes    Essential hypertension [I10] 06/30/2021 Yes    Mixed hyperlipidemia [E78.2] 06/30/2021 Yes      Problems Resolved During this Admission:       IMP:  The uric acid level is unchanged from yesterday   The BUN and creatinine remain elevated   Mild hyponatremia persists    Plan:  Continue to monitor    Thank you for your consult. I will follow-up with patient. Please contact us if you have any additional questions.    Mikey Velásquez MD  Nephrology  [facility]

## 2025-05-09 NOTE — ASSESSMENT & PLAN NOTE
CT abdomen/pelvis showed- Possible small fluid collection anterior to the pancreas measuring approximately 3.6 cm on axial 41. Probable complex collection slightly inferior to the pancreatic head extending to the right pelvis and right iliac fossa.     04/21 Abd fluid collection in area pancreas noted on CT abd, discussed with surgery  04/22 likely evolving pancreatitis dx a couple weeks ago at [facility], now with pseudocyst.  No severe epigastric pain.  04/26 Ongoing tube feeds and full liquid diet. If tolerates full liquid better, plan to advanced to soft foods.   04/30 Repeat CT abdomen ordered, showed the pancreas with multiple fluid collections anterior to the pancreas and within the root of the mesentery and extending into the anterior right pararenal space and right paracolic gutter.     5/1 D?W with [facility] (Dr. Tapia) regarding fluid collection. They believe this is sec to pancreatitis and nothing to do for now.

## 2025-05-09 NOTE — ASSESSMENT & PLAN NOTE
"EGD by Dr. Lo during recent past admission at [facility]:  "EGD on 03/21/2025 shows LA class B erosive esophagitis but no pill esophagitis, nonerosive gastritis and retention of food and fluid suspicious for gastroparesis, due to narcotics and diabetes. "  GI consulted, unable to advance diet and poor candidate for PEG.  Dobhoff in place for feeds, continued on Reglan.   GI recommended surgical PEG due to body habitus, GS stated that patient is not a candidate for PEG based on co morbidities and poor prognosis.   Continue PPI.     "

## 2025-05-09 NOTE — SUBJECTIVE & OBJECTIVE
Interval History:     Review of Systems   Constitutional:  Positive for appetite change.   Gastrointestinal:  Positive for abdominal pain.   All other systems reviewed and are negative.    Objective:     Vital Signs (Most Recent):  Temp: 97.3 °F (36.3 °C) (05/09/25 1600)  Pulse: 88 (05/09/25 1600)  Resp: 12 (05/09/25 1600)  BP: (!) 78/43 (05/09/25 1600)  SpO2: 100 % (05/09/25 1600) Vital Signs (24h Range):  Temp:  [97.1 °F (36.2 °C)-97.5 °F (36.4 °C)] 97.3 °F (36.3 °C)  Pulse:  [] 88  Resp:  [12-21] 12  SpO2:  [96 %-100 %] 100 %  BP: ()/(40-57) 78/43     Weight: 131.8 kg (290 lb 9.1 oz)  Body mass index is 48.35 kg/m².    Intake/Output Summary (Last 24 hours) at 5/9/2025 1651  Last data filed at 5/8/2025 1835  Gross per 24 hour   Intake 120 ml   Output --   Net 120 ml         Physical Exam  Constitutional:       Appearance: She is obese. She is ill-appearing.   HENT:      Head: Normocephalic.      Mouth/Throat:      Mouth: Mucous membranes are dry.   Cardiovascular:      Rate and Rhythm: Normal rate.      Heart sounds: Normal heart sounds. No murmur heard.  Pulmonary:      Effort: No respiratory distress.      Breath sounds: Rhonchi present.      Comments: On 2L NC  Abdominal:      General: There is no distension.      Tenderness: There is abdominal tenderness.      Comments: Dobhoff in place   Skin:     Coloration: Skin is pale.   Neurological:      Mental Status: Mental status is at baseline.               Significant Labs: All pertinent labs within the past 24 hours have been reviewed.    Significant Imaging: I have reviewed all pertinent imaging results/findings within the past 24 hours.

## 2025-05-09 NOTE — ASSESSMENT & PLAN NOTE
"EGD by Dr. Lo during recent past admission at [facility]:  "EGD on 03/21/2025 shows LA class B erosive esophagitis but no pill esophagitis, nonerosive gastritis and retention of food and fluid suspicious for gastroparesis, due to narcotics and diabetes. "  GI consulted, unable to advance diet and poor candidate for PEG.  Dobhoff in place for feeds, continued on Reglan.   GI recommended surgical PEG due to body habitus, GS stated that patient is not a candidate for PEG based on co morbidities and poor prognosis.   Continue PPI.     "

## 2025-05-09 NOTE — ASSESSMENT & PLAN NOTE
JOHNATHON is likely due to pre-renal azotemia due to intravascular volume depletion. Baseline creatinine is ~ 2.0. Most recent creatinine and eGFR are listed below.  Recent Labs     05/07/25  0401 05/08/25  0345 05/09/25  0321   CREATININE 4.57* 4.37* 4.53*   EGFRNORACEVR 10* 10* 10*      Plan  - JOHNATHON is worsening now.   - Avoid nephrotoxins and renally dose meds for GFR listed above  - Monitor urine output, serial BMP, and adjust therapy as needed  - Nephrology consulted, started on Lasix, bicarb is to alkalinize the urine for hyperuricemia.   - At risk to require dialysis, discussed with the patient and she wants dialysis if indicated.

## 2025-05-09 NOTE — ASSESSMENT & PLAN NOTE
CT abdomen/pelvis showed- Possible small fluid collection anterior to the pancreas measuring approximately 3.6 cm on axial 41. Probable complex collection slightly inferior to the pancreatic head extending to the right pelvis and right iliac fossa.     04/21 Abd fluid collection in area pancreas noted on CT abd, discussed with surgery  04/22 likely evolving pancreatitis dx a couple weeks ago at [facility], now with pseudocyst.  No severe epigastric pain.  04/26 Ongoing tube feeds and full liquid diet. If tolerates full liquid better, plan to advanced to soft foods.   04/30 Repeat CT abdomen ordered, showed the pancreas with multiple fluid collections anterior to the pancreas and within the root of the mesentery and extending into the anterior right pararenal space and right paracolic gutter.     5/1 D?W with [facility] (Dr. Tapia) regarding fluid collection. They believe this is sec to pancreatitis and nothing to do for now.

## 2025-05-09 NOTE — PT/OT/SLP PROGRESS
Assisted infectious disease provider with telemed visit. Assisted with dressing change after visit No. . Pt left with side rails up x2. Bed low, call bell within reach. Reported off to nurse that telemed consult is complete.

## 2025-05-09 NOTE — PROGRESS NOTES
[facility] Medicine  Progress Note    Patient Name: Magan Saleem  MRN: [id number]  Patient Class: IP- Inpatient   Admission Date: 4/26/2025  Length of Stay: 13 days  Attending Physician: Maria Teresa Conway MD  Primary Care Provider: Sil Brown DO        Subjective     Principal Problem:Pneumonitis        HPI:  70 yo F presents to [place] ED from [facility] for abnormal labs.  Patient was discharged from [facility] two days ago to skilled nursing facility for rehab.  She was diagnosed with dehydration due to gastroparesis with UTI.  Patient has metastatic lung cancer (to the brain) and follows with Dr. Swanson for IV chemotherapy every other week and takes lazertinib daily.  She has completed her course of radiation for the brain mets and follow had showed some improvement.  I thought she had either some decreased LOC due to encephalopathy or some aphasia from the brain mets but after a great effort to get her to talk, I realized she was just mad.  She wanted to know why she had been discharged two days ago when she could not eat.  She has no appetite and early satiety.  She is not nauseated and no vomiting.  She has decided on a DNR status previously (her caretaker and friend of 20 years) states that she had always said she did not want resuscitation if she experienced cardiopulmonary arrest and had told her children her wishes but she does want treatment and is not opposed to J tube if needed.  She has not had anything to eat or drink in two days and is dehydrated again.       Patient was transferred because of concern of sepsis.  She did have enterococcus faecalis UTI at [facility] and was treated.  I do not have the culture results but cultures were sent and patient does have some yeast noted.  (Will not treat a yeast colonization).  She is afebrile and hemodynamically stable and does not look septic clinically.  Her Lactic acid is stable at 2.5 dara 3.2.  Will check  another in am after volume resuscitation.  Her creat is at baseline but she has prerenal azotemia further confirming the dehydration.  Patient has an IO in place from [facility] due to dehydration and difficult stick but with some volume resuscitation, we have gotten an IV.  She is covid and flu negative.       Her WBC is 29 and I am not sure if she has received neupogen but could be a stress reaction.  Her BS is 245 and she does have some urine ketones but most likely due to starvation ketosis.  Will check a serum ketone.  Her platelets are 88K but this is most likely from her chemo.  She is not anemic.  CXR shows some patchy infiltrate but no obvious obstructive pneumonia from her lung cancer.  Her BNP about 3K but no clinical signs of CHF.  No recent echo but due to her BMI 50 most likely has some PHTN.  EKG had no ischemic changes but tachy at 114 which could also be from dehydration.  She was on ozempic for her DM and this could be the cause of her decreased appetite.  She is also on decadron for prevention of cerebral edema.       Remainder of ROS as below.  She mostly just nods and shakes her head and rolls her eyes.  You can get her to laugh if you try but she has been depressed since she has not walked since her hospitalization at [facility] on 3/19/25 and was discharged two days ago.  She says that at her last chemo she noticed she was getting weaker and her daughter had to help her in and out of the car and that was new for her.      4/5- patient seen examined today resting comfortably in bed and in no acute distress, with no acute events overnight.  Patient has a multitude of issues complicating her medical picture.  White blood cell count 09048 today, sodium 159, potassium 3.2 and BUN creatinine 59 and 1.8.  The patient is still not eating even when assistance is provided.  We have already changed her fluids to half-normal saline with 20 of KCl at 1:25 a.m..  Have also put in a GI consult for possible feeding tube.   E coli in the urine has turned out to be ESBL and Rocephin has been changed Merrem.     Hospital Course at [facility]:    4/6- the patient seen examined today resting comfortably in bed, in no acute distress, in no acute events overnight.  The patient is not very talkative today, but states she is doing okay.  She has no acute complaints.  Blood cultures remain negative.  The patient has not eaten since yesterday and is on light IV fluids.  GI has been consulted for feeding tube.  Continues on Merrem for positive urine culture.  04/07 Records reviewed. Not eating which not new, Similar during recent admit at [facility]. Dr Swanson there had question pancreatitis. No abd pain or tenderness reported now. Question of dysphagia to solids. Chart hx gastroparesis. Will discuss with GI. Ronnie says has responded so far well to treatment for lung CA.   04/08 had EGD at [facility] 03/21/25 with no obstruction and evidence gastroparesis. Still not ending. Try additional meds. Talked with GI. Increase activity  04/09 Some better with med  changes  04/10 Continues to do better. Ate 1/2 fish sandwich for lunch. Called by Dr Swanson and she wanting to keep feeding tube in consideration. Talked with Dr Reich and Dr Lemus. If something needed with gastroparesis would have to have post gastric position of tube.   04/11 eating some. Later staff requested some nystatin for sore mouth.   04/12 still not eating well.  Increased peripheral edema.  Albumin low at 1.5.  Will give some albumin and follow with some Lasix.  Placed Dobbhoff tube and start enteral feedings.  This will help with nutrition and if issue of placing surgical tube later make sure will tolerate enteral feedings.  Chest x-ray continues worse on left side.  Discuss with Pulmonary and ask Dr. Villasenor to see.   04/13 no new issues.  Enteral feedings to be started.  Pulmonary evaluation appreciated and plans noted.  04/14 Tolerating feedings Therapy working with her. Bronchoscopy  planned.   4/15 BAL today  4/16 bronch yesterday looks like pneumonitis  4/17 not candidate for PEG  04/18 Eating some now. Pt says feels some better than last seen. Look at placement. High dose steroids by pulmonary. BS not as bad as would expect.  04/19 states continued eat some.  Less nausea.  Blood sugar not sure bad considering the steroids.  Pulmonary adjusted antibiotics based on cultures.  Look at it placement next week.   04/20 Looks the small. C/O SOB to nursing staff earlier but ABG OK. Poor oral intake ; encourage for pt to do more. Looking into placement.  04/21 Firm tender area in abd wall above umbilicus; ?hernia. Abnormal CT de santiago noted and will discuss with surgery.   04/22 CT shows evolving pancreatitis which pt treated for acouple weeks earlier at [facility]. Reviewed with Dr Tapia. No plan to operate on ventral hernia. Discussed later with Dr York. See how does off IVF and encourage oral intake. WBC and Cr both slight better.   04/23 Looks this same. Pt eating some. Pt getting discouraged and asking about home hospice. Talked by phone with her friend Shauna. In conversion doesn't sound like family would be able to care for pt at home. Encouraged pt to give some time and attempt SWB. She says she with consider tonight.   04/24 Lab worse but pt looks some better. Still not taking in well. Maybe eat better as pancreatitis further resolves. Considering replacing dobbhoff feeding tube and look into move to [facility]. Ask Dr Frias to review renal situation. Maybe pancreatitis,pneumonia,renal failure, all these might make a big difference if resolved. Talked with pt and she was OK with NGT  04/25 patient's spirits seems to be doing some better.  Dobbhoff tube placed in feedings to be started.  To be moved to LTAC.  No other new issue  04/26 Awaiting bed assignment at [facility]. No new complains.     Overview/Hospital Course:  4/28- BG trending up, added Lantus and adjusted dose. D/W nutritionist about changing  feeds to allow for more PO intake.     4/29- BG better now. Off IV amiodarone infusion, remains in NSR. Encourage PO intake.     4/30- Continue to adjust insulin to get BG under 200, ideally 140-180. Checking CT chest, abdomen and pelvis today given persistent leukocytosis.     5/1- Discussing with surgery about fluid collections in the abdomen noted on CT (slightly increased in size), also asking if PEG is an option as previously deemed not a candidate. Nephrology has added bicarb infusion. Continue current management otherwise.     5/2- IV fluids started per nephro. WBC count remains elevated.     5/3- Started GOC conversations with the patient who is alert, asked her if her family can be with her or her daughter who she is closest to. She said she will try to talk to them.     5/4- Discussed poor prognosis with the patient. Labs are essentially unchanged. Repeat blood cultures ordered.     5/5   - hypotensive overnight, given fluids, minimal response  - discussed with pulm no additional recs  - renal function still elevated, nephro following  - going to try and reach out to Dr. Swanson    5/6  - refusing feeds and not eating  - stopped lactic acid drawing  -- will discuss goals of care again with family    5/7  - discussed with oncology who will come see patient tomorrow  - reviewed labs and decided to consult tele-ID as continues with leukocytosis with bandemia that likely isnt reactive response to steroids, elevated lactic acid, continues on fluids  - nephrology following, renal function noted  - overall goals of care pending patient's discussion with oncology      5/8  - all consult notes reviewed  - gi recommendation noted  - will discuss with family and patient about goals of care    Interval History:     Review of Systems   Constitutional:  Positive for appetite change.   Gastrointestinal:  Positive for abdominal pain.   All other systems reviewed and are negative.    Objective:     Vital Signs (Most  Recent):  Temp: 97.5 °F (36.4 °C) (05/09/25 0315)  Pulse: 98 (05/09/25 0515)  Resp: 15 (05/09/25 0515)  BP: (!) 100/46 (05/09/25 0400)  SpO2: 100 % (05/09/25 0515) Vital Signs (24h Range):  Temp:  [96 °F (35.6 °C)-97.5 °F (36.4 °C)] 97.5 °F (36.4 °C)  Pulse:  [83-98] 98  Resp:  [11-21] 15  SpO2:  [96 %-100 %] 100 %  BP: ()/(40-57) 100/46     Weight: 131.8 kg (290 lb 9.1 oz)  Body mass index is 48.35 kg/m².    Intake/Output Summary (Last 24 hours) at 5/9/2025 0634  Last data filed at 5/8/2025 1835  Gross per 24 hour   Intake 120 ml   Output --   Net 120 ml         Physical Exam  Constitutional:       Appearance: She is obese. She is ill-appearing.   HENT:      Head: Normocephalic.      Mouth/Throat:      Mouth: Mucous membranes are dry.   Cardiovascular:      Rate and Rhythm: Normal rate.      Heart sounds: Normal heart sounds. No murmur heard.  Pulmonary:      Effort: No respiratory distress.      Breath sounds: Rhonchi present.      Comments: On 2L NC  Abdominal:      General: There is no distension.      Tenderness: There is abdominal tenderness.      Comments: Dobhoff in place   Skin:     Coloration: Skin is pale.   Neurological:      Mental Status: Mental status is at baseline.               Significant Labs: All pertinent labs within the past 24 hours have been reviewed.    Significant Imaging: I have reviewed all pertinent imaging results/findings within the past 24 hours.      Assessment & Plan  Acute kidney injury superimposed on stage 4 chronic kidney disease  JOHNATHON is likely due to pre-renal azotemia due to intravascular volume depletion. Baseline creatinine is ~ 2.0. Most recent creatinine and eGFR are listed below.  Recent Labs     05/07/25  0401 05/08/25  0345 05/09/25  0321   CREATININE 4.57* 4.37* 4.53*   EGFRNORACEVR 10* 10* 10*      Plan  - JOHNATHON is worsening now.   - Avoid nephrotoxins and renally dose meds for GFR listed above  - Monitor urine output, serial BMP, and adjust therapy as needed  -  Nephrology consulted, started on Lasix, bicarb is to alkalinize the urine for hyperuricemia.   - At risk to require dialysis, discussed with the patient and she wants dialysis if indicated.     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  Pulmonology consulted, started on steroids and s/p bronchoscopy with normal findings, BAL culture with stenotrophomonas.   Steroid taper plan per Pulmonology, completed course of antibiotics.   Respiratory status is stable.     Infection due to Stenotrophomonas maltophilia  Sepsis due to pneumonia  Stenotrophomonas lower respiratory infection in this patient with multifocal infiltrates and consolidative opacities on CT Chest.   S/p 2 week course with Levaquin.   Leukocytosis persists, will follow procalcitonin levels Q48H.  Repeat CT chest stable.  Consider tele-ID consultation if leukocytosis does not improve.     Stenotrophomonas lower respiratory infection in this patient with multifocal infiltrates and consolidative opacities on CT Chest.   S/p 2 week course with Levaquin.   Leukocytosis persists, will follow procalcitonin levels Q48H.  Repeat CT chest stable.  Consider tele-ID consultation if leukocytosis does not improve.     5/7 consulted tele ID today  SVT (supraventricular tachycardia)  Went into SVT on 4/27, not responsive to multiple rounds of adenosine.  Started on amiodarone infusion after bolus, turned off on 4/29.  Cardiology consulted, no additional recommendations but could consider cardioversion if this becomes a recurrent and a refractory issue.   Continue beta blocker.   Monitor on tele.     Acute pancreatitis  Intra-abdominal fluid collection  CT abdomen/pelvis showed- Possible small fluid collection anterior to the pancreas measuring approximately 3.6 cm on axial 41. Probable complex collection slightly inferior to the pancreatic head extending to the right pelvis and right iliac fossa.     04/21 Abd fluid  "collection in area pancreas noted on CT abd, discussed with surgery  04/22 likely evolving pancreatitis dx a couple weeks ago at [facility], now with pseudocyst.  No severe epigastric pain.  04/26 Ongoing tube feeds and full liquid diet. If tolerates full liquid better, plan to advanced to soft foods.   04/30 Repeat CT abdomen ordered, showed the pancreas with multiple fluid collections anterior to the pancreas and within the root of the mesentery and extending into the anterior right pararenal space and right paracolic gutter.     5/1 D?W with [facility] (Dr. Tapia) regarding fluid collection. They believe this is sec to pancreatitis and nothing to do for now.     Gastroparesis  EGD by Dr. Lo during recent past admission at [facility]:  "EGD on 03/21/2025 shows LA class B erosive esophagitis but no pill esophagitis, nonerosive gastritis and retention of food and fluid suspicious for gastroparesis, due to narcotics and diabetes. "  GI consulted, unable to advance diet and poor candidate for PEG.  Dobhoff in place for feeds, continued on Reglan.   GI recommended surgical PEG due to body habitus, GS stated that patient is not a candidate for PEG based on co morbidities and poor prognosis.   Continue PPI.     Lung cancer metastatic to brain  DNR but not hospice.   Receives chemo and has finished radiation.    Follows with Dr. Swanson.    5/5 will attempt to contact Dr. Swanson  Goals of care, counseling/discussion  DNR confirmed.  Patient wants permanent feeding tube and dialysis if needed.   Encouraged patient to talk to her family about her prognosis.  Currently PEG is not option as mentioned above so nutrition could be a major factor in driving the hospice discussion on top of her underlying metastatic cancer.   Consider primary oncologist- Dr. Mata consultation to get an idea of her prognosis related to cancer as patient is relying on that to make further decisions.      Nonischemic nontraumatic myocardial injury  In " "the setting of SVT episodes.  Trend is flat, no chest pain and no ischemic changes noted on EKG.  No ischemic workup recommended per cardiology.     Hyperuricemia  Hyperphosphatemia  Defer management to nephrology- started on allopurinol and IV bicarb to alkalinize the urine.   Follow uric acid levels.     Essential hypertension  Patient's blood pressure range in the last 24 hours was: BP  Min: 80/40  Max: 108/48.The patient's inpatient anti-hypertensive regimen is listed below:  Current Antihypertensives       Plan  - BP is controlled, no changes needed to their regimen    Type 2 diabetes mellitus with hyperglycemia  Patient's FSGs are controlled on current medication regimen.  Last A1c reviewed-   Lab Results   Component Value Date    HGBA1C 6.7 04/02/2025     Most recent fingerstick glucose reviewed- No results for input(s): "POCTGLUCOSE" in the last 24 hours.  Current correctional scale  Low  Maintain anti-hyperglycemic dose as follows-   Antihyperglycemics (From admission, onward)      Start     Stop Route Frequency Ordered    05/05/25 2100  insulin glargine U-100 (Lantus) injection 50 Units         -- SubQ Nightly 05/04/25 2332    05/01/25 1051  insulin aspart U-100 injection 0-10 Units         -- SubQ Every 6 hours PRN 05/01/25 0952          Hold Oral hypoglycemics while patient is in the hospital.  BG ranging up as tube feeds now to goal and D5 in bicarb drip, added Lantus and dose adjusted for tighter glycemic control.     Normocytic anemia  Anemia is likely due to chronic disease due to Malignancy. Most recent hemoglobin and hematocrit are listed below.  Recent Labs     05/07/25  0401 05/09/25  0321   HGB 7.4* 7.2*   HCT 22.4* 21.9*     Plan  - Monitor serial CBC: Daily  - Transfuse PRBC if patient becomes hemodynamically unstable, symptomatic or H/H drops below 7/21.  - Patient has not received any PRBC transfusions to date  - Patient's anemia is currently stable  - No evidence of bleeding.    Neoplastic " (malignant) related fatigue  Patient with Acute on chronic debility due to neoplastic/malignant related fatigue. Latest AMPAC and GEMS scores have been reviewed. Evaluation for etiology is complete. Plan includes - Progressive mobility protocol initated  - PT/OT consulted  - Fall precautions in place.    Chronic pulmonary embolism without acute cor pulmonale  Eliquis was discontinued due to risk of ICH with brain mets.  Currently on hold in case surgical procedure is required.    Morbid obesity  Body mass index is 48.35 kg/m². Morbid obesity complicates all aspects of disease management from diagnostic modalities to treatment. Weight loss encouraged and health benefits explained to patient.     Moderate persistent asthma without complication  Stable without exacerbation.  Continue PRN nebs.     Edema due to hypoalbuminemia  Required albumin infusion intermittently during the stay.     Mixed hyperlipidemia  Resume statin.     Pancreatic pseudocyst      History of pancreatitis      Acute pancreatic fluid collection      VTE Risk Mitigation (From admission, onward)           Ordered     heparin (porcine) injection 5,000 Units  Every 8 hours         04/27/25 1055                    Discharge Planning   MITUL: 5/12/2025     Code Status: DNR   Medical Readiness for Discharge Date:   Discharge Plan A: Skilled Nursing Facility                Please place Justification for DME        Maria Teresa Conway MD  Department of Hospital Medicine   [facility]

## 2025-05-09 NOTE — PROGRESS NOTES
Infectious Disease [facility] E-consult Follow-up - 5/9/25   Patient Name: Magan Saleem    Attending Physician: Maria Teresa Conway MD   Primary Care Physician: Sil Brown, DO     Consultation Information  This patient recommendation is based on a telemedicine consult request which was completed asynchronously through chart review and information provided by the primary physician. The patient was not seen or examined today. The evaluation is consultative in nature and all patient care and treatment decisions can either be accepted or rejected by the patient's primary hospital-based treating physician using their own independent medical judgment for their patient.    Consultant Contact Information: Please call [facility] Call Center [phone]      Assessment & Plan      Impression: This is a 69 year old female with a past medical history of Type II DM, depression, PE, asthma, and metastatic lung adenocarcinoma to brain s/p radiation and currently on chemotherapy (outpatient) who was admitted from nursing home to [facility] on 4/3/25 due to abnormal labs.     She had a recent admission from 3/19-4/1/25 for chest pain associated with SOB and nausea, vomiting, and diarrhea. She was found to be in sepsis. She has been taking Doxycycline for a rash due to chemotherapy. She was found to have E faecalis UTI and was treated with Vancomycin. She also had an EGD which showed chronic gastritis. She was maintained on Doxycycline on discharge.     While at her nursing home, she reported SOB and productive cough and labs showed a leukocytosis of 27.8k and was sent to ER for evaluation. CXR showed patchy opacities in bilateral lungs. CT chest wo con on 4/16 showed right greater than left pleural effusions an multifocal consolidation and airspace opacities c/f pneumonia; possible increased size of pulmonary nodule and mediastinal lymph node. Underwent bronchoscopy with BAL on 4/15 which showed no abnormalities. R lung BAL showed  atypical cells on pathology. BAL fungal culture with C glabrata and Stenotrophomonas maltophilia (S Levo, TMP/SMX); BAL AFB smear neg. Bronchoscopy raised concerns for ICI with pneumonitis and she was treated with high dose steroids from 4/16-4/19 and then started on taper. She was treated for Stenotrophomonas with levofloxacin for 2 weeks. She has had neg blood cx x2 on 4/3 and 5/5. Urine cx on 4/3 with ESBL E coli for which she was treated with Meropenem. Hospital course c/b SVT on 4/27 for which she required adenosine. Nephrology also following for JOHNATHON with renal US on 4/25 unremarkable. CT A/P wo con on 4/21 showed possible small fluid collection anterior to pancrease (3.6cm) and complex collection inferior to pancreatic head extending to right pelvis (6.8x3.6cm) c/f acute pancreatitis; nonobstructing nephrolothiasis; focal airspace disease of lungs. Repeat CT chest/abd/pel wo on 4/30 with stable R>L pleural effusions with multifocal alveolar and GGOs; stable pulmonary nodules and mediastinal adenopathy c/w lung cancer; multiple pancreatic fluid collections c/w pancreatitis. General surgery was consulted regarding the collections/pseudocysts and recommended no intervention as due to pancreatitis. She has remained afebrile during the hospitalization though has been hypotensive. She has had a persistent leukocytosis peaking at 37k. However, she appears to have had a leukocytosis since at least 3/19/25 while at OSH. Differential shows neutrophils predominantly with occasional bandemia. Has also had persistently elevated lactate in 2s. PCT 0.75. LFTs stable. Appetite has been poor and is refusing tube feeds. Has had a PICC line since 4/17. No indwelling hardware.      Given that she has had a leukocytosis with intermittent bandemia since 3/19 while on varying courses of antibiotics for Stenotrophomonas pneumonia and ESBL E coli UTI, suspicion for true infection is low. She also hasn't mounted any fevers albeit they  could be masked by steroids (but didn't have fevers before steroids either). The leukocytosis precedes steroids thus unlikely to be reactive to those. Her main complaint is abdominal pain with other GI symptoms and given the radiographical evidence of pancreatitis with suspected (non-infected) pseudocysts and elevated amylase/lipase, I suspect her leukocytosis is likely due to this. Other possibility is due to her malignancy though appears her lung and brain findings are stable. Given her immunosuppression, could consider fungal etiology though imaging doesn't support this and her BAL fungal cx only grew C glabrata which is likely colonization. Her AFB smear from BAL is negative as is culture so far. Reviewed skin images from wound care as well - appear to be excoriations or bruising and dermatitis related to obesity.      Antimicrobial Courses  Ceftriaxone 4/4  PO Erythromycin 4/8-4/13  Levofloxacin 4/19-5/3  Meropenem 4/3-4/15  IV Vancomycin 4/3-4/7  IV Solumedrol 4/15-4/19  PO Prednisone 4/20-current     ID related problem list  # Leukocytosis with bandemia  # Metastatic lung cancer to brain  # JOHNATHON  # Pancreatitis with suspected pseudocysts  # AHRF 2/2 pneumonitis and pneumonia and lung cancer   # Asymptomatic ESBL E coli UTI     Recommendations  - Recommend continuing to monitor off abx  - Based on prednisone taper, does not need PJP ppx but if plans to do 20mg or more daily for >1 month, would need PJP ppx  - GI consulted re: pancreatitis and pseudocysts - recommend repeat imaging in 3-6 months and suspect pseudocysts to self-resolve with nutritional supplement  - Follow up 4/15 BAL AFB cx    Thank you for involving us in the care of this patient. Infectious diseases will sign off. Please contact us via the [facility] call center at [phone] with any further questions.     APARNA CAST, DO  Infectious Diseases Attending  [facility]         Subjective   Interval History:   Patient not seen  EMR reviewed  No  "fevers  WBC remains elevated at 30.9k  Plt 118  Cr 4.5  LFTs stable  Two bowel movements recorded       Objective   Vitals: T:97.2 °F (36.2 °C),  BP:(!) 93/41, HR:(!) 116, RR:12, SaO2:100%,FiO2-O2 (L/m): , Dosing Wt:  Wt Readings from Last 1 Encounters:   05/07/25 131.8 kg (290 lb 9.1 oz)   , BMI:Body mass index is 48.35 kg/m².    Physical Exam: Patient not seen or examined.    Results Review    Labs:      CBC  Recent Labs     05/07/25  0401 05/09/25  0321   WBC 24.97* 30.95*   HGB 7.4* 7.2*   HCT 22.4* 21.9*   * 118*     Renal function  Recent Labs     05/07/25  0401 05/08/25  0345 05/09/25  0321   * 132* 131*   K 4.4 4.3 4.1   CREATININE 4.57* 4.37* 4.53*     Liver function  Recent Labs     05/07/25  0401 05/08/25  0345 05/09/25  0321   AST 38 40 38   ALT 30 29 31   BILITOT 0.4 0.4 0.4     Coagulation  No results for input(s): "PT", "INR", "APTT" in the last 72 hours.   Procalcitonin  No results for input(s): "PROCALCIT" in the last 72 hours.    Microbiology:      Susceptibility data from last 90 days.  Collected Specimen Info Organism Amp/Sulbactam Ampicillin AZTREONAM ETEST Cefazolin CEFEPIME ETEST Cefotaxime Ceftazidime CEFTAZIDIME/AVIBACTAM SUSCEPTIBILITY Ceftriaxone Ciprofloxacin Ertapenem Gentamicin LEVOFLOXACIN ETEST   04/15/25 Respiratory from BAL Candida glabrata                04/15/25 Respiratory from BAL Stenotrophomonas maltophilia              S   04/03/25 Urine Escherichia coli ESBL  R  R  I  R  R  R  R  S  R  R  S  S  R     Collected Specimen Info Organism Meropenem Nitrofurantoin Pip/Tazo Tetracycline Tobramycin Trimeth/Sulfa   04/15/25 Respiratory from BAL Candida glabrata         04/15/25 Respiratory from BAL Stenotrophomonas maltophilia     I   S   04/03/25 Urine Escherichia coli ESBL  S  S  S  R  S  R       Imaging:     X-Ray Chest AP Portable  Result Date: 5/9/2025  EXAMINATION: XR CHEST AP PORTABLE CLINICAL HISTORY: JOHNATHON AND Lung cancer; TECHNIQUE: Single frontal view of the " chest was performed. COMPARISON: 04/29/2025 FINDINGS: Left-sided PICC line and partially visualized feeding tube similar.  There is slight increased consolidative opacity in the right upper lobe. There is component of patchy ill-defined opacification the right lower lobe as well which may represent developing pneumonia. Superimposed locular effusion not excluded. There is no large pneumothorax. Continued atherosclerotic aorta. Clinical correlation and continued follow-up advised     Please see above Electronically signed by: Toyn Wiggins DO Date:    05/09/2025 Time:    08:46

## 2025-05-09 NOTE — PLAN OF CARE
Problem: Pneumonia  Goal: Fluid Balance  Outcome: Progressing  Goal: Effective Oxygenation and Ventilation  Outcome: Progressing     Problem: Gas Exchange Impaired  Goal: Optimal Gas Exchange  Outcome: Progressing     Problem: Airway Clearance Ineffective  Goal: Effective Airway Clearance  Outcome: Progressing

## 2025-05-09 NOTE — PROGRESS NOTES
[facility] Medicine  Progress Note    Patient Name: Magan Saleem  MRN: [id number]  Patient Class: IP- Inpatient   Admission Date: 4/26/2025  Length of Stay: 13 days  Attending Physician: Maria Teresa Conway MD  Primary Care Provider: Sil Brown DO        Subjective     Principal Problem:Pneumonitis        HPI:  68 yo F presents to [place] ED from [facility] for abnormal labs.  Patient was discharged from [facility] two days ago to skilled nursing facility for rehab.  She was diagnosed with dehydration due to gastroparesis with UTI.  Patient has metastatic lung cancer (to the brain) and follows with Dr. Swanson for IV chemotherapy every other week and takes lazertinib daily.  She has completed her course of radiation for the brain mets and follow had showed some improvement.  I thought she had either some decreased LOC due to encephalopathy or some aphasia from the brain mets but after a great effort to get her to talk, I realized she was just mad.  She wanted to know why she had been discharged two days ago when she could not eat.  She has no appetite and early satiety.  She is not nauseated and no vomiting.  She has decided on a DNR status previously (her caretaker and friend of 20 years) states that she had always said she did not want resuscitation if she experienced cardiopulmonary arrest and had told her children her wishes but she does want treatment and is not opposed to J tube if needed.  She has not had anything to eat or drink in two days and is dehydrated again.       Patient was transferred because of concern of sepsis.  She did have enterococcus faecalis UTI at [facility] and was treated.  I do not have the culture results but cultures were sent and patient does have some yeast noted.  (Will not treat a yeast colonization).  She is afebrile and hemodynamically stable and does not look septic clinically.  Her Lactic acid is stable at 2.5 dara 3.2.  Will check  another in am after volume resuscitation.  Her creat is at baseline but she has prerenal azotemia further confirming the dehydration.  Patient has an IO in place from [facility] due to dehydration and difficult stick but with some volume resuscitation, we have gotten an IV.  She is covid and flu negative.       Her WBC is 29 and I am not sure if she has received neupogen but could be a stress reaction.  Her BS is 245 and she does have some urine ketones but most likely due to starvation ketosis.  Will check a serum ketone.  Her platelets are 88K but this is most likely from her chemo.  She is not anemic.  CXR shows some patchy infiltrate but no obvious obstructive pneumonia from her lung cancer.  Her BNP about 3K but no clinical signs of CHF.  No recent echo but due to her BMI 50 most likely has some PHTN.  EKG had no ischemic changes but tachy at 114 which could also be from dehydration.  She was on ozempic for her DM and this could be the cause of her decreased appetite.  She is also on decadron for prevention of cerebral edema.       Remainder of ROS as below.  She mostly just nods and shakes her head and rolls her eyes.  You can get her to laugh if you try but she has been depressed since she has not walked since her hospitalization at [facility] on 3/19/25 and was discharged two days ago.  She says that at her last chemo she noticed she was getting weaker and her daughter had to help her in and out of the car and that was new for her.      4/5- patient seen examined today resting comfortably in bed and in no acute distress, with no acute events overnight.  Patient has a multitude of issues complicating her medical picture.  White blood cell count 55044 today, sodium 159, potassium 3.2 and BUN creatinine 59 and 1.8.  The patient is still not eating even when assistance is provided.  We have already changed her fluids to half-normal saline with 20 of KCl at 1:25 a.m..  Have also put in a GI consult for possible feeding tube.   E coli in the urine has turned out to be ESBL and Rocephin has been changed Merrem.     Hospital Course at [facility]:    4/6- the patient seen examined today resting comfortably in bed, in no acute distress, in no acute events overnight.  The patient is not very talkative today, but states she is doing okay.  She has no acute complaints.  Blood cultures remain negative.  The patient has not eaten since yesterday and is on light IV fluids.  GI has been consulted for feeding tube.  Continues on Merrem for positive urine culture.  04/07 Records reviewed. Not eating which not new, Similar during recent admit at [facility]. Dr Swanson there had question pancreatitis. No abd pain or tenderness reported now. Question of dysphagia to solids. Chart hx gastroparesis. Will discuss with GI. Ronnie says has responded so far well to treatment for lung CA.   04/08 had EGD at [facility] 03/21/25 with no obstruction and evidence gastroparesis. Still not ending. Try additional meds. Talked with GI. Increase activity  04/09 Some better with med  changes  04/10 Continues to do better. Ate 1/2 fish sandwich for lunch. Called by Dr Swanson and she wanting to keep feeding tube in consideration. Talked with Dr Reich and Dr Lemus. If something needed with gastroparesis would have to have post gastric position of tube.   04/11 eating some. Later staff requested some nystatin for sore mouth.   04/12 still not eating well.  Increased peripheral edema.  Albumin low at 1.5.  Will give some albumin and follow with some Lasix.  Placed Dobbhoff tube and start enteral feedings.  This will help with nutrition and if issue of placing surgical tube later make sure will tolerate enteral feedings.  Chest x-ray continues worse on left side.  Discuss with Pulmonary and ask Dr. Villasenor to see.   04/13 no new issues.  Enteral feedings to be started.  Pulmonary evaluation appreciated and plans noted.  04/14 Tolerating feedings Therapy working with her. Bronchoscopy  planned.   4/15 BAL today  4/16 bronch yesterday looks like pneumonitis  4/17 not candidate for PEG  04/18 Eating some now. Pt says feels some better than last seen. Look at placement. High dose steroids by pulmonary. BS not as bad as would expect.  04/19 states continued eat some.  Less nausea.  Blood sugar not sure bad considering the steroids.  Pulmonary adjusted antibiotics based on cultures.  Look at it placement next week.   04/20 Looks the small. C/O SOB to nursing staff earlier but ABG OK. Poor oral intake ; encourage for pt to do more. Looking into placement.  04/21 Firm tender area in abd wall above umbilicus; ?hernia. Abnormal CT de santiago noted and will discuss with surgery.   04/22 CT shows evolving pancreatitis which pt treated for acouple weeks earlier at [facility]. Reviewed with Dr Tapia. No plan to operate on ventral hernia. Discussed later with Dr York. See how does off IVF and encourage oral intake. WBC and Cr both slight better.   04/23 Looks this same. Pt eating some. Pt getting discouraged and asking about home hospice. Talked by phone with her friend Shauna. In conversion doesn't sound like family would be able to care for pt at home. Encouraged pt to give some time and attempt SWB. She says she with consider tonight.   04/24 Lab worse but pt looks some better. Still not taking in well. Maybe eat better as pancreatitis further resolves. Considering replacing dobbhoff feeding tube and look into move to [facility]. Ask Dr Frias to review renal situation. Maybe pancreatitis,pneumonia,renal failure, all these might make a big difference if resolved. Talked with pt and she was OK with NGT  04/25 patient's spirits seems to be doing some better.  Dobbhoff tube placed in feedings to be started.  To be moved to LTAC.  No other new issue  04/26 Awaiting bed assignment at [facility]. No new complains.     Overview/Hospital Course:  4/28- BG trending up, added Lantus and adjusted dose. D/W nutritionist about changing  feeds to allow for more PO intake.     4/29- BG better now. Off IV amiodarone infusion, remains in NSR. Encourage PO intake.     4/30- Continue to adjust insulin to get BG under 200, ideally 140-180. Checking CT chest, abdomen and pelvis today given persistent leukocytosis.     5/1- Discussing with surgery about fluid collections in the abdomen noted on CT (slightly increased in size), also asking if PEG is an option as previously deemed not a candidate. Nephrology has added bicarb infusion. Continue current management otherwise.     5/2- IV fluids started per nephro. WBC count remains elevated.     5/3- Started GOC conversations with the patient who is alert, asked her if her family can be with her or her daughter who she is closest to. She said she will try to talk to them.     5/4- Discussed poor prognosis with the patient. Labs are essentially unchanged. Repeat blood cultures ordered.     5/5   - hypotensive overnight, given fluids, minimal response  - discussed with pulm no additional recs  - renal function still elevated, nephro following  - going to try and reach out to Dr. Swanson    5/6  - refusing feeds and not eating  - stopped lactic acid drawing  -- will discuss goals of care again with family    5/7  - discussed with oncology who will come see patient tomorrow  - reviewed labs and decided to consult tele-ID as continues with leukocytosis with bandemia that likely isnt reactive response to steroids, elevated lactic acid, continues on fluids  - nephrology following, renal function noted  - overall goals of care pending patient's discussion with oncology      5/8  - all consult notes reviewed  - gi recommendation noted  - will discuss with family and patient about goals of care    5/9  - discussed with all consults, will call a family meeting to discuss goals of care    Interval History:     Review of Systems   Constitutional:  Positive for appetite change.   Gastrointestinal:  Positive for abdominal  pain.   All other systems reviewed and are negative.    Objective:     Vital Signs (Most Recent):  Temp: 97.3 °F (36.3 °C) (05/09/25 1600)  Pulse: 88 (05/09/25 1600)  Resp: 12 (05/09/25 1600)  BP: (!) 78/43 (05/09/25 1600)  SpO2: 100 % (05/09/25 1600) Vital Signs (24h Range):  Temp:  [97.1 °F (36.2 °C)-97.5 °F (36.4 °C)] 97.3 °F (36.3 °C)  Pulse:  [] 88  Resp:  [12-21] 12  SpO2:  [96 %-100 %] 100 %  BP: ()/(40-57) 78/43     Weight: 131.8 kg (290 lb 9.1 oz)  Body mass index is 48.35 kg/m².    Intake/Output Summary (Last 24 hours) at 5/9/2025 1651  Last data filed at 5/8/2025 1835  Gross per 24 hour   Intake 120 ml   Output --   Net 120 ml         Physical Exam  Constitutional:       Appearance: She is obese. She is ill-appearing.   HENT:      Head: Normocephalic.      Mouth/Throat:      Mouth: Mucous membranes are dry.   Cardiovascular:      Rate and Rhythm: Normal rate.      Heart sounds: Normal heart sounds. No murmur heard.  Pulmonary:      Effort: No respiratory distress.      Breath sounds: Rhonchi present.      Comments: On 2L NC  Abdominal:      General: There is no distension.      Tenderness: There is abdominal tenderness.      Comments: Dobhoff in place   Skin:     Coloration: Skin is pale.   Neurological:      Mental Status: Mental status is at baseline.               Significant Labs: All pertinent labs within the past 24 hours have been reviewed.    Significant Imaging: I have reviewed all pertinent imaging results/findings within the past 24 hours.      Assessment & Plan  Acute kidney injury superimposed on stage 4 chronic kidney disease  JOHNATHON is likely due to pre-renal azotemia due to intravascular volume depletion. Baseline creatinine is ~ 2.0. Most recent creatinine and eGFR are listed below.  Recent Labs     05/07/25  0401 05/08/25  0345 05/09/25  0321   CREATININE 4.57* 4.37* 4.53*   EGFRNORACEVR 10* 10* 10*      Plan  - JOHNATHON is worsening now.   - Avoid nephrotoxins and renally dose meds  for GFR listed above  - Monitor urine output, serial BMP, and adjust therapy as needed  - Nephrology consulted, started on Lasix, bicarb is to alkalinize the urine for hyperuricemia.   - At risk to require dialysis, discussed with the patient and she wants dialysis if indicated.     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  Pulmonology consulted, started on steroids and s/p bronchoscopy with normal findings, BAL culture with stenotrophomonas.   Steroid taper plan per Pulmonology, completed course of antibiotics.   Respiratory status is stable.     Infection due to Stenotrophomonas maltophilia  Sepsis due to pneumonia  Stenotrophomonas lower respiratory infection in this patient with multifocal infiltrates and consolidative opacities on CT Chest.   S/p 2 week course with Levaquin.   Leukocytosis persists, will follow procalcitonin levels Q48H.  Repeat CT chest stable.  Consider tele-ID consultation if leukocytosis does not improve.     Stenotrophomonas lower respiratory infection in this patient with multifocal infiltrates and consolidative opacities on CT Chest.   S/p 2 week course with Levaquin.   Leukocytosis persists, will follow procalcitonin levels Q48H.  Repeat CT chest stable.  Consider tele-ID consultation if leukocytosis does not improve.     5/7 consulted tele ID today  SVT (supraventricular tachycardia)  Went into SVT on 4/27, not responsive to multiple rounds of adenosine.  Started on amiodarone infusion after bolus, turned off on 4/29.  Cardiology consulted, no additional recommendations but could consider cardioversion if this becomes a recurrent and a refractory issue.   Continue beta blocker.   Monitor on tele.     Acute pancreatitis  Intra-abdominal fluid collection  CT abdomen/pelvis showed- Possible small fluid collection anterior to the pancreas measuring approximately 3.6 cm on axial 41. Probable complex collection slightly inferior to the  "pancreatic head extending to the right pelvis and right iliac fossa.     04/21 Abd fluid collection in area pancreas noted on CT abd, discussed with surgery  04/22 likely evolving pancreatitis dx a couple weeks ago at [facility], now with pseudocyst.  No severe epigastric pain.  04/26 Ongoing tube feeds and full liquid diet. If tolerates full liquid better, plan to advanced to soft foods.   04/30 Repeat CT abdomen ordered, showed the pancreas with multiple fluid collections anterior to the pancreas and within the root of the mesentery and extending into the anterior right pararenal space and right paracolic gutter.     5/1 D?W with [facility] (Dr. Tapia) regarding fluid collection. They believe this is sec to pancreatitis and nothing to do for now.     Gastroparesis  EGD by Dr. Lo during recent past admission at [facility]:  "EGD on 03/21/2025 shows LA class B erosive esophagitis but no pill esophagitis, nonerosive gastritis and retention of food and fluid suspicious for gastroparesis, due to narcotics and diabetes. "  GI consulted, unable to advance diet and poor candidate for PEG.  Dobhoff in place for feeds, continued on Reglan.   GI recommended surgical PEG due to body habitus, GS stated that patient is not a candidate for PEG based on co morbidities and poor prognosis.   Continue PPI.     Lung cancer metastatic to brain  DNR but not hospice.   Receives chemo and has finished radiation.    Follows with Dr. Swanson.    5/5 will attempt to contact Dr. Swanson  Goals of care, counseling/discussion  DNR confirmed.  Patient wants permanent feeding tube and dialysis if needed.   Encouraged patient to talk to her family about her prognosis.  Currently PEG is not option as mentioned above so nutrition could be a major factor in driving the hospice discussion on top of her underlying metastatic cancer.   Consider primary oncologist- Dr. Mata consultation to get an idea of her prognosis related to cancer as patient is " "relying on that to make further decisions.      Nonischemic nontraumatic myocardial injury  In the setting of SVT episodes.  Trend is flat, no chest pain and no ischemic changes noted on EKG.  No ischemic workup recommended per cardiology.     Hyperuricemia  Hyperphosphatemia  Defer management to nephrology- started on allopurinol and IV bicarb to alkalinize the urine.   Follow uric acid levels.     Essential hypertension  Patient's blood pressure range in the last 24 hours was: BP  Min: 78/43  Max: 108/48.The patient's inpatient anti-hypertensive regimen is listed below:  Current Antihypertensives       Plan  - BP is controlled, no changes needed to their regimen    Type 2 diabetes mellitus with hyperglycemia  Patient's FSGs are controlled on current medication regimen.  Last A1c reviewed-   Lab Results   Component Value Date    HGBA1C 6.7 04/02/2025     Most recent fingerstick glucose reviewed- No results for input(s): "POCTGLUCOSE" in the last 24 hours.  Current correctional scale  Low  Maintain anti-hyperglycemic dose as follows-   Antihyperglycemics (From admission, onward)      Start     Stop Route Frequency Ordered    05/05/25 2100  insulin glargine U-100 (Lantus) injection 50 Units         -- SubQ Nightly 05/04/25 2332    05/01/25 1051  insulin aspart U-100 injection 0-10 Units         -- SubQ Every 6 hours PRN 05/01/25 0952          Hold Oral hypoglycemics while patient is in the hospital.  BG ranging up as tube feeds now to goal and D5 in bicarb drip, added Lantus and dose adjusted for tighter glycemic control.     Normocytic anemia  Anemia is likely due to chronic disease due to Malignancy. Most recent hemoglobin and hematocrit are listed below.  Recent Labs     05/07/25  0401 05/09/25  0321   HGB 7.4* 7.2*   HCT 22.4* 21.9*     Plan  - Monitor serial CBC: Daily  - Transfuse PRBC if patient becomes hemodynamically unstable, symptomatic or H/H drops below 7/21.  - Patient has not received any PRBC " transfusions to date  - Patient's anemia is currently stable  - No evidence of bleeding.    Neoplastic (malignant) related fatigue  Patient with Acute on chronic debility due to neoplastic/malignant related fatigue. Latest AMPAC and GEMS scores have been reviewed. Evaluation for etiology is complete. Plan includes - Progressive mobility protocol initated  - PT/OT consulted  - Fall precautions in place.    Chronic pulmonary embolism without acute cor pulmonale  Eliquis was discontinued due to risk of ICH with brain mets.  Currently on hold in case surgical procedure is required.    Morbid obesity  Body mass index is 48.35 kg/m². Morbid obesity complicates all aspects of disease management from diagnostic modalities to treatment. Weight loss encouraged and health benefits explained to patient.     Moderate persistent asthma without complication  Stable without exacerbation.  Continue PRN nebs.     Edema due to hypoalbuminemia  Required albumin infusion intermittently during the stay.     Mixed hyperlipidemia  Resume statin.     Pancreatic pseudocyst      History of pancreatitis      Acute pancreatic fluid collection      VTE Risk Mitigation (From admission, onward)           Ordered     heparin (porcine) injection 5,000 Units  Every 8 hours         04/27/25 1055                    Discharge Planning   MITUL: 5/12/2025     Code Status: DNR   Medical Readiness for Discharge Date:   Discharge Plan A: Skilled Nursing Facility                Please place Justification for DME        Maria Teresa Conway MD  Department of Hospital Medicine   [facility]

## 2025-05-09 NOTE — PROGRESS NOTES
Patient is awake and alert this morning on exam.  She is working with hand weight with physical therapy during my exam.  Patient reports continued epigastric pain that is intermittent.  She denies nausea or vomiting. States she has no appetite. General surgery has been consulted on numerous occasions to evaluate for PEG placement and to evaluate pseudocyst/ pancreatitis.  Again Dr Tapia does not recommend surgery at this time.  Recommendation for hydration with IV due to increase creatinine and BUN if agreeable with nephrology.  Possible consult to Dr Meraz at [facility] for further evaluation of pseudocyst from GI standpoint.  General surgery will be available for any further assistance or recommendations.

## 2025-05-09 NOTE — ASSESSMENT & PLAN NOTE
Patient's blood pressure range in the last 24 hours was: BP  Min: 80/40  Max: 108/48.The patient's inpatient anti-hypertensive regimen is listed below:  Current Antihypertensives       Plan  - BP is controlled, no changes needed to their regimen

## 2025-05-09 NOTE — PT/OT/SLP PROGRESS
Occupational Therapy   Treatment    Name: Magan Saleem  MRN: [id number]  Admitting Diagnosis:  Pneumonitis       Recommendations:     Discharge Recommendations: Moderate Intensity Therapy  Discharge Equipment Recommendations:  to be determined by next level of care  Barriers to discharge:       Assessment:     Magan Saleem is a 69 y.o. female with a medical diagnosis of Pneumonitis. Performance deficits affecting function are weakness, impaired endurance, impaired self care skills, impaired functional mobility.     Rehab Prognosis:  Fair; patient would benefit from acute skilled OT services to address these deficits and reach maximum level of function.       Plan:     Patient to be seen 5 x/week to address the above listed problems via self-care/home management, therapeutic activities, therapeutic exercises  Plan of Care Expires: 06/03/25  Plan of Care Reviewed with: patient    Subjective     Chief Complaint:   Patient/Family Comments/goals:   Pain/Comfort:       Objective:     Communicated with: Libby Dong RN prior to session.  Patient found HOB elevated with peripheral IV, pulse ox (continuous), telemetry, PureWick, NG tube upon OT entry to room.    General Precautions: Standard, fall    Orthopedic Precautions:N/A  Braces: N/A  Respiratory Status: Nasal cannula, flow 2 L/min     Occupational Performance:     Bed Mobility:    Moving up to hob  dep     Functional Mobility/Transfers:  Declined t/f to chair today  Functional Mobility:     Activities of Daily Living:  Dep to doff/evangelina gown      [facility] 6 Click ADL:      Treatment & Education:  Pt performed hand helper with 2 rubber band resistances 20 reps x 2, green t ball ex's 20 reps x 2, rom ex's with 2 lb wt 15 reps x 2 B shld flex,abd/add,elbow flex/ext,sup/pron, wrist flex/ext, red t band 15 reps x 2 B elbow flex,abd/add     Patient left up in chair with all lines intact and call button in reach    GOALS:   Multidisciplinary Problems       Occupational  Therapy Goals          Problem: Occupational Therapy    Goal Priority Disciplines Outcome Interventions   Occupational Therapy Goal     OT, PT/OT Progressing    Description: STG:  Pt will perform grooming with setup and min a  Pt will bathe with mod a  Pt will perform UE dressing with mod a  Pt will perform LE dressing with mod a with AD as needed  Pt will sit EOB x 10 min with SBA   Pt will transfer bed/chair/bsc with mod a  Pt will perform standing task x 30 sec with mod a   Pt will tolerate 20 minutes of tx without fatigue      LT.Restore to max I with self care and mobility.                          DME Justifications:      Time Tracking:     OT Date of Treatment: 25  OT Start Time: 1003  OT Stop Time: 1029  OT Total Time (min): 26 min    Billable Minutes:Therapeutic Activity 25    OT/SAROJ: SAROJ          2025

## 2025-05-09 NOTE — ASSESSMENT & PLAN NOTE
Patient's blood pressure range in the last 24 hours was: BP  Min: 78/43  Max: 108/48.The patient's inpatient anti-hypertensive regimen is listed below:  Current Antihypertensives       Plan  - BP is controlled, no changes needed to their regimen

## 2025-05-10 NOTE — NURSING
Patient resting with eyes closed. Chest rising and falling. No c/o at this time. No acute distress noted. Safety measures remain in effect.

## 2025-05-10 NOTE — SUBJECTIVE & OBJECTIVE
Interval History: The patient is resting.  She is weak.  Serum creatinine is noted to be 4.4.  Serum sodium is 130.  She is getting albumin today.  S    Review of patient's allergies indicates:   Allergen Reactions    Penicillins Hives    Sulfa (sulfonamide antibiotics) Hives     Current Facility-Administered Medications   Medication Frequency    acetaminophen suppository 650 mg Q6H PRN    acetaminophen tablet 1,000 mg Q6H PRN    albumin human 25% bottle 25 g Once    albuterol-ipratropium 2.5 mg-0.5 mg/3 mL nebulizer solution 3 mL Q4H PRN    allopurinoL tablet 300 mg Daily    aluminum & magnesium hydroxide-simethicone 400-400-40 mg/5 mL suspension 30 mL Q6H PRN    atorvastatin tablet 10 mg Daily    bisacodyL EC tablet 10 mg Daily PRN    dextromethorphan-guaiFENesin  mg/5 ml liquid 10 mL Q6H PRN    dextrose 50% injection 12.5 g PRN    dextrose 50% injection 25 g PRN    diphenhydrAMINE capsule 50 mg Q6H PRN    docusate sodium capsule 100 mg BID PRN    glucagon (human recombinant) injection 1 mg PRN    glucose chewable tablet 16 g PRN    glucose chewable tablet 24 g PRN    heparin (porcine) injection 5,000 Units Q8H    insulin aspart U-100 injection 0-10 Units Q6H PRN    insulin glargine U-100 (Lantus) injection 50 Units QHS    Lactobacillus acidophilus capsule 2 capsule TID WM    melatonin tablet 6 mg Nightly PRN    metoclopramide HCl tablet 5 mg QID    miconazole NITRATE 2 % top powder BID    montelukast tablet 10 mg QHS    ondansetron injection 8 mg Q6H PRN    oxyCODONE immediate release tablet 5 mg Q6H PRN    pantoprazole EC tablet 40 mg Daily    predniSONE tablet 20 mg Daily    Followed by    [START ON 5/18/2025] predniSONE tablet 10 mg Daily    rOPINIRole tablet 0.25 mg TID    sertraline tablet 25 mg Daily    traZODone tablet 50 mg Nightly PRN       Objective:     Vital Signs (Most Recent):  Temp: 97.4 °F (36.3 °C) (05/10/25 1200)  Pulse: 81 (05/10/25 1230)  Resp: 10 (05/10/25 1230)  BP: (!) 114/51  (05/10/25 1200)  SpO2: 98 % (05/10/25 1230) Vital Signs (24h Range):  Temp:  [97 °F (36.1 °C)-97.5 °F (36.4 °C)] 97.4 °F (36.3 °C)  Pulse:  [] 81  Resp:  [10-26] 10  SpO2:  [97 %-100 %] 98 %  BP: ()/(37-65) 114/51     Weight: 132.7 kg (292 lb 8.8 oz) (05/10/25 0445)  Body mass index is 48.68 kg/m².  Body surface area is 2.47 meters squared.    I/O last 3 completed shifts:  In: 1756 [P.O.:30; NG/GT:1227; IV Piggyback:499]  Out: 600 [Urine:600]     Physical Exam  Vitals reviewed.   Constitutional:       Appearance: She is obese.      Comments: Frail   HENT:      Head: Normocephalic and atraumatic.   Eyes:      Pupils: Pupils are equal, round, and reactive to light.   Cardiovascular:      Rate and Rhythm: Regular rhythm.   Pulmonary:      Effort: Pulmonary effort is normal.      Breath sounds: Rales present.   Abdominal:      Palpations: Abdomen is soft.   Musculoskeletal:      Cervical back: Neck supple.   Skin:     General: Skin is warm.   Neurological:      Mental Status: She is alert.          Significant Labs:  BMP:   Recent Labs   Lab 05/07/25  0401 05/08/25  0345 05/10/25  0503   *   < > 113   *   < > 130*   K 4.4   < > 3.5   CL 86*   < > 83*   CO2 27   < > 27   BUN >125*   < > 127*   CREATININE 4.57*   < > 4.48*   CALCIUM 7.8*   < > 6.8*   MG 2.3  --   --     < > = values in this interval not displayed.     CBC:   Recent Labs   Lab 05/09/25  0321   WBC 30.95*   RBC 2.43*   HGB 7.2*   HCT 21.9*   *   MCV 90.1   MCH 29.6   MCHC 32.9        Significant Imaging:  Labs: Reviewed

## 2025-05-10 NOTE — PROGRESS NOTES
[facility]  Nephrology  Progress Note    Patient Name: Magan Saleem  MRN: [id number]  Admission Date: 4/26/2025  Hospital Length of Stay: 14 days  Attending Provider: Maria Teresa Conway MD   Primary Care Physician: Sil Brown DO  Principal Problem:Pneumonitis    Subjective:     HPI: No notes on file    Interval History: The patient is resting.  She is weak.  Serum creatinine is noted to be 4.4.  Serum sodium is 130.  She is getting albumin today.  S    Review of patient's allergies indicates:   Allergen Reactions    Penicillins Hives    Sulfa (sulfonamide antibiotics) Hives     Current Facility-Administered Medications   Medication Frequency    acetaminophen suppository 650 mg Q6H PRN    acetaminophen tablet 1,000 mg Q6H PRN    albumin human 25% bottle 25 g Once    albuterol-ipratropium 2.5 mg-0.5 mg/3 mL nebulizer solution 3 mL Q4H PRN    allopurinoL tablet 300 mg Daily    aluminum & magnesium hydroxide-simethicone 400-400-40 mg/5 mL suspension 30 mL Q6H PRN    atorvastatin tablet 10 mg Daily    bisacodyL EC tablet 10 mg Daily PRN    dextromethorphan-guaiFENesin  mg/5 ml liquid 10 mL Q6H PRN    dextrose 50% injection 12.5 g PRN    dextrose 50% injection 25 g PRN    diphenhydrAMINE capsule 50 mg Q6H PRN    docusate sodium capsule 100 mg BID PRN    glucagon (human recombinant) injection 1 mg PRN    glucose chewable tablet 16 g PRN    glucose chewable tablet 24 g PRN    heparin (porcine) injection 5,000 Units Q8H    insulin aspart U-100 injection 0-10 Units Q6H PRN    insulin glargine U-100 (Lantus) injection 50 Units QHS    Lactobacillus acidophilus capsule 2 capsule TID WM    melatonin tablet 6 mg Nightly PRN    metoclopramide HCl tablet 5 mg QID    miconazole NITRATE 2 % top powder BID    montelukast tablet 10 mg QHS    ondansetron injection 8 mg Q6H PRN    oxyCODONE immediate release tablet 5 mg Q6H PRN    pantoprazole EC tablet 40 mg Daily    predniSONE tablet 20 mg  Daily    Followed by    [START ON 5/18/2025] predniSONE tablet 10 mg Daily    rOPINIRole tablet 0.25 mg TID    sertraline tablet 25 mg Daily    traZODone tablet 50 mg Nightly PRN       Objective:     Vital Signs (Most Recent):  Temp: 97.4 °F (36.3 °C) (05/10/25 1200)  Pulse: 81 (05/10/25 1230)  Resp: 10 (05/10/25 1230)  BP: (!) 114/51 (05/10/25 1200)  SpO2: 98 % (05/10/25 1230) Vital Signs (24h Range):  Temp:  [97 °F (36.1 °C)-97.5 °F (36.4 °C)] 97.4 °F (36.3 °C)  Pulse:  [] 81  Resp:  [10-26] 10  SpO2:  [97 %-100 %] 98 %  BP: ()/(37-65) 114/51     Weight: 132.7 kg (292 lb 8.8 oz) (05/10/25 0445)  Body mass index is 48.68 kg/m².  Body surface area is 2.47 meters squared.    I/O last 3 completed shifts:  In: 1756 [P.O.:30; NG/GT:1227; IV Piggyback:499]  Out: 600 [Urine:600]     Physical Exam  Vitals reviewed.   Constitutional:       Appearance: She is obese.      Comments: Frail   HENT:      Head: Normocephalic and atraumatic.   Eyes:      Pupils: Pupils are equal, round, and reactive to light.   Cardiovascular:      Rate and Rhythm: Regular rhythm.   Pulmonary:      Effort: Pulmonary effort is normal.      Breath sounds: Rales present.   Abdominal:      Palpations: Abdomen is soft.   Musculoskeletal:      Cervical back: Neck supple.   Skin:     General: Skin is warm.   Neurological:      Mental Status: She is alert.          Significant Labs:  BMP:   Recent Labs   Lab 05/07/25  0401 05/08/25  0345 05/10/25  0503   *   < > 113   *   < > 130*   K 4.4   < > 3.5   CL 86*   < > 83*   CO2 27   < > 27   BUN >125*   < > 127*   CREATININE 4.57*   < > 4.48*   CALCIUM 7.8*   < > 6.8*   MG 2.3  --   --     < > = values in this interval not displayed.     CBC:   Recent Labs   Lab 05/09/25  0321   WBC 30.95*   RBC 2.43*   HGB 7.2*   HCT 21.9*   *   MCV 90.1   MCH 29.6   MCHC 32.9        Significant Imaging:  Labs: Reviewed  Assessment/Plan:     Renal/  Acute kidney injury superimposed on stage 4  chronic kidney disease  Continue to monitor  No NSAIDS    Endocrine  Edema due to hypoalbuminemia  Chronic condition        Thank you for your consult. I will follow-up with patient. Please contact us if you have any additional questions.    Familia Iglesias Jr, MD  Nephrology  [facility]

## 2025-05-10 NOTE — NURSING
Spoke with Dr. Conway about keeping Picc line due to excessive swelling and weeping of extremities and multiple lab draws. Dr. Conway stated is okay to keep Picc.Dr. Conway also stated she would enter order to keep Picc line. In addition,Dr. Conway wants to speak with family regarding hospice.

## 2025-05-10 NOTE — NURSING
Notified Rahle LYNNE BP 85/37 -140 .  All day BP low and HR high. Order received to bolus NS 0.9 500    0645 Notified Man LYNNE of Weeping blister to right lower quadrant.  Wound care order written pictures taken.  Notifalvino Conway MD Calcium 6.8

## 2025-05-10 NOTE — PLAN OF CARE
Problem: Diabetes Comorbidity  Goal: Blood Glucose Level Within Targeted Range  Outcome: Progressing     Problem: Acute Kidney Injury/Impairment  Goal: Fluid and Electrolyte Balance  Outcome: Not Progressing     Problem: Wound  Goal: Optimal Coping  Outcome: Not Progressing

## 2025-05-11 NOTE — ASSESSMENT & PLAN NOTE
Patient's blood pressure range in the last 24 hours was: BP  Min: 78/32  Max: 117/46.The patient's inpatient anti-hypertensive regimen is listed below:  Current Antihypertensives       Plan  - BP is controlled, no changes needed to their regimen

## 2025-05-11 NOTE — PLAN OF CARE
Problem: Pneumonia  Goal: Fluid Balance  5/11/2025 1103 by Cyril Foote, RRT  Outcome: Progressing  5/11/2025 1012 by Cyril Foote, RRT  Outcome: Progressing  Goal: Effective Oxygenation and Ventilation  5/11/2025 1103 by Cyril Foote, RRT  Outcome: Progressing  5/11/2025 1012 by Cyril Foote, RRT  Outcome: Progressing     Problem: Gas Exchange Impaired  Goal: Optimal Gas Exchange  5/11/2025 1103 by Cyril Foote, RRT  Outcome: Progressing  5/11/2025 1012 by Cyril Foote, RRT  Outcome: Progressing     Problem: Airway Clearance Ineffective  Goal: Effective Airway Clearance  Outcome: Progressing

## 2025-05-11 NOTE — PT/OT/SLP PROGRESS
Occupational Therapy   Treatment    Name: Magan Saleem  MRN: [id number]  Admitting Diagnosis:  Pneumonitis       Recommendations:     Discharge Recommendations: Moderate Intensity Therapy  Discharge Equipment Recommendations:  to be determined by next level of care  Barriers to discharge:  None    Assessment:     Magan Saleem is a 69 y.o. female with a medical diagnosis of Pneumonitis.  She presents with complaint of having a bad night.Pt declined to sit EOB, but agreed to exercises in bed.Performance deficits affecting function are weakness, impaired endurance, impaired cardiopulmonary response to activity.     Rehab Prognosis:  Good and Fair; patient would benefit from acute skilled OT services to address these deficits and reach maximum level of function.       Plan:     Patient to be seen 5 x/week to address the above listed problems via self-care/home management, therapeutic activities, therapeutic exercises  Plan of Care Expires: 06/03/25  Plan of Care Reviewed with: patient    Subjective     Chief Complaint: Pneumonitis  Patient/Family Comments/goals: Pt agreed to UE exercises  Pain/Comfort:  Pain Rating 1: 0/10  Pain Rating Post-Intervention 1: 0/10    Objective:     Communicated with: LONDON Carter prior to session.  Patient found HOB elevated with NG tube, pulse ox (continuous), peripheral IV, telemetry upon OT entry to room.    General Precautions: Standard, fall    Orthopedic Precautions:N/A  Braces: N/A  Respiratory Status: Nasal cannula, flow 2 L/min     Occupational Performance:     Bed Mobility:         Functional Mobility/Transfers:    Functional Mobility:     Activities of Daily Living:        [facility] 6 Click ADL:      Treatment & Education:  Pt performed UE exercises.  AAROM x 2 sets of 15 reps (B) shoulder flexion/extension and adduction/abduction  1 lb hand wt 3 x sets of 10 reps  (B) elbow flexion/extension  (B) forearm supination/pronation  (B) wrist flexion/extension  Hand exerciser  x 20 reps x 1band    Pt required encouragement to give max effort during UE ROM/strengthening exercises. (B) UE supported in elevation due to edema.    Patient left HOB elevated with all lines intact, call button in reach, and nurse notified    GOALS:   Multidisciplinary Problems       Occupational Therapy Goals          Problem: Occupational Therapy    Goal Priority Disciplines Outcome Interventions   Occupational Therapy Goal     OT, PT/OT Progressing    Description: STG:  Pt will perform grooming with setup and min a  Pt will bathe with mod a  Pt will perform UE dressing with mod a  Pt will perform LE dressing with mod a with AD as needed  Pt will sit EOB x 10 min with SBA   Pt will transfer bed/chair/bsc with mod a  Pt will perform standing task x 30 sec with mod a   Pt will tolerate 20 minutes of tx without fatigue      LT.Restore to max I with self care and mobility.                          DME Justifications:      Time Tracking:     OT Date of Treatment: 25  OT Start Time: 904  OT Stop Time: 934  OT Total Time (min): 30 min    Billable Minutes:Therapeutic Exercise 26    OT/SAROJ: OT          2025

## 2025-05-11 NOTE — ASSESSMENT & PLAN NOTE
CT abdomen/pelvis showed- Possible small fluid collection anterior to the pancreas measuring approximately 3.6 cm on axial 41. Probable complex collection slightly inferior to the pancreatic head extending to the right pelvis and right iliac fossa.     04/21 Abd fluid collection in area pancreas noted on CT abd, discussed with surgery  04/22 likely evolving pancreatitis dx a couple weeks ago at [facility], now with pseudocyst.  No severe epigastric pain.  04/26 Ongoing tube feeds and full liquid diet. If tolerates full liquid better, plan to advanced to soft foods.   04/30 Repeat CT abdomen ordered, showed the pancreas with multiple fluid collections anterior to the pancreas and within the root of the mesentery and extending into the anterior right pararenal space and right paracolic gutter.     5/1 D?W with [facility] (Dr. Tapia) regarding fluid collection. They believe this is sec to pancreatitis and nothing to do for now.

## 2025-05-11 NOTE — ASSESSMENT & PLAN NOTE
Patient's blood pressure range in the last 24 hours was: BP  Min: 72/46  Max: 114/51.The patient's inpatient anti-hypertensive regimen is listed below:  Current Antihypertensives       Plan  - BP is controlled, no changes needed to their regimen

## 2025-05-11 NOTE — ASSESSMENT & PLAN NOTE
Body mass index is 48.68 kg/m². Morbid obesity complicates all aspects of disease management from diagnostic modalities to treatment. Weight loss encouraged and health benefits explained to patient.

## 2025-05-11 NOTE — ASSESSMENT & PLAN NOTE
JOHNATHON is likely due to pre-renal azotemia due to intravascular volume depletion. Baseline creatinine is ~ 2.0. Most recent creatinine and eGFR are listed below.  Recent Labs     05/09/25  0321 05/10/25  0503 05/11/25  0507   CREATININE 4.53* 4.48* 4.42*   EGFRNORACEVR 10* 10* 10*      Plan  - JOHNATHON is worsening now.   - Avoid nephrotoxins and renally dose meds for GFR listed above  - Monitor urine output, serial BMP, and adjust therapy as needed  - Nephrology consulted, started on Lasix, bicarb is to alkalinize the urine for hyperuricemia.   - At risk to require dialysis, discussed with the patient and she wants dialysis if indicated.

## 2025-05-11 NOTE — SUBJECTIVE & OBJECTIVE
Interval History:     Review of Systems   Constitutional:  Positive for appetite change.   Gastrointestinal:  Positive for abdominal pain.   All other systems reviewed and are negative.    Objective:     Vital Signs (Most Recent):  Temp: 97.4 °F (36.3 °C) (05/10/25 1200)  Pulse: 92 (05/10/25 1845)  Resp: 13 (05/10/25 1845)  BP: (!) 101/53 (05/10/25 1600)  SpO2: 100 % (05/10/25 1845) Vital Signs (24h Range):  Temp:  [97 °F (36.1 °C)-97.5 °F (36.4 °C)] 97.4 °F (36.3 °C)  Pulse:  [] 92  Resp:  [9-26] 13  SpO2:  [86 %-100 %] 100 %  BP: ()/(37-65) 101/53     Weight: 132.7 kg (292 lb 8.8 oz)  Body mass index is 48.68 kg/m².    Intake/Output Summary (Last 24 hours) at 5/10/2025 1959  Last data filed at 5/10/2025 1642  Gross per 24 hour   Intake 1876.97 ml   Output 400 ml   Net 1476.97 ml         Physical Exam  Constitutional:       Appearance: She is obese. She is ill-appearing.   HENT:      Head: Normocephalic.      Mouth/Throat:      Mouth: Mucous membranes are dry.   Cardiovascular:      Rate and Rhythm: Normal rate.      Heart sounds: Normal heart sounds. No murmur heard.  Pulmonary:      Effort: No respiratory distress.      Breath sounds: Rhonchi present.      Comments: On 2L NC  Abdominal:      General: There is no distension.      Tenderness: There is abdominal tenderness.      Comments: Dobhoff in place   Skin:     Coloration: Skin is pale.   Neurological:      Mental Status: Mental status is at baseline.               Significant Labs: All pertinent labs within the past 24 hours have been reviewed.    Significant Imaging: I have reviewed all pertinent imaging results/findings within the past 24 hours.

## 2025-05-11 NOTE — NURSING
Rec call from friend (Shauna).  Relayed message to her from medical team needing to have contact with family members.  Shauna verified phone numbers of MS Saleem's family members listed .  Stated family could meet with MD anytime Tuesday after 1:PM or anytime during the day Thursday.  Family lives in [place].

## 2025-05-11 NOTE — ASSESSMENT & PLAN NOTE
"EGD by Dr. Lo during recent past admission at [facility]:  "EGD on 03/21/2025 shows LA class B erosive esophagitis but no pill esophagitis, nonerosive gastritis and retention of food and fluid suspicious for gastroparesis, due to narcotics and diabetes. "  GI consulted, unable to advance diet and poor candidate for PEG.  Dobhoff in place for feeds, continued on Reglan.   GI recommended surgical PEG due to body habitus, GS stated that patient is not a candidate for PEG based on co morbidities and poor prognosis.   Continue PPI.     "

## 2025-05-11 NOTE — SUBJECTIVE & OBJECTIVE
Interval History:     Review of Systems   Constitutional:  Positive for appetite change.   Gastrointestinal:  Positive for abdominal pain.   All other systems reviewed and are negative.    Objective:     Vital Signs (Most Recent):  Temp: 97.3 °F (36.3 °C) (05/11/25 1630)  Pulse: 101 (05/11/25 1630)  Resp: 17 (05/11/25 1630)  BP: (!) 87/32 (05/11/25 1630)  SpO2: 100 % (05/11/25 1630) Vital Signs (24h Range):  Temp:  [94.1 °F (34.5 °C)-97.4 °F (36.3 °C)] 97.3 °F (36.3 °C)  Pulse:  [] 101  Resp:  [10-26] 17  SpO2:  [93 %-100 %] 100 %  BP: ()/(32-58) 87/32     Weight: 131.9 kg (290 lb 12.6 oz)  Body mass index is 48.39 kg/m².    Intake/Output Summary (Last 24 hours) at 5/11/2025 1828  Last data filed at 5/11/2025 1758  Gross per 24 hour   Intake 905 ml   Output 400 ml   Net 505 ml         Physical Exam  Constitutional:       Appearance: She is obese. She is ill-appearing.   HENT:      Head: Normocephalic.      Mouth/Throat:      Mouth: Mucous membranes are dry.   Cardiovascular:      Rate and Rhythm: Normal rate.      Heart sounds: Normal heart sounds. No murmur heard.  Pulmonary:      Effort: No respiratory distress.      Breath sounds: Rhonchi present.      Comments: On 2L NC  Abdominal:      General: There is no distension.      Tenderness: There is abdominal tenderness.      Comments: Dobhoff in place   Skin:     Coloration: Skin is pale.   Neurological:      Mental Status: Mental status is at baseline.               Significant Labs: All pertinent labs within the past 24 hours have been reviewed.    Significant Imaging: I have reviewed all pertinent imaging results/findings within the past 24 hours.

## 2025-05-11 NOTE — ASSESSMENT & PLAN NOTE
Body mass index is 48.39 kg/m². Morbid obesity complicates all aspects of disease management from diagnostic modalities to treatment. Weight loss encouraged and health benefits explained to patient.

## 2025-05-11 NOTE — PROGRESS NOTES
[facility] Medicine  Progress Note    Patient Name: Magan Saleem  MRN: [id number]  Patient Class: IP- Inpatient   Admission Date: 4/26/2025  Length of Stay: 14 days  Attending Physician: Maria Teresa Conway MD  Primary Care Provider: Sil Brown DO        Subjective     Principal Problem:Pneumonitis        HPI:  70 yo F presents to [place] ED from [facility] for abnormal labs.  Patient was discharged from [facility] two days ago to skilled nursing facility for rehab.  She was diagnosed with dehydration due to gastroparesis with UTI.  Patient has metastatic lung cancer (to the brain) and follows with Dr. Swanson for IV chemotherapy every other week and takes lazertinib daily.  She has completed her course of radiation for the brain mets and follow had showed some improvement.  I thought she had either some decreased LOC due to encephalopathy or some aphasia from the brain mets but after a great effort to get her to talk, I realized she was just mad.  She wanted to know why she had been discharged two days ago when she could not eat.  She has no appetite and early satiety.  She is not nauseated and no vomiting.  She has decided on a DNR status previously (her caretaker and friend of 20 years) states that she had always said she did not want resuscitation if she experienced cardiopulmonary arrest and had told her children her wishes but she does want treatment and is not opposed to J tube if needed.  She has not had anything to eat or drink in two days and is dehydrated again.       Patient was transferred because of concern of sepsis.  She did have enterococcus faecalis UTI at [facility] and was treated.  I do not have the culture results but cultures were sent and patient does have some yeast noted.  (Will not treat a yeast colonization).  She is afebrile and hemodynamically stable and does not look septic clinically.  Her Lactic acid is stable at 2.5 dara 3.2.  Will check  another in am after volume resuscitation.  Her creat is at baseline but she has prerenal azotemia further confirming the dehydration.  Patient has an IO in place from [facility] due to dehydration and difficult stick but with some volume resuscitation, we have gotten an IV.  She is covid and flu negative.       Her WBC is 29 and I am not sure if she has received neupogen but could be a stress reaction.  Her BS is 245 and she does have some urine ketones but most likely due to starvation ketosis.  Will check a serum ketone.  Her platelets are 88K but this is most likely from her chemo.  She is not anemic.  CXR shows some patchy infiltrate but no obvious obstructive pneumonia from her lung cancer.  Her BNP about 3K but no clinical signs of CHF.  No recent echo but due to her BMI 50 most likely has some PHTN.  EKG had no ischemic changes but tachy at 114 which could also be from dehydration.  She was on ozempic for her DM and this could be the cause of her decreased appetite.  She is also on decadron for prevention of cerebral edema.       Remainder of ROS as below.  She mostly just nods and shakes her head and rolls her eyes.  You can get her to laugh if you try but she has been depressed since she has not walked since her hospitalization at [facility] on 3/19/25 and was discharged two days ago.  She says that at her last chemo she noticed she was getting weaker and her daughter had to help her in and out of the car and that was new for her.      4/5- patient seen examined today resting comfortably in bed and in no acute distress, with no acute events overnight.  Patient has a multitude of issues complicating her medical picture.  White blood cell count [id number] today, sodium 159, potassium 3.2 and BUN creatinine 59 and 1.8.  The patient is still not eating even when assistance is provided.  We have already changed her fluids to half-normal saline with 20 of KCl at 1:25 a.m..  Have also put in a GI consult for possible feeding tube.   E coli in the urine has turned out to be ESBL and Rocephin has been changed Merrem.     Hospital Course at [facility]:    4/6- the patient seen examined today resting comfortably in bed, in no acute distress, in no acute events overnight.  The patient is not very talkative today, but states she is doing okay.  She has no acute complaints.  Blood cultures remain negative.  The patient has not eaten since yesterday and is on light IV fluids.  GI has been consulted for feeding tube.  Continues on Merrem for positive urine culture.  04/07 Records reviewed. Not eating which not new, Similar during recent admit at [facility]. Dr Swanson there had question pancreatitis. No abd pain or tenderness reported now. Question of dysphagia to solids. Chart hx gastroparesis. Will discuss with GI. Ronnie says has responded so far well to treatment for lung CA.   04/08 had EGD at [facility] 03/21/25 with no obstruction and evidence gastroparesis. Still not ending. Try additional meds. Talked with GI. Increase activity  04/09 Some better with med  changes  04/10 Continues to do better. Ate 1/2 fish sandwich for lunch. Called by Dr Swanson and she wanting to keep feeding tube in consideration. Talked with Dr Reich and Dr Lemus. If something needed with gastroparesis would have to have post gastric position of tube.   04/11 eating some. Later staff requested some nystatin for sore mouth.   04/12 still not eating well.  Increased peripheral edema.  Albumin low at 1.5.  Will give some albumin and follow with some Lasix.  Placed Dobbhoff tube and start enteral feedings.  This will help with nutrition and if issue of placing surgical tube later make sure will tolerate enteral feedings.  Chest x-ray continues worse on left side.  Discuss with Pulmonary and ask Dr. Villasenor to see.   04/13 no new issues.  Enteral feedings to be started.  Pulmonary evaluation appreciated and plans noted.  04/14 Tolerating feedings Therapy working with her. Bronchoscopy  planned.   4/15 BAL today  4/16 bronch yesterday looks like pneumonitis  4/17 not candidate for PEG  04/18 Eating some now. Pt says feels some better than last seen. Look at placement. High dose steroids by pulmonary. BS not as bad as would expect.  04/19 states continued eat some.  Less nausea.  Blood sugar not sure bad considering the steroids.  Pulmonary adjusted antibiotics based on cultures.  Look at it placement next week.   04/20 Looks the small. C/O SOB to nursing staff earlier but ABG OK. Poor oral intake ; encourage for pt to do more. Looking into placement.  04/21 Firm tender area in abd wall above umbilicus; ?hernia. Abnormal CT de santiago noted and will discuss with surgery.   04/22 CT shows evolving pancreatitis which pt treated for acouple weeks earlier at [facility]. Reviewed with Dr Tapia. No plan to operate on ventral hernia. Discussed later with Dr York. See how does off IVF and encourage oral intake. WBC and Cr both slight better.   04/23 Looks this same. Pt eating some. Pt getting discouraged and asking about home hospice. Talked by phone with her friend Shauna. In conversion doesn't sound like family would be able to care for pt at home. Encouraged pt to give some time and attempt SWB. She says she with consider tonight.   04/24 Lab worse but pt looks some better. Still not taking in well. Maybe eat better as pancreatitis further resolves. Considering replacing dobbhoff feeding tube and look into move to [facility]. Ask Dr Frias to review renal situation. Maybe pancreatitis,pneumonia,renal failure, all these might make a big difference if resolved. Talked with pt and she was OK with NGT  04/25 patient's spirits seems to be doing some better.  Dobbhoff tube placed in feedings to be started.  To be moved to LTAC.  No other new issue  04/26 Awaiting bed assignment at [facility]. No new complains.     Overview/Hospital Course:  4/28- BG trending up, added Lantus and adjusted dose. D/W nutritionist about changing  feeds to allow for more PO intake.     4/29- BG better now. Off IV amiodarone infusion, remains in NSR. Encourage PO intake.     4/30- Continue to adjust insulin to get BG under 200, ideally 140-180. Checking CT chest, abdomen and pelvis today given persistent leukocytosis.     5/1- Discussing with surgery about fluid collections in the abdomen noted on CT (slightly increased in size), also asking if PEG is an option as previously deemed not a candidate. Nephrology has added bicarb infusion. Continue current management otherwise.     5/2- IV fluids started per nephro. WBC count remains elevated.     5/3- Started GOC conversations with the patient who is alert, asked her if her family can be with her or her daughter who she is closest to. She said she will try to talk to them.     5/4- Discussed poor prognosis with the patient. Labs are essentially unchanged. Repeat blood cultures ordered.     5/5   - hypotensive overnight, given fluids, minimal response  - discussed with pulm no additional recs  - renal function still elevated, nephro following  - going to try and reach out to Dr. Swanson    5/6  - refusing feeds and not eating  - stopped lactic acid drawing  -- will discuss goals of care again with family    5/7  - discussed with oncology who will come see patient tomorrow  - reviewed labs and decided to consult tele-ID as continues with leukocytosis with bandemia that likely isnt reactive response to steroids, elevated lactic acid, continues on fluids  - nephrology following, renal function noted  - overall goals of care pending patient's discussion with oncology      5/8  - all consult notes reviewed  - gi recommendation noted  - will discuss with family and patient about goals of care    5/9  - discussed with all consults, will call a family meeting to discuss goals of care    5/10  - new wounds noted, severe weeping of current wounds as well  - bp low today, fluid flushes at max, will give albumin for secondary  resuscitation   - attempted to call family 3 times, no answer, need to setup family meeting for hospice talk    Interval History:     Review of Systems   Constitutional:  Positive for appetite change.   Gastrointestinal:  Positive for abdominal pain.   All other systems reviewed and are negative.    Objective:     Vital Signs (Most Recent):  Temp: 97.4 °F (36.3 °C) (05/10/25 1200)  Pulse: 92 (05/10/25 1845)  Resp: 13 (05/10/25 1845)  BP: (!) 101/53 (05/10/25 1600)  SpO2: 100 % (05/10/25 1845) Vital Signs (24h Range):  Temp:  [97 °F (36.1 °C)-97.5 °F (36.4 °C)] 97.4 °F (36.3 °C)  Pulse:  [] 92  Resp:  [9-26] 13  SpO2:  [86 %-100 %] 100 %  BP: ()/(37-65) 101/53     Weight: 132.7 kg (292 lb 8.8 oz)  Body mass index is 48.68 kg/m².    Intake/Output Summary (Last 24 hours) at 5/10/2025 1959  Last data filed at 5/10/2025 1642  Gross per 24 hour   Intake 1876.97 ml   Output 400 ml   Net 1476.97 ml         Physical Exam  Constitutional:       Appearance: She is obese. She is ill-appearing.   HENT:      Head: Normocephalic.      Mouth/Throat:      Mouth: Mucous membranes are dry.   Cardiovascular:      Rate and Rhythm: Normal rate.      Heart sounds: Normal heart sounds. No murmur heard.  Pulmonary:      Effort: No respiratory distress.      Breath sounds: Rhonchi present.      Comments: On 2L NC  Abdominal:      General: There is no distension.      Tenderness: There is abdominal tenderness.      Comments: Dobhoff in place   Skin:     Coloration: Skin is pale.   Neurological:      Mental Status: Mental status is at baseline.               Significant Labs: All pertinent labs within the past 24 hours have been reviewed.    Significant Imaging: I have reviewed all pertinent imaging results/findings within the past 24 hours.      Assessment & Plan  Acute kidney injury superimposed on stage 4 chronic kidney disease  JOHNATHON is likely due to pre-renal azotemia due to intravascular volume depletion. Baseline creatinine is  ~ 2.0. Most recent creatinine and eGFR are listed below.  Recent Labs     05/08/25  0345 05/09/25  0321 05/10/25  0503   CREATININE 4.37* 4.53* 4.48*   EGFRNORACEVR 10* 10* 10*      Plan  - JOHNATHON is worsening now.   - Avoid nephrotoxins and renally dose meds for GFR listed above  - Monitor urine output, serial BMP, and adjust therapy as needed  - Nephrology consulted, started on Lasix, bicarb is to alkalinize the urine for hyperuricemia.   - At risk to require dialysis, discussed with the patient and she wants dialysis if indicated.     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  Pulmonology consulted, started on steroids and s/p bronchoscopy with normal findings, BAL culture with stenotrophomonas.   Steroid taper plan per Pulmonology, completed course of antibiotics.   Respiratory status is stable.     Infection due to Stenotrophomonas maltophilia  Sepsis due to pneumonia  Stenotrophomonas lower respiratory infection in this patient with multifocal infiltrates and consolidative opacities on CT Chest.   S/p 2 week course with Levaquin.   Leukocytosis persists, will follow procalcitonin levels Q48H.  Repeat CT chest stable.  Consider tele-ID consultation if leukocytosis does not improve.     Stenotrophomonas lower respiratory infection in this patient with multifocal infiltrates and consolidative opacities on CT Chest.   S/p 2 week course with Levaquin.   Leukocytosis persists, will follow procalcitonin levels Q48H.  Repeat CT chest stable.  Consider tele-ID consultation if leukocytosis does not improve.     5/7 consulted tele ID today  SVT (supraventricular tachycardia)  Went into SVT on 4/27, not responsive to multiple rounds of adenosine.  Started on amiodarone infusion after bolus, turned off on 4/29.  Cardiology consulted, no additional recommendations but could consider cardioversion if this becomes a recurrent and a refractory issue.   Continue beta blocker.  "  Monitor on tele.     Acute pancreatitis  Intra-abdominal fluid collection  CT abdomen/pelvis showed- Possible small fluid collection anterior to the pancreas measuring approximately 3.6 cm on axial 41. Probable complex collection slightly inferior to the pancreatic head extending to the right pelvis and right iliac fossa.     04/21 Abd fluid collection in area pancreas noted on CT abd, discussed with surgery  04/22 likely evolving pancreatitis dx a couple weeks ago at [facility], now with pseudocyst.  No severe epigastric pain.  04/26 Ongoing tube feeds and full liquid diet. If tolerates full liquid better, plan to advanced to soft foods.   04/30 Repeat CT abdomen ordered, showed the pancreas with multiple fluid collections anterior to the pancreas and within the root of the mesentery and extending into the anterior right pararenal space and right paracolic gutter.     5/1 D?W with [facility] (Dr. Tapia) regarding fluid collection. They believe this is sec to pancreatitis and nothing to do for now.     Gastroparesis  EGD by Dr. Lo during recent past admission at [facility]:  "EGD on 03/21/2025 shows LA class B erosive esophagitis but no pill esophagitis, nonerosive gastritis and retention of food and fluid suspicious for gastroparesis, due to narcotics and diabetes. "  GI consulted, unable to advance diet and poor candidate for PEG.  Dobhoff in place for feeds, continued on Reglan.   GI recommended surgical PEG due to body habitus, GS stated that patient is not a candidate for PEG based on co morbidities and poor prognosis.   Continue PPI.     Lung cancer metastatic to brain  DNR but not hospice.   Receives chemo and has finished radiation.    Follows with Dr. Swanson.    5/5 will attempt to contact Dr. Swanson  Goals of care, counseling/discussion  DNR confirmed.  Patient wants permanent feeding tube and dialysis if needed.   Encouraged patient to talk to her family about her prognosis.  Currently PEG is not option as " "mentioned above so nutrition could be a major factor in driving the hospice discussion on top of her underlying metastatic cancer.   Consider primary oncologist- Dr. Mata consultation to get an idea of her prognosis related to cancer as patient is relying on that to make further decisions.      Nonischemic nontraumatic myocardial injury  In the setting of SVT episodes.  Trend is flat, no chest pain and no ischemic changes noted on EKG.  No ischemic workup recommended per cardiology.     Hyperuricemia  Hyperphosphatemia  Defer management to nephrology- started on allopurinol and IV bicarb to alkalinize the urine.   Follow uric acid levels.     Essential hypertension  Patient's blood pressure range in the last 24 hours was: BP  Min: 72/46  Max: 114/51.The patient's inpatient anti-hypertensive regimen is listed below:  Current Antihypertensives       Plan  - BP is controlled, no changes needed to their regimen    Type 2 diabetes mellitus with hyperglycemia  Patient's FSGs are controlled on current medication regimen.  Last A1c reviewed-   Lab Results   Component Value Date    HGBA1C 6.7 04/02/2025     Most recent fingerstick glucose reviewed- No results for input(s): "POCTGLUCOSE" in the last 24 hours.  Current correctional scale  Low  Maintain anti-hyperglycemic dose as follows-   Antihyperglycemics (From admission, onward)      Start     Stop Route Frequency Ordered    05/05/25 2100  insulin glargine U-100 (Lantus) injection 50 Units         -- SubQ Nightly 05/04/25 2332    05/01/25 1051  insulin aspart U-100 injection 0-10 Units         -- SubQ Every 6 hours PRN 05/01/25 0952          Hold Oral hypoglycemics while patient is in the hospital.  BG ranging up as tube feeds now to goal and D5 in bicarb drip, added Lantus and dose adjusted for tighter glycemic control.     Normocytic anemia  Anemia is likely due to chronic disease due to Malignancy. Most recent hemoglobin and hematocrit are listed below.  Recent Labs "     05/09/25  0321   HGB 7.2*   HCT 21.9*     Plan  - Monitor serial CBC: Daily  - Transfuse PRBC if patient becomes hemodynamically unstable, symptomatic or H/H drops below 7/21.  - Patient has not received any PRBC transfusions to date  - Patient's anemia is currently stable  - No evidence of bleeding.    Neoplastic (malignant) related fatigue  Patient with Acute on chronic debility due to neoplastic/malignant related fatigue. Latest AMPAC and GEMS scores have been reviewed. Evaluation for etiology is complete. Plan includes - Progressive mobility protocol initated  - PT/OT consulted  - Fall precautions in place.    Chronic pulmonary embolism without acute cor pulmonale  Eliquis was discontinued due to risk of ICH with brain mets.  Currently on hold in case surgical procedure is required.    Morbid obesity  Body mass index is 48.68 kg/m². Morbid obesity complicates all aspects of disease management from diagnostic modalities to treatment. Weight loss encouraged and health benefits explained to patient.     Moderate persistent asthma without complication  Stable without exacerbation.  Continue PRN nebs.     Edema due to hypoalbuminemia  Required albumin infusion intermittently during the stay.     Mixed hyperlipidemia  Resume statin.     Pancreatic pseudocyst      History of pancreatitis      Acute pancreatic fluid collection      VTE Risk Mitigation (From admission, onward)           Ordered     heparin (porcine) injection 5,000 Units  Every 8 hours         04/27/25 1055                    Discharge Planning   MITUL: 5/12/2025     Code Status: DNR   Medical Readiness for Discharge Date:   Discharge Plan A: Skilled Nursing Facility                Please place Justification for DME        Maria Teresa Conway MD  Department of Hospital Medicine   [facility]

## 2025-05-11 NOTE — PLAN OF CARE
Problem: Diabetes Comorbidity  Goal: Blood Glucose Level Within Targeted Range  Outcome: Progressing  Intervention: Monitor and Manage Glycemia  Flowsheets (Taken 5/11/2025 1407)  Glycemic Management:   blood glucose monitored   oral hydration promoted

## 2025-05-11 NOTE — PT/OT/SLP PROGRESS
"Physical Therapy Treatment    Patient Name:  Magan Saleem   MRN:  [id number]    Recommendations:     Discharge Recommendations: Moderate Intensity Therapy  Discharge Equipment Recommendations: to be determined by next level of care  Barriers to discharge: ongoing medical care    Assessment:     Magan Saleem is a 69 y.o. female admitted with a medical diagnosis of Pneumonitis.  She presents with the following impairments/functional limitations: weakness, impaired endurance, impaired self care skills, impaired functional mobility, impaired skin, edema.     Pt noted have significant generalized edema with weeping this session Reddened area with blood blister to R lateral trunk noted, began to drain during session with RN present. RN reported pt's blood glucose to be 54, medication/supplement given at time of session.     Rehab Prognosis: Fair; patient would benefit from acute skilled PT services to address these deficits and reach maximum level of function.    Recent Surgery: * No surgery found *      Plan:     During this hospitalization, patient to be seen 5 x/week to address the identified rehab impairments via gait training, therapeutic activities, therapeutic exercises, neuromuscular re-education and progress toward the following goals:    Plan of Care Expires:  05/29/25    Subjective     Chief Complaint: Pneumonitis   Patient/Family Comments/goals: agreeable; "I've been wheezig since last night"  Pain/Comfort: none reported         Objective:     Communicated with GIULIANA Carter RN prior to session.  Patient found HOB elevated with NG tube, oxygen, pulse ox (continuous), telemetry, PureWick upon PT entry to room.     General Precautions: Standard, fall  Orthopedic Precautions: N/A  Braces: N/A  Respiratory Status: Nasal cannula, flow 2 L/min     Functional Mobility:  Bed Mobility:     Rolling Left:  maximal assistance and of 1-2 persons  Rolling Right: maximal assistance and of 1-2 persons  Scooting: maximal " assistance and of 2 persons      AM-PAC 6 CLICK MOBILITY          Treatment & Education:  Mobility as stated above. Multiple trials of above for proper positioning.     Patient left HOB elevated with all lines intact, call button in reach, and RN present..    GOALS:   Multidisciplinary Problems       Physical Therapy Goals          Problem: Physical Therapy    Goal Priority Disciplines Outcome Interventions   Physical Therapy Goal     PT, PT/OT Not Progressing    Description: Short Term Goals to be met by: 25    Patient will increase functional independence with mobility by performin. Supine to sit with Moderate Assist  2. Sit to stand transfer with Maximal Assist using Rolling walker  3. Bed to chair transfer with Maximal Assist using lowest level of assistive device  4. Rolling side to side with Min A  5. Lower extremity exercise program x30 reps per handout, with assistance as needed    Long Term Goals to be met by: 25    Pt will regain full independent functional mobility with lowest level of assistive device to return to home situation and prior activities of daily living.                        DME Justifications:  To be determined.     Time Tracking:     PT Received On: 25  PT Start Time: 1708     PT Stop Time: 1727  PT Total Time (min): 19 min     Billable Minutes: Therapeutic Activity 15    Treatment Type: Treatment  PT/PTA: PT     Number of PTA visits since last PT visit: 0     2025

## 2025-05-11 NOTE — ASSESSMENT & PLAN NOTE
JOHNATHON is likely due to pre-renal azotemia due to intravascular volume depletion. Baseline creatinine is ~ 2.0. Most recent creatinine and eGFR are listed below.  Recent Labs     05/08/25  0345 05/09/25  0321 05/10/25  0503   CREATININE 4.37* 4.53* 4.48*   EGFRNORACEVR 10* 10* 10*      Plan  - JOHNATHON is worsening now.   - Avoid nephrotoxins and renally dose meds for GFR listed above  - Monitor urine output, serial BMP, and adjust therapy as needed  - Nephrology consulted, started on Lasix, bicarb is to alkalinize the urine for hyperuricemia.   - At risk to require dialysis, discussed with the patient and she wants dialysis if indicated.

## 2025-05-11 NOTE — ASSESSMENT & PLAN NOTE
Defer management to nephrology- started on allopurinol and IV bicarb to alkalinize the urine.   Follow uric acid levels.      Reason for Call:  Medication or medication refill:    Do you use a [facility]?  Name of the pharmacy and phone number for the current request:     [facility] [id number] [place] [address]      Name of the medication requested: hydrochlorothiazide (HYDRODIURIL) 12.5 MG tablet   And her blood pressure medication.  She is not sure what it is called.       Other request: Her pharmacy told her to call the doctor's office.    Can we leave a detailed message on this number? YES    Phone number patient can be reached at: Cell number on file:    Telephone Information:   Mobile [phone]       Best Time: any    Call taken on 7/1/2019 at 7:48 AM by Anjali Real

## 2025-05-11 NOTE — ASSESSMENT & PLAN NOTE
CT abdomen/pelvis showed- Possible small fluid collection anterior to the pancreas measuring approximately 3.6 cm on axial 41. Probable complex collection slightly inferior to the pancreatic head extending to the right pelvis and right iliac fossa.     04/21 Abd fluid collection in area pancreas noted on CT abd, discussed with surgery  04/22 likely evolving pancreatitis dx a couple weeks ago at [facility], now with pseudocyst.  No severe epigastric pain.  04/26 Ongoing tube feeds and full liquid diet. If tolerates full liquid better, plan to advanced to soft foods.   04/30 Repeat CT abdomen ordered, showed the pancreas with multiple fluid collections anterior to the pancreas and within the root of the mesentery and extending into the anterior right pararenal space and right paracolic gutter.     5/1 D?W with [facility] (Dr. Tapia) regarding fluid collection. They believe this is sec to pancreatitis and nothing to do for now.

## 2025-05-11 NOTE — ASSESSMENT & PLAN NOTE
DNR confirmed.  Patient wants permanent feeding tube and dialysis if needed.   Encouraged patient to talk to her family about her prognosis.  Currently PEG is not option as mentioned above so nutrition could be a major factor in driving the hospice discussion on top of her underlying metastatic cancer.   Consider primary oncologist- Dr. Mata consultation to get an idea of her prognosis related to cancer as patient is relying on that to make further decisions.      5/11  - patient declining, refusing tube feeds at times, and overall appetite nonexistent  - discussed with several specialists including oncology and recommendations noted  - planning for family meeting Tuesday at 1 pm for hospice

## 2025-05-11 NOTE — PROGRESS NOTES
[facility] Medicine  Progress Note    Patient Name: Magan Saleem  MRN: [id number]  Patient Class: IP- Inpatient   Admission Date: 4/26/2025  Length of Stay: 15 days  Attending Physician: Maria Teresa Conway MD  Primary Care Provider: Sil Brown DO        Subjective     Principal Problem:Pneumonitis        HPI:  70 yo F presents to [place] ED from [facility] for abnormal labs.  Patient was discharged from [facility] two days ago to skilled nursing facility for rehab.  She was diagnosed with dehydration due to gastroparesis with UTI.  Patient has metastatic lung cancer (to the brain) and follows with Dr. Swanson for IV chemotherapy every other week and takes lazertinib daily.  She has completed her course of radiation for the brain mets and follow had showed some improvement.  I thought she had either some decreased LOC due to encephalopathy or some aphasia from the brain mets but after a great effort to get her to talk, I realized she was just mad.  She wanted to know why she had been discharged two days ago when she could not eat.  She has no appetite and early satiety.  She is not nauseated and no vomiting.  She has decided on a DNR status previously (her caretaker and friend of 20 years) states that she had always said she did not want resuscitation if she experienced cardiopulmonary arrest and had told her children her wishes but she does want treatment and is not opposed to J tube if needed.  She has not had anything to eat or drink in two days and is dehydrated again.       Patient was transferred because of concern of sepsis.  She did have enterococcus faecalis UTI at [facility] and was treated.  I do not have the culture results but cultures were sent and patient does have some yeast noted.  (Will not treat a yeast colonization).  She is afebrile and hemodynamically stable and does not look septic clinically.  Her Lactic acid is stable at 2.5 dara 3.2.  Will check  another in am after volume resuscitation.  Her creat is at baseline but she has prerenal azotemia further confirming the dehydration.  Patient has an IO in place from [facility] due to dehydration and difficult stick but with some volume resuscitation, we have gotten an IV.  She is covid and flu negative.       Her WBC is 29 and I am not sure if she has received neupogen but could be a stress reaction.  Her BS is 245 and she does have some urine ketones but most likely due to starvation ketosis.  Will check a serum ketone.  Her platelets are 88K but this is most likely from her chemo.  She is not anemic.  CXR shows some patchy infiltrate but no obvious obstructive pneumonia from her lung cancer.  Her BNP about 3K but no clinical signs of CHF.  No recent echo but due to her BMI 50 most likely has some PHTN.  EKG had no ischemic changes but tachy at 114 which could also be from dehydration.  She was on ozempic for her DM and this could be the cause of her decreased appetite.  She is also on decadron for prevention of cerebral edema.       Remainder of ROS as below.  She mostly just nods and shakes her head and rolls her eyes.  You can get her to laugh if you try but she has been depressed since she has not walked since her hospitalization at [facility] on 3/19/25 and was discharged two days ago.  She says that at her last chemo she noticed she was getting weaker and her daughter had to help her in and out of the car and that was new for her.      4/5- patient seen examined today resting comfortably in bed and in no acute distress, with no acute events overnight.  Patient has a multitude of issues complicating her medical picture.  White blood cell count [id number] today, sodium 159, potassium 3.2 and BUN creatinine 59 and 1.8.  The patient is still not eating even when assistance is provided.  We have already changed her fluids to half-normal saline with 20 of KCl at 1:25 a.m..  Have also put in a GI consult for possible feeding tube.   E coli in the urine has turned out to be ESBL and Rocephin has been changed Merrem.     Hospital Course at [facility]:    4/6- the patient seen examined today resting comfortably in bed, in no acute distress, in no acute events overnight.  The patient is not very talkative today, but states she is doing okay.  She has no acute complaints.  Blood cultures remain negative.  The patient has not eaten since yesterday and is on light IV fluids.  GI has been consulted for feeding tube.  Continues on Merrem for positive urine culture.  04/07 Records reviewed. Not eating which not new, Similar during recent admit at [facility]. Dr Swanson there had question pancreatitis. No abd pain or tenderness reported now. Question of dysphagia to solids. Chart hx gastroparesis. Will discuss with GI. Ronnie says has responded so far well to treatment for lung CA.   04/08 had EGD at [facility] 03/21/25 with no obstruction and evidence gastroparesis. Still not ending. Try additional meds. Talked with GI. Increase activity  04/09 Some better with med  changes  04/10 Continues to do better. Ate 1/2 fish sandwich for lunch. Called by Dr Swanson and she wanting to keep feeding tube in consideration. Talked with Dr Reich and Dr Lemus. If something needed with gastroparesis would have to have post gastric position of tube.   04/11 eating some. Later staff requested some nystatin for sore mouth.   04/12 still not eating well.  Increased peripheral edema.  Albumin low at 1.5.  Will give some albumin and follow with some Lasix.  Placed Dobbhoff tube and start enteral feedings.  This will help with nutrition and if issue of placing surgical tube later make sure will tolerate enteral feedings.  Chest x-ray continues worse on left side.  Discuss with Pulmonary and ask Dr. Villasenor to see.   04/13 no new issues.  Enteral feedings to be started.  Pulmonary evaluation appreciated and plans noted.  04/14 Tolerating feedings Therapy working with her. Bronchoscopy  planned.   4/15 BAL today  4/16 bronch yesterday looks like pneumonitis  4/17 not candidate for PEG  04/18 Eating some now. Pt says feels some better than last seen. Look at placement. High dose steroids by pulmonary. BS not as bad as would expect.  04/19 states continued eat some.  Less nausea.  Blood sugar not sure bad considering the steroids.  Pulmonary adjusted antibiotics based on cultures.  Look at it placement next week.   04/20 Looks the small. C/O SOB to nursing staff earlier but ABG OK. Poor oral intake ; encourage for pt to do more. Looking into placement.  04/21 Firm tender area in abd wall above umbilicus; ?hernia. Abnormal CT de santiago noted and will discuss with surgery.   04/22 CT shows evolving pancreatitis which pt treated for acouple weeks earlier at [facility]. Reviewed with Dr Tapia. No plan to operate on ventral hernia. Discussed later with Dr York. See how does off IVF and encourage oral intake. WBC and Cr both slight better.   04/23 Looks this same. Pt eating some. Pt getting discouraged and asking about home hospice. Talked by phone with her friend Shauna. In conversion doesn't sound like family would be able to care for pt at home. Encouraged pt to give some time and attempt SWB. She says she with consider tonight.   04/24 Lab worse but pt looks some better. Still not taking in well. Maybe eat better as pancreatitis further resolves. Considering replacing dobbhoff feeding tube and look into move to [facility]. Ask Dr Frias to review renal situation. Maybe pancreatitis,pneumonia,renal failure, all these might make a big difference if resolved. Talked with pt and she was OK with NGT  04/25 patient's spirits seems to be doing some better.  Dobbhoff tube placed in feedings to be started.  To be moved to LTAC.  No other new issue  04/26 Awaiting bed assignment at [facility]. No new complains.     Overview/Hospital Course:  4/28- BG trending up, added Lantus and adjusted dose. D/W nutritionist about changing  feeds to allow for more PO intake.     4/29- BG better now. Off IV amiodarone infusion, remains in NSR. Encourage PO intake.     4/30- Continue to adjust insulin to get BG under 200, ideally 140-180. Checking CT chest, abdomen and pelvis today given persistent leukocytosis.     5/1- Discussing with surgery about fluid collections in the abdomen noted on CT (slightly increased in size), also asking if PEG is an option as previously deemed not a candidate. Nephrology has added bicarb infusion. Continue current management otherwise.     5/2- IV fluids started per nephro. WBC count remains elevated.     5/3- Started GOC conversations with the patient who is alert, asked her if her family can be with her or her daughter who she is closest to. She said she will try to talk to them.     5/4- Discussed poor prognosis with the patient. Labs are essentially unchanged. Repeat blood cultures ordered.     5/5   - hypotensive overnight, given fluids, minimal response  - discussed with pulm no additional recs  - renal function still elevated, nephro following  - going to try and reach out to Dr. Swanson    5/6  - refusing feeds and not eating  - stopped lactic acid drawing  -- will discuss goals of care again with family    5/7  - discussed with oncology who will come see patient tomorrow  - reviewed labs and decided to consult tele-ID as continues with leukocytosis with bandemia that likely isnt reactive response to steroids, elevated lactic acid, continues on fluids  - nephrology following, renal function noted  - overall goals of care pending patient's discussion with oncology      5/8  - all consult notes reviewed  - gi recommendation noted  - will discuss with family and patient about goals of care    5/9  - discussed with all consults, will call a family meeting to discuss goals of care    5/10  - new wounds noted, severe weeping of current wounds as well  - bp low today, fluid flushes at max, will give albumin for secondary  resuscitation   - attempted to call family 3 times, no answer, need to setup family meeting for hospice talk    5/11  - patient declining, hypotensive however asymptomatic, will add midodrine  - planning for family meeting Tuesday at 1 pm    Interval History:     Review of Systems   Constitutional:  Positive for appetite change.   Gastrointestinal:  Positive for abdominal pain.   All other systems reviewed and are negative.    Objective:     Vital Signs (Most Recent):  Temp: 97.3 °F (36.3 °C) (05/11/25 1630)  Pulse: 101 (05/11/25 1630)  Resp: 17 (05/11/25 1630)  BP: (!) 87/32 (05/11/25 1630)  SpO2: 100 % (05/11/25 1630) Vital Signs (24h Range):  Temp:  [94.1 °F (34.5 °C)-97.4 °F (36.3 °C)] 97.3 °F (36.3 °C)  Pulse:  [] 101  Resp:  [10-26] 17  SpO2:  [93 %-100 %] 100 %  BP: ()/(32-58) 87/32     Weight: 131.9 kg (290 lb 12.6 oz)  Body mass index is 48.39 kg/m².    Intake/Output Summary (Last 24 hours) at 5/11/2025 1828  Last data filed at 5/11/2025 1758  Gross per 24 hour   Intake 905 ml   Output 400 ml   Net 505 ml         Physical Exam  Constitutional:       Appearance: She is obese. She is ill-appearing.   HENT:      Head: Normocephalic.      Mouth/Throat:      Mouth: Mucous membranes are dry.   Cardiovascular:      Rate and Rhythm: Normal rate.      Heart sounds: Normal heart sounds. No murmur heard.  Pulmonary:      Effort: No respiratory distress.      Breath sounds: Rhonchi present.      Comments: On 2L NC  Abdominal:      General: There is no distension.      Tenderness: There is abdominal tenderness.      Comments: Dobhoff in place   Skin:     Coloration: Skin is pale.   Neurological:      Mental Status: Mental status is at baseline.               Significant Labs: All pertinent labs within the past 24 hours have been reviewed.    Significant Imaging: I have reviewed all pertinent imaging results/findings within the past 24 hours.      Assessment & Plan  Acute kidney injury superimposed on stage 4  chronic kidney disease  JOHNATHON is likely due to pre-renal azotemia due to intravascular volume depletion. Baseline creatinine is ~ 2.0. Most recent creatinine and eGFR are listed below.  Recent Labs     05/09/25  0321 05/10/25  0503 05/11/25  0507   CREATININE 4.53* 4.48* 4.42*   EGFRNORACEVR 10* 10* 10*      Plan  - JOHNATHON is worsening now.   - Avoid nephrotoxins and renally dose meds for GFR listed above  - Monitor urine output, serial BMP, and adjust therapy as needed  - Nephrology consulted, started on Lasix, bicarb is to alkalinize the urine for hyperuricemia.   - At risk to require dialysis, discussed with the patient and she wants dialysis if indicated.     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  Pulmonology consulted, started on steroids and s/p bronchoscopy with normal findings, BAL culture with stenotrophomonas.   Steroid taper plan per Pulmonology, completed course of antibiotics.   Respiratory status is stable.     Infection due to Stenotrophomonas maltophilia  Sepsis due to pneumonia  Stenotrophomonas lower respiratory infection in this patient with multifocal infiltrates and consolidative opacities on CT Chest.   S/p 2 week course with Levaquin.   Leukocytosis persists, will follow procalcitonin levels Q48H.  Repeat CT chest stable.  Consider tele-ID consultation if leukocytosis does not improve.     Stenotrophomonas lower respiratory infection in this patient with multifocal infiltrates and consolidative opacities on CT Chest.   S/p 2 week course with Levaquin.   Leukocytosis persists, will follow procalcitonin levels Q48H.  Repeat CT chest stable.  Consider tele-ID consultation if leukocytosis does not improve.     5/7 consulted tele ID today  SVT (supraventricular tachycardia)  Went into SVT on 4/27, not responsive to multiple rounds of adenosine.  Started on amiodarone infusion after bolus, turned off on 4/29.  Cardiology consulted, no additional  "recommendations but could consider cardioversion if this becomes a recurrent and a refractory issue.   Continue beta blocker.   Monitor on tele.     Acute pancreatitis  Intra-abdominal fluid collection  CT abdomen/pelvis showed- Possible small fluid collection anterior to the pancreas measuring approximately 3.6 cm on axial 41. Probable complex collection slightly inferior to the pancreatic head extending to the right pelvis and right iliac fossa.     04/21 Abd fluid collection in area pancreas noted on CT abd, discussed with surgery  04/22 likely evolving pancreatitis dx a couple weeks ago at [facility], now with pseudocyst.  No severe epigastric pain.  04/26 Ongoing tube feeds and full liquid diet. If tolerates full liquid better, plan to advanced to soft foods.   04/30 Repeat CT abdomen ordered, showed the pancreas with multiple fluid collections anterior to the pancreas and within the root of the mesentery and extending into the anterior right pararenal space and right paracolic gutter.     5/1 D?W with [facility] (Dr. Tapia) regarding fluid collection. They believe this is sec to pancreatitis and nothing to do for now.     Gastroparesis  EGD by Dr. Lo during recent past admission at [facility]:  "EGD on 03/21/2025 shows LA class B erosive esophagitis but no pill esophagitis, nonerosive gastritis and retention of food and fluid suspicious for gastroparesis, due to narcotics and diabetes. "  GI consulted, unable to advance diet and poor candidate for PEG.  Dobhoff in place for feeds, continued on Reglan.   GI recommended surgical PEG due to body habitus, GS stated that patient is not a candidate for PEG based on co morbidities and poor prognosis.   Continue PPI.     Lung cancer metastatic to brain  DNR but not hospice.   Receives chemo and has finished radiation.    Follows with Dr. Swanson.    5/5 will attempt to contact Dr. Swanson  Goals of care, counseling/discussion  DNR confirmed.  Patient wants permanent " "feeding tube and dialysis if needed.   Encouraged patient to talk to her family about her prognosis.  Currently PEG is not option as mentioned above so nutrition could be a major factor in driving the hospice discussion on top of her underlying metastatic cancer.   Consider primary oncologist- Dr. Mata consultation to get an idea of her prognosis related to cancer as patient is relying on that to make further decisions.      5/11  - patient declining, refusing tube feeds at times, and overall appetite nonexistent  - discussed with several specialists including oncology and recommendations noted  - planning for family meeting Tuesday at 1 pm for hospice  Nonischemic nontraumatic myocardial injury  In the setting of SVT episodes.  Trend is flat, no chest pain and no ischemic changes noted on EKG.  No ischemic workup recommended per cardiology.     Hyperuricemia  Hyperphosphatemia  Defer management to nephrology- started on allopurinol and IV bicarb to alkalinize the urine.   Follow uric acid levels.     Essential hypertension  Patient's blood pressure range in the last 24 hours was: BP  Min: 78/32  Max: 117/46.The patient's inpatient anti-hypertensive regimen is listed below:  Current Antihypertensives       Plan  - BP is controlled, no changes needed to their regimen    Type 2 diabetes mellitus with hyperglycemia  Patient's FSGs are controlled on current medication regimen.  Last A1c reviewed-   Lab Results   Component Value Date    HGBA1C 6.7 04/02/2025     Most recent fingerstick glucose reviewed- No results for input(s): "POCTGLUCOSE" in the last 24 hours.  Current correctional scale  Low  Maintain anti-hyperglycemic dose as follows-   Antihyperglycemics (From admission, onward)      Start     Stop Route Frequency Ordered    05/05/25 2100  insulin glargine U-100 (Lantus) injection 50 Units         -- SubQ Nightly 05/04/25 2332    05/01/25 1051  insulin aspart U-100 injection 0-10 Units         -- SubQ Every 6 " hours PRN 05/01/25 0952          Hold Oral hypoglycemics while patient is in the hospital.  BG ranging up as tube feeds now to goal and D5 in bicarb drip, added Lantus and dose adjusted for tighter glycemic control.     Normocytic anemia  Anemia is likely due to chronic disease due to Malignancy. Most recent hemoglobin and hematocrit are listed below.  Recent Labs     05/09/25  0321   HGB 7.2*   HCT 21.9*     Plan  - Monitor serial CBC: Daily  - Transfuse PRBC if patient becomes hemodynamically unstable, symptomatic or H/H drops below 7/21.  - Patient has not received any PRBC transfusions to date  - Patient's anemia is currently stable  - No evidence of bleeding.    Neoplastic (malignant) related fatigue  Patient with Acute on chronic debility due to neoplastic/malignant related fatigue. Latest AMPAC and GEMS scores have been reviewed. Evaluation for etiology is complete. Plan includes - Progressive mobility protocol initated  - PT/OT consulted  - Fall precautions in place.    Chronic pulmonary embolism without acute cor pulmonale  Eliquis was discontinued due to risk of ICH with brain mets.  Currently on hold in case surgical procedure is required.    Morbid obesity  Body mass index is 48.39 kg/m². Morbid obesity complicates all aspects of disease management from diagnostic modalities to treatment. Weight loss encouraged and health benefits explained to patient.     Moderate persistent asthma without complication  Stable without exacerbation.  Continue PRN nebs.     Edema due to hypoalbuminemia  Required albumin infusion intermittently during the stay.     Mixed hyperlipidemia  Resume statin.     Pancreatic pseudocyst      History of pancreatitis      Acute pancreatic fluid collection      VTE Risk Mitigation (From admission, onward)           Ordered     heparin (porcine) injection 5,000 Units  Every 8 hours         04/27/25 1055                    Discharge Planning   MITUL: 5/12/2025     Code Status: DNR    Medical Readiness for Discharge Date:   Discharge Plan A: Skilled Nursing Facility                Please place Justification for DME        Maria Teresa Conway MD  Department of Hospital Medicine   [facility]

## 2025-05-11 NOTE — ASSESSMENT & PLAN NOTE
"EGD by Dr. Lo during recent past admission at [facility]:  "EGD on 03/21/2025 shows LA class B erosive esophagitis but no pill esophagitis, nonerosive gastritis and retention of food and fluid suspicious for gastroparesis, due to narcotics and diabetes. "  GI consulted, unable to advance diet and poor candidate for PEG.  Dobhoff in place for feeds, continued on Reglan.   GI recommended surgical PEG due to body habitus, GS stated that patient is not a candidate for PEG based on co morbidities and poor prognosis.   Continue PPI.     "

## 2025-05-11 NOTE — ASSESSMENT & PLAN NOTE
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 Most recent hemoglobin and hematocrit are listed below.  Recent Labs     05/09/25  0321   HGB 7.2*   HCT 21.9*     Plan  - Monitor serial CBC: Daily  - Transfuse PRBC if patient becomes hemodynamically unstable, symptomatic or H/H drops below 7/21.  - Patient has not received any PRBC transfusions to date  - Patient's anemia is currently stable  - No evidence of bleeding.

## 2025-05-11 NOTE — CARE UPDATE
** Unable to get any peripheral access or midline, multiple attempts by multiple departments  Will continue picc line for now for access

## 2025-05-12 NOTE — ASSESSMENT & PLAN NOTE
JOHNATHON is likely due to pre-renal azotemia due to intravascular volume depletion. Baseline creatinine is ~ 2.0. Most recent creatinine and eGFR are listed below.  Recent Labs     05/10/25  0503 05/11/25  0507 05/12/25  0449   CREATININE 4.48* 4.42* 4.46*   EGFRNORACEVR 10* 10* 10*      Plan  - JOHNATHON is worsening now.   - Avoid nephrotoxins and renally dose meds for GFR listed above  - Monitor urine output, serial BMP, and adjust therapy as needed  - Nephrology consulted, started on Lasix, bicarb is to alkalinize the urine for hyperuricemia.   - At risk to require dialysis, discussed with the patient and she wants dialysis if indicated.

## 2025-05-12 NOTE — ASSESSMENT & PLAN NOTE
CT abdomen/pelvis showed- Possible small fluid collection anterior to the pancreas measuring approximately 3.6 cm on axial 41. Probable complex collection slightly inferior to the pancreatic head extending to the right pelvis and right iliac fossa.     04/21 Abd fluid collection in area pancreas noted on CT abd, discussed with surgery  04/22 likely evolving pancreatitis dx a couple weeks ago at [facility], now with pseudocyst.  No severe epigastric pain.  04/26 Ongoing tube feeds and full liquid diet. If tolerates full liquid better, plan to advanced to soft foods.   04/30 Repeat CT abdomen ordered, showed the pancreas with multiple fluid collections anterior to the pancreas and within the root of the mesentery and extending into the anterior right pararenal space and right paracolic gutter.     5/1 D?W with [facility] (Dr. Tapia) regarding fluid collection. They believe this is sec to pancreatitis and nothing to do for now.

## 2025-05-12 NOTE — ASSESSMENT & PLAN NOTE
"EGD by Dr. Lo during recent past admission at [facility]:  "EGD on 03/21/2025 shows LA class B erosive esophagitis but no pill esophagitis, nonerosive gastritis and retention of food and fluid suspicious for gastroparesis, due to narcotics and diabetes. "  GI consulted, unable to advance diet and poor candidate for PEG.  Dobhoff in place for feeds, continued on Reglan.   GI recommended surgical PEG due to body habitus, [facility] stated that patient is not a candidate for PEG based on co morbidities and poor prognosis.   Continue PPI.     "

## 2025-05-12 NOTE — PT/OT/SLP PROGRESS
Physical Therapy Treatment    Patient Name:  Magan Saleem   MRN:  [id number]    Recommendations:     Discharge Recommendations: Moderate Intensity Therapy  Discharge Equipment Recommendations: to be determined by next level of care  Barriers to discharge: ongoing medical treatment    Assessment:     Magan Saleem is a 69 y.o. female admitted with a medical diagnosis of Pneumonitis.  She presents with the following impairments/functional limitations: weakness, impaired endurance, impaired self care skills, impaired functional mobility, impaired skin, edema.    Pt completed exercise at bedside at assist and rest as needed. OOB deferred due to low H&H and scheduled to receive blood    Rehab Prognosis: Fair; patient would benefit from acute skilled PT services to address these deficits and reach maximum level of function.    Recent Surgery: * No surgery found *      Plan:     During this hospitalization, patient to be seen 5 x/week to address the identified rehab impairments via gait training, therapeutic activities, therapeutic exercises, neuromuscular re-education and progress toward the following goals:    Plan of Care Expires:  05/29/25    Subjective     Chief Complaint: Pneumonitis   Patient/Family Comments/goals: pt agreeable to exercise at bedside  Pain/Comfort:         Objective:     Communicated with Liza Mendez RN prior to session.  Patient found HOB elevated with telemetry, blood pressure cuff, pulse ox (continuous), oxygen, NG tube, peripheral IV upon PT entry to room.     General Precautions: Standard, fall  Orthopedic Precautions: N/A  Braces: N/A  Respiratory Status: Nasal cannula, flow 2 L/min     Functional Mobility:  NT      AM-PAC 6 CLICK MOBILITY  Turning over in bed (including adjusting bedclothes, sheets and blankets)?: 2  Sitting down on and standing up from a chair with arms (e.g., wheelchair, bedside commode, etc.): 1  Moving from lying on back to sitting on the side of the bed?: 2  Moving to  and from a bed to a chair (including a wheelchair)?: 2  Need to walk in hospital room?: 1  Climbing 3-5 steps with a railing?: 1  Basic Mobility Total Score: 9       Treatment & Education:  Pt performed 2 x 15 reps (B) LE exercises: ap, quad set, glut set, straight leg raise, hip ab/adduction, short arc quad, heel slide with active assist       Patient left HOB elevated with all lines intact and call button in reach..    GOALS:   Multidisciplinary Problems       Physical Therapy Goals          Problem: Physical Therapy    Goal Priority Disciplines Outcome Interventions   Physical Therapy Goal     PT, PT/OT Not Progressing    Description: Short Term Goals to be met by: 25    Patient will increase functional independence with mobility by performin. Supine to sit with Moderate Assist  2. Sit to stand transfer with Maximal Assist using Rolling walker  3. Bed to chair transfer with Maximal Assist using lowest level of assistive device  4. Rolling side to side with Min A  5. Lower extremity exercise program x30 reps per handout, with assistance as needed    Long Term Goals to be met by: 25    Pt will regain full independent functional mobility with lowest level of assistive device to return to home situation and prior activities of daily living.                        DME Justifications:      Time Tracking:     PT Received On: 25  PT Start Time: 851     PT Stop Time: 912  PT Total Time (min): 21 min     Billable Minutes: Therapeutic Exercise 15    Treatment Type: Treatment  PT/PTA: PTA     Number of PTA visits since last PT visit: 2025

## 2025-05-12 NOTE — PLAN OF CARE
Chart review. Family meeting planned for Tuesday at 1 to discuss prognosis and hospice. Med adjustments noted. Ng tube feedings. Nephrology is following. Labs being monitored.   Ziyad spoke with [facility] at [facility]. She is unsure if they would be able to provide hospice. She will check and let cm know.

## 2025-05-12 NOTE — ASSESSMENT & PLAN NOTE
"Patient's FSGs are controlled on current medication regimen.  Last A1c reviewed-   Lab Results   Component Value Date    HGBA1C 6.7 04/02/2025     Most recent fingerstick glucose reviewed- No results for input(s): "POCTGLUCOSE" in the last 24 hours.  Current correctional scale  Low  Maintain anti-hyperglycemic dose as follows-   Antihyperglycemics (From admission, onward)      Start     Stop Route Frequency Ordered    05/11/25 2321  insulin glargine U-100 (Lantus) injection 25 Units         05/12/25 2059 SubQ Nightly 05/11/25 2322    05/01/25 1051  insulin aspart U-100 injection 0-10 Units         -- SubQ Every 6 hours PRN 05/01/25 0952          Hold Oral hypoglycemics while patient is in the hospital.  BG ranging up as tube feeds now to goal and D5 in bicarb drip, added Lantus and dose adjusted for tighter glycemic control.     "

## 2025-05-12 NOTE — PLAN OF CARE
Problem: Adult Inpatient Plan of Care  Goal: Patient-Specific Goal (Individualized)  Outcome: Progressing  Goal: Absence of Hospital-Acquired Illness or Injury  Outcome: Progressing  Goal: Optimal Comfort and Wellbeing  Outcome: Progressing  Goal: Readiness for Transition of Care  Outcome: Progressing     Problem: Diabetes Comorbidity  Goal: Blood Glucose Level Within Targeted Range  Outcome: Progressing     Problem: Acute Kidney Injury/Impairment  Goal: Fluid and Electrolyte Balance  Outcome: Progressing     Problem: Bariatric Environmental Safety  Goal: Safety Maintained with Care  Outcome: Progressing     Problem: Infection  Goal: Absence of Infection Signs and Symptoms  Outcome: Progressing     Problem: Wound  Goal: Optimal Coping  Outcome: Progressing     Problem: Skin Injury Risk Increased  Goal: Skin Health and Integrity  Outcome: Progressing     Problem: Sepsis/Septic Shock  Goal: Blood Glucose Level Within Targeted Range  Outcome: Progressing     Problem: Gas Exchange Impaired  Goal: Optimal Gas Exchange  Outcome: Progressing

## 2025-05-12 NOTE — ASSESSMENT & PLAN NOTE
Body mass index is 46.74 kg/m². Morbid obesity complicates all aspects of disease management from diagnostic modalities to treatment. Weight loss encouraged and health benefits explained to patient.

## 2025-05-12 NOTE — PROGRESS NOTES
[facility] Medicine  Progress Note    Patient Name: Magan Saleem  MRN: [id number]  Patient Class: IP- Inpatient   Admission Date: 4/26/2025  Length of Stay: 16 days  Attending Physician: Maria Teresa Conway MD  Primary Care Provider: Sil Brown DO        Subjective     Principal Problem:Pneumonitis        HPI:  68 yo F presents to [facility] ED from [facility] for abnormal labs.  Patient was discharged from [facility] two days ago to skilled nursing facility for rehab.  She was diagnosed with dehydration due to gastroparesis with UTI.  Patient has metastatic lung cancer (to the brain) and follows with Dr. Swanson for IV chemotherapy every other week and takes lazertinib daily.  She has completed her course of radiation for the brain mets and follow had showed some improvement.  I thought she had either some decreased LOC due to encephalopathy or some aphasia from the brain mets but after a great effort to get her to talk, I realized she was just mad.  She wanted to know why she had been discharged two days ago when she could not eat.  She has no appetite and early satiety.  She is not nauseated and no vomiting.  She has decided on a DNR status previously (her caretaker and friend of 20 years) states that she had always said she did not want resuscitation if she experienced cardiopulmonary arrest and had told her children her wishes but she does want treatment and is not opposed to J tube if needed.  She has not had anything to eat or drink in two days and is dehydrated again.       Patient was transferred because of concern of sepsis.  She did have enterococcus faecalis UTI at [facility] and was treated.  I do not have the culture results but cultures were sent and patient does have some yeast noted.  (Will not treat a yeast colonization).  She is afebrile and hemodynamically stable and does not look septic clinically.  Her Lactic acid is stable at 2.5 dara 3.2.  Will check  another in am after volume resuscitation.  Her creat is at baseline but she has prerenal azotemia further confirming the dehydration.  Patient has an IO in place from [facility] due to dehydration and difficult stick but with some volume resuscitation, we have gotten an IV.  She is covid and flu negative.       Her WBC is 29 and I am not sure if she has received neupogen but could be a stress reaction.  Her BS is 245 and she does have some urine ketones but most likely due to starvation ketosis.  Will check a serum ketone.  Her platelets are 88K but this is most likely from her chemo.  She is not anemic.  CXR shows some patchy infiltrate but no obvious obstructive pneumonia from her lung cancer.  Her BNP about 3K but no clinical signs of CHF.  No recent echo but due to her BMI 50 most likely has some PHTN.  EKG had no ischemic changes but tachy at 114 which could also be from dehydration.  She was on ozempic for her DM and this could be the cause of her decreased appetite.  She is also on decadron for prevention of cerebral edema.       Remainder of ROS as below.  She mostly just nods and shakes her head and rolls her eyes.  You can get her to laugh if you try but she has been depressed since she has not walked since her hospitalization at [facility] on 3/19/25 and was discharged two days ago.  She says that at her last chemo she noticed she was getting weaker and her daughter had to help her in and out of the car and that was new for her.      4/5- patient seen examined today resting comfortably in bed and in no acute distress, with no acute events overnight.  Patient has a multitude of issues complicating her medical picture.  White blood cell count 01713 today, sodium 159, potassium 3.2 and BUN creatinine 59 and 1.8.  The patient is still not eating even when assistance is provided.  We have already changed her fluids to half-normal saline with 20 of KCl at 1:25 a.m..  Have also put in a GI consult for possible feeding tube.   E coli in the urine has turned out to be ESBL and Rocephin has been changed Merrem.     Hospital Course at [facility]:    4/6- the patient seen examined today resting comfortably in bed, in no acute distress, in no acute events overnight.  The patient is not very talkative today, but states she is doing okay.  She has no acute complaints.  Blood cultures remain negative.  The patient has not eaten since yesterday and is on light IV fluids.  GI has been consulted for feeding tube.  Continues on Merrem for positive urine culture.  04/07 Records reviewed. Not eating which not new, Similar during recent admit at [facility]. Dr Swanson there had question pancreatitis. No abd pain or tenderness reported now. Question of dysphagia to solids. Chart hx gastroparesis. Will discuss with GI. Ronnie says has responded so far well to treatment for lung CA.   04/08 had EGD at [facility] 03/21/25 with no obstruction and evidence gastroparesis. Still not ending. Try additional meds. Talked with GI. Increase activity  04/09 Some better with med  changes  04/10 Continues to do better. Ate 1/2 fish sandwich for lunch. Called by Dr Swanson and she wanting to keep feeding tube in consideration. Talked with Dr Reich and Dr Lemus. If something needed with gastroparesis would have to have post gastric position of tube.   04/11 eating some. Later staff requested some nystatin for sore mouth.   04/12 still not eating well.  Increased peripheral edema.  Albumin low at 1.5.  Will give some albumin and follow with some Lasix.  Placed Dobbhoff tube and start enteral feedings.  This will help with nutrition and if issue of placing surgical tube later make sure will tolerate enteral feedings.  Chest x-ray continues worse on left side.  Discuss with Pulmonary and ask Dr. Villasenor to see.   04/13 no new issues.  Enteral feedings to be started.  Pulmonary evaluation appreciated and plans noted.  04/14 Tolerating feedings Therapy working with her. Bronchoscopy  planned.   4/15 BAL today  4/16 bronch yesterday looks like pneumonitis  4/17 not candidate for PEG  04/18 Eating some now. Pt says feels some better than last seen. Look at placement. High dose steroids by pulmonary. BS not as bad as would expect.  04/19 states continued eat some.  Less nausea.  Blood sugar not sure bad considering the steroids.  Pulmonary adjusted antibiotics based on cultures.  Look at it placement next week.   04/20 Looks the small. C/O SOB to nursing staff earlier but ABG OK. Poor oral intake ; encourage for pt to do more. Looking into placement.  04/21 Firm tender area in abd wall above umbilicus; ?hernia. Abnormal CT de santiago noted and will discuss with surgery.   04/22 CT shows evolving pancreatitis which pt treated for acouple weeks earlier at [facility]. Reviewed with Dr Tapia. No plan to operate on ventral hernia. Discussed later with Dr York. See how does off IVF and encourage oral intake. WBC and Cr both slight better.   04/23 Looks this same. Pt eating some. Pt getting discouraged and asking about home hospice. Talked by phone with her friend Shauna. In conversion doesn't sound like family would be able to care for pt at home. Encouraged pt to give some time and attempt SWB. She says she with consider tonight.   04/24 Lab worse but pt looks some better. Still not taking in well. Maybe eat better as pancreatitis further resolves. Considering replacing dobbhoff feeding tube and look into move to [facility]. Ask Dr Frias to review renal situation. Maybe pancreatitis,pneumonia,renal failure, all these might make a big difference if resolved. Talked with pt and she was OK with NGT  04/25 patient's spirits seems to be doing some better.  Dobbhoff tube placed in feedings to be started.  To be moved to LTAC.  No other new issue  04/26 Awaiting bed assignment at [facility]. No new complains.     Overview/Hospital Course:  4/28- BG trending up, added Lantus and adjusted dose. D/W nutritionist about changing  feeds to allow for more PO intake.     4/29- BG better now. Off IV amiodarone infusion, remains in NSR. Encourage PO intake.     4/30- Continue to adjust insulin to get BG under 200, ideally 140-180. Checking CT chest, abdomen and pelvis today given persistent leukocytosis.     5/1- Discussing with surgery about fluid collections in the abdomen noted on CT (slightly increased in size), also asking if PEG is an option as previously deemed not a candidate. Nephrology has added bicarb infusion. Continue current management otherwise.     5/2- IV fluids started per nephro. WBC count remains elevated.     5/3- Started GOC conversations with the patient who is alert, asked her if her family can be with her or her daughter who she is closest to. She said she will try to talk to them.     5/4- Discussed poor prognosis with the patient. Labs are essentially unchanged. Repeat blood cultures ordered.     5/5   - hypotensive overnight, given fluids, minimal response  - discussed with pulm no additional recs  - renal function still elevated, nephro following  - going to try and reach out to Dr. Swanson    5/6  - refusing feeds and not eating  - stopped lactic acid drawing  -- will discuss goals of care again with family    5/7  - discussed with oncology who will come see patient tomorrow  - reviewed labs and decided to consult tele-ID as continues with leukocytosis with bandemia that likely isnt reactive response to steroids, elevated lactic acid, continues on fluids  - nephrology following, renal function noted  - overall goals of care pending patient's discussion with oncology      5/8  - all consult notes reviewed  - gi recommendation noted  - will discuss with family and patient about goals of care    5/9  - discussed with all consults, will call a family meeting to discuss goals of care    5/10  - new wounds noted, severe weeping of current wounds as well  - bp low today, fluid flushes at max, will give albumin for secondary  resuscitation   - attempted to call family 3 times, no answer, need to setup family meeting for hospice talk    5/11  - patient declining, hypotensive however asymptomatic, will add midodrine  - planning for family meeting Tuesday at 1 pm    5/12  - planning for family meeting tomorrow at 1  - patient with hypoglycemia and hypotension/hypothermia today, overall condition poor, stopped lantus, amy hugger applied  - hgb low with hypotension, treating as symptomatic, transfuse one unit    Interval History:     Review of Systems   Constitutional:  Positive for appetite change.   Gastrointestinal:  Positive for abdominal pain.   All other systems reviewed and are negative.    Objective:     Vital Signs (Most Recent):  Temp: (!) 95.6 °F (35.3 °C) (pt has amy-hugger in place, will continue monitoring temp) (05/12/25 1545)  Pulse: 77 (05/12/25 1346)  Resp: 10 (05/12/25 1346)  BP: (!) 159/60 (05/12/25 1545)  SpO2: 100 % (05/12/25 1346) Vital Signs (24h Range):  Temp:  [95.6 °F (35.3 °C)-97.6 °F (36.4 °C)] 95.6 °F (35.3 °C)  Pulse:  [] 77  Resp:  [10-23] 10  SpO2:  [10 %-100 %] 100 %  BP: ()/(45-96) 159/60     Weight: 127.4 kg (280 lb 13.9 oz)  Body mass index is 46.74 kg/m².    Intake/Output Summary (Last 24 hours) at 5/12/2025 1715  Last data filed at 5/12/2025 1226  Gross per 24 hour   Intake 662 ml   Output 200 ml   Net 462 ml         Physical Exam  Constitutional:       Appearance: She is obese. She is ill-appearing.   HENT:      Head: Normocephalic.      Mouth/Throat:      Mouth: Mucous membranes are dry.   Cardiovascular:      Rate and Rhythm: Normal rate.      Heart sounds: Normal heart sounds. No murmur heard.  Pulmonary:      Effort: No respiratory distress.      Breath sounds: Rhonchi present.      Comments: On 2L NC  Abdominal:      General: There is no distension.      Tenderness: There is abdominal tenderness.      Comments: Dobhoff in place   Skin:     Coloration: Skin is pale.   Neurological:       Mental Status: Mental status is at baseline.               Significant Labs: All pertinent labs within the past 24 hours have been reviewed.    Significant Imaging: I have reviewed all pertinent imaging results/findings within the past 24 hours.      Assessment & Plan  Acute kidney injury superimposed on stage 4 chronic kidney disease  JOHNATHON is likely due to pre-renal azotemia due to intravascular volume depletion. Baseline creatinine is ~ 2.0. Most recent creatinine and eGFR are listed below.  Recent Labs     05/10/25  0503 05/11/25  0507 05/12/25  0449   CREATININE 4.48* 4.42* 4.46*   EGFRNORACEVR 10* 10* 10*      Plan  - JOHNATHON is worsening now.   - Avoid nephrotoxins and renally dose meds for GFR listed above  - Monitor urine output, serial BMP, and adjust therapy as needed  - Nephrology consulted, started on Lasix, bicarb is to alkalinize the urine for hyperuricemia.   - At risk to require dialysis, discussed with the patient and she wants dialysis if indicated.     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  Pulmonology consulted, started on steroids and s/p bronchoscopy with normal findings, BAL culture with stenotrophomonas.   Steroid taper plan per Pulmonology, completed course of antibiotics.   Respiratory status is stable.     Infection due to Stenotrophomonas maltophilia  Sepsis due to pneumonia  Stenotrophomonas lower respiratory infection in this patient with multifocal infiltrates and consolidative opacities on CT Chest.   S/p 2 week course with Levaquin.   Leukocytosis persists, will follow procalcitonin levels Q48H.  Repeat CT chest stable.  Consider tele-ID consultation if leukocytosis does not improve.     Stenotrophomonas lower respiratory infection in this patient with multifocal infiltrates and consolidative opacities on CT Chest.   S/p 2 week course with Levaquin.   Leukocytosis persists, will follow procalcitonin levels Q48H.  Repeat CT chest  "stable.  Consider tele-ID consultation if leukocytosis does not improve.     5/7 consulted tele ID today  SVT (supraventricular tachycardia)  Went into SVT on 4/27, not responsive to multiple rounds of adenosine.  Started on amiodarone infusion after bolus, turned off on 4/29.  Cardiology consulted, no additional recommendations but could consider cardioversion if this becomes a recurrent and a refractory issue.   Continue beta blocker.   Monitor on tele.     Acute pancreatitis  Intra-abdominal fluid collection  CT abdomen/pelvis showed- Possible small fluid collection anterior to the pancreas measuring approximately 3.6 cm on axial 41. Probable complex collection slightly inferior to the pancreatic head extending to the right pelvis and right iliac fossa.     04/21 Abd fluid collection in area pancreas noted on CT abd, discussed with surgery  04/22 likely evolving pancreatitis dx a couple weeks ago at [facility], now with pseudocyst.  No severe epigastric pain.  04/26 Ongoing tube feeds and full liquid diet. If tolerates full liquid better, plan to advanced to soft foods.   04/30 Repeat CT abdomen ordered, showed the pancreas with multiple fluid collections anterior to the pancreas and within the root of the mesentery and extending into the anterior right pararenal space and right paracolic gutter.     5/1 D?W with [facility] (Dr. Tapia) regarding fluid collection. They believe this is sec to pancreatitis and nothing to do for now.     Gastroparesis  EGD by Dr. Lo during recent past admission at [facility]:  "EGD on 03/21/2025 shows LA class B erosive esophagitis but no pill esophagitis, nonerosive gastritis and retention of food and fluid suspicious for gastroparesis, due to narcotics and diabetes. "  GI consulted, unable to advance diet and poor candidate for PEG.  Dobhoff in place for feeds, continued on Reglan.   GI recommended surgical PEG due to body habitus, GS stated that patient is not a candidate for PEG based " "on co morbidities and poor prognosis.   Continue PPI.     Lung cancer metastatic to brain  DNR but not hospice.   Receives chemo and has finished radiation.    Follows with Dr. Swanson.    5/5 will attempt to contact Dr. Swanson  Goals of care, counseling/discussion  DNR confirmed.  Patient wants permanent feeding tube and dialysis if needed.   Encouraged patient to talk to her family about her prognosis.  Currently PEG is not option as mentioned above so nutrition could be a major factor in driving the hospice discussion on top of her underlying metastatic cancer.   Consider primary oncologist- Dr. Mata consultation to get an idea of her prognosis related to cancer as patient is relying on that to make further decisions.      5/11  - patient declining, refusing tube feeds at times, and overall appetite nonexistent  - discussed with several specialists including oncology and recommendations noted  - planning for family meeting Tuesday at 1 pm for hospice  Nonischemic nontraumatic myocardial injury  In the setting of SVT episodes.  Trend is flat, no chest pain and no ischemic changes noted on EKG.  No ischemic workup recommended per cardiology.     Hyperuricemia  Hyperphosphatemia  Defer management to nephrology- started on allopurinol and IV bicarb to alkalinize the urine.   Follow uric acid levels.     Essential hypertension  Patient's blood pressure range in the last 24 hours was: BP  Min: 99/47  Max: 159/60.The patient's inpatient anti-hypertensive regimen is listed below:  Current Antihypertensives       Plan  - BP is controlled, no changes needed to their regimen    Type 2 diabetes mellitus with hyperglycemia  Patient's FSGs are controlled on current medication regimen.  Last A1c reviewed-   Lab Results   Component Value Date    HGBA1C 6.7 04/02/2025     Most recent fingerstick glucose reviewed- No results for input(s): "POCTGLUCOSE" in the last 24 hours.  Current correctional scale  Low  Maintain " anti-hyperglycemic dose as follows-   Antihyperglycemics (From admission, onward)      Start     Stop Route Frequency Ordered    05/11/25 2321  insulin glargine U-100 (Lantus) injection 25 Units         05/12/25 2059 SubQ Nightly 05/11/25 2322    05/01/25 1051  insulin aspart U-100 injection 0-10 Units         -- SubQ Every 6 hours PRN 05/01/25 0952          Hold Oral hypoglycemics while patient is in the hospital.  BG ranging up as tube feeds now to goal and D5 in bicarb drip, added Lantus and dose adjusted for tighter glycemic control.     Normocytic anemia  Anemia is likely due to chronic disease due to Malignancy. Most recent hemoglobin and hematocrit are listed below.  Recent Labs     05/12/25  0449   HGB 6.6*   HCT 20.6*     Plan  - Monitor serial CBC: Daily  - Transfuse PRBC if patient becomes hemodynamically unstable, symptomatic or H/H drops below 7/21.  - Patient has not received any PRBC transfusions to date  - Patient's anemia is currently stable  - No evidence of bleeding.    Neoplastic (malignant) related fatigue  Patient with Acute on chronic debility due to neoplastic/malignant related fatigue. Latest AMPAC and GEMS scores have been reviewed. Evaluation for etiology is complete. Plan includes - Progressive mobility protocol initated  - PT/OT consulted  - Fall precautions in place.    Chronic pulmonary embolism without acute cor pulmonale  Eliquis was discontinued due to risk of ICH with brain mets.  Currently on hold in case surgical procedure is required.    Morbid obesity  Body mass index is 46.74 kg/m². Morbid obesity complicates all aspects of disease management from diagnostic modalities to treatment. Weight loss encouraged and health benefits explained to patient.     Moderate persistent asthma without complication  Stable without exacerbation.  Continue PRN nebs.     Edema due to hypoalbuminemia  Required albumin infusion intermittently during the stay.     Mixed hyperlipidemia  Resume statin.      Pancreatic pseudocyst      History of pancreatitis      Acute pancreatic fluid collection      VTE Risk Mitigation (From admission, onward)      None            Discharge Planning   MITUL: 5/12/2025     Code Status: DNR   Medical Readiness for Discharge Date:   Discharge Plan A: Skilled Nursing Facility                Please place Justification for DME        Maria Teresa Conway MD  Department of Hospital Medicine   [facility]

## 2025-05-12 NOTE — PROGRESS NOTES
[facility]  Nephrology  Progress Note    Patient Name: Magan Saleem  MRN: [id number]  Admission Date: 4/26/2025  Hospital Length of Stay: 15 days  Attending Provider: Maria Teresa Conway MD   Primary Care Physician: Sil Brown DO  Principal Problem:Pneumonitis    Subjective:     HPI: No notes on file    Interval History: The patient is frail.  Family is at the bedside.    Review of patient's allergies indicates:   Allergen Reactions    Penicillins Hives    Sulfa (sulfonamide antibiotics) Hives     Current Facility-Administered Medications   Medication Frequency    acetaminophen suppository 650 mg Q6H PRN    acetaminophen tablet 1,000 mg Q6H PRN    albuterol-ipratropium 2.5 mg-0.5 mg/3 mL nebulizer solution 3 mL Q4H PRN    allopurinoL tablet 300 mg Daily    aluminum & magnesium hydroxide-simethicone 400-400-40 mg/5 mL suspension 30 mL Q6H PRN    atorvastatin tablet 10 mg Daily    bisacodyL EC tablet 10 mg Daily PRN    dextromethorphan-guaiFENesin  mg/5 ml liquid 10 mL Q6H PRN    dextrose 50% injection 12.5 g PRN    dextrose 50% injection 25 g PRN    diphenhydrAMINE capsule 50 mg Q6H PRN    docusate sodium capsule 100 mg BID PRN    glucagon (human recombinant) injection 1 mg PRN    glucose chewable tablet 16 g PRN    glucose chewable tablet 24 g PRN    heparin (porcine) injection 5,000 Units Q8H    insulin aspart U-100 injection 0-10 Units Q6H PRN    insulin glargine U-100 (Lantus) injection 25 Units QHS    [START ON 5/12/2025] insulin glargine U-100 (Lantus) injection 50 Units QHS    Lactobacillus acidophilus capsule 2 capsule TID WM    melatonin tablet 6 mg Nightly PRN    metoclopramide HCl tablet 5 mg QID    miconazole NITRATE 2 % top powder BID    [START ON 5/12/2025] midodrine tablet 10 mg TID WM    montelukast tablet 10 mg QHS    ondansetron injection 8 mg Q6H PRN    oxyCODONE immediate release tablet 5 mg Q6H PRN    pantoprazole EC tablet 40 mg Daily    predniSONE  tablet 20 mg Daily    Followed by    [START ON 5/18/2025] predniSONE tablet 10 mg Daily    rOPINIRole tablet 0.25 mg TID    sertraline tablet 25 mg Daily    traZODone tablet 50 mg Nightly PRN       Objective:     Vital Signs (Most Recent):  Temp: 97.6 °F (36.4 °C) (05/11/25 1915)  Pulse: 94 (05/11/25 2100)  Resp: 19 (05/11/25 2050)  BP: (!) 119/45 (05/11/25 2000)  SpO2: 100 % (05/11/25 2100) Vital Signs (24h Range):  Temp:  [94.1 °F (34.5 °C)-97.6 °F (36.4 °C)] 97.6 °F (36.4 °C)  Pulse:  [] 94  Resp:  [11-26] 19  SpO2:  [93 %-100 %] 100 %  BP: ()/(32-58) 119/45     Weight: 131.9 kg (290 lb 12.6 oz) (05/11/25 0530)  Body mass index is 48.39 kg/m².  Body surface area is 2.46 meters squared.    I/O last 3 completed shifts:  In: 1056 [P.O.:60; I.V.:91; NG/GT:905]  Out: 400 [Urine:400]     Physical Exam  Vitals reviewed.   Constitutional:       Appearance: She is ill-appearing.   HENT:      Head: Normocephalic and atraumatic.   Cardiovascular:      Rate and Rhythm: Regular rhythm.   Pulmonary:      Effort: Pulmonary effort is normal.      Breath sounds: Rhonchi present.   Abdominal:      Palpations: Abdomen is soft.   Skin:     General: Skin is warm.          Significant Labs:  BMP:   Recent Labs   Lab 05/07/25  0401 05/08/25  0345 05/11/25  0507   *   < > 89   *   < > 128*   K 4.4   < > 3.3*   CL 86*   < > 83*   CO2 27   < > 27   BUN >125*   < > >125*   CREATININE 4.57*   < > 4.42*   CALCIUM 7.8*   < > 6.9*   MG 2.3  --   --     < > = values in this interval not displayed.     CBC:   Recent Labs   Lab 05/09/25  0321   WBC 30.95*   RBC 2.43*   HGB 7.2*   HCT 21.9*   *   MCV 90.1   MCH 29.6   MCHC 32.9        Significant Imaging:  Labs: Reviewed  Assessment/Plan:     Renal/  Acute kidney injury superimposed on stage 4 chronic kidney disease  Continue to monitor  No NSAIDS        Thank you for your consult. I will follow-up with patient. Please contact us if you have any additional  questions.    Familia Iglesias Jr, MD  Nephrology  [facility]

## 2025-05-12 NOTE — ASSESSMENT & PLAN NOTE
CT abdomen/pelvis showed- Possible small fluid collection anterior to the pancreas measuring approximately 3.6 cm on axial 41. Probable complex collection slightly inferior to the pancreatic head extending to the right pelvis and right iliac fossa.     04/21 Abd fluid collection in area pancreas noted on CT abd, discussed with surgery  04/22 likely evolving pancreatitis dx a couple weeks ago at [facility], now with pseudocyst.  No severe epigastric pain.  04/26 Ongoing tube feeds and full liquid diet. If tolerates full liquid better, plan to advanced to soft foods.   04/30 Repeat CT abdomen ordered, showed the pancreas with multiple fluid collections anterior to the pancreas and within the root of the mesentery and extending into the anterior right pararenal space and right paracolic gutter.     5/1 D?W with [facility] (Dr. Tapia) regarding fluid collection. They believe this is sec to pancreatitis and nothing to do for now.

## 2025-05-12 NOTE — NURSING
"Notified Rahel LYNNE MS Saleem Glucose at 1704 registered 55.  At 1707 Dextros 12.5g IVP was administered.  Her recheck at 1731 registered 137.  I let him know that her NG feedings are at night only and they are being held presently while awaiting results from XRAY verifing placement.     2233 request Rahel LYNNE to read XRAY and verify placement of NG tube and give ok order to start  feeding.    2310 Rahel LYNNE replied  via secure chat, "it's in proper place."    2320:  contacted Pharmacy "[facility]" to reschedule 2100 Medications allowing me to give now.    0009: notified Rahel LYNNE via secure chat feeding restarted.  Glucose 62.  Administered 12.5 g dextros.  Asked if ok to hold Lantus    0011 Rahel LYNNE okayed to hold Lantus 25 units  lashell secure chat    0632 notified Man LYNNE by secure chat Lab reported Calcium 6.4       "

## 2025-05-12 NOTE — PT/OT/SLP PROGRESS
Occupational Therapy   Treatment    Name: Magan Saleem  MRN: [id number]  Admitting Diagnosis:  Pneumonitis       Recommendations:     Discharge Recommendations: Moderate Intensity Therapy  Discharge Equipment Recommendations:  to be determined by next level of care  Barriers to discharge:       Assessment:     Magan Saleem is a 69 y.o. female with a medical diagnosis of Pneumonitis. Performance deficits affecting function are weakness, impaired endurance, impaired self care skills.     Rehab Prognosis:  Fair; patient would benefit from acute skilled OT services to address these deficits and reach maximum level of function.       Plan:     Patient to be seen 5 x/week to address the above listed problems via self-care/home management, therapeutic activities, therapeutic exercises  Plan of Care Expires: 06/03/25  Plan of Care Reviewed with: patient    Subjective     Chief Complaint:   Patient/Family Comments/goals:   Pain/Comfort:  Pain Rating 1: 0/10    Objective:     Communicated with: Liza Mendez RN prior to session.  Patient found HOB elevated with blood pressure cuff, pulse ox (continuous), NG tube, peripheral IV upon OT entry to room.    General Precautions: Standard, fall    Orthopedic Precautions:N/A  Braces: N/A  Respiratory Status: Nasal cannula, flow 2 L/min     Occupational Performance:     Bed Mobility:         Functional Mobility/Transfers:    Functional Mobility:     Activities of Daily Living:        [facility] 6 Click ADL:      Treatment & Education:  Pt performed hand helper with 1 rubber band resistances 20 reps, aarom with 1 lb wt 15 reps x 2 B shld flex,abd/add,elbow flex/ext,sup/pron, wrist flex/ext, aa red t band 15 reps x 2 B elbow flex/ext    Patient left HOB elevated with all lines intact and call button in reach    GOALS:   Multidisciplinary Problems       Occupational Therapy Goals          Problem: Occupational Therapy    Goal Priority Disciplines Outcome Interventions   Occupational Therapy  Goal     OT, PT/OT Progressing    Description: STG:  Pt will perform grooming with setup and min a  Pt will bathe with mod a  Pt will perform UE dressing with mod a  Pt will perform LE dressing with mod a with AD as needed  Pt will sit EOB x 10 min with SBA   Pt will transfer bed/chair/bsc with mod a  Pt will perform standing task x 30 sec with mod a   Pt will tolerate 20 minutes of tx without fatigue      LT.Restore to max I with self care and mobility.                          DME Justifications:      Time Tracking:     OT Date of Treatment: 25  OT Start Time: 1013  OT Stop Time: 1043  OT Total Time (min): 30 min    Billable Minutes:Therapeutic Exercise 25    OT/SAROJ: SAROJ          2025

## 2025-05-12 NOTE — SUBJECTIVE & OBJECTIVE
Interval History: The patient is frail.  Family is at the bedside.    Review of patient's allergies indicates:   Allergen Reactions    Penicillins Hives    Sulfa (sulfonamide antibiotics) Hives     Current Facility-Administered Medications   Medication Frequency    acetaminophen suppository 650 mg Q6H PRN    acetaminophen tablet 1,000 mg Q6H PRN    albuterol-ipratropium 2.5 mg-0.5 mg/3 mL nebulizer solution 3 mL Q4H PRN    allopurinoL tablet 300 mg Daily    aluminum & magnesium hydroxide-simethicone 400-400-40 mg/5 mL suspension 30 mL Q6H PRN    atorvastatin tablet 10 mg Daily    bisacodyL EC tablet 10 mg Daily PRN    dextromethorphan-guaiFENesin  mg/5 ml liquid 10 mL Q6H PRN    dextrose 50% injection 12.5 g PRN    dextrose 50% injection 25 g PRN    diphenhydrAMINE capsule 50 mg Q6H PRN    docusate sodium capsule 100 mg BID PRN    glucagon (human recombinant) injection 1 mg PRN    glucose chewable tablet 16 g PRN    glucose chewable tablet 24 g PRN    heparin (porcine) injection 5,000 Units Q8H    insulin aspart U-100 injection 0-10 Units Q6H PRN    insulin glargine U-100 (Lantus) injection 25 Units QHS    [START ON 5/12/2025] insulin glargine U-100 (Lantus) injection 50 Units QHS    Lactobacillus acidophilus capsule 2 capsule TID WM    melatonin tablet 6 mg Nightly PRN    metoclopramide HCl tablet 5 mg QID    miconazole NITRATE 2 % top powder BID    [START ON 5/12/2025] midodrine tablet 10 mg TID WM    montelukast tablet 10 mg QHS    ondansetron injection 8 mg Q6H PRN    oxyCODONE immediate release tablet 5 mg Q6H PRN    pantoprazole EC tablet 40 mg Daily    predniSONE tablet 20 mg Daily    Followed by    [START ON 5/18/2025] predniSONE tablet 10 mg Daily    rOPINIRole tablet 0.25 mg TID    sertraline tablet 25 mg Daily    traZODone tablet 50 mg Nightly PRN       Objective:     Vital Signs (Most Recent):  Temp: 97.6 °F (36.4 °C) (05/11/25 1915)  Pulse: 94 (05/11/25 2100)  Resp: 19 (05/11/25 2050)  BP: (!)  119/45 (05/11/25 2000)  SpO2: 100 % (05/11/25 2100) Vital Signs (24h Range):  Temp:  [94.1 °F (34.5 °C)-97.6 °F (36.4 °C)] 97.6 °F (36.4 °C)  Pulse:  [] 94  Resp:  [11-26] 19  SpO2:  [93 %-100 %] 100 %  BP: ()/(32-58) 119/45     Weight: 131.9 kg (290 lb 12.6 oz) (05/11/25 0530)  Body mass index is 48.39 kg/m².  Body surface area is 2.46 meters squared.    I/O last 3 completed shifts:  In: 1056 [P.O.:60; I.V.:91; NG/GT:905]  Out: 400 [Urine:400]     Physical Exam  Vitals reviewed.   Constitutional:       Appearance: She is ill-appearing.   HENT:      Head: Normocephalic and atraumatic.   Cardiovascular:      Rate and Rhythm: Regular rhythm.   Pulmonary:      Effort: Pulmonary effort is normal.      Breath sounds: Rhonchi present.   Abdominal:      Palpations: Abdomen is soft.   Skin:     General: Skin is warm.          Significant Labs:  BMP:   Recent Labs   Lab 05/07/25  0401 05/08/25  0345 05/11/25  0507   *   < > 89   *   < > 128*   K 4.4   < > 3.3*   CL 86*   < > 83*   CO2 27   < > 27   BUN >125*   < > >125*   CREATININE 4.57*   < > 4.42*   CALCIUM 7.8*   < > 6.9*   MG 2.3  --   --     < > = values in this interval not displayed.     CBC:   Recent Labs   Lab 05/09/25  0321   WBC 30.95*   RBC 2.43*   HGB 7.2*   HCT 21.9*   *   MCV 90.1   MCH 29.6   MCHC 32.9        Significant Imaging:  Labs: Reviewed

## 2025-05-12 NOTE — ASSESSMENT & PLAN NOTE
Patient's blood pressure range in the last 24 hours was: BP  Min: 99/47  Max: 159/60.The patient's inpatient anti-hypertensive regimen is listed below:  Current Antihypertensives       Plan  - BP is controlled, no changes needed to their regimen

## 2025-05-12 NOTE — SUBJECTIVE & OBJECTIVE
Interval History:     Review of Systems   Constitutional:  Positive for appetite change.   Gastrointestinal:  Positive for abdominal pain.   All other systems reviewed and are negative.    Objective:     Vital Signs (Most Recent):  Temp: (!) 95.6 °F (35.3 °C) (pt has amy-hugger in place, will continue monitoring temp) (05/12/25 1545)  Pulse: 77 (05/12/25 1346)  Resp: 10 (05/12/25 1346)  BP: (!) 159/60 (05/12/25 1545)  SpO2: 100 % (05/12/25 1346) Vital Signs (24h Range):  Temp:  [95.6 °F (35.3 °C)-97.6 °F (36.4 °C)] 95.6 °F (35.3 °C)  Pulse:  [] 77  Resp:  [10-23] 10  SpO2:  [10 %-100 %] 100 %  BP: ()/(45-96) 159/60     Weight: 127.4 kg (280 lb 13.9 oz)  Body mass index is 46.74 kg/m².    Intake/Output Summary (Last 24 hours) at 5/12/2025 1715  Last data filed at 5/12/2025 1226  Gross per 24 hour   Intake 662 ml   Output 200 ml   Net 462 ml         Physical Exam  Constitutional:       Appearance: She is obese. She is ill-appearing.   HENT:      Head: Normocephalic.      Mouth/Throat:      Mouth: Mucous membranes are dry.   Cardiovascular:      Rate and Rhythm: Normal rate.      Heart sounds: Normal heart sounds. No murmur heard.  Pulmonary:      Effort: No respiratory distress.      Breath sounds: Rhonchi present.      Comments: On 2L NC  Abdominal:      General: There is no distension.      Tenderness: There is abdominal tenderness.      Comments: Dobhoff in place   Skin:     Coloration: Skin is pale.   Neurological:      Mental Status: Mental status is at baseline.               Significant Labs: All pertinent labs within the past 24 hours have been reviewed.    Significant Imaging: I have reviewed all pertinent imaging results/findings within the past 24 hours.

## 2025-05-12 NOTE — PROGRESS NOTES
[facility]  Nephrology  Progress Note    Patient Name: Magan Saleem  MRN: [id number]  Admission Date: 4/26/2025  Hospital Length of Stay: 16 days  Attending Provider: Maria Teresa Conway MD   Primary Care Physician: Sil Brown DO  Principal Problem:Pneumonitis    Consults  Subjective:     Interval History: The patient has no complaints today.    Review of patient's allergies indicates:   Allergen Reactions    Penicillins Hives    Sulfa (sulfonamide antibiotics) Hives     Current Facility-Administered Medications   Medication Frequency    0.9%  NaCl infusion (for blood administration) Q24H PRN    acetaminophen suppository 650 mg Q6H PRN    acetaminophen tablet 1,000 mg Q6H PRN    albuterol-ipratropium 2.5 mg-0.5 mg/3 mL nebulizer solution 3 mL Q4H PRN    allopurinoL tablet 300 mg Daily    aluminum & magnesium hydroxide-simethicone 400-400-40 mg/5 mL suspension 30 mL Q6H PRN    atorvastatin tablet 10 mg Daily    bisacodyL EC tablet 10 mg Daily PRN    dextromethorphan-guaiFENesin  mg/5 ml liquid 10 mL Q6H PRN    dextrose 50% injection 12.5 g PRN    dextrose 50% injection 25 g PRN    diphenhydrAMINE capsule 50 mg Q6H PRN    docusate sodium capsule 100 mg BID PRN    glucagon (human recombinant) injection 1 mg PRN    glucose chewable tablet 16 g PRN    glucose chewable tablet 24 g PRN    insulin aspart U-100 injection 0-10 Units Q6H PRN    insulin glargine U-100 (Lantus) injection 25 Units QHS    Lactobacillus acidophilus capsule 2 capsule TID WM    melatonin tablet 6 mg Nightly PRN    metoclopramide HCl tablet 5 mg QID    miconazole NITRATE 2 % top powder BID    midodrine tablet 10 mg TID WM    montelukast tablet 10 mg QHS    ondansetron injection 8 mg Q6H PRN    oxyCODONE immediate release tablet 5 mg Q6H PRN    pantoprazole EC tablet 40 mg Daily    predniSONE tablet 20 mg Daily    Followed by    [START ON 5/18/2025] predniSONE tablet 10 mg Daily    rOPINIRole tablet 0.25 mg  TID    sertraline tablet 25 mg Daily    traZODone tablet 50 mg Nightly PRN       Objective:     Vital Signs (Most Recent):  Temp: 97.3 °F (36.3 °C) (05/12/25 1611)  Pulse: 96 (05/12/25 1611)  Resp: 15 (05/12/25 1611)  BP: (!) 159/60 (05/12/25 1611)  SpO2: (!) 10 % (05/12/25 1611) Vital Signs (24h Range):  Temp:  [95.6 °F (35.3 °C)-97.6 °F (36.4 °C)] 97.3 °F (36.3 °C)  Pulse:  [] 96  Resp:  [10-23] 15  SpO2:  [10 %-100 %] 10 %  BP: ()/(45-96) 159/60     Weight: 127.4 kg (280 lb 13.9 oz) (05/12/25 0430)  Body mass index is 46.74 kg/m².  Body surface area is 2.42 meters squared.    I/O last 3 completed shifts:  In: 1507 [NG/GT:1507]  Out: 400 [Urine:400]    Physical Exam  Cardiovascular:      Heart sounds: No murmur heard.     No friction rub. No gallop.   Pulmonary:      Breath sounds: Rales present.   Abdominal:      General: Abdomen is protuberant. Bowel sounds are normal.      Palpations: Abdomen is soft.      Tenderness: There is abdominal tenderness.   Musculoskeletal:      Right lower leg: 3+ Edema present.      Left lower leg: 3+ Edema present.         Significant Labs:sureCBC:   Recent Labs   Lab 05/12/25 0449   WBC 22.48*   RBC 2.23*   HGB 6.6*   HCT 20.6*   *   MCV 92.4   MCH 29.6   MCHC 32.0     CMP:   Recent Labs   Lab 05/12/25 0449      CALCIUM 6.4*   ALBUMIN 1.5*   PROT 4.3*   *   K 3.2*   CO2 27   CL 82*   *   CREATININE 4.46*   ALKPHOS 135   ALT 32   AST 35   BILITOT 0.5     All labs within the past 24 hours have been reviewed.    Significant Imaging:      Assessment/Plan:     Active Diagnoses:    Diagnosis Date Noted POA    PRINCIPAL PROBLEM:  Pneumonitis [J98.4] 04/16/2025 Yes    Pancreatic pseudocyst [K86.3] 05/08/2025 Yes    History of pancreatitis [Z87.19] 05/08/2025 Not Applicable    Acute pancreatic fluid collection [K86.89] 05/08/2025 Yes    Goals of care, counseling/discussion [Z71.89] 05/03/2025 Not Applicable    Intra-abdominal fluid collection  [R18.8] 05/01/2025 Yes    SVT (supraventricular tachycardia) [I47.10] 04/27/2025 No    Nonischemic nontraumatic myocardial injury [I5A] 04/27/2025 No    Hyperphosphatemia [E83.39] 04/26/2025 Yes    Hyperuricemia [E79.0] 04/26/2025 Yes    Infection due to Stenotrophomonas maltophilia [A49.8] 04/22/2025 Yes    Acute pancreatitis [K85.90] 04/21/2025 Yes    Neoplastic (malignant) related fatigue [R53.0] 04/15/2025 Yes    Normocytic anemia [D64.9] 04/15/2025 Yes    Edema due to hypoalbuminemia [E88.09] 04/12/2025 Yes    Type 2 diabetes mellitus with hyperglycemia [E11.65] 04/04/2025 Yes    Sepsis due to pneumonia [J18.9, A41.9] 04/04/2025 Yes    Gastroparesis [K31.84]  Yes    Lung cancer metastatic to brain [C34.90, C79.31]  Yes    Morbid obesity [E66.01] 04/03/2025 Yes    Acute kidney injury superimposed on stage 4 chronic kidney disease [N17.9, N18.4]  Yes    Chronic pulmonary embolism without acute cor pulmonale [I27.82]  Yes    Moderate persistent asthma without complication [J45.40] 10/30/2024 Yes    Essential hypertension [I10] 06/30/2021 Yes    Mixed hyperlipidemia [E78.2] 06/30/2021 Yes      Problems Resolved During this Admission:       IMP:  Hyperuricemia persists  JOHNATHON  Hypokalemia  Hyponatremia  Anemia    Plan:  Potassium supplementation  Mg level    Thank you for your consult. I will follow-up with patient. Please contact us if you have any additional questions.    Mikey Velásquez MD  Nephrology  [facility]

## 2025-05-12 NOTE — ASSESSMENT & PLAN NOTE
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 Most recent hemoglobin and hematocrit are listed below.  Recent Labs     05/12/25  0449   HGB 6.6*   HCT 20.6*     Plan  - Monitor serial CBC: Daily  - Transfuse PRBC if patient becomes hemodynamically unstable, symptomatic or H/H drops below 7/21.  - Patient has not received any PRBC transfusions to date  - Patient's anemia is currently stable  - No evidence of bleeding.

## 2025-05-13 NOTE — PROGRESS NOTES
[facility]  Nephrology  Progress Note    Patient Name: Magan Saleem  MRN: [id number]  Admission Date: 4/26/2025  Hospital Length of Stay: 17 days  Attending Provider: Maria Teresa Conway MD   Primary Care Physician: Sil Brown DO  Principal Problem:Pneumonitis    Consults  Subjective:     Interval History: The patient denies having dyspnea.    Review of patient's allergies indicates:   Allergen Reactions    Penicillins Hives    Sulfa (sulfonamide antibiotics) Hives     Current Facility-Administered Medications   Medication Frequency    0.9%  NaCl infusion (for blood administration) Q24H PRN    acetaminophen suppository 650 mg Q6H PRN    acetaminophen tablet 1,000 mg Q6H PRN    albuterol-ipratropium 2.5 mg-0.5 mg/3 mL nebulizer solution 3 mL Q4H PRN    allopurinoL tablet 300 mg Daily    aluminum & magnesium hydroxide-simethicone 400-400-40 mg/5 mL suspension 30 mL Q6H PRN    atorvastatin tablet 10 mg Daily    bisacodyL EC tablet 10 mg Daily PRN    dextromethorphan-guaiFENesin  mg/5 ml liquid 10 mL Q6H PRN    dextrose 50% injection 12.5 g PRN    dextrose 50% injection 25 g PRN    diphenhydrAMINE capsule 50 mg Q6H PRN    docusate sodium capsule 100 mg BID PRN    glucagon (human recombinant) injection 1 mg PRN    glucose chewable tablet 16 g PRN    glucose chewable tablet 24 g PRN    insulin aspart U-100 injection 0-10 Units Q6H PRN    Lactobacillus acidophilus capsule 2 capsule TID WM    melatonin tablet 6 mg Nightly PRN    metoclopramide HCl tablet 5 mg QID    miconazole NITRATE 2 % top powder BID    midodrine tablet 10 mg TID WM    montelukast tablet 10 mg QHS    ondansetron injection 8 mg Q6H PRN    oxyCODONE immediate release tablet 5 mg Q6H PRN    pantoprazole EC tablet 40 mg Daily    predniSONE tablet 20 mg Daily    Followed by    [START ON 5/18/2025] predniSONE tablet 10 mg Daily    rOPINIRole tablet 0.25 mg TID    sertraline tablet 25 mg Daily    traZODone tablet 50 mg  Nightly PRN       Objective:     Vital Signs (Most Recent):  Temp: 96.5 °F (35.8 °C) (05/13/25 0800)  Pulse: 87 (05/13/25 0800)  Resp: 12 (05/13/25 0800)  BP: (!) 123/59 (05/13/25 0800)  SpO2: 100 % (05/13/25 0800) Vital Signs (24h Range):  Temp:  [95.5 °F (35.3 °C)-97.3 °F (36.3 °C)] 96.5 °F (35.8 °C)  Pulse:  [] 87  Resp:  [10-22] 12  SpO2:  [88 %-100 %] 100 %  BP: (111-131)/(45-91) 123/59     Weight: 126 kg (277 lb 12.5 oz) (05/13/25 0440)  Body mass index is 46.22 kg/m².  Body surface area is 2.4 meters squared.    I/O last 3 completed shifts:  In: 2240.5 [P.O.:120; Blood:512.5; NG/GT:1608]  Out: 1 [Urine:1]    Physical Exam  Constitutional:       Appearance: She is obese.   Cardiovascular:      Heart sounds: No murmur heard.     No friction rub.   Pulmonary:      Breath sounds: Rales present.   Abdominal:      General: Abdomen is protuberant. Bowel sounds are decreased.      Palpations: Abdomen is soft.      Tenderness: There is abdominal tenderness.   Musculoskeletal:      Right lower leg: 3+ Edema present.      Left lower leg: 3+ Edema present.   Neurological:      Mental Status: She is alert.         Significant Labs:sureCBC:   Recent Labs   Lab 05/13/25  0517   WBC 21.66*   RBC 2.73*   HGB 7.9*   HCT 24.8*   *   MCV 90.8   MCH 28.9   MCHC 31.9*     CMP:   Recent Labs   Lab 05/13/25 0517   *   CALCIUM 6.2*   ALBUMIN 1.4*   PROT 4.2*   *   K 3.2*   CO2 24   CL 82*   BUN >125*   CREATININE 4.51*   ALKPHOS 142   ALT 30   AST 34   BILITOT 0.5     All labs within the past 24 hours have been reviewed.    Significant Imaging:      Assessment/Plan:     Active Diagnoses:    Diagnosis Date Noted POA    PRINCIPAL PROBLEM:  Pneumonitis [J98.4] 04/16/2025 Yes    Pancreatic pseudocyst [K86.3] 05/08/2025 Yes    History of pancreatitis [Z87.19] 05/08/2025 Not Applicable    Acute pancreatic fluid collection [K86.89] 05/08/2025 Yes    Goals of care, counseling/discussion [Z71.89] 05/03/2025 Not  Applicable    Intra-abdominal fluid collection [R18.8] 05/01/2025 Yes    SVT (supraventricular tachycardia) [I47.10] 04/27/2025 No    Nonischemic nontraumatic myocardial injury [I5A] 04/27/2025 No    Hyperphosphatemia [E83.39] 04/26/2025 Yes    Hyperuricemia [E79.0] 04/26/2025 Yes    Infection due to Stenotrophomonas maltophilia [A49.8] 04/22/2025 Yes    Acute pancreatitis [K85.90] 04/21/2025 Yes    Neoplastic (malignant) related fatigue [R53.0] 04/15/2025 Yes    Normocytic anemia [D64.9] 04/15/2025 Yes    Edema due to hypoalbuminemia [E88.09] 04/12/2025 Yes    Type 2 diabetes mellitus with hyperglycemia [E11.65] 04/04/2025 Yes    Sepsis due to pneumonia [J18.9, A41.9] 04/04/2025 Yes    Gastroparesis [K31.84]  Yes    Lung cancer metastatic to brain [C34.90, C79.31]  Yes    Morbid obesity [E66.01] 04/03/2025 Yes    Acute kidney injury superimposed on stage 4 chronic kidney disease [N17.9, N18.4]  Yes    Chronic pulmonary embolism without acute cor pulmonale [I27.82]  Yes    Moderate persistent asthma without complication [J45.40] 10/30/2024 Yes    Essential hypertension [I10] 06/30/2021 Yes    Mixed hyperlipidemia [E78.2] 06/30/2021 Yes      Problems Resolved During this Admission:       IMP:  Hyponatremia   Hyperuricemia improving  JOHNATHON   Hypokalemia  Anemia  Plan:  D/c free water flushes  Continue potassium supplementation    Thank you for your consult. I will follow-up with patient. Please contact us if you have any additional questions.    Mikey Velásquez MD  Nephrology  [facility]

## 2025-05-13 NOTE — PLAN OF CARE
Ziyad visited patients room with dr wetzel. She spoke with patients niece, sister at bedside and daughter was on the phone. Dr wetzel discussed patients overall condition and prognosis. She discussed options as far as comfort and hospice or continue with current plan of care. Family will discuss among themselves and dr wetzel will follow up with patient and family.   Ziyad has spoken with [facility] at [facility] and they are willing to accept patient for comfort/ hospice but patient can not go with ng tube feeding. Cm notified patients family of their options for comfort care if desired. Ziyad will follow.

## 2025-05-13 NOTE — PLAN OF CARE
Problem: Physical Therapy  Goal: Physical Therapy Goal  Description: Short Term Goals to be met by: 25    Patient will increase functional independence with mobility by performin. Supine to sit with Moderate Assist  2. Sit to stand transfer with Maximal Assist using Rolling walker  3. Bed to chair transfer with Maximal Assist using lowest level of assistive device  4. Rolling side to side with Min A  5. Lower extremity exercise program x30 reps per handout, with assistance as needed    Long Term Goals to be met by: 25    Pt will regain full independent functional mobility with lowest level of assistive device to return to home situation and prior activities of daily living.   Outcome: Not Progressing       Bed Mobility:     Rolling Left:  maximal assistance   Rolling Right: maximal assistance   Supine to Sit: maximal assistance, of 2 persons, and assist with trunk and B LE management  Sit to Supine: maximal assistance, of 2 persons, and assist with trunk and B LE management  Bed to Chair: dependent with maxim lift  Balance: minimum to contact guard assist sitting EOB    Pt cont to require increased assist with all functional mobility, no progress is noted

## 2025-05-13 NOTE — PT/OT/SLP PROGRESS
Occupational Therapy   Treatment    Name: Magan Saleem  MRN: [id number]  Admitting Diagnosis:  Pneumonitis       Recommendations:     Discharge Recommendations: Moderate Intensity Therapy  Discharge Equipment Recommendations:  to be determined by next level of care  Barriers to discharge:       Assessment:     Magan Saleem is a 69 y.o. female with a medical diagnosis of Pneumonitis. Performance deficits affecting function are weakness, impaired endurance, impaired self care skills.     Rehab Prognosis:  Fair; patient would benefit from acute skilled OT services to address these deficits and reach maximum level of function.       Plan:     Patient to be seen 5 x/week to address the above listed problems via self-care/home management, therapeutic activities, therapeutic exercises  Plan of Care Expires: 06/03/25  Plan of Care Reviewed with: patient    Subjective     Chief Complaint:   Patient/Family Comments/goals:   Pain/Comfort:  Pain Rating 1: 0/10    Objective:     Communicated with: Digna Greer RN prior to session.  Patient found HOB elevated with blood pressure cuff, telemetry, peripheral IV, pulse ox (continuous), NG tube upon OT entry to room.    General Precautions: Standard, fall    Orthopedic Precautions:N/A  Braces: N/A  Respiratory Status: Nasal cannula, flow 2 L/min     Occupational Performance:     Bed Mobility:         Functional Mobility/Transfers:    Functional Mobility:     Activities of Daily Living:        [facility] [address] 6 Click ADL:      Treatment & Education:  Pt performed hand helper with 1 rubber band resistances 20 reps x 2, aarom with 1 lb wt 15 reps x 2 B shld flex,elbow flex/ext,abd/add,sup/pron, wrist flex/ext, red t band ex's 10 reps x 2 B elbow flex       Patient left HOB elevated with all lines intact and call button in reach    GOALS:   Multidisciplinary Problems       Occupational Therapy Goals          Problem: Occupational Therapy    Goal Priority Disciplines Outcome Interventions    Occupational Therapy Goal     OT, PT/OT Progressing    Description: STG:  Pt will perform grooming with setup and min a  Pt will bathe with mod a  Pt will perform UE dressing with mod a  Pt will perform LE dressing with mod a with AD as needed  Pt will sit EOB x 10 min with SBA   Pt will transfer bed/chair/bsc with mod a  Pt will perform standing task x 30 sec with mod a   Pt will tolerate 20 minutes of tx without fatigue      LT.Restore to max I with self care and mobility.                          DME Justifications:      Time Tracking:     OT Date of Treatment: 25  OT Start Time: 955  OT Stop Time:   OT Total Time (min): 30 min    Billable Minutes:Therapeutic Exercise 25    OT/SAROJ: SAROJ          2025

## 2025-05-13 NOTE — NURSING
0713  Patient resting in bed with eyes closed. Resp even and unlabored. Call bell in reach. Dobhhoff present. Left picc in place. Safety measures intact. No complaints. Will continue to monitor.     Patient family called and said they will be late for the 1pm meeting. Blanca SHAFER and Dr. Conway aware.

## 2025-05-13 NOTE — PT/OT/SLP PROGRESS
Physical Therapy Treatment    Patient Name:  Magan Saleem   MRN:  [id number]    Recommendations:     Discharge Recommendations: Moderate Intensity Therapy  Discharge Equipment Recommendations: to be determined by next level of care  Barriers to discharge: ongoing medical treatment    Assessment:     Magan Saleem is a 69 y.o. female admitted with a medical diagnosis of Pneumonitis.  She presents with the following impairments/functional limitations: weakness, impaired endurance, impaired self care skills, impaired functional mobility, impaired skin, edema.    Pt with fluctuating heart rate throughout treatment session (102-136 bpm) and labored breathing    Rehab Prognosis: Fair and Poor; patient would benefit from acute skilled PT services to address these deficits and reach maximum level of function.    Recent Surgery: * No surgery found *      Plan:     During this hospitalization, patient to be seen 5 x/week to address the identified rehab impairments via gait training, therapeutic activities, therapeutic exercises, neuromuscular re-education and progress toward the following goals:    Plan of Care Expires:  05/29/25    Subjective     Chief Complaint: Pneumonitis   Patient/Family Comments/goals: pt agreeable  Pain/Comfort:         Objective:     Communicated with Digna Greer RN prior to session.  Patient found HOB elevated with blood pressure cuff, pulse ox (continuous), telemetry, peripheral IV, NG tube, PureWick, oxygen upon PT entry to room.     General Precautions: Standard, fall, contact  Orthopedic Precautions: N/A  Braces: N/A  Respiratory Status: Nasal cannula, flow 3 L/min     Functional Mobility:  Bed Mobility:     Rolling Left:  maximal assistance and dependent for hygiene  Rolling Right: maximal assistance and dependent for hygiene  Supine to Sit: maximal assistance, of 2 persons, and assist with trunk and B LE management  Sit to Supine: maximal assistance, of 2 persons, and assist with trunk and  B LE mangement  Balance: minimum to contact guard assist sitting EOB x 15 minutes      AM-PAC 6 CLICK MOBILITY  Turning over in bed (including adjusting bedclothes, sheets and blankets)?: 2  Sitting down on and standing up from a chair with arms (e.g., wheelchair, bedside commode, etc.): 1  Moving from lying on back to sitting on the side of the bed?: 2  Moving to and from a bed to a chair (including a wheelchair)?: 2  Need to walk in hospital room?: 1  Climbing 3-5 steps with a railing?: 1  Basic Mobility Total Score: 9       Treatment & Education:  Pt performed 20 reps (B) LE exercises: ap, quad set, glut set, straight leg raise, hip ab/adduction,  heel slide with active assist to passive ROM      Patient left HOB elevated with all lines intact and call button in reach..    GOALS:   Multidisciplinary Problems       Physical Therapy Goals          Problem: Physical Therapy    Goal Priority Disciplines Outcome Interventions   Physical Therapy Goal     PT, PT/OT Not Progressing    Description: Short Term Goals to be met by: 25    Patient will increase functional independence with mobility by performin. Supine to sit with Moderate Assist  2. Sit to stand transfer with Maximal Assist using Rolling walker  3. Bed to chair transfer with Maximal Assist using lowest level of assistive device  4. Rolling side to side with Min A  5. Lower extremity exercise program x30 reps per handout, with assistance as needed    Long Term Goals to be met by: 25    Pt will regain full independent functional mobility with lowest level of assistive device to return to home situation and prior activities of daily living.                        DME Justifications:      Time Tracking:     PT Received On: 25  PT Start Time: 50     PT Stop Time: 930  PT Total Time (min): 40 min     Billable Minutes: Therapeutic Activity 25 and Therapeutic Exercise 13    Treatment Type: Treatment  PT/PTA: PTA     Number of PTA visits  since last PT visit: 2     05/13/2025

## 2025-05-14 PROBLEM — E87.6 HYPOKALEMIA: Status: ACTIVE | Noted: 2025-01-01

## 2025-05-14 PROBLEM — E87.1 HYPONATREMIA: Status: ACTIVE | Noted: 2025-01-01

## 2025-05-14 PROBLEM — D69.6 THROMBOCYTOPENIA: Status: ACTIVE | Noted: 2025-01-01

## 2025-05-14 NOTE — ASSESSMENT & PLAN NOTE
JOHNATHON is likely due to pre-renal azotemia due to intravascular volume depletion. Baseline creatinine is ~ 2.0. Most recent creatinine and eGFR are listed below.  Recent Labs     05/11/25  0507 05/12/25  0449 05/13/25  0517   CREATININE 4.42* 4.46* 4.51*   EGFRNORACEVR 10* 10* 10*      Plan  - JOHNATHON is worsening now.   - Avoid nephrotoxins and renally dose meds for GFR listed above  - Monitor urine output, serial BMP, and adjust therapy as needed  - Nephrology consulted, started on Lasix, bicarb is to alkalinize the urine for hyperuricemia.   - At risk to require dialysis, discussed with the patient and she wants dialysis if indicated.

## 2025-05-14 NOTE — ASSESSMENT & PLAN NOTE
"EGD by Dr. Lo during recent past admission at [facility]:  "EGD on 03/21/2025 shows LA class B erosive esophagitis but no pill esophagitis, nonerosive gastritis and retention of food and fluid suspicious for gastroparesis, due to narcotics and diabetes. "  GI consulted, unable to advance diet and poor candidate for PEG.  Dobhoff in place for feeds, continued on Reglan.   GI recommended surgical PEG due to body habitus, GS stated that patient is not a candidate for PEG based on co morbidities and poor prognosis.   Continue PPI.     "

## 2025-05-14 NOTE — ASSESSMENT & PLAN NOTE
Patient's blood pressure range in the last 24 hours was: BP  Min: 111/91  Max: 131/69.The patient's inpatient anti-hypertensive regimen is listed below:  Current Antihypertensives       Plan  - BP is controlled, no changes needed to their regimen

## 2025-05-14 NOTE — ASSESSMENT & PLAN NOTE
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 Most recent hemoglobin and hematocrit are listed below.  Recent Labs     05/12/25  0449 05/13/25  0517   HGB 6.6* 7.9*   HCT 20.6* 24.8*     Plan  - Monitor serial CBC: Daily  - Transfuse PRBC if patient becomes hemodynamically unstable, symptomatic or H/H drops below 7/21.  - Patient has not received any PRBC transfusions to date  - Patient's anemia is currently stable  - No evidence of bleeding.

## 2025-05-14 NOTE — ASSESSMENT & PLAN NOTE
CT abdomen/pelvis showed- Possible small fluid collection anterior to the pancreas measuring approximately 3.6 cm on axial 41. Probable complex collection slightly inferior to the pancreatic head extending to the right pelvis and right iliac fossa.     04/21 Abd fluid collection in area pancreas noted on CT abd, discussed with surgery  04/22 likely evolving pancreatitis dx a couple weeks ago at [facility], now with pseudocyst.  No severe epigastric pain.  04/26 Ongoing tube feeds and full liquid diet. If tolerates full liquid better, plan to advanced to soft foods.   04/30 Repeat CT abdomen ordered, showed the pancreas with multiple fluid collections anterior to the pancreas and within the root of the mesentery and extending into the anterior right pararenal space and right paracolic gutter.     5/1 D?W with [facility] (Dr. Tapia) regarding fluid collection. They believe this is sec to pancreatitis and nothing to do for now.

## 2025-05-14 NOTE — SUBJECTIVE & OBJECTIVE
Interval History:  No acute events overnight      Objective:     Vital Signs (Most Recent):  Temp: (!) 94.2 °F (34.6 °C) (05/14/25 0800)  Pulse: 82 (05/14/25 0800)  Resp: 11 (05/14/25 0800)  BP: (!) 141/66 (05/14/25 0800)  SpO2: 100 % (05/14/25 0800) Vital Signs (24h Range):  Temp:  [94.2 °F (34.6 °C)-96 °F (35.6 °C)] 94.2 °F (34.6 °C)  Pulse:  [] 82  Resp:  [11-18] 11  SpO2:  [96 %-100 %] 100 %  BP: (106-141)/(53-66) 141/66     Weight: 126 kg (277 lb 12.5 oz)  Body mass index is 46.22 kg/m².    Intake/Output Summary (Last 24 hours) at 5/14/2025 0920  Last data filed at 5/14/2025 0600  Gross per 24 hour   Intake --   Output 300 ml   Net -300 ml         Physical Exam  Constitutional:       Appearance: She is obese. She is ill-appearing.   HENT:      Head: Normocephalic.      Mouth/Throat:      Mouth: Mucous membranes are dry.   Cardiovascular:      Rate and Rhythm: Normal rate.      Heart sounds: Normal heart sounds. No murmur heard.  Pulmonary:      Effort: No respiratory distress.      Breath sounds: Rhonchi present.      Comments: On 2L NC  Abdominal:      General: There is no distension.      Tenderness: There is abdominal tenderness.      Comments: Dobhoff in place   Skin:     Coloration: Skin is pale.   Neurological:      Mental Status: Mental status is at baseline.               Significant Labs: All pertinent labs within the past 24 hours have been reviewed.    Significant Imaging: I have reviewed all pertinent imaging results/findings within the past 24 hours.

## 2025-05-14 NOTE — ASSESSMENT & PLAN NOTE
"Patient's FSGs are controlled on current medication regimen.  Last A1c reviewed-   Lab Results   Component Value Date    HGBA1C 6.7 04/02/2025     Most recent fingerstick glucose reviewed- No results for input(s): "POCTGLUCOSE" in the last 24 hours.  Current correctional scale  Low  Maintain anti-hyperglycemic dose as follows-   Antihyperglycemics (From admission, onward)      Start     Stop Route Frequency Ordered    05/11/25 2321  insulin glargine U-100 (Lantus) injection 25 Units         05/12/25 2059 SubQ Nightly 05/11/25 2322          Hold Oral hypoglycemics while patient is in the hospital.  BG ranging up as tube feeds now to goal and D5 in bicarb drip, added Lantus and dose adjusted for tighter glycemic control.     "

## 2025-05-14 NOTE — ASSESSMENT & PLAN NOTE
CT abdomen/pelvis showed- Possible small fluid collection anterior to the pancreas measuring approximately 3.6 cm on axial 41. Probable complex collection slightly inferior to the pancreatic head extending to the right pelvis and right iliac fossa.     04/21 Abd fluid collection in area pancreas noted on CT abd, discussed with surgery  04/22 likely evolving pancreatitis dx a couple weeks ago at [facility], now with pseudocyst.  No severe epigastric pain.  04/26 Ongoing tube feeds and full liquid diet. If tolerates full liquid better, plan to advanced to soft foods.   04/30 Repeat CT abdomen ordered, showed the pancreas with multiple fluid collections anterior to the pancreas and within the root of the mesentery and extending into the anterior right pararenal space and right paracolic gutter.     5/1 D?W with [facility] (Dr. Tapia) regarding fluid collection. They believe this is sec to pancreatitis and nothing to do for now.

## 2025-05-14 NOTE — ASSESSMENT & PLAN NOTE
DNR confirmed.  Patient wants permanent feeding tube and dialysis if needed.   Encouraged patient to talk to her family about her prognosis.  Currently PEG is not option as mentioned above so nutrition could be a major factor in driving the hospice discussion on top of her underlying metastatic cancer.   Consider primary oncologist- Dr. Mata consultation to get an idea of her prognosis related to cancer as patient is relying on that to make further decisions.      5/11  - patient declining, refusing tube feeds at times, and overall appetite nonexistent  - discussed with several specialists including oncology and recommendations noted  - planning for family meeting Tuesday at 1 pm for hospice    5/13  - moving to full comfort care

## 2025-05-14 NOTE — PROGRESS NOTES
Oncology    Patient was seen this afternoon.  Her sister was present.  Patient has been transitioned to comfort care.  She was drowsy but did awaken and talk a little.  She appears comfortable and voiced no specific complaints.  She anticipates continuing with inpatient hospice.     Ca Swanson MD, PharmD

## 2025-05-14 NOTE — ASSESSMENT & PLAN NOTE
CT abdomen/pelvis showed- Possible small fluid collection anterior to the pancreas measuring approximately 3.6 cm on axial 41. Probable complex collection slightly inferior to the pancreatic head extending to the right pelvis and right iliac fossa.     04/21 Abd fluid collection in area pancreas noted on CT abd, discussed with surgery  04/22 likely evolving pancreatitis dx a couple weeks ago at [facility], now with pseudocyst.  No severe epigastric pain.  04/26 Ongoing tube feeds and full liquid diet. If tolerates full liquid better, plan to advanced to soft foods.   04/30 Repeat CT abdomen ordered, showed the pancreas with multiple fluid collections anterior to the pancreas and within the root of the mesentery and extending into the anterior right pararenal space and right paracolic gutter.     5/1 D?W with [facility] (Dr. Tapia) regarding fluid collection. They believe this is sec to pancreatitis and nothing to do for now.

## 2025-05-14 NOTE — PROGRESS NOTES
[facility] Medicine  Progress Note    Patient Name: Magan Saleem  MRN: [id number]  Patient Class: IP- Inpatient   Admission Date: 4/26/2025  Length of Stay: 18 days  Attending Physician: Wally Lagos DO  Primary Care Provider: Sil Brown DO        Subjective     Principal Problem:Pneumonitis        HPI:  68 yo F presents to [facility] ED from [facility] for abnormal labs.  Patient was discharged from [facility] two days ago to skilled nursing facility for rehab.  She was diagnosed with dehydration due to gastroparesis with UTI.  Patient has metastatic lung cancer (to the brain) and follows with Dr. Swanson for IV chemotherapy every other week and takes lazertinib daily.  She has completed her course of radiation for the brain mets and follow had showed some improvement.  I thought she had either some decreased LOC due to encephalopathy or some aphasia from the brain mets but after a great effort to get her to talk, I realized she was just mad.  She wanted to know why she had been discharged two days ago when she could not eat.  She has no appetite and early satiety.  She is not nauseated and no vomiting.  She has decided on a DNR status previously (her caretaker and friend of 20 years) states that she had always said she did not want resuscitation if she experienced cardiopulmonary arrest and had told her children her wishes but she does want treatment and is not opposed to J tube if needed.  She has not had anything to eat or drink in two days and is dehydrated again.       Patient was transferred because of concern of sepsis.  She did have enterococcus faecalis UTI at [facility] and was treated.  I do not have the culture results but cultures were sent and patient does have some yeast noted.  (Will not treat a yeast colonization).  She is afebrile and hemodynamically stable and does not look septic clinically.  Her Lactic acid is stable at 2.5 dara 3.2.  Will check  another in am after volume resuscitation.  Her creat is at baseline but she has prerenal azotemia further confirming the dehydration.  Patient has an IO in place from [facility] due to dehydration and difficult stick but with some volume resuscitation, we have gotten an IV.  She is covid and flu negative.       Her WBC is 29 and I am not sure if she has received neupogen but could be a stress reaction.  Her BS is 245 and she does have some urine ketones but most likely due to starvation ketosis.  Will check a serum ketone.  Her platelets are 88K but this is most likely from her chemo.  She is not anemic.  CXR shows some patchy infiltrate but no obvious obstructive pneumonia from her lung cancer.  Her BNP about 3K but no clinical signs of CHF.  No recent echo but due to her BMI 50 most likely has some PHTN.  EKG had no ischemic changes but tachy at 114 which could also be from dehydration.  She was on ozempic for her DM and this could be the cause of her decreased appetite.  She is also on decadron for prevention of cerebral edema.       Remainder of ROS as below.  She mostly just nods and shakes her head and rolls her eyes.  You can get her to laugh if you try but she has been depressed since she has not walked since her hospitalization at [facility] on 3/19/25 and was discharged two days ago.  She says that at her last chemo she noticed she was getting weaker and her daughter had to help her in and out of the car and that was new for her.      4/5- patient seen examined today resting comfortably in bed and in no acute distress, with no acute events overnight.  Patient has a multitude of issues complicating her medical picture.  White blood cell count 31960 today, sodium 159, potassium 3.2 and BUN creatinine 59 and 1.8.  The patient is still not eating even when assistance is provided.  We have already changed her fluids to half-normal saline with 20 of KCl at 1:25 a.m..  Have also put in a GI consult for possible feeding tube.   E coli in the urine has turned out to be ESBL and Rocephin has been changed Merrem.     Hospital Course at [facility]:    4/6- the patient seen examined today resting comfortably in bed, in no acute distress, in no acute events overnight.  The patient is not very talkative today, but states she is doing okay.  She has no acute complaints.  Blood cultures remain negative.  The patient has not eaten since yesterday and is on light IV fluids.  GI has been consulted for feeding tube.  Continues on Merrem for positive urine culture.  04/07 Records reviewed. Not eating which not new, Similar during recent admit at [facility]. Dr Swanson there had question pancreatitis. No abd pain or tenderness reported now. Question of dysphagia to solids. Chart hx gastroparesis. Will discuss with GI. Ronnie says has responded so far well to treatment for lung CA.   04/08 had EGD at [facility] 03/21/25 with no obstruction and evidence gastroparesis. Still not ending. Try additional meds. Talked with GI. Increase activity  04/09 Some better with med  changes  04/10 Continues to do better. Ate 1/2 fish sandwich for lunch. Called by Dr Swanson and she wanting to keep feeding tube in consideration. Talked with Dr Reich and Dr Lemus. If something needed with gastroparesis would have to have post gastric position of tube.   04/11 eating some. Later staff requested some nystatin for sore mouth.   04/12 still not eating well.  Increased peripheral edema.  Albumin low at 1.5.  Will give some albumin and follow with some Lasix.  Placed Dobbhoff tube and start enteral feedings.  This will help with nutrition and if issue of placing surgical tube later make sure will tolerate enteral feedings.  Chest x-ray continues worse on left side.  Discuss with Pulmonary and ask Dr. Villasenor to see.   04/13 no new issues.  Enteral feedings to be started.  Pulmonary evaluation appreciated and plans noted.  04/14 Tolerating feedings Therapy working with her. Bronchoscopy  planned.   4/15 BAL today  4/16 bronch yesterday looks like pneumonitis  4/17 not candidate for PEG  04/18 Eating some now. Pt says feels some better than last seen. Look at placement. High dose steroids by pulmonary. BS not as bad as would expect.  04/19 states continued eat some.  Less nausea.  Blood sugar not sure bad considering the steroids.  Pulmonary adjusted antibiotics based on cultures.  Look at it placement next week.   04/20 Looks the small. C/O SOB to nursing staff earlier but ABG OK. Poor oral intake ; encourage for pt to do more. Looking into placement.  04/21 Firm tender area in abd wall above umbilicus; ?hernia. Abnormal CT de santiago noted and will discuss with surgery.   04/22 CT shows evolving pancreatitis which pt treated for acouple weeks earlier at [facility]. Reviewed with Dr Tapia. No plan to operate on ventral hernia. Discussed later with Dr York. See how does off IVF and encourage oral intake. WBC and Cr both slight better.   04/23 Looks this same. Pt eating some. Pt getting discouraged and asking about home hospice. Talked by phone with her friend Shauna. In conversion doesn't sound like family would be able to care for pt at home. Encouraged pt to give some time and attempt SWB. She says she with consider tonight.   04/24 Lab worse but pt looks some better. Still not taking in well. Maybe eat better as pancreatitis further resolves. Considering replacing dobbhoff feeding tube and look into move to [facility]. Ask Dr Frias to review renal situation. Maybe pancreatitis,pneumonia,renal failure, all these might make a big difference if resolved. Talked with pt and she was OK with NGT  04/25 patient's spirits seems to be doing some better.  Dobbhoff tube placed in feedings to be started.  To be moved to LTAC.  No other new issue  04/26 Awaiting bed assignment at [facility]. No new complains.     Overview/Hospital Course:  4/28- BG trending up, added Lantus and adjusted dose. D/W nutritionist about changing  feeds to allow for more PO intake.     4/29- BG better now. Off IV amiodarone infusion, remains in NSR. Encourage PO intake.     4/30- Continue to adjust insulin to get BG under 200, ideally 140-180. Checking CT chest, abdomen and pelvis today given persistent leukocytosis.     5/1- Discussing with surgery about fluid collections in the abdomen noted on CT (slightly increased in size), also asking if PEG is an option as previously deemed not a candidate. Nephrology has added bicarb infusion. Continue current management otherwise.     5/2- IV fluids started per nephro. WBC count remains elevated.     5/3- Started GOC conversations with the patient who is alert, asked her if her family can be with her or her daughter who she is closest to. She said she will try to talk to them.     5/4- Discussed poor prognosis with the patient. Labs are essentially unchanged. Repeat blood cultures ordered.     5/5   - hypotensive overnight, given fluids, minimal response  - discussed with pulm no additional recs  - renal function still elevated, nephro following  - going to try and reach out to Dr. Swanson    5/6  - refusing feeds and not eating  - stopped lactic acid drawing  -- will discuss goals of care again with family    5/7  - discussed with oncology who will come see patient tomorrow  - reviewed labs and decided to consult tele-ID as continues with leukocytosis with bandemia that likely isnt reactive response to steroids, elevated lactic acid, continues on fluids  - nephrology following, renal function noted  - overall goals of care pending patient's discussion with oncology      5/8  - all consult notes reviewed  - gi recommendation noted  - will discuss with family and patient about goals of care    5/9  - discussed with all consults, will call a family meeting to discuss goals of care    5/10  - new wounds noted, severe weeping of current wounds as well  - bp low today, fluid flushes at max, will give albumin for secondary  resuscitation   - attempted to call family 3 times, no answer, need to setup family meeting for hospice talk    5/11  - patient declining, hypotensive however asymptomatic, will add midodrine  - planning for family meeting Tuesday at 1 pm    5/12  - planning for family meeting tomorrow at 1  - patient with hypoglycemia and hypotension/hypothermia today, overall condition poor, stopped lantus, amy boone applied  - hgb low with hypotension, treating as symptomatic, transfuse one unit    5/13  - long discussion with family and patient, moving to full comfort care  - assessed overall condition and agree that patient qualifies for GIP and likely will pass in the immediate future, moving to comfort care here  - planning to pull NG tube tomorrow once daughter is able to be at bedside  - vitals q shift, comfort meds added  5/14 comfort care    Interval History:  No acute events overnight      Objective:     Vital Signs (Most Recent):  Temp: (!) 94.2 °F (34.6 °C) (05/14/25 0800)  Pulse: 82 (05/14/25 0800)  Resp: 11 (05/14/25 0800)  BP: (!) 141/66 (05/14/25 0800)  SpO2: 100 % (05/14/25 0800) Vital Signs (24h Range):  Temp:  [94.2 °F (34.6 °C)-96 °F (35.6 °C)] 94.2 °F (34.6 °C)  Pulse:  [] 82  Resp:  [11-18] 11  SpO2:  [96 %-100 %] 100 %  BP: (106-141)/(53-66) 141/66     Weight: 126 kg (277 lb 12.5 oz)  Body mass index is 46.22 kg/m².    Intake/Output Summary (Last 24 hours) at 5/14/2025 0920  Last data filed at 5/14/2025 0600  Gross per 24 hour   Intake --   Output 300 ml   Net -300 ml         Physical Exam  Constitutional:       Appearance: She is obese. She is ill-appearing.   HENT:      Head: Normocephalic.      Mouth/Throat:      Mouth: Mucous membranes are dry.   Cardiovascular:      Rate and Rhythm: Normal rate.      Heart sounds: Normal heart sounds. No murmur heard.  Pulmonary:      Effort: No respiratory distress.      Breath sounds: Rhonchi present.      Comments: On 2L NC  Abdominal:      General: There is  no distension.      Tenderness: There is abdominal tenderness.      Comments: Dobhoff in place   Skin:     Coloration: Skin is pale.   Neurological:      Mental Status: Mental status is at baseline.               Significant Labs: All pertinent labs within the past 24 hours have been reviewed.    Significant Imaging: I have reviewed all pertinent imaging results/findings within the past 24 hours.      Assessment & Plan  Acute kidney injury superimposed on stage 4 chronic kidney disease  JOHNATHON is likely due to pre-renal azotemia due to intravascular volume depletion. Baseline creatinine is ~ 2.0. Most recent creatinine and eGFR are listed below.  Recent Labs     05/12/25  0449 05/13/25  0517   CREATININE 4.46* 4.51*   EGFRNORACEVR 10* 10*      Plan  - JOHNATHON is worsening now.   - Avoid nephrotoxins and renally dose meds for GFR listed above  - Monitor urine output, serial BMP, and adjust therapy as needed  - Nephrology consulted, started on Lasix, bicarb is to alkalinize the urine for hyperuricemia.   - At risk to require dialysis, discussed with the patient and she wants dialysis if indicated.   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  Pulmonology consulted, started on steroids and s/p bronchoscopy with normal findings, BAL culture with stenotrophomonas.   Steroid taper plan per Pulmonology, completed course of antibiotics.   Respiratory status is stable.     Infection due to Stenotrophomonas maltophilia  Sepsis due to pneumonia  Stenotrophomonas lower respiratory infection in this patient with multifocal infiltrates and consolidative opacities on CT Chest.   S/p 2 week course with Levaquin.   Leukocytosis persists, will follow procalcitonin levels Q48H.  Repeat CT chest stable.  Consider tele-ID consultation if leukocytosis does not improve.     Stenotrophomonas lower respiratory infection in this patient with multifocal  "infiltrates and consolidative opacities on CT Chest.   S/p 2 week course with Levaquin.   Leukocytosis persists, will follow procalcitonin levels Q48H.  Repeat CT chest stable.  Consider tele-ID consultation if leukocytosis does not improve.     5/7 consulted tele ID today  SVT (supraventricular tachycardia)  Went into SVT on 4/27, not responsive to multiple rounds of adenosine.  Started on amiodarone infusion after bolus, turned off on 4/29.  Cardiology consulted, no additional recommendations but could consider cardioversion if this becomes a recurrent and a refractory issue.   Continue beta blocker.   Monitor on tele.     Acute pancreatitis  Intra-abdominal fluid collection  CT abdomen/pelvis showed- Possible small fluid collection anterior to the pancreas measuring approximately 3.6 cm on axial 41. Probable complex collection slightly inferior to the pancreatic head extending to the right pelvis and right iliac fossa.     04/21 Abd fluid collection in area pancreas noted on CT abd, discussed with surgery  04/22 likely evolving pancreatitis dx a couple weeks ago at [facility], now with pseudocyst.  No severe epigastric pain.  04/26 Ongoing tube feeds and full liquid diet. If tolerates full liquid better, plan to advanced to soft foods.   04/30 Repeat CT abdomen ordered, showed the pancreas with multiple fluid collections anterior to the pancreas and within the root of the mesentery and extending into the anterior right pararenal space and right paracolic gutter.     5/1 D?W with [facility] (Dr. Tapia) regarding fluid collection. They believe this is sec to pancreatitis and nothing to do for now.     Gastroparesis  EGD by Dr. Lo during recent past admission at [facility]:  "EGD on 03/21/2025 shows LA class B erosive esophagitis but no pill esophagitis, nonerosive gastritis and retention of food and fluid suspicious for gastroparesis, due to narcotics and diabetes. "  GI consulted, unable to advance diet and poor " candidate for PEG.  Dobhoff in place for feeds, continued on Reglan.   GI recommended surgical PEG due to body habitus, GS stated that patient is not a candidate for PEG based on co morbidities and poor prognosis.   Continue PPI.     Lung cancer metastatic to brain  DNR but not hospice.   Receives chemo and has finished radiation.    Follows with Dr. Swanson.    5/5 will attempt to contact Dr. Swanson  Goals of care, counseling/discussion  DNR confirmed.  Patient wants permanent feeding tube and dialysis if needed.   Encouraged patient to talk to her family about her prognosis.  Currently PEG is not option as mentioned above so nutrition could be a major factor in driving the hospice discussion on top of her underlying metastatic cancer.   Consider primary oncologist- Dr. Mata consultation to get an idea of her prognosis related to cancer as patient is relying on that to make further decisions.      5/11  - patient declining, refusing tube feeds at times, and overall appetite nonexistent  - discussed with several specialists including oncology and recommendations noted  - planning for family meeting Tuesday at 1 pm for hospice    5/13  - moving to full comfort care  Nonischemic nontraumatic myocardial injury  In the setting of SVT episodes.  Trend is flat, no chest pain and no ischemic changes noted on EKG.  No ischemic workup recommended per cardiology.     Hyperuricemia  Hyperphosphatemia  Defer management to nephrology- started on allopurinol and IV bicarb to alkalinize the urine.   Follow uric acid levels.     Essential hypertension  Patient's blood pressure range in the last 24 hours was: BP  Min: 106/53  Max: 141/66.The patient's inpatient anti-hypertensive regimen is listed below:  Current Antihypertensives       Plan  - BP is controlled, no changes needed to their regimen    Type 2 diabetes mellitus with hyperglycemia  Patient's FSGs are controlled on current medication regimen.  Last A1c reviewed-   Lab  "Results   Component Value Date    HGBA1C 6.7 04/02/2025     Most recent fingerstick glucose reviewed- No results for input(s): "POCTGLUCOSE" in the last 24 hours.  Current correctional scale  Low  Maintain anti-hyperglycemic dose as follows-   Antihyperglycemics (From admission, onward)      Start     Stop Route Frequency Ordered    05/11/25 2321  insulin glargine U-100 (Lantus) injection 25 Units         05/12/25 2059 SubQ Nightly 05/11/25 2322          Hold Oral hypoglycemics while patient is in the hospital.  BG ranging up as tube feeds now to goal and D5 in bicarb drip, added Lantus and dose adjusted for tighter glycemic control.     Normocytic anemia  Anemia is likely due to chronic disease due to Malignancy. Most recent hemoglobin and hematocrit are listed below.  Recent Labs     05/12/25  0449 05/13/25  0517   HGB 6.6* 7.9*   HCT 20.6* 24.8*     Plan  - Monitor serial CBC: Daily  - Transfuse PRBC if patient becomes hemodynamically unstable, symptomatic or H/H drops below 7/21.  - Patient has not received any PRBC transfusions to date  - Patient's anemia is currently stable  - No evidence of bleeding.    Neoplastic (malignant) related fatigue  Patient with Acute on chronic debility due to neoplastic/malignant related fatigue. Latest AMPAC and GEMS scores have been reviewed. Evaluation for etiology is complete. Plan includes - Progressive mobility protocol initated  - PT/OT consulted  - Fall precautions in place.    Chronic pulmonary embolism without acute cor pulmonale  Eliquis was discontinued due to risk of ICH with brain mets.  Currently on hold in case surgical procedure is required.    Morbid obesity  Body mass index is 46.22 kg/m². Morbid obesity complicates all aspects of disease management from diagnostic modalities to treatment. Weight loss encouraged and health benefits explained to patient.     Moderate persistent asthma without complication  Stable without exacerbation.  Continue PRN nebs. "     Edema due to hypoalbuminemia  Required albumin infusion intermittently during the stay.     Mixed hyperlipidemia  Resume statin.     Pancreatic pseudocyst  Transitioned to comfort only measures    History of pancreatitis      Acute pancreatic fluid collection      Hypokalemia  Patient's most recent potassium results are listed below.   Recent Labs     05/12/25  0449 05/13/25  0517   K 3.2* 3.2*     Plan  -transitioned to comfort only measures  Hyponatremia  Transitioned to comfort only measures  Thrombocytopenia  Transitioned to comfort only measures    VTE Risk Mitigation (From admission, onward)      None            Discharge Planning   MITUL: 5/12/2025     Code Status: DNR   Medical Readiness for Discharge Date:   Discharge Plan A: Skilled Nursing Facility            Continue benzodiazepines and narcotics for comfort only measures            Wally Lagos DO  Department of Hospital Medicine   [facility]

## 2025-05-14 NOTE — ASSESSMENT & PLAN NOTE
Body mass index is 46.22 kg/m². Morbid obesity complicates all aspects of disease management from diagnostic modalities to treatment. Weight loss encouraged and health benefits explained to patient.

## 2025-05-14 NOTE — PROGRESS NOTES
[facility] Medicine  Progress Note    Patient Name: Magan Saleem  MRN: [id number]  Patient Class: IP- Inpatient   Admission Date: 4/26/2025  Length of Stay: 17 days  Attending Physician: Maria Teresa Conway MD  Primary Care Provider: Sil Brown DO        Subjective     Principal Problem:Pneumonitis        HPI:  70 yo F presents to [place] ED from [facility] for abnormal labs.  Patient was discharged from [facility] two days ago to skilled nursing facility for rehab.  She was diagnosed with dehydration due to gastroparesis with UTI.  Patient has metastatic lung cancer (to the brain) and follows with Dr. Swanson for IV chemotherapy every other week and takes lazertinib daily.  She has completed her course of radiation for the brain mets and follow had showed some improvement.  I thought she had either some decreased LOC due to encephalopathy or some aphasia from the brain mets but after a great effort to get her to talk, I realized she was just mad.  She wanted to know why she had been discharged two days ago when she could not eat.  She has no appetite and early satiety.  She is not nauseated and no vomiting.  She has decided on a DNR status previously (her caretaker and friend of 20 years) states that she had always said she did not want resuscitation if she experienced cardiopulmonary arrest and had told her children her wishes but she does want treatment and is not opposed to J tube if needed.  She has not had anything to eat or drink in two days and is dehydrated again.       Patient was transferred because of concern of sepsis.  She did have enterococcus faecalis UTI at [facility] and was treated.  I do not have the culture results but cultures were sent and patient does have some yeast noted.  (Will not treat a yeast colonization).  She is afebrile and hemodynamically stable and does not look septic clinically.  Her Lactic acid is stable at 2.5 dara 3.2.  Will check  another in am after volume resuscitation.  Her creat is at baseline but she has prerenal azotemia further confirming the dehydration.  Patient has an IO in place from [facility] due to dehydration and difficult stick but with some volume resuscitation, we have gotten an IV.  She is covid and flu negative.       Her WBC is 29 and I am not sure if she has received neupogen but could be a stress reaction.  Her BS is 245 and she does have some urine ketones but most likely due to starvation ketosis.  Will check a serum ketone.  Her platelets are 88K but this is most likely from her chemo.  She is not anemic.  CXR shows some patchy infiltrate but no obvious obstructive pneumonia from her lung cancer.  Her BNP about 3K but no clinical signs of CHF.  No recent echo but due to her BMI 50 most likely has some PHTN.  EKG had no ischemic changes but tachy at 114 which could also be from dehydration.  She was on ozempic for her DM and this could be the cause of her decreased appetite.  She is also on decadron for prevention of cerebral edema.       Remainder of ROS as below.  She mostly just nods and shakes her head and rolls her eyes.  You can get her to laugh if you try but she has been depressed since she has not walked since her hospitalization at [facility] on 3/19/25 and was discharged two days ago.  She says that at her last chemo she noticed she was getting weaker and her daughter had to help her in and out of the car and that was new for her.      4/5- patient seen examined today resting comfortably in bed and in no acute distress, with no acute events overnight.  Patient has a multitude of issues complicating her medical picture.  White blood cell count 12900 today, sodium 159, potassium 3.2 and BUN creatinine 59 and 1.8.  The patient is still not eating even when assistance is provided.  We have already changed her fluids to half-normal saline with 20 of KCl at 1:25 a.m..  Have also put in a GI consult for possible feeding tube.   E coli in the urine has turned out to be ESBL and Rocephin has been changed Merrem.     Hospital Course at [facility]:    4/6- the patient seen examined today resting comfortably in bed, in no acute distress, in no acute events overnight.  The patient is not very talkative today, but states she is doing okay.  She has no acute complaints.  Blood cultures remain negative.  The patient has not eaten since yesterday and is on light IV fluids.  GI has been consulted for feeding tube.  Continues on Merrem for positive urine culture.  04/07 Records reviewed. Not eating which not new, Similar during recent admit at [facility]. Dr Swanson there had question pancreatitis. No abd pain or tenderness reported now. Question of dysphagia to solids. Chart hx gastroparesis. Will discuss with GI. Ronnie says has responded so far well to treatment for lung CA.   04/08 had EGD at [facility] 03/21/25 with no obstruction and evidence gastroparesis. Still not ending. Try additional meds. Talked with GI. Increase activity  04/09 Some better with med  changes  04/10 Continues to do better. Ate 1/2 fish sandwich for lunch. Called by Dr Swanson and she wanting to keep feeding tube in consideration. Talked with Dr Reich and Dr Lemus. If something needed with gastroparesis would have to have post gastric position of tube.   04/11 eating some. Later staff requested some nystatin for sore mouth.   04/12 still not eating well.  Increased peripheral edema.  Albumin low at 1.5.  Will give some albumin and follow with some Lasix.  Placed Dobbhoff tube and start enteral feedings.  This will help with nutrition and if issue of placing surgical tube later make sure will tolerate enteral feedings.  Chest x-ray continues worse on left side.  Discuss with Pulmonary and ask Dr. Villasenor to see.   04/13 no new issues.  Enteral feedings to be started.  Pulmonary evaluation appreciated and plans noted.  04/14 Tolerating feedings Therapy working with her. Bronchoscopy  planned.   4/15 BAL today  4/16 bronch yesterday looks like pneumonitis  4/17 not candidate for PEG  04/18 Eating some now. Pt says feels some better than last seen. Look at placement. High dose steroids by pulmonary. BS not as bad as would expect.  04/19 states continued eat some.  Less nausea.  Blood sugar not sure bad considering the steroids.  Pulmonary adjusted antibiotics based on cultures.  Look at it placement next week.   04/20 Looks the small. C/O SOB to nursing staff earlier but ABG OK. Poor oral intake ; encourage for pt to do more. Looking into placement.  04/21 Firm tender area in abd wall above umbilicus; ?hernia. Abnormal CT de santiago noted and will discuss with surgery.   04/22 CT shows evolving pancreatitis which pt treated for acouple weeks earlier at [facility]. Reviewed with Dr Tapia. No plan to operate on ventral hernia. Discussed later with Dr York. See how does off IVF and encourage oral intake. WBC and Cr both slight better.   04/23 Looks this same. Pt eating some. Pt getting discouraged and asking about home hospice. Talked by phone with her friend Shauna. In conversion doesn't sound like family would be able to care for pt at home. Encouraged pt to give some time and attempt SWB. She says she with consider tonight.   04/24 Lab worse but pt looks some better. Still not taking in well. Maybe eat better as pancreatitis further resolves. Considering replacing dobbhoff feeding tube and look into move to [facility]. Ask Dr Frias to review renal situation. Maybe pancreatitis,pneumonia,renal failure, all these might make a big difference if resolved. Talked with pt and she was OK with NGT  04/25 patient's spirits seems to be doing some better.  Dobbhoff tube placed in feedings to be started.  To be moved to LTAC.  No other new issue  04/26 Awaiting bed assignment at [facility]. No new complains.     Overview/Hospital Course:  4/28- BG trending up, added Lantus and adjusted dose. D/W nutritionist about changing  feeds to allow for more PO intake.     4/29- BG better now. Off IV amiodarone infusion, remains in NSR. Encourage PO intake.     4/30- Continue to adjust insulin to get BG under 200, ideally 140-180. Checking CT chest, abdomen and pelvis today given persistent leukocytosis.     5/1- Discussing with surgery about fluid collections in the abdomen noted on CT (slightly increased in size), also asking if PEG is an option as previously deemed not a candidate. Nephrology has added bicarb infusion. Continue current management otherwise.     5/2- IV fluids started per nephro. WBC count remains elevated.     5/3- Started GOC conversations with the patient who is alert, asked her if her family can be with her or her daughter who she is closest to. She said she will try to talk to them.     5/4- Discussed poor prognosis with the patient. Labs are essentially unchanged. Repeat blood cultures ordered.     5/5   - hypotensive overnight, given fluids, minimal response  - discussed with pulm no additional recs  - renal function still elevated, nephro following  - going to try and reach out to Dr. Swanson    5/6  - refusing feeds and not eating  - stopped lactic acid drawing  -- will discuss goals of care again with family    5/7  - discussed with oncology who will come see patient tomorrow  - reviewed labs and decided to consult tele-ID as continues with leukocytosis with bandemia that likely isnt reactive response to steroids, elevated lactic acid, continues on fluids  - nephrology following, renal function noted  - overall goals of care pending patient's discussion with oncology      5/8  - all consult notes reviewed  - gi recommendation noted  - will discuss with family and patient about goals of care    5/9  - discussed with all consults, will call a family meeting to discuss goals of care    5/10  - new wounds noted, severe weeping of current wounds as well  - bp low today, fluid flushes at max, will give albumin for secondary  resuscitation   - attempted to call family 3 times, no answer, need to setup family meeting for hospice talk    5/11  - patient declining, hypotensive however asymptomatic, will add midodrine  - planning for family meeting Tuesday at 1 pm    5/12  - planning for family meeting tomorrow at 1  - patient with hypoglycemia and hypotension/hypothermia today, overall condition poor, stopped lantus, amy boone applied  - hgb low with hypotension, treating as symptomatic, transfuse one unit    5/13  - long discussion with family and patient, moving to full comfort care  - assessed overall condition and agree that patient qualifies for GIP and likely will pass in the immediate future, moving to comfort care here  - planning to pull NG tube tomorrow once daughter is able to be at bedside  - vitals q shift, comfort meds added    Interval History:     Review of Systems   Constitutional:  Positive for appetite change.   Gastrointestinal:  Positive for abdominal pain.   All other systems reviewed and are negative.    Objective:     Vital Signs (Most Recent):  Temp: 96.5 °F (35.8 °C) (05/13/25 0800)  Pulse: 87 (05/13/25 0800)  Resp: 12 (05/13/25 0800)  BP: (!) 123/59 (05/13/25 0800)  SpO2: 100 % (05/13/25 0800) Vital Signs (24h Range):  Temp:  [95.5 °F (35.3 °C)-97.3 °F (36.3 °C)] 96.5 °F (35.8 °C)  Pulse:  [] 87  Resp:  [10-22] 12  SpO2:  [100 %] 100 %  BP: (111-131)/(45-91) 123/59     Weight: 126 kg (277 lb 12.5 oz)  Body mass index is 46.22 kg/m².    Intake/Output Summary (Last 24 hours) at 5/13/2025 1958  Last data filed at 5/13/2025 0440  Gross per 24 hour   Intake 1006 ml   Output --   Net 1006 ml         Physical Exam  Constitutional:       Appearance: She is obese. She is ill-appearing.   HENT:      Head: Normocephalic.      Mouth/Throat:      Mouth: Mucous membranes are dry.   Cardiovascular:      Rate and Rhythm: Normal rate.      Heart sounds: Normal heart sounds. No murmur heard.  Pulmonary:      Effort: No  respiratory distress.      Breath sounds: Rhonchi present.      Comments: On 2L NC  Abdominal:      General: There is no distension.      Tenderness: There is abdominal tenderness.      Comments: Dobhoff in place   Skin:     Coloration: Skin is pale.   Neurological:      Mental Status: Mental status is at baseline.               Significant Labs: All pertinent labs within the past 24 hours have been reviewed.    Significant Imaging: I have reviewed all pertinent imaging results/findings within the past 24 hours.      Assessment & Plan  Acute kidney injury superimposed on stage 4 chronic kidney disease  JOHNATHON is likely due to pre-renal azotemia due to intravascular volume depletion. Baseline creatinine is ~ 2.0. Most recent creatinine and eGFR are listed below.  Recent Labs     05/11/25  0507 05/12/25  0449 05/13/25  0517   CREATININE 4.42* 4.46* 4.51*   EGFRNORACEVR 10* 10* 10*      Plan  - JOHNATHON is worsening now.   - Avoid nephrotoxins and renally dose meds for GFR listed above  - Monitor urine output, serial BMP, and adjust therapy as needed  - Nephrology consulted, started on Lasix, bicarb is to alkalinize the urine for hyperuricemia.   - At risk to require dialysis, discussed with the patient and she wants dialysis if indicated.     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  Pulmonology consulted, started on steroids and s/p bronchoscopy with normal findings, BAL culture with stenotrophomonas.   Steroid taper plan per Pulmonology, completed course of antibiotics.   Respiratory status is stable.     Infection due to Stenotrophomonas maltophilia  Sepsis due to pneumonia  Stenotrophomonas lower respiratory infection in this patient with multifocal infiltrates and consolidative opacities on CT Chest.   S/p 2 week course with Levaquin.   Leukocytosis persists, will follow procalcitonin levels Q48H.  Repeat CT chest stable.  Consider tele-ID consultation if leukocytosis does  "not improve.     Stenotrophomonas lower respiratory infection in this patient with multifocal infiltrates and consolidative opacities on CT Chest.   S/p 2 week course with Levaquin.   Leukocytosis persists, will follow procalcitonin levels Q48H.  Repeat CT chest stable.  Consider tele-ID consultation if leukocytosis does not improve.     5/7 consulted tele ID today  SVT (supraventricular tachycardia)  Went into SVT on 4/27, not responsive to multiple rounds of adenosine.  Started on amiodarone infusion after bolus, turned off on 4/29.  Cardiology consulted, no additional recommendations but could consider cardioversion if this becomes a recurrent and a refractory issue.   Continue beta blocker.   Monitor on tele.     Acute pancreatitis  Intra-abdominal fluid collection  CT abdomen/pelvis showed- Possible small fluid collection anterior to the pancreas measuring approximately 3.6 cm on axial 41. Probable complex collection slightly inferior to the pancreatic head extending to the right pelvis and right iliac fossa.     04/21 Abd fluid collection in area pancreas noted on CT abd, discussed with surgery  04/22 likely evolving pancreatitis dx a couple weeks ago at [facility], now with pseudocyst.  No severe epigastric pain.  04/26 Ongoing tube feeds and full liquid diet. If tolerates full liquid better, plan to advanced to soft foods.   04/30 Repeat CT abdomen ordered, showed the pancreas with multiple fluid collections anterior to the pancreas and within the root of the mesentery and extending into the anterior right pararenal space and right paracolic gutter.     5/1 D?W with GS (Dr. Tapia) regarding fluid collection. They believe this is sec to pancreatitis and nothing to do for now.     Gastroparesis  EGD by Dr. Lo during recent past admission at [facility]:  "EGD on 03/21/2025 shows LA class B erosive esophagitis but no pill esophagitis, nonerosive gastritis and retention of food and fluid suspicious for " "gastroparesis, due to narcotics and diabetes. "  GI consulted, unable to advance diet and poor candidate for PEG.  Dobhoff in place for feeds, continued on Reglan.   GI recommended surgical PEG due to body habitus, GS stated that patient is not a candidate for PEG based on co morbidities and poor prognosis.   Continue PPI.     Lung cancer metastatic to brain  DNR but not hospice.   Receives chemo and has finished radiation.    Follows with Dr. Swanson.    5/5 will attempt to contact Dr. Swanson  Goals of care, counseling/discussion  DNR confirmed.  Patient wants permanent feeding tube and dialysis if needed.   Encouraged patient to talk to her family about her prognosis.  Currently PEG is not option as mentioned above so nutrition could be a major factor in driving the hospice discussion on top of her underlying metastatic cancer.   Consider primary oncologist- Dr. Mata consultation to get an idea of her prognosis related to cancer as patient is relying on that to make further decisions.      5/11  - patient declining, refusing tube feeds at times, and overall appetite nonexistent  - discussed with several specialists including oncology and recommendations noted  - planning for family meeting Tuesday at 1 pm for hospice    5/13  - moving to full comfort care  Nonischemic nontraumatic myocardial injury  In the setting of SVT episodes.  Trend is flat, no chest pain and no ischemic changes noted on EKG.  No ischemic workup recommended per cardiology.     Hyperuricemia  Hyperphosphatemia  Defer management to nephrology- started on allopurinol and IV bicarb to alkalinize the urine.   Follow uric acid levels.     Essential hypertension  Patient's blood pressure range in the last 24 hours was: BP  Min: 111/91  Max: 131/69.The patient's inpatient anti-hypertensive regimen is listed below:  Current Antihypertensives       Plan  - BP is controlled, no changes needed to their regimen    Type 2 diabetes mellitus with " "hyperglycemia  Patient's FSGs are controlled on current medication regimen.  Last A1c reviewed-   Lab Results   Component Value Date    HGBA1C 6.7 04/02/2025     Most recent fingerstick glucose reviewed- No results for input(s): "POCTGLUCOSE" in the last 24 hours.  Current correctional scale  Low  Maintain anti-hyperglycemic dose as follows-   Antihyperglycemics (From admission, onward)      Start     Stop Route Frequency Ordered    05/11/25 2321  insulin glargine U-100 (Lantus) injection 25 Units         05/12/25 2059 SubQ Nightly 05/11/25 2322    05/01/25 1051  insulin aspart U-100 injection 0-10 Units         -- SubQ Every 6 hours PRN 05/01/25 0952          Hold Oral hypoglycemics while patient is in the hospital.  BG ranging up as tube feeds now to goal and D5 in bicarb drip, added Lantus and dose adjusted for tighter glycemic control.     Normocytic anemia  Anemia is likely due to chronic disease due to Malignancy. Most recent hemoglobin and hematocrit are listed below.  Recent Labs     05/12/25  0449 05/13/25  0517   HGB 6.6* 7.9*   HCT 20.6* 24.8*     Plan  - Monitor serial CBC: Daily  - Transfuse PRBC if patient becomes hemodynamically unstable, symptomatic or H/H drops below 7/21.  - Patient has not received any PRBC transfusions to date  - Patient's anemia is currently stable  - No evidence of bleeding.    Neoplastic (malignant) related fatigue  Patient with Acute on chronic debility due to neoplastic/malignant related fatigue. Latest AMPAC and GEMS scores have been reviewed. Evaluation for etiology is complete. Plan includes - Progressive mobility protocol initated  - PT/OT consulted  - Fall precautions in place.    Chronic pulmonary embolism without acute cor pulmonale  Eliquis was discontinued due to risk of ICH with brain mets.  Currently on hold in case surgical procedure is required.    Morbid obesity  Body mass index is 46.22 kg/m². Morbid obesity complicates all aspects of disease management from " diagnostic modalities to treatment. Weight loss encouraged and health benefits explained to patient.     Moderate persistent asthma without complication  Stable without exacerbation.  Continue PRN nebs.     Edema due to hypoalbuminemia  Required albumin infusion intermittently during the stay.     Mixed hyperlipidemia  Resume statin.     Pancreatic pseudocyst      History of pancreatitis      Acute pancreatic fluid collection      VTE Risk Mitigation (From admission, onward)      None            Discharge Planning   MITUL: 5/12/2025     Code Status: DNR   Medical Readiness for Discharge Date:   Discharge Plan A: Skilled Nursing Facility                Please place Justification for DME        Maria Teresa Conway MD  Department of Hospital Medicine   [facility]

## 2025-05-14 NOTE — ASSESSMENT & PLAN NOTE
"EGD by Dr. Lo during recent past admission at [facility]:  "EGD on 03/21/2025 shows LA class B erosive esophagitis but no pill esophagitis, nonerosive gastritis and retention of food and fluid suspicious for gastroparesis, due to narcotics and diabetes. "  GI consulted, unable to advance diet and poor candidate for PEG.  Dobhoff in place for feeds, continued on Reglan.   GI recommended surgical PEG due to body habitus, GS stated that patient is not a candidate for PEG based on co morbidities and poor prognosis.   Continue PPI.     "

## 2025-05-14 NOTE — PROGRESS NOTES
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 Swanson    Melanocytic nevi of lower limb, including hip, left     Mixed hyperlipidemia     Moderate persistent asthma without complication 10/30/2024    Vitamin D deficiency        Social History[1]    Precautions:     Allergies as of 04/25/2025 - Reviewed 04/17/2025   Allergen Reaction Noted    Penicillins Hives 06/29/2021    Sulfa (sulfonamide antibiotics) Hives 06/29/2021       WOC Assessment Details/Treatment     Narrative: Skin evaluation    Patient in bed, Alert. Has oxygen in use, no redness over ears. Upper sacral and heels are with no pressure injury noted. Lower buttock lesion, slowly improving, cont powder. Has multiple ruptured blisters to abdominal folds, arms and thigh areas. Pale white tissues. Has large purple ruptured blister to Right abdominal fold. Applied Mepliex foam border to area. Skin integrity remains compromised due to co morbilities, age, edema, skin moisture, limited mobility, decrease Martin score 11, etc. Cont turn protocol, on low air loss mattress, alvaro carolyn boots/pillows to offload heels.  Patient in on comfort care.     Wound care team to follow weekly prn        05/14/2025         [1]   Social History  Socioeconomic History    Marital status:    Occupational History     Occupation: Retired   Tobacco Use    Smoking status: Never    Smokeless tobacco: Never   Substance and Sexual Activity    Alcohol use: Never    Drug use: Never    Sexual activity: Not Currently     Social Drivers of Health     Financial Resource Strain: Medium Risk (4/27/2025)    Overall Financial Resource Strain (CARDIA)     Difficulty of Paying Living Expenses: Somewhat hard   Food Insecurity: No Food Insecurity (4/27/2025)    Hunger Vital Sign     Worried About Running Out of Food in the Last Year: Never true     Ran Out of Food in the Last Year: Never true   Transportation Needs: No Transportation Needs (4/27/2025)    PRAPARE - Transportation     Lack of Transportation (Medical): No     Lack of Transportation (Non-Medical): No   Physical Activity: Inactive (4/4/2025)    Exercise Vital Sign     Days of Exercise per Week: 0 days     Minutes of Exercise per Session: 0 min   Stress: No Stress Concern Present (4/27/2025)    [employer] - Occupational Stress Questionnaire     Feeling of Stress : Only a little   Housing Stability: Low Risk  (4/27/2025)    Housing Stability Vital Sign     Unable to Pay for Housing in the Last Year: No     Number of Times Moved in the Last Year: 0     Homeless in the Last Year: No

## 2025-05-14 NOTE — ASSESSMENT & PLAN NOTE
Patient's blood pressure range in the last 24 hours was: BP  Min: 106/53  Max: 141/66.The patient's inpatient anti-hypertensive regimen is listed below:  Current Antihypertensives       Plan  - BP is controlled, no changes needed to their regimen

## 2025-05-14 NOTE — ASSESSMENT & PLAN NOTE
JOHNATHON is likely due to pre-renal azotemia due to intravascular volume depletion. Baseline creatinine is ~ 2.0. Most recent creatinine and eGFR are listed below.  Recent Labs     05/12/25  0449 05/13/25  0517   CREATININE 4.46* 4.51*   EGFRNORACEVR 10* 10*      Plan  - JOHNATHON is worsening now.   - Avoid nephrotoxins and renally dose meds for GFR listed above  - Monitor urine output, serial BMP, and adjust therapy as needed  - Nephrology consulted, started on Lasix, bicarb is to alkalinize the urine for hyperuricemia.   - At risk to require dialysis, discussed with the patient and she wants dialysis if indicated.   5/14 Transitioned to comfort only measures

## 2025-05-14 NOTE — NURSING
0710  Patient resting in bed quietly, resp even but labored. Dobhoff in place, when family gets here it will be removed. Left picc in place. Safety measures intact. No complaints. Will continue to monitor.      1355  Dobhoff removed at this time. Patient tolerated well. Sister, Krystle at bedside. No complaints.

## 2025-05-14 NOTE — ASSESSMENT & PLAN NOTE
Patient's most recent potassium results are listed below.   Recent Labs     05/12/25  0449 05/13/25  0517   K 3.2* 3.2*     Plan  -transitioned to comfort only measures

## 2025-05-14 NOTE — PLAN OF CARE
Problem: Adult Inpatient Plan of Care  Goal: Plan of Care Review  Outcome: Adequate for Care Transition  Goal: Patient-Specific Goal (Individualized)  Outcome: Adequate for Care Transition  Goal: Absence of Hospital-Acquired Illness or Injury  Outcome: Adequate for Care Transition  Goal: Optimal Comfort and Wellbeing  Outcome: Adequate for Care Transition  Goal: Readiness for Transition of Care  Outcome: Adequate for Care Transition     Problem: Diabetes Comorbidity  Goal: Blood Glucose Level Within Targeted Range  Outcome: Adequate for Care Transition     Problem: Acute Kidney Injury/Impairment  Goal: Fluid and Electrolyte Balance  Outcome: Adequate for Care Transition  Goal: Improved Oral Intake  Outcome: Adequate for Care Transition  Goal: Effective Renal Function  Outcome: Adequate for Care Transition     Problem: Bariatric Environmental Safety  Goal: Safety Maintained with Care  Outcome: Adequate for Care Transition     Problem: Infection  Goal: Absence of Infection Signs and Symptoms  Outcome: Adequate for Care Transition     Problem: Wound  Goal: Optimal Coping  Outcome: Adequate for Care Transition  Goal: Optimal Functional Ability  Outcome: Adequate for Care Transition  Goal: Absence of Infection Signs and Symptoms  Outcome: Adequate for Care Transition  Goal: Improved Oral Intake  Outcome: Adequate for Care Transition  Goal: Optimal Pain Control and Function  Outcome: Adequate for Care Transition  Goal: Skin Health and Integrity  Outcome: Adequate for Care Transition  Goal: Optimal Wound Healing  Outcome: Adequate for Care Transition     Problem: Skin Injury Risk Increased  Goal: Skin Health and Integrity  Outcome: Adequate for Care Transition     Problem: Sepsis/Septic Shock  Goal: Optimal Coping  Outcome: Adequate for Care Transition  Goal: Absence of Bleeding  Outcome: Adequate for Care Transition  Goal: Blood Glucose Level Within Targeted Range  Outcome: Adequate for Care Transition  Goal: Absence of  Infection Signs and Symptoms  Outcome: Adequate for Care Transition  Goal: Optimal Nutrition Intake  Outcome: Adequate for Care Transition     Problem: Pneumonia  Goal: Fluid Balance  Outcome: Adequate for Care Transition  Goal: Resolution of Infection Signs and Symptoms  Outcome: Adequate for Care Transition  Goal: Effective Oxygenation and Ventilation  Outcome: Adequate for Care Transition     Problem: Gas Exchange Impaired  Goal: Optimal Gas Exchange  Outcome: Adequate for Care Transition     Problem: Airway Clearance Ineffective  Goal: Effective Airway Clearance  Outcome: Adequate for Care Transition

## 2025-05-14 NOTE — SUBJECTIVE & OBJECTIVE
Interval History:     Review of Systems   Constitutional:  Positive for appetite change.   Gastrointestinal:  Positive for abdominal pain.   All other systems reviewed and are negative.    Objective:     Vital Signs (Most Recent):  Temp: 96.5 °F (35.8 °C) (05/13/25 0800)  Pulse: 87 (05/13/25 0800)  Resp: 12 (05/13/25 0800)  BP: (!) 123/59 (05/13/25 0800)  SpO2: 100 % (05/13/25 0800) Vital Signs (24h Range):  Temp:  [95.5 °F (35.3 °C)-97.3 °F (36.3 °C)] 96.5 °F (35.8 °C)  Pulse:  [] 87  Resp:  [10-22] 12  SpO2:  [100 %] 100 %  BP: (111-131)/(45-91) 123/59     Weight: 126 kg (277 lb 12.5 oz)  Body mass index is 46.22 kg/m².    Intake/Output Summary (Last 24 hours) at 5/13/2025 1958  Last data filed at 5/13/2025 0440  Gross per 24 hour   Intake 1006 ml   Output --   Net 1006 ml         Physical Exam  Constitutional:       Appearance: She is obese. She is ill-appearing.   HENT:      Head: Normocephalic.      Mouth/Throat:      Mouth: Mucous membranes are dry.   Cardiovascular:      Rate and Rhythm: Normal rate.      Heart sounds: Normal heart sounds. No murmur heard.  Pulmonary:      Effort: No respiratory distress.      Breath sounds: Rhonchi present.      Comments: On 2L NC  Abdominal:      General: There is no distension.      Tenderness: There is abdominal tenderness.      Comments: Dobhoff in place   Skin:     Coloration: Skin is pale.   Neurological:      Mental Status: Mental status is at baseline.               Significant Labs: All pertinent labs within the past 24 hours have been reviewed.    Significant Imaging: I have reviewed all pertinent imaging results/findings within the past 24 hours.

## 2025-05-15 NOTE — ASSESSMENT & PLAN NOTE
"EGD by Dr. Lo during recent past admission at [facility]:  "EGD on 03/21/2025 shows LA class B erosive esophagitis but no pill esophagitis, nonerosive gastritis and retention of food and fluid suspicious for gastroparesis, due to narcotics and diabetes. "  GI consulted, unable to advance diet and poor candidate for PEG.  Dobhoff in place for feeds, continued on Reglan.   GI recommended surgical PEG due to body habitus, GS stated that patient is not a candidate for PEG based on co morbidities and poor prognosis.   Continue PPI.   Comfortable today  "

## 2025-05-15 NOTE — PLAN OF CARE
Problem: Adult Inpatient Plan of Care  Goal: Plan of Care Review  Outcome: Unable to Meet  Goal: Patient-Specific Goal (Individualized)  Outcome: Unable to Meet  Goal: Absence of Hospital-Acquired Illness or Injury  Outcome: Unable to Meet  Goal: Optimal Comfort and Wellbeing  Outcome: Unable to Meet  Goal: Readiness for Transition of Care  Outcome: Unable to Meet     Problem: Diabetes Comorbidity  Goal: Blood Glucose Level Within Targeted Range  Outcome: Unable to Meet     Problem: Acute Kidney Injury/Impairment  Goal: Fluid and Electrolyte Balance  Outcome: Unable to Meet  Goal: Improved Oral Intake  Outcome: Unable to Meet  Goal: Effective Renal Function  Outcome: Unable to Meet     Problem: Bariatric Environmental Safety  Goal: Safety Maintained with Care  Outcome: Unable to Meet     Problem: Infection  Goal: Absence of Infection Signs and Symptoms  Outcome: Unable to Meet     Problem: Wound  Goal: Optimal Coping  Outcome: Unable to Meet  Goal: Optimal Functional Ability  Outcome: Unable to Meet  Goal: Absence of Infection Signs and Symptoms  Outcome: Unable to Meet  Goal: Improved Oral Intake  Outcome: Unable to Meet  Goal: Optimal Pain Control and Function  Outcome: Unable to Meet  Goal: Skin Health and Integrity  Outcome: Unable to Meet  Goal: Optimal Wound Healing  Outcome: Unable to Meet     Problem: Skin Injury Risk Increased  Goal: Skin Health and Integrity  Outcome: Unable to Meet     Problem: Sepsis/Septic Shock  Goal: Optimal Coping  Outcome: Unable to Meet  Goal: Absence of Bleeding  Outcome: Unable to Meet  Goal: Blood Glucose Level Within Targeted Range  Outcome: Unable to Meet  Goal: Absence of Infection Signs and Symptoms  Outcome: Unable to Meet  Goal: Optimal Nutrition Intake  Outcome: Unable to Meet     Problem: Pneumonia  Goal: Fluid Balance  Outcome: Unable to Meet  Goal: Resolution of Infection Signs and Symptoms  Outcome: Unable to Meet  Goal: Effective Oxygenation and  Ventilation  Outcome: Unable to Meet     Problem: Gas Exchange Impaired  Goal: Optimal Gas Exchange  Outcome: Unable to Meet     Problem: Airway Clearance Ineffective  Goal: Effective Airway Clearance  Outcome: Unable to Meet     Problem: Fall Injury Risk  Goal: Absence of Fall and Fall-Related Injury  Outcome: Unable to Meet

## 2025-05-15 NOTE — ASSESSMENT & PLAN NOTE
CT abdomen/pelvis showed- Possible small fluid collection anterior to the pancreas measuring approximately 3.6 cm on axial 41. Probable complex collection slightly inferior to the pancreatic head extending to the right pelvis and right iliac fossa.     04/21 Abd fluid collection in area pancreas noted on CT abd, discussed with surgery  04/22 likely evolving pancreatitis dx a couple weeks ago at [facility], now with pseudocyst.  No severe epigastric pain.  04/26 Ongoing tube feeds and full liquid diet. If tolerates full liquid better, plan to advanced to soft foods.   04/30 Repeat CT abdomen ordered, showed the pancreas with multiple fluid collections anterior to the pancreas and within the root of the mesentery and extending into the anterior right pararenal space and right paracolic gutter.     5/1 D?W with GS (Dr. Tapia) regarding fluid collection. They believe this is sec to pancreatitis and nothing to do for now.

## 2025-05-15 NOTE — ASSESSMENT & PLAN NOTE
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 Most recent hemoglobin and hematocrit are listed below.  Recent Labs     05/13/25  0517   HGB 7.9*   HCT 24.8*     Plan  - Monitor serial CBC: Daily  - Transfuse PRBC if patient becomes hemodynamically unstable, symptomatic or H/H drops below 7/21.  - Patient has not received any PRBC transfusions to date  - Patient's anemia is currently stable  - No evidence of bleeding.

## 2025-05-15 NOTE — ASSESSMENT & PLAN NOTE
Patient's most recent potassium results are listed below.   Recent Labs     05/13/25  0517   K 3.2*     Plan  -transitioned to comfort only measures

## 2025-05-15 NOTE — PROGRESS NOTES
[facility]  Progress Note    Patient Name: Magan Saleem  MRN: [id number]  Patient Class: IP- Inpatient   Admission Date: 4/26/2025  Length of Stay: 19 days  Attending Physician: Wally Lagos DO  Primary Care Provider: Sil Brown DO        Subjective     Principal Problem:Pneumonitis        HPI:  68 yo F presents to [place] ED from [facility] for abnormal labs.  Patient was discharged from [facility] two days ago to skilled nursing facility for rehab.  She was diagnosed with dehydration due to gastroparesis with UTI.  Patient has metastatic lung cancer (to the brain) and follows with Dr. Swanson for IV chemotherapy every other week and takes lazertinib daily.  She has completed her course of radiation for the brain mets and follow had showed some improvement.  I thought she had either some decreased LOC due to encephalopathy or some aphasia from the brain mets but after a great effort to get her to talk, I realized she was just mad.  She wanted to know why she had been discharged two days ago when she could not eat.  She has no appetite and early satiety.  She is not nauseated and no vomiting.  She has decided on a DNR status previously (her caretaker and friend of 20 years) states that she had always said she did not want resuscitation if she experienced cardiopulmonary arrest and had told her children her wishes but she does want treatment and is not opposed to J tube if needed.  She has not had anything to eat or drink in two days and is dehydrated again.       Patient was transferred because of concern of sepsis.  She did have enterococcus faecalis UTI at [facility] and was treated.  I do not have the culture results but cultures were sent and patient does have some yeast noted.  (Will not treat a yeast colonization).  She is afebrile and hemodynamically stable and does not look septic clinically.  Her Lactic acid is stable at 2.5 dara 3.2.  Will check  another in am after volume resuscitation.  Her creat is at baseline but she has prerenal azotemia further confirming the dehydration.  Patient has an IO in place from [facility] due to dehydration and difficult stick but with some volume resuscitation, we have gotten an IV.  She is covid and flu negative.       Her WBC is 29 and I am not sure if she has received neupogen but could be a stress reaction.  Her BS is 245 and she does have some urine ketones but most likely due to starvation ketosis.  Will check a serum ketone.  Her platelets are 88K but this is most likely from her chemo.  She is not anemic.  CXR shows some patchy infiltrate but no obvious obstructive pneumonia from her lung cancer.  Her BNP about 3K but no clinical signs of CHF.  No recent echo but due to her BMI 50 most likely has some PHTN.  EKG had no ischemic changes but tachy at 114 which could also be from dehydration.  She was on ozempic for her DM and this could be the cause of her decreased appetite.  She is also on decadron for prevention of cerebral edema.       Remainder of ROS as below.  She mostly just nods and shakes her head and rolls her eyes.  You can get her to laugh if you try but she has been depressed since she has not walked since her hospitalization at [facility] on 3/19/25 and was discharged two days ago.  She says that at her last chemo she noticed she was getting weaker and her daughter had to help her in and out of the car and that was new for her.      4/5- patient seen examined today resting comfortably in bed and in no acute distress, with no acute events overnight.  Patient has a multitude of issues complicating her medical picture.  White blood cell count 93416 today, sodium 159, potassium 3.2 and BUN creatinine 59 and 1.8.  The patient is still not eating even when assistance is provided.  We have already changed her fluids to half-normal saline with 20 of KCl at 1:25 a.m..  Have also put in a GI consult for possible feeding tube.   E coli in the urine has turned out to be ESBL and Rocephin has been changed Merrem.     Hospital Course at [facility]:    4/6- the patient seen examined today resting comfortably in bed, in no acute distress, in no acute events overnight.  The patient is not very talkative today, but states she is doing okay.  She has no acute complaints.  Blood cultures remain negative.  The patient has not eaten since yesterday and is on light IV fluids.  GI has been consulted for feeding tube.  Continues on Merrem for positive urine culture.  04/07 Records reviewed. Not eating which not new, Similar during recent admit at [facility]. Dr Swanson there had question pancreatitis. No abd pain or tenderness reported now. Question of dysphagia to solids. Chart hx gastroparesis. Will discuss with GI. Ronnie says has responded so far well to treatment for lung CA.   04/08 had EGD at [facility] 03/21/25 with no obstruction and evidence gastroparesis. Still not ending. Try additional meds. Talked with GI. Increase activity  04/09 Some better with med  changes  04/10 Continues to do better. Ate 1/2 fish sandwich for lunch. Called by Dr Swanson and she wanting to keep feeding tube in consideration. Talked with Dr Reich and Dr Lemus. If something needed with gastroparesis would have to have post gastric position of tube.   04/11 eating some. Later staff requested some nystatin for sore mouth.   04/12 still not eating well.  Increased peripheral edema.  Albumin low at 1.5.  Will give some albumin and follow with some Lasix.  Placed Dobbhoff tube and start enteral feedings.  This will help with nutrition and if issue of placing surgical tube later make sure will tolerate enteral feedings.  Chest x-ray continues worse on left side.  Discuss with Pulmonary and ask Dr. Villasenor to see.   04/13 no new issues.  Enteral feedings to be started.  Pulmonary evaluation appreciated and plans noted.  04/14 Tolerating feedings Therapy working with her. Bronchoscopy  planned.   4/15 BAL today  4/16 bronch yesterday looks like pneumonitis  4/17 not candidate for PEG  04/18 Eating some now. Pt says feels some better than last seen. Look at placement. High dose steroids by pulmonary. BS not as bad as would expect.  04/19 states continued eat some.  Less nausea.  Blood sugar not sure bad considering the steroids.  Pulmonary adjusted antibiotics based on cultures.  Look at it placement next week.   04/20 Looks the small. C/O SOB to nursing staff earlier but ABG OK. Poor oral intake ; encourage for pt to do more. Looking into placement.  04/21 Firm tender area in abd wall above umbilicus; ?hernia. Abnormal CT de santiago noted and will discuss with surgery.   04/22 CT shows evolving pancreatitis which pt treated for acouple weeks earlier at [facility]. Reviewed with Dr Tapia. No plan to operate on ventral hernia. Discussed later with Dr York. See how does off IVF and encourage oral intake. WBC and Cr both slight better.   04/23 Looks this same. Pt eating some. Pt getting discouraged and asking about home hospice. Talked by phone with her friend Shauna. In conversion doesn't sound like family would be able to care for pt at home. Encouraged pt to give some time and attempt SWB. She says she with consider tonight.   04/24 Lab worse but pt looks some better. Still not taking in well. Maybe eat better as pancreatitis further resolves. Considering replacing dobbhoff feeding tube and look into move to [facility]. Ask Dr Frias to review renal situation. Maybe pancreatitis,pneumonia,renal failure, all these might make a big difference if resolved. Talked with pt and she was OK with NGT  04/25 patient's spirits seems to be doing some better.  Dobbhoff tube placed in feedings to be started.  To be moved to LTAC.  No other new issue  04/26 Awaiting bed assignment at [facility]. No new complains.     Overview/Hospital Course:  4/28- BG trending up, added Lantus and adjusted dose. D/W nutritionist about changing  feeds to allow for more PO intake.     4/29- BG better now. Off IV amiodarone infusion, remains in NSR. Encourage PO intake.     4/30- Continue to adjust insulin to get BG under 200, ideally 140-180. Checking CT chest, abdomen and pelvis today given persistent leukocytosis.     5/1- Discussing with surgery about fluid collections in the abdomen noted on CT (slightly increased in size), also asking if PEG is an option as previously deemed not a candidate. Nephrology has added bicarb infusion. Continue current management otherwise.     5/2- IV fluids started per nephro. WBC count remains elevated.     5/3- Started GOC conversations with the patient who is alert, asked her if her family can be with her or her daughter who she is closest to. She said she will try to talk to them.     5/4- Discussed poor prognosis with the patient. Labs are essentially unchanged. Repeat blood cultures ordered.     5/5   - hypotensive overnight, given fluids, minimal response  - discussed with pulm no additional recs  - renal function still elevated, nephro following  - going to try and reach out to Dr. Swanson    5/6  - refusing feeds and not eating  - stopped lactic acid drawing  -- will discuss goals of care again with family    5/7  - discussed with oncology who will come see patient tomorrow  - reviewed labs and decided to consult tele-ID as continues with leukocytosis with bandemia that likely isnt reactive response to steroids, elevated lactic acid, continues on fluids  - nephrology following, renal function noted  - overall goals of care pending patient's discussion with oncology      5/8  - all consult notes reviewed  - gi recommendation noted  - will discuss with family and patient about goals of care    5/9  - discussed with all consults, will call a family meeting to discuss goals of care    5/10  - new wounds noted, severe weeping of current wounds as well  - bp low today, fluid flushes at max, will give albumin for secondary  resuscitation   - attempted to call family 3 times, no answer, need to setup family meeting for hospice talk    5/11  - patient declining, hypotensive however asymptomatic, will add midodrine  - planning for family meeting Tuesday at 1 pm    5/12  - planning for family meeting tomorrow at 1  - patient with hypoglycemia and hypotension/hypothermia today, overall condition poor, stopped lantus, amy billingsleyer applied  - hgb low with hypotension, treating as symptomatic, transfuse one unit    5/13  - long discussion with family and patient, moving to full comfort care  - assessed overall condition and agree that patient qualifies for GIP and likely will pass in the immediate future, moving to comfort care here  - planning to pull NG tube tomorrow once daughter is able to be at bedside  - vitals q shift, comfort meds added  5/14 comfort care  5/15 comfort measures only.  Patient with no complaints    Interval History:  No acute events overnight      Objective:     Vital Signs (Most Recent):  Temp: (!) 94 °F (34.4 °C) (05/15/25 0800)  Pulse: 83 (05/15/25 0800)  Resp: 12 (05/15/25 0800)  BP: 113/62 (05/15/25 0800)  SpO2: 99 % (05/15/25 0800) Vital Signs (24h Range):  Temp:  [93.9 °F (34.4 °C)-94 °F (34.4 °C)] 94 °F (34.4 °C)  Pulse:  [83-93] 83  Resp:  [12-18] 12  SpO2:  [99 %-100 %] 99 %  BP: ()/(60-62) 113/62     Weight: 126 kg (277 lb 12.5 oz)  Body mass index is 46.22 kg/m².    Intake/Output Summary (Last 24 hours) at 5/15/2025 1036  Last data filed at 5/15/2025 0515  Gross per 24 hour   Intake 0 ml   Output 200 ml   Net -200 ml         Physical Exam  Constitutional:       Appearance: She is obese. She is ill-appearing.   HENT:      Head: Normocephalic.      Mouth/Throat:      Mouth: Mucous membranes are dry.   Cardiovascular:      Rate and Rhythm: Normal rate.      Heart sounds: Normal heart sounds. No murmur heard.  Pulmonary:      Effort: No respiratory distress.      Breath sounds: Rhonchi present.       Comments: On 2L NC  Abdominal:      General: There is no distension.      Tenderness: There is abdominal tenderness.      Comments: Dobhoff in place   Skin:     Coloration: Skin is pale.   Neurological:      Mental Status: Mental status is at baseline.               Significant Labs: All pertinent labs within the past 24 hours have been reviewed.    Significant Imaging: I have reviewed all pertinent imaging results/findings within the past 24 hours.  Review of Systems      Assessment & Plan  Acute kidney injury superimposed on stage 4 chronic kidney disease  JOHNATHON is likely due to pre-renal azotemia due to intravascular volume depletion. Baseline creatinine is ~ 2.0. Most recent creatinine and eGFR are listed below.  Recent Labs     05/13/25  0517   CREATININE 4.51*   EGFRNORACEVR 10*      Plan  - JOHNATHON is worsening now.   - Avoid nephrotoxins and renally dose meds for GFR listed above  - Monitor urine output, serial BMP, and adjust therapy as needed  - Nephrology consulted, started on Lasix, bicarb is to alkalinize the urine for hyperuricemia.   - At risk to require dialysis, discussed with the patient and she wants dialysis if indicated.   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  Pulmonology consulted, started on steroids and s/p bronchoscopy with normal findings, BAL culture with stenotrophomonas.   Steroid taper plan per Pulmonology, completed course of antibiotics.   Respiratory status is stable.   5/15 Breathing comfortably today    Infection due to Stenotrophomonas maltophilia  Sepsis due to pneumonia  Stenotrophomonas lower respiratory infection in this patient with multifocal infiltrates and consolidative opacities on CT Chest.   S/p 2 week course with Levaquin.   Leukocytosis persists, will follow procalcitonin levels Q48H.  Repeat CT chest stable.  Consider tele-ID consultation if leukocytosis does not improve.  "    Stenotrophomonas lower respiratory infection in this patient with multifocal infiltrates and consolidative opacities on CT Chest.   S/p 2 week course with Levaquin.   Leukocytosis persists, will follow procalcitonin levels Q48H.  Repeat CT chest stable.  Consider tele-ID consultation if leukocytosis does not improve.     5/7 consulted tele ID today  SVT (supraventricular tachycardia)  Went into SVT on 4/27, not responsive to multiple rounds of adenosine.  Started on amiodarone infusion after bolus, turned off on 4/29.  Cardiology consulted, no additional recommendations but could consider cardioversion if this becomes a recurrent and a refractory issue.   Continue beta blocker.   Monitor on tele.     Acute pancreatitis  Intra-abdominal fluid collection  CT abdomen/pelvis showed- Possible small fluid collection anterior to the pancreas measuring approximately 3.6 cm on axial 41. Probable complex collection slightly inferior to the pancreatic head extending to the right pelvis and right iliac fossa.     04/21 Abd fluid collection in area pancreas noted on CT abd, discussed with surgery  04/22 likely evolving pancreatitis dx a couple weeks ago at [facility], now with pseudocyst.  No severe epigastric pain.  04/26 Ongoing tube feeds and full liquid diet. If tolerates full liquid better, plan to advanced to soft foods.   04/30 Repeat CT abdomen ordered, showed the pancreas with multiple fluid collections anterior to the pancreas and within the root of the mesentery and extending into the anterior right pararenal space and right paracolic gutter.     5/1 D?W with [facility] (Dr. Tapia) regarding fluid collection. They believe this is sec to pancreatitis and nothing to do for now.     Gastroparesis  EGD by Dr. Lo during recent past admission at [facility]:  "EGD on 03/21/2025 shows LA class B erosive esophagitis but no pill esophagitis, nonerosive gastritis and retention of food and fluid suspicious for gastroparesis, due " "to narcotics and diabetes. "  GI consulted, unable to advance diet and poor candidate for PEG.  Dobhoff in place for feeds, continued on Reglan.   GI recommended surgical PEG due to body habitus, GS stated that patient is not a candidate for PEG based on co morbidities and poor prognosis.   Continue PPI.   Comfortable today  Lung cancer metastatic to brain  DNR but not hospice.   Receives chemo and has finished radiation.    Follows with Dr. Swanson.    5/5 will attempt to contact Dr. Swanson  Goals of care, counseling/discussion  DNR confirmed.  Patient wants permanent feeding tube and dialysis if needed.   Encouraged patient to talk to her family about her prognosis.  Currently PEG is not option as mentioned above so nutrition could be a major factor in driving the hospice discussion on top of her underlying metastatic cancer.   Consider primary oncologist- Dr. Mata consultation to get an idea of her prognosis related to cancer as patient is relying on that to make further decisions.      5/11  - patient declining, refusing tube feeds at times, and overall appetite nonexistent  - discussed with several specialists including oncology and recommendations noted  - planning for family meeting Tuesday at 1 pm for hospice    5/13  - moving to full comfort care  Nonischemic nontraumatic myocardial injury  In the setting of SVT episodes.  Trend is flat, no chest pain and no ischemic changes noted on EKG.  No ischemic workup recommended per cardiology.     Hyperuricemia  Hyperphosphatemia  Defer management to nephrology- started on allopurinol and IV bicarb to alkalinize the urine.   Follow uric acid levels.     Essential hypertension  Patient's blood pressure range in the last 24 hours was: BP  Min: 97/60  Max: 113/62.The patient's inpatient anti-hypertensive regimen is listed below:  Current Antihypertensives       Plan  - BP is controlled, no changes needed to their regimen    Type 2 diabetes mellitus with " "hyperglycemia  Patient's FSGs are controlled on current medication regimen.  Last A1c reviewed-   Lab Results   Component Value Date    HGBA1C 6.7 04/02/2025     Most recent fingerstick glucose reviewed- No results for input(s): "POCTGLUCOSE" in the last 24 hours.  Current correctional scale  Low  Maintain anti-hyperglycemic dose as follows-   Antihyperglycemics (From admission, onward)      Start     Stop Route Frequency Ordered    05/11/25 2321  insulin glargine U-100 (Lantus) injection 25 Units         05/12/25 2059 SubQ Nightly 05/11/25 2322          Hold Oral hypoglycemics while patient is in the hospital.  BG ranging up as tube feeds now to goal and D5 in bicarb drip, added Lantus and dose adjusted for tighter glycemic control.     Normocytic anemia  Anemia is likely due to chronic disease due to Malignancy. Most recent hemoglobin and hematocrit are listed below.  Recent Labs     05/13/25  0517   HGB 7.9*   HCT 24.8*     Plan  - Monitor serial CBC: Daily  - Transfuse PRBC if patient becomes hemodynamically unstable, symptomatic or H/H drops below 7/21.  - Patient has not received any PRBC transfusions to date  - Patient's anemia is currently stable  - No evidence of bleeding.    Neoplastic (malignant) related fatigue  Patient with Acute on chronic debility due to neoplastic/malignant related fatigue. Latest AMPAC and GEMS scores have been reviewed. Evaluation for etiology is complete. Plan includes - Progressive mobility protocol initated  - PT/OT consulted  - Fall precautions in place.    Chronic pulmonary embolism without acute cor pulmonale  Eliquis was discontinued due to risk of ICH with brain mets.  Currently on hold in case surgical procedure is required.    Morbid obesity  Body mass index is 46.22 kg/m². Morbid obesity complicates all aspects of disease management from diagnostic modalities to treatment. Weight loss encouraged and health benefits explained to patient.     Moderate persistent asthma " without complication  Stable without exacerbation.  Continue PRN nebs.     Edema due to hypoalbuminemia  Required albumin infusion intermittently during the stay.     Mixed hyperlipidemia  Resume statin.     Pancreatic pseudocyst  Transitioned to comfort only measures    History of pancreatitis      Acute pancreatic fluid collection      Hypokalemia  Patient's most recent potassium results are listed below.   Recent Labs     05/13/25  0517   K 3.2*     Plan  -transitioned to comfort only measures  Hyponatremia  Transitioned to comfort only measures  Thrombocytopenia  Transitioned to comfort only measures    VTE Risk Mitigation (From admission, onward)      None            Discharge Planning   MITUL: 5/12/2025     Code Status: DNR   Medical Readiness for Discharge Date:   Discharge Plan A: Comfort care/withdrawal        Family updated.  Consultant notes reviewed.  All questions answered comfortable on pain and anxiolytic regimen.  Continue                Wally Lagos DO  Department of Hospital Medicine   [facility]

## 2025-05-15 NOTE — SUBJECTIVE & OBJECTIVE
Interval History:  No acute events overnight      Objective:     Vital Signs (Most Recent):  Temp: (!) 94 °F (34.4 °C) (05/15/25 0800)  Pulse: 83 (05/15/25 0800)  Resp: 12 (05/15/25 0800)  BP: 113/62 (05/15/25 0800)  SpO2: 99 % (05/15/25 0800) Vital Signs (24h Range):  Temp:  [93.9 °F (34.4 °C)-94 °F (34.4 °C)] 94 °F (34.4 °C)  Pulse:  [83-93] 83  Resp:  [12-18] 12  SpO2:  [99 %-100 %] 99 %  BP: ()/(60-62) 113/62     Weight: 126 kg (277 lb 12.5 oz)  Body mass index is 46.22 kg/m².    Intake/Output Summary (Last 24 hours) at 5/15/2025 1036  Last data filed at 5/15/2025 0515  Gross per 24 hour   Intake 0 ml   Output 200 ml   Net -200 ml         Physical Exam  Constitutional:       Appearance: She is obese. She is ill-appearing.   HENT:      Head: Normocephalic.      Mouth/Throat:      Mouth: Mucous membranes are dry.   Cardiovascular:      Rate and Rhythm: Normal rate.      Heart sounds: Normal heart sounds. No murmur heard.  Pulmonary:      Effort: No respiratory distress.      Breath sounds: Rhonchi present.      Comments: On 2L NC  Abdominal:      General: There is no distension.      Tenderness: There is abdominal tenderness.      Comments: Dobhoff in place   Skin:     Coloration: Skin is pale.   Neurological:      Mental Status: Mental status is at baseline.               Significant Labs: All pertinent labs within the past 24 hours have been reviewed.    Significant Imaging: I have reviewed all pertinent imaging results/findings within the past 24 hours.  Review of Systems

## 2025-05-15 NOTE — ASSESSMENT & PLAN NOTE
Patient's blood pressure range in the last 24 hours was: BP  Min: 97/60  Max: 113/62.The patient's inpatient anti-hypertensive regimen is listed below:  Current Antihypertensives       Plan  - BP is controlled, no changes needed to their regimen

## 2025-05-15 NOTE — ASSESSMENT & PLAN NOTE
CT abdomen/pelvis showed- Possible small fluid collection anterior to the pancreas measuring approximately 3.6 cm on axial 41. Probable complex collection slightly inferior to the pancreatic head extending to the right pelvis and right iliac fossa.     04/21 Abd fluid collection in area pancreas noted on CT abd, discussed with surgery  04/22 likely evolving pancreatitis dx a couple weeks ago at [facility], now with pseudocyst.  No severe epigastric pain.  04/26 Ongoing tube feeds and full liquid diet. If tolerates full liquid better, plan to advanced to soft foods.   04/30 Repeat CT abdomen ordered, showed the pancreas with multiple fluid collections anterior to the pancreas and within the root of the mesentery and extending into the anterior right pararenal space and right paracolic gutter.     5/1 D?W with [facility] (Dr. Tapia) regarding fluid collection. They believe this is sec to pancreatitis and nothing to do for now.

## 2025-05-15 NOTE — ASSESSMENT & PLAN NOTE
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  Pulmonology consulted, started on steroids and s/p bronchoscopy with normal findings, BAL culture with stenotrophomonas.   Steroid taper plan per Pulmonology, completed course of antibiotics.   Respiratory status is stable.   5/15 Breathing comfortably today

## 2025-05-15 NOTE — PLAN OF CARE
05/15/25 0944   Rounds   Attendance Nurse ;Charge nurse;Occupational therapist;Pharmacist  (Resp therapist)   Discharge Plan A Comfort care/withdrawal   Why the patient remains in the hospital Requires continued medical care   Transition of Care Barriers None     Per IDTM. Ng tube has been removed. Oral intake is poor. Patient has been transitioned to full comfort. Morphine iv and ativan iv prn ordered. Patient gets oxygen as needed. Cm visited patients room after meeting. Patients sister john is at the bedside. Cm will follow.

## 2025-05-16 NOTE — PLAN OF CARE
[facility]  Discharge Final Note    Primary Care Provider: Sil Brown DO    Expected Discharge Date: 2025    Final Discharge Note (most recent)       Final Note - 25 0949          Final Note    Assessment Type Final Discharge Note     Anticipated Discharge Disposition                      Important Message from Medicare  Important Message from Medicare regarding Discharge Appeal Rights: Explained to patient/caregiver, Signed/date by patient/caregiver     Date IMM was signed: 25  Time IMM was signed: 1210      Patient . Cm visited with patients sister john who has been at  the bedside.

## 2025-05-16 NOTE — DISCHARGE SUMMARY
[facility] Medicine  Discharge Summary      Patient Name: Magan Saleem  MRN: [id number]  SHELLY: [id number]  Patient Class: IP- Inpatient  Admission Date: 4/26/2025  Hospital Length of Stay: 20 days  Discharge Date and Time: 05/16/2025 8:56 AM  Attending Physician: Wally Lagos DO   Discharging Provider: Wally Lagos DO  Primary Care Provider: Sil Brown DO    Primary Care Team: Networked reference to record PCT     HPI:   68 yo F presents to [place] ED from [facility] for abnormal labs.  Patient was discharged from [facility] two days ago to skilled nursing facility for rehab.  She was diagnosed with dehydration due to gastroparesis with UTI.  Patient has metastatic lung cancer (to the brain) and follows with Dr. Swanson for IV chemotherapy every other week and takes lazertinib daily.  She has completed her course of radiation for the brain mets and follow had showed some improvement.  I thought she had either some decreased LOC due to encephalopathy or some aphasia from the brain mets but after a great effort to get her to talk, I realized she was just mad.  She wanted to know why she had been discharged two days ago when she could not eat.  She has no appetite and early satiety.  She is not nauseated and no vomiting.  She has decided on a DNR status previously (her caretaker and friend of 20 years) states that she had always said she did not want resuscitation if she experienced cardiopulmonary arrest and had told her children her wishes but she does want treatment and is not opposed to J tube if needed.  She has not had anything to eat or drink in two days and is dehydrated again.       Patient was transferred because of concern of sepsis.  She did have enterococcus faecalis UTI at [facility] and was treated.  I do not have the culture results but cultures were sent and patient does have some yeast noted.  (Will not treat a yeast colonization).  She is  afebrile and hemodynamically stable and does not look septic clinically.  Her Lactic acid is stable at 2.5 dara 3.2.  Will check another in am after volume resuscitation.  Her creat is at baseline but she has prerenal azotemia further confirming the dehydration.  Patient has an IO in place from [facility] due to dehydration and difficult stick but with some volume resuscitation, we have gotten an IV.  She is covid and flu negative.       Her WBC is 29 and I am not sure if she has received neupogen but could be a stress reaction.  Her BS is 245 and she does have some urine ketones but most likely due to starvation ketosis.  Will check a serum ketone.  Her platelets are 88K but this is most likely from her chemo.  She is not anemic.  CXR shows some patchy infiltrate but no obvious obstructive pneumonia from her lung cancer.  Her BNP about 3K but no clinical signs of CHF.  No recent echo but due to her BMI 50 most likely has some PHTN.  EKG had no ischemic changes but tachy at 114 which could also be from dehydration.  She was on ozempic for her DM and this could be the cause of her decreased appetite.  She is also on decadron for prevention of cerebral edema.       Remainder of ROS as below.  She mostly just nods and shakes her head and rolls her eyes.  You can get her to laugh if you try but she has been depressed since she has not walked since her hospitalization at [facility] on 3/19/25 and was discharged two days ago.  She says that at her last chemo she noticed she was getting weaker and her daughter had to help her in and out of the car and that was new for her.      4/5- patient seen examined today resting comfortably in bed and in no acute distress, with no acute events overnight.  Patient has a multitude of issues complicating her medical picture.  White blood cell count 64983 today, sodium 159, potassium 3.2 and BUN creatinine 59 and 1.8.  The patient is still not eating even when assistance is provided.  We have already  changed her fluids to half-normal saline with 20 of KCl at 1:25 a.m..  Have also put in a GI consult for possible feeding tube.  E coli in the urine has turned out to be ESBL and Rocephin has been changed Merrem.     Hospital Course at [facility]:    4/6- the patient seen examined today resting comfortably in bed, in no acute distress, in no acute events overnight.  The patient is not very talkative today, but states she is doing okay.  She has no acute complaints.  Blood cultures remain negative.  The patient has not eaten since yesterday and is on light IV fluids.  GI has been consulted for feeding tube.  Continues on Merrem for positive urine culture.  04/07 Records reviewed. Not eating which not new, Similar during recent admit at [facility]. Dr Swanson there had question pancreatitis. No abd pain or tenderness reported now. Question of dysphagia to solids. Chart hx gastroparesis. Will discuss with GI. Ronnie says has responded so far well to treatment for lung CA.   04/08 had EGD at [facility] 03/21/25 with no obstruction and evidence gastroparesis. Still not ending. Try additional meds. Talked with GI. Increase activity  04/09 Some better with med  changes  04/10 Continues to do better. Ate 1/2 fish sandwich for lunch. Called by Dr Swanson and she wanting to keep feeding tube in consideration. Talked with Dr Reich and Dr Lemus. If something needed with gastroparesis would have to have post gastric position of tube.   04/11 eating some. Later staff requested some nystatin for sore mouth.   04/12 still not eating well.  Increased peripheral edema.  Albumin low at 1.5.  Will give some albumin and follow with some Lasix.  Placed Dobbhoff tube and start enteral feedings.  This will help with nutrition and if issue of placing surgical tube later make sure will tolerate enteral feedings.  Chest x-ray continues worse on left side.  Discuss with Pulmonary and ask Dr. Villasenor to see.   04/13 no new issues.  Enteral feedings to be  started.  Pulmonary evaluation appreciated and plans noted.  04/14 Tolerating feedings Therapy working with her. Bronchoscopy planned.   4/15 BAL today  4/16 bronch yesterday looks like pneumonitis  4/17 not candidate for PEG  04/18 Eating some now. Pt says feels some better than last seen. Look at placement. High dose steroids by pulmonary. BS not as bad as would expect.  04/19 states continued eat some.  Less nausea.  Blood sugar not sure bad considering the steroids.  Pulmonary adjusted antibiotics based on cultures.  Look at it placement next week.   04/20 Looks the small. C/O SOB to nursing staff earlier but ABG OK. Poor oral intake ; encourage for pt to do more. Looking into placement.  04/21 Firm tender area in abd wall above umbilicus; ?hernia. Abnormal CT de santiago noted and will discuss with surgery.   04/22 CT shows evolving pancreatitis which pt treated for acouple weeks earlier at [facility]. Reviewed with Dr Tapia. No plan to operate on ventral hernia. Discussed later with Dr York. See how does off IVF and encourage oral intake. WBC and Cr both slight better.   04/23 Looks this same. Pt eating some. Pt getting discouraged and asking about home hospice. Talked by phone with her friend Shauna. In conversion doesn't sound like family would be able to care for pt at home. Encouraged pt to give some time and attempt SWB. She says she with consider tonight.   04/24 Lab worse but pt looks some better. Still not taking in well. Maybe eat better as pancreatitis further resolves. Considering replacing dobbhoff feeding tube and look into move to [facility]. Ask Dr Frias to review renal situation. Maybe pancreatitis,pneumonia,renal failure, all these might make a big difference if resolved. Talked with pt and she was OK with NGT  04/25 patient's spirits seems to be doing some better.  Dobbhoff tube placed in feedings to be started.  To be moved to LTAC.  No other new issue  04/26 Awaiting bed assignment at [facility]. No new  complains.     * No surgery found *      Hospital Course:   4/28- BG trending up, added Lantus and adjusted dose. D/W nutritionist about changing feeds to allow for more PO intake.     4/29- BG better now. Off IV amiodarone infusion, remains in NSR. Encourage PO intake.     4/30- Continue to adjust insulin to get BG under 200, ideally 140-180. Checking CT chest, abdomen and pelvis today given persistent leukocytosis.     5/1- Discussing with surgery about fluid collections in the abdomen noted on CT (slightly increased in size), also asking if PEG is an option as previously deemed not a candidate. Nephrology has added bicarb infusion. Continue current management otherwise.     5/2- IV fluids started per nephro. WBC count remains elevated.     5/3- Started GOC conversations with the patient who is alert, asked her if her family can be with her or her daughter who she is closest to. She said she will try to talk to them.     5/4- Discussed poor prognosis with the patient. Labs are essentially unchanged. Repeat blood cultures ordered.     5/5   - hypotensive overnight, given fluids, minimal response  - discussed with pulm no additional recs  - renal function still elevated, nephro following  - going to try and reach out to Dr. Swanson    5/6  - refusing feeds and not eating  - stopped lactic acid drawing  -- will discuss goals of care again with family    5/7  - discussed with oncology who will come see patient tomorrow  - reviewed labs and decided to consult tele-ID as continues with leukocytosis with bandemia that likely isnt reactive response to steroids, elevated lactic acid, continues on fluids  - nephrology following, renal function noted  - overall goals of care pending patient's discussion with oncology      5/8  - all consult notes reviewed  - gi recommendation noted  - will discuss with family and patient about goals of care    5/9  - discussed with all consults, will call a family meeting to discuss goals of  care    5/10  - new wounds noted, severe weeping of current wounds as well  - bp low today, fluid flushes at max, will give albumin for secondary resuscitation   - attempted to call family 3 times, no answer, need to setup family meeting for hospice talk      - patient declining, hypotensive however asymptomatic, will add midodrine  - planning for family meeting Tuesday at 1 pm      - planning for family meeting tomorrow at 1  - patient with hypoglycemia and hypotension/hypothermia today, overall condition poor, stopped lantus, amy budgger applied  - hgb low with hypotension, treating as symptomatic, transfuse one unit      - long discussion with family and patient, moving to full comfort care  - assessed overall condition and agree that patient qualifies for GIP and likely will pass in the immediate future, moving to comfort care here  - planning to pull NG tube tomorrow once daughter is able to be at bedside  - vitals q shift, comfort meds added   comfort care  5/15 comfort measures only.  Patient with no complaints    8:02a this morning     Goals of Care Treatment Preferences:  Code Status: DNR          What is most important right now is to focus on symptom/pain control, quality of life, even if it means sacrificing a little time, improvement in condition but with limits to invasive therapies.  Accordingly, we have decided that the best plan to meet the patient's goals includes continuing with treatment.      SDOH Screening:  The patient was screened for utility difficulties, food insecurity, transport difficulties, housing insecurity, and interpersonal safety and there were no concerns identified this admission.     Consults:   Consults (From admission, onward)          Status Ordering Provider     Inpatient consult to Midline team  Once        Provider:  (Not yet assigned)    Acknowledged NERY CAMPBELL     Inpatient consult to General Surgery  Once        Provider:  Cabrera Tapia MD  "   Acknowledged NERY CAMPBELL     Inpatient consult to Gastroenterology  Once        Provider:  Aurelio Reich MD    Completed NERY CAMPBELL     Inpatient consult to Hematology/Oncology  Once        Provider:  Ca Swanson MD    Completed NERY CAMPBELL     Inpatient Consult to [facility] Telemedicine  Once        Provider:  (Not yet assigned)    Completed NERY CAMPBELL     Inpatient consult to Cardiology  Once        Provider:  Shankar Gray MD    Completed RAYMOND ZHAO            Assessment & Plan  Acute kidney injury superimposed on stage 4 chronic kidney disease  JOHNATHON is likely due to pre-renal azotemia due to intravascular volume depletion. Baseline creatinine is ~ 2.0. Most recent creatinine and eGFR are listed below.  No results for input(s): "CREATININE", "EGFRNORACEVR" in the last 72 hours.     Plan  - JOHNATHON is worsening now.   - Avoid nephrotoxins and renally dose meds for GFR listed above  - Monitor urine output, serial BMP, and adjust therapy as needed  - Nephrology consulted, started on Lasix, bicarb is to alkalinize the urine for hyperuricemia.   - At risk to require dialysis, discussed with the patient and she wants dialysis if indicated.   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  Pulmonology consulted, started on steroids and s/p bronchoscopy with normal findings, BAL culture with stenotrophomonas.   Steroid taper plan per Pulmonology, completed course of antibiotics.   Respiratory status is stable.   5/15 Breathing comfortably today    Infection due to Stenotrophomonas maltophilia  Sepsis due to pneumonia  Stenotrophomonas lower respiratory infection in this patient with multifocal infiltrates and consolidative opacities on CT Chest.   S/p 2 week course with Levaquin.   Leukocytosis persists, will follow procalcitonin levels Q48H.  Repeat CT chest stable.  Consider tele-ID " "consultation if leukocytosis does not improve.     Stenotrophomonas lower respiratory infection in this patient with multifocal infiltrates and consolidative opacities on CT Chest.   S/p 2 week course with Levaquin.   Leukocytosis persists, will follow procalcitonin levels Q48H.  Repeat CT chest stable.  Consider tele-ID consultation if leukocytosis does not improve.     5/7 consulted tele ID today  SVT (supraventricular tachycardia)  Went into SVT on 4/27, not responsive to multiple rounds of adenosine.  Started on amiodarone infusion after bolus, turned off on 4/29.  Cardiology consulted, no additional recommendations but could consider cardioversion if this becomes a recurrent and a refractory issue.   Continue beta blocker.   Monitor on tele.     Acute pancreatitis  Intra-abdominal fluid collection  CT abdomen/pelvis showed- Possible small fluid collection anterior to the pancreas measuring approximately 3.6 cm on axial 41. Probable complex collection slightly inferior to the pancreatic head extending to the right pelvis and right iliac fossa.     04/21 Abd fluid collection in area pancreas noted on CT abd, discussed with surgery  04/22 likely evolving pancreatitis dx a couple weeks ago at [facility] now with pseudocyst.  No severe epigastric pain.  04/26 Ongoing tube feeds and full liquid diet. If tolerates full liquid better, plan to advanced to soft foods.   04/30 Repeat CT abdomen ordered, showed the pancreas with multiple fluid collections anterior to the pancreas and within the root of the mesentery and extending into the anterior right pararenal space and right paracolic gutter.     5/1 D?W with GS (Dr. Tapia) regarding fluid collection. They believe this is sec to pancreatitis and nothing to do for now.     Gastroparesis  EGD by Dr. Lo during recent past admission at [facility]:  "EGD on 03/21/2025 shows LA class B erosive esophagitis but no pill esophagitis, nonerosive gastritis and retention of " "food and fluid suspicious for gastroparesis, due to narcotics and diabetes. "  GI consulted, unable to advance diet and poor candidate for PEG.  Dobhoff in place for feeds, continued on Reglan.   GI recommended surgical PEG due to body habitus, GS stated that patient is not a candidate for PEG based on co morbidities and poor prognosis.   Continue PPI.   Comfortable today  Lung cancer metastatic to brain  DNR but not hospice.   Receives chemo and has finished radiation.    Follows with Dr. Swanson.    5/5 will attempt to contact Dr. Swanson  Goals of care, counseling/discussion  DNR confirmed.  Patient wants permanent feeding tube and dialysis if needed.   Encouraged patient to talk to her family about her prognosis.  Currently PEG is not option as mentioned above so nutrition could be a major factor in driving the hospice discussion on top of her underlying metastatic cancer.   Consider primary oncologist- Dr. Mata consultation to get an idea of her prognosis related to cancer as patient is relying on that to make further decisions.      5/11  - patient declining, refusing tube feeds at times, and overall appetite nonexistent  - discussed with several specialists including oncology and recommendations noted  - planning for family meeting Tuesday at 1 pm for hospice    5/13  - moving to full comfort care  Nonischemic nontraumatic myocardial injury  In the setting of SVT episodes.  Trend is flat, no chest pain and no ischemic changes noted on EKG.  No ischemic workup recommended per cardiology.     Hyperuricemia  Hyperphosphatemia  Defer management to nephrology- started on allopurinol and IV bicarb to alkalinize the urine.   Follow uric acid levels.     Essential hypertension  Patient's blood pressure range in the last 24 hours was: BP  Min: 92/40  Max: 119/50.The patient's inpatient anti-hypertensive regimen is listed below:  Current Antihypertensives       Plan  - BP is controlled, no changes needed to their " "regimen    Type 2 diabetes mellitus with hyperglycemia  Patient's FSGs are controlled on current medication regimen.  Last A1c reviewed-   Lab Results   Component Value Date    HGBA1C 6.7 04/02/2025     Most recent fingerstick glucose reviewed- No results for input(s): "POCTGLUCOSE" in the last 24 hours.  Current correctional scale  Low  Maintain anti-hyperglycemic dose as follows-   Antihyperglycemics (From admission, onward)      Start     Stop Route Frequency Ordered    05/11/25 2321  insulin glargine U-100 (Lantus) injection 25 Units         05/12/25 2059 SubQ Nightly 05/11/25 2322          Hold Oral hypoglycemics while patient is in the hospital.  BG ranging up as tube feeds now to goal and D5 in bicarb drip, added Lantus and dose adjusted for tighter glycemic control.     Normocytic anemia  Anemia is likely due to chronic disease due to Malignancy. Most recent hemoglobin and hematocrit are listed below.  No results for input(s): "HGB", "HCT" in the last 72 hours.    Plan  - Monitor serial CBC: Daily  - Transfuse PRBC if patient becomes hemodynamically unstable, symptomatic or H/H drops below 7/21.  - Patient has not received any PRBC transfusions to date  - Patient's anemia is currently stable  - No evidence of bleeding.    Neoplastic (malignant) related fatigue  Patient with Acute on chronic debility due to neoplastic/malignant related fatigue. Latest AMPAC and GEMS scores have been reviewed. Evaluation for etiology is complete. Plan includes - Progressive mobility protocol initated  - PT/OT consulted  - Fall precautions in place.    Chronic pulmonary embolism without acute cor pulmonale  Eliquis was discontinued due to risk of ICH with brain mets.  Currently on hold in case surgical procedure is required.    Morbid obesity  Body mass index is 46.22 kg/m². Morbid obesity complicates all aspects of disease management from diagnostic modalities to treatment. Weight loss encouraged and health benefits explained " "to patient.     Moderate persistent asthma without complication  Stable without exacerbation.  Continue PRN nebs.     Edema due to hypoalbuminemia  Required albumin infusion intermittently during the stay.     Mixed hyperlipidemia  Resume statin.     Pancreatic pseudocyst  Transitioned to comfort only measures    History of pancreatitis      Acute pancreatic fluid collection      Hypokalemia  Patient's most recent potassium results are listed below.   No results for input(s): "K" in the last 72 hours.    Plan  -transitioned to comfort only measures  Hyponatremia  Transitioned to comfort only measures  Thrombocytopenia  Transitioned to comfort only measures    Final Active Diagnoses:    Diagnosis Date Noted POA    PRINCIPAL PROBLEM:  Pneumonitis [J98.4] 04/16/2025 Yes    Hypokalemia [E87.6] 05/14/2025 Yes    Hyponatremia [E87.1] 05/14/2025 Yes    Thrombocytopenia [D69.6] 05/14/2025 Yes    Pancreatic pseudocyst [K86.3] 05/08/2025 Yes    History of pancreatitis [Z87.19] 05/08/2025 Not Applicable    Acute pancreatic fluid collection [K86.89] 05/08/2025 Yes    Goals of care, counseling/discussion [Z71.89] 05/03/2025 Not Applicable    Intra-abdominal fluid collection [R18.8] 05/01/2025 Yes    SVT (supraventricular tachycardia) [I47.10] 04/27/2025 No    Nonischemic nontraumatic myocardial injury [I5A] 04/27/2025 No    Hyperphosphatemia [E83.39] 04/26/2025 Yes    Hyperuricemia [E79.0] 04/26/2025 Yes    Infection due to Stenotrophomonas maltophilia [A49.8] 04/22/2025 Yes    Acute pancreatitis [K85.90] 04/21/2025 Yes    Neoplastic (malignant) related fatigue [R53.0] 04/15/2025 Yes    Normocytic anemia [D64.9] 04/15/2025 Yes    Edema due to hypoalbuminemia [E88.09] 04/12/2025 Yes    Type 2 diabetes mellitus with hyperglycemia [E11.65] 04/04/2025 Yes    Sepsis due to pneumonia [J18.9, A41.9] 04/04/2025 Yes    Gastroparesis [K31.84]  Yes    Lung cancer metastatic to brain [C34.90, C79.31]  Yes    Morbid obesity [E66.01] " 2025 Yes    Acute kidney injury superimposed on stage 4 chronic kidney disease [N17.9, N18.4]  Yes    Chronic pulmonary embolism without acute cor pulmonale [I27.82]  Yes    Moderate persistent asthma without complication [J45.40] 10/30/2024 Yes    Essential hypertension [I10] 2021 Yes    Mixed hyperlipidemia [E78.2] 2021 Yes      Problems Resolved During this Admission:       Discharged Condition: good    Disposition:     Follow Up:    Patient Instructions:   No discharge procedures on file.    Significant Diagnostic Studies: Labs: All labs within the past 24 hours have been reviewed    Pending Diagnostic Studies:       Procedure Component Value Units Date/Time    EXTRA TUBES [[id number]] Collected: 25 0509    Order Status: Sent Lab Status: In process Updated: 25    Specimen: Blood, Venous     Narrative:      The following orders were created for panel order EXTRA TUBES.  Procedure                               Abnormality         Status                     ---------                               -----------         ------                     Gold Top Hold[[id number]]                                   In process                   Please view results for these tests on the individual orders.    EXTRA TUBES [[id number]] Collected: 25 0515    Order Status: Sent Lab Status: In process Updated: 25    Specimen: Blood, Venous     Narrative:      The following orders were created for panel order EXTRA TUBES.  Procedure                               Abnormality         Status                     ---------                               -----------         ------                     Lavender Top Hold[[id number]]                               In process                 Gold Top Hold[[id number]]                                   In process                   Please view results for these tests on the individual orders.           Medications:  Reconciled Home Medications:       Medication List        STOP taking these medications      albuterol 2.5 mg /3 mL (0.083 %) nebulizer solution  Commonly known as: PROVENTIL     albuterol-ipratropium 2.5 mg-0.5 mg/3 mL nebulizer solution  Commonly known as: DUO-NEB     atorvastatin 10 MG tablet  Commonly known as: LIPITOR     ELIQUIS 5 mg Tab  Generic drug: apixaban     Lactobacillus acidophilus 500 million cell Cap     levoFLOXacin 500 MG tablet  Commonly known as: LEVAQUIN     metoclopramide HCl 5 MG tablet  Commonly known as: REGLAN     metoprolol succinate 25 MG 24 hr tablet  Commonly known as: TOPROL-XL     montelukast 10 mg tablet  Commonly known as: SINGULAIR     NovoLOG Flexpen U-100 Insulin 100 unit/mL (3 mL) Inpn pen  Generic drug: insulin aspart U-100     pantoprazole 40 MG tablet  Commonly known as: PROTONIX     predniSONE 10 MG tablet  Commonly known as: DELTASONE     rOPINIRole 0.25 MG tablet  Commonly known as: REQUIP     sertraline 25 MG tablet  Commonly known as: ZOLOFT     sodium bicarbonate 650 MG tablet              Indwelling Lines/Drains at time of discharge:   Lines/Drains/Airways       Peripherally Inserted Central Catheter Line  Duration             PICC Double Lumen 04/17/25 1547 left brachial 28 days              Drain  Duration             Female External Urinary Catheter w/ Suction 04/21/25 1950 24 days                    Time spent on the discharge of patient: 25 minutes         Wally Lagos DO  Department of Hospital Medicine  [facility]

## 2025-05-16 NOTE — ASSESSMENT & PLAN NOTE
"Anemia is likely due to chronic disease due to Malignancy. Most recent hemoglobin and hematocrit are listed below.  No results for input(s): "HGB", "HCT" in the last 72 hours.    Plan  - Monitor serial CBC: Daily  - Transfuse PRBC if patient becomes hemodynamically unstable, symptomatic or H/H drops below 7/21.  - Patient has not received any PRBC transfusions to date  - Patient's anemia is currently stable  - No evidence of bleeding.    "

## 2025-05-16 NOTE — PLAN OF CARE
Problem: Diabetes Comorbidity  Goal: Blood Glucose Level Within Targeted Range  Outcome: Progressing     Problem: Diabetes Comorbidity  Goal: Blood Glucose Level Within Targeted Range  Outcome: Progressing     Problem: Acute Kidney Injury/Impairment  Goal: Fluid and Electrolyte Balance  Outcome: Progressing  Goal: Improved Oral Intake  Outcome: Progressing  Goal: Effective Renal Function  Outcome: Progressing     Problem: Acute Kidney Injury/Impairment  Goal: Fluid and Electrolyte Balance  Outcome: Progressing     Problem: Acute Kidney Injury/Impairment  Goal: Improved Oral Intake  Outcome: Progressing     Problem: Acute Kidney Injury/Impairment  Goal: Effective Renal Function  Outcome: Progressing     Problem: Bariatric Environmental Safety  Goal: Safety Maintained with Care  Outcome: Progressing     Problem: Bariatric Environmental Safety  Goal: Safety Maintained with Care  Outcome: Progressing     Problem: Skin Injury Risk Increased  Goal: Skin Health and Integrity  Outcome: Progressing     Problem: Skin Injury Risk Increased  Goal: Skin Health and Integrity  Outcome: Progressing

## 2025-05-16 NOTE — CARE UPDATE
At 8:02a notified by nursing staff that Ms. Saleem's cardiac rhythm was asystole on the monitor. Presented to bedside. Non responsive to verbal or tactile stimuli.  Heart and lung sounds absent. Pupils fixed and non reactive to light. TOD 8:02a

## 2025-05-16 NOTE — NURSING
08:02 No palpable pulse, no respirations noted. Dr Adames notified. Family @ bedside.  08:28 Dr adames @ bedside.  08:30 additional family arrived @ bedside-requested [facility]  Home to receive body.[phone] number for  home.  09:00 Post mortuum care completed.  Home aware of family wishes.  11:53  Home here for the body.  11:55 Patient discharge to [facility]  [place].

## 2025-05-16 NOTE — ASSESSMENT & PLAN NOTE
CT abdomen/pelvis showed- Possible small fluid collection anterior to the pancreas measuring approximately 3.6 cm on axial 41. Probable complex collection slightly inferior to the pancreatic head extending to the right pelvis and right iliac fossa.     04/21 Abd fluid collection in area pancreas noted on CT abd, discussed with surgery  04/22 likely evolving pancreatitis dx a couple weeks ago at [facility], now with pseudocyst.  No severe epigastric pain.  04/26 Ongoing tube feeds and full liquid diet. If tolerates full liquid better, plan to advanced to soft foods.   04/30 Repeat CT abdomen ordered, showed the pancreas with multiple fluid collections anterior to the pancreas and within the root of the mesentery and extending into the anterior right pararenal space and right paracolic gutter.     5/1 D?W with [facility] (Dr. Tapia) regarding fluid collection. They believe this is sec to pancreatitis and nothing to do for now.

## 2025-05-16 NOTE — ASSESSMENT & PLAN NOTE
"JOHNATHON is likely due to pre-renal azotemia due to intravascular volume depletion. Baseline creatinine is ~ 2.0. Most recent creatinine and eGFR are listed below.  No results for input(s): "CREATININE", "EGFRNORACEVR" in the last 72 hours.     Plan  - JOHNATHON is worsening now.   - Avoid nephrotoxins and renally dose meds for GFR listed above  - Monitor urine output, serial BMP, and adjust therapy as needed  - Nephrology consulted, started on Lasix, bicarb is to alkalinize the urine for hyperuricemia.   - At risk to require dialysis, discussed with the patient and she wants dialysis if indicated.   5/14 Transitioned to comfort only measures  "

## 2025-05-16 NOTE — ASSESSMENT & PLAN NOTE
"EGD by Dr. Lo during recent past admission at [facility]:  "EGD on 03/21/2025 shows LA class B erosive esophagitis but no pill esophagitis, nonerosive gastritis and retention of food and fluid suspicious for gastroparesis, due to narcotics and diabetes. "  GI consulted, unable to advance diet and poor candidate for PEG.  Dobhoff in place for feeds, continued on Reglan.   GI recommended surgical PEG due to body habitus, GS stated that patient is not a candidate for PEG based on co morbidities and poor prognosis.   Continue PPI.   Comfortable today  "

## 2025-05-16 NOTE — ASSESSMENT & PLAN NOTE
CT abdomen/pelvis showed- Possible small fluid collection anterior to the pancreas measuring approximately 3.6 cm on axial 41. Probable complex collection slightly inferior to the pancreatic head extending to the right pelvis and right iliac fossa.     04/21 Abd fluid collection in area pancreas noted on CT abd, discussed with surgery  04/22 likely evolving pancreatitis dx a couple weeks ago at [facility], now with pseudocyst.  No severe epigastric pain.  04/26 Ongoing tube feeds and full liquid diet. If tolerates full liquid better, plan to advanced to soft foods.   04/30 Repeat CT abdomen ordered, showed the pancreas with multiple fluid collections anterior to the pancreas and within the root of the mesentery and extending into the anterior right pararenal space and right paracolic gutter.     5/1 D?W with [facility] (Dr. Tapia) regarding fluid collection. They believe this is sec to pancreatitis and nothing to do for now.

## 2025-05-16 NOTE — ASSESSMENT & PLAN NOTE
"Patient's most recent potassium results are listed below.   No results for input(s): "K" in the last 72 hours.    Plan  -transitioned to comfort only measures  "

## 2025-05-16 NOTE — ASSESSMENT & PLAN NOTE
Patient's blood pressure range in the last 24 hours was: BP  Min: 92/40  Max: 119/50.The patient's inpatient anti-hypertensive regimen is listed below:  Current Antihypertensives       Plan  - BP is controlled, no changes needed to their regimen